# Patient Record
Sex: MALE | Race: WHITE | NOT HISPANIC OR LATINO | Employment: FULL TIME | URBAN - METROPOLITAN AREA
[De-identification: names, ages, dates, MRNs, and addresses within clinical notes are randomized per-mention and may not be internally consistent; named-entity substitution may affect disease eponyms.]

---

## 2021-09-08 LAB — CRC RECOMMENDATION EXT: NORMAL

## 2023-02-09 ENCOUNTER — TELEPHONE (OUTPATIENT)
Dept: HEPATOLOGY | Facility: CLINIC | Age: 63
End: 2023-02-09
Payer: COMMERCIAL

## 2023-02-09 NOTE — TELEPHONE ENCOUNTER
Attempted to contact patient at [phone] with no answer. Left voicemail message for patient to return call.     Re: external referral received from Dr. Germain

## 2023-02-10 ENCOUNTER — TELEPHONE (OUTPATIENT)
Dept: HEPATOLOGY | Facility: CLINIC | Age: 63
End: 2023-02-10
Payer: COMMERCIAL

## 2023-02-10 NOTE — TELEPHONE ENCOUNTER
----- Message from Goldie Fatima sent at 2/10/2023  2:34 PM CST -----  Type:  Patient Returning Call    Who Called:pt  Who Left Message for Patient:nurse  Does the patient know what this is regarding?:return call  Would the patient rather a call back or a response via [facility]? call  Best Call Back Number:[phone]  Additional Information: .    Thank you

## 2023-02-10 NOTE — TELEPHONE ENCOUNTER
Returned call to patient at [phone] with no answer. Left voicemail message for patient to return call.

## 2023-02-10 NOTE — TELEPHONE ENCOUNTER
Spoke with patient and scheduled an appointment for 02/15/2023 with Dr. Joleen Colmenares at [facility].

## 2023-02-10 NOTE — TELEPHONE ENCOUNTER
----- Message from Gia Isidro RN sent at 2/9/2023  4:12 PM CST -----  Contact: self [phone]    ----- Message -----  From: Roxie Coronel  Sent: 2/9/2023   3:47 PM CST  To: Darrian [facility]ATERS Staff    Patient calling back after missing a call from Deb from you office. Please call back [phone]

## 2023-02-15 ENCOUNTER — OFFICE VISIT (OUTPATIENT)
Dept: HEPATOLOGY | Facility: CLINIC | Age: 63
End: 2023-02-15
Payer: COMMERCIAL

## 2023-02-15 ENCOUNTER — LAB VISIT (OUTPATIENT)
Dept: LAB | Facility: HOSPITAL | Age: 63
End: 2023-02-15
Attending: INTERNAL MEDICINE
Payer: COMMERCIAL

## 2023-02-15 VITALS
SYSTOLIC BLOOD PRESSURE: 128 MMHG | BODY MASS INDEX: 30.71 KG/M2 | DIASTOLIC BLOOD PRESSURE: 72 MMHG | HEIGHT: 71 IN | WEIGHT: 219.38 LBS | HEART RATE: 82 BPM

## 2023-02-15 DIAGNOSIS — R16.0 LIVER MASSES: ICD-10-CM

## 2023-02-15 DIAGNOSIS — K74.69 OTHER CIRRHOSIS OF LIVER: Primary | ICD-10-CM

## 2023-02-15 DIAGNOSIS — K74.69 OTHER CIRRHOSIS OF LIVER: ICD-10-CM

## 2023-02-15 LAB
ALBUMIN SERPL BCP-MCNC: 3 G/DL (ref 3.5–5.2)
ALP SERPL-CCNC: 205 U/L (ref 55–135)
ALT SERPL W/O P-5'-P-CCNC: 63 U/L (ref 10–44)
ANION GAP SERPL CALC-SCNC: 10 MMOL/L (ref 8–16)
AST SERPL-CCNC: 90 U/L (ref 10–40)
BASOPHILS # BLD AUTO: 0.02 K/UL (ref 0–0.2)
BASOPHILS NFR BLD: 0.5 % (ref 0–1.9)
BILIRUB SERPL-MCNC: 3.4 MG/DL (ref 0.1–1)
BUN SERPL-MCNC: 8 MG/DL (ref 8–23)
CALCIUM SERPL-MCNC: 8.5 MG/DL (ref 8.7–10.5)
CHLORIDE SERPL-SCNC: 103 MMOL/L (ref 95–110)
CO2 SERPL-SCNC: 26 MMOL/L (ref 23–29)
CREAT SERPL-MCNC: 0.8 MG/DL (ref 0.5–1.4)
DIFFERENTIAL METHOD: ABNORMAL
EOSINOPHIL # BLD AUTO: 0.1 K/UL (ref 0–0.5)
EOSINOPHIL NFR BLD: 2.8 % (ref 0–8)
ERYTHROCYTE [DISTWIDTH] IN BLOOD BY AUTOMATED COUNT: 13.4 % (ref 11.5–14.5)
EST. GFR  (NO RACE VARIABLE): >60 ML/MIN/1.73 M^2
GLUCOSE SERPL-MCNC: 178 MG/DL (ref 70–110)
HCT VFR BLD AUTO: 42.6 % (ref 40–54)
HGB BLD-MCNC: 14.7 G/DL (ref 14–18)
IMM GRANULOCYTES # BLD AUTO: 0 K/UL (ref 0–0.04)
IMM GRANULOCYTES NFR BLD AUTO: 0 % (ref 0–0.5)
INR PPP: 2.4 (ref 0.8–1.2)
LYMPHOCYTES # BLD AUTO: 1.2 K/UL (ref 1–4.8)
LYMPHOCYTES NFR BLD: 27.7 % (ref 18–48)
MCH RBC QN AUTO: 31.3 PG (ref 27–31)
MCHC RBC AUTO-ENTMCNC: 34.5 G/DL (ref 32–36)
MCV RBC AUTO: 91 FL (ref 82–98)
MONOCYTES # BLD AUTO: 0.4 K/UL (ref 0.3–1)
MONOCYTES NFR BLD: 8.6 % (ref 4–15)
NEUTROPHILS # BLD AUTO: 2.6 K/UL (ref 1.8–7.7)
NEUTROPHILS NFR BLD: 60.4 % (ref 38–73)
NRBC BLD-RTO: 0 /100 WBC
PLATELET # BLD AUTO: 71 K/UL (ref 150–450)
PMV BLD AUTO: 12.5 FL (ref 9.2–12.9)
POTASSIUM SERPL-SCNC: 3.5 MMOL/L (ref 3.5–5.1)
PROT SERPL-MCNC: 7.3 G/DL (ref 6–8.4)
PROTHROMBIN TIME: 25.2 SEC (ref 9–12.5)
RBC # BLD AUTO: 4.69 M/UL (ref 4.6–6.2)
SODIUM SERPL-SCNC: 139 MMOL/L (ref 136–145)
WBC # BLD AUTO: 4.3 K/UL (ref 3.9–12.7)

## 2023-02-15 PROCEDURE — 85025 COMPLETE CBC W/AUTO DIFF WBC: CPT | Performed by: INTERNAL MEDICINE

## 2023-02-15 PROCEDURE — 36415 COLL VENOUS BLD VENIPUNCTURE: CPT | Performed by: INTERNAL MEDICINE

## 2023-02-15 PROCEDURE — 4010F PR ACE/ARB THEARPY RXD/TAKEN: ICD-10-PCS | Mod: CPTII,S$GLB,, | Performed by: INTERNAL MEDICINE

## 2023-02-15 PROCEDURE — 1159F PR MEDICATION LIST DOCUMENTED IN MEDICAL RECORD: ICD-10-PCS | Mod: CPTII,S$GLB,, | Performed by: INTERNAL MEDICINE

## 2023-02-15 PROCEDURE — 86038 ANTINUCLEAR ANTIBODIES: CPT | Performed by: INTERNAL MEDICINE

## 2023-02-15 PROCEDURE — 85610 PROTHROMBIN TIME: CPT | Performed by: INTERNAL MEDICINE

## 2023-02-15 PROCEDURE — 86225 DNA ANTIBODY NATIVE: CPT | Performed by: INTERNAL MEDICINE

## 2023-02-15 PROCEDURE — 82728 ASSAY OF FERRITIN: CPT | Performed by: INTERNAL MEDICINE

## 2023-02-15 PROCEDURE — 99205 OFFICE O/P NEW HI 60 MIN: CPT | Mod: S$GLB,,, | Performed by: INTERNAL MEDICINE

## 2023-02-15 PROCEDURE — 99999 PR PBB SHADOW E&M-EST. PATIENT-LVL III: CPT | Mod: PBBFAC,,, | Performed by: INTERNAL MEDICINE

## 2023-02-15 PROCEDURE — 3074F PR MOST RECENT SYSTOLIC BLOOD PRESSURE < 130 MM HG: ICD-10-PCS | Mod: CPTII,S$GLB,, | Performed by: INTERNAL MEDICINE

## 2023-02-15 PROCEDURE — 1160F PR REVIEW ALL MEDS BY PRESCRIBER/CLIN PHARMACIST DOCUMENTED: ICD-10-PCS | Mod: CPTII,S$GLB,, | Performed by: INTERNAL MEDICINE

## 2023-02-15 PROCEDURE — 82390 ASSAY OF CERULOPLASMIN: CPT | Performed by: INTERNAL MEDICINE

## 2023-02-15 PROCEDURE — 86790 VIRUS ANTIBODY NOS: CPT | Performed by: INTERNAL MEDICINE

## 2023-02-15 PROCEDURE — 3078F PR MOST RECENT DIASTOLIC BLOOD PRESSURE < 80 MM HG: ICD-10-PCS | Mod: CPTII,S$GLB,, | Performed by: INTERNAL MEDICINE

## 2023-02-15 PROCEDURE — 80053 COMPREHEN METABOLIC PANEL: CPT | Performed by: INTERNAL MEDICINE

## 2023-02-15 PROCEDURE — 3074F SYST BP LT 130 MM HG: CPT | Mod: CPTII,S$GLB,, | Performed by: INTERNAL MEDICINE

## 2023-02-15 PROCEDURE — 3008F PR BODY MASS INDEX (BMI) DOCUMENTED: ICD-10-PCS | Mod: CPTII,S$GLB,, | Performed by: INTERNAL MEDICINE

## 2023-02-15 PROCEDURE — 99999 PR PBB SHADOW E&M-EST. PATIENT-LVL III: ICD-10-PCS | Mod: PBBFAC,,, | Performed by: INTERNAL MEDICINE

## 2023-02-15 PROCEDURE — 1160F RVW MEDS BY RX/DR IN RCRD: CPT | Mod: CPTII,S$GLB,, | Performed by: INTERNAL MEDICINE

## 2023-02-15 PROCEDURE — 1159F MED LIST DOCD IN RCRD: CPT | Mod: CPTII,S$GLB,, | Performed by: INTERNAL MEDICINE

## 2023-02-15 PROCEDURE — 87340 HEPATITIS B SURFACE AG IA: CPT | Performed by: INTERNAL MEDICINE

## 2023-02-15 PROCEDURE — 3008F BODY MASS INDEX DOCD: CPT | Mod: CPTII,S$GLB,, | Performed by: INTERNAL MEDICINE

## 2023-02-15 PROCEDURE — 86381 MITOCHONDRIAL ANTIBODY EACH: CPT | Performed by: INTERNAL MEDICINE

## 2023-02-15 PROCEDURE — 86235 NUCLEAR ANTIGEN ANTIBODY: CPT | Mod: 59 | Performed by: INTERNAL MEDICINE

## 2023-02-15 PROCEDURE — 84466 ASSAY OF TRANSFERRIN: CPT | Performed by: INTERNAL MEDICINE

## 2023-02-15 PROCEDURE — 99205 PR OFFICE/OUTPT VISIT, NEW, LEVL V, 60-74 MIN: ICD-10-PCS | Mod: S$GLB,,, | Performed by: INTERNAL MEDICINE

## 2023-02-15 PROCEDURE — 86015 ACTIN ANTIBODY EACH: CPT | Performed by: INTERNAL MEDICINE

## 2023-02-15 PROCEDURE — 86039 ANTINUCLEAR ANTIBODIES (ANA): CPT | Performed by: INTERNAL MEDICINE

## 2023-02-15 PROCEDURE — 3078F DIAST BP <80 MM HG: CPT | Mod: CPTII,S$GLB,, | Performed by: INTERNAL MEDICINE

## 2023-02-15 PROCEDURE — 82977 ASSAY OF GGT: CPT | Performed by: INTERNAL MEDICINE

## 2023-02-15 PROCEDURE — 82107 ALPHA-FETOPROTEIN L3: CPT | Performed by: INTERNAL MEDICINE

## 2023-02-15 PROCEDURE — 86706 HEP B SURFACE ANTIBODY: CPT | Performed by: INTERNAL MEDICINE

## 2023-02-15 PROCEDURE — 82103 ALPHA-1-ANTITRYPSIN TOTAL: CPT | Performed by: INTERNAL MEDICINE

## 2023-02-15 PROCEDURE — 4010F ACE/ARB THERAPY RXD/TAKEN: CPT | Mod: CPTII,S$GLB,, | Performed by: INTERNAL MEDICINE

## 2023-02-15 RX ORDER — NADOLOL 20 MG/1
1 TABLET ORAL EVERY MORNING
Status: ON HOLD | COMMUNITY
Start: 2023-01-25 | End: 2023-08-16 | Stop reason: HOSPADM

## 2023-02-15 RX ORDER — METFORMIN HYDROCHLORIDE 500 MG/1
500 TABLET, EXTENDED RELEASE ORAL 2 TIMES DAILY WITH MEALS
COMMUNITY
Start: 2022-11-22 | End: 2023-06-15

## 2023-02-15 RX ORDER — AMLODIPINE BESYLATE 10 MG/1
10 TABLET ORAL DAILY
Status: ON HOLD | COMMUNITY
Start: 2023-02-14 | End: 2023-08-16 | Stop reason: HOSPADM

## 2023-02-15 RX ORDER — EZETIMIBE 10 MG/1
10 TABLET ORAL EVERY MORNING
Status: ON HOLD | COMMUNITY
Start: 2023-01-20 | End: 2023-08-16 | Stop reason: HOSPADM

## 2023-02-15 RX ORDER — LISINOPRIL AND HYDROCHLOROTHIAZIDE 12.5; 2 MG/1; MG/1
1 TABLET ORAL DAILY
Status: ON HOLD | COMMUNITY
Start: 2023-01-12 | End: 2023-08-16 | Stop reason: HOSPADM

## 2023-02-15 RX ORDER — GLIMEPIRIDE 4 MG/1
4 TABLET ORAL EVERY MORNING
Status: ON HOLD | COMMUNITY
Start: 2023-01-12 | End: 2023-08-16 | Stop reason: HOSPADM

## 2023-02-15 NOTE — PROGRESS NOTES
Subjective:     Jed Chávez is here for evaluation of Cirrhosis and Liver lesions      HPI  Jed Chávez is here for eval of new liver lesions concerning for HCC. His dx of cirrhosis is relatively new thought 2/2 EtoH. He has not had EtOH in 90 days and is committed to abstinence.    No evidence of liver decompensation: no ascites, confusion or GI bleeding.      Outside records reviewed    Syncope from September 2021 shows a 4 mm polyp, external and internal hemorrhoids    Upper endoscopy from January 2023 shows small esophageal varices, small hiatal hernia, gastritis, normal duodenum    US 1/2023    There is cirrhosis of the liver.  No ascites.  Stable   splenomegaly.  Possible masses or nodules involving the right lobe the liver.   Consider further evaluation with a CT scan of the liver with and without   contrast using a hemangioma /cirrhosis protocol or MRI abdomen with and without   contrast.   1 cm gallstone.  CT 1/2023  Cirrhosis.  Several hyperdense lesions scattered throughout both lobes of the   liver measuring 4.3 cm.  In the setting of chronic liver disease these may   represent dysplastic nodules.  Recommend MRI correlation with and without   intravenous Gadavist contrast.   Splenomegaly.   Soft tissue density encasing the superior mesenteric artery raising concern for   vasculitis.   Cholelithiasis.   MRI 2/2023  1.  There are 2 nodules as detailed above concerning for hepatocellular   carcinoma, both in the right hepatic lobe.  Recommend correlation with the   patient's elevated protein levels.   2.  Cirrhosis with portal venous hypertension and splenomegaly but no ascites.   Main portal vein and intrahepatic branches are patent.   3.  Innumerable regenerative and hyperplastic nodules throughout the liver.    Review of Systems    Objective:     Physical Exam  Vitals reviewed.   Constitutional:       General: He is not in acute distress.     Appearance: He is well-developed.   HENT:      Head:  Normocephalic and atraumatic.      Mouth/Throat:      Pharynx: No oropharyngeal exudate.   Eyes:      General: No scleral icterus.        Right eye: No discharge.         Left eye: No discharge.      Conjunctiva/sclera: Conjunctivae normal.      Pupils: Pupils are equal, round, and reactive to light.   Pulmonary:      Effort: Pulmonary effort is normal. No respiratory distress.      Breath sounds: Normal breath sounds. No wheezing.   Abdominal:      General: There is no distension.      Palpations: Abdomen is soft.      Tenderness: There is no abdominal tenderness.   Neurological:      Mental Status: He is alert and oriented to person, place, and time.   Psychiatric:         Behavior: Behavior normal.       Computed MELD-Na score unavailable. Necessary lab results were not found in the last year.  Computed MELD score unavailable. Necessary lab results were not found in the last year.    No results found for: WBC, HGB, HCT, PLT  No results found for: BUN, CREATININE, GLU, CALCIUM, NA, K, CL, MG  No results found for: AST, ALT, ALKPHOS, BILITOT, ALBUMIN  No results found for: INR      Assessment/Plan:     1. Other cirrhosis of liver    2. Liver masses      Jed Chávez is a 63 y.o. male withCirrhosis and Liver lesions    Other cirrhosis of liver-likely secondary to alcohol will evaluate for other causes.  Patient is clinically compensated.  -check meld score  -     Anti-Smooth Muscle Antibody; Future; Expected date: 02/15/2023  -     IHSAN Screen w/Reflex; Future; Expected date: 02/15/2023  -     Iron and TIBC; Future; Expected date: 02/15/2023  -     Ferritin; Future; Expected date: 02/15/2023  -     Ceruloplasmin; Future; Expected date: 02/15/2023  -     Antimitochondrial Antibody; Future; Expected date: 02/15/2023  -     Alpha-1-Antitrypsin; Future; Expected date: 02/15/2023  -     Hepatitis A antibody, IgG; Future; Expected date: 02/15/2023  -     Gamma GT; Future; Expected date: 02/15/2023  -     Comprehensive  Metabolic Panel; Standing  -     CBC Auto Differential; Standing  -     Protime-INR; Standing  -     Hepatitis B Surface Ab, Qualitative; Future; Expected date: 02/15/2023  -     Hepatitis B Surface Antigen; Future; Expected date: 02/15/2023  -     Alpha-Fetoprotein and AFP L-3; Future; Expected date: 02/15/2023    Liver masses-needs further evaluation to see if HCC can be diagnosed or if there regenerative nodules as well as staging if HCC is present.  -extensive discussion with patient and wife about the process needed for evaluation.  Imaging will need to be reviewed at IR conference once we have this scanned into our system.  -after IR conference will plan for virtual visit if HCC is diagnosed further discuss in detail treatment options  -     Alpha-Fetoprotein and AFP L-3; Future; Expected date: 02/15/2023    Return to clinic in 3 months with preclinic labs; will be sooner via virtual visit if HCC diagnosis confirmed      Joleen Colmenares MD

## 2023-02-16 LAB
A1AT SERPL-MCNC: 191 MG/DL (ref 100–190)
CERULOPLASMIN SERPL-MCNC: 19 MG/DL (ref 15–45)
FERRITIN SERPL-MCNC: 207 NG/ML (ref 20–300)
GGT SERPL-CCNC: 384 U/L (ref 8–55)
HAV IGG SER QL IA: NORMAL
HBV SURFACE AB SER-ACNC: <3 MIU/ML
HBV SURFACE AB SER-ACNC: NORMAL M[IU]/ML
HBV SURFACE AG SERPL QL IA: NORMAL
IRON SERPL-MCNC: 98 UG/DL (ref 45–160)
SATURATED IRON: 35 % (ref 20–50)
TOTAL IRON BINDING CAPACITY: 281 UG/DL (ref 250–450)
TRANSFERRIN SERPL-MCNC: 190 MG/DL (ref 200–375)

## 2023-02-17 LAB
ANA PATTERN 1: NORMAL
ANA SER QL IF: POSITIVE
ANA TITR SER IF: NORMAL {TITER}
MITOCHONDRIA AB TITR SER IF: NORMAL {TITER}

## 2023-02-20 LAB
AFP L3 MFR SERPL: 5.1 %
AFP SERPL-MCNC: 8.5 NG/ML
ANTI SM ANTIBODY: 0.1 RATIO (ref 0–0.99)
ANTI SM/RNP ANTIBODY: 0.09 RATIO (ref 0–0.99)
ANTI-SM INTERPRETATION: NEGATIVE
ANTI-SM/RNP INTERPRETATION: NEGATIVE
ANTI-SSA ANTIBODY: 0.1 RATIO (ref 0–0.99)
ANTI-SSA INTERPRETATION: NEGATIVE
ANTI-SSB ANTIBODY: 0.06 RATIO (ref 0–0.99)
ANTI-SSB INTERPRETATION: NEGATIVE
DSDNA AB SER-ACNC: NORMAL [IU]/ML
IMMUNOLOGIST REVIEW: ABNORMAL

## 2023-02-22 ENCOUNTER — TELEPHONE (OUTPATIENT)
Dept: HEPATOLOGY | Facility: CLINIC | Age: 63
End: 2023-02-22
Payer: COMMERCIAL

## 2023-02-22 NOTE — TELEPHONE ENCOUNTER
Returned patient's call and informed him that he does not need an order to get his hep a and b vaccines. Patient voiced understanding.

## 2023-02-22 NOTE — TELEPHONE ENCOUNTER
----- Message from Anayeli Covarrubias sent at 2/22/2023 10:04 AM CST -----  Contact: Jed Palma called in with questions regarding immunizations and a procedure he doesn't know about. Please call him back at [phone].     Thanks  TS

## 2023-02-23 LAB — SMOOTH MUSCLE AB TITR SER IF: ABNORMAL {TITER}

## 2023-02-24 ENCOUNTER — PATIENT MESSAGE (OUTPATIENT)
Dept: HEPATOLOGY | Facility: CLINIC | Age: 63
End: 2023-02-24
Payer: COMMERCIAL

## 2023-02-24 DIAGNOSIS — R79.89 ABNORMAL LFTS: Primary | ICD-10-CM

## 2023-03-09 ENCOUNTER — TELEPHONE (OUTPATIENT)
Dept: HEPATOLOGY | Facility: CLINIC | Age: 63
End: 2023-03-09
Payer: COMMERCIAL

## 2023-03-09 NOTE — TELEPHONE ENCOUNTER
Returned patient's call per his request. He stated that no one phoned him with his lab results. I informed the patient that we corresponded with him via portal. Patient stated that he appologized that he thought that there would be additional follow up. Patient voiced understanding.

## 2023-03-17 ENCOUNTER — TELEPHONE (OUTPATIENT)
Dept: TRANSPLANT | Facility: CLINIC | Age: 63
End: 2023-03-17
Payer: COMMERCIAL

## 2023-03-17 NOTE — TELEPHONE ENCOUNTER
Patient: Jed Chávez       MRN: [id number]      : 1960     Age: 63 y.o.  [address] [facility] [address]    Presenting Radiologists:     Providers  Hepatologists: Joleen Colmenares MD      Priority of review: Cancer    Patient Transplant Status: Other Hepatology    Reason for presentation: Indeterminate lesion    Clinical Summary: 63M with EtOH cirrhosis and 2 concerning liver lesions for HCC, minimal elevation of AFP but AFP L3 is normal.    Imaging to be reviewed: Outside CT and MRI scan from this year    HCC Treatment History: none    ABO: n/a    Platelets:   Lab Results   Component Value Date/Time    PLT 71 (L) 02/15/2023 04:36 PM     Creatinine:   Lab Results   Component Value Date/Time    CREATININE 0.8 02/15/2023 04:36 PM     Bilirubin:   Lab Results   Component Value Date/Time    BILITOT 3.4 (H) 02/15/2023 04:36 PM     AFP Last 3 each if available: No results found for: AFP, EXTAFP    MELD: MELD-Na score: 21 at 2/15/2023  4:36 PM  MELD score: 21 at 2/15/2023  4:36 PM  Calculated from:  Serum Creatinine: 0.8 mg/dL (Using min of 1 mg/dL) at 2/15/2023  4:36 PM  Serum Sodium: 139 mmol/L (Using max of 137 mmol/L) at 2/15/2023  4:36 PM  Total Bilirubin: 3.4 mg/dL at 2/15/2023  4:36 PM  INR(ratio): 2.4 at 2/15/2023  4:36 PM  Age: 63 years    Plan:     Follow-up Provider:

## 2023-03-28 ENCOUNTER — CONFERENCE (OUTPATIENT)
Dept: TRANSPLANT | Facility: CLINIC | Age: 63
End: 2023-03-28
Payer: COMMERCIAL

## 2023-03-28 NOTE — TELEPHONE ENCOUNTER
Patient: Jed Chávez       MRN: [id number]      : 1960     Age: 63 y.o.  [address] [facility] [address]     Presenting Radiologists: Jones Gong MD     Providers  Hepatologists: Joleen Colmenares MD        Priority of review: Cancer     Patient Transplant Status: Other Hepatology     Reason for presentation: Indeterminate lesion     Clinical Summary: 63M with EtOH cirrhosis and 2 concerning liver lesions for HCC, minimal elevation of AFP but AFP L3 is normal.     Imaging to be reviewed: Outside CT and MRI scan from this year     HCC Treatment History: none     ABO: n/a     Platelets:         Lab Results   Component Value Date/Time     PLT 71 (L) 02/15/2023 04:36 PM      Creatinine:         Lab Results   Component Value Date/Time     CREATININE 0.8 02/15/2023 04:36 PM      Bilirubin:         Lab Results   Component Value Date/Time     BILITOT 3.4 (H) 02/15/2023 04:36 PM      AFP Last 3 each if available: No results found for: AFP, EXTAFP     MELD: MELD-Na score: 21 at 2/15/2023  4:36 PM  MELD score: 21 at 2/15/2023  4:36 PM  Calculated from:  Serum Creatinine: 0.8 mg/dL (Using min of 1 mg/dL) at 2/15/2023  4:36 PM  Serum Sodium: 139 mmol/L (Using max of 137 mmol/L) at 2/15/2023  4:36 PM  Total Bilirubin: 3.4 mg/dL at 2/15/2023  4:36 PM  INR(ratio): 2.4 at 2/15/2023  4:36 PM  Age: 63 years     Plan: 2 tumors that meet imaging criteria for HCC with arterial enhancement and PV washout c/w HCC, measuring 2.2cm and 1.8cm.  Additional T1 hyperintense foci throughout the liver, likely representing regenerative nodules.  Recommend Y90 and liver transplant evaluation.  Anticipate that the mapping will be challenging.      Discussion:large amount of regenerative nodules which mask anything that looks real. The ones that are suspicious in the posterior aspect segment 7 and the in segment 5.  They enhance and washout, meets imaging characteristic of HCC.  2 cm tumor in the setting of cirrhosis with enhancement and  washout. Ablation or Y90,  Y90 is his best option for treatment        Follow-up Provider: Dr Colmenares

## 2023-03-29 ENCOUNTER — TELEPHONE (OUTPATIENT)
Dept: TRANSPLANT | Facility: CLINIC | Age: 63
End: 2023-03-29
Payer: COMMERCIAL

## 2023-03-29 NOTE — TELEPHONE ENCOUNTER
Patient called and notified of follow up appointment , to discuss the IR results scheduled for 4/5/23 at 1:30.   Patient confirmed that he will be attending the scheduled appointment

## 2023-03-30 ENCOUNTER — PATIENT MESSAGE (OUTPATIENT)
Dept: GASTROENTEROLOGY | Facility: CLINIC | Age: 63
End: 2023-03-30
Payer: COMMERCIAL

## 2023-03-30 ENCOUNTER — TELEPHONE (OUTPATIENT)
Dept: HEPATOLOGY | Facility: CLINIC | Age: 63
End: 2023-03-30
Payer: COMMERCIAL

## 2023-03-30 NOTE — TELEPHONE ENCOUNTER
----- Message from Genie Garcia MA sent at 3/30/2023 10:08 AM CDT -----  Contact: patient  Can you just let him know about his virtual at 11am. Dr. Colmenares said he needed to be seen sooner and she had an opening. She said if he can not do the virtual to just let him come on 04/05  ----- Message -----  From: Gia Isidro RN  Sent: 3/30/2023  10:02 AM CDT  To: STEPHANIA Dupree- do you need me to relay something to the patient or will you call back?  ----- Message -----  From: Davis Stapleton  Sent: 3/30/2023   9:22 AM CDT  To: Darrian Chávez would like a call back at [phone], in regards to speaking with nurse Prescott. He states that she is expecting a call from him.

## 2023-03-30 NOTE — TELEPHONE ENCOUNTER
Attempted to contact patient at [phone] with no answer. Left voicemail message for patient to return call.

## 2023-04-05 ENCOUNTER — OFFICE VISIT (OUTPATIENT)
Dept: HEPATOLOGY | Facility: CLINIC | Age: 63
End: 2023-04-05
Payer: COMMERCIAL

## 2023-04-05 VITALS — BODY MASS INDEX: 30.66 KG/M2 | WEIGHT: 219 LBS | HEIGHT: 71 IN

## 2023-04-05 DIAGNOSIS — C22.0 HCC (HEPATOCELLULAR CARCINOMA): Primary | ICD-10-CM

## 2023-04-05 PROCEDURE — 1160F RVW MEDS BY RX/DR IN RCRD: CPT | Mod: CPTII,95,, | Performed by: INTERNAL MEDICINE

## 2023-04-05 PROCEDURE — 4010F ACE/ARB THERAPY RXD/TAKEN: CPT | Mod: CPTII,95,, | Performed by: INTERNAL MEDICINE

## 2023-04-05 PROCEDURE — 3008F BODY MASS INDEX DOCD: CPT | Mod: CPTII,95,, | Performed by: INTERNAL MEDICINE

## 2023-04-05 PROCEDURE — 3008F PR BODY MASS INDEX (BMI) DOCUMENTED: ICD-10-PCS | Mod: CPTII,95,, | Performed by: INTERNAL MEDICINE

## 2023-04-05 PROCEDURE — 99214 OFFICE O/P EST MOD 30 MIN: CPT | Mod: 95,,, | Performed by: INTERNAL MEDICINE

## 2023-04-05 PROCEDURE — 1159F MED LIST DOCD IN RCRD: CPT | Mod: CPTII,95,, | Performed by: INTERNAL MEDICINE

## 2023-04-05 PROCEDURE — 99214 PR OFFICE/OUTPT VISIT, EST, LEVL IV, 30-39 MIN: ICD-10-PCS | Mod: 95,,, | Performed by: INTERNAL MEDICINE

## 2023-04-05 PROCEDURE — 1159F PR MEDICATION LIST DOCUMENTED IN MEDICAL RECORD: ICD-10-PCS | Mod: CPTII,95,, | Performed by: INTERNAL MEDICINE

## 2023-04-05 PROCEDURE — 1160F PR REVIEW ALL MEDS BY PRESCRIBER/CLIN PHARMACIST DOCUMENTED: ICD-10-PCS | Mod: CPTII,95,, | Performed by: INTERNAL MEDICINE

## 2023-04-05 PROCEDURE — 4010F PR ACE/ARB THEARPY RXD/TAKEN: ICD-10-PCS | Mod: CPTII,95,, | Performed by: INTERNAL MEDICINE

## 2023-04-05 NOTE — PROGRESS NOTES
Subjective:     Jed Chávez is here for follow up of cirrhosis    HPI  Since Jed Chávez's last visit he is mainly been having issues with his hip for which he is getting follow-up.  No specific liver related issues.  He is here to follow-up IR conference review.    IR   Plan: 2 tumors that meet imaging criteria for HCC with arterial enhancement and PV washout c/w HCC, measuring 2.2cm and 1.8cm.  Additional T1 hyperintense foci throughout the liver, likely representing regenerative nodules.  Recommend Y90 and liver transplant evaluation.  Anticipate that the mapping will be challenging.        Discussion:large amount of regenerative nodules which mask anything that looks real. The ones that are suspicious in the posterior aspect segment 7 and the in segment 5.  They enhance and washout, meets imaging characteristic of HCC.  2 cm tumor in the setting of cirrhosis with enhancement and washout. Ablation or Y90,  Y90 is his best option for treatment        No evidence of liver decompensation: no ascites, confusion or GI bleeding.      Review of Systems    Objective:     Physical Exam    MELD-Na score: 21 at 2/15/2023  4:36 PM  MELD score: 21 at 2/15/2023  4:36 PM  Calculated from:  Serum Creatinine: 0.8 mg/dL (Using min of 1 mg/dL) at 2/15/2023  4:36 PM  Serum Sodium: 139 mmol/L (Using max of 137 mmol/L) at 2/15/2023  4:36 PM  Total Bilirubin: 3.4 mg/dL at 2/15/2023  4:36 PM  INR(ratio): 2.4 at 2/15/2023  4:36 PM  Age: 63 years    WBC   Date Value Ref Range Status   02/15/2023 4.30 3.90 - 12.70 K/uL Final     Hemoglobin   Date Value Ref Range Status   02/15/2023 14.7 14.0 - 18.0 g/dL Final     Hematocrit   Date Value Ref Range Status   02/15/2023 42.6 40.0 - 54.0 % Final     Platelets   Date Value Ref Range Status   02/15/2023 71 (L) 150 - 450 K/uL Final     BUN   Date Value Ref Range Status   02/15/2023 8 8 - 23 mg/dL Final     Creatinine   Date Value Ref Range Status   02/15/2023 0.8 0.5 - 1.4 mg/dL Final      Glucose   Date Value Ref Range Status   02/15/2023 178 (H) 70 - 110 mg/dL Final     Calcium   Date Value Ref Range Status   02/15/2023 8.5 (L) 8.7 - 10.5 mg/dL Final     Sodium   Date Value Ref Range Status   02/15/2023 139 136 - 145 mmol/L Final     Potassium   Date Value Ref Range Status   02/15/2023 3.5 3.5 - 5.1 mmol/L Final     Chloride   Date Value Ref Range Status   02/15/2023 103 95 - 110 mmol/L Final     AST   Date Value Ref Range Status   02/15/2023 90 (H) 10 - 40 U/L Final     ALT   Date Value Ref Range Status   02/15/2023 63 (H) 10 - 44 U/L Final     Alkaline Phosphatase   Date Value Ref Range Status   02/15/2023 205 (H) 55 - 135 U/L Final     Total Bilirubin   Date Value Ref Range Status   02/15/2023 3.4 (H) 0.1 - 1.0 mg/dL Final     Comment:     For infants and newborns, interpretation of results should be based  on gestational age, weight and in agreement with clinical  observations.    Premature Infant recommended reference ranges:  Up to 24 hours.............<8.0 mg/dL  Up to 48 hours............<12.0 mg/dL  3-5 days..................<15.0 mg/dL  6-29 days.................<15.0 mg/dL       Albumin   Date Value Ref Range Status   02/15/2023 3.0 (L) 3.5 - 5.2 g/dL Final     INR   Date Value Ref Range Status   02/15/2023 2.4 (H) 0.8 - 1.2 Final     Comment:     Coumadin Therapy:  2.0 - 3.0 for INR for all indicators except mechanical heart valves  and antiphospholipid syndromes which should use 2.5 - 3.5.           Assessment/Plan:     1. HCC (hepatocellular carcinoma)      Jed Chávez is a 63 y.o. male withIR Review and Cirrhosis      HCC-extensive discussion with patient and wife about diagnosis of HCC.  Also discussed plan for treatment with yttrium therapy and the need for liver transplantation.  Process for transplant evaluation, listing and ultimately transplantation were discussed.  -patient is okay to proceed with transplant evaluation  -patient is okay to proceed with yttrium therapy  -he  will need chest CT as part of his staging when he goes for transplant evaluation along with updated cross-sectional imaging    Return to clinic as previously scheduled in June with preclinic labs    Patient was seen in the liver transplant department at [facility].    UNOS Patient Status  Functional Status: 80% - Normal activity with effort: some symptoms of disease  Physical Capacity: No Limitations      Joleen Colmenares MD     The patient location is: [place]  The chief complaint leading to consultation is: Cirrhosis and liver lesions    Visit type: audiovisual    Face to Face time with patient: 15 minutes  30 minutes of total time spent on the encounter, which includes face to face time and non-face to face time preparing to see the patient (eg, review of tests), Obtaining and/or reviewing separately obtained history, Documenting clinical information in the electronic or other health record, Independently interpreting results (not separately reported) and communicating results to the patient/family/caregiver, or Care coordination (not separately reported).         Each patient to whom he or she provides medical services by telemedicine is:  (1) informed of the relationship between the physician and patient and the respective role of any other health care provider with respect to management of the patient; and (2) notified that he or she may decline to receive medical services by telemedicine and may withdraw from such care at any time.    Notes:

## 2023-04-10 ENCOUNTER — TELEPHONE (OUTPATIENT)
Dept: TRANSPLANT | Facility: CLINIC | Age: 63
End: 2023-04-10

## 2023-04-11 ENCOUNTER — TELEPHONE (OUTPATIENT)
Dept: INTERVENTIONAL RADIOLOGY/VASCULAR | Facility: CLINIC | Age: 63
End: 2023-04-11
Payer: COMMERCIAL

## 2023-04-11 NOTE — TELEPHONE ENCOUNTER
Left message for pt to return my call. Need to schedule IR consult.  Please forward call to [phone]. Thanks

## 2023-04-12 ENCOUNTER — OFFICE VISIT (OUTPATIENT)
Dept: INTERVENTIONAL RADIOLOGY/VASCULAR | Facility: CLINIC | Age: 63
End: 2023-04-12
Payer: COMMERCIAL

## 2023-04-12 DIAGNOSIS — C22.0 HCC (HEPATOCELLULAR CARCINOMA): Primary | ICD-10-CM

## 2023-04-12 PROCEDURE — 1160F RVW MEDS BY RX/DR IN RCRD: CPT | Mod: CPTII,95,NTX,

## 2023-04-12 PROCEDURE — 4010F ACE/ARB THERAPY RXD/TAKEN: CPT | Mod: CPTII,95,NTX,

## 2023-04-12 PROCEDURE — 1160F PR REVIEW ALL MEDS BY PRESCRIBER/CLIN PHARMACIST DOCUMENTED: ICD-10-PCS | Mod: CPTII,95,NTX,

## 2023-04-12 PROCEDURE — 99204 PR OFFICE/OUTPT VISIT, NEW, LEVL IV, 45-59 MIN: ICD-10-PCS | Mod: 95,NTX,,

## 2023-04-12 PROCEDURE — 4010F PR ACE/ARB THEARPY RXD/TAKEN: ICD-10-PCS | Mod: CPTII,95,NTX,

## 2023-04-12 PROCEDURE — 1159F MED LIST DOCD IN RCRD: CPT | Mod: CPTII,95,NTX,

## 2023-04-12 PROCEDURE — 99204 OFFICE O/P NEW MOD 45 MIN: CPT | Mod: 95,NTX,,

## 2023-04-12 PROCEDURE — 1159F PR MEDICATION LIST DOCUMENTED IN MEDICAL RECORD: ICD-10-PCS | Mod: CPTII,95,NTX,

## 2023-04-12 NOTE — PROGRESS NOTES
Subjective     Patient ID: Jed Chávez is a 63 y.o. male.    Chief Complaint: HCC    Patient presents virtually with wife to discuss y90. Patient was discussed in a multidisciplinary liver conference (including surgery, oncology, hepatology, and IR).   Jed Chávez with dx of cirrhosis presents to discuss HCC local regional treatment. Patient reports he is felling well.  He also denies other signs of decompensated cirrhosis including no recent abdominal distention, encephalopathy, jaundice. 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 He also endorse diarrhea daily that has been going on for several months (possibly due to metformin?) denies and blood in stool and reports is stool is normal in color. No other complaints at this time.     PMH and medications reviewed  No issues laying flat. Patient does not use a CPAP or O2 at home.   Hepatology and conference notes reviewed.      Review of Systems   Constitutional:  Positive for fatigue. Negative for appetite change and unexpected weight change.   Respiratory:  Negative for cough and shortness of breath.    Cardiovascular:  Negative for chest pain.   Gastrointestinal:  Positive for diarrhea. Negative for abdominal distention, abdominal pain, blood in stool, constipation, nausea and vomiting.   Psychiatric/Behavioral:  Positive for sleep disturbance. Negative for behavioral problems and confusion.         Objective     Physical Exam  Constitutional:       Appearance: Normal appearance.      Comments: Virtual Visit.    HENT:      Head: Atraumatic.      Nose: Nose normal.   Eyes:      Conjunctiva/sclera: Conjunctivae normal.   Pulmonary:      Effort: Pulmonary effort is normal. No respiratory distress.   Neurological:      General: No focal deficit present.      Mental Status: He is alert and oriented to person, place, and time.   Psychiatric:         Mood and Affect: Mood normal.          Behavior: Behavior normal.     López Kent MD - 02/03/2023   Formatting of this note might be different from the original.   EXAM: MR ABDOMEN WO AND W  CONTRAST   CLINICAL HISTORY: Liver lesion.  Cirrhosis.   TECHNIQUE: Multiplanar multisequence imaging of the abdomen is performed with   and without the intravenous administration of 20 mL of ProHance.   COMPARISON: CT abdomen, 1/16/2023 and 10/24/2022.   FINDINGS:  The liver is cirrhotic with innumerable varying sized nodules   scattered throughout the entire liver many of which shows T1 hyperintense   signal intensity indicating hyperplastic nodules.  The hyperdense obvious   lesions on the recent CT scan show T2 hyperintense signal intensity and do not   show definite early arterial enhancement.   There are however 2 definite nodules which show early arterial enhancement and   washout on the more delayed sequences one of which appears to show a enhancing   pseudocapsule on the delayed image.  The largest is seen in the anterior   segment right hepatic lobe measuring 20 x 11 mm and the other is seen in the   subcapsular posterior segment right hepatic lobe measuring 14 x 12 mm.   Splenomegaly with the spleen measuring 17.3 x 10.9 x 15.4 cm.  No biliary or   pancreatic ductal dilatation.  No adrenal mass.  No hydronephrosis.  Simple   cyst in the posterior aspect left kidney measuring 2.9 x 2.2 cm.  No ascites.   Dictated and Electronically Signed By: López Kent MD on February 3, 2023   IMPRESSION:   1.  There are 2 nodules as detailed above concerning for hepatocellular   carcinoma, both in the right hepatic lobe.  Recommend correlation with the   patient's elevated protein levels.   2.  Cirrhosis with portal venous hypertension and splenomegaly but no ascites.   Main portal vein and intrahepatic branches are patent.   3.  Innumerable regenerative and hyperplastic nodules throughout the liver.       Assessment and Plan     HCC (hepatocellular carcinoma)  -      Bilirubin, Direct; Future; Expected date: 04/12/2023  -     CBC Auto Differential; Future; Expected date: 04/12/2023  -     Comprehensive Metabolic Panel; Future; Expected date: 04/12/2023  -     Protime-INR; Future; Expected date: 04/12/2023  -     IR Embolization for Tumor_Organ Ischemia; Future; Expected date: 04/12/2023  -     IR Embolization for Tumor_Organ Ischemia; Future; Expected date: 04/12/2023  -     NM Liver Imaging Static POST Y-90 Emboli; Future; Expected date: 04/12/2023  -     NM Liver Imaging Static PRE Y-90 Emboliz; Future; Expected date: 04/12/2023  -     NM Spect/CT Single Area; Future; Expected date: 04/12/2023  -     NM Spect/CT Single Area; Future; Expected date: 04/12/2023      The patient location is: [place]  The chief complaint leading to consultation is: HCC    Visit type: audiovisual    30 minutes of total time spent on the encounter, which includes face to face time and non-face to face time preparing to see the patient (eg, review of tests), Obtaining and/or reviewing separately obtained history, Documenting clinical information in the electronic or other health record, Independently interpreting results (not separately reported) and communicating results to the patient/family/caregiver, or Care coordination (not separately reported).     Each patient to whom he or she provides medical services by telemedicine is:  (1) informed of the relationship between the physician and patient and the respective role of any other health care provider with respect to management of the patient; and (2) notified that he or she may decline to receive medical services by telemedicine and may withdraw from such care at any time.    Notes: - Patient was reviewed in multidisciplinary liver conference including IR, hepatology, surgery, and oncology.  -  2 tumors that meet imaging criteria for HCC with arterial enhancement and PV washout c/w HCC, measuring 2.2cm and 1.8cm.  - Based on liver function and  functional status (ECOG 0) patient has early stage disease. Recommend Y90 for locoregional therapy as a definitive therapy or potential bridge to liver transplant.   - Patient with untreated HCC at betito for progression without intervention     Yttrium 90 Radioembolization discussed in detail with the patient including risks (including, but not limited to, pain, bleeding, infection, damage to nearby structures, and the need for additional procedures), benefits, potential complications, usual pre and post procedure course.  Discussed the need for initial Angiogram mapping study prior to scheduling the actual Radioembolization procedure. Utilized images from the patient's chart, the internet, and illustrations to help explain procedure. The patient voices understanding and all questions have been answered.  The patient agrees to proceed as planned.  Written informed consent obtained. Patient scheduled for 5/8/2023. Pre-procedure handout with clinic phone number provided.    Maribell Alonzo PA-C  Interventional Radiology  [phone]

## 2023-04-13 ENCOUNTER — PATIENT MESSAGE (OUTPATIENT)
Dept: HEPATOLOGY | Facility: CLINIC | Age: 63
End: 2023-04-13
Payer: COMMERCIAL

## 2023-05-01 ENCOUNTER — TELEPHONE (OUTPATIENT)
Dept: TRANSPLANT | Facility: CLINIC | Age: 63
End: 2023-05-01
Payer: COMMERCIAL

## 2023-05-01 NOTE — TELEPHONE ENCOUNTER
----- Message from Ryann Partida MA sent at 5/1/2023 11:03 AM CDT -----    ----- Message -----  From: Taylor Mcfadden  Sent: 5/1/2023  10:59 AM CDT  To: [facility] Staff    Pt stated he have an upcoming appt in [place] and would like a call back at .[phone]. Thx EL

## 2023-05-02 ENCOUNTER — PATIENT MESSAGE (OUTPATIENT)
Dept: HEPATOLOGY | Facility: CLINIC | Age: 63
End: 2023-05-02
Payer: COMMERCIAL

## 2023-05-03 ENCOUNTER — TELEPHONE (OUTPATIENT)
Dept: TRANSPLANT | Facility: CLINIC | Age: 63
End: 2023-05-03
Payer: COMMERCIAL

## 2023-05-03 NOTE — TELEPHONE ENCOUNTER
Spoke with ANN Chávez in reference to coordinating dates for liver transplant evaluation.  Standard and Fast Pass evaluation procedures reviewed, as well as tentative start dates.  Patient expressed understanding and agreed to Fast Pass evaluation June 1st and 2nd.  Evaluation tests and consults discussed, including HIV testing and Anesthesia review of tests results and medical history. Patient reports having recent egd and  colonoscopy. BMD to be scheduled with evaluation. Tentative evaluation schedule reviewed.  Advised of the need to fast after midnight May 31st and have caregiver attend.  All questions and concerns addressed. Caregiver and Fast Pass appointment letters mailed.

## 2023-05-05 ENCOUNTER — TELEPHONE (OUTPATIENT)
Dept: INTERVENTIONAL RADIOLOGY/VASCULAR | Facility: HOSPITAL | Age: 63
End: 2023-05-05
Payer: COMMERCIAL

## 2023-05-08 ENCOUNTER — HOSPITAL ENCOUNTER (OUTPATIENT)
Dept: RADIOLOGY | Facility: HOSPITAL | Age: 63
Discharge: HOME | End: 2023-05-08
Payer: COMMERCIAL

## 2023-05-08 ENCOUNTER — HOSPITAL ENCOUNTER (OUTPATIENT)
Dept: INTERVENTIONAL RADIOLOGY/VASCULAR | Facility: HOSPITAL | Age: 63
Discharge: HOME | End: 2023-05-08
Payer: COMMERCIAL

## 2023-05-08 VITALS
BODY MASS INDEX: 30.66 KG/M2 | WEIGHT: 219 LBS | SYSTOLIC BLOOD PRESSURE: 119 MMHG | TEMPERATURE: 99 F | OXYGEN SATURATION: 95 % | HEART RATE: 61 BPM | HEIGHT: 71 IN | RESPIRATION RATE: 18 BRPM | DIASTOLIC BLOOD PRESSURE: 65 MMHG

## 2023-05-08 DIAGNOSIS — C22.0 HCC (HEPATOCELLULAR CARCINOMA): ICD-10-CM

## 2023-05-08 LAB — POCT GLUCOSE: 88 MG/DL (ref 70–110)

## 2023-05-08 PROCEDURE — 99153 MOD SED SAME PHYS/QHP EA: CPT | Mod: NTX,59 | Performed by: RADIOLOGY

## 2023-05-08 PROCEDURE — 99152 MOD SED SAME PHYS/QHP 5/>YRS: CPT | Mod: TXP | Performed by: RADIOLOGY

## 2023-05-08 PROCEDURE — 77290 IR EMBOLIZATION COMP FOR TUMOR_ORGAN ISCHEMIA_INFARC: ICD-10-PCS | Mod: 26,NTX,, | Performed by: RADIOLOGY

## 2023-05-08 PROCEDURE — 36248 INS CATH ABD/L-EXT ART ADDL: CPT | Mod: 59,NTX | Performed by: RADIOLOGY

## 2023-05-08 PROCEDURE — 75887 PR  PERCUT XHEPATIC PORTOGRAM: ICD-10-PCS | Mod: 26,NTX,, | Performed by: RADIOLOGY

## 2023-05-08 PROCEDURE — C1769 GUIDE WIRE: HCPCS | Mod: NTX

## 2023-05-08 PROCEDURE — 63600175 PHARM REV CODE 636 W HCPCS: Mod: TXP | Performed by: STUDENT IN AN ORGANIZED HEALTH CARE EDUCATION/TRAINING PROGRAM

## 2023-05-08 PROCEDURE — 25000003 PHARM REV CODE 250: Mod: NTX | Performed by: STUDENT IN AN ORGANIZED HEALTH CARE EDUCATION/TRAINING PROGRAM

## 2023-05-08 PROCEDURE — 75774 ARTERY X-RAY EACH VESSEL: CPT | Mod: TC,59,TXP | Performed by: RADIOLOGY

## 2023-05-08 PROCEDURE — 36248 INS CATH ABD/L-EXT ART ADDL: CPT | Mod: 59,NTX,, | Performed by: RADIOLOGY

## 2023-05-08 PROCEDURE — 78201 NM LIVER IMAGING STATIC PRE Y-90 EMBOLIZATION: ICD-10-PCS | Mod: 26,59,NTX, | Performed by: RADIOLOGY

## 2023-05-08 PROCEDURE — 36248 PR PR INS CATH ABD/L-EXT ART ADDL 2ND ORD/3RD ORD/BYD: ICD-10-PCS | Mod: 59,NTX,, | Performed by: RADIOLOGY

## 2023-05-08 PROCEDURE — 76380 CAT SCAN FOLLOW-UP STUDY: CPT | Mod: TC,TXP | Performed by: RADIOLOGY

## 2023-05-08 PROCEDURE — G0269 OCCLUSIVE DEVICE IN VEIN ART: HCPCS | Mod: TXP | Performed by: RADIOLOGY

## 2023-05-08 PROCEDURE — 76377 3D RENDER W/INTRP POSTPROCES: CPT | Mod: 26,59,NTX, | Performed by: RADIOLOGY

## 2023-05-08 PROCEDURE — 75726 CHG ANGIO VISCERAL SELECTV/SUBSELEC: ICD-10-PCS | Mod: 26,59,NTX, | Performed by: RADIOLOGY

## 2023-05-08 PROCEDURE — 76377 3D RENDER W/INTRP POSTPROCES: CPT | Mod: TC,NTX | Performed by: RADIOLOGY

## 2023-05-08 PROCEDURE — 82962 GLUCOSE BLOOD TEST: CPT | Mod: TXP

## 2023-05-08 PROCEDURE — 75726 ARTERY X-RAYS ABDOMEN: CPT | Mod: 26,59,NTX, | Performed by: RADIOLOGY

## 2023-05-08 PROCEDURE — 76937 US GUIDE VASCULAR ACCESS: CPT | Mod: 26,NTX,, | Performed by: RADIOLOGY

## 2023-05-08 PROCEDURE — 78201 LIVER IMAGING STATIC ONLY: CPT | Mod: 26,59,NTX, | Performed by: RADIOLOGY

## 2023-05-08 PROCEDURE — 75726 ARTERY X-RAYS ABDOMEN: CPT | Mod: TC,59,TXP | Performed by: RADIOLOGY

## 2023-05-08 PROCEDURE — 36247 INS CATH ABD/L-EXT ART 3RD: CPT | Mod: RT,NTX | Performed by: RADIOLOGY

## 2023-05-08 PROCEDURE — 75774 PR  ANGIO EA ADDNL SELECTV VESSEL: ICD-10-PCS | Mod: 26,59,NTX, | Performed by: RADIOLOGY

## 2023-05-08 PROCEDURE — 77290 THER RAD SIMULAJ FIELD CPLX: CPT | Mod: NTX | Performed by: RADIOLOGY

## 2023-05-08 PROCEDURE — 76380 CAT SCAN FOLLOW-UP STUDY: CPT | Mod: 26,NTX,, | Performed by: RADIOLOGY

## 2023-05-08 PROCEDURE — 25500020 PHARM REV CODE 255: Mod: NTX | Performed by: RADIOLOGY

## 2023-05-08 PROCEDURE — 78830 NM SPECT/CT SINGLE AREA: ICD-10-PCS | Mod: 26,NTX,, | Performed by: RADIOLOGY

## 2023-05-08 PROCEDURE — 78830 RP LOCLZJ TUM SPECT W/CT 1: CPT | Mod: TC,NTX

## 2023-05-08 PROCEDURE — 75887 VEIN X-RAY LIVER W/O HEMODYN: CPT | Mod: TC,NTX | Performed by: RADIOLOGY

## 2023-05-08 PROCEDURE — 76937 PR  US GUIDE, VASCULAR ACCESS: ICD-10-PCS | Mod: 26,NTX,, | Performed by: RADIOLOGY

## 2023-05-08 PROCEDURE — 75887 VEIN X-RAY LIVER W/O HEMODYN: CPT | Mod: 26,NTX,, | Performed by: RADIOLOGY

## 2023-05-08 PROCEDURE — 76380 PR  CT SCAN,LIMITED/LOCALIZED F/U STUDY: ICD-10-PCS | Mod: 26,NTX,, | Performed by: RADIOLOGY

## 2023-05-08 PROCEDURE — 75774 ARTERY X-RAY EACH VESSEL: CPT | Mod: 26,59,NTX, | Performed by: RADIOLOGY

## 2023-05-08 PROCEDURE — 78830 RP LOCLZJ TUM SPECT W/CT 1: CPT | Mod: 26,NTX,, | Performed by: RADIOLOGY

## 2023-05-08 PROCEDURE — 76377 PR  3D RENDERING W/ IMAGE POSTPROCESS: ICD-10-PCS | Mod: 26,59,NTX, | Performed by: RADIOLOGY

## 2023-05-08 PROCEDURE — 36247 INS CATH ABD/L-EXT ART 3RD: CPT | Mod: 51,RT,NTX, | Performed by: RADIOLOGY

## 2023-05-08 PROCEDURE — 36247 PR PLACE CATH SUBSUBSELECT ART,ABD/PEL: ICD-10-PCS | Mod: 51,RT,NTX, | Performed by: RADIOLOGY

## 2023-05-08 PROCEDURE — 76937 US GUIDE VASCULAR ACCESS: CPT | Mod: TC,NTX | Performed by: RADIOLOGY

## 2023-05-08 PROCEDURE — 78201 LIVER IMAGING STATIC ONLY: CPT | Mod: TC,NTX,59

## 2023-05-08 RX ORDER — MIDAZOLAM HYDROCHLORIDE 1 MG/ML
INJECTION INTRAMUSCULAR; INTRAVENOUS
Status: COMPLETED | OUTPATIENT
Start: 2023-05-08 | End: 2023-05-08

## 2023-05-08 RX ORDER — FENTANYL CITRATE 50 UG/ML
INJECTION, SOLUTION INTRAMUSCULAR; INTRAVENOUS
Status: COMPLETED | OUTPATIENT
Start: 2023-05-08 | End: 2023-05-08

## 2023-05-08 RX ORDER — LIDOCAINE HYDROCHLORIDE 10 MG/ML
1 INJECTION, SOLUTION EPIDURAL; INFILTRATION; INTRACAUDAL; PERINEURAL ONCE AS NEEDED
Status: DISCONTINUED | OUTPATIENT
Start: 2023-05-08 | End: 2023-05-09 | Stop reason: HOSPADM

## 2023-05-08 RX ORDER — LIDOCAINE HYDROCHLORIDE 10 MG/ML
INJECTION INFILTRATION; PERINEURAL
Status: COMPLETED | OUTPATIENT
Start: 2023-05-08 | End: 2023-05-08

## 2023-05-08 RX ORDER — SODIUM CHLORIDE 9 MG/ML
INJECTION, SOLUTION INTRAVENOUS
Status: COMPLETED | OUTPATIENT
Start: 2023-05-08 | End: 2023-05-08

## 2023-05-08 RX ORDER — SODIUM CHLORIDE 9 MG/ML
INJECTION, SOLUTION INTRAVENOUS CONTINUOUS
Status: DISCONTINUED | OUTPATIENT
Start: 2023-05-08 | End: 2023-05-09 | Stop reason: HOSPADM

## 2023-05-08 RX ADMIN — MIDAZOLAM HYDROCHLORIDE 0.5 MG: 1 INJECTION INTRAMUSCULAR; INTRAVENOUS at 11:05

## 2023-05-08 RX ADMIN — FENTANYL CITRATE 25 MCG: 50 INJECTION, SOLUTION INTRAMUSCULAR; INTRAVENOUS at 12:05

## 2023-05-08 RX ADMIN — SODIUM CHLORIDE 500 ML: 9 INJECTION, SOLUTION INTRAVENOUS at 11:05

## 2023-05-08 RX ADMIN — IOHEXOL 85 ML: 300 INJECTION, SOLUTION INTRAVENOUS at 01:05

## 2023-05-08 RX ADMIN — LIDOCAINE HYDROCHLORIDE 5 ML: 10 INJECTION, SOLUTION INFILTRATION; PERINEURAL at 11:05

## 2023-05-08 RX ADMIN — MIDAZOLAM HYDROCHLORIDE 0.5 MG: 1 INJECTION INTRAMUSCULAR; INTRAVENOUS at 12:05

## 2023-05-08 RX ADMIN — FENTANYL CITRATE 25 MCG: 50 INJECTION, SOLUTION INTRAMUSCULAR; INTRAVENOUS at 11:05

## 2023-05-08 NOTE — PLAN OF CARE
Patient arrived to room. PIV placed. Admit assessment completed. Plan of care discussed with patient. Will monitor. Call light within reach, instructed in use.   POC BS 88

## 2023-05-08 NOTE — H&P
Radiology History & Physical      SUBJECTIVE:     Chief Complaint: HCC     History of Present Illness:  Jed Chávez is a 63 y.o. male who presents for pre Y90 mapping.    Past Medical History:   Diagnosis Date    Alcoholic cirrhosis     CVA (cerebral vascular accident)     DM (diabetes mellitus)     Dyslipidemia     HTN (hypertension)     Intracranial hemorrhage      Past Surgical History:   Procedure Laterality Date    HERNIA REPAIR N/A        Home Meds:   Prior to Admission medications    Medication Sig Start Date End Date Taking? Authorizing Provider   amLODIPine (NORVASC) 10 MG tablet Take 10 mg by mouth. 2/14/23  Yes Historical Provider   ezetimibe (ZETIA) 10 mg tablet Take 10 mg by mouth every morning. 1/20/23  Yes Historical Provider   glimepiride (AMARYL) 4 MG tablet Take 4 mg by mouth every morning. 1/12/23  Yes Historical Provider   lisinopriL-hydrochlorothiazide (PRINZIDE,ZESTORETIC) 20-12.5 mg per tablet Take 1 tablet by mouth. 1/12/23  Yes Historical Provider   metFORMIN (GLUCOPHAGE-XR) 500 MG ER 24hr tablet Take 500 mg by mouth 2 (two) times daily with meals. 11/22/22 11/22/23 Yes Historical Provider   nadoloL (CORGARD) 20 MG tablet Take 1 tablet by mouth every morning. 1/25/23 1/25/24 Yes Historical Provider     Anticoagulants/Antiplatelets: no anticoagulation    Allergies: Review of patient's allergies indicates:  No Known Allergies  Sedation History:  have not been any systemic reactions    Review of Systems:   Hematological: negative  no known coagulopathies  Respiratory: no cough, shortness of breath, or wheezing  no shortness of breath  Cardiovascular: no chest pain or dyspnea on exertion  no chest pain  Gastrointestinal: no abdominal pain, change in bowel habits, or black or bloody stools  no abdominal pain  Genito-Urinary: no dysuria, trouble voiding, or hematuria  no dysuria  Musculoskeletal: negative  Neurological: no TIA or stroke symptoms         OBJECTIVE:     Vital Signs (Most  Recent)  Temp: 99 °F (37.2 °C) (05/08/23 0750)  Pulse: (!) 51 (05/08/23 0750)  Resp: 20 (05/08/23 0750)  BP: 122/72 (05/08/23 0750)  SpO2: 96 % (05/08/23 0750)    Physical Exam:  ASA: 2  Mallampati: 2    General: no acute distress  Mental Status: alert and oriented to person, place and time  HEENT: normocephalic, atraumatic  Chest: unlabored breathing  Heart: regular heart rate  Abdomen: nondistended  Extremity: moves all extremities    Laboratory  Lab Results   Component Value Date    INR 2.3 (H) 05/08/2023       Lab Results   Component Value Date    WBC 5.40 05/08/2023    HGB 14.9 05/08/2023    HCT 45.2 05/08/2023    MCV 96 05/08/2023    PLT 85 (L) 05/08/2023      Lab Results   Component Value Date     (H) 02/15/2023     02/15/2023    K 3.5 02/15/2023     02/15/2023    CO2 26 02/15/2023    BUN 8 02/15/2023    CREATININE 0.8 02/15/2023    CALCIUM 8.5 (L) 02/15/2023    ALT 63 (H) 02/15/2023    AST 90 (H) 02/15/2023    ALBUMIN 3.0 (L) 02/15/2023    BILITOT 3.4 (H) 02/15/2023       ASSESSMENT/PLAN:     Sedation Plan: Moderate  Patient will undergo pre Y90 mapping.      Benja Ernandez DO

## 2023-05-08 NOTE — PROCEDURES
Interventional Radiology postop note    Pre Op Diagnosis: HCC  Post Op Diagnosis: Same    Procedure: Pre Y90 mapping      Procedure performed by: Chandra    Written Informed Consent Obtained: Yes  Specimen Removed: NO  Estimated Blood Loss: Minimal    Findings:   Via right CFA, angiography demonstrated tumor enhancement within the right hepatic lobe supplied via branches of seg 7 and seg 6 hepatic arteries. MAA injected via right hepatic artery.     5 Fr RCFA access closed with Vascade, Hemostasis achieved.     Patient tolerated procedure well.    Right leg straight for 2 hours.  Patient to undergo nuclear medicine scan for lung shunt calculation.    Benja Ernandez, DO  Interventional Radiology

## 2023-05-08 NOTE — DISCHARGE SUMMARY
Radiology Discharge Summary      Hospital Course: No complications    Admit Date: 5/8/2023  Discharge Date: 05/08/2023     Instructions Given to Patient: Yes  Diet: Resume prior diet  Activity: no lifting, Driving, or Strenuous exercise for 3 days    Description of Condition on Discharge: Stable  Vital Signs (Most Recent): Temp: 99 °F (37.2 °C) (05/08/23 0750)  Pulse: (!) 57 (05/08/23 1245)  Resp: 16 (05/08/23 1245)  BP: 107/65 (05/08/23 1245)  SpO2: 95 % (05/08/23 1245)    Discharge Disposition: Home    Discharge Diagnosis: HCC    Benja Ernandez DO

## 2023-05-08 NOTE — CARE UPDATE
Pt arrived to MPU 5 for recovery of Y 90. VS stable wife brought to bedside. Will continue to monitor.

## 2023-05-08 NOTE — PLAN OF CARE
Pre-Yttrium mapping procedure completed. Patient tolerated well. Patient AAOx3, no distress noted, respirations even and unlabored, will continue to monitor. VSS. 5 F Vascade closure device deployed in right  femoral artery; hemostasis achieved at 1245. Patient to lay flat for 2 hours until 14:45 on 05 08/23 per MD. Right groin site clean, dry, and intact; no bleeding or hematoma noted. Patient to be transferred to nuclear medicine  for post-procedural imaging then to MPU for recovery per MD. Report to be given at bedside to RN.

## 2023-05-08 NOTE — PLAN OF CARE
Pt arrived to  for pre-yttrium mapping. Pt oriented to unit and staff. Plan of care reviewed with patient, patient verbalizes understanding. Comfort measures utilized. Pt safely transferred from stretcher to procedural table. Fall risk reviewed with patient, fall risk interventions maintained. Safety strap applied, positioner pillows utilized to minimize pressure points. Blankets applied. Pt prepped and draped utilizing standard sterile technique. Patient placed on continuous monitoring, as required by sedation policy. Timeouts completed utilizing standard universal time-out, per department and facility policy. RN to remain at bedside, continuous monitoring maintained. Pt resting comfortably. Denies pain/discomfort. Will continue to monitor. See flow sheets for monitoring, medication administration, and updates.  '

## 2023-05-08 NOTE — DISCHARGE INSTRUCTIONS
For scheduling: Call  at [phone]    For questions or concerns call: [facility] 8 AM- 5PM [phone]. Radiology resident on call [phone].    For immediate concerns that are not emergent, you may call our radiology clinic at: [phone]     Discharge Instructions for Hepatic Angiography  You had a procedure called hepatic angiography. This is an X-ray study of the blood vessels that supply your liver. During  the procedure, a catheter (thin, flexible tube) was inserted into one of your blood vessels through a small incision. A  specially trained doctor called an interventional radiologist usually does the procedure. Heres what to do at home  afterward.  Home care  Follow your doctor's recommendations on when it is safe to drive after the procedure.  Rest according to your doctor's instructions after the procedure. Most people are able to resume normal activity  within a few days.  Dont lift anything heavier than 10 pounds for 3 to 4 days.  Avoid strenuous activity for 2 weeks after the procedure.  Exercise according to your doctors recommendations.  You can shower the day after the procedure.  Ask your doctor when it is safe to swim or take a bath.  Take your medications exactly as directed. Dont skip doses.  Unless directed otherwise, drink 6 to 8 glasses of water a day to prevent dehydration and to help flush your body of  the dye that was used during your procedure.  Take your temperature and check the place where your incision was made for signs of infection (redness, swelling, bleeding or  warmth) every day for a week.  Report any numbness or coolness to the extremity. Report any discoloration to the puncture site or the extremity.  Follow-up care  Make a follow-up appointment as directed by our staff.  Ask your doctor when you can return to work.  Date Last Reviewed: 6/14/2015  © 4737-2045 [employer]. [address] [place] [address], [place] [address]. All rights reserved. This  information  is not intended as a substitute for professional medical care. Always follow your healthcare professional's instructions.     Post Yttrium (Y-90) Instructions    Pregnant women should be no closer to the patient than 3 feet for a period of 3 days.    Children under 10 years of age should be no closer than 3 feet for 3 days, although brief contact with the patient for a few seconds is acceptable.

## 2023-05-08 NOTE — CARE UPDATE
Pt up at 14:45 pm right groin CDI. Pt fully recovered and states full understanding of discharge. Pt up and dressed and to leave with  wife and escort shortly.

## 2023-05-16 DIAGNOSIS — K74.69 OTHER CIRRHOSIS OF LIVER: ICD-10-CM

## 2023-05-16 DIAGNOSIS — C22.0 HCC (HEPATOCELLULAR CARCINOMA): Primary | ICD-10-CM

## 2023-05-16 DIAGNOSIS — I63.9 CEREBROVASCULAR ACCIDENT (CVA), UNSPECIFIED MECHANISM: ICD-10-CM

## 2023-05-16 DIAGNOSIS — E11.69 TYPE 2 DIABETES MELLITUS WITH OTHER SPECIFIED COMPLICATION, WITHOUT LONG-TERM CURRENT USE OF INSULIN: ICD-10-CM

## 2023-05-16 DIAGNOSIS — I10 HYPERTENSION, UNSPECIFIED TYPE: ICD-10-CM

## 2023-05-16 DIAGNOSIS — Z01.818 PRE-TRANSPLANT EVALUATION FOR LIVER TRANSPLANT: ICD-10-CM

## 2023-05-17 ENCOUNTER — TELEPHONE (OUTPATIENT)
Dept: NEUROLOGY | Facility: CLINIC | Age: 63
End: 2023-05-17
Payer: COMMERCIAL

## 2023-05-17 NOTE — TELEPHONE ENCOUNTER
----- Message from Vania Kelsey sent at 5/17/2023  9:17 AM CDT -----  Patient has a referral in the system and is ready to schedule, but there are no available appointments showing. Patient can be contacted at [phone] (home) to schedule accordingly.    TY

## 2023-05-22 RX ORDER — SODIUM CHLORIDE 9 MG/ML
INJECTION, SOLUTION INTRAVENOUS CONTINUOUS
Status: CANCELLED | OUTPATIENT
Start: 2023-05-22

## 2023-05-25 ENCOUNTER — TELEPHONE (OUTPATIENT)
Dept: TRANSPLANT | Facility: CLINIC | Age: 63
End: 2023-05-25
Payer: COMMERCIAL

## 2023-05-25 NOTE — TELEPHONE ENCOUNTER
----- Message from Mickie Fowler sent at 5/25/2023  3:47 PM CDT -----  Regarding: FW: R/S Appt    Returned call to pt and he told me that he wanted to get back on the board for his work up appts; pt on for 6/15 and 6/16. Pt nurse made aware.   .  ----- Message -----  From: Rafal Kent  Sent: 5/25/2023   3:34 PM CDT  To: [facility] Pre-Liver Transplant Non-Clinical  Subject: R/S Appt                                         Patient called in requesting to speak with  for assistance rescheduling appts. Requested a call back at earliest convenience.                        Contact: [phone]

## 2023-05-26 ENCOUNTER — RESEARCH ENCOUNTER (OUTPATIENT)
Dept: RESEARCH | Facility: HOSPITAL | Age: 63
End: 2023-05-26
Payer: COMMERCIAL

## 2023-05-26 ENCOUNTER — PATIENT MESSAGE (OUTPATIENT)
Dept: RESEARCH | Facility: HOSPITAL | Age: 63
End: 2023-05-26
Payer: COMMERCIAL

## 2023-05-26 NOTE — PROGRESS NOTES
RESEARCH STUDY CONSENT ENCOUNTER  ORGAN TRANSPLANT  [facility]    Study Title: Role of Tumor-Induced Immune Tolerance in the Patient Response to Locoregional Therapy: Implications in Assessment Risk of Hepatocellular Carcinoma Recurrence Following Liver Transplantation    IRB #: [id number].B    IRB Approval Date: 6/8/2016    : Ameya Suero MD  Sub-investigator: Cecil Reilly, PhD    Patient Number: To be assigned    Patient was scheduled for a Y90 on (date: 05/31/2023).     This study is an observational study to evaluate patients receiving transarterial chemoembolization (TACE) or transarterial radioembolization (TARE) therapy to define the link between tumor-elicited peripheral cell populations, and the risk of hepatocellular carcinoma (HCC) recurrence before orthotopic liver transplantation (OLT). [IRB 2016.131.B]      Present for discussion: Patient only  Is LAR Consenting for Subject: YES/NO: No    Prior to the Informed Consent (IC) being signed, or any protocol required testing, procedure, or intervention being performed, the following was done or discussed with Jed Chávez:    Purpose of the Study, Qualifications to Participate: YES/NO: Yes  Study Design, Schedule and Procedures: YES/NO: Yes  Risks, Benefits, Alternative Treatments, Compensation and Costs: YES/NO: Yes  Confidentiality and HIPAA Authorization for Release of Medical Records for the research trial/subject's right/study related injury: YES/NO: Yes  Study related contact information: YES/NO: Yes  Voluntary Participation and Withdrawal from the research trial at any time: YES/NO: Yes  Patient has been offered the opportunity to ask questions regarding the study and all questions were answered satisfactorily: YES/NO: Yes  Patient verbalizes understanding of the study/procedures and agrees to participate: YES/NO: Yes  CRC and PI contact information given to patient:YES/NO: Yes  Verification the ICF was appropriately  completed: YES/NO: Yes  Signed copy given to patient: YES/NO: Yes  Copy in patient's chart and original uploaded to Epic: YES/NO: Yes    Research staff present during consent: Consenter Rob Tee, Witness Ivy Marsh    No study procedures (I.e. specimen collection) were performed before the informed consent was signed.     Specimens were collected in [facility] at the time of peripheral IV line placement: YES/NO: No  Specimens were collected in [facility] RAD IR: YES/NO: No      Rob Tee  Admin Research- Liver Transplant

## 2023-05-29 DIAGNOSIS — C22.0 HCC (HEPATOCELLULAR CARCINOMA): ICD-10-CM

## 2023-05-29 DIAGNOSIS — Z00.6 RESEARCH STUDY PATIENT: Primary | ICD-10-CM

## 2023-05-30 ENCOUNTER — TELEPHONE (OUTPATIENT)
Dept: INTERVENTIONAL RADIOLOGY/VASCULAR | Facility: HOSPITAL | Age: 63
End: 2023-05-30
Payer: COMMERCIAL

## 2023-05-30 ENCOUNTER — RESEARCH ENCOUNTER (OUTPATIENT)
Dept: RESEARCH | Facility: HOSPITAL | Age: 63
End: 2023-05-30
Payer: COMMERCIAL

## 2023-05-30 ENCOUNTER — LAB VISIT (OUTPATIENT)
Dept: LAB | Facility: HOSPITAL | Age: 63
End: 2023-05-30
Attending: INTERNAL MEDICINE
Payer: COMMERCIAL

## 2023-05-30 DIAGNOSIS — C22.0 HCC (HEPATOCELLULAR CARCINOMA): ICD-10-CM

## 2023-05-30 DIAGNOSIS — Z00.6 RESEARCH STUDY PATIENT: ICD-10-CM

## 2023-05-30 LAB
ALBUMIN SERPL BCP-MCNC: 2.7 G/DL (ref 3.5–5.2)
ALP SERPL-CCNC: 175 U/L (ref 55–135)
ALT SERPL W/O P-5'-P-CCNC: 61 U/L (ref 10–44)
ANION GAP SERPL CALC-SCNC: 10 MMOL/L (ref 8–16)
AST SERPL-CCNC: 99 U/L (ref 10–40)
BASOPHILS # BLD AUTO: 0.02 K/UL (ref 0–0.2)
BASOPHILS NFR BLD: 0.6 % (ref 0–1.9)
BILIRUB DIRECT SERPL-MCNC: 2.7 MG/DL (ref 0.1–0.3)
BILIRUB SERPL-MCNC: 4 MG/DL (ref 0.1–1)
BUN SERPL-MCNC: 10 MG/DL (ref 8–23)
CALCIUM SERPL-MCNC: 8.3 MG/DL (ref 8.7–10.5)
CHLORIDE SERPL-SCNC: 105 MMOL/L (ref 95–110)
CO2 SERPL-SCNC: 23 MMOL/L (ref 23–29)
CREAT SERPL-MCNC: 0.8 MG/DL (ref 0.5–1.4)
DIFFERENTIAL METHOD: ABNORMAL
EOSINOPHIL # BLD AUTO: 0.1 K/UL (ref 0–0.5)
EOSINOPHIL NFR BLD: 2.3 % (ref 0–8)
ERYTHROCYTE [DISTWIDTH] IN BLOOD BY AUTOMATED COUNT: 13.7 % (ref 11.5–14.5)
EST. GFR  (NO RACE VARIABLE): >60 ML/MIN/1.73 M^2
GLUCOSE SERPL-MCNC: 289 MG/DL (ref 70–110)
HCT VFR BLD AUTO: 40.8 % (ref 40–54)
HGB BLD-MCNC: 14.4 G/DL (ref 14–18)
IMM GRANULOCYTES # BLD AUTO: 0 K/UL (ref 0–0.04)
IMM GRANULOCYTES NFR BLD AUTO: 0 % (ref 0–0.5)
INR PPP: 2.5 (ref 0.8–1.2)
LYMPHOCYTES # BLD AUTO: 0.9 K/UL (ref 1–4.8)
LYMPHOCYTES NFR BLD: 25.9 % (ref 18–48)
MCH RBC QN AUTO: 32.6 PG (ref 27–31)
MCHC RBC AUTO-ENTMCNC: 35.3 G/DL (ref 32–36)
MCV RBC AUTO: 92 FL (ref 82–98)
MONOCYTES # BLD AUTO: 0.3 K/UL (ref 0.3–1)
MONOCYTES NFR BLD: 7.4 % (ref 4–15)
NEUTROPHILS # BLD AUTO: 2.3 K/UL (ref 1.8–7.7)
NEUTROPHILS NFR BLD: 63.8 % (ref 38–73)
NRBC BLD-RTO: 0 /100 WBC
PLATELET # BLD AUTO: 61 K/UL (ref 150–450)
PMV BLD AUTO: 12.5 FL (ref 9.2–12.9)
POTASSIUM SERPL-SCNC: 3.6 MMOL/L (ref 3.5–5.1)
PROT SERPL-MCNC: 7.2 G/DL (ref 6–8.4)
PROTHROMBIN TIME: 25.8 SEC (ref 9–12.5)
RBC # BLD AUTO: 4.42 M/UL (ref 4.6–6.2)
RESEARCH LAB: NORMAL
SODIUM SERPL-SCNC: 138 MMOL/L (ref 136–145)
WBC # BLD AUTO: 3.52 K/UL (ref 3.9–12.7)

## 2023-05-30 PROCEDURE — 85610 PROTHROMBIN TIME: CPT | Mod: NTX | Performed by: PHYSICIAN ASSISTANT

## 2023-05-30 PROCEDURE — 36415 COLL VENOUS BLD VENIPUNCTURE: CPT | Mod: NTX | Performed by: PHYSICIAN ASSISTANT

## 2023-05-30 PROCEDURE — 82248 BILIRUBIN DIRECT: CPT | Mod: NTX | Performed by: PHYSICIAN ASSISTANT

## 2023-05-30 PROCEDURE — 85025 COMPLETE CBC W/AUTO DIFF WBC: CPT | Mod: NTX | Performed by: PHYSICIAN ASSISTANT

## 2023-05-30 PROCEDURE — 80053 COMPREHEN METABOLIC PANEL: CPT | Mod: NTX | Performed by: PHYSICIAN ASSISTANT

## 2023-05-30 NOTE — NURSING
Attempted to call pt to review preop instructions for procedure 5/31, no answer left message, reviewed arrival time of 0730, NPO after midnight, arrange ride to and from procedure, bring list of home meds

## 2023-05-31 ENCOUNTER — HOSPITAL ENCOUNTER (OUTPATIENT)
Dept: RADIOLOGY | Facility: HOSPITAL | Age: 63
Discharge: HOME | End: 2023-05-31
Payer: COMMERCIAL

## 2023-05-31 ENCOUNTER — HOSPITAL ENCOUNTER (OUTPATIENT)
Dept: INTERVENTIONAL RADIOLOGY/VASCULAR | Facility: HOSPITAL | Age: 63
Discharge: HOME | End: 2023-05-31
Payer: COMMERCIAL

## 2023-05-31 ENCOUNTER — PATIENT MESSAGE (OUTPATIENT)
Dept: NEUROLOGY | Facility: CLINIC | Age: 63
End: 2023-05-31
Payer: COMMERCIAL

## 2023-05-31 VITALS
OXYGEN SATURATION: 96 % | DIASTOLIC BLOOD PRESSURE: 84 MMHG | SYSTOLIC BLOOD PRESSURE: 183 MMHG | WEIGHT: 215 LBS | BODY MASS INDEX: 30.1 KG/M2 | RESPIRATION RATE: 18 BRPM | HEART RATE: 66 BPM | TEMPERATURE: 98 F | HEIGHT: 71 IN

## 2023-05-31 DIAGNOSIS — C22.0 HCC (HEPATOCELLULAR CARCINOMA): ICD-10-CM

## 2023-05-31 LAB — POCT GLUCOSE: 88 MG/DL (ref 70–110)

## 2023-05-31 PROCEDURE — 77263 PR  RADIATION THERAPY PLAN COMPLEX: ICD-10-PCS | Mod: NTX,,, | Performed by: RADIOLOGY

## 2023-05-31 PROCEDURE — 36247 PR PLACE CATH SUBSUBSELECT ART,ABD/PEL: ICD-10-PCS | Mod: 51,RT,NTX, | Performed by: RADIOLOGY

## 2023-05-31 PROCEDURE — 76937 US GUIDE VASCULAR ACCESS: CPT | Mod: 26,NTX,, | Performed by: RADIOLOGY

## 2023-05-31 PROCEDURE — 63600175 PHARM REV CODE 636 W HCPCS: Mod: TXP | Performed by: RADIOLOGY

## 2023-05-31 PROCEDURE — 78201 NM LIVER IMAGING STATIC POST Y-90 EMBOLIZATION: ICD-10-PCS | Mod: 26,59,NTX, | Performed by: RADIOLOGY

## 2023-05-31 PROCEDURE — 37243 IR EMBOLIZATION COMP FOR TUMOR_ORGAN ISCHEMIA_INFARC: ICD-10-PCS | Mod: NTX,,, | Performed by: RADIOLOGY

## 2023-05-31 PROCEDURE — C1887 CATHETER, GUIDING: HCPCS | Mod: NTX

## 2023-05-31 PROCEDURE — 78830 RP LOCLZJ TUM SPECT W/CT 1: CPT | Mod: 26,NTX,, | Performed by: RADIOLOGY

## 2023-05-31 PROCEDURE — 78830 NM SPECT/CT SINGLE AREA: ICD-10-PCS | Mod: 26,NTX,, | Performed by: RADIOLOGY

## 2023-05-31 PROCEDURE — 75726 ARTERY X-RAYS ABDOMEN: CPT | Mod: 26,59,NTX, | Performed by: RADIOLOGY

## 2023-05-31 PROCEDURE — 36247 INS CATH ABD/L-EXT ART 3RD: CPT | Mod: 51,RT,NTX, | Performed by: RADIOLOGY

## 2023-05-31 PROCEDURE — 77300 RADIATION THERAPY DOSE PLAN: CPT | Mod: 26,59,NTX, | Performed by: RADIOLOGY

## 2023-05-31 PROCEDURE — 25500020 PHARM REV CODE 255: Mod: NTX | Performed by: RADIOLOGY

## 2023-05-31 PROCEDURE — 75726 CHG ANGIO VISCERAL SELECTV/SUBSELEC: ICD-10-PCS | Mod: 26,59,NTX, | Performed by: RADIOLOGY

## 2023-05-31 PROCEDURE — 36248 INS CATH ABD/L-EXT ART ADDL: CPT | Mod: NTX,,, | Performed by: RADIOLOGY

## 2023-05-31 PROCEDURE — 76937 US GUIDE VASCULAR ACCESS: CPT | Mod: TC,NTX | Performed by: RADIOLOGY

## 2023-05-31 PROCEDURE — 25000003 PHARM REV CODE 250: Mod: NTX | Performed by: RADIOLOGY

## 2023-05-31 PROCEDURE — 79445 NUCLEAR RX INTRA-ARTERIAL: CPT | Mod: TC,TXP | Performed by: RADIOLOGY

## 2023-05-31 PROCEDURE — 77300 PR RADIATION THERAPY,DOSIMETRY PLAN: ICD-10-PCS | Mod: 26,59,NTX, | Performed by: RADIOLOGY

## 2023-05-31 PROCEDURE — 76377 3D RENDER W/INTRP POSTPROCES: CPT | Mod: 26,NTX,, | Performed by: RADIOLOGY

## 2023-05-31 PROCEDURE — 78201 LIVER IMAGING STATIC ONLY: CPT | Mod: 26,59,NTX, | Performed by: RADIOLOGY

## 2023-05-31 PROCEDURE — 75774 ARTERY X-RAY EACH VESSEL: CPT | Mod: TC,59,NTX | Performed by: RADIOLOGY

## 2023-05-31 PROCEDURE — 76377 3D RENDER W/INTRP POSTPROCES: CPT | Mod: TC,NTX | Performed by: RADIOLOGY

## 2023-05-31 PROCEDURE — 76377 PR  3D RENDERING W/ IMAGE POSTPROCESS: ICD-10-PCS | Mod: 26,NTX,, | Performed by: RADIOLOGY

## 2023-05-31 PROCEDURE — 77300 RADIATION THERAPY DOSE PLAN: CPT | Mod: TC,59,TXP | Performed by: RADIOLOGY

## 2023-05-31 PROCEDURE — 76937 PR  US GUIDE, VASCULAR ACCESS: ICD-10-PCS | Mod: 26,NTX,, | Performed by: RADIOLOGY

## 2023-05-31 PROCEDURE — 76380 PR  CT SCAN,LIMITED/LOCALIZED F/U STUDY: ICD-10-PCS | Mod: 26,59,NTX, | Performed by: RADIOLOGY

## 2023-05-31 PROCEDURE — 36248 PR PR INS CATH ABD/L-EXT ART ADDL 2ND ORD/3RD ORD/BYD: ICD-10-PCS | Mod: NTX,,, | Performed by: RADIOLOGY

## 2023-05-31 PROCEDURE — 76380 CAT SCAN FOLLOW-UP STUDY: CPT | Mod: 26,59,NTX, | Performed by: RADIOLOGY

## 2023-05-31 PROCEDURE — 75774 PR  ANGIO EA ADDNL SELECTV VESSEL: ICD-10-PCS | Mod: 26,59,NTX, | Performed by: RADIOLOGY

## 2023-05-31 PROCEDURE — 79445 NUCLEAR RX INTRA-ARTERIAL: CPT | Mod: 26,NTX,, | Performed by: RADIOLOGY

## 2023-05-31 PROCEDURE — 75774 ARTERY X-RAY EACH VESSEL: CPT | Mod: 26,59,NTX, | Performed by: RADIOLOGY

## 2023-05-31 PROCEDURE — 78830 RP LOCLZJ TUM SPECT W/CT 1: CPT | Mod: TC,TXP

## 2023-05-31 PROCEDURE — 36247 INS CATH ABD/L-EXT ART 3RD: CPT | Mod: RT,NTX | Performed by: RADIOLOGY

## 2023-05-31 PROCEDURE — 37243 VASC EMBOLIZE/OCCLUDE ORGAN: CPT | Mod: TXP | Performed by: RADIOLOGY

## 2023-05-31 PROCEDURE — 76380 CAT SCAN FOLLOW-UP STUDY: CPT | Mod: TC,59,TXP | Performed by: RADIOLOGY

## 2023-05-31 PROCEDURE — 78201 LIVER IMAGING STATIC ONLY: CPT | Mod: TC,NTX

## 2023-05-31 PROCEDURE — 36248 INS CATH ABD/L-EXT ART ADDL: CPT | Mod: TXP | Performed by: RADIOLOGY

## 2023-05-31 PROCEDURE — 75726 ARTERY X-RAYS ABDOMEN: CPT | Mod: TC,59,NTX | Performed by: RADIOLOGY

## 2023-05-31 PROCEDURE — 77263 THER RADIOLOGY TX PLNG CPLX: CPT | Mod: NTX,,, | Performed by: RADIOLOGY

## 2023-05-31 PROCEDURE — 99152 MOD SED SAME PHYS/QHP 5/>YRS: CPT | Mod: NTX

## 2023-05-31 PROCEDURE — 79445 PR  NUCLEAR THERAPY, INTRA-ARTERIAL: ICD-10-PCS | Mod: 26,NTX,, | Performed by: RADIOLOGY

## 2023-05-31 PROCEDURE — 99153 MOD SED SAME PHYS/QHP EA: CPT | Mod: TXP

## 2023-05-31 RX ORDER — HEPARIN SODIUM 200 [USP'U]/100ML
INJECTION, SOLUTION INTRAVENOUS
Status: COMPLETED | OUTPATIENT
Start: 2023-05-31 | End: 2023-05-31

## 2023-05-31 RX ORDER — SODIUM CHLORIDE 9 MG/ML
INJECTION, SOLUTION INTRAVENOUS
Status: COMPLETED | OUTPATIENT
Start: 2023-05-31 | End: 2023-05-31

## 2023-05-31 RX ORDER — MIDAZOLAM HYDROCHLORIDE 1 MG/ML
INJECTION INTRAMUSCULAR; INTRAVENOUS
Status: COMPLETED | OUTPATIENT
Start: 2023-05-31 | End: 2023-05-31

## 2023-05-31 RX ORDER — LIDOCAINE HYDROCHLORIDE 10 MG/ML
INJECTION INFILTRATION; PERINEURAL
Status: COMPLETED | OUTPATIENT
Start: 2023-05-31 | End: 2023-05-31

## 2023-05-31 RX ORDER — FENTANYL CITRATE 50 UG/ML
INJECTION, SOLUTION INTRAMUSCULAR; INTRAVENOUS
Status: COMPLETED | OUTPATIENT
Start: 2023-05-31 | End: 2023-05-31

## 2023-05-31 RX ORDER — OXYCODONE HYDROCHLORIDE 5 MG/1
5 TABLET ORAL EVERY 4 HOURS PRN
Status: DISCONTINUED | OUTPATIENT
Start: 2023-05-31 | End: 2023-06-01 | Stop reason: HOSPADM

## 2023-05-31 RX ADMIN — SODIUM CHLORIDE 500 ML: 0.9 INJECTION, SOLUTION INTRAVENOUS at 09:05

## 2023-05-31 RX ADMIN — FENTANYL CITRATE 25 MCG: 50 INJECTION, SOLUTION INTRAMUSCULAR; INTRAVENOUS at 09:05

## 2023-05-31 RX ADMIN — HEPARIN SODIUM 1000 UNITS/HR: 200 INJECTION, SOLUTION INTRAVENOUS at 09:05

## 2023-05-31 RX ADMIN — MIDAZOLAM HYDROCHLORIDE 1 MG: 1 INJECTION, SOLUTION INTRAMUSCULAR; INTRAVENOUS at 09:05

## 2023-05-31 RX ADMIN — LIDOCAINE HYDROCHLORIDE 10 ML: 10 INJECTION, SOLUTION INFILTRATION; PERINEURAL at 09:05

## 2023-05-31 RX ADMIN — IOHEXOL 50 ML: 350 INJECTION, SOLUTION INTRAVENOUS at 10:05

## 2023-05-31 NOTE — PROCEDURES
Radiology Post-Procedure Note    Pre Op Diagnosis: HCC  Post Op Diagnosis: Same    Procedure: Y90    Procedure performed by: Jones Gong MD    Written Informed Consent Obtained: Yes  Specimen Removed: NO  Estimated Blood Loss: Minimal    Findings:   Successful Y90 delivery.  See imaging report for further details.    Patient tolerated procedure well.    Jones Gong MD  Diagnostic and Interventional Radiologist  Department of Radiology  Pager: [phone]

## 2023-05-31 NOTE — H&P
Radiology History & Physical      SUBJECTIVE:     Chief Complaint: HCC    History of Present Illness:  Jed Chávez is a 63 y.o. male with HCC who presents for Y90.    Past Medical History:   Diagnosis Date    Alcoholic cirrhosis     CVA (cerebral vascular accident)     DM (diabetes mellitus)     Dyslipidemia     HTN (hypertension)     Intracranial hemorrhage      Past Surgical History:   Procedure Laterality Date    HERNIA REPAIR N/A        Home Meds:   Prior to Admission medications    Medication Sig Start Date End Date Taking? Authorizing Provider   amLODIPine (NORVASC) 10 MG tablet Take 10 mg by mouth. 2/14/23  Yes Historical Provider   ezetimibe (ZETIA) 10 mg tablet Take 10 mg by mouth every morning. 1/20/23  Yes Historical Provider   glimepiride (AMARYL) 4 MG tablet Take 4 mg by mouth every morning. 1/12/23  Yes Historical Provider   lisinopriL-hydrochlorothiazide (PRINZIDE,ZESTORETIC) 20-12.5 mg per tablet Take 1 tablet by mouth. 1/12/23  Yes Historical Provider   metFORMIN (GLUCOPHAGE-XR) 500 MG ER 24hr tablet Take 500 mg by mouth 2 (two) times daily with meals. 11/22/22 11/22/23 Yes Historical Provider   nadoloL (CORGARD) 20 MG tablet Take 1 tablet by mouth every morning. 1/25/23 1/25/24 Yes Historical Provider     Anticoagulants/Antiplatelets: no anticoagulation    Allergies: Review of patient's allergies indicates:  No Known Allergies  Sedation History:  no adverse reactions    Review of Systems:   Hematological: no known coagulopathies  Respiratory: no shortness of breath  Cardiovascular: no chest pain  Gastrointestinal: no abdominal pain  Genito-Urinary: no dysuria  Musculoskeletal: negative  Neurological: no TIA or stroke symptoms         OBJECTIVE:     Vital Signs (Most Recent)  Temp: 97.8 °F (36.6 °C) (05/31/23 0726)  Pulse: (!) 49 (05/31/23 0726)  Resp: 18 (05/31/23 0726)  BP: 128/73 (05/31/23 0726)  SpO2: 98 % (05/31/23 0726)    Physical Exam:  ASA: 3  Mallampati: 2    General: no acute  distress  Mental Status: alert and oriented to person, place and time  HEENT: normocephalic, atraumatic  Chest: unlabored breathing  Heart: regular heart rate  Abdomen: nondistended  Extremity: moves all extremities    Laboratory  Lab Results   Component Value Date    INR 2.5 (H) 05/30/2023       Lab Results   Component Value Date    WBC 3.52 (L) 05/30/2023    HGB 14.4 05/30/2023    HCT 40.8 05/30/2023    MCV 92 05/30/2023    PLT 61 (L) 05/30/2023      Lab Results   Component Value Date     (H) 05/30/2023     05/30/2023    K 3.6 05/30/2023     05/30/2023    CO2 23 05/30/2023    BUN 10 05/30/2023    CREATININE 0.8 05/30/2023    CALCIUM 8.3 (L) 05/30/2023    ALT 61 (H) 05/30/2023    AST 99 (H) 05/30/2023    ALBUMIN 2.7 (L) 05/30/2023    BILITOT 4.0 (H) 05/30/2023    BILIDIR 2.7 (H) 05/30/2023       ASSESSMENT/PLAN:     Sedation Plan: Moderate.  Patient will undergo Y90.    Jones Gong MD  Diagnostic and Interventional Radiologist  Department of Radiology  Pager: [phone]

## 2023-05-31 NOTE — DISCHARGE SUMMARY
Radiology Discharge Summary      Hospital Course: No complications    Admit Date: 5/31/2023  Discharge Date: 05/31/2023     Instructions Given to Patient: Yes  Diet: Resume prior diet  Activity: activity as tolerated    Description of Condition on Discharge: Stable  Vital Signs (Most Recent): Temp: 97.8 °F (36.6 °C) (05/31/23 0726)  Pulse: (!) 59 (05/31/23 1015)  Resp: 18 (05/31/23 1015)  BP: 133/81 (05/31/23 1015)  SpO2: 100 % (05/31/23 1015)    Discharge Disposition: Home    Discharge Diagnosis: HCC s/p Y90 delivery     Follow-up: Will plan for f/u imaging in 1 month to assess treatment response.    Jones Gong MD  Diagnostic and Interventional Radiologist  Department of Radiology  Pager: [phone]

## 2023-06-01 DIAGNOSIS — C22.0 HCC (HEPATOCELLULAR CARCINOMA): Primary | ICD-10-CM

## 2023-06-02 DIAGNOSIS — C22.0 HCC (HEPATOCELLULAR CARCINOMA): ICD-10-CM

## 2023-06-02 DIAGNOSIS — Z00.6 RESEARCH STUDY PATIENT: Primary | ICD-10-CM

## 2023-06-05 ENCOUNTER — TELEPHONE (OUTPATIENT)
Dept: NEUROLOGY | Facility: CLINIC | Age: 63
End: 2023-06-05
Payer: COMMERCIAL

## 2023-06-05 NOTE — TELEPHONE ENCOUNTER
----- Message from Janice Collins sent at 6/5/2023  2:51 PM CDT -----  Regarding: call back  Contact: [phone]  Pt requesting call back regarding seeing around another DR on 6/6/23 please call to discuss Further

## 2023-06-06 ENCOUNTER — OFFICE VISIT (OUTPATIENT)
Dept: NEUROLOGY | Facility: CLINIC | Age: 63
End: 2023-06-06
Payer: COMMERCIAL

## 2023-06-06 VITALS
WEIGHT: 211.63 LBS | DIASTOLIC BLOOD PRESSURE: 63 MMHG | HEART RATE: 64 BPM | SYSTOLIC BLOOD PRESSURE: 108 MMHG | BODY MASS INDEX: 29.63 KG/M2 | HEIGHT: 71 IN

## 2023-06-06 DIAGNOSIS — E11.69 TYPE 2 DIABETES MELLITUS WITH OTHER SPECIFIED COMPLICATION, WITHOUT LONG-TERM CURRENT USE OF INSULIN: ICD-10-CM

## 2023-06-06 DIAGNOSIS — Z01.818 PRE-TRANSPLANT EVALUATION FOR LIVER TRANSPLANT: ICD-10-CM

## 2023-06-06 DIAGNOSIS — K74.69 OTHER CIRRHOSIS OF LIVER: ICD-10-CM

## 2023-06-06 DIAGNOSIS — C22.0 HCC (HEPATOCELLULAR CARCINOMA): ICD-10-CM

## 2023-06-06 DIAGNOSIS — I61.8 OTHER LEFT-SIDED NONTRAUMATIC INTRACEREBRAL HEMORRHAGE: ICD-10-CM

## 2023-06-06 DIAGNOSIS — I10 HYPERTENSION, UNSPECIFIED TYPE: ICD-10-CM

## 2023-06-06 PROBLEM — E11.9 TYPE 2 DIABETES MELLITUS, WITHOUT LONG-TERM CURRENT USE OF INSULIN: Status: ACTIVE | Noted: 2023-06-06

## 2023-06-06 PROBLEM — I61.9 LEFT-SIDED NONTRAUMATIC INTRACEREBRAL HEMORRHAGE: Status: ACTIVE | Noted: 2023-06-06

## 2023-06-06 PROCEDURE — 99999 PR PBB SHADOW E&M-EST. PATIENT-LVL IV: ICD-10-PCS | Mod: PBBFAC,TXP,, | Performed by: NURSE PRACTITIONER

## 2023-06-06 PROCEDURE — 99204 PR OFFICE/OUTPT VISIT, NEW, LEVL IV, 45-59 MIN: ICD-10-PCS | Mod: S$GLB,TXP,, | Performed by: NURSE PRACTITIONER

## 2023-06-06 PROCEDURE — 99999 PR PBB SHADOW E&M-EST. PATIENT-LVL IV: CPT | Mod: PBBFAC,TXP,, | Performed by: NURSE PRACTITIONER

## 2023-06-06 PROCEDURE — 99204 OFFICE O/P NEW MOD 45 MIN: CPT | Mod: S$GLB,TXP,, | Performed by: NURSE PRACTITIONER

## 2023-06-06 RX ORDER — SITAGLIPTIN 100 MG/1
100 TABLET, FILM COATED ORAL EVERY MORNING
Status: ON HOLD | COMMUNITY
Start: 2023-06-01 | End: 2023-08-16 | Stop reason: HOSPADM

## 2023-06-06 NOTE — PROGRESS NOTES
[facility] VISIT      SUBJECTIVE:    History for Present Illness: Jed Chávez is a 63 y.o.  male with a past medical history of left ICH (2014 without residual deficit), HTN, dm 2, HLD and hepatocellular carcinoma who presents to me in clinic today as a referral for history of stroke.    At the time of today's visit, the patient denies new or worsening focal neurologic symptoms concerning for new stroke or TIA.He denies associated nausea, vomiting, HA ,vertigo, double vision, focal weakness or numbness, gait imbalance,  language or visual disturbances.  He is independent with ADLs.  He denies tobacco/alcohol/illicit drug use. He is compliant with home medications.    Past Medical History:   Diagnosis Date    Alcoholic cirrhosis     CVA (cerebral vascular accident)     DM (diabetes mellitus)     Dyslipidemia     HTN (hypertension)     Intracranial hemorrhage      Past Surgical History:   Procedure Laterality Date    HERNIA REPAIR N/A      History reviewed. No pertinent family history.     Current Outpatient Medications:     amLODIPine (NORVASC) 10 MG tablet, Take 10 mg by mouth., Disp: , Rfl:     ezetimibe (ZETIA) 10 mg tablet, Take 10 mg by mouth every morning., Disp: , Rfl:     glimepiride (AMARYL) 4 MG tablet, Take 4 mg by mouth every morning., Disp: , Rfl:     JANUVIA 100 mg Tab, Take 100 mg by mouth every morning., Disp: , Rfl:     lisinopriL-hydrochlorothiazide (PRINZIDE,ZESTORETIC) 20-12.5 mg per tablet, Take 1 tablet by mouth., Disp: , Rfl:     nadoloL (CORGARD) 20 MG tablet, Take 1 tablet by mouth every morning., Disp: , Rfl:     metFORMIN (GLUCOPHAGE-XR) 500 MG ER 24hr tablet, Take 500 mg by mouth 2 (two) times daily with meals., Disp: , Rfl:      Review of Systems:   Constitutional:  Negative for chills and fever.   HENT:  Negative for sore throat.    Eyes:  Negative visual disturbance. Negative for photophobia, pain and redness.   Respiratory:  Negative for shortness of  "breath.    Cardiovascular:  Negative for chest pain.   Gastrointestinal:  Negative for nausea.   Genitourinary:  Negative for dysuria.   Musculoskeletal:  Negative for back pain.   Skin:  Negative for rash.   Neurological:  Negative for weakness.   Hematological:  Does not bruise/bleed easily.     OBJECTIVE:    /63 (BP Location: Right arm, Patient Position: Sitting, BP Method: Small (Automatic))   Pulse 64   Ht 5' 11" (1.803 m)   Wt 96 kg (211 lb 10.3 oz)   BMI 29.52 kg/m²     Physical Exam   Constitution: He appears well nourished. No distress   LOC: Alert and follows request  HEENT: Normocephalic and atraumatic.  Yellow sclerae  Cardiovascular: Normal rate. Intact distal pulses  Pulmonary/Chest: Effort normal. No respiratory distress  Psychiatric: no pressured speech; normal affect; no evidence of impaired cognition    Neurologic Exam:  Orientation: person, place and time  Language: No aphasia  Speech: No dysarthria  Memory: Recent memory intact; Remote memory intact; age correct; month correct  Visual Fields (CN II):  Full  EOM (CN III, IV, VI): Full intact  Pupils (CN II, III): PERRL  Facial Sensation (CN V): symmetric  Facial Movement (CN VII): Symmetrical facial expressions   Hearing (CN VIII):  Intact bilaterally   Shoulder/Neck (CN XI): SCM-Left: Normal; SCM-Right: Normal; Left Shoulder Shrug: Normal/Symmetric ; Right Shoulder Shrug: Normal/Symmetric  Tongue (CN XII): to midline  Motor examination of all extremities :demonstrates normal bulk and tone in all four limbs. There are no atrophy or fasciculations.        Left Right     Left Right   Deltoid 5/5 5/5   Hip Flexion 5/5 5/5   Biceps 5/5 5/5   Hip Extension 5/5 5/5   Triceps 5/5 5/5   Knee Flexion 5/5 5/5   Wrist Ext 5/5 5/5   Knee Extension 5/5 5/5   Finger Abd 5/5 5/5   Ankle dorsiflex 5/5 5/5           Ankle plantar flex 5/5 5/5     Sensory examination: is normal light touch in BUE and BLE.   Gait: Gait steady with normal arm swing and " stride length  Coordination: No dysmetria with finger-to-nose . Rapid alternating movements and fine finger movements are intact.                                                                                                                                                                                           Relevant Labwork:  Recent Labs   Lab 04/20/23  0836   Hemoglobin A1C 7.5 H       Diagnostic Results:  Imaging was independently reviewed by myself, along with the associated radiology report    Brain Imaging   MRI of the brain OSH 01/20/2014:   Per OSH radiology read hemorrhage in the left basal ganglia  Vessel Imaging     Cardiac Imaging     Assessment:  Jed Chávez  is a 63 y.o.  male  seen today in clinic for follow-up assessment and recommendations. The following recommendations and plan were discussed in depth with the patient who voiced understanding and was in agreement.  Plan:  History of left basal ganglia hemorrhage  -Stroke etiology suspected uncontrolled hypertension  --In-depth discussion with patient regarding diagnosis ,imaging findings  & stroke risk factors  -Plan for aggressive risk factor modification and maximum medical management  -- Antithrombotics/ Anticoagulation:  Not indicated and hemorrhagic stroke  - Stroke Risk Factors:  HTN, HLD, hepatocellular carcinoma, alcohol cirrhosis, dm  - Lipid Management:  Not clinically indicated and hemorrhagic stroke  -Hypertension: Long term goal is normotension w/ target BP of less than 130/80 mmHg.  Continue home medications and follow up with PCP for surveillance and long-term management  - Rehab efforts:  None  - Diagnostics ordered/pending:  None  -Diet: Discussed Mediterranean Diet recommendations (Adopted from Angela et al, NEJ, 2018.)  - Eat primarily plant-based foods, such as fruits and vegetables, whole grains, legumes (beans) and nuts  - Limit refined carbohydrates (white pasta, bread, rice).  - Replace butter with healthy fats such  as olive oil.  - Use herbs and spices instead of salt to flavor foods.  - Limit red meat and processed meats to no more than a few times a month.  - Avoid sugary sodas, bakery goods, and sweets.  - Eat fish and poultry at least twice a week.              - Get plenty of exercise (150 minutes per week).    Diabetes  Stroke risk factor   Target hemoglobin A1c <7%, measured 2X/year or quarterly if not meeting goals.   Continue home medications and follow up with PCP for surveillance and long-term management    Lipid Management  Target is LDL < 70mg/dL.   Continue Zetia 10 mg daily.  Follow up with PCP for surveillance and long-term management    Hepatocellular carcinoma   Continue to follow-up with transplant services for surveillance and long-term management    Patient/Family teaching provided during this visit  -Identifying the signs and symptoms of stroke; emergency action and ER attention  -Risk factor control  -Optimization for secondary stroke prevention including compliance with current medications       I will plan on having Jed return to clinic as needed. The patient can contact my office with any questions or concerns they may have as they arise in the interim.     45 minutes of total time spent on the encounter, which includes face to face time and non-face to face time preparing to see the patient (eg, review of tests), Obtaining and/or reviewing separately obtained history, Documenting clinical information in the electronic or other health record, Independently interpreting results (not separately reported) and communicating results to the patient/family/caregiver, patient/family education and Care coordination (not separately reported).     Sophie Deng NP-C  Department of Vascular Neurology  [facility]  [phone]    This note is dictated on M*Modal Fluency Direct word recognition program. There are word recognition mistakes that are occasionally missed on review

## 2023-06-07 ENCOUNTER — TELEPHONE (OUTPATIENT)
Dept: INTERVENTIONAL RADIOLOGY/VASCULAR | Facility: CLINIC | Age: 63
End: 2023-06-07
Payer: COMMERCIAL

## 2023-06-13 DIAGNOSIS — Z00.6 RESEARCH STUDY PATIENT: Primary | ICD-10-CM

## 2023-06-13 DIAGNOSIS — C22.0 HCC (HEPATOCELLULAR CARCINOMA): ICD-10-CM

## 2023-06-14 ENCOUNTER — TELEPHONE (OUTPATIENT)
Dept: TRANSPLANT | Facility: CLINIC | Age: 63
End: 2023-06-14
Payer: COMMERCIAL

## 2023-06-14 NOTE — TELEPHONE ENCOUNTER
Patient called to confirm that he will be attending appointments for [facility].          ----- Message from Mickie Fowler sent at 6/14/2023  3:24 PM CDT -----  Regarding: FW: Returning call  Contact: Pt [phone]  Caller: Jed Chávez   Returning call to: Kimi     Caller can be reached at: [phone]   Nature of the call: Returning missed call     ----- Message -----  From: Lizabeth Marcus  Sent: 6/14/2023   3:19 PM CDT  To: [facility] Pre-Liver Transplant Non-Clinical  Subject: Returning call                                             Caller: Jed Chávez       Returning call to: Kimi       Caller can be reached at: [phone]      Nature of the call: Returning missed call

## 2023-06-15 ENCOUNTER — HOSPITAL ENCOUNTER (OUTPATIENT)
Dept: RADIOLOGY | Facility: HOSPITAL | Age: 63
Discharge: HOME | End: 2023-06-15
Attending: INTERNAL MEDICINE
Payer: COMMERCIAL

## 2023-06-15 ENCOUNTER — DOCUMENTATION ONLY (OUTPATIENT)
Dept: TRANSPLANT | Facility: CLINIC | Age: 63
End: 2023-06-15

## 2023-06-15 ENCOUNTER — SOCIAL WORK (OUTPATIENT)
Dept: TRANSPLANT | Facility: CLINIC | Age: 63
End: 2023-06-15
Payer: COMMERCIAL

## 2023-06-15 ENCOUNTER — CLINICAL SUPPORT (OUTPATIENT)
Dept: TRANSPLANT | Facility: CLINIC | Age: 63
End: 2023-06-15
Payer: COMMERCIAL

## 2023-06-15 ENCOUNTER — EVALUATION (OUTPATIENT)
Dept: TRANSPLANT | Facility: CLINIC | Age: 63
End: 2023-06-15
Payer: COMMERCIAL

## 2023-06-15 VITALS
OXYGEN SATURATION: 97 % | DIASTOLIC BLOOD PRESSURE: 74 MMHG | BODY MASS INDEX: 29.69 KG/M2 | OXYGEN SATURATION: 97 % | HEART RATE: 63 BPM | HEART RATE: 63 BPM | TEMPERATURE: 97 F | HEIGHT: 71 IN | DIASTOLIC BLOOD PRESSURE: 74 MMHG | TEMPERATURE: 97 F | RESPIRATION RATE: 16 BRPM | RESPIRATION RATE: 16 BRPM | SYSTOLIC BLOOD PRESSURE: 144 MMHG | WEIGHT: 212.06 LBS | BODY MASS INDEX: 29.69 KG/M2 | WEIGHT: 212.06 LBS | SYSTOLIC BLOOD PRESSURE: 144 MMHG | HEIGHT: 71 IN

## 2023-06-15 DIAGNOSIS — E11.69 TYPE 2 DIABETES MELLITUS WITH OTHER SPECIFIED COMPLICATION, WITHOUT LONG-TERM CURRENT USE OF INSULIN: ICD-10-CM

## 2023-06-15 DIAGNOSIS — I10 HYPERTENSION, UNSPECIFIED TYPE: ICD-10-CM

## 2023-06-15 DIAGNOSIS — K74.69 OTHER CIRRHOSIS OF LIVER: ICD-10-CM

## 2023-06-15 DIAGNOSIS — I63.9 CEREBROVASCULAR ACCIDENT (CVA), UNSPECIFIED MECHANISM: ICD-10-CM

## 2023-06-15 DIAGNOSIS — C22.0 HCC (HEPATOCELLULAR CARCINOMA): ICD-10-CM

## 2023-06-15 DIAGNOSIS — Z01.818 PRE-TRANSPLANT EVALUATION FOR LIVER TRANSPLANT: ICD-10-CM

## 2023-06-15 PROCEDURE — 99999 PR PBB SHADOW E&M-EST. PATIENT-LVL I: CPT | Mod: PBBFAC,TXP,,

## 2023-06-15 PROCEDURE — 71250 CT CHEST WITHOUT CONTRAST: ICD-10-PCS | Mod: 26,TXP,, | Performed by: STUDENT IN AN ORGANIZED HEALTH CARE EDUCATION/TRAINING PROGRAM

## 2023-06-15 PROCEDURE — 72197 MRI PELVIS W WO CONTRAST: ICD-10-PCS | Mod: 26,TXP,, | Performed by: RADIOLOGY

## 2023-06-15 PROCEDURE — 99204 OFFICE O/P NEW MOD 45 MIN: CPT | Mod: S$GLB,TXP,, | Performed by: PHYSICIAN ASSISTANT

## 2023-06-15 PROCEDURE — 71046 X-RAY EXAM CHEST 2 VIEWS: CPT | Mod: TC,FY,TXP

## 2023-06-15 PROCEDURE — 99999 PR PBB SHADOW E&M-EST. PATIENT-LVL I: ICD-10-PCS | Mod: PBBFAC,TXP,,

## 2023-06-15 PROCEDURE — 71250 CT THORAX DX C-: CPT | Mod: TC,TXP

## 2023-06-15 PROCEDURE — 99204 PR OFFICE/OUTPT VISIT, NEW, LEVL IV, 45-59 MIN: ICD-10-PCS | Mod: S$GLB,TXP,, | Performed by: PHYSICIAN ASSISTANT

## 2023-06-15 PROCEDURE — 99205 OFFICE O/P NEW HI 60 MIN: CPT | Mod: S$GLB,TXP,, | Performed by: TRANSPLANT SURGERY

## 2023-06-15 PROCEDURE — 72197 MRI PELVIS W/O & W/DYE: CPT | Mod: TC,TXP

## 2023-06-15 PROCEDURE — 25500020 PHARM REV CODE 255: Mod: TXP | Performed by: INTERNAL MEDICINE

## 2023-06-15 PROCEDURE — 99205 PR OFFICE/OUTPT VISIT, NEW, LEVL V, 60-74 MIN: ICD-10-PCS | Mod: S$GLB,TXP,, | Performed by: TRANSPLANT SURGERY

## 2023-06-15 PROCEDURE — 72197 MRI PELVIS W/O & W/DYE: CPT | Mod: 26,TXP,, | Performed by: RADIOLOGY

## 2023-06-15 PROCEDURE — A9585 GADOBUTROL INJECTION: HCPCS | Mod: TXP | Performed by: INTERNAL MEDICINE

## 2023-06-15 PROCEDURE — 99999 PR PBB SHADOW E&M-EST. PATIENT-LVL IV: ICD-10-PCS | Mod: PBBFAC,TXP,,

## 2023-06-15 PROCEDURE — 71250 CT THORAX DX C-: CPT | Mod: 26,TXP,, | Performed by: STUDENT IN AN ORGANIZED HEALTH CARE EDUCATION/TRAINING PROGRAM

## 2023-06-15 PROCEDURE — 99999 PR PBB SHADOW E&M-EST. PATIENT-LVL III: ICD-10-PCS | Mod: PBBFAC,TXP,,

## 2023-06-15 PROCEDURE — 71046 X-RAY EXAM CHEST 2 VIEWS: CPT | Mod: 26,TXP,, | Performed by: RADIOLOGY

## 2023-06-15 PROCEDURE — 97802 PR MED NUTR THER, 1ST, INDIV, EA 15 MIN: ICD-10-PCS | Mod: S$GLB,TXP,, | Performed by: DIETITIAN, REGISTERED

## 2023-06-15 PROCEDURE — 97802 MEDICAL NUTRITION INDIV IN: CPT | Mod: S$GLB,TXP,, | Performed by: DIETITIAN, REGISTERED

## 2023-06-15 PROCEDURE — 71046 XR CHEST PA AND LATERAL: ICD-10-PCS | Mod: 26,TXP,, | Performed by: RADIOLOGY

## 2023-06-15 PROCEDURE — 99999 PR PBB SHADOW E&M-EST. PATIENT-LVL IV: CPT | Mod: PBBFAC,TXP,,

## 2023-06-15 PROCEDURE — 99999 PR PBB SHADOW E&M-EST. PATIENT-LVL III: CPT | Mod: PBBFAC,TXP,,

## 2023-06-15 RX ORDER — GADOBUTROL 604.72 MG/ML
10 INJECTION INTRAVENOUS
Status: COMPLETED | OUTPATIENT
Start: 2023-06-15 | End: 2023-06-15

## 2023-06-15 RX ADMIN — GADOBUTROL 10 ML: 604.72 INJECTION INTRAVENOUS at 03:06

## 2023-06-15 NOTE — PROGRESS NOTES
Patient seen in clinic with caregiver.   Patient's name and date of birth verified.  Patient contact information verified. Consents reviewed and signatures obtained.   Patient given supplies needed to obtain urine specimen.   Instructions reviewed with the patient on the way the specimen should be obtained.   Patient stated that  they understood the information provided for the collection and  placement of the specimen. Specimen collected in clinic.  Patient given supplies and printed instructions for the collection of the 24 hour urine specimen.  Patient reports understanding the instructions provided to obtain the specimen and the storage of the specimen while at home.  Patient given instructed to bring the container to 2nd floor lab when the collection is completed.    Patient questions answered and concerns addressed.

## 2023-06-15 NOTE — PROGRESS NOTES
"TRANSPLANT NUTRITIONAL ASSESSMENT    Referring Provider: Joleen Colmenares MD    Reason for Visit: Pre-liver transplant work-up    Age: 63 y.o.  Sex: male    Patient Active Problem List   Diagnosis    Other cirrhosis of liver    Liver masses    HCC (hepatocellular carcinoma)    Left-sided nontraumatic intracerebral hemorrhage    Hypertension    Type 2 diabetes mellitus, without long-term current use of insulin     Past Medical History:   Diagnosis Date    Alcoholic cirrhosis     CVA (cerebral vascular accident)     DM (diabetes mellitus)     Dyslipidemia     HTN (hypertension)     Intracranial hemorrhage      Lab Results   Component Value Date     06/15/2023    K 3.3 (L) 06/15/2023    ALBUMIN 2.8 (L) 06/15/2023    HGBA1C 7.5 (H) 04/20/2023    CALCIUM 8.8 06/15/2023     Other Pertinent Labs: none    Current Outpatient Medications   Medication Sig    amLODIPine (NORVASC) 10 MG tablet Take 10 mg by mouth once daily.    ezetimibe (ZETIA) 10 mg tablet Take 10 mg by mouth every morning.    glimepiride (AMARYL) 4 MG tablet Take 4 mg by mouth every morning.    JANUVIA 100 mg Tab Take 100 mg by mouth every morning.    lisinopriL-hydrochlorothiazide (PRINZIDE,ZESTORETIC) 20-12.5 mg per tablet Take 1 tablet by mouth once daily.    nadoloL (CORGARD) 20 MG tablet Take 1 tablet by mouth every morning.     No current facility-administered medications for this visit.     Allergies: Patient has no known allergies.    Ht Readings from Last 1 Encounters:   06/15/23 5' 10.71" (1.796 m)     Wt Readings from Last 1 Encounters:   06/15/23 96.2 kg (212 lb 1.3 oz)      BMI: Body mass index is 29.82 kg/m².    Usual Weight: 212 lb  Weight Change/Time: has lost about 15 lb over the past year; stopped drinking alcohol, more mindful of eating when truly hungry, less portions  Current Diet: regular  Appetite/Current Intake: good   Exercise/Physical Activity: functional in ADL's, no limitations, working, active at work, no frail  LFI: " 3.98  Nutritional/Herbal Supplements: reviewed  Potential Food/Medication Interactions: reviewed  Chewing/Swallowing Problems: none  Symptoms: none  Assessment of Lab Values: K 3.3, Alb 2.8, Ha1c 7.5 (April 2023)  Support System: spouse present, voices support    Estimated Kcal Need: 0505-4628 kcal (20-25 kcal/kg)  Estimated Protein Need:  gm (1-1.5 gm/kg)    Nutritional History:   Pt reports to have a good appetite, no n/v/d/abd pain/c. Pt is trying to eat more only when hungry, not out of boredom, eating 1 serving instead of  multiple at times. Jed has no fluid retention, no diuretics. Pt reports the following diet recall:  B: bagel w/ cream cheese / yogurt / 1 egg / Smart Ones frozen meal / bowl of cereal (2% milk)  L: less fast food, occasional / frozen meal like [employer] or Smart Meals / left overs  D: pork loin / ground beef / steak / fish / other seafood / grilled chicken / salad / mac & cheese / broccoli / zucchini / brussel sprouts / green beans / asparagus, not adding table salt  Beverage: water, diet Dr Pepper, Sprite zero, non alcoholic beer  Snack: slice cheese, Triscuit crackers, Wheat thins, humus, pretzels, peanut butter     Nutritional Diagnoses  Problem: food- and nutrition-related knowledge deficit  Etiology: r/t no prior edu on pre liver transplant nutrition recommendations   Symptoms: aeb diet recall    Educational Need? yes  Barriers: none identified  Discussed with: patient and spouse  Interventions: Patient taught nutrition information regarding Pre-liver transplant work-up  Pre liver transplant nutrition packet provided and discussed. Pt advised on the recommendations to follow a low sodium diet while taking in adequate protein.  This packet includes label low sodium reading tips, seasoning suggestions, protein intake goal amount (gm) for the individual patient, as well as oral supplement suggestions.   Exercise and physical activity are encouraged.  DM packet reviewed (basic DM  guidelines, carb counting, 5-day sample ADA meal plan).  Emphasis on controlling carb intake throughout the day, no skipping meals, snacks when appropriate.      Goals/Recommendations: diet adherence and small frequent meals and snacks  Actions Taken: instruct/provide written information  Strategies Used: problem solving, goal setting, motivational interviewing  Patient and/or family comprehend instructions: yes , adherence expected  Outcome: Verbalizes understanding  Monitoring: Contact information provided, will f/u in clinic and communicate with the care team as needed.     Counseling Time: 15 minutes

## 2023-06-15 NOTE — PROGRESS NOTES
Transplant Recipient Adult Psychosocial Assessment    Jed Chávez  [address]  [facility] [address]  Telephone Information:   Mobile [phone]   Home  [phone] (home)  Work  There is no work phone number on file.  E-mail  [email or web address]    Sex: male  YOB: 1960  Age: 63 y.o.    Encounter Date: 6/15/2023  [place] Citizen: yes  Primary Language: English   Needed: no    Emergency Contact:  Name: Leida Chávez  Relationship: wife  Address: same as patient  Phone Numbers:  [phone] (mobile)    Family/Social Support:   Number of dependents/: Patient has no dependents in need of   Marital history:   Other family dynamics: Patient and wife, Leida, have been  for 37 years. Patient has 2 adult children: Maranda Celeste (age 32, [place]) and Emily Sandifer (age 28, [place]). Patient reports his daughter Maranda is pregnant and they have a grandchild on the way. Patient has 4 sisters, 1 who lives in [place]. Patient also has in-laws involved as part of his support system.     Household Composition:  Name: Jed Chávez  Age: 63  Relationship: patient  Does person drive? yes    Name: Leida Chávez   Age: 62  Relationship: wife  Does person drive? yes    Do you and your caregivers have access to reliable transportation? yes    PRIMARY CAREGIVER: Leida Chávez will be primary caregiver, phone number [phone]. Patient's wife was present during this assessment and verbalizes commitment to caregiver role. Patient's wife works as a teacher and will be able to take time off as needed. Patient's wife drives and has reliable transportation.       provided in-depth information to patient and caregiver regarding pre- and post-transplant caregiver role.   strongly encourages patient and caregiver to have concrete plan regarding post-transplant care giving, including back-up caregiver(s) to ensure care giving needs are met as needed.    Patient and Caregiver states  understanding all aspects of caregiver role/commitment and is able/willing/committed to being caregiver to the fullest extent necessary.    Patient and Caregiver verbalizes understanding of the education provided today and caregiver responsibilities.         remains available. Patient and Caregiver agree to contact  in a timely manner if concerns arise.      Able to take time off work without financial concerns: yes.     Additional Significant Others who will Assist with Transplant:  Name: Emily Sandifer (waiting to confirm after pt notifies)  Age: 28  Relationship: daughter  Does person drive? yes  Phone: [phone]  *Patient has not told his daughters of his cancer diagnosis or possibility of needing a transplant yet. Patient wanted to wait to see his daughters in person next month to tell them face to face. Patient would like SW to wait until his family is notified prior to calling for confirmation and education. However, patient is confident his daughter would assist him as a back up caregiver if needed.    Patient also has in-laws who would be able to stay with him as back up caregivers if needed.     Living Will: yes  Healthcare Power of : no  Advance Directives on file: <<no information> per medical record.  Verbally reviewed LW/HCPA information.   provided patient with copy of LW/HCPA documents and provided education on completion of forms.    Living Donors: Education and resource information given to patient.    Highest Education Level: Associate/Bachelor Degree  Reading Ability: college  Reports difficulty with:  vision (wears prescription glasses)  Learns Best By:  written information and likes to use [facility] portal for records/instructions      Status: no  VA Benefits: no     Working for Income: yes  If yes, working activity level: Working Full Time  Patient is employed as the owner of [employer].    Spouse/Significant Other  Employment: Teacher    Disabled: no    Monthly Income: $9,000/mo    Able to afford all costs now and if transplanted, including medications: yes    Patient and Caregiver verbalizes understanding of personal responsibilities related to transplant costs and the importance of having a financial plan to ensure that patients transplant costs are fully covered.       provided fundraising information/education. Patient and Caregiververbalizes understanding.   remains available.    Insurance:   Payer/Plan Subscr  Sex Relation Sub. Ins. ID Effective Group Num   1. [employer]* RODRIGUE BOYKIN 1960 Male Self [id number]                                    [address] [address]   2. [employer]* FIFI BOYKIN 1960 Female Spouse [id number]                                   [address] [address]     Primary Insurance (for UNOS reporting): Private Insurance  Secondary Insurance (for UNOS reporting): None  Patient and Caregiver verbalizes clear understanding that patient may experience difficulty obtaining and/or be denied insurance coverage post-surgery. This includes and is not limited to disability insurance, life insurance, health insurance, burial insurance, long term care insurance, and other insurances.      Patient and Caregiver also reports understanding that future health concerns related to or unrelated to transplantation may not be covered by patient's insurance.  Resources and information provided and reviewed.     Patient and Caregiver provides verbal permission to release any necessary information to outside resources for patient care and discharge planning.  Resources and information provided are reviewed.      Dialysis Adherence: Patient denies any history of dialysis  Infusion Service: patient utilizing? no  Home Health: patient utilizing? no  DME: no  Pulmonary/Cardiac Rehab: no   ADLS: Patient reports being independent with ADLs and drives    Adherence: Patient reports  "good adherence with medical and medication regimens. Patient's wife assists as needed. Patient utilizes [facility] portal. Adherence education and counseling provided.     Per History Section:  Past Medical History:   Diagnosis Date    Alcoholic cirrhosis     CVA (cerebral vascular accident)     DM (diabetes mellitus)     Dyslipidemia     HTN (hypertension)     Intracranial hemorrhage      Social History     Tobacco Use    Smoking status: Never    Smokeless tobacco: Never   Substance Use Topics    Alcohol use: Not Currently     Social History     Substance and Sexual Activity   Drug Use Never     Social History     Substance and Sexual Activity   Sexual Activity Not Currently    Partners: Female       Per Today's Psychosocial:  Tobacco: Patient reports quitting dipping tobacco use last year.  Alcohol:  Patient reports quitting alcohol use 9 months ago. Per chart review, patient's Internal Medicine doctor discussed "stopping alcohol abuse" in February 2022 due to elevated LFTs. It appears patient reduced alcohol use and eventually stopped upon liver diagnosis in October 2022. Patient reports prior to quitting use, he was a daily heavy drinker (6 pack of beer or more for over 40 years). Patient reports cutting back and quitting alcohol use on his own without rehab, IOP or AA. Patient denies any cravings or urges, and reports taking his sobriety seriously. Patient reports commitment to sobriety and was given resources for AA meetings for additional support with maintenance.  Illicit Drugs/Non-prescribed Medications: none, patient denies any use.    Patient and Caregiver states clear understanding of the potential impact of substance use as it relates to transplant candidacy and is aware of possible random substance screening.  Substance abstinence/cessation counseling, education and resources provided and reviewed.     Arrests/DWI/Treatment/Rehab: patient denies    Psychiatric History:    Mental Health:  Patient denies " any mental health history  Psychiatrist/Counselor: Patient denies any history of mental health services  Medications: none  Suicide/Homicide Issues: Patient denies any past or current SI or HI    Safety at home: Patient reports living in a safe environment at home and denies any safety concerns    Knowledge: Patient and Caregiver states having clear understanding and realistic expectations regarding the potential risks and potential benefits of organ transplantation and organ donation and agrees to discuss with health care team members and support system members, as well as to utilize available resources and express questions and/or concerns in order to further facilitate the pt informed decision-making.  Resources and information provided and reviewed.    Patient and Caregiver is aware of [facility]'s affiliation and/or partnership with agencies in home health care, LTAC, SNF, [facility], and other hospitals and clinics.    Understanding: Patient and Caregiver reports having a clear understanding of the many lifetime commitments involved with being a transplant recipient, including costs, compliance, medications, lab work, procedures, appointments, concrete and financial planning, preparedness, timely and appropriate communication of concerns, abstinence (ETOH, tobacco, illicit non-prescribed drugs), adherence to all health care team recommendations, support system and caregiver involvement, appropriate and timely resource utilization and follow-through, mental health counseling as needed/recommended, and patient and caregiver responsibilities.  Social Service Handbook, resources and detailed educational information provided and reviewed.  Educational information provided.    Patient and Caregiver also reports current and expected compliance with health care regime and states having a clear understanding of the importance of compliance.      Patient and Caregiver reports a clear understanding that risks and benefits may be  involved with organ transplantation and with organ donation.       Patient and Caregiver also reports clear understanding that psychosocial risk factors may affect patient, and include but are not limited to feelings of depression, generalized anxiety, anxiety regarding dependence on others, post traumatic stress disorder, feelings of guilt and other emotional and/or mental concerns, and/or exacerbation of existing mental health concerns.  Detailed resources provided and discussed.      Patient and Caregiver agrees to access appropriate resources in a timely manner as needed and/or as recommended, and to communicate concerns appropriately.  Patient and Caregiver also reports a clear understanding of treatment options available.     Patient and Caregiver received education in a group setting.   reviewed education, provided additional information, and answered questions.    Feelings or Concerns: Patient reports wanting to take things step by step and prefers alternative treatments if possible. However, he states if transplant is needed he will be ready and would like to proceed.    Coping: Identify Patient & Caregiver Strategies to [place]:   1. Currently & Pre-transplant - Patient enjoys playing and watching golf and gardening   3. At the time of surgery - Patient plans to cope with support from family   4. During post-Transplant & Recovery Period - Patient plans to cope with support from family and watch golf    Goals: Patient has goals to continue to work or find an activity outside of work when he retires. Patient also has a grandchild on the way and is looking forward to spending time with his family.      Interview Behavior: Patient and Caregiver presents as alert and oriented x 4, pleasant, calm, communicative, cooperative, and asking and answering questions appropriately.          Transplant Social Work - Candidacy  Assessment/Plan:     Psychosocial Suitability: Patient presents as  a higher risk   candidate for transplant at this time. Based on psychosocial risk factors, patient presents as  higher risk due to less than 1 year of sobriety. Patient reports last alcohol use was about 9 months ago (October 2022). Patient reports daily heavy drinking prior to quitting use. Patient was able to stop alcohol use on his own and has not engaged in rehab, IOP or AA. Patient verbalizes commitment to sobriety and open to resources as needed .    Patient's protective factors include patient having a good support system and confirmed primary caregiver plan. Patient reports having a reliable secondary caregiver plan and would like to inform them first prior to SW contacting. Patient denies financial concerns and has active insurance coverage. Patient reports quitting tobacco use and denies any illicit substance use. Patient denies any mental health concerns.     Final determination of transplant candidacy will be made once evaluation is complete and reviewed by the Liver Transplant Selection Committee.      Recommendations/Additional Comments:   -Engagement in 12 step meetings (or similar).  -Send 12 step meeting logs to transplant  or coordinator  -Recommend periodic random alcohol testing   -Speak with daughter about transplant possibility, and notify SW so she can be contacted for confirmation and education as a secondary caregiver  -Fundraising  -File advanced directives with medical records  -Local Lodging (patient has travel benefits with insurance and considering hotel vs airbnb vs LR; if patient is able to return to  post transplant he would not mind traveling back and forth for appointments if needed)  -Patient to call transplant SW with any concerns or obstacles  -Plan was created in collaboration with patient and patient/caregiver verbalize agreement with plan as discussed.      Mali Mosqueda LCSW              no

## 2023-06-15 NOTE — PROGRESS NOTES
PHARM.D. PRE-TRANSPLANT NOTE:    This patient's medication therapy was evaluated as part of his pre-transplant evaluation.      The following general pharmacologic concerns were noted: N/A    The following concerns for post-operative pain management were noted: N/A    The following pharmacologic concerns related to HCV therapy were noted: N/A      This patient's medication profile was reviewed for considerations for DAA Hepatitis C therapy:    [x]  No current inducers of CYP 3A4 or PGP  [x]  No amiodarone on this patient's EMR profile in the last 24 months  [x]  No past or current atrial fibrillation on this patient's EMR profile       Current Outpatient Medications   Medication Sig Dispense Refill    amLODIPine (NORVASC) 10 MG tablet Take 10 mg by mouth once daily.      ezetimibe (ZETIA) 10 mg tablet Take 10 mg by mouth every morning.      glimepiride (AMARYL) 4 MG tablet Take 4 mg by mouth every morning.      JANUVIA 100 mg Tab Take 100 mg by mouth every morning.      lisinopriL-hydrochlorothiazide (PRINZIDE,ZESTORETIC) 20-12.5 mg per tablet Take 1 tablet by mouth once daily.      nadoloL (CORGARD) 20 MG tablet Take 1 tablet by mouth every morning.       No current facility-administered medications for this visit.           I am available for consultation and can be contacted, as needed by the other members of the Transplant team.

## 2023-06-15 NOTE — PROGRESS NOTES
PRE-TRANSPLANT INFECTIOUS DISEASE CONSULT    Reason for Visit:  Pre-transplant evaluation  Referring Provider: Dr. Joleen Colmenares     History of Present Illness:    63 y.o. 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 Denies HE, paracentsis, ascites, SBP    Infectious History:  Recent hospital admissions: yes, underwent y90 mapping for HCC   Recent infections: No  Recent or current antibiotic use: No  History of recurrent infections *(sinus / pneumonia / UTI / SBP)*: No  History of diabetic foot wound or bone/joint infection: No  Recent dental infections, issues or procedures: No  History of chicken pox: Yes  History of shingles: No, shingles vaccine.   History of STI: No  History of COVID infection: Yes, 2022, denies admission. 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  Yes, raw oysters   Tobacco: No  Alcohol: No  Recreational drug use:  No  Sexual partners: spouse      Past Histories:   Past Medical History:   Diagnosis Date    Alcoholic cirrhosis     CVA (cerebral vascular accident)     DM (diabetes mellitus)     Dyslipidemia     HTN (hypertension)     Intracranial hemorrhage      Past Surgical History:   Procedure Laterality Date    HERNIA REPAIR  N/A      No family history on file.  Social History     Tobacco Use    Smoking status: Never    Smokeless tobacco: Never   Substance Use Topics    Alcohol use: Not Currently    Drug use: Never     Review of patient's allergies indicates:  No Known Allergies    Immunization History:  Received all childhood vaccines: Yes  All household members receive annual flu vaccine: Yes  All household members are up to date on COVID vaccine: Yes      Current antibiotics:  Antibiotics (From admission, onward)      None              Review of Systems  Review of Systems   Constitutional: Positive for malaise/fatigue. Negative for chills, decreased appetite, fever, night sweats, weight gain and weight loss.   HENT:  Negative for congestion, ear pain, hearing loss, hoarse voice, sore throat and tinnitus.    Eyes:  Negative for blurred vision, pain, vision loss in left eye, vision loss in right eye and visual disturbance.   Cardiovascular:  Negative for chest pain, dyspnea on exertion, leg swelling and palpitations.   Respiratory:  Negative for cough, shortness of breath, sputum production and wheezing.    Skin:  Negative for dry skin, itching, rash and suspicious lesions.   Musculoskeletal:  Negative for back pain, joint pain, myalgias and neck pain.   Gastrointestinal:  Negative for abdominal pain, constipation, diarrhea, heartburn, nausea and vomiting.   Genitourinary:  Negative for dysuria, flank pain, frequency, hematuria, hesitancy and urgency.   Neurological:  Negative for dizziness, headaches, numbness, paresthesias and weakness.   Psychiatric/Behavioral:  Negative for depression and memory loss. The patient does not have insomnia and is not nervous/anxious.         Objective  Physical Exam  Vitals and nursing note reviewed.   Constitutional:       General: He is not in acute distress.     Appearance: He is well-developed. He is not diaphoretic.   HENT:      Head: Normocephalic and atraumatic.   Eyes:      Pupils: Pupils are  equal, round, and reactive to light.   Cardiovascular:      Rate and Rhythm: Normal rate and regular rhythm.      Heart sounds: Normal heart sounds. No murmur heard.    No friction rub. No gallop.   Pulmonary:      Effort: Pulmonary effort is normal. No respiratory distress.      Breath sounds: Normal breath sounds. No wheezing or rales.   Chest:      Chest wall: No tenderness.   Abdominal:      General: Bowel sounds are normal. There is distension.      Palpations: There is no mass.      Tenderness: There is no abdominal tenderness. There is no guarding.   Musculoskeletal:         General: No deformity. Normal range of motion.      Cervical back: Normal range of motion and neck supple.   Skin:     General: Skin is warm and dry.      Findings: No erythema or rash.   Neurological:      Mental Status: He is alert and oriented to person, place, and time.   Psychiatric:         Behavior: Behavior normal.         Thought Content: Thought content normal.         Judgment: Judgment normal.         Labs:    CBC:   Lab Results   Component Value Date    WBC 4.43 06/15/2023    HGB 15.1 06/15/2023    HCT 44.9 06/15/2023    MCV 96 06/15/2023    PLT 85 (L) 06/15/2023    GRAN 3.4 06/15/2023    GRAN 76.5 (H) 06/15/2023    LYMPH 0.5 (L) 06/15/2023    LYMPH 10.6 (L) 06/15/2023    MONO 0.3 06/15/2023    MONO 6.8 06/15/2023    EOSINOPHIL 5.2 06/15/2023       Syphilis screening: No results found for: RPR, PRPQ, FTAABS     TB screening: No results found for: TBGOLDPLUS, TSPOTSCREN    HIV screening:   Lab Results   Component Value Date    [id number] Non-reactive 06/15/2023       Strongyloides IgG: No results found for: STRONGANTIGG    Hepatitis Serologies:   Lab Results   Component Value Date    HEPAIGG Non-reactive 02/15/2023    HEPBCAB Non-reactive 06/15/2023    HEPBSAB <3.00 02/15/2023    HEPBSAB Non-reactive 02/15/2023    HEPCAB Non-reactive 06/15/2023        Varicella IgG: No results found for: VARICELLAINT      Immunization History    Administered Date(s) Administered    COVID-19, MRNA, LN-S, PF (MODERNA FULL 0.5 ML DOSE) 02/25/2021, 03/25/2021, 10/22/2021, 04/18/2022          Assessment and Plan    1. Risks of Infection: Available serologies were reviewed. No unusual risks of infection or significant barriers to transplantation were identified from the infectious disease standpoint given the information available at this time.    - Acute infectious issues: None   - Pending serologies: Hepatitis B surface Ab, Hepatitis C Ab, HIV, Quantiferon gold / T-spot, RPR, Strongyloides IgG, and VZV IgG   - Please call if any pending serologic testing is positive.    2. Immunizations:  Based on the patient's immunization history and serologies, the following immunizations are recommended:  - Hepatitis A    Patient does not have immunity to hepatitis A    Vaccination ordered today: No. Reason for not ordering: undergoing hepatitis A series, second dose due 8/2023   - Hepatitis B    Patient does not have immunity to hepatitis B    Vaccination ordered today: No. Reason for not ordering: pt recently completed heplisav B series.    - COVID    Current [facility] vaccination recommendations were discussed with the patient   - Annual high dose influenza     Vaccination ordered today: No. Reason for not ordering: vaccination up to date   - Prevnar 20    Vaccination ordered today: Yes   - Tdap    Vaccination ordered today: Yes   - Shingrix    Vaccination ordered today: No. Reason for not ordering: vaccination up to date    Recommended Pre-Transplant Immunization Schedule   Vaccine  0m 1m 2m 6m   Pneumococcal conjugate vaccine (Prevnar 20) X      Tetanus-diphtheria-pertussis (Tdap)* X      Hepatitis A Vaccine (Havrix)** X   X   Hepatitis B Vaccine (Heplisav)** X X     Influenza (annual) X      Zoster Recombinant Vaccine (Shingrix) X  X           *Administer booster every 10 years.       **Administer if no immunity demonstrated on serologies               Patient will  receive vaccines at local pharmacy. A written prescription was provided for all vaccine doses.     3. Counseling:   I discussed with the patient the risk for increased susceptibility to infections following transplantation including increased risk for infection right after transplant and if rejection should occur.  The patient has been counseled on the importance of vaccinations to decrease risk of infection and severe illness. Specific guidance has been provided to the patient regarding the patient's occupation, hobbies and activities to avoid future infectious complications.     4. Transplant Candidacy: Based on available information, there are no identified significant barriers to transplantation from an infectious disease standpoint.  Final determination of transplant candidacy will be made once evaluation is complete and reviewed by the Selection Committee.      Follow up with infectious disease as needed.       The total time for evaluation and management services performed on 06/15/2023 was greater than >35 minutes.

## 2023-06-16 ENCOUNTER — HOSPITAL ENCOUNTER (OUTPATIENT)
Dept: CARDIOLOGY | Facility: HOSPITAL | Age: 63
Discharge: HOME | End: 2023-06-16
Attending: INTERNAL MEDICINE
Payer: COMMERCIAL

## 2023-06-16 ENCOUNTER — HOSPITAL ENCOUNTER (OUTPATIENT)
Dept: RADIOLOGY | Facility: HOSPITAL | Age: 63
Discharge: HOME | End: 2023-06-16
Attending: INTERNAL MEDICINE
Payer: COMMERCIAL

## 2023-06-16 VITALS
RESPIRATION RATE: 16 BRPM | DIASTOLIC BLOOD PRESSURE: 67 MMHG | HEIGHT: 70 IN | SYSTOLIC BLOOD PRESSURE: 117 MMHG | HEART RATE: 68 BPM | BODY MASS INDEX: 30.35 KG/M2 | WEIGHT: 212 LBS

## 2023-06-16 DIAGNOSIS — K74.69 OTHER CIRRHOSIS OF LIVER: ICD-10-CM

## 2023-06-16 DIAGNOSIS — I63.9 CEREBROVASCULAR ACCIDENT (CVA), UNSPECIFIED MECHANISM: ICD-10-CM

## 2023-06-16 DIAGNOSIS — C22.0 HCC (HEPATOCELLULAR CARCINOMA): ICD-10-CM

## 2023-06-16 DIAGNOSIS — I10 HYPERTENSION, UNSPECIFIED TYPE: ICD-10-CM

## 2023-06-16 DIAGNOSIS — E11.69 TYPE 2 DIABETES MELLITUS WITH OTHER SPECIFIED COMPLICATION, WITHOUT LONG-TERM CURRENT USE OF INSULIN: ICD-10-CM

## 2023-06-16 DIAGNOSIS — Z01.818 PRE-TRANSPLANT EVALUATION FOR LIVER TRANSPLANT: ICD-10-CM

## 2023-06-16 LAB
ASCENDING AORTA: 3.84 CM
AV INDEX (PROSTH): 0.81
AV MEAN GRADIENT: 6 MMHG
AV PEAK GRADIENT: 11 MMHG
AV VALVE AREA: 3.58 CM2
AV VELOCITY RATIO: 0.77
BSA FOR ECHO PROCEDURE: 2.18 M2
CV ECHO LV RWT: 0.33 CM
CV STRESS BASE HR: 60 BPM
DIASTOLIC BLOOD PRESSURE: 67 MMHG
DOP CALC AO PEAK VEL: 1.67 M/S
DOP CALC AO VTI: 34.93 CM
DOP CALC LVOT AREA: 4.4 CM2
DOP CALC LVOT DIAMETER: 2.37 CM
DOP CALC LVOT PEAK VEL: 1.28 M/S
DOP CALC LVOT STROKE VOLUME: 124.91 CM3
DOP CALCLVOT PEAK VEL VTI: 28.33 CM
ECHO LV POSTERIOR WALL: 0.89 CM (ref 0.6–1.1)
EJECTION FRACTION: 65 %
FRACTIONAL SHORTENING: 35 % (ref 28–44)
INTERVENTRICULAR SEPTUM: 0.99 CM (ref 0.6–1.1)
LA MAJOR: 7.05 CM
LA MINOR: 6.44 CM
LA WIDTH: 4.19 CM
LEFT ATRIUM SIZE: 4.25 CM
LEFT ATRIUM VOLUME INDEX: 47.6 ML/M2
LEFT ATRIUM VOLUME: 101.89 CM3
LEFT INTERNAL DIMENSION IN SYSTOLE: 3.48 CM (ref 2.1–4)
LEFT VENTRICLE DIASTOLIC VOLUME INDEX: 64.14 ML/M2
LEFT VENTRICLE DIASTOLIC VOLUME: 137.25 ML
LEFT VENTRICLE MASS INDEX: 87 G/M2
LEFT VENTRICLE SYSTOLIC VOLUME INDEX: 23.4 ML/M2
LEFT VENTRICLE SYSTOLIC VOLUME: 50.07 ML
LEFT VENTRICULAR INTERNAL DIMENSION IN DIASTOLE: 5.33 CM (ref 3.5–6)
LEFT VENTRICULAR MASS: 186.46 G
MV A" WAVE DURATION": 5.99 MSEC
OHS CV CPX 1 MINUTE RECOVERY HEART RATE: 139 BPM
OHS CV CPX 85 PERCENT MAX PREDICTED HEART RATE MALE: 134
OHS CV CPX MAX PREDICTED HEART RATE: 158
OHS CV CPX PATIENT IS FEMALE: 0
OHS CV CPX PATIENT IS MALE: 1
OHS CV CPX PEAK DIASTOLIC BLOOD PRESSURE: 52 MMHG
OHS CV CPX PEAK HEAR RATE: 139 BPM
OHS CV CPX PEAK RATE PRESSURE PRODUCT: NORMAL
OHS CV CPX PEAK SYSTOLIC BLOOD PRESSURE: 140 MMHG
OHS CV CPX PERCENT MAX PREDICTED HEART RATE ACHIEVED: 88
OHS CV CPX RATE PRESSURE PRODUCT PRESENTING: 7020
PISA TR MAX VEL: 2.27 M/S
PULM VEIN S/D RATIO: 1.52
PV PEAK D VEL: 0.44 M/S
PV PEAK S VEL: 0.67 M/S
QEF: 63 %
RA MAJOR: 6.09 CM
RA WIDTH: 4.32 CM
RIGHT VENTRICULAR END-DIASTOLIC DIMENSION: 3.79 CM
SINUS: 4.45 CM
STJ: 3.31 CM
SYSTOLIC BLOOD PRESSURE: 117 MMHG
TDI LATERAL: 0.13 M/S
TDI SEPTAL: 0.07 M/S
TDI: 0.1 M/S
TR MAX PG: 21 MMHG
TRICUSPID ANNULAR PLANE SYSTOLIC EXCURSION: 2.98 CM

## 2023-06-16 PROCEDURE — 93320 DOPPLER ECHO COMPLETE: CPT | Mod: 26,TXP,, | Performed by: INTERNAL MEDICINE

## 2023-06-16 PROCEDURE — 93325 STRESS ECHO (CUPID ONLY): ICD-10-PCS | Mod: 26,TXP,, | Performed by: INTERNAL MEDICINE

## 2023-06-16 PROCEDURE — 63600175 PHARM REV CODE 636 W HCPCS: Mod: TXP | Performed by: INTERNAL MEDICINE

## 2023-06-16 PROCEDURE — 93351 STRESS ECHO (CUPID ONLY): ICD-10-PCS | Mod: 26,TXP,, | Performed by: INTERNAL MEDICINE

## 2023-06-16 PROCEDURE — 93325 DOPPLER ECHO COLOR FLOW MAPG: CPT | Mod: TXP

## 2023-06-16 PROCEDURE — 93975 VASCULAR STUDY: CPT | Mod: 26,TXP,, | Performed by: STUDENT IN AN ORGANIZED HEALTH CARE EDUCATION/TRAINING PROGRAM

## 2023-06-16 PROCEDURE — 76700 US ABDOMEN COMP WITH DOPPLER_PRE LIVER TRANSPLANT: ICD-10-PCS | Mod: 26,TXP,, | Performed by: STUDENT IN AN ORGANIZED HEALTH CARE EDUCATION/TRAINING PROGRAM

## 2023-06-16 PROCEDURE — 76700 US EXAM ABDOM COMPLETE: CPT | Mod: 26,TXP,, | Performed by: STUDENT IN AN ORGANIZED HEALTH CARE EDUCATION/TRAINING PROGRAM

## 2023-06-16 PROCEDURE — 93351 STRESS TTE COMPLETE: CPT | Mod: 26,TXP,, | Performed by: INTERNAL MEDICINE

## 2023-06-16 PROCEDURE — 93975 US ABDOMEN COMP WITH DOPPLER_PRE LIVER TRANSPLANT: ICD-10-PCS | Mod: 26,TXP,, | Performed by: STUDENT IN AN ORGANIZED HEALTH CARE EDUCATION/TRAINING PROGRAM

## 2023-06-16 PROCEDURE — 63600150 PHARM REV CODE 636: Mod: TXP | Performed by: INTERNAL MEDICINE

## 2023-06-16 PROCEDURE — 76700 US EXAM ABDOM COMPLETE: CPT | Mod: 59,TC,TXP

## 2023-06-16 PROCEDURE — 93320 STRESS ECHO (CUPID ONLY): ICD-10-PCS | Mod: 26,TXP,, | Performed by: INTERNAL MEDICINE

## 2023-06-16 PROCEDURE — 93975 VASCULAR STUDY: CPT | Mod: TC,TXP

## 2023-06-16 PROCEDURE — 93325 DOPPLER ECHO COLOR FLOW MAPG: CPT | Mod: 26,TXP,, | Performed by: INTERNAL MEDICINE

## 2023-06-16 RX ORDER — ATROPINE SULFATE 0.1 MG/ML
1 INJECTION INTRAVENOUS
Status: COMPLETED | OUTPATIENT
Start: 2023-06-16 | End: 2023-06-16

## 2023-06-16 RX ORDER — DOBUTAMINE HYDROCHLORIDE 100 MG/100ML
10 INJECTION INTRAVENOUS
Status: COMPLETED | OUTPATIENT
Start: 2023-06-16 | End: 2023-06-16

## 2023-06-16 RX ADMIN — DOBUTAMINE HYDROCHLORIDE 10 MCG/KG/MIN: 100 INJECTION INTRAVENOUS at 09:06

## 2023-06-16 RX ADMIN — ATROPINE SULFATE 1 MG: 0.1 INJECTION INTRAVENOUS at 09:06

## 2023-06-16 NOTE — PROGRESS NOTES
Transplant Surgery  Liver Transplant Recipient Evaluation    Referring Provider: Abdulkadir Germain Jr.    Subjective:     Reason for Visit: evaluation for liver transplant    History of Present Illness: Jed Chávez is a 63 y.o. male who is being evaluated for liver transplant due to Primary Liver Malignancy: Hepatoma (HCC) and Cirrhosis. Jed reports encephalopathy, portal hypertension, and thrombocytopenia.    External provider notes reviewed: Yes    Review of Systems   Respiratory: Negative.     Cardiovascular: Negative.    Gastrointestinal: Negative.    Genitourinary: Negative.    Objective:     Physical Exam  Constitutional:       Appearance: Normal appearance.   Cardiovascular:      Rate and Rhythm: Normal rate and regular rhythm.      Pulses: Normal pulses.      Heart sounds: Normal heart sounds.   Pulmonary:      Effort: Pulmonary effort is normal.      Breath sounds: Normal breath sounds.   Abdominal:      Palpations: Abdomen is soft.      Tenderness: There is no abdominal tenderness.      Hernia: No hernia is present.   Skin:     General: Skin is warm and dry.   Neurological:      Mental Status: He is alert.   Psychiatric:         Mood and Affect: Mood normal.         Behavior: Behavior normal.       MELD-Na: 23 at 6/16/2023  7:30 AM  MELD: 23 at 6/16/2023  7:30 AM  Calculated from:  Serum Creatinine: 0.8 mg/dL (Using min of 1 mg/dL) at 6/16/2023  7:30 AM  Serum Sodium: 138 mmol/L (Using max of 137 mmol/L) at 6/15/2023  6:54 AM  Total Bilirubin: 4.6 mg/dL at 6/15/2023  6:54 AM  INR(ratio): 2.6 at 6/15/2023  6:54 AM      Diagnostics:  The following labs have been reviewed: CBC  CMP  PT  INR  The following radiology images have been independently reviewed and interpreted: Abdominal US    Diagnoses:  1. HCC (hepatocellular carcinoma)    2. Other cirrhosis of liver    3. Pre-transplant evaluation for liver transplant    4. Type 2 diabetes mellitus with other specified complication, without long-term current  use of insulin    5. Hypertension, unspecified type    6. Cerebrovascular accident (CVA), unspecified mechanism        Transplant Surgery - Candidacy   Assessment/Plan:     Transplant Candidacy: Jed Chávez is a 63 y.o. male with ESLD secondary to Primary Liver Malignancy: Hepatoma (HCC) and Cirrhosis here for evaluation for possible OLT.  Based on available information, Jed is a suitable liver transplant candidate.  He will be presented to selection committee after all tests and evaluations are complete.    Additional testing to be completed according to Liver : Written Order Guideline for Adult Liver Transplant Evaluation (LI-02)    Interpretation of tests and discussion of patient management involves all members of the multidisciplinary transplant team.    López Sampson MD         [facility] Patient Status  Functional Status: 50% - Requires considerable assistance and frequent medical care  Physical Capacity: No Limitations    Counseling: I discussed with Jed the benefits of liver transplantation.  We discussed the evaluation and listing procedures.  We discussed the MELD system and the associated waiting times.  We discussed national and center specific survival results.  We discussed the option of being multiply listed in different OPOs.  We discussed the option of living donation versus  donor transplantation and the advantages and relative disadvantages of each.   We discussed the risks, benefits and potential complications related to surgery including the risks related to anesthesia, bleeding, infection, primary non-function of the allograft, the risk of reoperation as well as the risk of death.  We discussed the typical post-operative course, length of hospitalization, the long-term use of immunosuppressive therapy as well as the need for long-term routine follow-up.    Patient advised that it is recommended that all transplant candidates, and their close contacts and household members receive Covid  vaccination.    Coronavirus disease (COVID-19) caused by severe acute respiratory virus coronavirus 2 (SARS-C0V 2) is associated with increased mortality in solid organ transplant recipients (SOT) compared to non-transplant patients. Vaccine responses to vaccination are depressed against SARS-CoV2 compared to normal individuals but improve with third vaccination doses. Vaccination prior to SOT provides both the best opportunity for transplant candidates to develop protective immunity and to reduce the risk of serious COVID19 infections post transplantation. Organ transplant candidates at [facility] will be required to receive SARS-CoV-2 vaccination prior to being listed with a an active status, whenever possible. Exceptions will be made for disability related reasons or for sincerely held [employer] beliefs.     PHS: I discussed the use of organs from donors with PHS risk criteria, including the testing protocols utilized, as well as data from the literature regarding the likelihood of transmission of hepatitis or HIV.  The patient is willing to consider such grafts.  DCD: I discussed the use of organs recovered by donation after cardiac death (DCD), including slightly decreased graft survival and greater risk of arterial and biliary complications. The potential advantage to the recipient is possibly receiving a transplant sooner by accepting such an organ. The patient is willing to consider such grafts.  HBcAb: I discussed the use of organs from donors with HBcAb in conjunction with long term use of HBV antiviral drugs, such as lamivudine. The small but measurable risk of hepatitis B seroconversion was discussed as well as the potentially life long need to continue antiviral drugs. The patient is willing to consider such grafts.  HCV Non-viremic recipient: I discussed the use of HCV-positive organs in naive recipients, including the risk of viral transmission to the patients or  others, potential insurance barriers for antiviral medication coverage, risk for fibrosing cholestatic hepatitis, death or graft loss. The potential advantage to the recipient is the possibility of receiving a transplant sooner with decreased mortality risk by accepting such an organ. The patient is willing to consider such grafts.  LDLT: I discussed the nature of living donor liver transplant, including donor risks and more frequent recipient complications. The patient is willing to consider such grafts.

## 2023-06-19 ENCOUNTER — TELEPHONE (OUTPATIENT)
Dept: TRANSPLANT | Facility: CLINIC | Age: 63
End: 2023-06-19
Payer: COMMERCIAL

## 2023-06-19 NOTE — TELEPHONE ENCOUNTER
Call returned. Leida (Hallie) says her questions regarding the vaccines have been addressed and she has no further concerns. However, Leida says she was given a form by the  to complete regarding medication co-pays and tried returning it as as email attachment but received an error message. She's asking the best way to get this information back to Eleanor. Eleanor message during this telephone conversation and asked to have patient give her a call. Leida notified. Understanding expressed. Denies any additional questions or concerns at this time.     ----- Message from Mali Mosqueda LCSW sent at 6/16/2023  3:36 PM CDT -----  Regarding: FW: Pt Advice / Rx  Contact: Hallie / [phone]  Onelia, I believe they sent this to us by accident.       ----- Message -----  From: Rosy Stevenson  Sent: 6/16/2023   3:32 PM CDT  To: [facility]  Subject: Pt Advice / Rx                                   Hallie / Wife called on behalf of the pt, regarding Tetanus & Prevnar 20 vaccines that pt needs. Hallie / Wife would like to know if vaccines can be done at local pharmacy. Please Call

## 2023-06-20 NOTE — PROGRESS NOTES
Met with ANN Chávez in clinic. HCC Care Sequence program reviewed. Form completed and given to patient.

## 2023-06-21 ENCOUNTER — OFFICE VISIT (OUTPATIENT)
Dept: HEPATOLOGY | Facility: CLINIC | Age: 63
End: 2023-06-21
Payer: COMMERCIAL

## 2023-06-21 ENCOUNTER — LAB VISIT (OUTPATIENT)
Dept: LAB | Facility: HOSPITAL | Age: 63
End: 2023-06-21
Attending: INTERNAL MEDICINE
Payer: COMMERCIAL

## 2023-06-21 VITALS
HEART RATE: 74 BPM | HEIGHT: 70 IN | BODY MASS INDEX: 30.96 KG/M2 | DIASTOLIC BLOOD PRESSURE: 80 MMHG | SYSTOLIC BLOOD PRESSURE: 118 MMHG | WEIGHT: 216.25 LBS

## 2023-06-21 DIAGNOSIS — R79.89 ABNORMAL LFTS: ICD-10-CM

## 2023-06-21 DIAGNOSIS — K74.69 OTHER CIRRHOSIS OF LIVER: ICD-10-CM

## 2023-06-21 DIAGNOSIS — C22.0 HCC (HEPATOCELLULAR CARCINOMA): ICD-10-CM

## 2023-06-21 DIAGNOSIS — K74.69 OTHER CIRRHOSIS OF LIVER: Primary | ICD-10-CM

## 2023-06-21 DIAGNOSIS — R94.39 ABNORMAL CARDIOVASCULAR STRESS TEST: ICD-10-CM

## 2023-06-21 LAB
ALBUMIN SERPL BCP-MCNC: 2.6 G/DL (ref 3.5–5.2)
ALP SERPL-CCNC: 153 U/L (ref 55–135)
ALT SERPL W/O P-5'-P-CCNC: 52 U/L (ref 10–44)
ANION GAP SERPL CALC-SCNC: 8 MMOL/L (ref 8–16)
AST SERPL-CCNC: 82 U/L (ref 10–40)
BASOPHILS # BLD AUTO: 0.02 K/UL (ref 0–0.2)
BASOPHILS NFR BLD: 0.5 % (ref 0–1.9)
BILIRUB SERPL-MCNC: 5.5 MG/DL (ref 0.1–1)
BUN SERPL-MCNC: 8 MG/DL (ref 8–23)
CALCIUM SERPL-MCNC: 8.4 MG/DL (ref 8.7–10.5)
CHLORIDE SERPL-SCNC: 103 MMOL/L (ref 95–110)
CO2 SERPL-SCNC: 23 MMOL/L (ref 23–29)
CREAT SERPL-MCNC: 0.7 MG/DL (ref 0.5–1.4)
DIFFERENTIAL METHOD: ABNORMAL
EOSINOPHIL # BLD AUTO: 0.3 K/UL (ref 0–0.5)
EOSINOPHIL NFR BLD: 7.9 % (ref 0–8)
ERYTHROCYTE [DISTWIDTH] IN BLOOD BY AUTOMATED COUNT: 13.7 % (ref 11.5–14.5)
EST. GFR  (NO RACE VARIABLE): >60 ML/MIN/1.73 M^2
GLUCOSE SERPL-MCNC: 102 MG/DL (ref 70–110)
HCT VFR BLD AUTO: 40.5 % (ref 40–54)
HGB BLD-MCNC: 13.9 G/DL (ref 14–18)
IGA SERPL-MCNC: 752 MG/DL (ref 40–350)
IGG SERPL-MCNC: 2099 MG/DL (ref 650–1600)
IGM SERPL-MCNC: 95 MG/DL (ref 50–300)
IMM GRANULOCYTES # BLD AUTO: 0.01 K/UL (ref 0–0.04)
IMM GRANULOCYTES NFR BLD AUTO: 0.2 % (ref 0–0.5)
INR PPP: 2.7 (ref 0.8–1.2)
LYMPHOCYTES # BLD AUTO: 0.4 K/UL (ref 1–4.8)
LYMPHOCYTES NFR BLD: 9.5 % (ref 18–48)
MCH RBC QN AUTO: 32 PG (ref 27–31)
MCHC RBC AUTO-ENTMCNC: 34.3 G/DL (ref 32–36)
MCV RBC AUTO: 93 FL (ref 82–98)
MONOCYTES # BLD AUTO: 0.4 K/UL (ref 0.3–1)
MONOCYTES NFR BLD: 9.7 % (ref 4–15)
NEUTROPHILS # BLD AUTO: 3.1 K/UL (ref 1.8–7.7)
NEUTROPHILS NFR BLD: 72.2 % (ref 38–73)
NRBC BLD-RTO: 0 /100 WBC
PLATELET # BLD AUTO: 76 K/UL (ref 150–450)
PMV BLD AUTO: 11.9 FL (ref 9.2–12.9)
POTASSIUM SERPL-SCNC: 3.6 MMOL/L (ref 3.5–5.1)
PROT SERPL-MCNC: 7.3 G/DL (ref 6–8.4)
PROTHROMBIN TIME: 27.3 SEC (ref 9–12.5)
RBC # BLD AUTO: 4.35 M/UL (ref 4.6–6.2)
SODIUM SERPL-SCNC: 134 MMOL/L (ref 136–145)
WBC # BLD AUTO: 4.31 K/UL (ref 3.9–12.7)

## 2023-06-21 PROCEDURE — 99999 PR PBB SHADOW E&M-EST. PATIENT-LVL III: CPT | Mod: PBBFAC,TXP,, | Performed by: INTERNAL MEDICINE

## 2023-06-21 PROCEDURE — 99214 PR OFFICE/OUTPT VISIT, EST, LEVL IV, 30-39 MIN: ICD-10-PCS | Mod: S$GLB,TXP,, | Performed by: INTERNAL MEDICINE

## 2023-06-21 PROCEDURE — 80053 COMPREHEN METABOLIC PANEL: CPT | Mod: TXP | Performed by: INTERNAL MEDICINE

## 2023-06-21 PROCEDURE — 99999 PR PBB SHADOW E&M-EST. PATIENT-LVL III: ICD-10-PCS | Mod: PBBFAC,TXP,, | Performed by: INTERNAL MEDICINE

## 2023-06-21 PROCEDURE — 85610 PROTHROMBIN TIME: CPT | Mod: TXP | Performed by: INTERNAL MEDICINE

## 2023-06-21 PROCEDURE — 36415 COLL VENOUS BLD VENIPUNCTURE: CPT | Mod: TXP | Performed by: INTERNAL MEDICINE

## 2023-06-21 PROCEDURE — 99214 OFFICE O/P EST MOD 30 MIN: CPT | Mod: S$GLB,TXP,, | Performed by: INTERNAL MEDICINE

## 2023-06-21 PROCEDURE — 85025 COMPLETE CBC W/AUTO DIFF WBC: CPT | Mod: TXP | Performed by: INTERNAL MEDICINE

## 2023-06-21 PROCEDURE — 82784 ASSAY IGA/IGD/IGG/IGM EACH: CPT | Mod: 59,TXP | Performed by: INTERNAL MEDICINE

## 2023-06-21 NOTE — Clinical Note
Hi Dr. Krause, this patient is being evaluated for liver transplantation on the stress test as mentioned abnormal septal wall motion.  Wondering with that could signify (? Previous ischemia) or if we need patient to have a cardiology consult? Thanks

## 2023-06-21 NOTE — PROGRESS NOTES
Subjective:     Jed Chávez is here for follow up of cirrhosis    HPI  Since Jed Chávez's last visit there have been no significant issues.  Overall feels well.    No evidence of liver decompensation: no ascites, confusion or GI bleeding.    No h/o CAD. H/ CVA in 2015    Yttrium 5/2023    Review of Systems    Objective:     Physical Exam  Vitals reviewed.   Constitutional:       General: He is not in acute distress.     Appearance: He is well-developed.   HENT:      Head: Normocephalic and atraumatic.      Mouth/Throat:      Pharynx: No oropharyngeal exudate.   Eyes:      General: Scleral icterus present.         Right eye: No discharge.         Left eye: No discharge.      Pupils: Pupils are equal, round, and reactive to light.   Pulmonary:      Effort: Pulmonary effort is normal. No respiratory distress.      Breath sounds: Normal breath sounds. No wheezing.   Abdominal:      General: There is no distension.      Palpations: Abdomen is soft.      Tenderness: There is no abdominal tenderness.   Musculoskeletal:      Right lower leg: No edema.      Left lower leg: No edema.   Neurological:      Mental Status: He is alert and oriented to person, place, and time.   Psychiatric:         Behavior: Behavior normal.       MELD-Na: 26 at 6/21/2023  8:36 AM  MELD: 24 at 6/21/2023  8:36 AM  Calculated from:  Serum Creatinine: 0.7 mg/dL (Using min of 1 mg/dL) at 6/21/2023  8:36 AM  Serum Sodium: 134 mmol/L at 6/21/2023  8:36 AM  Total Bilirubin: 5.5 mg/dL at 6/21/2023  8:36 AM  INR(ratio): 2.7 at 6/21/2023  8:36 AM      WBC   Date Value Ref Range Status   06/21/2023 4.31 3.90 - 12.70 K/uL Final     Hemoglobin   Date Value Ref Range Status   06/21/2023 13.9 (L) 14.0 - 18.0 g/dL Final     Hematocrit   Date Value Ref Range Status   06/21/2023 40.5 40.0 - 54.0 % Final     Platelets   Date Value Ref Range Status   06/21/2023 76 (L) 150 - 450 K/uL Final     BUN   Date Value Ref Range Status   06/21/2023 8 8 - 23 mg/dL Final      Creatinine   Date Value Ref Range Status   06/21/2023 0.7 0.5 - 1.4 mg/dL Final     Glucose   Date Value Ref Range Status   06/21/2023 102 70 - 110 mg/dL Final     Calcium   Date Value Ref Range Status   06/21/2023 8.4 (L) 8.7 - 10.5 mg/dL Final     Sodium   Date Value Ref Range Status   06/21/2023 134 (L) 136 - 145 mmol/L Final     Potassium   Date Value Ref Range Status   06/21/2023 3.6 3.5 - 5.1 mmol/L Final     Chloride   Date Value Ref Range Status   06/21/2023 103 95 - 110 mmol/L Final     AST   Date Value Ref Range Status   06/21/2023 82 (H) 10 - 40 U/L Final     ALT   Date Value Ref Range Status   06/21/2023 52 (H) 10 - 44 U/L Final     Alkaline Phosphatase   Date Value Ref Range Status   06/21/2023 153 (H) 55 - 135 U/L Final     Total Bilirubin   Date Value Ref Range Status   06/21/2023 5.5 (H) 0.1 - 1.0 mg/dL Final     Comment:     For infants and newborns, interpretation of results should be based  on gestational age, weight and in agreement with clinical  observations.    Premature Infant recommended reference ranges:  Up to 24 hours.............<8.0 mg/dL  Up to 48 hours............<12.0 mg/dL  3-5 days..................<15.0 mg/dL  6-29 days.................<15.0 mg/dL       Albumin   Date Value Ref Range Status   06/21/2023 2.6 (L) 3.5 - 5.2 g/dL Final     INR   Date Value Ref Range Status   06/21/2023 2.7 (H) 0.8 - 1.2 Final     Comment:     Coumadin Therapy:  2.0 - 3.0 for INR for all indicators except mechanical heart valves  and antiphospholipid syndromes which should use 2.5 - 3.5.           Assessment/Plan:     1. Other cirrhosis of liver    2. HCC (hepatocellular carcinoma)    3. Abnormal cardiovascular stress test      Jed Chávez is a 63 y.o. male withFollow-up, Cirrhosis, and Hepatocellular Carcinoma      Other cirrhosis of liver-MELD is increasing for unclear reason but patient remains compensated  -will get repeat labs in a month to monitor meld score  -patient has indications for  transplant from his actual meld score as well as because of his liver cancer  -patient does have positive autoantibodies as well as elevated IgG; will discuss at next visit if liver disease could be from autoimmune etiology.  -     Comprehensive Metabolic Panel; Standing  -     CBC Auto Differential; Standing  -     Protime-INR; Standing  -     AFP Tumor Marker; Standing    HCC (hepatocellular carcinoma)-status post treatment awaiting follow-up imaging  -follow-up imaging already scheduled  -continue with transplant evaluation  -     AFP Tumor Marker; Standing    Abnormal cardiovascular stress test-uncertainty of the significance of the abnormal septal wall motion on his stress test.  There was no evidence of ischemia so will ask the reading cardiologist for any input.    Patient was seen in the liver transplant department at [facility].      RTC in 3 months     Joleen Colmenares MD

## 2023-06-27 ENCOUNTER — APPOINTMENT (OUTPATIENT)
Dept: RADIOLOGY | Facility: HOSPITAL | Age: 63
End: 2023-06-27
Attending: INTERNAL MEDICINE
Payer: COMMERCIAL

## 2023-06-27 DIAGNOSIS — Z01.818 PRE-TRANSPLANT EVALUATION FOR LIVER TRANSPLANT: ICD-10-CM

## 2023-06-27 DIAGNOSIS — I10 HYPERTENSION, UNSPECIFIED TYPE: ICD-10-CM

## 2023-06-27 DIAGNOSIS — I63.9 CEREBROVASCULAR ACCIDENT (CVA), UNSPECIFIED MECHANISM: ICD-10-CM

## 2023-06-27 DIAGNOSIS — Z00.6 RESEARCH STUDY PATIENT: Primary | ICD-10-CM

## 2023-06-27 DIAGNOSIS — E11.69 TYPE 2 DIABETES MELLITUS WITH OTHER SPECIFIED COMPLICATION, WITHOUT LONG-TERM CURRENT USE OF INSULIN: ICD-10-CM

## 2023-06-27 DIAGNOSIS — C22.0 HCC (HEPATOCELLULAR CARCINOMA): ICD-10-CM

## 2023-06-27 DIAGNOSIS — K74.69 OTHER CIRRHOSIS OF LIVER: ICD-10-CM

## 2023-06-27 PROCEDURE — 77080 DXA BONE DENSITY AXIAL: CPT | Mod: TC,TXP

## 2023-06-27 PROCEDURE — 77080 DXA BONE DENSITY AXIAL: CPT | Mod: 26,TXP,, | Performed by: RADIOLOGY

## 2023-06-27 PROCEDURE — 77080 DXA BONE DENSITY AXIAL SKELETON 1 OR MORE SITES: ICD-10-PCS | Mod: 26,TXP,, | Performed by: RADIOLOGY

## 2023-06-30 ENCOUNTER — TELEPHONE (OUTPATIENT)
Dept: TRANSPLANT | Facility: CLINIC | Age: 63
End: 2023-06-30
Payer: COMMERCIAL

## 2023-06-30 NOTE — TELEPHONE ENCOUNTER
Noted    ----- Message from Adan Krause MD sent at 6/21/2023 11:55 AM CDT -----  If he has a right bundle-branch block this could cause abnormal septal wall motion.  The right bundle sits in the septum and can cause abnormal wall motion here.    And he has a right bundle-branch block    No consult needed just for that    Adan    ----- Message -----  From: Joleen Colmenares MD  Sent: 6/21/2023  10:44 AM CDT  To: Adan Krause MD, #    Hi Dr. Krause, this patient is being evaluated for liver transplantation on the stress test as mentioned abnormal septal wall motion.  Wondering with that could signify (? Previous ischemia) or if we need patient to have a cardiology consult? Thanks

## 2023-07-03 ENCOUNTER — TELEPHONE (OUTPATIENT)
Dept: TRANSPLANT | Facility: HOSPITAL | Age: 63
End: 2023-07-03
Payer: COMMERCIAL

## 2023-07-03 ENCOUNTER — HOSPITAL ENCOUNTER (OUTPATIENT)
Dept: RADIOLOGY | Facility: HOSPITAL | Age: 63
Discharge: HOME | End: 2023-07-03
Payer: COMMERCIAL

## 2023-07-03 DIAGNOSIS — C22.0 HCC (HEPATOCELLULAR CARCINOMA): ICD-10-CM

## 2023-07-03 PROCEDURE — 25500020 PHARM REV CODE 255: Mod: TXP

## 2023-07-03 PROCEDURE — 74183 MRI ABD W/O CNTR FLWD CNTR: CPT | Mod: TC,TXP

## 2023-07-03 PROCEDURE — 74183 MRI ABD W/O CNTR FLWD CNTR: CPT | Mod: 26,TXP,, | Performed by: RADIOLOGY

## 2023-07-03 PROCEDURE — 74183 MRI ABDOMEN W WO CONTRAST: ICD-10-PCS | Mod: 26,TXP,, | Performed by: RADIOLOGY

## 2023-07-03 PROCEDURE — A9585 GADOBUTROL INJECTION: HCPCS | Mod: TXP

## 2023-07-03 RX ORDER — GADOBUTROL 604.72 MG/ML
10 INJECTION INTRAVENOUS
Status: COMPLETED | OUTPATIENT
Start: 2023-07-03 | End: 2023-07-03

## 2023-07-03 RX ADMIN — GADOBUTROL 10 ML: 604.72 INJECTION INTRAVENOUS at 11:07

## 2023-07-03 NOTE — TELEPHONE ENCOUNTER
"SW spoke with patient's wife regarding need to speak with back up caregivers/daughters about patient's current diagnosis prior to SW contacting for confirmation. Patient's wife notes they will notify their daughters next week. SW asked for patient's wife to call team when they are notified so SW can follow up for confirmation and education regarding caregiver role. SW also inquired if patient has been doing any AA meetings. Patient's last alcohol use was about 9 months ago. Patient was advised by his internal medicine doctor in February 2022 to stop alcohol abuse due to elevated LFTs. Patient reduced alcohol use and eventually stopped upon liver diagnosis in October 2022. Patient's wife reports AA is "not his thing" but that patient is doing well and committed to sobriety. Patient's wife reports patient's diagnosis has been an eye opener. SW verified patient does have resources as needed. Patient's wife denies any other changes or concerns at this time. SW remains available.   "

## 2023-07-05 ENCOUNTER — COMMITTEE REVIEW (OUTPATIENT)
Dept: TRANSPLANT | Facility: CLINIC | Age: 63
End: 2023-07-05

## 2023-07-05 ENCOUNTER — OFFICE VISIT (OUTPATIENT)
Dept: INTERVENTIONAL RADIOLOGY/VASCULAR | Facility: CLINIC | Age: 63
End: 2023-07-05
Payer: COMMERCIAL

## 2023-07-05 DIAGNOSIS — C22.0 HCC (HEPATOCELLULAR CARCINOMA): Primary | ICD-10-CM

## 2023-07-05 PROCEDURE — 99212 PR OFFICE/OUTPT VISIT, EST, LEVL II, 10-19 MIN: ICD-10-PCS | Mod: 95,TXP,,

## 2023-07-05 PROCEDURE — 99212 OFFICE O/P EST SF 10 MIN: CPT | Mod: 95,TXP,,

## 2023-07-05 NOTE — PROGRESS NOTES
Subjective     Patient ID: Jed Chávez is a 63 y.o. male.    Chief Complaint: HCC    Patient presents virtually with wife to follow up after y90. Jed Chávez with dx of cirrhosis. Patient reports he is felling well. He also denies other signs of decompensated cirrhosis including no recent abdominal distention, encephalopathy, jaundice. He is s/p y90 on 5/31/2023.    Hepatology clinic note reviewed.     Review of Systems   Constitutional:  Positive for fatigue and fever.   Respiratory:  Negative for cough and shortness of breath.    Cardiovascular:  Negative for chest pain and leg swelling.   Gastrointestinal:  Negative for abdominal distention, abdominal pain and diarrhea (resolved after medications change).   Neurological:  Negative for facial asymmetry and speech difficulty.   Psychiatric/Behavioral:  Positive for sleep disturbance (at baseline). Negative for behavioral problems and confusion.         Objective     Physical Exam  Constitutional:       Appearance: Normal appearance.      Comments: Virtual Visit.   HENT:      Head: Atraumatic.      Nose: Nose normal.   Eyes:      Conjunctiva/sclera: Conjunctivae normal.   Pulmonary:      Effort: Pulmonary effort is normal. No respiratory distress.   Neurological:      General: No focal deficit present.      Mental Status: He is alert and oriented to person, place, and time.   Psychiatric:         Mood and Affect: Mood normal.         Behavior: Behavior normal.        Assessment and Plan     EXAMINATION:  MRI ABDOMEN W WO CONTRAST     CLINICAL HISTORY:  s/p y90;  Liver cell carcinoma     TECHNIQUE:  Multiplanar multisequence MR imaging the abdomen is performed with and without contrast.  10 cc of Gadavist was administered without immediate complication.     COMPARISON:  Outside MRI from 02/03/2023     FINDINGS:  The liver demonstrates a nodular peripheral contour consistent with cirrhosis.  There are numerous T1 hyperintense lesion seen scattered throughout the liver  most consistent with hyperplastic nodules.  Post treatment related changes seen within the posterior segment of the right hepatic lobe on the current study.  The previously noted areas of arterial phase enhancement within the right hepatic lobe are no longer demonstrated.  No new arterial phase enhancing lesions are noted within the liver.  The gallbladder is present.  No biliary ductal dilation.  There is normal enhancement of the portal vein, IVC and hepatic veins.  The spleen is enlarged.  Trace perihepatic ascites is noted.     There is a cyst seen projecting off the posterior aspect of the interpolar to lower pole left kidney that is stable in appearance.  The pancreas demonstrates no obvious focal abnormality.  The adrenals are unremarkable in appearance.     Impression:     1. Post treatment related changes seen within the posterior segment of the right hepatic lobe.  No obvious residual arterial phase enhancing lesions seen within the right hepatic lobe to suggest residual disease.  No new arterial phase enhancing lesions.  2. Remaining findings as discussed above and stable when compared to the prior study.        Electronically signed by: Jaya Hernandez DO  Date:                                            07/03/2023  Time:                                           11:43    HCC (hepatocellular carcinoma)      - Imaging reviewed by Dr. Gong. Good treatment response no residual or recurrence. Patient can continue to follow up with hepatology for surveillance imaging.   - Follow up PRN.     The patient location is: [place]  The chief complaint leading to consultation is: HCC    Visit type: audiovisual      20 minutes of total time spent on the encounter, which includes face to face time and non-face to face time preparing to see the patient (eg, review of tests), Obtaining and/or reviewing separately obtained history, Documenting clinical information in the electronic or other health record, Independently  interpreting results (not separately reported) and communicating results to the patient/family/caregiver, or Care coordination (not separately reported).     Each patient to whom he or she provides medical services by telemedicine is:  (1) informed of the relationship between the physician and patient and the respective role of any other health care provider with respect to management of the patient; and (2) notified that he or she may decline to receive medical services by telemedicine and may withdraw from such care at any time.    Maribell Alonzo PA-C  Interventional Radiology  [phone]

## 2023-07-06 ENCOUNTER — TELEPHONE (OUTPATIENT)
Dept: TRANSPLANT | Facility: CLINIC | Age: 63
End: 2023-07-06
Payer: COMMERCIAL

## 2023-07-06 NOTE — TELEPHONE ENCOUNTER
Call placed to patient to discuss Liver Selection's decision with no answer. Voice message left requesting a return call upon receipt of message. Contact numbers provided.

## 2023-07-06 NOTE — COMMITTEE REVIEW
Jed Chávez's case presented to selection committee on 7/5/2023.  Patient has been accepted for liver transplant due to Primary Liver Malignancy: Hepatoma (HCC) and alcohol cirrhosis and complications of end stage liver disease including HCC, Cirrhosis, Thrombocytopenia, Coagulopathy, Hypoalbuminemia, Hyperbilirubinemia, Esophageal varices, Splenomegaly, Severe malnutritionwith a MELD score of 26.  Patient has no absolute contraindications for liver transplant.  Patient will be listed pending financial approval.    Patient will accept HBcAb positive livers.  Patient will accept HCVAB positive livers.  Patient will accept DCD livers.  Patient will accept HCV SURENDRA positive livers  Patient will not accept HBV SURENDRA positive livers  Patient candidacy for living donor transplant: yes  I was present at the committee meeting and attest to the decision of the committee.    Ochoa Quesada  07/06/2023

## 2023-07-06 NOTE — TELEPHONE ENCOUNTER
Call returned. Patient notified his case was presented to the Liver Selection Committee and he has been approved for listing pending financial clearance. Questions regarding listing process, wait time, organ offers, and tumor surveillance/management addressed.  Understanding expressed.         ----- Message from Lizabeth Marcus sent at 7/6/2023 10:59 AM CDT -----  Regarding: Patient advice  Contact: Pt [phone]    Caller:  Jed Chávez     Returning call to: Onelia Keenan can be reached at: [phone]    Nature of the call: Returning missed call to discuss decision

## 2023-07-10 ENCOUNTER — LAB VISIT (OUTPATIENT)
Dept: LAB | Facility: HOSPITAL | Age: 63
End: 2023-07-10
Attending: INTERNAL MEDICINE
Payer: COMMERCIAL

## 2023-07-10 DIAGNOSIS — I63.9 CEREBROVASCULAR ACCIDENT (CVA), UNSPECIFIED MECHANISM: ICD-10-CM

## 2023-07-10 DIAGNOSIS — E11.69 TYPE 2 DIABETES MELLITUS WITH OTHER SPECIFIED COMPLICATION, WITHOUT LONG-TERM CURRENT USE OF INSULIN: ICD-10-CM

## 2023-07-10 DIAGNOSIS — K74.69 OTHER CIRRHOSIS OF LIVER: ICD-10-CM

## 2023-07-10 DIAGNOSIS — Z01.818 PRE-TRANSPLANT EVALUATION FOR LIVER TRANSPLANT: ICD-10-CM

## 2023-07-10 DIAGNOSIS — Z00.6 RESEARCH STUDY PATIENT: ICD-10-CM

## 2023-07-10 DIAGNOSIS — C22.0 HCC (HEPATOCELLULAR CARCINOMA): ICD-10-CM

## 2023-07-10 DIAGNOSIS — I10 HYPERTENSION, UNSPECIFIED TYPE: ICD-10-CM

## 2023-07-10 LAB
ABO + RH BLD: NORMAL
AFP SERPL-MCNC: 14 NG/ML (ref 0–8.4)
ALBUMIN SERPL BCP-MCNC: 2.5 G/DL (ref 3.5–5.2)
ALP SERPL-CCNC: 180 U/L (ref 55–135)
ALT SERPL W/O P-5'-P-CCNC: 56 U/L (ref 10–44)
ANION GAP SERPL CALC-SCNC: 9 MMOL/L (ref 8–16)
AST SERPL-CCNC: 101 U/L (ref 10–40)
BASOPHILS # BLD AUTO: 0.01 K/UL (ref 0–0.2)
BASOPHILS NFR BLD: 0.3 % (ref 0–1.9)
BILIRUB SERPL-MCNC: 5 MG/DL (ref 0.1–1)
BLD GP AB SCN CELLS X3 SERPL QL: NORMAL
BUN SERPL-MCNC: 12 MG/DL (ref 8–23)
CALCIUM SERPL-MCNC: 8.4 MG/DL (ref 8.7–10.5)
CHLORIDE SERPL-SCNC: 104 MMOL/L (ref 95–110)
CO2 SERPL-SCNC: 24 MMOL/L (ref 23–29)
CREAT SERPL-MCNC: 0.8 MG/DL (ref 0.5–1.4)
DIFFERENTIAL METHOD: ABNORMAL
EOSINOPHIL # BLD AUTO: 0.1 K/UL (ref 0–0.5)
EOSINOPHIL NFR BLD: 3.4 % (ref 0–8)
ERYTHROCYTE [DISTWIDTH] IN BLOOD BY AUTOMATED COUNT: 14.5 % (ref 11.5–14.5)
EST. GFR  (NO RACE VARIABLE): >60 ML/MIN/1.73 M^2
GLUCOSE SERPL-MCNC: 150 MG/DL (ref 70–110)
HCT VFR BLD AUTO: 38 % (ref 40–54)
HGB BLD-MCNC: 13.2 G/DL (ref 14–18)
IMM GRANULOCYTES # BLD AUTO: 0.01 K/UL (ref 0–0.04)
IMM GRANULOCYTES NFR BLD AUTO: 0.3 % (ref 0–0.5)
INR PPP: 2.6 (ref 0.8–1.2)
LYMPHOCYTES # BLD AUTO: 0.5 K/UL (ref 1–4.8)
LYMPHOCYTES NFR BLD: 12.6 % (ref 18–48)
MCH RBC QN AUTO: 32.9 PG (ref 27–31)
MCHC RBC AUTO-ENTMCNC: 34.7 G/DL (ref 32–36)
MCV RBC AUTO: 95 FL (ref 82–98)
MONOCYTES # BLD AUTO: 0.4 K/UL (ref 0.3–1)
MONOCYTES NFR BLD: 10.2 % (ref 4–15)
NEUTROPHILS # BLD AUTO: 2.8 K/UL (ref 1.8–7.7)
NEUTROPHILS NFR BLD: 73.2 % (ref 38–73)
NRBC BLD-RTO: 0 /100 WBC
PLATELET # BLD AUTO: 80 K/UL (ref 150–450)
PMV BLD AUTO: 11.8 FL (ref 9.2–12.9)
POTASSIUM SERPL-SCNC: 3.6 MMOL/L (ref 3.5–5.1)
PROT SERPL-MCNC: 7.4 G/DL (ref 6–8.4)
PROTHROMBIN TIME: 26 SEC (ref 9–12.5)
RBC # BLD AUTO: 4.01 M/UL (ref 4.6–6.2)
SODIUM SERPL-SCNC: 137 MMOL/L (ref 136–145)
SPECIMEN OUTDATE: NORMAL
WBC # BLD AUTO: 3.82 K/UL (ref 3.9–12.7)

## 2023-07-10 PROCEDURE — 36415 COLL VENOUS BLD VENIPUNCTURE: CPT | Mod: TXP | Performed by: INTERNAL MEDICINE

## 2023-07-10 PROCEDURE — 85610 PROTHROMBIN TIME: CPT | Mod: TXP | Performed by: INTERNAL MEDICINE

## 2023-07-10 PROCEDURE — 86900 BLOOD TYPING SEROLOGIC ABO: CPT | Mod: TXP | Performed by: INTERNAL MEDICINE

## 2023-07-10 PROCEDURE — 82105 ALPHA-FETOPROTEIN SERUM: CPT | Mod: TXP | Performed by: INTERNAL MEDICINE

## 2023-07-10 PROCEDURE — 85025 COMPLETE CBC W/AUTO DIFF WBC: CPT | Mod: TXP | Performed by: INTERNAL MEDICINE

## 2023-07-10 PROCEDURE — 80053 COMPREHEN METABOLIC PANEL: CPT | Mod: TXP | Performed by: INTERNAL MEDICINE

## 2023-07-13 ENCOUNTER — TELEPHONE (OUTPATIENT)
Dept: TRANSPLANT | Facility: CLINIC | Age: 63
End: 2023-07-13
Payer: COMMERCIAL

## 2023-07-13 NOTE — TELEPHONE ENCOUNTER
"  LIVER WAIT LISTING NOTE    **NOTE:   IF ANY EXTERNAL LABS ARE USED FOR LISTING THE VALUES AND DATES MUST BE ENTERED IN EPIC TO GENERATE THIS NOTE**    Date of Financial clearance to list: 7/10/2023    [facility]:        Organ: Liver    Last Name: Kyler  First Name: Jed     : 1960      Gender:     male         MRN#: [id number]                                   State of Permanent Residence:  [address] [place] [id number]    Ethnicity: Not  or /a   Race:      White    CLINICAL INFORMATION       ABO  ABO Blood Group:   B POS     ABO Confirmation: (THESE DATES MUST BE PRIOR TO THE LIST DATE AND SUPPORTED BY SEPARATE LAB REPORTS)    Internal Results    Lab Results   Component Value Date    GROUPTRH B POS 07/10/2023    GROUPTRH B POS 06/15/2023     No results found for: ABO    External Results    ABO Date 1:  ABO Result 1:  ABO Date 2:  ABO Result 2:     Are either of these ABO results based on External Labs? No  (If Yes, STOP and go to source document in Media Tab for verification).    VITALS  Height:    Ht Readings from Last 1 Encounters:   23 5' 10" (1.778 m)     Weight:    Wt Readings from Last 1 Encounters:   23 98.1 kg (216 lb 4.3 oz)       LIVER ORGAN INFORMATION  Candidate Medical Urgency Status:  MELD/PELD    Number of Previous Transplants: 0    MELD/PELD Data Collection:  Had dialysis twice, or 24 hours of CVVHD, within 1 week prior to the serum creatinine test: No  Encephalopathy: none Date: 7/10/2023  Ascites: none Date: 7/10/2023          MELD Score:  MELD 3.0: 24 at 7/10/2023  7:25 AM  MELD-Na: 23 at 7/10/2023  7:25 AM  Calculated from:  Serum Creatinine: 0.8 mg/dL (Using min of 1 mg/dL) at 7/10/2023  7:25 AM  Serum Sodium: 137 mmol/L at 7/10/2023  7:25 AM  Total Bilirubin: 5.0 mg/dL at 7/10/2023  7:25 AM  Serum Albumin: 2.5 g/dL at 7/10/2023  7:25 AM  INR(ratio): 2.6 at 7/10/2023  7:25 AM  Age at listing (hypothetical): 63 years  Sex: Male at 7/10/2023  " "7:25 AM    Lab Results   Component Value Date    ALBUMIN 2.5 (L) 07/10/2023     Additional Organs: none  Kidney: No    If Kidney is "Yes" above, check Diagnosis and enter the medical eligibility below for a simultaneous liver/kidney:    Diagnosis: Chronic kidney disease (CKD) with measured or calculated GFR less than or equal to 60 ml/min for greater than 90 consecutive days. At least one of the following must qualify for CKD:  Date Begun Dialysis CrCl (ml/min)  Must be < or = 30 eGFR (ml/min) Must be < or = 30    Yes/no:                    Diagnosis: Sustained acute kidney injury (must be confirmed at least once every 7 days). Please select at least one of the following criteria:  Date of test or treatment Dialysis received CrCl (ml/min) Must be < or = 30 eGFR (ml/min) Must be < or = 25 Number of days since previous test or treatment (must be less than or equal to 7 days)    Yes/No:                  Diagnosis: Metabolic disease, Check all diagnosis that apply:     Hyperoxaluria    Atypical hemolytic uremic syndrome (HUS) from mutations in factor H or factor I    Familial non-neuropathic systemic amyloidosis    Methylmalonic aciduria     Transplant nephrologist confirming candidate's most recent diagnosis for SLK: N/A               ## Please submit a separate Kidney Listing note for combined Liver/Kidney patients. ##    Will Recipient Accept?   Accept HBcAB Positive Organ:  Yes  Accept HBV SURENDRA Positive Organ:  No  Accept HCV Antibody Positive Organ: Yes   Accept HCV SURENDRA Positive Organ:  Yes                        Local: No                           Import: No  Accept DCD Organ:    Yes  Minimum acceptable donor age:  5 years  Maximum acceptable donor age:  99 years  Minimum acceptable donor weight:  40 lbs    Maximum acceptable donor weight:  440 lb  Maximum miles the organ or  Recovery team will travel:   5000 miles    TCR Information    Citizenship: [place] Citizen   Country of permanent residence:   Year of entry to " the [place]:   Highest education level: Associate/Bachelor Degree    Patient on Life Support: No  Functional Status: 50% - requires considerable assistance and frequent medical care  Working for income: yes  If yes, working activity level: Working Full Time  Previous [facility] - No  Source of payment: Private Insurance  Diabetes: Type II  Any previous malignancy: Yes, Skin Non-Melanoma and Liver  Neoadjuvant Therapy: No  Has candidate ever had a diagnosis of HCC: Yes    Liver Medical Factors  Previous abdominal surgery: No  Spontaneous Bacterial Peritonitis: No  History of Portal Vein Thrombosis: No  Transjugular Intrahepatic Portosystemic Shunt: No    Blood Type x2 was verified by myself and Torrey Dubon RN. Blood type determination and reporting was completed according to the programs protocols and OPTN requirements.

## 2023-07-14 ENCOUNTER — TELEPHONE (OUTPATIENT)
Dept: TRANSPLANT | Facility: CLINIC | Age: 63
End: 2023-07-14
Payer: COMMERCIAL

## 2023-07-14 DIAGNOSIS — Z76.82 ORGAN TRANSPLANT CANDIDATE: ICD-10-CM

## 2023-07-14 DIAGNOSIS — E55.9 VITAMIN D DEFICIENCY: Primary | ICD-10-CM

## 2023-07-14 RX ORDER — ERGOCALCIFEROL 1.25 MG/1
50000 CAPSULE ORAL
Qty: 12 CAPSULE | Refills: 0 | Status: SHIPPED | OUTPATIENT
Start: 2023-07-14 | End: 2023-09-29 | Stop reason: ALTCHOICE

## 2023-07-14 NOTE — TELEPHONE ENCOUNTER
Patient advised vitamin A and vitamin D levels are low. MD recommends vitamin D 50,000 units weekly x12 weeks then repeat a level, and daily MVI for vitamin A replacement. Vitamin D rx sent to MD for approval and will then be e[facility]scribed to [facility].    Vitamin D lab entered and  messaged to schedule in 3 months     ----- Message from Joleen Colmenares MD sent at 6/15/2023 11:19 AM CDT -----  Vitamin-D deficiency; Needs ergocalciferol 55501 units weekly for 12 weeks and then repeat vitamin-D level.

## 2023-07-14 NOTE — TELEPHONE ENCOUNTER
Patient listed July 13, 2023 with medical MELD score 24.  Patient notified and advised he is active on the liver transplant waitlist and eligible for organ offers. Primary and back-up offers, MELD score, and MELD exception process discussed.  Patient advised he should stay prepared for organ offers, which may come day or night. Patient instructed to notify the transplant department of any changes in contact numbers, insurance or caregiver plan.  All questions and concerns addressed. Understanding expressed.

## 2023-07-17 ENCOUNTER — TELEPHONE (OUTPATIENT)
Dept: TRANSPLANT | Facility: CLINIC | Age: 63
End: 2023-07-17
Payer: COMMERCIAL

## 2023-07-17 NOTE — TELEPHONE ENCOUNTER
Call returned. Questions regarding listing status and primary/backup organ offers addressed. Advised of low vitamin A and D levels with MD's recommendation for replacement therapy. Vitamin D [facility]. Understanding expressed. Denies any additional questions or concerns at this time.     ----- Message from Julieth Choi sent at 7/17/2023  8:57 AM CDT -----  Regarding: Consult/Advisory  Contact: [phone]  CONSULT/ADVISORY    Name of Caller: Hallie Chávez (Spouse)    Contact Preference: [phone]    Nature of Call: Pts spouse Hallie states she has a question to ask about a call she received on Friday.

## 2023-07-19 ENCOUNTER — PATIENT MESSAGE (OUTPATIENT)
Dept: TRANSPLANT | Facility: CLINIC | Age: 63
End: 2023-07-19
Payer: COMMERCIAL

## 2023-07-21 ENCOUNTER — TELEPHONE (OUTPATIENT)
Dept: TRANSPLANT | Facility: CLINIC | Age: 63
End: 2023-07-21
Payer: COMMERCIAL

## 2023-07-21 NOTE — TELEPHONE ENCOUNTER
Transplant SW received message that patient has spoken with his daughter, Nasreen, to inform her of his liver diagnosis and undergoing transplant evaluation. Patient's daughter Nasreen was identified as patient' secondary caregiver and patient asked SW to wait until they spoke to her about the caregiver role. SW contacted patient's daughter for confirmation and education. Patient's daughter verbalizes commitment and availability to assist patient as needed. Patient's daughter works as a school therapist, but will be able to take time off. Patient's daughter lives in [place] and would take a flight in and use parents vehicle for transportation. Patient's daughter denied any other questions or concerns at this time. SW remains available.     Additional Significant Others who will Assist with Transplant:  Name: Emily Sandifer (confirmed via phone)  Age: 28  Relationship: daughter  State: [place]  Does person drive? yes  Phone: [phone]

## 2023-07-27 ENCOUNTER — TELEPHONE (OUTPATIENT)
Dept: TRANSPLANT | Facility: CLINIC | Age: 63
End: 2023-07-27
Payer: COMMERCIAL

## 2023-07-29 ENCOUNTER — TELEPHONE (OUTPATIENT)
Dept: TRANSPLANT | Facility: CLINIC | Age: 63
End: 2023-07-29
Payer: COMMERCIAL

## 2023-07-29 NOTE — TELEPHONE ENCOUNTER
TXP LIVER COORDINATOR ORGAN OFFER NOTE   UNOS# [id number]  Notified by Ricci Eugene, , that Jed Chávez is eligible for liver as a backup recipient offer.  Spoke with patient and identified no acute medical issues with telephone assessment. Protocol script read to patient regarding N/A, standard donor offer.  Patient verbalized understanding, all questions answered, patient declines organ offer.Discussed patient's diagnosis of HCC and the fact that he declined this offer.  Informed patient that Dr. Carr or Onelia whitt call patient next week to discuss if he would like to be  inactive.  Per Ricci Eugene's request, I asked patient if he became primary would he agree to be transplanted and he said no.  He said he is not ready and does not like to be put on the spot with these question.  Explained to patient being on the transplant list does put patients on the spot to make important decisions.

## 2023-07-31 ENCOUNTER — TELEPHONE (OUTPATIENT)
Dept: TRANSPLANT | Facility: CLINIC | Age: 63
End: 2023-07-31
Payer: COMMERCIAL

## 2023-07-31 NOTE — TELEPHONE ENCOUNTER
"TXP LIVER COORDINATOR ORGAN OFFER NOTE   UNOS# [id number]  Notified by Ricci García, , that Jed Chávez is eligible for liver as a backup recipient offer.  Spoke with patient and identified no acute medical issues with telephone assessment. Protocol script read to patient regarding N/A, standard donor offer.  Patient was informed that if he turned down the offer A transplant doctor will call you after we hang up."  Patient was also informed that if he turned down this donor, he would not be punished or removed from the transplant list, that he  would remain on the list at his current status/MELD score. However, by waiting on the list longer rather than receiving this transplant, he will face a greater risk of death than any risk from the slight possibility of transmitted infection.  Patient was asked if they have had a positive COVID-19 test or if they have any signs or symptoms. Informed patient that they will be tested for COVID-19 upon arrival to the hospital, unless have a previous positive result. If tested and result is positive, the transplant will not be able to occur, they will be inactivated on the wait list for 21 days per protocol and required to quarantine.   Patient verbalized understanding, all questions answered, patient accepts organ offer.   Patient notified of plan, will remain home at this time and await further instruction, and states understanding.      "

## 2023-08-01 ENCOUNTER — TELEPHONE (OUTPATIENT)
Dept: TRANSPLANT | Facility: CLINIC | Age: 63
End: 2023-08-01
Payer: COMMERCIAL

## 2023-08-01 ENCOUNTER — LAB VISIT (OUTPATIENT)
Dept: TRANSPLANT | Facility: CLINIC | Age: 63
End: 2023-08-01
Payer: COMMERCIAL

## 2023-08-01 ENCOUNTER — TELEPHONE (OUTPATIENT)
Dept: TRANSPLANT | Facility: CLINIC | Age: 63
End: 2023-08-01

## 2023-08-01 ENCOUNTER — PATIENT MESSAGE (OUTPATIENT)
Dept: TRANSPLANT | Facility: CLINIC | Age: 63
End: 2023-08-01
Payer: COMMERCIAL

## 2023-08-01 DIAGNOSIS — Z76.82 ORGAN TRANSPLANT CANDIDATE: Primary | ICD-10-CM

## 2023-08-01 LAB — SARS-COV-2 RDRP RESP QL NAA+PROBE: NEGATIVE

## 2023-08-01 PROCEDURE — U0002 COVID-19 LAB TEST NON-CDC: HCPCS | Mod: TXP | Performed by: TRANSPLANT SURGERY

## 2023-08-01 NOTE — TELEPHONE ENCOUNTER
Case update:    UNOS ID: [id number]     Per procurement, donor times are set for 1500 today, 8/1/23.     Advised Mr. Adkins to report to the 1st floor transplant clinic for COVID test & be prepared to remain on campus for 5-6 hours as backup to an outside case. NPO starting at 1115. Testing scheduled @1400.

## 2023-08-02 NOTE — TELEPHONE ENCOUNTER
Re: UNOS ID [id number]    Per procurement, donor did not pass in allotted time. Case has been shut down.Communicated to Mr. Chávez that he is released from back-up status. Pt may resume meds, meals and fluids & return home. Understanding expressed. Thanked patient for agreeing to come out as a backup tonight.

## 2023-08-02 NOTE — TELEPHONE ENCOUNTER
Referral received from Dr Joleen Colmenares  Initial  referral from Dr. Abdulkadir Germain  Patient with HCC and cirrhosis. MELD 21  ICD-10: K74.60  Referred for liver transplant for EVALUATION.    Referral completed and forwarded to Transplant Financial Services.          Insurance: in Epic    
[phone]

## 2023-08-09 ENCOUNTER — TELEPHONE (OUTPATIENT)
Dept: TRANSPLANT | Facility: CLINIC | Age: 63
End: 2023-08-09
Payer: COMMERCIAL

## 2023-08-09 ENCOUNTER — HOSPITAL ENCOUNTER (INPATIENT)
Facility: HOSPITAL | Age: 63
LOS: 7 days | Discharge: HOME HEALTH | DRG: 006 | End: 2023-08-16
Attending: TRANSPLANT SURGERY | Admitting: SURGERY
Payer: COMMERCIAL

## 2023-08-09 ENCOUNTER — ANESTHESIA (OUTPATIENT)
Dept: SURGERY | Facility: HOSPITAL | Age: 63
DRG: 006 | End: 2023-08-09
Payer: COMMERCIAL

## 2023-08-09 ENCOUNTER — ANESTHESIA EVENT (OUTPATIENT)
Dept: SURGERY | Facility: HOSPITAL | Age: 63
DRG: 006 | End: 2023-08-09
Payer: COMMERCIAL

## 2023-08-09 DIAGNOSIS — Z91.89 AT RISK FOR OPPORTUNISTIC INFECTIONS: ICD-10-CM

## 2023-08-09 DIAGNOSIS — T38.0X5D ADVERSE EFFECT OF CORTICOSTEROIDS, SUBSEQUENT ENCOUNTER: ICD-10-CM

## 2023-08-09 DIAGNOSIS — Z94.4 STATUS POST LIVER TRANSPLANT: Primary | ICD-10-CM

## 2023-08-09 DIAGNOSIS — E11.69 TYPE 2 DIABETES MELLITUS WITH OTHER SPECIFIED COMPLICATION, WITHOUT LONG-TERM CURRENT USE OF INSULIN: ICD-10-CM

## 2023-08-09 DIAGNOSIS — D62 ACUTE BLOOD LOSS ANEMIA: ICD-10-CM

## 2023-08-09 DIAGNOSIS — I10 HYPERTENSION, UNSPECIFIED TYPE: ICD-10-CM

## 2023-08-09 DIAGNOSIS — E55.9 VITAMIN D DEFICIENCY: ICD-10-CM

## 2023-08-09 DIAGNOSIS — Z76.82 ORGAN TRANSPLANT CANDIDATE: ICD-10-CM

## 2023-08-09 DIAGNOSIS — K72.10 END STAGE LIVER DISEASE: ICD-10-CM

## 2023-08-09 DIAGNOSIS — Z29.89 PROPHYLACTIC IMMUNOTHERAPY: ICD-10-CM

## 2023-08-09 DIAGNOSIS — E87.1 HYPONATREMIA: ICD-10-CM

## 2023-08-09 DIAGNOSIS — Z79.60 LONG-TERM USE OF IMMUNOSUPPRESSANT MEDICATION: ICD-10-CM

## 2023-08-09 DIAGNOSIS — Z01.818 PREOP TESTING: ICD-10-CM

## 2023-08-09 DIAGNOSIS — D68.4 ACQUIRED COAGULATION FACTOR DEFICIENCY: ICD-10-CM

## 2023-08-09 LAB
ABO + RH BLD: NORMAL
ALBUMIN SERPL BCP-MCNC: 2 G/DL (ref 3.5–5.2)
ALBUMIN SERPL BCP-MCNC: 2.3 G/DL (ref 3.5–5.2)
ALP SERPL-CCNC: 179 U/L (ref 55–135)
ALT SERPL W/O P-5'-P-CCNC: 74 U/L (ref 10–44)
ANION GAP SERPL CALC-SCNC: 11 MMOL/L (ref 8–16)
APTT PPP: 31.9 SEC (ref 21–32)
AST SERPL-CCNC: 130 U/L (ref 10–40)
BASOPHILS # BLD AUTO: 0.01 K/UL (ref 0–0.2)
BASOPHILS NFR BLD: 0.2 % (ref 0–1.9)
BILIRUB SERPL-MCNC: 6.7 MG/DL (ref 0.1–1)
BLD GP AB SCN CELLS X3 SERPL QL: NORMAL
BLOOD BANK HEPATITIS FREEZE AND HOLD: NORMAL
BUN SERPL-MCNC: 10 MG/DL (ref 8–23)
CA-I BLDV-SCNC: 1.07 MMOL/L (ref 1.06–1.42)
CALCIUM SERPL-MCNC: 8.1 MG/DL (ref 8.7–10.5)
CHLORIDE SERPL-SCNC: 103 MMOL/L (ref 95–110)
CO2 SERPL-SCNC: 24 MMOL/L (ref 23–29)
CREAT SERPL-MCNC: 0.8 MG/DL (ref 0.5–1.4)
DIFFERENTIAL METHOD: ABNORMAL
EOSINOPHIL # BLD AUTO: 0.1 K/UL (ref 0–0.5)
EOSINOPHIL NFR BLD: 3.4 % (ref 0–8)
ERYTHROCYTE [DISTWIDTH] IN BLOOD BY AUTOMATED COUNT: 14.6 % (ref 11.5–14.5)
EST. GFR  (NO RACE VARIABLE): >60 ML/MIN/1.73 M^2
FIBRINOGEN PPP-MCNC: 304 MG/DL (ref 182–400)
GLUCOSE SERPL-MCNC: 236 MG/DL (ref 70–110)
GLUCOSE SERPL-MCNC: 60 MG/DL (ref 70–110)
HBV CORE AB SERPL QL IA: NORMAL
HBV SURFACE AB SER-ACNC: 219.92 MIU/ML
HBV SURFACE AB SER-ACNC: REACTIVE M[IU]/ML
HBV SURFACE AG SERPL QL IA: NORMAL
HCT VFR BLD AUTO: 35.2 % (ref 40–54)
HCT VFR BLD AUTO: 40.1 % (ref 40–54)
HCV AB SERPL QL IA: NORMAL
HGB BLD-MCNC: 12 G/DL (ref 14–18)
HGB BLD-MCNC: 13.4 G/DL (ref 14–18)
HIV 1+2 AB+HIV1 P24 AG SERPL QL IA: NORMAL
IMM GRANULOCYTES # BLD AUTO: 0.01 K/UL (ref 0–0.04)
IMM GRANULOCYTES NFR BLD AUTO: 0.2 % (ref 0–0.5)
INR PPP: 2.7 (ref 0.8–1.2)
INR PPP: 2.7 (ref 0.8–1.2)
LYMPHOCYTES # BLD AUTO: 0.5 K/UL (ref 1–4.8)
LYMPHOCYTES NFR BLD: 12.2 % (ref 18–48)
MAGNESIUM SERPL-MCNC: 1.9 MG/DL (ref 1.6–2.6)
MCH RBC QN AUTO: 32.1 PG (ref 27–31)
MCHC RBC AUTO-ENTMCNC: 33.4 G/DL (ref 32–36)
MCV RBC AUTO: 96 FL (ref 82–98)
MONOCYTES # BLD AUTO: 0.4 K/UL (ref 0.3–1)
MONOCYTES NFR BLD: 10.5 % (ref 4–15)
NEUTROPHILS # BLD AUTO: 3 K/UL (ref 1.8–7.7)
NEUTROPHILS NFR BLD: 73.5 % (ref 38–73)
NRBC BLD-RTO: 0 /100 WBC
PLATELET # BLD AUTO: 83 K/UL (ref 150–450)
PLATELET # BLD AUTO: 87 K/UL (ref 150–450)
PMV BLD AUTO: 11.7 FL (ref 9.2–12.9)
PMV BLD AUTO: 12.1 FL (ref 9.2–12.9)
POCT GLUCOSE: 207 MG/DL (ref 70–110)
POTASSIUM SERPL-SCNC: 3.2 MMOL/L (ref 3.5–5.1)
POTASSIUM SERPL-SCNC: 3.6 MMOL/L (ref 3.5–5.1)
PROT SERPL-MCNC: 7.5 G/DL (ref 6–8.4)
PROTHROMBIN TIME: 26.9 SEC (ref 9–12.5)
PROTHROMBIN TIME: 27.3 SEC (ref 9–12.5)
RBC # BLD AUTO: 4.17 M/UL (ref 4.6–6.2)
SARS-COV-2 RDRP RESP QL NAA+PROBE: NEGATIVE
SODIUM SERPL-SCNC: 138 MMOL/L (ref 136–145)
SODIUM SERPL-SCNC: 140 MMOL/L (ref 136–145)
SPECIMEN OUTDATE: NORMAL
WBC # BLD AUTO: 4.11 K/UL (ref 3.9–12.7)

## 2023-08-09 PROCEDURE — P9017 PLASMA 1 DONOR FRZ W/IN 8 HR: HCPCS | Performed by: PHYSICIAN ASSISTANT

## 2023-08-09 PROCEDURE — 93503 INSERT/PLACE HEART CATHETER: CPT | Mod: 59,,, | Performed by: ANESTHESIOLOGY

## 2023-08-09 PROCEDURE — 27201423 OPTIME MED/SURG SUP & DEVICES STERILE SUPPLY: Performed by: SURGERY

## 2023-08-09 PROCEDURE — 86706 HEP B SURFACE ANTIBODY: CPT | Mod: 91 | Performed by: PHYSICIAN ASSISTANT

## 2023-08-09 PROCEDURE — 93503 SWAN GANZ LINE: ICD-10-PCS | Mod: 59,,, | Performed by: ANESTHESIOLOGY

## 2023-08-09 PROCEDURE — 37000009 HC ANESTHESIA EA ADD 15 MINS: Performed by: SURGERY

## 2023-08-09 PROCEDURE — 99223 1ST HOSP IP/OBS HIGH 75: CPT | Mod: 57,NTX,,

## 2023-08-09 PROCEDURE — 84132 ASSAY OF SERUM POTASSIUM: CPT | Performed by: SURGERY

## 2023-08-09 PROCEDURE — 63600175 PHARM REV CODE 636 W HCPCS: Performed by: SURGERY

## 2023-08-09 PROCEDURE — 37000008 HC ANESTHESIA 1ST 15 MINUTES: Performed by: SURGERY

## 2023-08-09 PROCEDURE — 47135 PR TRANSPLANT LIVER,ALLOTRANSPLANT: ICD-10-PCS | Mod: ,,, | Performed by: SURGERY

## 2023-08-09 PROCEDURE — 85610 PROTHROMBIN TIME: CPT | Performed by: PHYSICIAN ASSISTANT

## 2023-08-09 PROCEDURE — D9220A PRA ANESTHESIA: ICD-10-PCS | Mod: ANES,,, | Performed by: ANESTHESIOLOGY

## 2023-08-09 PROCEDURE — D9220A PRA ANESTHESIA: ICD-10-PCS | Mod: CRNA,,, | Performed by: NURSE ANESTHETIST, CERTIFIED REGISTERED

## 2023-08-09 PROCEDURE — 87522 HEPATITIS C REVRS TRNSCRPJ: CPT | Performed by: PHYSICIAN ASSISTANT

## 2023-08-09 PROCEDURE — 99223 PR INITIAL HOSPITAL CARE,LEVL III: ICD-10-PCS | Mod: 57,NTX,,

## 2023-08-09 PROCEDURE — 87340 HEPATITIS B SURFACE AG IA: CPT | Performed by: PHYSICIAN ASSISTANT

## 2023-08-09 PROCEDURE — P9045 ALBUMIN (HUMAN), 5%, 250 ML: HCPCS | Mod: JZ,JG | Performed by: SURGERY

## 2023-08-09 PROCEDURE — 36620 INSERTION CATHETER ARTERY: CPT | Mod: 59,,, | Performed by: ANESTHESIOLOGY

## 2023-08-09 PROCEDURE — 36415 COLL VENOUS BLD VENIPUNCTURE: CPT | Performed by: SURGERY

## 2023-08-09 PROCEDURE — 36000931 HC OR TIME LEV VII EA ADD 15 MIN: Performed by: SURGERY

## 2023-08-09 PROCEDURE — 85018 HEMOGLOBIN: CPT | Performed by: SURGERY

## 2023-08-09 PROCEDURE — 47135 PR TRANSPLANT LIVER,ALLOTRANSPLANT: ICD-10-PCS | Mod: 82,,, | Performed by: TRANSPLANT SURGERY

## 2023-08-09 PROCEDURE — 25000003 PHARM REV CODE 250: Performed by: NURSE ANESTHETIST, CERTIFIED REGISTERED

## 2023-08-09 PROCEDURE — P9021 RED BLOOD CELLS UNIT: HCPCS | Performed by: PHYSICIAN ASSISTANT

## 2023-08-09 PROCEDURE — D9220A PRA ANESTHESIA: Mod: CRNA,,, | Performed by: NURSE ANESTHETIST, CERTIFIED REGISTERED

## 2023-08-09 PROCEDURE — 86920 COMPATIBILITY TEST SPIN: CPT | Performed by: PHYSICIAN ASSISTANT

## 2023-08-09 PROCEDURE — 93010 ELECTROCARDIOGRAM REPORT: CPT | Mod: ,,, | Performed by: INTERNAL MEDICINE

## 2023-08-09 PROCEDURE — 12000002 HC ACUTE/MED SURGE SEMI-PRIVATE ROOM: Mod: NTX

## 2023-08-09 PROCEDURE — 25000003 PHARM REV CODE 250: Performed by: STUDENT IN AN ORGANIZED HEALTH CARE EDUCATION/TRAINING PROGRAM

## 2023-08-09 PROCEDURE — 47143 PR TRANSPLANT,PREP DONOR LIVER, WHOLE: ICD-10-PCS | Mod: 51,,, | Performed by: TRANSPLANT SURGERY

## 2023-08-09 PROCEDURE — U0002 COVID-19 LAB TEST NON-CDC: HCPCS | Performed by: PHYSICIAN ASSISTANT

## 2023-08-09 PROCEDURE — 85025 COMPLETE CBC W/AUTO DIFF WBC: CPT | Performed by: PHYSICIAN ASSISTANT

## 2023-08-09 PROCEDURE — 63600175 PHARM REV CODE 636 W HCPCS: Mod: NTX | Performed by: PHYSICIAN ASSISTANT

## 2023-08-09 PROCEDURE — 83735 ASSAY OF MAGNESIUM: CPT | Performed by: SURGERY

## 2023-08-09 PROCEDURE — 47135 TRANSPLANTATION OF LIVER: CPT | Mod: ,,, | Performed by: SURGERY

## 2023-08-09 PROCEDURE — 87389 HIV-1 AG W/HIV-1&-2 AB AG IA: CPT | Performed by: PHYSICIAN ASSISTANT

## 2023-08-09 PROCEDURE — 86900 BLOOD TYPING SEROLOGIC ABO: CPT | Performed by: PHYSICIAN ASSISTANT

## 2023-08-09 PROCEDURE — 63600175 PHARM REV CODE 636 W HCPCS: Performed by: NURSE ANESTHETIST, CERTIFIED REGISTERED

## 2023-08-09 PROCEDURE — 85384 FIBRINOGEN ACTIVITY: CPT | Performed by: SURGERY

## 2023-08-09 PROCEDURE — 99000 SPECIMEN HANDLING OFFICE-LAB: CPT | Performed by: PHYSICIAN ASSISTANT

## 2023-08-09 PROCEDURE — 85049 AUTOMATED PLATELET COUNT: CPT | Performed by: SURGERY

## 2023-08-09 PROCEDURE — 36415 COLL VENOUS BLD VENIPUNCTURE: CPT | Performed by: PHYSICIAN ASSISTANT

## 2023-08-09 PROCEDURE — 84295 ASSAY OF SERUM SODIUM: CPT | Performed by: SURGERY

## 2023-08-09 PROCEDURE — 93005 ELECTROCARDIOGRAM TRACING: CPT

## 2023-08-09 PROCEDURE — 85730 THROMBOPLASTIN TIME PARTIAL: CPT | Performed by: SURGERY

## 2023-08-09 PROCEDURE — 36556 INSERT NON-TUNNEL CV CATH: CPT | Mod: 59,,, | Performed by: ANESTHESIOLOGY

## 2023-08-09 PROCEDURE — 80053 COMPREHEN METABOLIC PANEL: CPT | Performed by: PHYSICIAN ASSISTANT

## 2023-08-09 PROCEDURE — 85610 PROTHROMBIN TIME: CPT | Mod: 91 | Performed by: SURGERY

## 2023-08-09 PROCEDURE — 36000930 HC OR TIME LEV VII 1ST 15 MIN: Performed by: SURGERY

## 2023-08-09 PROCEDURE — 63600175 PHARM REV CODE 636 W HCPCS: Performed by: STUDENT IN AN ORGANIZED HEALTH CARE EDUCATION/TRAINING PROGRAM

## 2023-08-09 PROCEDURE — 82040 ASSAY OF SERUM ALBUMIN: CPT | Performed by: SURGERY

## 2023-08-09 PROCEDURE — 86803 HEPATITIS C AB TEST: CPT | Performed by: PHYSICIAN ASSISTANT

## 2023-08-09 PROCEDURE — 82330 ASSAY OF CALCIUM: CPT | Performed by: SURGERY

## 2023-08-09 PROCEDURE — 36556: ICD-10-PCS | Mod: 59,,, | Performed by: ANESTHESIOLOGY

## 2023-08-09 PROCEDURE — 82947 ASSAY GLUCOSE BLOOD QUANT: CPT | Performed by: SURGERY

## 2023-08-09 PROCEDURE — 47135 TRANSPLANTATION OF LIVER: CPT | Mod: 82,,, | Performed by: TRANSPLANT SURGERY

## 2023-08-09 PROCEDURE — 93010 EKG 12-LEAD: ICD-10-PCS | Mod: ,,, | Performed by: INTERNAL MEDICINE

## 2023-08-09 PROCEDURE — 85014 HEMATOCRIT: CPT | Performed by: SURGERY

## 2023-08-09 PROCEDURE — 86704 HEP B CORE ANTIBODY TOTAL: CPT | Performed by: PHYSICIAN ASSISTANT

## 2023-08-09 PROCEDURE — D9220A PRA ANESTHESIA: Mod: ANES,,, | Performed by: ANESTHESIOLOGY

## 2023-08-09 PROCEDURE — C1729 CATH, DRAINAGE: HCPCS | Performed by: SURGERY

## 2023-08-09 PROCEDURE — 36620 ARTERIAL: ICD-10-PCS | Mod: 59,,, | Performed by: ANESTHESIOLOGY

## 2023-08-09 RX ORDER — FENTANYL CITRATE 50 UG/ML
INJECTION, SOLUTION INTRAMUSCULAR; INTRAVENOUS
Status: DISCONTINUED | OUTPATIENT
Start: 2023-08-09 | End: 2023-08-10

## 2023-08-09 RX ORDER — MUPIROCIN 20 MG/G
OINTMENT TOPICAL
Status: DISCONTINUED | OUTPATIENT
Start: 2023-08-09 | End: 2023-08-16 | Stop reason: HOSPADM

## 2023-08-09 RX ORDER — HEPARIN SODIUM 1000 [USP'U]/ML
INJECTION, SOLUTION INTRAVENOUS; SUBCUTANEOUS
Status: DISCONTINUED | OUTPATIENT
Start: 2023-08-09 | End: 2023-08-10 | Stop reason: HOSPADM

## 2023-08-09 RX ORDER — ROCURONIUM BROMIDE 10 MG/ML
INJECTION, SOLUTION INTRAVENOUS
Status: DISCONTINUED | OUTPATIENT
Start: 2023-08-09 | End: 2023-08-10

## 2023-08-09 RX ORDER — FUROSEMIDE 10 MG/ML
INJECTION INTRAMUSCULAR; INTRAVENOUS
Status: DISCONTINUED | OUTPATIENT
Start: 2023-08-09 | End: 2023-08-10

## 2023-08-09 RX ORDER — LIDOCAINE HYDROCHLORIDE 20 MG/ML
INJECTION INTRAVENOUS
Status: DISCONTINUED | OUTPATIENT
Start: 2023-08-09 | End: 2023-08-10

## 2023-08-09 RX ORDER — ALBUMIN HUMAN 50 G/1000ML
SOLUTION INTRAVENOUS
Status: COMPLETED | OUTPATIENT
Start: 2023-08-09 | End: 2023-08-10

## 2023-08-09 RX ORDER — ALBUMIN HUMAN 50 G/1000ML
SOLUTION INTRAVENOUS CONTINUOUS PRN
Status: DISCONTINUED | OUTPATIENT
Start: 2023-08-09 | End: 2023-08-10

## 2023-08-09 RX ORDER — VASOPRESSIN 20 [USP'U]/ML
INJECTION, SOLUTION INTRAMUSCULAR; SUBCUTANEOUS
Status: DISCONTINUED | OUTPATIENT
Start: 2023-08-09 | End: 2023-08-10

## 2023-08-09 RX ORDER — METHYLPREDNISOLONE SODIUM SUCCINATE 500 MG/8ML
500 INJECTION INTRAMUSCULAR; INTRAVENOUS
Status: DISCONTINUED | OUTPATIENT
Start: 2023-08-09 | End: 2023-08-12

## 2023-08-09 RX ORDER — HYDROCODONE BITARTRATE AND ACETAMINOPHEN 500; 5 MG/1; MG/1
TABLET ORAL
Status: DISCONTINUED | OUTPATIENT
Start: 2023-08-09 | End: 2023-08-11

## 2023-08-09 RX ORDER — KETAMINE HCL IN 0.9 % NACL 50 MG/5 ML
SYRINGE (ML) INTRAVENOUS
Status: DISCONTINUED | OUTPATIENT
Start: 2023-08-09 | End: 2023-08-10

## 2023-08-09 RX ORDER — PROPOFOL 10 MG/ML
VIAL (ML) INTRAVENOUS
Status: DISCONTINUED | OUTPATIENT
Start: 2023-08-09 | End: 2023-08-10

## 2023-08-09 RX ORDER — MIDAZOLAM HYDROCHLORIDE 1 MG/ML
INJECTION INTRAMUSCULAR; INTRAVENOUS
Status: DISCONTINUED | OUTPATIENT
Start: 2023-08-09 | End: 2023-08-10

## 2023-08-09 RX ORDER — MANNITOL 250 MG/ML
INJECTION, SOLUTION INTRAVENOUS
Status: DISCONTINUED | OUTPATIENT
Start: 2023-08-09 | End: 2023-08-10

## 2023-08-09 RX ORDER — PHENYLEPHRINE HYDROCHLORIDE 10 MG/ML
INJECTION INTRAVENOUS
Status: DISCONTINUED | OUTPATIENT
Start: 2023-08-09 | End: 2023-08-10

## 2023-08-09 RX ADMIN — MANNITOL 25 G: 12.5 INJECTION, SOLUTION INTRAVENOUS at 11:08

## 2023-08-09 RX ADMIN — VASOPRESSIN 0.04 UNITS/MIN: 20 INJECTION INTRAVENOUS at 11:08

## 2023-08-09 RX ADMIN — PROPOFOL 100 MG: 10 INJECTION, EMULSION INTRAVENOUS at 09:08

## 2023-08-09 RX ADMIN — MIDAZOLAM HYDROCHLORIDE 2 MG: 1 INJECTION INTRAMUSCULAR; INTRAVENOUS at 09:08

## 2023-08-09 RX ADMIN — VASOPRESSIN 2 UNITS: 20 INJECTION INTRAVENOUS at 10:08

## 2023-08-09 RX ADMIN — Medication 10 MG: at 11:08

## 2023-08-09 RX ADMIN — ALBUMIN HUMAN: 50 SOLUTION INTRAVENOUS at 11:08

## 2023-08-09 RX ADMIN — DEXTROSE: 5 SOLUTION INTRAVENOUS at 11:08

## 2023-08-09 RX ADMIN — LIDOCAINE HYDROCHLORIDE 100 MG: 20 INJECTION INTRAVENOUS at 09:08

## 2023-08-09 RX ADMIN — SODIUM CHLORIDE, SODIUM ACETATE ANHYDROUS, SODIUM GLUCONATE, POTASSIUM CHLORIDE, AND MAGNESIUM CHLORIDE: 526; 222; 502; 37; 30 INJECTION, SOLUTION INTRAVENOUS at 09:08

## 2023-08-09 RX ADMIN — NOREPINEPHRINE BITARTRATE 0.04 MCG/KG/MIN: 1 INJECTION, SOLUTION, CONCENTRATE INTRAVENOUS at 11:08

## 2023-08-09 RX ADMIN — SODIUM CHLORIDE, SODIUM ACETATE ANHYDROUS, SODIUM GLUCONATE, POTASSIUM CHLORIDE, AND MAGNESIUM CHLORIDE: 526; 222; 502; 37; 30 INJECTION, SOLUTION INTRAVENOUS at 10:08

## 2023-08-09 RX ADMIN — AMPICILLIN SODIUM AND SULBACTAM SODIUM 3 G: 2; 1 INJECTION, POWDER, FOR SOLUTION INTRAMUSCULAR; INTRAVENOUS at 11:08

## 2023-08-09 RX ADMIN — ROCURONIUM BROMIDE 100 MG: 10 INJECTION, SOLUTION INTRAVENOUS at 09:08

## 2023-08-09 RX ADMIN — Medication 20 MG: at 09:08

## 2023-08-09 RX ADMIN — METHYLPREDNISOLONE SODIUM SUCCINATE 500 MG: 1 INJECTION INTRAMUSCULAR; INTRAVENOUS at 10:08

## 2023-08-09 RX ADMIN — FUROSEMIDE 100 MG: 10 INJECTION, SOLUTION INTRAMUSCULAR; INTRAVENOUS at 11:08

## 2023-08-09 RX ADMIN — FENTANYL CITRATE 150 MCG: 50 INJECTION, SOLUTION INTRAMUSCULAR; INTRAVENOUS at 09:08

## 2023-08-09 RX ADMIN — PHENYLEPHRINE HYDROCHLORIDE 200 MCG: 10 INJECTION INTRAVENOUS at 10:08

## 2023-08-09 RX ADMIN — ROCURONIUM BROMIDE 50 MG: 10 INJECTION, SOLUTION INTRAVENOUS at 10:08

## 2023-08-09 NOTE — H&P
[facility] - Transplant Stepdown  Liver Transplant  History & Physical    Patient Name: Jed Chávez  MRN: [id number]  Admission Date: 8/9/2023  Code Status: Full Code  Primary Care Provider: Alee Pink MD    Subjective:     History of Present Illness:  Mr. 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 Reports feeling in his usual state of health. Denies recent hospitalizations or illnesses. Of note, has a history of a hypertensive CVA in 2015. No focal deficits noted, BP well controlled with current medications. Pre op labs and diagnostics pending. Tentative OR times 2030/2200 with Dr. Suero as primary surgeon.       No new subjective & objective note has been filed under this hospital service since the last note was generated.    Assessment/Plan:     * End stage liver disease  - ESRD 2/2 HCC admitted for liver transplant  - Pre op labs, CXR, EKG will be reviewed before surgery  - OR 2030/2200 with Dr. 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 Expect to improve post transplant.        Type 2 diabetes mellitus, without long-term current use of insulin  - Consult Endocrine post op for assistance in BG management.          The patient presents for liver transplant.  There are no apparent contraindications to proceeding with the planned transplant.  The patient understands that the transplant could potentially be cancelled pending detailed assessment of the donor organ.    MELD 3.0: 24 at 7/10/2023  7:25 AM  MELD-Na: 23 at 7/10/2023  7:25 AM  Calculated from:  Serum Creatinine: 0.8 mg/dL (Using min of 1 mg/dL) at 7/10/2023  7:25 AM  Serum Sodium: 137 mmol/L at 7/10/2023  7:25 AM  Total Bilirubin: 5.0 mg/dL at 7/10/2023  7:25 AM  Serum Albumin: 2.5 g/dL at 7/10/2023  7:25 AM  INR(ratio): 2.6 at 7/10/2023  7:25 AM  Age at listing (hypothetical): 63 years  Sex: Male at 7/10/2023  7:25 AM      He will receive IV  steroids pulse induction.    COVID pending    A complete discussion of the transplant procedure, including risks, complications, and alternatives, as well as any donor-specific risk factors requiring specific disclosure, will be carried out by the responsible staff surgeon prior to the procedure.       Hallie Acosta PA-C  Liver Transplant  [facility] - Transplant Stepdown

## 2023-08-09 NOTE — ASSESSMENT & PLAN NOTE
- ESRD 2/2 HCC admitted for liver transplant  - Pre op labs, CXR, EKG will be reviewed before surgery  - OR 2030/2200 with Dr. Suero  - Cont to monitor

## 2023-08-09 NOTE — SUBJECTIVE & OBJECTIVE
Past Medical History:   Diagnosis Date    Alcoholic cirrhosis     CVA (cerebral vascular accident)     DM (diabetes mellitus)     Dyslipidemia     Hepatocellular carcinoma     HTN (hypertension)     Intracranial hemorrhage     Skin cancer        Past Surgical History:   Procedure Laterality Date    HERNIA REPAIR N/A        Review of patient's allergies indicates:  No Known Allergies    Family History    None       Tobacco Use    Smoking status: Never    Smokeless tobacco: Never   Substance and Sexual Activity    Alcohol use: Not Currently    Drug use: Never    Sexual activity: Not Currently     Partners: Female       PTA Medications   Medication Sig    amLODIPine (NORVASC) 10 MG tablet Take 10 mg by mouth once daily.    ergocalciferol (ERGOCALCIFEROL) 50,000 unit Cap Take 1 capsule (50,000 Units total) by mouth every 7 days.    ezetimibe (ZETIA) 10 mg tablet Take 10 mg by mouth every morning.    glimepiride (AMARYL) 4 MG tablet Take 4 mg by mouth every morning.    JANUVIA 100 mg Tab Take 100 mg by mouth every morning.    lisinopriL-hydrochlorothiazide (PRINZIDE,ZESTORETIC) 20-12.5 mg per tablet Take 1 tablet by mouth once daily.    nadoloL (CORGARD) 20 MG tablet Take 1 tablet by mouth every morning.       Review of Systems   Constitutional:  Negative for appetite change, fatigue and fever.   HENT:  Negative for trouble swallowing.    Respiratory:  Negative for cough and shortness of breath.    Cardiovascular:  Negative for chest pain, palpitations and leg swelling.   Gastrointestinal:  Negative for abdominal distention, abdominal pain, diarrhea, nausea and vomiting.   Genitourinary:  Negative for decreased urine volume, difficulty urinating, dysuria and frequency.   Musculoskeletal:  Negative for arthralgias and back pain.   Skin:  Negative for wound.   Neurological:  Negative for dizziness, weakness and headaches.   Psychiatric/Behavioral:  Negative for behavioral problems, confusion and decreased concentration.       Objective:     Vital Signs (Most Recent):    Vital Signs (24h Range):           There is no height or weight on file to calculate BMI.    No intake or output data in the 24 hours ending 08/09/23 1706     Physical Exam  Vitals and nursing note reviewed.   Constitutional:       General: He is not in acute distress.     Appearance: He is not ill-appearing.   HENT:      Head: Normocephalic and atraumatic.      Mouth/Throat:      Mouth: Mucous membranes are moist.   Eyes:      General: Scleral icterus present.      Extraocular Movements: Extraocular movements intact.      Conjunctiva/sclera: Conjunctivae normal.   Cardiovascular:      Rate and Rhythm: Normal rate and regular rhythm.      Pulses: Normal pulses.      Heart sounds: Normal heart sounds. No murmur heard.  Pulmonary:      Effort: Pulmonary effort is normal. No respiratory distress.      Breath sounds: Normal breath sounds. No wheezing or rales.   Abdominal:      General: Bowel sounds are normal. There is distension.      Palpations: Abdomen is soft. There is no mass.      Tenderness: There is no abdominal tenderness. There is no guarding or rebound.      Hernia: No hernia is present.   Musculoskeletal:         General: Normal range of motion.      Cervical back: Normal range of motion.      Right lower leg: No edema.      Left lower leg: No edema.   Skin:     General: Skin is warm and dry.   Neurological:      Mental Status: He is alert and oriented to person, place, and time.      Motor: No weakness.   Psychiatric:         Mood and Affect: Mood normal.         Behavior: Behavior normal.         Thought Content: Thought content normal.         Judgment: Judgment normal.          Laboratory:  Labs pending, to be reviewed prior to transplant    Diagnostic Results:  Pending, to be reviewed prior to transplant

## 2023-08-09 NOTE — TELEPHONE ENCOUNTER
"TXP LIVER COORDINATOR PRIMARY ORGAN OFFER NOTE   UNOS# [id number]  Notified by Ricci García, , that Jed Chávez is eligible for liver as a primary recipient offer.  Spoke with patient and identified no acute medical issues with telephone assessment. Protocol script read to patient regarding N/A, standard donor offer.  Patient was informed that if he turned down the offer A transplant doctor will call you after we hang up."  Patient was also informed that if he turned down this donor, he would not be punished or removed from the transplant list, that he  would remain on the list at his current status/MELD score. However, by waiting on the list longer rather than receiving this transplant, he will face a greater risk of death than any risk from the slight possibility of transmitted infection.  Patient was asked if they have had a positive COVID-19 test or if they have any signs or symptoms. Informed patient that they will be tested for COVID-19 upon arrival to the hospital, unless have a previous positive result. If tested and result is positive, the transplant will not be able to occur, they will be inactivated on the wait list for 21 days per protocol and required to quarantine.   Patient verbalized understanding, all questions answered, patient accepts organ offer.   Patient notified of plan to be NPO and states understanding.      "

## 2023-08-09 NOTE — HPI
Mr. Chávez is a 62 y/o male with ESRD 2/2 HCC who presents as a direct admit for liver transplant on 8/9/23. Reports feeling in his usual state of health. Denies recent hospitalizations or illnesses. Of note, has a history of a hypertensive CVA in 2015. No focal deficits noted, BP well controlled with current medications. Pre op labs and diagnostics pending. Tentative OR times 2030/2200 with Dr. Suero as primary surgeon.

## 2023-08-09 NOTE — TELEPHONE ENCOUNTER
TXP LIVER COORDINATOR ORGAN OFFER NOTE   UNOS# [id number]  Notified by Gibran Mayers, , that Jed Chávez is eligible for liver as a backup recipient offer.  Spoke with patient and identified no acute medical issues with telephone assessment. Protocol script read to patient regarding N/A, standard donor offer.  Patient was asked if they have had a positive COVID-19 test or if they have any signs or symptoms. Informed patient that they will be tested for COVID-19 upon arrival to the hospital, unless have a previous positive result. If tested and result is positive, the transplant will not be able to occur, they will be inactivated on the wait list for 21 days per protocol and required to quarantine.   Patient verbalized understanding, all questions answered, patient accepts back up organ offer.   Patient notified that he does not need to be NPO or come to hospital at this time . Patient and wife state understanding.

## 2023-08-09 NOTE — H&P
[facility] - Transplant [facility]  Liver Transplant  History & Physical    Patient Name: Jed Chávez  MRN: [id number]  Admission Date: 8/9/2023  Code Status: Full Code  Primary Care Provider: Alee Pink MD    Subjective:     History of Present Illness:  Mr. 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 Reports feeling in his usual state of health. Denies recent hospitalizations or illnesses. Of note, has a history of a hypertensive CVA in 2015. No focal deficits noted, BP well controlled with current medications. Pre op labs and diagnostics pending. Tentative OR times 2030/2200 with Dr. Suero as primary surgeon.       Past Medical History:   Diagnosis Date    Alcoholic cirrhosis     CVA (cerebral vascular accident)     DM (diabetes mellitus)     Dyslipidemia     Hepatocellular carcinoma     HTN (hypertension)     Intracranial hemorrhage     Skin cancer        Past Surgical History:   Procedure Laterality Date    HERNIA REPAIR N/A        Review of patient's allergies indicates:  No Known Allergies    Family History    None       Tobacco Use    Smoking status: Never    Smokeless tobacco: Never   Substance and Sexual Activity    Alcohol use: Not Currently    Drug use: Never    Sexual activity: Not Currently     Partners: Female       PTA Medications   Medication Sig    amLODIPine (NORVASC) 10 MG tablet Take 10 mg by mouth once daily.    ergocalciferol (ERGOCALCIFEROL) 50,000 unit Cap Take 1 capsule (50,000 Units total) by mouth every 7 days.    ezetimibe (ZETIA) 10 mg tablet Take 10 mg by mouth every morning.    glimepiride (AMARYL) 4 MG tablet Take 4 mg by mouth every morning.    JANUVIA 100 mg Tab Take 100 mg by mouth every morning.    lisinopriL-hydrochlorothiazide (PRINZIDE,ZESTORETIC) 20-12.5 mg per tablet Take 1 tablet by mouth once daily.    nadoloL (CORGARD) 20 MG tablet Take 1 tablet by mouth every morning.       Review  of Systems   Constitutional:  Negative for appetite change, fatigue and fever.   HENT:  Negative for trouble swallowing.    Respiratory:  Negative for cough and shortness of breath.    Cardiovascular:  Negative for chest pain, palpitations and leg swelling.   Gastrointestinal:  Negative for abdominal distention, abdominal pain, diarrhea, nausea and vomiting.   Genitourinary:  Negative for decreased urine volume, difficulty urinating, dysuria and frequency.   Musculoskeletal:  Negative for arthralgias and back pain.   Skin:  Negative for wound.   Neurological:  Negative for dizziness, weakness and headaches.   Psychiatric/Behavioral:  Negative for behavioral problems, confusion and decreased concentration.      Objective:     Vital Signs (Most Recent):    Vital Signs (24h Range):           There is no height or weight on file to calculate BMI.    No intake or output data in the 24 hours ending 08/09/23 1706     Physical Exam  Vitals and nursing note reviewed.   Constitutional:       General: He is not in acute distress.     Appearance: He is not ill-appearing.   HENT:      Head: Normocephalic and atraumatic.      Mouth/Throat:      Mouth: Mucous membranes are moist.   Eyes:      General: Scleral icterus present.      Extraocular Movements: Extraocular movements intact.      Conjunctiva/sclera: Conjunctivae normal.   Cardiovascular:      Rate and Rhythm: Normal rate and regular rhythm.      Pulses: Normal pulses.      Heart sounds: Normal heart sounds. No murmur heard.  Pulmonary:      Effort: Pulmonary effort is normal. No respiratory distress.      Breath sounds: Normal breath sounds. No wheezing or rales.   Abdominal:      General: Bowel sounds are normal. There is distension.      Palpations: Abdomen is soft. There is no mass.      Tenderness: There is no abdominal tenderness. There is no guarding or rebound.      Hernia: No hernia is present.   Musculoskeletal:         General: Normal range of motion.       Cervical back: Normal range of motion.      Right lower leg: No edema.      Left lower leg: No edema.   Skin:     General: Skin is warm and dry.   Neurological:      Mental Status: He is alert and oriented to person, place, and time.      Motor: No weakness.   Psychiatric:         Mood and Affect: Mood normal.         Behavior: Behavior normal.         Thought Content: Thought content normal.         Judgment: Judgment normal.          Laboratory:  Labs pending, to be reviewed prior to transplant    Diagnostic Results:  Pending, to be reviewed prior to transplant     Assessment/Plan:     * End stage liver disease  - ESRD 2/2 HCC admitted for liver transplant  - Pre op labs, CXR, EKG will be reviewed before surgery  - OR 2030/2200 with Dr. 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 Expect to improve post transplant.        Type 2 diabetes mellitus, without long-term current use of insulin  - Consult Endocrine post op for assistance in BG management.          The patient presents for liver transplant.  There are no apparent contraindications to proceeding with the planned transplant.  The patient understands that the transplant could potentially be cancelled pending detailed assessment of the donor organ.    MELD 3.0: 24 at 7/10/2023  7:25 AM  MELD-Na: 23 at 7/10/2023  7:25 AM  Calculated from:  Serum Creatinine: 0.8 mg/dL (Using min of 1 mg/dL) at 7/10/2023  7:25 AM  Serum Sodium: 137 mmol/L at 7/10/2023  7:25 AM  Total Bilirubin: 5.0 mg/dL at 7/10/2023  7:25 AM  Serum Albumin: 2.5 g/dL at 7/10/2023  7:25 AM  INR(ratio): 2.6 at 7/10/2023  7:25 AM  Age at listing (hypothetical): 63 years  Sex: Male at 7/10/2023  7:25 AM      He will receive IV steroids pulse induction.    COVID pending     A complete discussion of the transplant procedure, including risks, complications, and alternatives, as well as any donor-specific risk factors requiring specific disclosure, will be carried  out by the responsible staff surgeon prior to the procedure.     Discharge Planning:  No Patient Care Coordination Note on file.      Hallie Acosta PA-C  Liver Transplant  [facility]

## 2023-08-09 NOTE — ANESTHESIA PREPROCEDURE EVALUATION
[facility]  Anesthesia Pre-Operative Evaluation        Patient Name: Jed Chávez  YOB: 1960  MRN: [id number]    SUBJECTIVE:     Pre-operative Evaluation for Procedure(s) (LRB):  TRANSPLANT, LIVER (N/A)     08/09/2023    Patient last ate at approx 2PM    Jed Chávez is a 63 y.o. male with a PMHx significant for HTN, T2DM, previous hypertensive CVA (no residual deficits), and ESLD 2/2 HCC presenting as a direct admit for liver transplant.     He now presents for the above procedure(s).    Previous Airway: None documented.    Stress Echo   Results for orders placed during the hospital encounter of 06/16/23  Interpretation Summary  · The estimated ejection fraction is 65%.  · The quantitatively derived ejection fraction is 63%.  · During stress, the following significant arrhythmias were observed: occasional PACs.  · The left ventricle is normal in size with normal systolic function.  · Mild to moderate left atrial enlargement.  · Normal left ventricular diastolic function.  · Mild to moderate tricuspid regurgitation.  · Normal right ventricular size with normal right ventricular systolic function.  · Moderate right atrial enlargement.  · Trivial pericardial effusion.  · Indeterminate central venous pressure.  · There is abnormal septal wall motion.  · The stress echo portion of this study is negative for myocardial ischemia.      Patient Active Problem List   Diagnosis    Other cirrhosis of liver    Liver masses    HCC (hepatocellular carcinoma)    Left-sided nontraumatic intracerebral hemorrhage    Hypertension    Type 2 diabetes mellitus, without long-term current use of insulin    End stage liver disease    Acquired coagulation factor deficiency    Preop testing       Review of patient's allergies indicates:  No Known Allergies    Current Outpatient Medications   Medication Instructions    amLODIPine (NORVASC) 10 mg, Oral, Daily    ergocalciferol (ERGOCALCIFEROL) 50,000  "Units, Oral, Every 7 days    ezetimibe (ZETIA) 10 mg, Oral, Every morning    glimepiride (AMARYL) 4 mg, Oral, Every morning    JANUVIA 100 mg, Oral, Every morning    lisinopriL-hydrochlorothiazide (PRINZIDE,ZESTORETIC) 20-12.5 mg per tablet 1 tablet, Oral, Daily    nadoloL (CORGARD) 20 MG tablet 1 tablet, Oral, Every morning       Past Surgical History:   Procedure Laterality Date    HERNIA REPAIR N/A        Social History     Substance and Sexual Activity   Drug Use Never     Alcohol Use: Not on file     Tobacco Use: Low Risk  (8/9/2023)    Patient History     Smoking Tobacco Use: Never     Smokeless Tobacco Use: Never     Passive Exposure: Not on file       OBJECTIVE:     Vital Signs Range (Last 24H):  Temp:  [36.9 °C (98.4 °F)]   Pulse:  [70]   Resp:  [16]   BP: (129)/(70)   SpO2:  [95 %]       Significant Labs    Heme Profile  Lab Results   Component Value Date    WBC 3.82 (L) 07/10/2023    HGB 13.2 (L) 07/10/2023    HCT 38.0 (L) 07/10/2023    PLT 80 (L) 07/10/2023       Coagulation Studies  Lab Results   Component Value Date    LABPROT 26.0 (H) 07/10/2023    INR 2.6 (H) 07/10/2023       BMP  Lab Results   Component Value Date     07/10/2023    K 3.6 07/10/2023     07/10/2023    CO2 24 07/10/2023    BUN 12 07/10/2023    CREATININE 0.8 07/10/2023       Liver Function Tests  Lab Results   Component Value Date     (H) 07/10/2023    ALT 56 (H) 07/10/2023    ALKPHOS 180 (H) 07/10/2023    BILITOT 5.0 (H) 07/10/2023    PROT 7.4 07/10/2023    ALBUMIN 2.5 (L) 07/10/2023       Lipid Profile  No results found for: "CHOL", "HDL", "LDLDIRECT", "TRIG"    Endocrine Profile  Lab Results   Component Value Date    HGBA1C 7.5 (H) 04/20/2023    TSH 2.096 06/15/2023         Cardiac Studies    EKG:   No results found for this or any previous visit.    TTE:  No results found for this or any previous visit.        Stress Echo   Results for orders placed during the hospital encounter of " 06/16/23  Interpretation Summary  · The estimated ejection fraction is 65%.  · The quantitatively derived ejection fraction is 63%.  · During stress, the following significant arrhythmias were observed: occasional PACs.  · The left ventricle is normal in size with normal systolic function.  · Mild to moderate left atrial enlargement.  · Normal left ventricular diastolic function.  · Mild to moderate tricuspid regurgitation.  · Normal right ventricular size with normal right ventricular systolic function.  · Moderate right atrial enlargement.  · Trivial pericardial effusion.  · Indeterminate central venous pressure.  · There is abnormal septal wall motion.  · The stress echo portion of this study is negative for myocardial ischemia.        ASSESSMENT/PLAN:         Pre-op Assessment    I have reviewed the Patient Summary Reports.     I have reviewed the Nursing Notes. I have reviewed the NPO Status.   I have reviewed the Medications.     Review of Systems  Anesthesia Hx:  No problems with previous Anesthesia   Denies Personal Hx of Anesthesia complications.   Social:  Non-Smoker    Hematology/Oncology:     Oncology Normal     EENT/Dental:EENT/Dental Normal   Cardiovascular:   Hypertension    Pulmonary:  Pulmonary Normal    Hepatic/GI:   Liver Disease,    Musculoskeletal:  Musculoskeletal Normal    Neurological:   CVA    Endocrine:   Diabetes, type 2    Dermatological:  Skin Normal    Psych:  Psychiatric Normal           Physical Exam  General: Well nourished, Cooperative and Alert    Airway:  Mallampati: II / I  Mouth Opening: Normal  TM Distance: Normal  Tongue: Normal  Neck ROM: Normal ROM    Dental:  Intact        Anesthesia Plan  Type of Anesthesia, risks & benefits discussed:    Anesthesia Type: Gen ETT  Intra-op Monitoring Plan: Standard ASA Monitors, Art Line, Central Line and PA  Post Op Pain Control Plan: multimodal analgesia and IV/PO Opioids PRN  Induction:  IV  Airway Plan: Direct, Post-Induction  Informed  Consent: Informed consent signed with the Patient and all parties understand the risks and agree with anesthesia plan.  All questions answered. Patient consented to blood products? Yes  ASA Score: 3  Day of Surgery Review of History & Physical: H&P Update referred to the surgeon/provider.I have interviewed and examined the patient. I have reviewed the patient's H&P dated: There are no significant changes.     Ready For Surgery From Anesthesia Perspective.     .

## 2023-08-09 NOTE — PROGRESS NOTES
Patient arrived to [id number] via ambulation accompanied by wife.  Patient AAOx4, VSS, and in NAD.  Patient denies pain.  Patient displays no breakdown at this time.  TWAN Gordon notified of patient's arrival.  Anesthesia notified of patient's arrival.  Will continue to monitor patient.

## 2023-08-10 PROBLEM — T38.0X5A ADVERSE EFFECT OF ADRENAL CORTICAL STEROIDS: Status: ACTIVE | Noted: 2023-08-10

## 2023-08-10 LAB
ABO + RH BLD: NORMAL
ALBUMIN SERPL BCP-MCNC: 2.2 G/DL (ref 3.5–5.2)
ALBUMIN SERPL BCP-MCNC: 2.5 G/DL (ref 3.5–5.2)
ALBUMIN SERPL BCP-MCNC: 2.6 G/DL (ref 3.5–5.2)
ALLENS TEST: ABNORMAL
ALLENS TEST: NORMAL
ALP SERPL-CCNC: 209 U/L (ref 55–135)
ALT SERPL W/O P-5'-P-CCNC: 170 U/L (ref 10–44)
ALT SERPL W/O P-5'-P-CCNC: 249 U/L (ref 10–44)
AMYLASE SERPL-CCNC: 36 U/L (ref 20–110)
ANION GAP SERPL CALC-SCNC: 10 MMOL/L (ref 8–16)
ANION GAP SERPL CALC-SCNC: 14 MMOL/L (ref 8–16)
ANION GAP SERPL CALC-SCNC: 7 MMOL/L (ref 8–16)
ANISOCYTOSIS BLD QL SMEAR: SLIGHT
APTT PPP: 33.2 SEC (ref 21–32)
APTT PPP: 37.9 SEC (ref 21–32)
APTT PPP: 43.7 SEC (ref 21–32)
AST SERPL-CCNC: 1072 U/L (ref 10–40)
AST SERPL-CCNC: 1129 U/L (ref 10–40)
AST SERPL-CCNC: 1673 U/L (ref 10–40)
AST SERPL-CCNC: 2174 U/L (ref 10–40)
AST SERPL-CCNC: 2371 U/L (ref 10–40)
AST SERPL-CCNC: 985 U/L (ref 10–40)
BASOPHILS # BLD AUTO: 0.01 K/UL (ref 0–0.2)
BASOPHILS # BLD AUTO: 0.01 K/UL (ref 0–0.2)
BASOPHILS # BLD AUTO: 0.02 K/UL (ref 0–0.2)
BASOPHILS NFR BLD: 0.1 % (ref 0–1.9)
BASOPHILS NFR BLD: 0.2 % (ref 0–1.9)
BILIRUB SERPL-MCNC: 5.3 MG/DL (ref 0.1–1)
BLD GP AB SCN CELLS X3 SERPL QL: NORMAL
BLD PROD TYP BPU: NORMAL
BLOOD UNIT EXPIRATION DATE: NORMAL
BLOOD UNIT TYPE CODE: 6200
BLOOD UNIT TYPE CODE: 7300
BLOOD UNIT TYPE CODE: 7300
BLOOD UNIT TYPE: NORMAL
BUN SERPL-MCNC: 12 MG/DL (ref 8–23)
CA-I BLDV-SCNC: 0.9 MMOL/L (ref 1.06–1.42)
CA-I BLDV-SCNC: 1.02 MMOL/L (ref 1.06–1.42)
CA-I BLDV-SCNC: 1.06 MMOL/L (ref 1.06–1.42)
CALCIUM SERPL-MCNC: 7.4 MG/DL (ref 8.7–10.5)
CALCIUM SERPL-MCNC: 7.7 MG/DL (ref 8.7–10.5)
CALCIUM SERPL-MCNC: 7.9 MG/DL (ref 8.7–10.5)
CHLORIDE SERPL-SCNC: 106 MMOL/L (ref 95–110)
CHLORIDE SERPL-SCNC: 108 MMOL/L (ref 95–110)
CHLORIDE SERPL-SCNC: 109 MMOL/L (ref 95–110)
CO2 SERPL-SCNC: 20 MMOL/L (ref 23–29)
CO2 SERPL-SCNC: 21 MMOL/L (ref 23–29)
CO2 SERPL-SCNC: 22 MMOL/L (ref 23–29)
CODING SYSTEM: NORMAL
CREAT SERPL-MCNC: 0.7 MG/DL (ref 0.5–1.4)
CROSSMATCH INTERPRETATION: NORMAL
DELSYS: ABNORMAL
DELSYS: NORMAL
DIFFERENTIAL METHOD: ABNORMAL
DISPENSE STATUS: NORMAL
DOHLE BOD BLD QL SMEAR: PRESENT
EOSINOPHIL # BLD AUTO: 0 K/UL (ref 0–0.5)
EOSINOPHIL NFR BLD: 0 % (ref 0–8)
EOSINOPHIL NFR BLD: 0.1 % (ref 0–8)
EOSINOPHIL NFR BLD: 0.1 % (ref 0–8)
ERYTHROCYTE [DISTWIDTH] IN BLOOD BY AUTOMATED COUNT: 14.8 % (ref 11.5–14.5)
ERYTHROCYTE [DISTWIDTH] IN BLOOD BY AUTOMATED COUNT: 14.9 % (ref 11.5–14.5)
ERYTHROCYTE [DISTWIDTH] IN BLOOD BY AUTOMATED COUNT: 15.1 % (ref 11.5–14.5)
ERYTHROCYTE [DISTWIDTH] IN BLOOD BY AUTOMATED COUNT: 15.2 % (ref 11.5–14.5)
ERYTHROCYTE [DISTWIDTH] IN BLOOD BY AUTOMATED COUNT: 15.3 % (ref 11.5–14.5)
ERYTHROCYTE [SEDIMENTATION RATE] IN BLOOD BY WESTERGREN METHOD: 18 MM/H
EST. GFR  (NO RACE VARIABLE): >60 ML/MIN/1.73 M^2
FIBRINOGEN PPP-MCNC: 159 MG/DL (ref 182–400)
FIBRINOGEN PPP-MCNC: 248 MG/DL (ref 182–400)
FIO2: 100
FIO2: 40
FIO2: 50
GLUCOSE SERPL-MCNC: 128 MG/DL (ref 70–110)
GLUCOSE SERPL-MCNC: 136 MG/DL (ref 70–110)
GLUCOSE SERPL-MCNC: 167 MG/DL (ref 70–110)
GLUCOSE SERPL-MCNC: 249 MG/DL (ref 70–110)
GLUCOSE SERPL-MCNC: 255 MG/DL (ref 70–110)
GLUCOSE SERPL-MCNC: 256 MG/DL (ref 70–110)
GLUCOSE SERPL-MCNC: 259 MG/DL (ref 70–110)
GLUCOSE SERPL-MCNC: 263 MG/DL (ref 70–110)
GLUCOSE SERPL-MCNC: 275 MG/DL (ref 70–110)
GLUCOSE SERPL-MCNC: 283 MG/DL (ref 70–110)
GLUCOSE SERPL-MCNC: 356 MG/DL (ref 70–110)
GLUCOSE SERPL-MCNC: 62 MG/DL (ref 70–110)
HCO3 UR-SCNC: 22.1 MMOL/L (ref 24–28)
HCO3 UR-SCNC: 22.2 MMOL/L (ref 24–28)
HCO3 UR-SCNC: 22.9 MMOL/L (ref 24–28)
HCO3 UR-SCNC: 23.1 MMOL/L (ref 24–28)
HCO3 UR-SCNC: 23.5 MMOL/L (ref 24–28)
HCO3 UR-SCNC: 25.1 MMOL/L (ref 24–28)
HCO3 UR-SCNC: 25.9 MMOL/L (ref 24–28)
HCO3 UR-SCNC: 26.3 MMOL/L (ref 24–28)
HCO3 UR-SCNC: 26.7 MMOL/L (ref 24–28)
HCO3 UR-SCNC: 26.8 MMOL/L (ref 24–28)
HCO3 UR-SCNC: 27.2 MMOL/L (ref 24–28)
HCT VFR BLD AUTO: 32 % (ref 40–54)
HCT VFR BLD AUTO: 33.2 % (ref 40–54)
HCT VFR BLD AUTO: 34.3 % (ref 40–54)
HCT VFR BLD AUTO: 35.6 % (ref 40–54)
HCT VFR BLD AUTO: 36.6 % (ref 40–54)
HCT VFR BLD AUTO: 36.7 % (ref 40–54)
HCT VFR BLD AUTO: 39.2 % (ref 40–54)
HCT VFR BLD CALC: 31 %PCV (ref 36–54)
HCT VFR BLD CALC: 32 %PCV (ref 36–54)
HCT VFR BLD CALC: 33 %PCV (ref 36–54)
HCT VFR BLD CALC: 34 %PCV (ref 36–54)
HCT VFR BLD CALC: 37 %PCV (ref 36–54)
HCT VFR BLD CALC: 38 %PCV (ref 36–54)
HCV RNA SERPL QL NAA+PROBE: NOT DETECTED
HCV RNA SPEC NAA+PROBE-ACNC: <12 IU/ML
HGB BLD-MCNC: 11 G/DL (ref 14–18)
HGB BLD-MCNC: 11.3 G/DL (ref 14–18)
HGB BLD-MCNC: 11.9 G/DL (ref 14–18)
HGB BLD-MCNC: 12.1 G/DL (ref 14–18)
HGB BLD-MCNC: 12.4 G/DL (ref 14–18)
HGB BLD-MCNC: 12.7 G/DL (ref 14–18)
HGB BLD-MCNC: 13.2 G/DL (ref 14–18)
IMM GRANULOCYTES # BLD AUTO: 0.04 K/UL (ref 0–0.04)
IMM GRANULOCYTES # BLD AUTO: 0.05 K/UL (ref 0–0.04)
IMM GRANULOCYTES # BLD AUTO: 0.05 K/UL (ref 0–0.04)
IMM GRANULOCYTES # BLD AUTO: 0.06 K/UL (ref 0–0.04)
IMM GRANULOCYTES # BLD AUTO: 0.07 K/UL (ref 0–0.04)
IMM GRANULOCYTES NFR BLD AUTO: 0.3 % (ref 0–0.5)
IMM GRANULOCYTES NFR BLD AUTO: 0.3 % (ref 0–0.5)
IMM GRANULOCYTES NFR BLD AUTO: 0.4 % (ref 0–0.5)
IMM GRANULOCYTES NFR BLD AUTO: 0.5 % (ref 0–0.5)
IMM GRANULOCYTES NFR BLD AUTO: 0.6 % (ref 0–0.5)
INR PPP: 1.3 (ref 0.8–1.2)
INR PPP: 1.3 (ref 0.8–1.2)
INR PPP: 1.4 (ref 0.8–1.2)
INR PPP: 1.4 (ref 0.8–1.2)
INR PPP: 1.6 (ref 0.8–1.2)
INR PPP: 1.7 (ref 0.8–1.2)
INR PPP: 2.3 (ref 0.8–1.2)
LDH SERPL L TO P-CCNC: 0.95 MMOL/L (ref 0.36–1.25)
LDH SERPL L TO P-CCNC: 2832 U/L (ref 110–260)
LYMPHOCYTES # BLD AUTO: 0.2 K/UL (ref 1–4.8)
LYMPHOCYTES # BLD AUTO: 0.3 K/UL (ref 1–4.8)
LYMPHOCYTES # BLD AUTO: 0.4 K/UL (ref 1–4.8)
LYMPHOCYTES # BLD AUTO: 0.5 K/UL (ref 1–4.8)
LYMPHOCYTES # BLD AUTO: 0.5 K/UL (ref 1–4.8)
LYMPHOCYTES NFR BLD: 1.7 % (ref 18–48)
LYMPHOCYTES NFR BLD: 2.4 % (ref 18–48)
LYMPHOCYTES NFR BLD: 3 % (ref 18–48)
LYMPHOCYTES NFR BLD: 3.1 % (ref 18–48)
LYMPHOCYTES NFR BLD: 3.2 % (ref 18–48)
MAGNESIUM SERPL-MCNC: 1.6 MG/DL (ref 1.6–2.6)
MAGNESIUM SERPL-MCNC: 1.7 MG/DL (ref 1.6–2.6)
MAGNESIUM SERPL-MCNC: 1.7 MG/DL (ref 1.6–2.6)
MAGNESIUM SERPL-MCNC: 1.8 MG/DL (ref 1.6–2.6)
MCH RBC QN AUTO: 31.4 PG (ref 27–31)
MCH RBC QN AUTO: 32 PG (ref 27–31)
MCH RBC QN AUTO: 32 PG (ref 27–31)
MCH RBC QN AUTO: 32.4 PG (ref 27–31)
MCH RBC QN AUTO: 32.5 PG (ref 27–31)
MCHC RBC AUTO-ENTMCNC: 33.7 G/DL (ref 32–36)
MCHC RBC AUTO-ENTMCNC: 33.8 G/DL (ref 32–36)
MCHC RBC AUTO-ENTMCNC: 34 G/DL (ref 32–36)
MCHC RBC AUTO-ENTMCNC: 34.7 G/DL (ref 32–36)
MCHC RBC AUTO-ENTMCNC: 34.7 G/DL (ref 32–36)
MCV RBC AUTO: 92 FL (ref 82–98)
MCV RBC AUTO: 92 FL (ref 82–98)
MCV RBC AUTO: 93 FL (ref 82–98)
MCV RBC AUTO: 95 FL (ref 82–98)
MCV RBC AUTO: 96 FL (ref 82–98)
MODE: ABNORMAL
MODE: NORMAL
MONOCYTES # BLD AUTO: 0.4 K/UL (ref 0.3–1)
MONOCYTES # BLD AUTO: 0.4 K/UL (ref 0.3–1)
MONOCYTES # BLD AUTO: 0.5 K/UL (ref 0.3–1)
MONOCYTES # BLD AUTO: 0.7 K/UL (ref 0.3–1)
MONOCYTES # BLD AUTO: 0.7 K/UL (ref 0.3–1)
MONOCYTES NFR BLD: 3.3 % (ref 4–15)
MONOCYTES NFR BLD: 3.5 % (ref 4–15)
MONOCYTES NFR BLD: 4.5 % (ref 4–15)
MONOCYTES NFR BLD: 4.5 % (ref 4–15)
MONOCYTES NFR BLD: 4.6 % (ref 4–15)
NEUTROPHILS # BLD AUTO: 10.5 K/UL (ref 1.8–7.7)
NEUTROPHILS # BLD AUTO: 11.7 K/UL (ref 1.8–7.7)
NEUTROPHILS # BLD AUTO: 14 K/UL (ref 1.8–7.7)
NEUTROPHILS # BLD AUTO: 14.3 K/UL (ref 1.8–7.7)
NEUTROPHILS # BLD AUTO: 9.7 K/UL (ref 1.8–7.7)
NEUTROPHILS NFR BLD: 91.7 % (ref 38–73)
NEUTROPHILS NFR BLD: 91.9 % (ref 38–73)
NEUTROPHILS NFR BLD: 93 % (ref 38–73)
NEUTROPHILS NFR BLD: 93.1 % (ref 38–73)
NEUTROPHILS NFR BLD: 93.6 % (ref 38–73)
NRBC BLD-RTO: 0 /100 WBC
PCO2 BLDA: 36.7 MMHG (ref 35–45)
PCO2 BLDA: 38.4 MMHG (ref 35–45)
PCO2 BLDA: 38.6 MMHG (ref 35–45)
PCO2 BLDA: 39.7 MMHG (ref 35–45)
PCO2 BLDA: 40.8 MMHG (ref 35–45)
PCO2 BLDA: 43.5 MMHG (ref 35–45)
PCO2 BLDA: 43.7 MMHG (ref 35–45)
PCO2 BLDA: 44.3 MMHG (ref 35–45)
PCO2 BLDA: 44.5 MMHG (ref 35–45)
PCO2 BLDA: 45.4 MMHG (ref 35–45)
PCO2 BLDA: 52.2 MMHG (ref 35–45)
PEEP: 5
PH SMN: 7.23 [PH] (ref 7.35–7.45)
PH SMN: 7.31 [PH] (ref 7.35–7.45)
PH SMN: 7.37 [PH] (ref 7.35–7.45)
PH SMN: 7.38 [PH] (ref 7.35–7.45)
PH SMN: 7.39 [PH] (ref 7.35–7.45)
PH SMN: 7.4 [PH] (ref 7.35–7.45)
PH SMN: 7.41 [PH] (ref 7.35–7.45)
PHOSPHATE SERPL-MCNC: 2.5 MG/DL (ref 2.7–4.5)
PHOSPHATE SERPL-MCNC: 2.8 MG/DL (ref 2.7–4.5)
PLATELET # BLD AUTO: 111 K/UL (ref 150–450)
PLATELET # BLD AUTO: 76 K/UL (ref 150–450)
PLATELET # BLD AUTO: 86 K/UL (ref 150–450)
PLATELET # BLD AUTO: 91 K/UL (ref 150–450)
PLATELET # BLD AUTO: 95 K/UL (ref 150–450)
PLATELET # BLD AUTO: 97 K/UL (ref 150–450)
PLATELET # BLD AUTO: 98 K/UL (ref 150–450)
PLATELET BLD QL SMEAR: ABNORMAL
PMV BLD AUTO: 10.8 FL (ref 9.2–12.9)
PMV BLD AUTO: 10.9 FL (ref 9.2–12.9)
PMV BLD AUTO: 10.9 FL (ref 9.2–12.9)
PMV BLD AUTO: 11.1 FL (ref 9.2–12.9)
PMV BLD AUTO: 11.2 FL (ref 9.2–12.9)
PMV BLD AUTO: 11.3 FL (ref 9.2–12.9)
PMV BLD AUTO: 12 FL (ref 9.2–12.9)
PO2 BLDA: 113 MMHG (ref 80–100)
PO2 BLDA: 116 MMHG (ref 80–100)
PO2 BLDA: 119 MMHG (ref 80–100)
PO2 BLDA: 124 MMHG (ref 80–100)
PO2 BLDA: 229 MMHG (ref 80–100)
PO2 BLDA: 236 MMHG (ref 80–100)
PO2 BLDA: 273 MMHG (ref 80–100)
PO2 BLDA: 304 MMHG (ref 80–100)
PO2 BLDA: 311 MMHG (ref 80–100)
PO2 BLDA: 317 MMHG (ref 80–100)
PO2 BLDA: 41 MMHG (ref 40–60)
POC BE: -1 MMOL/L
POC BE: -2 MMOL/L
POC BE: -3 MMOL/L
POC BE: -3 MMOL/L
POC BE: -5 MMOL/L
POC BE: 0 MMOL/L
POC BE: 1 MMOL/L
POC BE: 1 MMOL/L
POC BE: 2 MMOL/L
POC IONIZED CALCIUM: 0.94 MMOL/L (ref 1.06–1.42)
POC IONIZED CALCIUM: 1.01 MMOL/L (ref 1.06–1.42)
POC IONIZED CALCIUM: 1.06 MMOL/L (ref 1.06–1.42)
POC IONIZED CALCIUM: 1.1 MMOL/L (ref 1.06–1.42)
POC IONIZED CALCIUM: 1.15 MMOL/L (ref 1.06–1.42)
POC IONIZED CALCIUM: 1.19 MMOL/L (ref 1.06–1.42)
POC SATURATED O2: 100 % (ref 95–100)
POC SATURATED O2: 75 % (ref 95–100)
POC SATURATED O2: 98 % (ref 95–100)
POC SATURATED O2: 99 % (ref 95–100)
POC SVO2: 75 %
POC TCO2: 23 MMOL/L (ref 23–27)
POC TCO2: 24 MMOL/L (ref 23–27)
POC TCO2: 25 MMOL/L (ref 23–27)
POC TCO2: 26 MMOL/L (ref 23–27)
POC TCO2: 27 MMOL/L (ref 23–27)
POC TCO2: 28 MMOL/L (ref 23–27)
POC TCO2: 28 MMOL/L (ref 24–29)
POCT GLUCOSE: 137 MG/DL (ref 70–110)
POCT GLUCOSE: 140 MG/DL (ref 70–110)
POCT GLUCOSE: 141 MG/DL (ref 70–110)
POCT GLUCOSE: 149 MG/DL (ref 70–110)
POCT GLUCOSE: 155 MG/DL (ref 70–110)
POCT GLUCOSE: 163 MG/DL (ref 70–110)
POCT GLUCOSE: 163 MG/DL (ref 70–110)
POCT GLUCOSE: 165 MG/DL (ref 70–110)
POCT GLUCOSE: 180 MG/DL (ref 70–110)
POCT GLUCOSE: 196 MG/DL (ref 70–110)
POCT GLUCOSE: 205 MG/DL (ref 70–110)
POCT GLUCOSE: 209 MG/DL (ref 70–110)
POCT GLUCOSE: 241 MG/DL (ref 70–110)
POCT GLUCOSE: 247 MG/DL (ref 70–110)
POCT GLUCOSE: 247 MG/DL (ref 70–110)
POCT GLUCOSE: 249 MG/DL (ref 70–110)
POCT GLUCOSE: 251 MG/DL (ref 70–110)
POCT GLUCOSE: 261 MG/DL (ref 70–110)
POOLED CRYOPPT GVN BPU: NORMAL
POTASSIUM BLD-SCNC: 2.7 MMOL/L (ref 3.5–5.1)
POTASSIUM BLD-SCNC: 3 MMOL/L (ref 3.5–5.1)
POTASSIUM BLD-SCNC: 3.2 MMOL/L (ref 3.5–5.1)
POTASSIUM BLD-SCNC: 3.2 MMOL/L (ref 3.5–5.1)
POTASSIUM BLD-SCNC: 3.4 MMOL/L (ref 3.5–5.1)
POTASSIUM BLD-SCNC: 3.4 MMOL/L (ref 3.5–5.1)
POTASSIUM BLD-SCNC: 3.5 MMOL/L (ref 3.5–5.1)
POTASSIUM BLD-SCNC: 4 MMOL/L (ref 3.5–5.1)
POTASSIUM SERPL-SCNC: 2.8 MMOL/L (ref 3.5–5.1)
POTASSIUM SERPL-SCNC: 3.1 MMOL/L (ref 3.5–5.1)
POTASSIUM SERPL-SCNC: 3.2 MMOL/L (ref 3.5–5.1)
POTASSIUM SERPL-SCNC: 3.4 MMOL/L (ref 3.5–5.1)
POTASSIUM SERPL-SCNC: 3.8 MMOL/L (ref 3.5–5.1)
PROT SERPL-MCNC: 5.2 G/DL (ref 6–8.4)
PROTHROMBIN TIME: 13.7 SEC (ref 9–12.5)
PROTHROMBIN TIME: 14 SEC (ref 9–12.5)
PROTHROMBIN TIME: 14.6 SEC (ref 9–12.5)
PROTHROMBIN TIME: 14.7 SEC (ref 9–12.5)
PROTHROMBIN TIME: 17.1 SEC (ref 9–12.5)
PROTHROMBIN TIME: 17.2 SEC (ref 9–12.5)
PROTHROMBIN TIME: 23.6 SEC (ref 9–12.5)
PS: 5
RBC # BLD AUTO: 3.72 M/UL (ref 4.6–6.2)
RBC # BLD AUTO: 3.74 M/UL (ref 4.6–6.2)
RBC # BLD AUTO: 3.81 M/UL (ref 4.6–6.2)
RBC # BLD AUTO: 3.97 M/UL (ref 4.6–6.2)
RBC # BLD AUTO: 4.2 M/UL (ref 4.6–6.2)
SAMPLE: ABNORMAL
SAMPLE: NORMAL
SITE: ABNORMAL
SITE: NORMAL
SODIUM BLD-SCNC: 135 MMOL/L (ref 136–145)
SODIUM BLD-SCNC: 135 MMOL/L (ref 136–145)
SODIUM BLD-SCNC: 137 MMOL/L (ref 136–145)
SODIUM BLD-SCNC: 137 MMOL/L (ref 136–145)
SODIUM BLD-SCNC: 138 MMOL/L (ref 136–145)
SODIUM BLD-SCNC: 138 MMOL/L (ref 136–145)
SODIUM BLD-SCNC: 141 MMOL/L (ref 136–145)
SODIUM BLD-SCNC: 142 MMOL/L (ref 136–145)
SODIUM SERPL-SCNC: 136 MMOL/L (ref 136–145)
SODIUM SERPL-SCNC: 136 MMOL/L (ref 136–145)
SODIUM SERPL-SCNC: 138 MMOL/L (ref 136–145)
SODIUM SERPL-SCNC: 140 MMOL/L (ref 136–145)
SODIUM SERPL-SCNC: 141 MMOL/L (ref 136–145)
SPECIMEN OUTDATE: NORMAL
TACROLIMUS BLD-MCNC: <2 NG/ML (ref 5–15)
TOXIC GRANULES BLD QL SMEAR: PRESENT
UNIT NUMBER: NORMAL
UNIT NUMBER: NORMAL
VT: 500
WBC # BLD AUTO: 10.4 K/UL (ref 3.9–12.7)
WBC # BLD AUTO: 11.18 K/UL (ref 3.9–12.7)
WBC # BLD AUTO: 12.6 K/UL (ref 3.9–12.7)
WBC # BLD AUTO: 15.29 K/UL (ref 3.9–12.7)
WBC # BLD AUTO: 15.51 K/UL (ref 3.9–12.7)

## 2023-08-10 PROCEDURE — 85520 HEPARIN ASSAY: CPT

## 2023-08-10 PROCEDURE — 25000003 PHARM REV CODE 250

## 2023-08-10 PROCEDURE — 88307 TISSUE EXAM BY PATHOLOGIST: CPT | Mod: 26,,, | Performed by: PATHOLOGY

## 2023-08-10 PROCEDURE — 94002 VENT MGMT INPAT INIT DAY: CPT

## 2023-08-10 PROCEDURE — 85730 THROMBOPLASTIN TIME PARTIAL: CPT | Performed by: SURGERY

## 2023-08-10 PROCEDURE — 88307 PR  SURG PATH,LEVEL V: ICD-10-PCS | Mod: 26,,, | Performed by: PATHOLOGY

## 2023-08-10 PROCEDURE — 85730 THROMBOPLASTIN TIME PARTIAL: CPT | Mod: 91 | Performed by: STUDENT IN AN ORGANIZED HEALTH CARE EDUCATION/TRAINING PROGRAM

## 2023-08-10 PROCEDURE — 84100 ASSAY OF PHOSPHORUS: CPT | Mod: 91

## 2023-08-10 PROCEDURE — 84100 ASSAY OF PHOSPHORUS: CPT | Performed by: STUDENT IN AN ORGANIZED HEALTH CARE EDUCATION/TRAINING PROGRAM

## 2023-08-10 PROCEDURE — 85018 HEMOGLOBIN: CPT | Performed by: SURGERY

## 2023-08-10 PROCEDURE — 84132 ASSAY OF SERUM POTASSIUM: CPT | Mod: 91 | Performed by: SURGERY

## 2023-08-10 PROCEDURE — 25000003 PHARM REV CODE 250: Performed by: STUDENT IN AN ORGANIZED HEALTH CARE EDUCATION/TRAINING PROGRAM

## 2023-08-10 PROCEDURE — 63600175 PHARM REV CODE 636 W HCPCS: Mod: JZ,JG | Performed by: SURGERY

## 2023-08-10 PROCEDURE — 63600175 PHARM REV CODE 636 W HCPCS: Performed by: NURSE ANESTHETIST, CERTIFIED REGISTERED

## 2023-08-10 PROCEDURE — 37799 UNLISTED PX VASCULAR SURGERY: CPT

## 2023-08-10 PROCEDURE — 27201041 HC RESERVOIR, CARDIOTOMY

## 2023-08-10 PROCEDURE — 99291 CRITICAL CARE FIRST HOUR: CPT | Mod: 24,,, | Performed by: STUDENT IN AN ORGANIZED HEALTH CARE EDUCATION/TRAINING PROGRAM

## 2023-08-10 PROCEDURE — 88381 MICRODISSECTION MANUAL: CPT | Performed by: PATHOLOGY

## 2023-08-10 PROCEDURE — 25000003 PHARM REV CODE 250: Performed by: SURGERY

## 2023-08-10 PROCEDURE — S5010 5% DEXTROSE AND 0.45% SALINE: HCPCS | Performed by: STUDENT IN AN ORGANIZED HEALTH CARE EDUCATION/TRAINING PROGRAM

## 2023-08-10 PROCEDURE — 81301 MICROSATELLITE INSTABILITY: CPT | Performed by: PATHOLOGY

## 2023-08-10 PROCEDURE — 63600175 PHARM REV CODE 636 W HCPCS: Performed by: STUDENT IN AN ORGANIZED HEALTH CARE EDUCATION/TRAINING PROGRAM

## 2023-08-10 PROCEDURE — 88313 PR  SPECIAL STAINS,GROUP II: ICD-10-PCS | Mod: 26,,, | Performed by: PATHOLOGY

## 2023-08-10 PROCEDURE — 63600175 PHARM REV CODE 636 W HCPCS

## 2023-08-10 PROCEDURE — 86965 POOLING BLOOD PLATELETS: CPT | Performed by: ANESTHESIOLOGY

## 2023-08-10 PROCEDURE — 85014 HEMATOCRIT: CPT | Mod: 91 | Performed by: SURGERY

## 2023-08-10 PROCEDURE — 84450 TRANSFERASE (AST) (SGOT): CPT | Performed by: SURGERY

## 2023-08-10 PROCEDURE — 99223 1ST HOSP IP/OBS HIGH 75: CPT | Mod: ,,, | Performed by: PHYSICIAN ASSISTANT

## 2023-08-10 PROCEDURE — 88309 TISSUE EXAM BY PATHOLOGIST: CPT | Mod: 26,,, | Performed by: PATHOLOGY

## 2023-08-10 PROCEDURE — 82040 ASSAY OF SERUM ALBUMIN: CPT | Mod: 91 | Performed by: SURGERY

## 2023-08-10 PROCEDURE — 85384 FIBRINOGEN ACTIVITY: CPT | Performed by: SURGERY

## 2023-08-10 PROCEDURE — 84460 ALANINE AMINO (ALT) (SGPT): CPT | Performed by: SURGERY

## 2023-08-10 PROCEDURE — 88313 SPECIAL STAINS GROUP 2: CPT | Performed by: PATHOLOGY

## 2023-08-10 PROCEDURE — 27000191 HC C-V MONITORING

## 2023-08-10 PROCEDURE — P9035 PLATELET PHERES LEUKOREDUCED: HCPCS | Performed by: ANESTHESIOLOGY

## 2023-08-10 PROCEDURE — 82803 BLOOD GASES ANY COMBINATION: CPT

## 2023-08-10 PROCEDURE — 83735 ASSAY OF MAGNESIUM: CPT | Mod: 91

## 2023-08-10 PROCEDURE — 20000000 HC ICU ROOM

## 2023-08-10 PROCEDURE — 88342 CHG IMMUNOCYTOCHEMISTRY: ICD-10-PCS | Mod: 26,,, | Performed by: PATHOLOGY

## 2023-08-10 PROCEDURE — 25000003 PHARM REV CODE 250: Performed by: NURSE ANESTHETIST, CERTIFIED REGISTERED

## 2023-08-10 PROCEDURE — 83605 ASSAY OF LACTIC ACID: CPT

## 2023-08-10 PROCEDURE — P9012 CRYOPRECIPITATE EACH UNIT: HCPCS | Performed by: ANESTHESIOLOGY

## 2023-08-10 PROCEDURE — 99900035 HC TECH TIME PER 15 MIN (STAT)

## 2023-08-10 PROCEDURE — 27100088 HC CELL SAVER

## 2023-08-10 PROCEDURE — 99900017 HC EXTUBATION W/PARAMETERS (STAT)

## 2023-08-10 PROCEDURE — 80053 COMPREHEN METABOLIC PANEL: CPT | Performed by: STUDENT IN AN ORGANIZED HEALTH CARE EDUCATION/TRAINING PROGRAM

## 2023-08-10 PROCEDURE — 99900026 HC AIRWAY MAINTENANCE (STAT)

## 2023-08-10 PROCEDURE — 99291 PR CRITICAL CARE, E/M 30-74 MINUTES: ICD-10-PCS | Mod: 24,,, | Performed by: STUDENT IN AN ORGANIZED HEALTH CARE EDUCATION/TRAINING PROGRAM

## 2023-08-10 PROCEDURE — 94010 BREATHING CAPACITY TEST: CPT

## 2023-08-10 PROCEDURE — 80048 BASIC METABOLIC PNL TOTAL CA: CPT | Mod: 91,XB | Performed by: STUDENT IN AN ORGANIZED HEALTH CARE EDUCATION/TRAINING PROGRAM

## 2023-08-10 PROCEDURE — 85049 AUTOMATED PLATELET COUNT: CPT | Performed by: SURGERY

## 2023-08-10 PROCEDURE — 63600175 PHARM REV CODE 636 W HCPCS: Mod: JZ,JG

## 2023-08-10 PROCEDURE — 85014 HEMATOCRIT: CPT

## 2023-08-10 PROCEDURE — 94761 N-INVAS EAR/PLS OXIMETRY MLT: CPT

## 2023-08-10 PROCEDURE — 27200966 HC CLOSED SUCTION SYSTEM

## 2023-08-10 PROCEDURE — 84295 ASSAY OF SERUM SODIUM: CPT | Mod: 91 | Performed by: SURGERY

## 2023-08-10 PROCEDURE — 85025 COMPLETE CBC W/AUTO DIFF WBC: CPT | Performed by: STUDENT IN AN ORGANIZED HEALTH CARE EDUCATION/TRAINING PROGRAM

## 2023-08-10 PROCEDURE — 83615 LACTATE (LD) (LDH) ENZYME: CPT | Performed by: STUDENT IN AN ORGANIZED HEALTH CARE EDUCATION/TRAINING PROGRAM

## 2023-08-10 PROCEDURE — 84295 ASSAY OF SERUM SODIUM: CPT

## 2023-08-10 PROCEDURE — P9045 ALBUMIN (HUMAN), 5%, 250 ML: HCPCS | Mod: JZ,JG

## 2023-08-10 PROCEDURE — 94150 VITAL CAPACITY TEST: CPT

## 2023-08-10 PROCEDURE — 88341 PR IHC OR ICC EACH ADD'L SINGLE ANTIBODY  STAINPR: ICD-10-PCS | Mod: 26,,, | Performed by: PATHOLOGY

## 2023-08-10 PROCEDURE — 86900 BLOOD TYPING SEROLOGIC ABO: CPT | Performed by: STUDENT IN AN ORGANIZED HEALTH CARE EDUCATION/TRAINING PROGRAM

## 2023-08-10 PROCEDURE — 84450 TRANSFERASE (AST) (SGOT): CPT | Mod: 91 | Performed by: STUDENT IN AN ORGANIZED HEALTH CARE EDUCATION/TRAINING PROGRAM

## 2023-08-10 PROCEDURE — 83735 ASSAY OF MAGNESIUM: CPT | Mod: 91 | Performed by: STUDENT IN AN ORGANIZED HEALTH CARE EDUCATION/TRAINING PROGRAM

## 2023-08-10 PROCEDURE — 88341 IMHCHEM/IMCYTCHM EA ADD ANTB: CPT | Mod: 59 | Performed by: PATHOLOGY

## 2023-08-10 PROCEDURE — 82947 ASSAY GLUCOSE BLOOD QUANT: CPT | Mod: 91 | Performed by: SURGERY

## 2023-08-10 PROCEDURE — 83735 ASSAY OF MAGNESIUM: CPT | Mod: 91 | Performed by: SURGERY

## 2023-08-10 PROCEDURE — 81300000 HC LIVER ACQUISITION CHARGE

## 2023-08-10 PROCEDURE — 88342 IMHCHEM/IMCYTCHM 1ST ANTB: CPT | Performed by: PATHOLOGY

## 2023-08-10 PROCEDURE — 99223 PR INITIAL HOSPITAL CARE,LEVL III: ICD-10-PCS | Mod: ,,, | Performed by: PHYSICIAN ASSISTANT

## 2023-08-10 PROCEDURE — 88309 PR  SURG PATH,LEVEL VI: ICD-10-PCS | Mod: 26,,, | Performed by: PATHOLOGY

## 2023-08-10 PROCEDURE — 88341 IMHCHEM/IMCYTCHM EA ADD ANTB: CPT | Mod: 26,,, | Performed by: PATHOLOGY

## 2023-08-10 PROCEDURE — 85610 PROTHROMBIN TIME: CPT | Performed by: SURGERY

## 2023-08-10 PROCEDURE — 80197 ASSAY OF TACROLIMUS: CPT | Performed by: STUDENT IN AN ORGANIZED HEALTH CARE EDUCATION/TRAINING PROGRAM

## 2023-08-10 PROCEDURE — 81300005 HC LIVER TRANSPORT, GROUND 4-5 HOURS

## 2023-08-10 PROCEDURE — 88309 TISSUE EXAM BY PATHOLOGIST: CPT | Performed by: PATHOLOGY

## 2023-08-10 PROCEDURE — 82150 ASSAY OF AMYLASE: CPT | Performed by: STUDENT IN AN ORGANIZED HEALTH CARE EDUCATION/TRAINING PROGRAM

## 2023-08-10 PROCEDURE — 85610 PROTHROMBIN TIME: CPT | Mod: 91 | Performed by: STUDENT IN AN ORGANIZED HEALTH CARE EDUCATION/TRAINING PROGRAM

## 2023-08-10 PROCEDURE — 36415 COLL VENOUS BLD VENIPUNCTURE: CPT | Performed by: SURGERY

## 2023-08-10 PROCEDURE — 88313 SPECIAL STAINS GROUP 2: CPT | Mod: 26,,, | Performed by: PATHOLOGY

## 2023-08-10 PROCEDURE — 94799 UNLISTED PULMONARY SVC/PX: CPT

## 2023-08-10 PROCEDURE — 82330 ASSAY OF CALCIUM: CPT | Mod: 91

## 2023-08-10 PROCEDURE — 84132 ASSAY OF SERUM POTASSIUM: CPT

## 2023-08-10 PROCEDURE — 88307 TISSUE EXAM BY PATHOLOGIST: CPT | Performed by: PATHOLOGY

## 2023-08-10 PROCEDURE — 82330 ASSAY OF CALCIUM: CPT

## 2023-08-10 PROCEDURE — 36415 COLL VENOUS BLD VENIPUNCTURE: CPT | Performed by: PHYSICIAN ASSISTANT

## 2023-08-10 PROCEDURE — 85002 BLEEDING TIME TEST: CPT

## 2023-08-10 PROCEDURE — 27100171 HC OXYGEN HIGH FLOW UP TO 24 HOURS

## 2023-08-10 PROCEDURE — 82330 ASSAY OF CALCIUM: CPT | Performed by: SURGERY

## 2023-08-10 PROCEDURE — 85025 COMPLETE CBC W/AUTO DIFF WBC: CPT | Mod: 91 | Performed by: SURGERY

## 2023-08-10 PROCEDURE — 88342 IMHCHEM/IMCYTCHM 1ST ANTB: CPT | Mod: 26,,, | Performed by: PATHOLOGY

## 2023-08-10 RX ORDER — FENTANYL CITRATE 50 UG/ML
25 INJECTION, SOLUTION INTRAMUSCULAR; INTRAVENOUS
Status: DISCONTINUED | OUTPATIENT
Start: 2023-08-10 | End: 2023-08-12

## 2023-08-10 RX ORDER — NOREPINEPHRINE BITARTRATE/D5W 4MG/250ML
0-3 PLASTIC BAG, INJECTION (ML) INTRAVENOUS CONTINUOUS
Status: DISCONTINUED | OUTPATIENT
Start: 2023-08-10 | End: 2023-08-10

## 2023-08-10 RX ORDER — METHYLPREDNISOLONE SOD SUCC 125 MG
200 VIAL (EA) INJECTION DAILY
Status: COMPLETED | OUTPATIENT
Start: 2023-08-11 | End: 2023-08-11

## 2023-08-10 RX ORDER — DEXTROSE MONOHYDRATE AND SODIUM CHLORIDE 5; .45 G/100ML; G/100ML
INJECTION, SOLUTION INTRAVENOUS CONTINUOUS
Status: DISCONTINUED | OUTPATIENT
Start: 2023-08-10 | End: 2023-08-11

## 2023-08-10 RX ORDER — FAMOTIDINE 10 MG/ML
20 INJECTION INTRAVENOUS EVERY 12 HOURS
Status: DISCONTINUED | OUTPATIENT
Start: 2023-08-10 | End: 2023-08-10

## 2023-08-10 RX ORDER — SODIUM CHLORIDE 0.9 % (FLUSH) 0.9 %
10 SYRINGE (ML) INJECTION
Status: DISCONTINUED | OUTPATIENT
Start: 2023-08-10 | End: 2023-08-16 | Stop reason: HOSPADM

## 2023-08-10 RX ORDER — CALCIUM GLUCONATE 20 MG/ML
1 INJECTION, SOLUTION INTRAVENOUS
Status: DISCONTINUED | OUTPATIENT
Start: 2023-08-10 | End: 2023-08-11

## 2023-08-10 RX ORDER — POTASSIUM CHLORIDE 7.45 MG/ML
60 INJECTION INTRAVENOUS
Status: DISCONTINUED | OUTPATIENT
Start: 2023-08-10 | End: 2023-08-11

## 2023-08-10 RX ORDER — MAGNESIUM SULFATE HEPTAHYDRATE 40 MG/ML
2 INJECTION, SOLUTION INTRAVENOUS
Status: DISCONTINUED | OUTPATIENT
Start: 2023-08-10 | End: 2023-08-11

## 2023-08-10 RX ORDER — OXYCODONE HYDROCHLORIDE 5 MG/1
5 TABLET ORAL EVERY 4 HOURS PRN
Status: DISCONTINUED | OUTPATIENT
Start: 2023-08-10 | End: 2023-08-16 | Stop reason: HOSPADM

## 2023-08-10 RX ORDER — ONDANSETRON 2 MG/ML
INJECTION INTRAMUSCULAR; INTRAVENOUS
Status: DISCONTINUED | OUTPATIENT
Start: 2023-08-10 | End: 2023-08-10

## 2023-08-10 RX ORDER — EPINEPHRINE 1 MG/ML
INJECTION, SOLUTION INTRACARDIAC; INTRAMUSCULAR; INTRAVENOUS; SUBCUTANEOUS
Status: DISCONTINUED | OUTPATIENT
Start: 2023-08-10 | End: 2023-08-10

## 2023-08-10 RX ORDER — POTASSIUM CHLORIDE 29.8 MG/ML
40 INJECTION INTRAVENOUS ONCE
Status: COMPLETED | OUTPATIENT
Start: 2023-08-10 | End: 2023-08-10

## 2023-08-10 RX ORDER — POTASSIUM CHLORIDE 7.45 MG/ML
80 INJECTION INTRAVENOUS
Status: DISCONTINUED | OUTPATIENT
Start: 2023-08-10 | End: 2023-08-11

## 2023-08-10 RX ORDER — HALOPERIDOL 5 MG/ML
INJECTION INTRAMUSCULAR
Status: DISCONTINUED | OUTPATIENT
Start: 2023-08-10 | End: 2023-08-10

## 2023-08-10 RX ORDER — POTASSIUM CHLORIDE 29.8 MG/ML
40 INJECTION INTRAVENOUS ONCE
Status: COMPLETED | OUTPATIENT
Start: 2023-08-10 | End: 2023-08-11

## 2023-08-10 RX ORDER — PROPOFOL 10 MG/ML
VIAL (ML) INTRAVENOUS CONTINUOUS PRN
Status: DISCONTINUED | OUTPATIENT
Start: 2023-08-10 | End: 2023-08-10

## 2023-08-10 RX ORDER — FAMOTIDINE 10 MG/ML
20 INJECTION INTRAVENOUS NIGHTLY
Status: DISCONTINUED | OUTPATIENT
Start: 2023-08-10 | End: 2023-08-11

## 2023-08-10 RX ORDER — CALCIUM GLUCONATE 20 MG/ML
3 INJECTION, SOLUTION INTRAVENOUS
Status: DISCONTINUED | OUTPATIENT
Start: 2023-08-10 | End: 2023-08-11

## 2023-08-10 RX ORDER — MAGNESIUM SULFATE HEPTAHYDRATE 40 MG/ML
4 INJECTION, SOLUTION INTRAVENOUS
Status: DISCONTINUED | OUTPATIENT
Start: 2023-08-10 | End: 2023-08-11

## 2023-08-10 RX ORDER — MUPIROCIN 20 MG/G
1 OINTMENT TOPICAL 2 TIMES DAILY
Status: DISPENSED | OUTPATIENT
Start: 2023-08-10 | End: 2023-08-15

## 2023-08-10 RX ORDER — PREDNISONE 20 MG/1
20 TABLET ORAL DAILY
Status: DISCONTINUED | OUTPATIENT
Start: 2023-08-16 | End: 2023-08-16 | Stop reason: HOSPADM

## 2023-08-10 RX ORDER — OXYCODONE HYDROCHLORIDE 10 MG/1
10 TABLET ORAL EVERY 6 HOURS PRN
Status: DISCONTINUED | OUTPATIENT
Start: 2023-08-10 | End: 2023-08-16 | Stop reason: HOSPADM

## 2023-08-10 RX ORDER — METHYLPREDNISOLONE SOD SUCC 125 MG
120 VIAL (EA) INJECTION DAILY
Status: COMPLETED | OUTPATIENT
Start: 2023-08-13 | End: 2023-08-13

## 2023-08-10 RX ORDER — DEXTROSE MONOHYDRATE 100 MG/ML
INJECTION, SOLUTION INTRAVENOUS CONTINUOUS PRN
Status: DISCONTINUED | OUTPATIENT
Start: 2023-08-10 | End: 2023-08-10

## 2023-08-10 RX ORDER — SULFAMETHOXAZOLE AND TRIMETHOPRIM 400; 80 MG/1; MG/1
1 TABLET ORAL EVERY MORNING
Status: DISCONTINUED | OUTPATIENT
Start: 2023-08-17 | End: 2023-08-16 | Stop reason: HOSPADM

## 2023-08-10 RX ORDER — NYSTATIN 100000 [USP'U]/ML
500000 SUSPENSION ORAL
Status: DISCONTINUED | OUTPATIENT
Start: 2023-08-10 | End: 2023-08-10

## 2023-08-10 RX ORDER — MYCOPHENOLATE MOFETIL 200 MG/ML
1000 POWDER, FOR SUSPENSION ORAL 2 TIMES DAILY
Status: DISCONTINUED | OUTPATIENT
Start: 2023-08-10 | End: 2023-08-11

## 2023-08-10 RX ORDER — POTASSIUM CHLORIDE 7.45 MG/ML
40 INJECTION INTRAVENOUS
Status: DISCONTINUED | OUTPATIENT
Start: 2023-08-10 | End: 2023-08-11

## 2023-08-10 RX ORDER — NICARDIPINE HYDROCHLORIDE 2.5 MG/ML
INJECTION INTRAVENOUS
Status: DISCONTINUED | OUTPATIENT
Start: 2023-08-10 | End: 2023-08-10 | Stop reason: HOSPADM

## 2023-08-10 RX ORDER — HEPARIN SODIUM 5000 [USP'U]/ML
5000 INJECTION, SOLUTION INTRAVENOUS; SUBCUTANEOUS EVERY 8 HOURS
Status: DISCONTINUED | OUTPATIENT
Start: 2023-08-10 | End: 2023-08-16 | Stop reason: HOSPADM

## 2023-08-10 RX ORDER — HEPARIN SODIUM 1000 [USP'U]/ML
INJECTION, SOLUTION INTRAVENOUS; SUBCUTANEOUS
Status: DISCONTINUED | OUTPATIENT
Start: 2023-08-10 | End: 2023-08-10

## 2023-08-10 RX ORDER — CALCIUM GLUCONATE 20 MG/ML
2 INJECTION, SOLUTION INTRAVENOUS
Status: DISCONTINUED | OUTPATIENT
Start: 2023-08-10 | End: 2023-08-11

## 2023-08-10 RX ORDER — VALGANCICLOVIR 450 MG/1
450 TABLET, FILM COATED ORAL DAILY
Status: DISCONTINUED | OUTPATIENT
Start: 2023-08-20 | End: 2023-08-16 | Stop reason: HOSPADM

## 2023-08-10 RX ORDER — OXYCODONE HYDROCHLORIDE 5 MG/1
5 TABLET ORAL EVERY 6 HOURS PRN
Status: DISCONTINUED | OUTPATIENT
Start: 2023-08-10 | End: 2023-08-10

## 2023-08-10 RX ORDER — ALBUMIN HUMAN 50 G/1000ML
25 SOLUTION INTRAVENOUS ONCE
Status: COMPLETED | OUTPATIENT
Start: 2023-08-10 | End: 2023-08-10

## 2023-08-10 RX ORDER — DEXTROSE MONOHYDRATE 50 MG/ML
INJECTION, SOLUTION INTRAVENOUS CONTINUOUS PRN
Status: DISCONTINUED | OUTPATIENT
Start: 2023-08-09 | End: 2023-08-10

## 2023-08-10 RX ORDER — METHYLPREDNISOLONE SOD SUCC 125 MG
160 VIAL (EA) INJECTION DAILY
Status: COMPLETED | OUTPATIENT
Start: 2023-08-12 | End: 2023-08-12

## 2023-08-10 RX ORDER — PROPOFOL 10 MG/ML
0-50 INJECTION, EMULSION INTRAVENOUS CONTINUOUS
Status: DISCONTINUED | OUTPATIENT
Start: 2023-08-10 | End: 2023-08-10

## 2023-08-10 RX ADMIN — CALCIUM GLUCONATE 1 G: 20 INJECTION, SOLUTION INTRAVENOUS at 11:08

## 2023-08-10 RX ADMIN — SODIUM CHLORIDE, SODIUM ACETATE ANHYDROUS, SODIUM GLUCONATE, POTASSIUM CHLORIDE, AND MAGNESIUM CHLORIDE: 526; 222; 502; 37; 30 INJECTION, SOLUTION INTRAVENOUS at 01:08

## 2023-08-10 RX ADMIN — MYCOPHENOLATE MOFETIL 1000 MG: 200 POWDER, FOR SUSPENSION ORAL at 09:08

## 2023-08-10 RX ADMIN — PROPOFOL 45 MCG/KG/MIN: 10 INJECTION, EMULSION INTRAVENOUS at 07:08

## 2023-08-10 RX ADMIN — PHYTONADIONE 10 MG: 10 INJECTION, EMULSION INTRAMUSCULAR; INTRAVENOUS; SUBCUTANEOUS at 05:08

## 2023-08-10 RX ADMIN — HEPARIN SODIUM 5000 UNITS: 5000 INJECTION INTRAVENOUS; SUBCUTANEOUS at 02:08

## 2023-08-10 RX ADMIN — Medication 20 MG: at 12:08

## 2023-08-10 RX ADMIN — AMPICILLIN SODIUM AND SULBACTAM SODIUM 3 G: 2; 1 INJECTION, POWDER, FOR SOLUTION INTRAMUSCULAR; INTRAVENOUS at 08:08

## 2023-08-10 RX ADMIN — FENTANYL CITRATE 50 MCG: 50 INJECTION, SOLUTION INTRAMUSCULAR; INTRAVENOUS at 03:08

## 2023-08-10 RX ADMIN — PHYTONADIONE 10 MG: 10 INJECTION, EMULSION INTRAMUSCULAR; INTRAVENOUS; SUBCUTANEOUS at 10:08

## 2023-08-10 RX ADMIN — PHYTONADIONE 10 MG: 10 INJECTION, EMULSION INTRAMUSCULAR; INTRAVENOUS; SUBCUTANEOUS at 02:08

## 2023-08-10 RX ADMIN — ROCURONIUM BROMIDE 50 MG: 10 INJECTION, SOLUTION INTRAVENOUS at 02:08

## 2023-08-10 RX ADMIN — MUPIROCIN 1 G: 20 OINTMENT TOPICAL at 08:08

## 2023-08-10 RX ADMIN — ALBUMIN HUMAN: 50 SOLUTION INTRAVENOUS at 12:08

## 2023-08-10 RX ADMIN — HEPARIN SODIUM 3000 UNITS: 1000 INJECTION, SOLUTION INTRAVENOUS; SUBCUTANEOUS at 12:08

## 2023-08-10 RX ADMIN — ONDANSETRON 4 MG: 2 INJECTION INTRAMUSCULAR; INTRAVENOUS at 03:08

## 2023-08-10 RX ADMIN — POTASSIUM CHLORIDE 10 MEQ: 7.46 INJECTION, SOLUTION INTRAVENOUS at 11:08

## 2023-08-10 RX ADMIN — DEXTROSE AND SODIUM CHLORIDE: 5; 450 INJECTION, SOLUTION INTRAVENOUS at 08:08

## 2023-08-10 RX ADMIN — FUROSEMIDE 100 MG: 10 INJECTION, SOLUTION INTRAMUSCULAR; INTRAVENOUS at 01:08

## 2023-08-10 RX ADMIN — SODIUM CHLORIDE 3 UNITS/HR: 9 INJECTION, SOLUTION INTRAVENOUS at 03:08

## 2023-08-10 RX ADMIN — MYCOPHENOLATE MOFETIL 1000 MG: 200 POWDER, FOR SUSPENSION ORAL at 10:08

## 2023-08-10 RX ADMIN — OXYCODONE HYDROCHLORIDE 5 MG: 5 TABLET ORAL at 06:08

## 2023-08-10 RX ADMIN — FENTANYL CITRATE 25 MCG: 50 INJECTION INTRAMUSCULAR; INTRAVENOUS at 01:08

## 2023-08-10 RX ADMIN — DEXTROSE AND SODIUM CHLORIDE: 5; 450 INJECTION, SOLUTION INTRAVENOUS at 05:08

## 2023-08-10 RX ADMIN — DEXTROSE MONOHYDRATE: 10 INJECTION, SOLUTION INTRAVENOUS at 12:08

## 2023-08-10 RX ADMIN — AMPICILLIN SODIUM AND SULBACTAM SODIUM 3 G: 2; 1 INJECTION, POWDER, FOR SOLUTION INTRAMUSCULAR; INTRAVENOUS at 01:08

## 2023-08-10 RX ADMIN — PROPOFOL 50 MCG/KG/MIN: 10 INJECTION, EMULSION INTRAVENOUS at 04:08

## 2023-08-10 RX ADMIN — ONDANSETRON 1000 MG: 2 INJECTION INTRAMUSCULAR; INTRAVENOUS at 01:08

## 2023-08-10 RX ADMIN — ALBUMIN (HUMAN) 25 G: 12.5 SOLUTION INTRAVENOUS at 04:08

## 2023-08-10 RX ADMIN — HEPARIN SODIUM 5000 UNITS: 5000 INJECTION INTRAVENOUS; SUBCUTANEOUS at 09:08

## 2023-08-10 RX ADMIN — FAMOTIDINE 20 MG: 10 INJECTION, SOLUTION INTRAVENOUS at 08:08

## 2023-08-10 RX ADMIN — MANNITOL 25 G: 12.5 INJECTION, SOLUTION INTRAVENOUS at 01:08

## 2023-08-10 RX ADMIN — ONDANSETRON 500 MG: 2 INJECTION INTRAMUSCULAR; INTRAVENOUS at 12:08

## 2023-08-10 RX ADMIN — EPINEPHRINE 10 MCG: 1 INJECTION, SOLUTION INTRACARDIAC; INTRAMUSCULAR; INTRAVENOUS; SUBCUTANEOUS at 01:08

## 2023-08-10 RX ADMIN — POTASSIUM CHLORIDE 40 MEQ: 29.8 INJECTION, SOLUTION INTRAVENOUS at 10:08

## 2023-08-10 RX ADMIN — OXYCODONE HYDROCHLORIDE 5 MG: 5 TABLET ORAL at 03:08

## 2023-08-10 RX ADMIN — ONDANSETRON 500 MG: 2 INJECTION INTRAMUSCULAR; INTRAVENOUS at 02:08

## 2023-08-10 RX ADMIN — POTASSIUM CHLORIDE 40 MEQ: 400 INJECTION, SOLUTION INTRAVENOUS at 05:08

## 2023-08-10 RX ADMIN — EPINEPHRINE 10 MCG: 1 INJECTION, SOLUTION INTRACARDIAC; INTRAMUSCULAR; INTRAVENOUS; SUBCUTANEOUS at 12:08

## 2023-08-10 RX ADMIN — SODIUM PHOSPHATE, MONOBASIC, MONOHYDRATE AND SODIUM PHOSPHATE, DIBASIC, ANHYDROUS 15 MMOL: 142; 276 INJECTION, SOLUTION INTRAVENOUS at 08:08

## 2023-08-10 RX ADMIN — AMPICILLIN SODIUM AND SULBACTAM SODIUM 3 G: 2; 1 INJECTION, POWDER, FOR SOLUTION INTRAMUSCULAR; INTRAVENOUS at 04:08

## 2023-08-10 RX ADMIN — ROCURONIUM BROMIDE 50 MG: 10 INJECTION, SOLUTION INTRAVENOUS at 12:08

## 2023-08-10 RX ADMIN — VASOPRESSIN 4 UNITS: 20 INJECTION INTRAVENOUS at 01:08

## 2023-08-10 RX ADMIN — AMPICILLIN SODIUM AND SULBACTAM SODIUM 3 G: 2; 1 INJECTION, POWDER, FOR SOLUTION INTRAMUSCULAR; INTRAVENOUS at 10:08

## 2023-08-10 RX ADMIN — AMPICILLIN SODIUM AND SULBACTAM SODIUM 3 G: 2; 1 INJECTION, POWDER, FOR SOLUTION INTRAMUSCULAR; INTRAVENOUS at 03:08

## 2023-08-10 RX ADMIN — HEPARIN SODIUM 5000 UNITS: 5000 INJECTION INTRAVENOUS; SUBCUTANEOUS at 05:08

## 2023-08-10 RX ADMIN — ONDANSETRON 500 MG: 2 INJECTION INTRAMUSCULAR; INTRAVENOUS at 03:08

## 2023-08-10 RX ADMIN — MAGNESIUM SULFATE HEPTAHYDRATE 2 G: 40 INJECTION, SOLUTION INTRAVENOUS at 05:08

## 2023-08-10 RX ADMIN — TACROLIMUS 1 MG: 1 CAPSULE ORAL at 08:08

## 2023-08-10 RX ADMIN — DEXTROSE: 5 SOLUTION INTRAVENOUS at 12:08

## 2023-08-10 RX ADMIN — INSULIN HUMAN 4 UNITS/HR: 1 INJECTION, SOLUTION INTRAVENOUS at 10:08

## 2023-08-10 RX ADMIN — TACROLIMUS 1 MG: 1 CAPSULE ORAL at 06:08

## 2023-08-10 RX ADMIN — HALOPERIDOL LACTATE 1 MG: 5 INJECTION, SOLUTION INTRAMUSCULAR at 03:08

## 2023-08-10 RX ADMIN — INSULIN HUMAN 10 UNITS: 100 INJECTION, SOLUTION PARENTERAL at 12:08

## 2023-08-10 RX ADMIN — VASOPRESSIN 3 UNITS: 20 INJECTION INTRAVENOUS at 12:08

## 2023-08-10 RX ADMIN — MAGNESIUM SULFATE HEPTAHYDRATE 2 G: 40 INJECTION, SOLUTION INTRAVENOUS at 10:08

## 2023-08-10 NOTE — CARE UPDATE
SICU PLAN OF CARE NOTE    Dx: End stage liver disease    Goals of Care: SBP<160    Vital Signs:   Temp:  [99 °F (37.2 °C)-99.5 °F (37.5 °C)]   Pulse:  [65-82]   Resp:  [2-25]   BP: (102-128)/(68-74)   SpO2:  [95 %-99 %]   Arterial Line BP: (104-152)/(50-66)      Neuro: AAO x4    Respiratory: Room Air    Gtts: Insulin    Urine Output: Urinary Catheter  cc/hour      Diet: Clear Liquid    Labs/Accuchecks: q4/q1.    Skin: skin intact    Shift Events: Patient extubated. [place], introducer, and femoral a-line discontinued.  Albumin 500cc 25% infused.    Plan of care reviewed with Dr. Baum.  Questions and concerns addressed with patient's wife at the bedside.  Refer to flow sheet for vital signs, ggts, and assesments.  Will continue to monitor.

## 2023-08-10 NOTE — ANESTHESIA PROCEDURE NOTES
Arterial    Diagnosis: ESLD    Patient location during procedure: done in OR    Staffing  Authorizing Provider: Kimo Vnace MD  Performing Provider: Julius Alvarez DO    Staffing  Performed by: Julius Alvarez DO  Authorized by: Kimo Vance MD    Anesthesiologist was present at the time of the procedure.    Preanesthetic Checklist  Completed: patient identified, IV checked, site marked, risks and benefits discussed, surgical consent, monitors and equipment checked, pre-op evaluation, timeout performed and anesthesia consent givenArterial  Skin Prep: chlorhexidine gluconate and isopropyl alcohol  Orientation: right  Location: femoral    Catheter Size: 20 G  Catheter placement by Ultrasound guidance. Heme positive aspiration all ports.   Vessel Caliber: patent  Needle advanced into vessel with real time Ultrasound guidance.Insertion Attempts: 1  Assessment  Dressing: secured with tape and tegaderm, secured with tape, sutured in place and taped, tegaderm and steri-strips  Patient: Tolerated well

## 2023-08-10 NOTE — SUBJECTIVE & OBJECTIVE
Interval HPI:   Overnight events: No acute events overnight. Patient in room 91901/46010 A. Blood glucose worsening. BG above goal on current insulin regimen (IIP). Steroid use- None .   1 Day Post-Op  Renal function- Normal   Vasopressors-  None     Diet NPO Except for: Sips with Medication     Eating:   NPO  Nausea: No  Hypoglycemia and intervention: No  Fever: No  TPN and/or TF: No  PMH, PSH, FH, SH updated and reviewed     ROS:    Review of Systems   Unable to perform ROS: Intubated       Current Medications and/or Treatments Impacting Glycemic Control  Immunotherapy:    Immunosuppressants           Stop Route Frequency     mycophenolate mofetil 200 mg/mL suspension 1,000 mg         -- Oral 2 times daily     tacrolimus (PROGRAF) 1 mg/mL oral syringe         -- Oral 2 times daily          Steroids:   Hormones (From admission, onward)      Start     Stop Route Frequency Ordered    08/16/23 0900  predniSONE tablet 20 mg  (methylprednisolone taper panel)        See Hyperspace for full Linked Orders Report.    -- Oral Daily 08/10/23 0436    08/15/23 0900  methylPREDNISolone sodium succinate injection 40 mg  (methylprednisolone taper panel)        See Hyperspace for full Linked Orders Report.    08/16/23 0859 IV Daily 08/10/23 0436    08/14/23 0900  methylPREDNISolone sodium succinate injection 80 mg  (methylprednisolone taper panel)        See Hyperspace for full Linked Orders Report.    08/15/23 0859 IV Daily 08/10/23 0436    08/13/23 0900  methylPREDNISolone sodium succinate injection 120 mg  (methylprednisolone taper panel)        See Hyperspace for full Linked Orders Report.    08/14/23 0859 IV Daily 08/10/23 0436    08/12/23 0900  methylPREDNISolone sodium succinate injection 160 mg  (methylprednisolone taper panel)        See Hyperspace for full Linked Orders Report.    08/13/23 0859 IV Daily 08/10/23 0436    08/11/23 0900  methylPREDNISolone sodium succinate injection 200 mg  (methylprednisolone taper panel)         See Hyperspace for full Linked Orders Report.    08/12/23 0859 IV Daily 08/10/23 0436    08/09/23 1709  methylPREDNISolone sodium succinate injection 500 mg  (Med - Immunosuppression Induction Therapy (Methylprednisolone))         -- IV On Call Procedure 08/09/23 1709          Pressors:    Autonomic Drugs (From admission, onward)      None          Hyperglycemia/Diabetes Medications:   Antihyperglycemics (From admission, onward)      Start     Stop Route Frequency Ordered    08/10/23 0545  insulin regular in 0.9 % NaCl 100 unit/100 mL (1 unit/mL) infusion        Question Answer Comment   Insulin Rate Adjustment (DO NOT MODIFY ANSWER) \\[email or web address]    Enter initial dose from Infusion Protocol Chart (Units/hr): 3        -- IV Continuous 08/10/23 0436             PHYSICAL EXAMINATION:  Vitals:    08/10/23 0912   BP:    Pulse: 71   Resp: 18   Temp:      Body mass index is 30.95 kg/m².     Physical Exam  Constitutional:       Appearance: He is not ill-appearing.   HENT:      Head: Normocephalic and atraumatic.   Pulmonary:      Comments: Intubated  Abdominal:      General: There is no distension.   Musculoskeletal:         General: No swelling.      Right lower leg: No edema.   Neurological:      Comments: Sedated

## 2023-08-10 NOTE — TRANSFER OF CARE
"Anesthesia Transfer of Care Note    Patient: Jed Chávez    Procedure(s) Performed: Procedure(s) (LRB):  TRANSPLANT, LIVER (N/A)    Patient location: ICU    Anesthesia Type: general    Transport from OR: Transported from OR intubated on 100% O2 by AMBU with adequate controlled ventilation. Upon arrival to PACU/ICU, patient attached to ventilator and auscultated to confirm bilateral breath sounds and adequate TV. Continuous ECG monitoring in transport. Continuous SpO2 monitoring in transport. Continuos invasive BP monitoring in transport    Post pain: adequate analgesia    Post assessment: no apparent anesthetic complications and tolerated procedure well    Post vital signs: stable    Level of consciousness: sedated    Nausea/Vomiting: no nausea/vomiting    Complications: none    Transfer of care protocol was followed      Last vitals:   Visit Vitals  /73 (BP Location: Right arm, Patient Position: Lying)   Pulse 73   Temp 35.6 °C (96.1 °F) (Oral)   Resp 18   Ht 5' 10" (1.778 m)   Wt 97.8 kg (215 lb 11.5 oz)   SpO2 98%   BMI 30.95 kg/m²     "

## 2023-08-10 NOTE — ANESTHESIA PROCEDURE NOTES
Central Line    Diagnosis: Aortic Stenosis  Patient location during procedure: done in OR    Staffing  Authorizing Provider: Kimo Vance MD  Performing Provider: Julius Alvarez DO    Staffing  Performed by: Julius Alvarez DO  Authorized by: Kimo Vance MD    Anesthesiologist was present at the time of the procedure.  Preanesthetic Checklist  Completed: patient identified, IV checked, site marked, risks and benefits discussed, surgical consent, monitors and equipment checked, pre-op evaluation, timeout performed and anesthesia consent given  Indication   Indication: hemodynamic monitoring, vascular access     Anesthesia   local infiltration    Central Line   Skin Prep: skin prepped with ChloraPrep, skin prep agent completely dried prior to procedure  Sterile Barriers Followed: Yes    All five maximal barriers used- gloves, gown, cap, mask, and large sterile sheet    hand hygiene performed prior to central venous catheter insertion  Location: right internal jugular.   Catheter type: triple lumen  Catheter Size: 12 Fr  Ultrasound: none     Manometry: none  Insertion Attempts: 1   Securement:line sutured, chlorhexidine patch, sterile dressing applied and blood return through all ports    Post-Procedure    Adverse Events:none      Guidewire Guidewire removed intact. Guidewire removed intact, verified with nurse.

## 2023-08-10 NOTE — ANESTHESIA PROCEDURE NOTES
Intubation    Date/Time: 8/9/2023 10:03 PM    Performed by: Ghassan Campos CRNA  Authorized by: Kimo Vance MD    Intubation:     Induction:  Intravenous    Intubated:  Postinduction    Mask Ventilation:  Easy with oral airway    Attempts:  1    Attempted By:  CRNA    Method of Intubation:  Video laryngoscopy    Blade:  [place] 3    Laryngeal View Grade: Grade I - full view of cords      Difficult Airway Encountered?: No      Complications:  None    Airway Device:  Oral endotracheal tube    Airway Device Size:  7.5    Style/Cuff Inflation:  Cuffed    Inflation Amount (mL):  7    Tube secured:  21    Secured at:  The teeth    Placement Verified By:  Capnometry    Complicating Factors:  None    Findings Post-Intubation:  BS equal bilateral

## 2023-08-10 NOTE — PLAN OF CARE
Spoke to patient's wife, Leida, via telephone regarding progression of procedure 8/10/23 @ 0154. GRICELDA Rose RN

## 2023-08-10 NOTE — PLAN OF CARE
Spoke to patient's wife, Leida, via telephone regarding start of procedure 8/9/23 @ 4534. GRICELDA Rose RN

## 2023-08-10 NOTE — ASSESSMENT & PLAN NOTE
Mr. Chávez is a 62 y/o male with ESLD 2/2 HCC and alcoholic cirrhosis MELD 24, T2DM, and HTN c/b hypertensive CVA ( with no residual deficits) who is admitted to ICU for postop monitoring after DCD liver transplant with Dr. Hewitt 8/10/23. Per chart review, preop reported feeling in his usual state of health. Denies recent hospitalizations or illnesses.  Preop, he received IV steroids pulse induction.No focal deficits noted on preop exam.    Neuro:  - Propofol for sedation  - Fentanyl prn  - Hx of CVA with no focal deficits. Will monitor neuro status closely  - GCS 10T. Follows commands in all extremities per nursing    CV:  - MAP goal > 65  - Drips: now off  - [place] Marc in place. CVP 8-10. CI 3. PA     Pulm:  - Intubated with mechanical vent  Vent Mode: A/C  Oxygen Concentration (%):  [] 50  Resp Rate Total:  [18 br/min-20 br/min] 18 br/min  Vt Set:  [500 mL] 500 mL  PEEP/CPAP:  [5 cmH20] 5 cmH20  Mean Airway Pressure:  [9.1 cmH20] 9.1 cmH20  - Post op AB.4/43.5/317/27  - ABG prn  - Will attempt extubation later this afternoon  - CXR showed ETT in good position       FEN/GI:  S/p DCD liver transplant for ESLD 2/2 HCC on 8/10. Cold ischemia time 215 minutes. Warm ischemia time 26 minutes. Donor and recipient both with atherosclerosis. MELD 24  - NPO   - Trend BMP q12hr, AST q4hr  - Lyte replacement prn  - OGT LIWS  - Drains: 2x abdominal drains in place. 1 lateral above liver with 100mL serosanguinous output. 1 medial drain with 85 mL serous output.  - AM Liver Transplant US pending due to elevated AST/ALT 2174/249 (1072/170). LDH 2832  - D5-1/2NS at 100  - famotidine BID    Renal:  - [place] in place  - Monitor UOP. UOP 3L in OR  - Trend BUN/Cr. 12/0.7    Heme/Onc:  - Received a total of 1u pRBC, 6u FFP, 2u Plts, 1u Cryo and 3L cell saver today  - Trend CBC q4hr, INR q4hr  - Immuno: Methylprednisolone taper, Prograf, Mycophenolate  - Hx of HCC s/p Y90 2023  - heparin for DVT ppx    ID:  -  AF  - Trend WBC with CBC q4hr  - Abx/Antifungals/Antiviral: Unasyn to be completed by tomorrow, Bactrim/Valganciclovir to start 8/17. Nystatin to start once extubated    Endo:  - Insulin gtt in place. Will monitor closely and consult endocrinology  - Glucose 207-251    PPx:  - Famotidine, TEDs/SCDs    Dispo: continue ICU care, AM Liver US  Lines: 2x abdominal drain, ETT, ulrich, OG, RIJ trialysis and introducer, L radial a line, R femoral a line

## 2023-08-10 NOTE — PROGRESS NOTES
Saw patient and and his wife, Leida, in the ICU.  Patient was intubated. Gave Leida  My New Journey: Living Smart After My Liver Transplant.  Asked her to read the book in preparation for education.  Allowed time for questions and answers.

## 2023-08-10 NOTE — ANESTHESIA PROCEDURE NOTES
MAC Line    Diagnosis: Aortic Stenosis  Patient location during procedure: done in OR    Staffing  Authorizing Provider: Kimo Vance MD  Performing Provider: Julius Alvarez DO    Staffing  Performed by: Julius Alvarez DO  Authorized by: Kimo Vance MD    Anesthesiologist was present at the time of the procedure.  Preanesthetic Checklist  Completed: patient identified, IV checked, site marked, risks and benefits discussed, surgical consent, monitors and equipment checked, pre-op evaluation, timeout performed and anesthesia consent given  Indication   Indication: hemodynamic monitoring, vascular access     Anesthesia   local infiltration    Central Line   Skin Prep: skin prepped with ChloraPrep, skin prep agent completely dried prior to procedure  Sterile Barriers Followed: Yes    All five maximal barriers used- gloves, gown, cap, mask, and large sterile sheet    hand hygiene performed prior to central venous catheter insertion  Location: right internal jugular.   Catheter type: introducer  Catheter Size: 9 Fr  Ultrasound: none     Manometry: none  Insertion Attempts: 1   Securement:line sutured, chlorhexidine patch, sterile dressing applied and blood return through all ports    Post-Procedure    Adverse Events:none      Guidewire Guidewire removed intact. Guidewire removed intact, verified with nurse.

## 2023-08-10 NOTE — PROGRESS NOTES
TRANSPLANT NOTE:    Admit Date: 8/9/2023    ORGAN:   LIVER  Disease Etiology: Primary Liver Malignancy: Hepatoma (HCC) and Cirrhosis  Donor CMV Status: Positive  Donor HCV Status: Negative  Donor HBcAb: Negative  Donor HBV SURENDRA: negative   Donor HCV SURENDRA: Negative  Whole or Partial: Whole Liver  Biliary Anastomosis: End to End  Arterial Anatomy: Standard    Jed Chávez is a 63 y.o. male s/p    Donation after Circulatory Death  liver transplant on 8/10/2023 (Liver) for Primary Liver Malignancy: Hepatoma (HCC) and Cirrhosis.  This patient will follow the Steroid Induction protocol.  This patients immunosuppression will include a steroid taper over 5 weeks, Cellcept for 3 months and Prograf maintenance.  Opportunistic infection prophylaxis will include Valcyte for 6 months (CMV D+ R-), Bactrim for 6 months.  Osteoporosis risk assessment identifies normal dexa but Vit D 13, therapy will include ergo 50k weekly for 5 additional weeks (started on 7/14/23) and calcium carbonate 1000 mg daily for 8 weeks then yair/vitD combo pill daily thereafter.  I have reviewed the pre-op medications and those have been restarted those, as appropriate.

## 2023-08-10 NOTE — PROGRESS NOTES
Admit / Transplant Note     SW met with wife to assess patients needs due to patient being intubated.  Patient is a 63 y.o.  male, admitted for liver transplant. Patient received a liver transplant on 8/10/2023    Patient admitted from home on 8/9/2023 .  At this time, patient presents as intubated.  At this time, patients caregiver presents as alert and oriented x 4 and asking and answering questions appropriately.      Household/Family Systems (as reported by patients caregiver)     Patient resides with patient's wife, at [id number] [place] [id number].  Support system includes wife, daughter and in laws.  Patient does not have dependents that are need of being cared for.     Patients primary caregiver is Leida Chávez, patients wife, phone number [phone].  Confirmed patients contact information is [phone] (home);   Telephone Information:   Mobile [phone]   .    During admission, patient's caregiver plans to stay in patient's room.  Confirmed patient and patients caregivers do have access to reliable transportation.    Cognitive Status/Learning     Patients caregiver reports patients reading ability as college and states patient does have difficulty with seeing and wears glasses. Patients caregiver reports patient learns best by written information.   Needed: No.   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    Adherence     Patients caregiver reports patient has a high level of adherence to patients health care regimen.  Adherence counseling and education provided.  Patient's caregiver verbalizes understanding.    Substance Use    Patients caregiver reports patients substance usage as the  following:    Tobacco:  per chart review, patient quit tobacco use last year .  Alcohol:  per chart review, patient's last alcohol use was in 2022 .  Illicit Drugs/Non-prescribed Medications: none, patient denies any use.  Patients caregiver states clear understanding of the potential impact of substance use.  Substance abstinence/cessation counseling, education and resources provided and reviewed.     Services Utilizing/ADLS (as reported by patients caregiver)    Infusion Service: Prior to admission, patient utilizing? no  Home Health: Prior to admission, patient utilizing? no  DME: Prior to admission, no  Pulmonary/Cardiac Rehab: Prior to admission, no  Dialysis:  Prior to admission, no  Transplant Specialty Pharmacy:  Prior to admission, no.    Prior to admission, patients caregiver reports patient was independent with ADLS and was driving.  Patients caregiver reports patient is not able to care for self at this time due to compromised medical condition (as documented in medical record) and physical weakness..  Patients caregiver reports patient indicates a willingness to care for self once medically cleared to do so.    Insurance/Medications    Insured by   Payer/Plan Subscr  Sex Relation Sub. Ins. ID Effective Group Num   1. [employer]* RODRIGUE BOYKIN 1960 Male Self [id number]                                    [address] [address]   2. [employer]* FIFI BOYKIN 1960 Female Spouse [id number]                                   [address] [address]      Primary Insurance (for UNOS reporting): Private Insurance  Secondary Insurance (for UNOS reporting): None    Patients caregiver reports patient is able to obtain and afford medications at this time and at time of discharge.    Living Will/Healthcare Power of     Patients caregiver reports patient does not have a LW and/or HCPA.   provided education regarding LW and HCPA and the completion of  forms.    Coping/Mental Health (as reported by patients caregiver)    Patient is coping adequately with the aid of  family members. Patients caregiver is coping adequately with the aid of  family members.      Discharge Planning (as reported by patients caregiver)    At time of discharge, patient plans to  stay at a local [employer]  under the care of Leida. Leida is currently looking for an AirBnb close to the transplant center and will provide an address when an appropriate place is found. Patients wife will transport patient.  Per rounds today, expected discharge date has not been medically determined at this time. Patients caretaker verbalizes understanding and is involved in treatment planning and discharge process.    Additional Concerns    Patient's caretaker denies additional needs and/or concerns at this time.  provided resource list, patient choice, psychosocial and supportive counseling, resources, education, assistance and discharge planning with patient and caregiver involvement, ongoing SW availability and services as appropriate.  remains available. Patient's caregiver verbalizes understanding and agreement with information reviewed,  availability and how to access available resources as needed.

## 2023-08-10 NOTE — PT/OT/SLP PROGRESS
Physical Therapy      Patient Name:  Jed Chávez   MRN:  [id number]    Patient not seen today. Pt remains intubated and not medically appropriate for therapy services s/p liver transplant on 8/10. Will follow-up when appropriate.

## 2023-08-10 NOTE — NURSING
Nurses Note -- 4 Eyes      8/10/2023   5:51 AM      Skin assessed during: Admit      [x] No Altered Skin Integrity Present    [x]Prevention Measures Documented      [] Yes- Altered Skin Integrity Present or Discovered   [] LDA Added if Not in Epic (Describe Wound)   [] New Altered Skin Integrity was Present on Admit and Documented in LDA   [] Wound Image Taken    Wound Care Consulted? No    Attending Nurse:  Dania Sosa RN      Second RN/Staff Member:   Cristal Felix RN

## 2023-08-10 NOTE — ASSESSMENT & PLAN NOTE
BG goal: 140-180   T2dm.  S/P liver transplant.  BG above goal on high drip rates.  Will continue to monitor.    - -Continue Intensive insulin drip    - POCT Glucose every hour  - Hypoglycemia protocol in place      ** Please notify Endocrine for any change and/or advance in diet**  ** Please call Endocrine for any BG related issues **     Discharge Planning:   TBD. Please notify endocrinology prior to discharge.

## 2023-08-10 NOTE — HPI
Mr. 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 Salgado and Pio 8/10/23. Per chart review, preop reported feeling in his usual state of health. Denies recent hospitalizations or illnesses.  Preop, he received IV steroids pulse induction. No focal deficits noted on preop exam.      Intraop, he received 1 unit RBC, 6 FFP, 2 PLT, 1 cryo, 1500 ml albumin, 3L cellsaver, and 6L crystalloid. EBL10 L. UOP 3 L. 2 drains in place. He arrives on 0.03 vaso and 0.03 levo. He is also on an insulin drip. Cold ischemia time 215 minutes. Warm ischemia time 26 minutes. Donor and recipient both with atherosclerosis.

## 2023-08-10 NOTE — OP NOTE
Operative Report    Date of Procedure: 8/9/2023  Date of Transplant (UNOS): 8/10/23    Surgeons:  Surgeon(s) and Role:     * Ameya Suero MD - Primary     * Jed Salgado MD - Assisting     * Lizzy Ramesh MD - Fellow     * Shreyas Larose MD - Fellow    First Assistant Attestation:  The presence of an additional attending surgeon functioning as first assistant was required due to the complexity of the procedure relative to any available residents. I certify that no resident was available who was qualified to serve as first assistant. Duties performed by the assistant included assisting the primary surgeon.    Pre-operative Diagnosis (UNOS): End Stage Liver Disease due to Primary Liver Malignancy: Hepatoma (HCC) and Cirrhosis, Alcohol-Associated Cirrhosis Without Acute Alcohol-Associated Hepatitis and Liver cell carcinoma C22.0    Post-operative Diagnosis: Same; portal vein status:  patent    Principal Procedure Performed: Orthotopic Liver Transplant  (whole liver, DCD donor, biliary reconstruction end to end donor common bile duct to recipient common hepatic duct  )  Additional Procedures Performed:   None    Anesthesia: General endotracheal  Findings: cirrhosis, portal hypertension, ascites, and adhesions    Preamble  Indications and Patient Counseling: The patient is a 63 y.o. year-old male with Primary Liver Malignancy: Hepatoma (HCC) and Cirrhosis, Alcohol-Associated Cirrhosis Without Acute Alcohol-Associated Hepatitis (UNOS terminology) who has been evaluated for a liver transplant.  The procedure was thoroughly discussed with the patient, including potential risks, complications, and alternatives. Specific complications mentioned included death, graft non-function, bleeding, infection, rejection, renal failure, respiratory failure, and neurologic deficits, as well as the possibility of other complications not specifically mentioned.    Donor Risk Factors:  Prior to the operation, the patient was advised of  any donor-specific risk factors requiring specific disclosure. Factors in this case included nothing that required specific disclosure.      Specific PHS Donor Risk criteria for the organ donor include:  None    All questions were answered, the patient voiced appropriate understanding, and he agreed to proceed with the planned procedure.    ABO Confirmation: Immediately following arrival of the donor organ and prior to implantation, a formal ABO confirmation was done according to hospital and UNOS policies.  I confirmed the UNOS ID number of the donor organ ([id number]) and the donor and recipient ABO types, directly verifying these data by comparison with the UNOS Match Run report ([id number].  This confirmation was personally done by an attending surgeon and circulating nurse, and is officially documented elsewhere.    Time-Out: A complete time out was carried out prior to incision, with confirmation of patient identity, correct procedure, correct operative site, appropriate antibiotic prophylaxis, review of any known allergies, and presence of all needed equipment.    Procedure in Detail  Following the induction of general endotracheal anesthesia, appropriate arterial and venous lines were placed by the anesthesia team.  Care was taken to pad all pressure points and avoid any potential traction injuries from positioning. The urinary bladder was catheterized.  Sequential compression boots and Yelena Huggers were used. The chest, abdomen, and upper thighs were sterilely prepped and draped.     The abdomen was entered via a wide bilateral subcostal incision. 100 mL of ascites was removed. The round ligament was ligated and divided. The falciform, left triangular, and gastrohepatic ligaments were divided using the electrocautery, with 2-0 silk ties as needed. The Monsalve self-retaining retractor was placed to provide exposure. Adhesions between the abdominal viscera and the abdominal wall and the undersurface of the liver  were divided as needed using cautery dissection and silk ties or suture ligatures. Hilar dissection was then carried out, with ligation of the right and left hepatic arteries as well as the cystic duct and the common hepatic duct. The recipient arterial anatomy was found to be: standard. The portal vein was exposed circumferentially from its bifurcation down to the superior border of the pancreas. The portal vein was found to be patent.  Attention was next directed to the right side of the liver where the right triangular ligament was taken down, exposing the bare area of the liver, and the IVC. The infrahepatic IVC was isolated, followed by mobilization of the retrohepatic cava, division of the right adrenal vein, and isolation of the suprahepatic IVC. The patient was given 3000 units of heparin prior to caval cross-clamping. . After assuring adequate stability after cross-clamping, the native liver was excised and the allograft liver was brought to the operative field.  The liver was perfused with cold 5% albumin during implantation to displace the organ preservation solution.  IVC reconstruction technique consisted of end to end ivc using 3-0 and 4-0 polypropylene as appropriate.  The donor portal vein was then anastomosed to the recipient portal inflow site (end portal vein) with 5-0 polypropylene. Once this was completed, anesthesia was notified and the liver was re-perfused. Copious irrigation with warm saline and temporary finger occlusion of portal inflow were used as needed to avoid any problems with hypothermia. Temporary packing, electrocautery, and polypropylene suture ligatures were used as appropriate to provide hemostasis. Once this was satisfactory, attention was directed to the arterial reconstruction. As per the protocol for livers from DCD donors, the allograft hepatic artery was infused with 2 mg of TPA and 5 mg of verapamil. Arterial inflow was provided to the graft by anastomosing the recipient  proper hepatic artery to the donor  sa branch patch using 6-0 polypropylene. The liver was assessed for adequacy of perfusion, which was satisfactory. The gallbladder was then removed from the allograft liver, and a temporary packing period was carried out to help assure hemostatis.   Attention was next directed toward the bile duct. The donor bile duct was prepared and assessed for adequate vascular supply. Biliary reconstruction was then performed by anastomosing the recipient common hepatic duct to the donor duct using 6-0 PDS sutures. There was a slight mismatch which required a Cheatle slit on the recipient duct.  The biliary stent used was: none.  A final check for hemostasis was made. 2 19f Patrick drains were placed through separate incisions below the transverse abdominal incision and placed in the usual positions. The cavity was irrigated with saline. The abdomen was closed in layers using running #1 PDS suture. The incision was irrigated and the skin was closed with staples. At the end of the case all instrument, needle, and sponge counts were correct. The patient was taken to the ICU in good condition.     Fluids Administered:   Crystalloid (mL) 6,000   Colloid (mL) 1,500   RBC (Units) 1   FFP (Units) 6   Cryoprecipitate (Units)  1   Platelets (Units) 2   Cell Saver (CCs) 3,066      Specimens: Explant liver  Drains: 19f Patrick drains x 2    Additional Findings:    Estimated Blood Loss: 10,000 mL  Ascites Evacuated: 100 mL    Ischemic Times:  Time of portal reperfusion: 8/10/2023  1:07 AM  Time of arterial reperfusion: 8/10/2023  1:35 AM  Anastomosis (warm ischemia) time: 26 minutes  Cold ischemia time: 215 minutes    Surgical Data:  Graft type (whole vs. partial): whole liver  IVC reconstruction: end to end ivc  Portal vein status: patent  Portal graft performed? no  Donor arterial anatomy: standard  Donor arterial inflow: sa branch patch  Recipient arterial anatomy: standard  Recipient arterial inflow:  proper hepatic artery  Arterial graft performed? no  Biliary reconstruction: end to end (donor common bile duct to recipient common hepatic duct)  Biliary stent: none  Disposition of Vessels from donor of transplanted organ: sent to blood bank  Damage during procurement: no  Procurement damage comments:     Donor Factors:  UNOS ID: )[id number]  UNOS Match Run: [id number]  Donor type: donation after circulatory death   Donor with reported PHS risk criteria: no  Donor CMV serologic status: Positive  Donor with known cancer: no  Donor HCV serologic status: Negative  Donor HBcAb: Negative  Donor HBV SURENDRA:   Donor HCV SURENDRA: Negative  Liver weight: 1568 grams

## 2023-08-10 NOTE — RESPIRATORY THERAPY
Patient received from OR to [facility] [id number]. Patient intubated with #7.5/24 @ the teeth. Placed on documented vent settings. Ambu bag @ bedside. Will continue to monitor.

## 2023-08-10 NOTE — OP NOTE
Certification of Assistant at Surgery       Surgery Date: 8/9/2023     Participating Surgeons:  Surgeon(s) and Role:     * Ameya Suero MD - Primary     * Jed Salgado MD - Assisting     * Lizzy Ramesh MD - Fellow     * Shreyas Larose MD - Fellow    Procedures:  Procedure(s) (LRB):  TRANSPLANT, LIVER (N/A)    Assistant Surgeon's Certification of Necessity:  I understand that section 1842 (b) (6) (d) of the Social Security Act generally prohibits Medicare Part B reasonable charge payment for the services of assistants at surgery in teaching hospitals when qualified residents are available to furnish such services. I certify that the services for which payment is claimed were medically necessary, and that no qualified resident was available to perform the services. I further understand that these services are subject to post-payment review by the Medicare carrier.      Jed Salgado MD    08/10/2023  1:46 AM

## 2023-08-10 NOTE — SUBJECTIVE & OBJECTIVE
Follow-up For: Procedure(s) (LRB):  TRANSPLANT, LIVER (N/A)    Post-Operative Day: 1 Day Post-Op     Past Medical History:   Diagnosis Date    Alcoholic cirrhosis     CVA (cerebral vascular accident)     DM (diabetes mellitus)     Dyslipidemia     Hepatocellular carcinoma     HTN (hypertension)     Intracranial hemorrhage     Skin cancer        Past Surgical History:   Procedure Laterality Date    HERNIA REPAIR N/A        Review of patient's allergies indicates:  No Known Allergies    Family History    None       Tobacco Use    Smoking status: Never    Smokeless tobacco: Never   Substance and Sexual Activity    Alcohol use: Not Currently    Drug use: Never    Sexual activity: Not Currently     Partners: Female      Review of Systems   Reason unable to perform ROS: intubated.     Objective:     Vital Signs (Most Recent):  Temp: 96.1 °F (35.6 °C) (08/09/23 1949)  Pulse: 83 (08/10/23 0441)  Resp: 18 (08/10/23 0441)  BP: 128/81 (08/10/23 0441)  SpO2: 100 % (08/10/23 0441) Vital Signs (24h Range):  Temp:  [96.1 °F (35.6 °C)-98.4 °F (36.9 °C)] 96.1 °F (35.6 °C)  Pulse:  [70-83] 83  Resp:  [16-18] 18  SpO2:  [95 %-100 %] 100 %  BP: (128-130)/(70-81) 128/81     Weight: 97.8 kg (215 lb 11.5 oz)  Body mass index is 30.95 kg/m².      Intake/Output Summary (Last 24 hours) at 8/10/2023 0537  Last data filed at 8/10/2023 0442  Gross per 24 hour   Intake 93710 ml   Output 3740 ml   Net 7435 ml          Physical Exam  Constitutional:       General: He is not in acute distress.     Appearance: He is not ill-appearing.   HENT:      Head: Normocephalic.   Neck:      Comments: RIJ trialysis line in place   Cardiovascular:      Rate and Rhythm: Normal rate and regular rhythm.      Pulses: Normal pulses.      Comments: L radial a line, R femoral a line  Pulmonary:      Comments: Intubated on vent  Abdominal:      General: Abdomen is flat. There is no distension.      Palpations: Abdomen is soft.      Comments: 2 x drains. Dressings in  place. Medial drain with serous drainage and lateral with serosanguinous output   Genitourinary:     Comments: Marroquin in place  Neurological:      Comments: Sedated            Vents:  Vent Mode: A/C (08/10/23 0441)  Set Rate: 18 BPM (08/10/23 0441)  Vt Set: 500 mL (08/10/23 0441)  PEEP/CPAP: 5 cmH20 (08/10/23 0441)  Oxygen Concentration (%): 100 (08/10/23 0441)  Peak Airway Pressure: 22 cmH20 (08/10/23 0441)  Plateau Pressure: 0 cmH20 (08/10/23 0441)  Total Ve: 9.71 L/m (08/10/23 0441)  Negative Inspiratory Force (cm H2O): 0 (08/10/23 0441)  F/VT Ratio<105 (RSBI): (!) 34.22 (08/10/23 0441)    Lines/Drains/Airways       Central Venous Catheter Line  Duration              Introducer with Double Lumen 08/10/23 0457 Internal Jugular Right <1 day    Pulmonary Artery Catheter Assessment  08/09/23 2350 <1 day    Trialysis (Dialysis) Catheter 08/10/23 0456 right internal jugular <1 day              Drain  Duration                  Closed/Suction Drain 08/10/23 0333 Right Abdomen Bulb 19 Fr. <1 day         Closed/Suction Drain 08/10/23 0334 Lateral;Right Abdomen Bulb 19 Fr. <1 day         NG/OG Tube 08/10/23 0458 Center mouth <1 day         Urethral Catheter 08/09/23 2244 Non-latex;Straight-tip 16 Fr. <1 day              Airway  Duration                  Airway - Non-Surgical 08/09/23 2203 <1 day              Arterial Line  Duration             Arterial Line 08/09/23 2020 Left Radial <1 day    Arterial Line 08/09/23 2254 Right Femoral <1 day              Peripheral Intravenous Line  Duration                  Peripheral IV - Single Lumen 08/09/23 1800 20 G Anterior;Right Forearm <1 day         Peripheral IV - Single Lumen 08/10/23 0454 16 G Anterior;Left;Proximal Forearm <1 day                    Significant Labs:    CBC/Anemia Profile:  Recent Labs   Lab 08/09/23  1750 08/09/23  2230 08/10/23  0146 08/10/23  0256   WBC 4.11  --   --   --    HGB 13.4* 12.0* 11.0* 11.3*   HCT 40.1 35.2* 33.2* 32.0*   PLT 87* 83* 76* 95*   MCV  96  --   --   --    RDW 14.6*  --   --   --         Chemistries:  Recent Labs   Lab 08/09/23  1750 08/09/23  2230 08/10/23  0146 08/10/23  0256    140 136 138   K 3.6 3.2* 3.2* 3.1*     --   --   --    CO2 24  --   --   --    BUN 10  --   --   --    CREATININE 0.8  --   --   --    CALCIUM 8.1*  --   --   --    ALBUMIN 2.3* 2.0* 2.2* 2.6*   PROT 7.5  --   --   --    BILITOT 6.7*  --   --   --    ALKPHOS 179*  --   --   --    ALT 74*  --  170*  --    *  --  1,072*  --    MG  --  1.9 1.8 1.7       ABGs:   Recent Labs   Lab 08/10/23  0451   PH 7.379   PCO2 44.5   HCO3 26.3   POCSATURATED 75*   BE 1     CMP:   Recent Labs   Lab 08/09/23  1750 08/09/23  2230 08/10/23  0146 08/10/23  0256 08/10/23  0445      < > 136 138 140   K 3.6   < > 3.2* 3.1* 2.8*     --   --   --  106   CO2 24  --   --   --  20*   *   < > 249* 263* 256*   BUN 10  --   --   --  12   CREATININE 0.8  --   --   --  0.7   CALCIUM 8.1*  --   --   --  7.9*   PROT 7.5  --   --   --  5.2*   ALBUMIN 2.3*   < > 2.2* 2.6* 2.5*   BILITOT 6.7*  --   --   --  5.3*   ALKPHOS 179*  --   --   --  209*   *  --  1,072*  --  2,174*   ALT 74*  --  170*  --  249*   ANIONGAP 11  --   --   --  14    < > = values in this interval not displayed.     POCT Glucose:   Recent Labs   Lab 08/09/23  1815 08/10/23  0444 08/10/23  0601   POCTGLUCOSE 207* 241* 247*       Significant Imaging: I have reviewed all pertinent imaging results/findings within the past 24 hours.  CXR: I have reviewed all pertinent results/findings within the past 24 hours and my personal findings are:  pending final read of CXR and KUB

## 2023-08-10 NOTE — H&P
[facility]  Critical Care - Surgery  History & Physical    Patient Name: Jed Chávez  MRN: [id number]  Admission Date: 8/9/2023  Code Status: Full Code  Attending Physician: Ameya Suero MD   Primary Care Provider: Alee Pink MD   Principal Problem: End stage liver disease    Subjective:     HPI:  Mr. Chávez is a 64 y/o male with ESLD 2/2 HCC and alcoholic cirrhosis MELD 24, T2DM, and HTN c/b hypertensive CVA (2015 with no residual deficits) who is admitted to ICU for postop monitoring after DCD liver transplant with Dr. Salgado and Pio 8/10/23. Per chart review, preop reported feeling in his usual state of health. Denies recent hospitalizations or illnesses.  Preop, he received IV steroids pulse induction. No focal deficits noted on preop exam.      Intraop, he received 1 unit RBC, 6 FFP, 2 PLT, 1 cryo, 1500 ml albumin, 3L cellsaver, and 6L crystalloid. EBL10 L. UOP 3 L. 2 drains in place. He arrives on 0.03 vaso and 0.03 levo. He is also on an insulin drip. Cold ischemia time 215 minutes. Warm ischemia time 26 minutes. Donor and recipient both with atherosclerosis.      Hospital/ICU Course:  No notes on file    Follow-up For: Procedure(s) (LRB):  TRANSPLANT, LIVER (N/A)    Post-Operative Day: 1 Day Post-Op     Past Medical History:   Diagnosis Date    Alcoholic cirrhosis     CVA (cerebral vascular accident)     DM (diabetes mellitus)     Dyslipidemia     Hepatocellular carcinoma     HTN (hypertension)     Intracranial hemorrhage     Skin cancer        Past Surgical History:   Procedure Laterality Date    HERNIA REPAIR N/A        Review of patient's allergies indicates:  No Known Allergies    Family History    None       Tobacco Use    Smoking status: Never    Smokeless tobacco: Never   Substance and Sexual Activity    Alcohol use: Not Currently    Drug use: Never    Sexual activity: Not Currently     Partners: Female      Review of Systems   Reason unable to  perform ROS: intubated.     Objective:     Vital Signs (Most Recent):  Temp: 96.1 °F (35.6 °C) (08/09/23 1949)  Pulse: 83 (08/10/23 0441)  Resp: 18 (08/10/23 0441)  BP: 128/81 (08/10/23 0441)  SpO2: 100 % (08/10/23 0441) Vital Signs (24h Range):  Temp:  [96.1 °F (35.6 °C)-98.4 °F (36.9 °C)] 96.1 °F (35.6 °C)  Pulse:  [70-83] 83  Resp:  [16-18] 18  SpO2:  [95 %-100 %] 100 %  BP: (128-130)/(70-81) 128/81     Weight: 97.8 kg (215 lb 11.5 oz)  Body mass index is 30.95 kg/m².      Intake/Output Summary (Last 24 hours) at 8/10/2023 0537  Last data filed at 8/10/2023 0442  Gross per 24 hour   Intake 03303 ml   Output 3740 ml   Net 7435 ml          Physical Exam  Constitutional:       General: He is not in acute distress.     Appearance: He is not ill-appearing.   HENT:      Head: Normocephalic.   Neck:      Comments: RIJ trialysis line in place   Cardiovascular:      Rate and Rhythm: Normal rate and regular rhythm.      Pulses: Normal pulses.      Comments: L radial a line, R femoral a line  Pulmonary:      Comments: Intubated on vent  Abdominal:      General: Abdomen is flat. There is no distension.      Palpations: Abdomen is soft.      Comments: 2 x drains. Dressings in place. Medial drain with serous drainage and lateral with serosanguinous output   Genitourinary:     Comments: Marroquin in place  Neurological:      Comments: Sedated            Vents:  Vent Mode: A/C (08/10/23 0441)  Set Rate: 18 BPM (08/10/23 0441)  Vt Set: 500 mL (08/10/23 0441)  PEEP/CPAP: 5 cmH20 (08/10/23 0441)  Oxygen Concentration (%): 100 (08/10/23 0441)  Peak Airway Pressure: 22 cmH20 (08/10/23 0441)  Plateau Pressure: 0 cmH20 (08/10/23 0441)  Total Ve: 9.71 L/m (08/10/23 0441)  Negative Inspiratory Force (cm H2O): 0 (08/10/23 0441)  F/VT Ratio<105 (RSBI): (!) 34.22 (08/10/23 0441)    Lines/Drains/Airways       Central Venous Catheter Line  Duration              Introducer with Double Lumen 08/10/23 0457 Internal Jugular Right <1 day    Pulmonary  Artery Catheter Assessment  08/09/23 2350 <1 day    Trialysis (Dialysis) Catheter 08/10/23 0456 right internal jugular <1 day              Drain  Duration                  Closed/Suction Drain 08/10/23 0333 Right Abdomen Bulb 19 Fr. <1 day         Closed/Suction Drain 08/10/23 0334 Lateral;Right Abdomen Bulb 19 Fr. <1 day         NG/OG Tube 08/10/23 0458 Center mouth <1 day         Urethral Catheter 08/09/23 2244 Non-latex;Straight-tip 16 Fr. <1 day              Airway  Duration                  Airway - Non-Surgical 08/09/23 2203 <1 day              Arterial Line  Duration             Arterial Line 08/09/23 2020 Left Radial <1 day    Arterial Line 08/09/23 2254 Right Femoral <1 day              Peripheral Intravenous Line  Duration                  Peripheral IV - Single Lumen 08/09/23 1800 20 G Anterior;Right Forearm <1 day         Peripheral IV - Single Lumen 08/10/23 0454 16 G Anterior;Left;Proximal Forearm <1 day                    Significant Labs:    CBC/Anemia Profile:  Recent Labs   Lab 08/09/23  1750 08/09/23  2230 08/10/23  0146 08/10/23  0256   WBC 4.11  --   --   --    HGB 13.4* 12.0* 11.0* 11.3*   HCT 40.1 35.2* 33.2* 32.0*   PLT 87* 83* 76* 95*   MCV 96  --   --   --    RDW 14.6*  --   --   --         Chemistries:  Recent Labs   Lab 08/09/23  1750 08/09/23  2230 08/10/23  0146 08/10/23  0256    140 136 138   K 3.6 3.2* 3.2* 3.1*     --   --   --    CO2 24  --   --   --    BUN 10  --   --   --    CREATININE 0.8  --   --   --    CALCIUM 8.1*  --   --   --    ALBUMIN 2.3* 2.0* 2.2* 2.6*   PROT 7.5  --   --   --    BILITOT 6.7*  --   --   --    ALKPHOS 179*  --   --   --    ALT 74*  --  170*  --    *  --  1,072*  --    MG  --  1.9 1.8 1.7       ABGs:   Recent Labs   Lab 08/10/23  0451   PH 7.379   PCO2 44.5   HCO3 26.3   POCSATURATED 75*   BE 1     CMP:   Recent Labs   Lab 08/09/23  1750 08/09/23  2230 08/10/23  0146 08/10/23  0256 08/10/23  0445      < > 136 138 140   K 3.6   < >  3.2* 3.1* 2.8*     --   --   --  106   CO2 24  --   --   --  20*   *   < > 249* 263* 256*   BUN 10  --   --   --  12   CREATININE 0.8  --   --   --  0.7   CALCIUM 8.1*  --   --   --  7.9*   PROT 7.5  --   --   --  5.2*   ALBUMIN 2.3*   < > 2.2* 2.6* 2.5*   BILITOT 6.7*  --   --   --  5.3*   ALKPHOS 179*  --   --   --  209*   *  --  1,072*  --  2,174*   ALT 74*  --  170*  --  249*   ANIONGAP 11  --   --   --  14    < > = values in this interval not displayed.     POCT Glucose:   Recent Labs   Lab 23  1815 08/10/23  0444 08/10/23  0601   POCTGLUCOSE 207* 241* 247*       Significant Imaging: I have reviewed all pertinent imaging results/findings within the past 24 hours.  CXR: I have reviewed all pertinent results/findings within the past 24 hours and my personal findings are:  pending final read of CXR and KUB    Assessment/Plan:     GI  * End stage liver disease  Mr. Chávez is a 64 y/o male with ESLD 2/2 HCC and alcoholic cirrhosis MELD 24, T2DM, and HTN c/b hypertensive CVA ( with no residual deficits) who is admitted to ICU for postop monitoring after DCD liver transplant with Dr. Salgado and Pio 8/10/23. Per chart review, preop reported feeling in his usual state of health. Denies recent hospitalizations or illnesses.  Preop, he received IV steroids pulse induction.No focal deficits noted on preop exam.    Neuro:  - Propofol for sedation  - Fentanyl prn  - Hx of CVA with no focal deficits. Will monitor neuro status closely  - GCS 10T. Follows commands in all extremities per nursing    CV:  - MAP goal > 65  - Drips: now off  - [place] Marc in place. CVP 8-10. CI 3. PA /13    Pulm:  - Intubated with mechanical vent  Vent Mode: A/C  Oxygen Concentration (%):  [] 50  Resp Rate Total:  [18 br/min-20 br/min] 18 br/min  Vt Set:  [500 mL] 500 mL  PEEP/CPAP:  [5 cmH20] 5 cmH20  Mean Airway Pressure:  [9.1 cmH20] 9.1 cmH20  - Post op AB.4/43.5/317/27  - ABG prn  - Will attempt  extubation later this afternoon  - CXR showed ETT in good position       FEN/GI:  S/p DCD liver transplant for ESLD 2/2 HCC on 8/10. Cold ischemia time 215 minutes. Warm ischemia time 26 minutes. Donor and recipient both with atherosclerosis. MELD 24  - NPO  - Trend BMP q12hr, AST q4hr  - Lyte replacement prn  - OGT LIWS. [place] near EG junction on KUB  - Drains: 2x abdominal drains in place. 1 lateral above liver with 100mL serosanguinous output. 1 medial drain with 85 mL serous output.  - AM Liver Transplant US pending due to elevated AST/ALT 2174/249 (1072/170). LDH 2832  - D5-1/2NS at 100  - famotidine BID    Renal:  - Ulrich in place  - Monitor UOP. UOP 3L in OR  - Trend BUN/Cr. 12/0.7    Heme/Onc:  - Received a total of 1u pRBC, 6u FFP, 2u Plts, 1u Cryo and 3L cell saver today  - Trend CBC q4hr, INR q4hr  - Immuno: Methylprednisolone taper, Prograf, Mycophenolate  - Hx of HCC s/p Y90 5/2023  - heparin for DVT ppx    ID:  - AF  - Trend WBC with CBC q4hr  - Abx/Antifungals/Antiviral: Unasyn to be completed by tomorrow, Bactrim/Valganciclovir to start 8/17. Nystatin to start once extubated    Endo:  - Insulin gtt in place. Will monitor closely and consult endocrinology  - Glucose 207-251    PPx:  - Famotidine, TEDs/SCDs    Dispo: continue ICU care, AM Liver US  Lines: 2x abdominal drain, ETT, ulrich, OG, RIJ trialysis and introducer, L radial a line, R femoral a line      Critical care was time spent personally by me on the following activities: development of treatment plan with patient or surrogate and bedside caregivers, discussions with consultants, evaluation of patient's response to treatment, examination of patient, ordering and performing treatments and interventions, ordering and review of laboratory studies, ordering and review of radiographic studies, pulse oximetry, re-evaluation of patient's condition.  This critical care time did not overlap with that of any other provider or involve time for any  procedures.     Irina Hernandez MD  Critical Care - Surgery  [facility]

## 2023-08-10 NOTE — PLAN OF CARE
Recommendations     1.) Recommend if and when medically feasible, to ADAT, with goal of Low Na diet, fluid per MD, texture per SLP.      2.) If PO intake <50%, recommend Boost Glucose Control to optimize PRO/Kcal intake.      3.) If pt is to be NPO >48hrs, recommend initiating TF of Impact Peptide 1.5 at trickle rate and advance slowly to goal of 50ml/hr to provide 1800kcal, 113g PRO, and 924ml FF.      - monitor for s/s of intolerance such as residuals >500nl, n/v/d, or abdominal distension.      4.) RD to monitor wt, nutritional status, skin, labs.        Goals: to meet % of EEN/EPN by next RD f/u  Nutrition Goal Status: new  Communication of RD Recs:  (POC)

## 2023-08-10 NOTE — NURSING
Pt remains intubated/sedated s/p liver tx. Tmax 100.0. HR 70-80s. -160. Levo and Vaso titrated off. SpO2>90% on AC/VC 50% fiO2/ 5 PEEP.  Pt following commands and moving all extremities. CVP 8. SvO2 75%. On propofol and insulin gtts.     Marroquin in place with 900cc UOP.   MILANA x2. #1 w/ 100cc of sanguinous drainage. #2 w/ 85cc serous drainage.     Electrolytes replaced per orders.     Skin: Bed plugged in and working properly. Waffle inflated. Foams, heel boots, and SCDs in place. Turn q2h.

## 2023-08-10 NOTE — PROGRESS NOTES
Autotransfusion/Rapid Infusion Record:      08/10/2023  Autotransfusionist:  Carlos Raza    Surgeon(s) and Role:     * Ameya Suero MD - Primary     * Jed Salgado MD - Assisting     * Lizzy Ramesh MD - Fellow     * Shreyas Larose MD - Fellow  Anesthesiologist:  Kimo Vance MD    Past Medical History:   Diagnosis Date    Alcoholic cirrhosis     CVA (cerebral vascular accident)     DM (diabetes mellitus)     Dyslipidemia     Hepatocellular carcinoma     HTN (hypertension)     Intracranial hemorrhage     Skin cancer        Procedure(s) (LRB):  TRANSPLANT, LIVER (N/A)     3:05 AM    Equipment:    Cell Saver     R.I.S.  : [employer] Model: CATSmart or CATSplus : [place]   Model: [id number]     Serial number: [id number]   Serial number:    Disposable lot #: [id number]   Disposable lot #:      Were extra cardiotomies used for cell saver?  YES   if yes, #:  01    Solutions:  Anticoagulant: ACD-A   Expiration date: 02/2024 Volume used: 2500ml   Wash solution: 0.9% NaCl   Expiration date: 05/2025 Volume used: 5174ml     Cell saver checklist  Time completed:           [x]   Circuit assembled correctly     [x]   Cell saver powered and operational     [x]   Vacuum connected, functional, adjust to max -150mmHg     [x]   Anticoagulant drip rate adjusted     [x]   Transfer bag properly labeled with patient name, expiration time, volume,       anticoagulant, OR number, and initials     [x]   Cell saver disinfected after use (completed at end of case)       Cell Saver volumes:    Total volume processed:     9380 mL     Total volume pRBCs recovered     9198 mL     Volume pRBCs infused     9198 mL         RIS checklist   Time completed:  []   RIS circuit assembled correctly     []   RIS power and operational     []   RIS disinfected after use (completed at end of case)       RIS volumes:    Total volume infused:    (see anesthesia record for blood       product information)    mL       Additional  comments:

## 2023-08-10 NOTE — CONSULTS
[facility]  Adult Nutrition  Consult Note    SUMMARY     Recommendations    1.) Recommend if and when medically feasible, to ADAT, with goal of Low Na diet, fluid per MD, texture per SLP.     2.) If PO intake <50%, recommend Boost Glucose Control to optimize PRO/Kcal intake.     3.) If pt is to be NPO >48hrs, recommend initiating TF of Impact Peptide 1.5 at trickle rate and advance slowly to goal of 50ml/hr to provide 1800kcal, 113g PRO, and 924ml FF.     - monitor for s/s of intolerance such as residuals >500nl, n/v/d, or abdominal distension.     4.) RD to monitor wt, nutritional status, skin, labs.      Goals: to meet % of EEN/EPN by next RD f/u  Nutrition Goal Status: new  Communication of RD Recs:  (POC)    Assessment and Plan    Nutrition Problem  Increased PRO/energy needs    Related to (etiology):   Increased physiological needs    Signs and Symptoms (as evidenced by):   S/p liver transplant     Interventions/Recommendations (treatment strategy):  Collaboration of nutritional care with other providers.  Low Na and food Safety edu provided on 8/10.    Nutrition Diagnosis Status:   New    Reason for Assessment    Reason For Assessment: consult  Diagnosis: liver disease (End stage liver disease; s/p liver tx on 8/9)  Relevant Medical History: DM2, HTN, HCC w/ alcoholic cirrhosis  Interdisciplinary Rounds: did not attend  General Information Comments: Pt was seen by RD. Pt was asleep, coming off sedation. Pt is currently NPO. Caregivers were present in the room. Pt is s/p liver tx 8/10. No edema was documented. NFPE not appropriate at this time d/t pt lack of consent. RD went over post transplant diet edu with caregivers that were present. RD to continue to f/u.  Nutrition Discharge Planning: Post transplant nutrition education provided on 8/10. Food safety/drug interactions emphasized. General healthy/low salt diet recommended. Education material left.  No other needs identified.  "Caregiver present.    Nutrition Risk Screen    Nutrition Risk Screen: no indicators present    Anthropometrics    Temp: 99.3 °F (37.4 °C)  Height: 5' 10" (177.8 cm)  Height (inches): 70 in  Weight Method: Standard Scale  Weight: 97.8 kg (215 lb 11.5 oz)  Weight (lb): 215.72 lb  Ideal Body Weight (IBW), Male: 166 lb  % Ideal Body Weight, Male (lb): 129.95 %  BMI (Calculated): 31  BMI Grade: 30 - 34.9- obesity - grade I     Lab/Procedures/Meds    Pertinent Labs Reviewed: reviewed  Pertinent Labs Comments: K: 2.8, gluc: 256, Ca: 7/9. phos: 2.5, alk phos: 209, PRO: 5.2, alb: 2.5, tbili: 5.3, AST: 2174, ALT: 249  Pertinent Medications Reviewed: reviewed  Pertinent Medications Comments: abx, famotidine, heparin, methylprednisolone, mycophenolate mofetil, VitK, tacrolimus, propofol, insulin, D5    Estimated/Assessed Needs    Weight Used For Calorie Calculations: 97.8 kg (215 lb 9.8 oz)  Energy Calorie Requirements (kcal): 1784 kcal  Energy Need Method: Robert-St Jeor (MSJ x 1.0- d/t obesity)  Protein Requirements: 113- 150g (1.5-2.0g/kg of IBW)  Weight Used For Protein Calculations: 75 kg (165 lb 5.5 oz) (IBW d/t obesity)  Fluid Requirements (mL): 1ml/1kcal or per MD  Estimated Fluid Requirement Method: RDA Method  RDA Method (mL): 1784  CHO Requirement: 223g    Nutrition Prescription Ordered    Current Diet Order: NPO    Evaluation of Received Nutrient/Fluid Intake    I/O: +550ml since admit  Energy Calories Required: not meeting needs  Protein Required: not meeting needs  Fluid Required:  (as per MD)  Comments: LBM 8/9  % Intake of Estimated Energy Needs: 0 - 25 %  % Meal Intake: NPO    Nutrition Risk    Level of Risk/Frequency of Follow-up:  (RD to f/u x 1-2/week)     Monitor and Evaluation    Food and Nutrient Intake: energy intake  Food and Nutrient Adminstration: diet order  Knowledge/Beliefs/Attitudes: food and nutrition knowledge/skill, beliefs and attitudes  Physical Activity and Function: nutrition-related ADLs " and IADLs  Anthropometric Measurements: weight, weight change, body mass index  Biochemical Data, Medical Tests and Procedures: electrolyte and renal panel, gastrointestinal profile, glucose/endocrine profile, inflammatory profile, lipid profile  Nutrition-Focused Physical Findings: overall appearance, skin     Nutrition Follow-Up    RD Follow-up?: Yes

## 2023-08-10 NOTE — OP NOTE
Operative Report    Date of Procedure: 8/9/2023    Surgeon: Jed Salgado MD  First Assistant: Lizzy Ramesh MD    Pre-operative Diagnosis: Allograft liver for transplantation  Post-operative Diagnosis: Same    Procedure(s) Performed:   Back Table Preparation of Liver with needle biopsy and vascular reconstruction of replaced right hepatic artery.    Anesthesia: Not applicable  Estimated Blood Loss: Not applicable  Fluids Administered: Not applicable    Findings: Estimated steatosis 0-10%, normal vascular anatomy.  Drains: Not applicable  Specimens: Core biopsy of allograft liver      Preamble  Indications: This report describes only the backbench preparation of the liver prior to transplantation.  The transplant operation itself is described in a separate report.    ABO Confirmation: Immediately following arrival of the donor organ and prior to implantation, a formal ABO confirmation was done according to hospital and [facility] policies.  I confirmed the [facility] ID number of the donor organ and the donor and recipient ABO types, directly verifying these data by comparison with the [facility] report.  This confirmation was personally done by an attending surgeon and circulating nurse, and is officially documented elsewhere.    Time-Out: A complete time out was carried out prior to the procedure, with confirmation of patient identity, correct procedure, correct operative site, appropriate antibiotic prophylaxis, review of any known allergies, and presence of all needed equipment.    Procedure in Detail  The liver was recovered from the transport cooler and inspected for vascular anatomy and overall suitability for transplantation, with findings as noted above.  The remnant of the diaphragm was dissected away.  The phrenic veins and adrenal vein were identified and ligated or sutured as appropriate.  The portal vein and hepatic artery were mobilized toward the hilum of the liver, and extrahepatic branches were ligated.  No  vascular reconstruction was required.  The vessels were tested for leaks.  A needle biopsy was obtained for permanent section histology using a spring-loaded biopsy device. The liver was kept in ice temperature organ preservation solution until the time of implantation.

## 2023-08-10 NOTE — NURSING
Pt transported to SICU 23764 with portable telemetry. Pt connected to ICU monitor and Ventilator. MD, charge RN, and RRT called to bedside for patient arrival. Report received from Anesthesia. New orders received and implemented. Pt assessed, immediate needs met.

## 2023-08-10 NOTE — CONSULTS
[facility]  Endocrinology  Diabetes Consult Note    Consult Requested by: Ameya Suero MD   Reason for admit: End stage liver disease    HISTORY OF PRESENT ILLNESS:  Reason for Consult: Management of T2DM, Hyperglycemia     Surgical Procedure and Date: S/p liver txp    Diabetes diagnosis year: >7 years    Home Diabetes Medications:  Glimepiride 4 mg, Januvia 100 mg.    How often checking glucose at home?  ALEXANDER    BG readings on regimen: ALEXANDER  Hypoglycemia on the regimen?  No  Missed doses on regimen?  No    Diabetes Complications include:     Hyperglycemia    Complicating diabetes co morbidities:   CIRRHOSIS and Glucocorticoid use       HPI:   Patient is a 63 y.o. 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 Salgado and Pio 8/10/23. Endocrine consulted for bg management.        Interval HPI:   Overnight events: No acute events overnight. Patient in room 97971/36451 A. Blood glucose worsening. BG above goal on current insulin regimen (IIP). Steroid use- None .   1 Day Post-Op  Renal function- Normal   Vasopressors-  None     Diet NPO Except for: Sips with Medication     Eating:   NPO  Nausea: No  Hypoglycemia and intervention: No  Fever: No  TPN and/or TF: No  PMH, PSH, FH, SH updated and reviewed     ROS:    Review of Systems   Unable to perform ROS: Intubated       Current Medications and/or Treatments Impacting Glycemic Control  Immunotherapy:    Immunosuppressants           Stop Route Frequency     mycophenolate mofetil 200 mg/mL suspension 1,000 mg         -- Oral 2 times daily     tacrolimus (PROGRAF) 1 mg/mL oral syringe         -- Oral 2 times daily          Steroids:   Hormones (From admission, onward)      Start     Stop Route Frequency Ordered    08/16/23 0900  predniSONE tablet 20 mg  (methylprednisolone taper panel)        See Hyperspace for full Linked Orders Report.     -- Oral Daily 08/10/23 0436    08/15/23 0900  methylPREDNISolone sodium succinate injection 40 mg  (methylprednisolone taper panel)        See Hyperspace for full Linked Orders Report.    08/16/23 0859 IV Daily 08/10/23 0436    08/14/23 0900  methylPREDNISolone sodium succinate injection 80 mg  (methylprednisolone taper panel)        See Hyperspace for full Linked Orders Report.    08/15/23 0859 IV Daily 08/10/23 0436    08/13/23 0900  methylPREDNISolone sodium succinate injection 120 mg  (methylprednisolone taper panel)        See Hyperspace for full Linked Orders Report.    08/14/23 0859 IV Daily 08/10/23 0436    08/12/23 0900  methylPREDNISolone sodium succinate injection 160 mg  (methylprednisolone taper panel)        See Hyperspace for full Linked Orders Report.    08/13/23 0859 IV Daily 08/10/23 0436    08/11/23 0900  methylPREDNISolone sodium succinate injection 200 mg  (methylprednisolone taper panel)        See Hyperspace for full Linked Orders Report.    08/12/23 0859 IV Daily 08/10/23 0436    08/09/23 1709  methylPREDNISolone sodium succinate injection 500 mg  (Med - Immunosuppression Induction Therapy (Methylprednisolone))         -- IV On Call Procedure 08/09/23 1709          Pressors:    Autonomic Drugs (From admission, onward)      None          Hyperglycemia/Diabetes Medications:   Antihyperglycemics (From admission, onward)      Start     Stop Route Frequency Ordered    08/10/23 0545  insulin regular in 0.9 % NaCl 100 unit/100 mL (1 unit/mL) infusion        Question Answer Comment   Insulin Rate Adjustment (DO NOT MODIFY ANSWER) [email or web address]    Enter initial dose from Infusion Protocol Chart (Units/hr): 3        -- IV Continuous 08/10/23 0436             PHYSICAL EXAMINATION:  Vitals:    08/10/23 0912   BP:    Pulse: 71   Resp: 18   Temp:      Body mass index is 30.95 kg/m².     Physical Exam  Constitutional:       Appearance: He is not  "ill-appearing.   HENT:      Head: Normocephalic and atraumatic.   Pulmonary:      Comments: Intubated  Abdominal:      General: There is no distension.   Musculoskeletal:         General: No swelling.      Right lower leg: No edema.   Neurological:      Comments: Sedated              Labs Reviewed and Include   Recent Labs   Lab 08/10/23  0445 08/10/23  0909   * 283*   CALCIUM 7.9* 7.7*   ALBUMIN 2.5*  --    PROT 5.2*  --     141   K 2.8* 3.4*   CO2 20* 22*    109   BUN 12 12   CREATININE 0.7 0.7   ALKPHOS 209*  --    *  --    AST 2,174* 2,371*   BILITOT 5.3*  --      Lab Results   Component Value Date    WBC 11.18 08/10/2023    HGB 12.7 (L) 08/10/2023    HCT 36.6 (L) 08/10/2023    MCV 92 08/10/2023    PLT 91 (L) 08/10/2023     No results for input(s): "TSH", "FREET4" in the last 168 hours.  Lab Results   Component Value Date    HGBA1C 7.5 (H) 04/20/2023       Nutritional status:   Body mass index is 30.95 kg/m².  Lab Results   Component Value Date    ALBUMIN 2.5 (L) 08/10/2023    ALBUMIN 2.6 (L) 08/10/2023    ALBUMIN 2.2 (L) 08/10/2023     No results found for: "PREALBUMIN"    Estimated Creatinine Clearance: 128.5 mL/min (based on SCr of 0.7 mg/dL).    Accu-Checks  Recent Labs     08/09/23  1815 08/10/23  0444 08/10/23  0601 08/10/23  0716 08/10/23  0807 08/10/23  0910   POCTGLUCOSE 207* 241* 247* 251* 247* 261*        ASSESSMENT and PLAN    Endocrine  Type 2 diabetes mellitus, without long-term current use of insulin  BG goal: 140-180   T2dm.  S/P liver transplant.  BG above goal on high drip rates.  Will continue to monitor.    - -Continue Intensive insulin drip    - POCT Glucose every hour  - Hypoglycemia protocol in place      ** Please notify Endocrine for any change and/or advance in diet**  ** Please call Endocrine for any BG related issues **     Discharge Planning:   TBD. Please notify endocrinology prior to discharge.          GI  * End stage liver disease    Managed per primary " team  Optimize bg control      Other  Adverse effect of adrenal cortical steroids  Steroids may increase BG          Plan discussed with patient, family, and RN at bedside.     Carson Jesus PA-C  Endocrinology  [facility]

## 2023-08-10 NOTE — ANESTHESIA PROCEDURE NOTES
Arterial    Diagnosis: ESLD    Patient location during procedure: done in OR  Procedure start time: 8/9/2023 8:20 PM  Timeout: 8/9/2023 8:20 PM  Procedure end time: 8/9/2023 8:27 PM    Staffing  Authorizing Provider: Kimo Vance MD  Performing Provider: Marsha Samuel CRNA    Staffing  Performed by: Marsha Samuel CRNA  Authorized by: Kimo Vance MD    Anesthesiologist was present at the time of the procedure.    Preanesthetic Checklist  Completed: patient identified, IV checked, site marked, risks and benefits discussed, surgical consent, monitors and equipment checked, pre-op evaluation, timeout performed and anesthesia consent givenArterial  Skin Prep: chlorhexidine gluconate  Local Infiltration: none  Orientation: left  Location: radial    Catheter Size: 20 G  Catheter placement by Anatomical landmarks and Ultrasound guidance. Heme positive aspiration all ports.   Vessel Caliber: medium, patent, compressibility normal  Needle advanced into vessel with real time Ultrasound guidance.Insertion Attempts: 1  Assessment  Dressing: secured with tape and tegaderm  Patient: Tolerated well

## 2023-08-10 NOTE — ANESTHESIA PROCEDURE NOTES
[place]    Diagnosis: ESLD  Patient location during procedure: done in OR    Staffing  Authorizing Provider: Kimo Vance MD  Performing Provider: Julius Alvarez DO    Staffing  Performed by: Julius Alvarez DO  Authorized by: Kimo Vance MD    Anesthesiologist was present at the time of the procedure.  [place]  Skin Prep: chlorhexidine gluconate  Device: CCO/Oximetric Catheter   Catheter Size: 8 Fr  Catheter placement by linda Tripathi positive aspiration all ports. PAC floated with balloon up not wedgedSterile sheath usedInsertion Attempts: 1  Indication: hemodynamic monitoring  Assessment  Central Line Bundle Protocol followed. Hand hygiene before procedure, surgical cap worn, surgical mask worn, sterile surgical gloves worn, large sterile drape used.  Dressing: secured with tape and tegaderm and sutured in place and taped  Patient: Tolerated Well

## 2023-08-11 LAB
ALBUMIN SERPL BCP-MCNC: 2.4 G/DL (ref 3.5–5.2)
ALP SERPL-CCNC: 113 U/L (ref 55–135)
ALT SERPL W/O P-5'-P-CCNC: 334 U/L (ref 10–44)
ANION GAP SERPL CALC-SCNC: 7 MMOL/L (ref 8–16)
APTT PPP: 31.9 SEC (ref 21–32)
AST SERPL-CCNC: 748 U/L (ref 10–40)
AST SERPL-CCNC: 748 U/L (ref 10–40)
AST SERPL-CCNC: 838 U/L (ref 10–40)
BASOPHILS # BLD AUTO: 0.02 K/UL (ref 0–0.2)
BASOPHILS # BLD AUTO: 0.02 K/UL (ref 0–0.2)
BASOPHILS NFR BLD: 0.1 % (ref 0–1.9)
BASOPHILS NFR BLD: 0.1 % (ref 0–1.9)
BILIRUB SERPL-MCNC: 3.7 MG/DL (ref 0.1–1)
BLD PROD TYP BPU: NORMAL
BLOOD UNIT EXPIRATION DATE: NORMAL
BLOOD UNIT TYPE CODE: 1700
BLOOD UNIT TYPE CODE: 1700
BLOOD UNIT TYPE CODE: 7300
BLOOD UNIT TYPE: NORMAL
BUN SERPL-MCNC: 12 MG/DL (ref 8–23)
CALCIUM SERPL-MCNC: 7.7 MG/DL (ref 8.7–10.5)
CHLORIDE SERPL-SCNC: 106 MMOL/L (ref 95–110)
CO2 SERPL-SCNC: 21 MMOL/L (ref 23–29)
CODING SYSTEM: NORMAL
CREAT SERPL-MCNC: 0.6 MG/DL (ref 0.5–1.4)
CROSSMATCH INTERPRETATION: NORMAL
DIFFERENTIAL METHOD: ABNORMAL
DIFFERENTIAL METHOD: ABNORMAL
DISPENSE STATUS: NORMAL
EOSINOPHIL # BLD AUTO: 0 K/UL (ref 0–0.5)
EOSINOPHIL # BLD AUTO: 0 K/UL (ref 0–0.5)
EOSINOPHIL NFR BLD: 0 % (ref 0–8)
EOSINOPHIL NFR BLD: 0.1 % (ref 0–8)
ERYTHROCYTE [DISTWIDTH] IN BLOOD BY AUTOMATED COUNT: 15.2 % (ref 11.5–14.5)
ERYTHROCYTE [DISTWIDTH] IN BLOOD BY AUTOMATED COUNT: 15.5 % (ref 11.5–14.5)
EST. GFR  (NO RACE VARIABLE): >60 ML/MIN/1.73 M^2
GLUCOSE SERPL-MCNC: 200 MG/DL (ref 70–110)
HCT VFR BLD AUTO: 35.5 % (ref 40–54)
HCT VFR BLD AUTO: 36 % (ref 40–54)
HGB BLD-MCNC: 12.1 G/DL (ref 14–18)
HGB BLD-MCNC: 12.3 G/DL (ref 14–18)
IMM GRANULOCYTES # BLD AUTO: 0.08 K/UL (ref 0–0.04)
IMM GRANULOCYTES # BLD AUTO: 0.09 K/UL (ref 0–0.04)
IMM GRANULOCYTES NFR BLD AUTO: 0.4 % (ref 0–0.5)
IMM GRANULOCYTES NFR BLD AUTO: 0.5 % (ref 0–0.5)
INR PPP: 1.2 (ref 0.8–1.2)
INR PPP: 1.2 (ref 0.8–1.2)
LYMPHOCYTES # BLD AUTO: 0.6 K/UL (ref 1–4.8)
LYMPHOCYTES # BLD AUTO: 0.7 K/UL (ref 1–4.8)
LYMPHOCYTES NFR BLD: 3.1 % (ref 18–48)
LYMPHOCYTES NFR BLD: 3.7 % (ref 18–48)
MAGNESIUM SERPL-MCNC: 2.2 MG/DL (ref 1.6–2.6)
MCH RBC QN AUTO: 31.7 PG (ref 27–31)
MCH RBC QN AUTO: 32.2 PG (ref 27–31)
MCHC RBC AUTO-ENTMCNC: 34.1 G/DL (ref 32–36)
MCHC RBC AUTO-ENTMCNC: 34.2 G/DL (ref 32–36)
MCV RBC AUTO: 93 FL (ref 82–98)
MCV RBC AUTO: 94 FL (ref 82–98)
MONOCYTES # BLD AUTO: 1 K/UL (ref 0.3–1)
MONOCYTES # BLD AUTO: 1.1 K/UL (ref 0.3–1)
MONOCYTES NFR BLD: 5.3 % (ref 4–15)
MONOCYTES NFR BLD: 5.7 % (ref 4–15)
NEUTROPHILS # BLD AUTO: 16.5 K/UL (ref 1.8–7.7)
NEUTROPHILS # BLD AUTO: 16.8 K/UL (ref 1.8–7.7)
NEUTROPHILS NFR BLD: 90.3 % (ref 38–73)
NEUTROPHILS NFR BLD: 90.7 % (ref 38–73)
NRBC BLD-RTO: 0 /100 WBC
NRBC BLD-RTO: 0 /100 WBC
NUM UNITS TRANS FFP: NORMAL
PHOSPHATE SERPL-MCNC: 3.3 MG/DL (ref 2.7–4.5)
PLATELET # BLD AUTO: 98 K/UL (ref 150–450)
PLATELET # BLD AUTO: 99 K/UL (ref 150–450)
PMV BLD AUTO: 10.5 FL (ref 9.2–12.9)
PMV BLD AUTO: 11.2 FL (ref 9.2–12.9)
POCT GLUCOSE: 132 MG/DL (ref 70–110)
POCT GLUCOSE: 174 MG/DL (ref 70–110)
POCT GLUCOSE: 176 MG/DL (ref 70–110)
POCT GLUCOSE: 193 MG/DL (ref 70–110)
POCT GLUCOSE: 195 MG/DL (ref 70–110)
POCT GLUCOSE: 196 MG/DL (ref 70–110)
POCT GLUCOSE: 201 MG/DL (ref 70–110)
POCT GLUCOSE: 274 MG/DL (ref 70–110)
POTASSIUM SERPL-SCNC: 4.5 MMOL/L (ref 3.5–5.1)
PROT SERPL-MCNC: 4.9 G/DL (ref 6–8.4)
PROTHROMBIN TIME: 12.4 SEC (ref 9–12.5)
PROTHROMBIN TIME: 13.1 SEC (ref 9–12.5)
RBC # BLD AUTO: 3.76 M/UL (ref 4.6–6.2)
RBC # BLD AUTO: 3.88 M/UL (ref 4.6–6.2)
SODIUM SERPL-SCNC: 134 MMOL/L (ref 136–145)
TACROLIMUS BLD-MCNC: 3.3 NG/ML (ref 5–15)
WBC # BLD AUTO: 18.26 K/UL (ref 3.9–12.7)
WBC # BLD AUTO: 18.55 K/UL (ref 3.9–12.7)

## 2023-08-11 PROCEDURE — 99291 CRITICAL CARE FIRST HOUR: CPT | Mod: 24,,, | Performed by: STUDENT IN AN ORGANIZED HEALTH CARE EDUCATION/TRAINING PROGRAM

## 2023-08-11 PROCEDURE — 80053 COMPREHEN METABOLIC PANEL: CPT | Performed by: STUDENT IN AN ORGANIZED HEALTH CARE EDUCATION/TRAINING PROGRAM

## 2023-08-11 PROCEDURE — 63600175 PHARM REV CODE 636 W HCPCS: Performed by: STUDENT IN AN ORGANIZED HEALTH CARE EDUCATION/TRAINING PROGRAM

## 2023-08-11 PROCEDURE — 25000003 PHARM REV CODE 250

## 2023-08-11 PROCEDURE — 85025 COMPLETE CBC W/AUTO DIFF WBC: CPT | Mod: 91 | Performed by: STUDENT IN AN ORGANIZED HEALTH CARE EDUCATION/TRAINING PROGRAM

## 2023-08-11 PROCEDURE — 94761 N-INVAS EAR/PLS OXIMETRY MLT: CPT

## 2023-08-11 PROCEDURE — 85610 PROTHROMBIN TIME: CPT | Mod: 91 | Performed by: STUDENT IN AN ORGANIZED HEALTH CARE EDUCATION/TRAINING PROGRAM

## 2023-08-11 PROCEDURE — 85610 PROTHROMBIN TIME: CPT | Performed by: STUDENT IN AN ORGANIZED HEALTH CARE EDUCATION/TRAINING PROGRAM

## 2023-08-11 PROCEDURE — 80197 ASSAY OF TACROLIMUS: CPT | Performed by: STUDENT IN AN ORGANIZED HEALTH CARE EDUCATION/TRAINING PROGRAM

## 2023-08-11 PROCEDURE — 84100 ASSAY OF PHOSPHORUS: CPT

## 2023-08-11 PROCEDURE — 84450 TRANSFERASE (AST) (SGOT): CPT | Mod: 91 | Performed by: STUDENT IN AN ORGANIZED HEALTH CARE EDUCATION/TRAINING PROGRAM

## 2023-08-11 PROCEDURE — 94799 UNLISTED PULMONARY SVC/PX: CPT

## 2023-08-11 PROCEDURE — 25000003 PHARM REV CODE 250: Performed by: PHYSICIAN ASSISTANT

## 2023-08-11 PROCEDURE — 63600175 PHARM REV CODE 636 W HCPCS: Performed by: PHYSICIAN ASSISTANT

## 2023-08-11 PROCEDURE — 99233 PR SUBSEQUENT HOSPITAL CARE,LEVL III: ICD-10-PCS | Mod: ,,, | Performed by: PHYSICIAN ASSISTANT

## 2023-08-11 PROCEDURE — 84450 TRANSFERASE (AST) (SGOT): CPT | Performed by: STUDENT IN AN ORGANIZED HEALTH CARE EDUCATION/TRAINING PROGRAM

## 2023-08-11 PROCEDURE — 85730 THROMBOPLASTIN TIME PARTIAL: CPT | Performed by: STUDENT IN AN ORGANIZED HEALTH CARE EDUCATION/TRAINING PROGRAM

## 2023-08-11 PROCEDURE — 99233 SBSQ HOSP IP/OBS HIGH 50: CPT | Mod: ,,, | Performed by: PHYSICIAN ASSISTANT

## 2023-08-11 PROCEDURE — 99291 PR CRITICAL CARE, E/M 30-74 MINUTES: ICD-10-PCS | Mod: 24,,, | Performed by: STUDENT IN AN ORGANIZED HEALTH CARE EDUCATION/TRAINING PROGRAM

## 2023-08-11 PROCEDURE — 25000003 PHARM REV CODE 250: Performed by: STUDENT IN AN ORGANIZED HEALTH CARE EDUCATION/TRAINING PROGRAM

## 2023-08-11 PROCEDURE — 20600001 HC STEP DOWN PRIVATE ROOM

## 2023-08-11 PROCEDURE — 83735 ASSAY OF MAGNESIUM: CPT

## 2023-08-11 RX ORDER — MYCOPHENOLATE MOFETIL 250 MG/1
1000 CAPSULE ORAL 2 TIMES DAILY
Status: DISCONTINUED | OUTPATIENT
Start: 2023-08-11 | End: 2023-08-16 | Stop reason: HOSPADM

## 2023-08-11 RX ORDER — FAMOTIDINE 20 MG/1
20 TABLET, FILM COATED ORAL NIGHTLY
Status: DISCONTINUED | OUTPATIENT
Start: 2023-08-11 | End: 2023-08-16 | Stop reason: HOSPADM

## 2023-08-11 RX ORDER — FAMOTIDINE 20 MG/1
20 TABLET, FILM COATED ORAL NIGHTLY
Qty: 30 TABLET | Refills: 0 | Status: SHIPPED | OUTPATIENT
Start: 2023-08-11 | End: 2023-10-30 | Stop reason: HOSPADM

## 2023-08-11 RX ORDER — MYCOPHENOLATE MOFETIL 250 MG/1
1000 CAPSULE ORAL 2 TIMES DAILY
Qty: 240 CAPSULE | Refills: 11 | Status: SHIPPED | OUTPATIENT
Start: 2023-08-11 | End: 2023-09-20

## 2023-08-11 RX ORDER — INSULIN ASPART 100 [IU]/ML
5 INJECTION, SOLUTION INTRAVENOUS; SUBCUTANEOUS
Status: DISCONTINUED | OUTPATIENT
Start: 2023-08-11 | End: 2023-08-12

## 2023-08-11 RX ORDER — PREDNISONE 5 MG/1
TABLET ORAL
Qty: 70 TABLET | Refills: 0 | Status: SHIPPED | OUTPATIENT
Start: 2023-08-16 | End: 2023-09-20

## 2023-08-11 RX ORDER — DOCUSATE SODIUM 100 MG/1
100 CAPSULE, LIQUID FILLED ORAL 3 TIMES DAILY
Status: DISCONTINUED | OUTPATIENT
Start: 2023-08-11 | End: 2023-08-14

## 2023-08-11 RX ORDER — ACETAMINOPHEN 325 MG/1
650 TABLET ORAL EVERY 6 HOURS
Status: DISCONTINUED | OUTPATIENT
Start: 2023-08-11 | End: 2023-08-16 | Stop reason: HOSPADM

## 2023-08-11 RX ORDER — TALC
6 POWDER (GRAM) TOPICAL NIGHTLY PRN
Status: DISCONTINUED | OUTPATIENT
Start: 2023-08-11 | End: 2023-08-16 | Stop reason: HOSPADM

## 2023-08-11 RX ORDER — VALGANCICLOVIR 450 MG/1
450 TABLET, FILM COATED ORAL DAILY
Qty: 30 TABLET | Refills: 5 | Status: SHIPPED | OUTPATIENT
Start: 2023-08-10 | End: 2024-02-16

## 2023-08-11 RX ORDER — SULFAMETHOXAZOLE AND TRIMETHOPRIM 400; 80 MG/1; MG/1
1 TABLET ORAL DAILY
Qty: 30 TABLET | Refills: 5 | Status: SHIPPED | OUTPATIENT
Start: 2023-08-10 | End: 2024-02-16

## 2023-08-11 RX ORDER — IBUPROFEN 200 MG
16 TABLET ORAL
Status: DISCONTINUED | OUTPATIENT
Start: 2023-08-11 | End: 2023-08-15

## 2023-08-11 RX ORDER — GLUCAGON 1 MG
1 KIT INJECTION
Status: DISCONTINUED | OUTPATIENT
Start: 2023-08-11 | End: 2023-08-15

## 2023-08-11 RX ORDER — TACROLIMUS 1 MG/1
2 CAPSULE ORAL 2 TIMES DAILY
Status: DISCONTINUED | OUTPATIENT
Start: 2023-08-11 | End: 2023-08-12

## 2023-08-11 RX ORDER — TACROLIMUS 1 MG/1
6 CAPSULE ORAL EVERY 12 HOURS
Qty: 360 CAPSULE | Refills: 11 | Status: SHIPPED | OUTPATIENT
Start: 2023-08-11 | End: 2023-08-16 | Stop reason: SDUPTHER

## 2023-08-11 RX ORDER — BISACODYL 5 MG
10 TABLET, DELAYED RELEASE (ENTERIC COATED) ORAL NIGHTLY
Status: DISCONTINUED | OUTPATIENT
Start: 2023-08-11 | End: 2023-08-16 | Stop reason: HOSPADM

## 2023-08-11 RX ORDER — IBUPROFEN 200 MG
24 TABLET ORAL
Status: DISCONTINUED | OUTPATIENT
Start: 2023-08-11 | End: 2023-08-15

## 2023-08-11 RX ORDER — INSULIN ASPART 100 [IU]/ML
0-10 INJECTION, SOLUTION INTRAVENOUS; SUBCUTANEOUS
Status: DISCONTINUED | OUTPATIENT
Start: 2023-08-11 | End: 2023-08-15

## 2023-08-11 RX ADMIN — ACETAMINOPHEN 650 MG: 325 TABLET ORAL at 05:08

## 2023-08-11 RX ADMIN — MUPIROCIN 1 G: 20 OINTMENT TOPICAL at 08:08

## 2023-08-11 RX ADMIN — INSULIN ASPART 5 UNITS: 100 INJECTION, SOLUTION INTRAVENOUS; SUBCUTANEOUS at 12:08

## 2023-08-11 RX ADMIN — DOCUSATE SODIUM 100 MG: 100 CAPSULE, LIQUID FILLED ORAL at 08:08

## 2023-08-11 RX ADMIN — INSULIN HUMAN 3 UNITS/HR: 1 INJECTION, SOLUTION INTRAVENOUS at 09:08

## 2023-08-11 RX ADMIN — INSULIN ASPART 6 UNITS: 100 INJECTION, SOLUTION INTRAVENOUS; SUBCUTANEOUS at 08:08

## 2023-08-11 RX ADMIN — INSULIN ASPART 5 UNITS: 100 INJECTION, SOLUTION INTRAVENOUS; SUBCUTANEOUS at 09:08

## 2023-08-11 RX ADMIN — TACROLIMUS 2 MG: 1 CAPSULE ORAL at 08:08

## 2023-08-11 RX ADMIN — ACETAMINOPHEN 650 MG: 325 TABLET ORAL at 08:08

## 2023-08-11 RX ADMIN — OXYCODONE HYDROCHLORIDE 5 MG: 5 TABLET ORAL at 08:08

## 2023-08-11 RX ADMIN — BISACODYL 10 MG: 5 TABLET, COATED ORAL at 08:08

## 2023-08-11 RX ADMIN — OXYCODONE HYDROCHLORIDE 10 MG: 10 TABLET ORAL at 09:08

## 2023-08-11 RX ADMIN — ACETAMINOPHEN 650 MG: 325 TABLET ORAL at 12:08

## 2023-08-11 RX ADMIN — HEPARIN SODIUM 5000 UNITS: 5000 INJECTION INTRAVENOUS; SUBCUTANEOUS at 08:08

## 2023-08-11 RX ADMIN — MYCOPHENOLATE MOFETIL 1000 MG: 250 CAPSULE ORAL at 08:08

## 2023-08-11 RX ADMIN — TACROLIMUS 2 MG: 1 CAPSULE ORAL at 05:08

## 2023-08-11 RX ADMIN — Medication 6 MG: at 09:08

## 2023-08-11 RX ADMIN — HEPARIN SODIUM 5000 UNITS: 5000 INJECTION INTRAVENOUS; SUBCUTANEOUS at 01:08

## 2023-08-11 RX ADMIN — INSULIN ASPART 5 UNITS: 100 INJECTION, SOLUTION INTRAVENOUS; SUBCUTANEOUS at 06:08

## 2023-08-11 RX ADMIN — FAMOTIDINE 20 MG: 20 TABLET, FILM COATED ORAL at 08:08

## 2023-08-11 RX ADMIN — INSULIN ASPART 4 UNITS: 100 INJECTION, SOLUTION INTRAVENOUS; SUBCUTANEOUS at 06:08

## 2023-08-11 RX ADMIN — INSULIN ASPART 2 UNITS: 100 INJECTION, SOLUTION INTRAVENOUS; SUBCUTANEOUS at 12:08

## 2023-08-11 RX ADMIN — METHYLPREDNISOLONE SODIUM SUCCINATE 200 MG: 125 INJECTION, POWDER, FOR SOLUTION INTRAMUSCULAR; INTRAVENOUS at 08:08

## 2023-08-11 RX ADMIN — HEPARIN SODIUM 5000 UNITS: 5000 INJECTION INTRAVENOUS; SUBCUTANEOUS at 05:08

## 2023-08-11 NOTE — NURSING TRANSFER
Nursing Transfer Note      8/11/2023   3:02 PM    Nurse receiving handoff: Una SCOTT RN    Reason patient is being transferred: step down    Transfer From: SICU bed 69934    Transfer via wheelchair    Transfer with cardiac monitoring    Transported by RN      Telemetry: RN using bedside monitor  Order for Tele Monitor? Yes    4eyes on Skin: yes    Medicines sent: insulin drip    Any special needs or follow-up needed: no    Patient belongings transferred with patient: Yes    Chart send with patient: No    Notified: spouse, daughter    Patient reassessed at: 1400, 8/11     Upon arrival to floor: cardiac monitor applied, patient oriented to room, call bell in reach, and bed in lowest position

## 2023-08-11 NOTE — SUBJECTIVE & OBJECTIVE
"Interval HPI:   No acute events overnight. Patient in room 45771/03431 A. Blood glucose stable. BG at and above goal on current insulin regimen (IIP). Steroid use- Methylprednisolone  200 mg .   2 Days Post-Op  Renal function- Normal   Vasopressors-  None     Diet Adult Regular (IDDSI Level 7)     Eatin%  Nausea: No  Hypoglycemia and intervention: No  Fever: No  TPN and/or TF: No    BP (!) 131/91 (BP Location: Right arm, Patient Position: Sitting)   Pulse 84   Temp 97.6 °F (36.4 °C) (Oral)   Resp (!) 21   Ht 5' 10" (1.778 m)   Wt 97.8 kg (215 lb 11.5 oz)   SpO2 96%   BMI 30.95 kg/m²     Labs Reviewed and Include    Recent Labs   Lab 23   *   CALCIUM 7.7*   ALBUMIN 2.4*   PROT 4.9*   *   K 4.5   CO2 21*      BUN 12   CREATININE 0.6   ALKPHOS 113   *   *  748*   BILITOT 3.7*     Lab Results   Component Value Date    WBC 18.55 (H) 2023    HGB 12.3 (L) 2023    HCT 36.0 (L) 2023    MCV 93 2023    PLT 99 (L) 2023     No results for input(s): "TSH", "FREET4" in the last 168 hours.  Lab Results   Component Value Date    HGBA1C 7.5 (H) 2023       Nutritional status:   Body mass index is 30.95 kg/m².  Lab Results   Component Value Date    ALBUMIN 2.4 (L) 2023    ALBUMIN 2.5 (L) 08/10/2023    ALBUMIN 2.6 (L) 08/10/2023     No results found for: "PREALBUMIN"    Estimated Creatinine Clearance: 149.9 mL/min (based on SCr of 0.6 mg/dL).    Accu-Checks  Recent Labs     08/10/23  1958 08/10/23  2054 08/10/23  2214 08/10/23  2323 23  0039 23  0254 23  0422 23  0510 23  0606 23  0756   POCTGLUCOSE 163* 163* 140* 165* 132* 176* 195* 196* 201* 174*       Current Medications and/or Treatments Impacting Glycemic Control  Immunotherapy:    Immunosuppressants           Stop Route Frequency     mycophenolate capsule 1,000 mg         -- Oral 2 times daily     tacrolimus capsule 2 mg         -- Oral 2 times " daily          Steroids:   Hormones (From admission, onward)      Start     Stop Route Frequency Ordered    08/16/23 0900  predniSONE tablet 20 mg  (methylprednisolone taper panel)        See Hyperspace for full Linked Orders Report.    -- Oral Daily 08/10/23 0436    08/15/23 0900  methylPREDNISolone sodium succinate injection 40 mg  (methylprednisolone taper panel)        See Hyperspace for full Linked Orders Report.    08/16/23 0859 IV Daily 08/10/23 0436    08/14/23 0900  methylPREDNISolone sodium succinate injection 80 mg  (methylprednisolone taper panel)        See Hyperspace for full Linked Orders Report.    08/15/23 0859 IV Daily 08/10/23 0436    08/13/23 0900  methylPREDNISolone sodium succinate injection 120 mg  (methylprednisolone taper panel)        See Hyperspace for full Linked Orders Report.    08/14/23 0859 IV Daily 08/10/23 0436    08/12/23 0900  methylPREDNISolone sodium succinate injection 160 mg  (methylprednisolone taper panel)        See Hyperspace for full Linked Orders Report.    08/13/23 0859 IV Daily 08/10/23 0436    08/09/23 1709  methylPREDNISolone sodium succinate injection 500 mg  (Med - Immunosuppression Induction Therapy (Methylprednisolone))         -- IV On Call Procedure 08/09/23 1709          Pressors:    Autonomic Drugs (From admission, onward)      None          Hyperglycemia/Diabetes Medications:   Antihyperglycemics (From admission, onward)      Start     Stop Route Frequency Ordered    08/11/23 0845  insulin regular in 0.9 % NaCl 100 unit/100 mL (1 unit/mL) infusion        Question:  Enter initial dose (Units/hr):  Answer:  3    -- IV Continuous 08/11/23 0737    08/11/23 0836  insulin aspart U-100 pen 0-10 Units         -- SubQ As needed (PRN) 08/11/23 0737    08/11/23 0745  insulin aspart U-100 pen 5 Units         -- SubQ 3 times daily with meals 08/11/23 0737

## 2023-08-11 NOTE — PROGRESS NOTES
"[facility]  Endocrinology  Progress Note    Admit Date: 2023     Reason for Consult: Management of T2DM, Hyperglycemia     Surgical Procedure and Date: S/p liver txp    Diabetes diagnosis year: >7 years    Home Diabetes Medications:  Glimepiride 4 mg, Januvia 100 mg.    How often checking glucose at home?  ALEXANDER    BG readings on regimen: ALEXANDER  Hypoglycemia on the regimen?  No  Missed doses on regimen?  No    Diabetes Complications include:     Hyperglycemia    Complicating diabetes co morbidities:   CIRRHOSIS and Glucocorticoid use       HPI:   Patient is a 63 y.o. 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 Salgado and Pio 8/10/23. Endocrine consulted for bg management.        Interval HPI:   No acute events overnight. Patient in room 74661/37450 A. Blood glucose stable. BG at and above goal on current insulin regimen (IIP). Steroid use- Methylprednisolone  200 mg .   2 Days Post-Op  Renal function- Normal   Vasopressors-  None     Diet Adult Regular (IDDSI Level 7)     Eatin%  Nausea: No  Hypoglycemia and intervention: No  Fever: No  TPN and/or TF: No    BP (!) 131/91 (BP Location: Right arm, Patient Position: Sitting)   Pulse 84   Temp 97.6 °F (36.4 °C) (Oral)   Resp (!) 21   Ht 5' 10" (1.778 m)   Wt 97.8 kg (215 lb 11.5 oz)   SpO2 96%   BMI 30.95 kg/m²     Labs Reviewed and Include    Recent Labs   Lab 23  0426   *   CALCIUM 7.7*   ALBUMIN 2.4*   PROT 4.9*   *   K 4.5   CO2 21*      BUN 12   CREATININE 0.6   ALKPHOS 113   *   *  748*   BILITOT 3.7*     Lab Results   Component Value Date    WBC 18.55 (H) 2023    HGB 12.3 (L) 2023    HCT 36.0 (L) 2023    MCV 93 2023    PLT 99 (L) 2023     No results for input(s): "TSH", "FREET4" in the last 168 hours.  Lab Results   Component Value Date    " "HGBA1C 7.5 (H) 04/20/2023       Nutritional status:   Body mass index is 30.95 kg/m².  Lab Results   Component Value Date    ALBUMIN 2.4 (L) 08/11/2023    ALBUMIN 2.5 (L) 08/10/2023    ALBUMIN 2.6 (L) 08/10/2023     No results found for: "PREALBUMIN"    Estimated Creatinine Clearance: 149.9 mL/min (based on SCr of 0.6 mg/dL).    Accu-Checks  Recent Labs     08/10/23  1958 08/10/23  2054 08/10/23  2214 08/10/23  2323 08/11/23  0039 08/11/23  0254 08/11/23  0422 08/11/23  0510 08/11/23  0606 08/11/23  0756   POCTGLUCOSE 163* 163* 140* 165* 132* 176* 195* 196* 201* 174*       Current Medications and/or Treatments Impacting Glycemic Control  Immunotherapy:    Immunosuppressants           Stop Route Frequency     mycophenolate capsule 1,000 mg         -- Oral 2 times daily     tacrolimus capsule 2 mg         -- Oral 2 times daily          Steroids:   Hormones (From admission, onward)      Start     Stop Route Frequency Ordered    08/16/23 0900  predniSONE tablet 20 mg  (methylprednisolone taper panel)        See Hyperspace for full Linked Orders Report.    -- Oral Daily 08/10/23 0436    08/15/23 0900  methylPREDNISolone sodium succinate injection 40 mg  (methylprednisolone taper panel)        See Hyperspace for full Linked Orders Report.    08/16/23 0859 IV Daily 08/10/23 0436    08/14/23 0900  methylPREDNISolone sodium succinate injection 80 mg  (methylprednisolone taper panel)        See Hyperspace for full Linked Orders Report.    08/15/23 0859 IV Daily 08/10/23 0436    08/13/23 0900  methylPREDNISolone sodium succinate injection 120 mg  (methylprednisolone taper panel)        See Hyperspace for full Linked Orders Report.    08/14/23 0859 IV Daily 08/10/23 0436    08/12/23 0900  methylPREDNISolone sodium succinate injection 160 mg  (methylprednisolone taper panel)        See Hyperspace for full Linked Orders Report.    08/13/23 0859 IV Daily 08/10/23 0436    08/09/23 1709  methylPREDNISolone sodium succinate injection " 500 mg  (Med - Immunosuppression Induction Therapy (Methylprednisolone))         -- IV On Call Procedure 08/09/23 1705          Pressors:    Autonomic Drugs (From admission, onward)      None          Hyperglycemia/Diabetes Medications:   Antihyperglycemics (From admission, onward)      Start     Stop Route Frequency Ordered    08/11/23 0845  insulin regular in 0.9 % NaCl 100 unit/100 mL (1 unit/mL) infusion        Question:  Enter initial dose (Units/hr):  Answer:  3    -- IV Continuous 08/11/23 0737    08/11/23 0836  insulin aspart U-100 pen 0-10 Units         -- SubQ As needed (PRN) 08/11/23 0737    08/11/23 0745  insulin aspart U-100 pen 5 Units         -- SubQ 3 times daily with meals 08/11/23 0737            ASSESSMENT and PLAN    Endocrine  Type 2 diabetes mellitus, without long-term current use of insulin  BG goal: 140-180   T2dm.  S/P liver transplant.  Primary team advanced diet while still on high rates.  Will switch to transition drip at lower rate as expect needs to decrease. Will continue to monitor.    - -Discontinue Intensive insulin drip    - Start Transition insulin drip at 3 units/hr  - Start Novolog 5 units AC  - Start /25   - POCT Glucose before meals, at bedtime and at 2 am  - Hypoglycemia protocol in place      ** Please notify Endocrine for any change and/or advance in diet**  ** Please call Endocrine for any BG related issues **     Discharge Planning:   TBD. Please notify endocrinology prior to discharge.          GI  * End stage liver disease    Managed per primary team  Optimize bg control      Other  Adverse effect of adrenal cortical steroids  Steroids may increase BG          Carson Jesus PA-C  Endocrinology  [facility]

## 2023-08-11 NOTE — ASSESSMENT & PLAN NOTE
Mr. 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 Salgado and Pio 8/10/23. Per chart review, preop reported feeling in his usual state of health. Denies recent hospitalizations or illnesses.  Preop, he received IV steroids pulse induction.No focal deficits noted on preop exam.    Neuro:  -  PRN oxycodone. Will confirm ok to tylenol    CV:  - MAP goal > 65. No pressors  - Restart antihypertensives when appropriate  - BP at goal. Will discuss if ok to discontinue michael and RIJ    Pulm:  - extubated yesterday  - RA, using IS    FEN/GI:  S/p DCD liver transplant for ESLD 2/2 HCC on 8/10. Cold ischemia time 215 minutes. Warm ischemia time 26 minutes. Donor and recipient both with atherosclerosis. MELD 24  - Tolerated CLD. Will start regular diet and stop fluids  - Trend BMP q12hr, AST q4hr. AST downtrending -748.  (249). Tbili 3.7 (5.3)  - Lyte replacement prn  - Drains: 2x abdominal drains in place. 1 lateral above liver with 372mL serosanguinous output. 1 medial drain with 335 mL serosanguinous output.  - Liver US reviewed yesterday. Next one on POD6. Several nonop fluid collections visualized  - famotidine BID    Renal:  - Ulrich in place  - Monitor UOP. UOP 30-50 cc per hour. Net +2L. 1060 cc UOP  - Trend BUN/Cr 12/0.6 (12/0.7)    Heme/Onc:  - Trend CBC, INR daily  - Immuno: Methylprednisolone taper, Prograf, Mycophenolate  - Hx of HCC s/p Y90 5/2023  - heparin for DVT ppx    ID:  - AF  - Trend WBC. 18.5 (18.2)  - Abx/Antifungals/Antiviral: Unasyn completed, Bactrim/Valganciclovir to start 8/17 on nystatin    Endo:  - Insulin gtt in place. Endocrinology managing  - Glucose 132-251    PPx:  - Famotidine, TEDs/SCDs    Dispo: continue ICU care, AM Liver US  Lines: 2x abdominal drain, ulrich, RIJ trialysis, L radial a line

## 2023-08-11 NOTE — ANESTHESIA POSTPROCEDURE EVALUATION
Anesthesia Post Evaluation    Patient: Jed Chávez    Procedure(s) Performed: Procedure(s) (LRB):  TRANSPLANT, LIVER (N/A)    Final Anesthesia Type: general      Patient location during evaluation: ICU  Patient participation: No - Unable to Participate, Intubation  Level of consciousness: sedated  Post-procedure vital signs: reviewed and stable  Pain management: adequate  Airway patency: patent    PONV status at discharge: No PONV  Anesthetic complications: no      Cardiovascular status: blood pressure returned to baseline  Respiratory status: ventilator and ETT  Hydration status: euvolemic  Follow-up not needed.          Vitals Value Taken Time   /78 08/11/23 0801   Temp 36.4 °C (97.6 °F) 08/11/23 0700   Pulse 78 08/11/23 0843   Resp 23 08/11/23 0843   SpO2 95 % 08/11/23 0843   Vitals shown include unvalidated device data.      No case tracking events are documented in the log.      Pain/Chanell Score: Pain Rating Prior to Med Admin: 5 (8/11/2023  7:01 AM)  Pain Rating Post Med Admin: 0 (8/10/2023  8:00 PM)

## 2023-08-11 NOTE — ASSESSMENT & PLAN NOTE
BG goal: 140-180   T2dm.  S/P liver transplant.  Primary team advanced diet while still on high rates.  Will switch to transition drip at lower rate as expect needs to decrease. Will continue to monitor.    - -Discontinue Intensive insulin drip    - Start Transition insulin drip at 3 units/hr  - Start Novolog 5 units AC  - Start /25   - POCT Glucose before meals, at bedtime and at 2 am  - Hypoglycemia protocol in place      ** Please notify Endocrine for any change and/or advance in diet**  ** Please call Endocrine for any BG related issues **     Discharge Planning:   TBD. Please notify endocrinology prior to discharge.

## 2023-08-11 NOTE — PROGRESS NOTES
Assisted pt in ambulating to bathroom. One person assist out of chair and stand by assist ambulating to bathroom. Pt brushed teeth and returned to chair. Dinner arrived, insulin given. Visitors at the bedside. No distress noted.

## 2023-08-11 NOTE — SUBJECTIVE & OBJECTIVE
Interval History/Significant Events: Extubated yesterday. Tolerating CLD. Good UOP 30-50 cc/hr. Femoral a line removed yesterday. No flatus or BM yet. Did not work with PT yet    Follow-up For: Procedure(s) (LRB):  TRANSPLANT, LIVER (N/A)    Post-Operative Day: 2 Days Post-Op    Objective:     Vital Signs (Most Recent):  Temp: 97.8 °F (36.6 °C) (08/11/23 0300)  Pulse: 68 (08/11/23 0645)  Resp: 10 (08/11/23 0645)  BP: 118/78 (08/11/23 0600)  SpO2: (!) 94 % (08/11/23 0645) Vital Signs (24h Range):  Temp:  [97.6 °F (36.4 °C)-99.3 °F (37.4 °C)] 97.8 °F (36.6 °C)  Pulse:  [65-83] 68  Resp:  [2-31] 10  SpO2:  [92 %-99 %] 94 %  BP: (102-136)/(68-86) 118/78  Arterial Line BP: (104-155)/(50-68) 134/55     Weight: 97.8 kg (215 lb 11.5 oz)  Body mass index is 30.95 kg/m².      Intake/Output Summary (Last 24 hours) at 8/11/2023 0658  Last data filed at 8/11/2023 0600  Gross per 24 hour   Intake 3897.84 ml   Output 1897 ml   Net 2000.84 ml          Physical Exam  Constitutional:       General: He is not in acute distress.     Appearance: Normal appearance.   HENT:      Head: Normocephalic.   Neck:      Comments: RIJ in place  Cardiovascular:      Rate and Rhythm: Normal rate and regular rhythm.   Pulmonary:      Effort: Pulmonary effort is normal. No respiratory distress.   Abdominal:      General: There is distension.      Palpations: Abdomen is soft.      Comments: Appropriately tender postop to RUQ. 2x drains with serosanguinous output. Dressing in place to RUQ with no strikethrough   Genitourinary:     Comments: Marroquin in place  Musculoskeletal:         General: Normal range of motion.   Skin:     General: Skin is warm and dry.   Neurological:      General: No focal deficit present.      Mental Status: He is alert and oriented to person, place, and time.   Psychiatric:         Mood and Affect: Mood normal.         Behavior: Behavior normal.         Thought Content: Thought content normal.            Vents:  Vent Mode: Spont  (08/10/23 1329)  Set Rate: 0 BPM (08/10/23 1216)  Vt Set: 0 mL (08/10/23 1216)  Pressure Support: 5 cmH20 (08/10/23 1329)  PEEP/CPAP: 5 cmH20 (08/10/23 1329)  Oxygen Concentration (%): 40 (08/10/23 1330)  Peak Airway Pressure: 10 cmH20 (08/10/23 1329)  Plateau Pressure: 16 cmH20 (08/10/23 1329)  Total Ve: 6.4 L/m (08/10/23 1329)  Negative Inspiratory Force (cm H2O): -28 (08/10/23 1329)  F/VT Ratio<105 (RSBI): (!) 26.71 (08/10/23 1323)    Lines/Drains/Airways       Central Venous Catheter Line  Duration             Trialysis (Dialysis) Catheter 08/10/23 0456 right internal jugular 1 day              Drain  Duration                  Closed/Suction Drain 08/10/23 0333 Right Abdomen Bulb 19 Fr. 1 day         Closed/Suction Drain 08/10/23 0334 Lateral;Right Abdomen Bulb 19 Fr. 1 day         Urethral Catheter 08/09/23 2244 Non-latex;Straight-tip 16 Fr. 1 day              Arterial Line  Duration             Arterial Line 08/09/23 2020 Left Radial 1 day              Peripheral Intravenous Line  Duration                  Peripheral IV - Single Lumen 08/09/23 1800 20 G Anterior;Right Forearm 1 day         Peripheral IV - Single Lumen 08/10/23 0454 16 G Anterior;Left;Proximal Forearm 1 day         SUSANNE 08/10/23 Left Forearm 1 day                    Significant Labs:    CBC/Anemia Profile:  Recent Labs   Lab 08/10/23  2058 08/11/23  0037 08/11/23  0426   WBC 15.51* 18.26* 18.55*   HGB 11.9* 12.1* 12.3*   HCT 34.3* 35.5* 36.0*   PLT 97* 98* 99*   MCV 92 94 93   RDW 15.3* 15.2* 15.5*        Chemistries:  Recent Labs   Lab 08/09/23  1750 08/09/23  2230 08/10/23  0146 08/10/23  0256 08/10/23  0445 08/10/23  0909 08/10/23  1308 08/10/23  2058 08/11/23  0037 08/11/23  0426      < > 136 138 140 141  --  136  --  134*   K 3.6   < > 3.2* 3.1* 2.8* 3.4*  --  3.8  --  4.5     --   --   --  106 109  --  108  --  106   CO2 24  --   --   --  20* 22*  --  21*  --  21*   BUN 10  --   --   --  12 12  --  12  --  12   CREATININE 0.8   --   --   --  0.7 0.7  --  0.7  --  0.6   CALCIUM 8.1*  --   --   --  7.9* 7.7*  --  7.4*  --  7.7*   ALBUMIN 2.3*   < > 2.2* 2.6* 2.5*  --   --   --   --  2.4*   PROT 7.5  --   --   --  5.2*  --   --   --   --  4.9*   BILITOT 6.7*  --   --   --  5.3*  --   --   --   --  3.7*   ALKPHOS 179*  --   --   --  209*  --   --   --   --  113   ALT 74*  --  170*  --  249*  --   --   --   --  334*   *  --  1,072*  --  2,174* 2,371*   < > 985* 838* 748*  748*   MG  --    < > 1.8 1.7 1.6  --   --  1.7  --  2.2   PHOS  --   --   --   --  2.5*  --   --  2.8  --  3.3    < > = values in this interval not displayed.       All pertinent labs within the past 24 hours have been reviewed.    Significant Imaging:  I have reviewed all pertinent imaging results/findings within the past 24 hours.

## 2023-08-11 NOTE — PLAN OF CARE
VSS. NAEON. Pt OOBTC this AM.     Pt afebrile. HR 70s. -160. SpO2>90% on room air. Pt following commands and moving all extremities. Accuchecks q1h. Insulin gtt @ 3.9 units/hr      Marroquin in place UOP 30-55cc/hr .   MILANA x2. #1 w/ 120cc of sanguinous drainage  #2 w/ 232cc serous drainage/shift.      Electrolytes replaced per orders.      PoC reviewed with patient and family. All questions/concerns addressed.       Skin: No new skin breakdown. Bed plugged in and working properly. Waffle inflated. Foams and SCDs in place. Turn q2h. Weight shift assistance provided as needed.

## 2023-08-12 PROBLEM — Z79.60 LONG-TERM USE OF IMMUNOSUPPRESSANT MEDICATION: Status: ACTIVE | Noted: 2023-08-12

## 2023-08-12 PROBLEM — Z94.4 LIVER TRANSPLANTED: Status: ACTIVE | Noted: 2023-08-12

## 2023-08-12 PROBLEM — D62 ACUTE BLOOD LOSS ANEMIA: Status: ACTIVE | Noted: 2023-08-12

## 2023-08-12 PROBLEM — Z29.89 PROPHYLACTIC IMMUNOTHERAPY: Status: ACTIVE | Noted: 2023-08-12

## 2023-08-12 PROBLEM — Z01.818 PREOP TESTING: Status: RESOLVED | Noted: 2023-08-09 | Resolved: 2023-08-12

## 2023-08-12 PROBLEM — Z91.89 AT RISK FOR OPPORTUNISTIC INFECTIONS: Status: ACTIVE | Noted: 2023-08-12

## 2023-08-12 PROBLEM — K72.10 END STAGE LIVER DISEASE: Status: RESOLVED | Noted: 2023-08-09 | Resolved: 2023-08-12

## 2023-08-12 LAB
ALBUMIN SERPL BCP-MCNC: 2.4 G/DL (ref 3.5–5.2)
ALP SERPL-CCNC: 119 U/L (ref 55–135)
ALT SERPL W/O P-5'-P-CCNC: 291 U/L (ref 10–44)
ANION GAP SERPL CALC-SCNC: 10 MMOL/L (ref 8–16)
ANISOCYTOSIS BLD QL SMEAR: SLIGHT
AST SERPL-CCNC: 242 U/L (ref 10–40)
BASOPHILS # BLD AUTO: 0.03 K/UL (ref 0–0.2)
BASOPHILS NFR BLD: 0.1 % (ref 0–1.9)
BILIRUB SERPL-MCNC: 3.2 MG/DL (ref 0.1–1)
BUN SERPL-MCNC: 20 MG/DL (ref 8–23)
CALCIUM SERPL-MCNC: 7.7 MG/DL (ref 8.7–10.5)
CHLORIDE SERPL-SCNC: 99 MMOL/L (ref 95–110)
CO2 SERPL-SCNC: 18 MMOL/L (ref 23–29)
CREAT SERPL-MCNC: 0.7 MG/DL (ref 0.5–1.4)
DIFFERENTIAL METHOD: ABNORMAL
EOSINOPHIL # BLD AUTO: 0 K/UL (ref 0–0.5)
EOSINOPHIL NFR BLD: 0 % (ref 0–8)
ERYTHROCYTE [DISTWIDTH] IN BLOOD BY AUTOMATED COUNT: 15.2 % (ref 11.5–14.5)
EST. GFR  (NO RACE VARIABLE): >60 ML/MIN/1.73 M^2
GLUCOSE SERPL-MCNC: 274 MG/DL (ref 70–110)
HCT VFR BLD AUTO: 39.4 % (ref 40–54)
HGB BLD-MCNC: 13.5 G/DL (ref 14–18)
IMM GRANULOCYTES # BLD AUTO: 0.21 K/UL (ref 0–0.04)
IMM GRANULOCYTES NFR BLD AUTO: 0.9 % (ref 0–0.5)
INR PPP: 1.1 (ref 0.8–1.2)
LYMPHOCYTES # BLD AUTO: 0.9 K/UL (ref 1–4.8)
LYMPHOCYTES NFR BLD: 3.5 % (ref 18–48)
MAGNESIUM SERPL-MCNC: 2.1 MG/DL (ref 1.6–2.6)
MCH RBC QN AUTO: 31.5 PG (ref 27–31)
MCHC RBC AUTO-ENTMCNC: 34.3 G/DL (ref 32–36)
MCV RBC AUTO: 92 FL (ref 82–98)
MONOCYTES # BLD AUTO: 1.8 K/UL (ref 0.3–1)
MONOCYTES NFR BLD: 7.3 % (ref 4–15)
NEUTROPHILS # BLD AUTO: 21.4 K/UL (ref 1.8–7.7)
NEUTROPHILS NFR BLD: 88.2 % (ref 38–73)
NRBC BLD-RTO: 0 /100 WBC
OVALOCYTES BLD QL SMEAR: ABNORMAL
PLATELET # BLD AUTO: 126 K/UL (ref 150–450)
PLATELET BLD QL SMEAR: ABNORMAL
PMV BLD AUTO: 10.9 FL (ref 9.2–12.9)
POCT GLUCOSE: 182 MG/DL (ref 70–110)
POCT GLUCOSE: 202 MG/DL (ref 70–110)
POCT GLUCOSE: 238 MG/DL (ref 70–110)
POCT GLUCOSE: 295 MG/DL (ref 70–110)
POCT GLUCOSE: 329 MG/DL (ref 70–110)
POIKILOCYTOSIS BLD QL SMEAR: SLIGHT
POTASSIUM SERPL-SCNC: 4.3 MMOL/L (ref 3.5–5.1)
PROT SERPL-MCNC: 5.2 G/DL (ref 6–8.4)
PROTHROMBIN TIME: 11.4 SEC (ref 9–12.5)
RBC # BLD AUTO: 4.29 M/UL (ref 4.6–6.2)
SODIUM SERPL-SCNC: 127 MMOL/L (ref 136–145)
TACROLIMUS BLD-MCNC: 5.7 NG/ML (ref 5–15)
WBC # BLD AUTO: 24.25 K/UL (ref 3.9–12.7)

## 2023-08-12 PROCEDURE — 85025 COMPLETE CBC W/AUTO DIFF WBC: CPT | Performed by: STUDENT IN AN ORGANIZED HEALTH CARE EDUCATION/TRAINING PROGRAM

## 2023-08-12 PROCEDURE — 99232 PR SUBSEQUENT HOSPITAL CARE,LEVL II: ICD-10-PCS | Mod: ,,, | Performed by: PHYSICIAN ASSISTANT

## 2023-08-12 PROCEDURE — 36415 COLL VENOUS BLD VENIPUNCTURE: CPT | Performed by: STUDENT IN AN ORGANIZED HEALTH CARE EDUCATION/TRAINING PROGRAM

## 2023-08-12 PROCEDURE — 25000003 PHARM REV CODE 250: Performed by: PHYSICIAN ASSISTANT

## 2023-08-12 PROCEDURE — 85610 PROTHROMBIN TIME: CPT | Performed by: STUDENT IN AN ORGANIZED HEALTH CARE EDUCATION/TRAINING PROGRAM

## 2023-08-12 PROCEDURE — 97116 GAIT TRAINING THERAPY: CPT

## 2023-08-12 PROCEDURE — 99233 PR SUBSEQUENT HOSPITAL CARE,LEVL III: ICD-10-PCS | Mod: ,,, | Performed by: NURSE PRACTITIONER

## 2023-08-12 PROCEDURE — 97161 PT EVAL LOW COMPLEX 20 MIN: CPT

## 2023-08-12 PROCEDURE — 63600175 PHARM REV CODE 636 W HCPCS: Performed by: STUDENT IN AN ORGANIZED HEALTH CARE EDUCATION/TRAINING PROGRAM

## 2023-08-12 PROCEDURE — 63600175 PHARM REV CODE 636 W HCPCS: Performed by: TRANSPLANT SURGERY

## 2023-08-12 PROCEDURE — 25000003 PHARM REV CODE 250

## 2023-08-12 PROCEDURE — 80053 COMPREHEN METABOLIC PANEL: CPT | Performed by: STUDENT IN AN ORGANIZED HEALTH CARE EDUCATION/TRAINING PROGRAM

## 2023-08-12 PROCEDURE — 80197 ASSAY OF TACROLIMUS: CPT | Performed by: STUDENT IN AN ORGANIZED HEALTH CARE EDUCATION/TRAINING PROGRAM

## 2023-08-12 PROCEDURE — 99233 SBSQ HOSP IP/OBS HIGH 50: CPT | Mod: ,,, | Performed by: NURSE PRACTITIONER

## 2023-08-12 PROCEDURE — 25000003 PHARM REV CODE 250: Performed by: STUDENT IN AN ORGANIZED HEALTH CARE EDUCATION/TRAINING PROGRAM

## 2023-08-12 PROCEDURE — 83735 ASSAY OF MAGNESIUM: CPT | Performed by: PHYSICIAN ASSISTANT

## 2023-08-12 PROCEDURE — 63600175 PHARM REV CODE 636 W HCPCS: Performed by: NURSE PRACTITIONER

## 2023-08-12 PROCEDURE — 25000003 PHARM REV CODE 250: Performed by: NURSE PRACTITIONER

## 2023-08-12 PROCEDURE — 20600001 HC STEP DOWN PRIVATE ROOM

## 2023-08-12 PROCEDURE — 97165 OT EVAL LOW COMPLEX 30 MIN: CPT

## 2023-08-12 PROCEDURE — 97535 SELF CARE MNGMENT TRAINING: CPT

## 2023-08-12 PROCEDURE — 99232 SBSQ HOSP IP/OBS MODERATE 35: CPT | Mod: ,,, | Performed by: PHYSICIAN ASSISTANT

## 2023-08-12 RX ORDER — ONDANSETRON 2 MG/ML
4 INJECTION INTRAMUSCULAR; INTRAVENOUS ONCE
Status: COMPLETED | OUTPATIENT
Start: 2023-08-12 | End: 2023-08-12

## 2023-08-12 RX ORDER — ONDANSETRON 2 MG/ML
4 INJECTION INTRAMUSCULAR; INTRAVENOUS EVERY 6 HOURS PRN
Status: DISCONTINUED | OUTPATIENT
Start: 2023-08-12 | End: 2023-08-16 | Stop reason: HOSPADM

## 2023-08-12 RX ORDER — TACROLIMUS 1 MG/1
1 CAPSULE ORAL ONCE
Status: COMPLETED | OUTPATIENT
Start: 2023-08-12 | End: 2023-08-12

## 2023-08-12 RX ORDER — ONDANSETRON 2 MG/ML
4 INJECTION INTRAMUSCULAR; INTRAVENOUS EVERY 6 HOURS PRN
Status: DISCONTINUED | OUTPATIENT
Start: 2023-08-12 | End: 2023-08-12

## 2023-08-12 RX ORDER — INSULIN ASPART 100 [IU]/ML
8 INJECTION, SOLUTION INTRAVENOUS; SUBCUTANEOUS
Status: DISCONTINUED | OUTPATIENT
Start: 2023-08-12 | End: 2023-08-13

## 2023-08-12 RX ORDER — ONDANSETRON HYDROCHLORIDE 4 MG/5ML
4 SOLUTION ORAL ONCE
Status: DISCONTINUED | OUTPATIENT
Start: 2023-08-12 | End: 2023-08-16 | Stop reason: HOSPADM

## 2023-08-12 RX ORDER — LIDOCAINE HYDROCHLORIDE 10 MG/ML
10 INJECTION, SOLUTION EPIDURAL; INFILTRATION; INTRACAUDAL; PERINEURAL ONCE
Status: DISCONTINUED | OUTPATIENT
Start: 2023-08-12 | End: 2023-08-16 | Stop reason: HOSPADM

## 2023-08-12 RX ORDER — BISACODYL 10 MG
10 SUPPOSITORY, RECTAL RECTAL ONCE
Status: COMPLETED | OUTPATIENT
Start: 2023-08-12 | End: 2023-08-12

## 2023-08-12 RX ORDER — CARVEDILOL 6.25 MG/1
6.25 TABLET ORAL 2 TIMES DAILY
Status: DISCONTINUED | OUTPATIENT
Start: 2023-08-12 | End: 2023-08-15

## 2023-08-12 RX ORDER — ONDANSETRON HYDROCHLORIDE 4 MG/5ML
4 SOLUTION ORAL ONCE
Status: DISCONTINUED | OUTPATIENT
Start: 2023-08-12 | End: 2023-08-12

## 2023-08-12 RX ORDER — TACROLIMUS 1 MG/1
3 CAPSULE ORAL 2 TIMES DAILY
Status: DISCONTINUED | OUTPATIENT
Start: 2023-08-12 | End: 2023-08-13

## 2023-08-12 RX ADMIN — INSULIN ASPART 2 UNITS: 100 INJECTION, SOLUTION INTRAVENOUS; SUBCUTANEOUS at 02:08

## 2023-08-12 RX ADMIN — ACETAMINOPHEN 650 MG: 325 TABLET ORAL at 05:08

## 2023-08-12 RX ADMIN — TACROLIMUS 1 MG: 1 CAPSULE ORAL at 02:08

## 2023-08-12 RX ADMIN — ONDANSETRON 4 MG: 2 INJECTION INTRAMUSCULAR; INTRAVENOUS at 03:08

## 2023-08-12 RX ADMIN — DOCUSATE SODIUM 100 MG: 100 CAPSULE, LIQUID FILLED ORAL at 09:08

## 2023-08-12 RX ADMIN — METHYLPREDNISOLONE SODIUM SUCCINATE 160 MG: 125 INJECTION, POWDER, FOR SOLUTION INTRAMUSCULAR; INTRAVENOUS at 09:08

## 2023-08-12 RX ADMIN — CARVEDILOL 6.25 MG: 6.25 TABLET, FILM COATED ORAL at 08:08

## 2023-08-12 RX ADMIN — ACETAMINOPHEN 650 MG: 325 TABLET ORAL at 12:08

## 2023-08-12 RX ADMIN — TACROLIMUS 3 MG: 1 CAPSULE ORAL at 06:08

## 2023-08-12 RX ADMIN — MUPIROCIN 1 G: 20 OINTMENT TOPICAL at 08:08

## 2023-08-12 RX ADMIN — INSULIN ASPART 4 UNITS: 100 INJECTION, SOLUTION INTRAVENOUS; SUBCUTANEOUS at 09:08

## 2023-08-12 RX ADMIN — INSULIN ASPART 5 UNITS: 100 INJECTION, SOLUTION INTRAVENOUS; SUBCUTANEOUS at 09:08

## 2023-08-12 RX ADMIN — MUPIROCIN 1 G: 20 OINTMENT TOPICAL at 09:08

## 2023-08-12 RX ADMIN — DOCUSATE SODIUM 100 MG: 100 CAPSULE, LIQUID FILLED ORAL at 02:08

## 2023-08-12 RX ADMIN — HEPARIN SODIUM 5000 UNITS: 5000 INJECTION INTRAVENOUS; SUBCUTANEOUS at 08:08

## 2023-08-12 RX ADMIN — INSULIN ASPART 6 UNITS: 100 INJECTION, SOLUTION INTRAVENOUS; SUBCUTANEOUS at 03:08

## 2023-08-12 RX ADMIN — MYCOPHENOLATE MOFETIL 1000 MG: 250 CAPSULE ORAL at 08:08

## 2023-08-12 RX ADMIN — FAMOTIDINE 20 MG: 20 TABLET, FILM COATED ORAL at 08:08

## 2023-08-12 RX ADMIN — ONDANSETRON 4 MG: 2 INJECTION INTRAMUSCULAR; INTRAVENOUS at 09:08

## 2023-08-12 RX ADMIN — INSULIN ASPART 2 UNITS: 100 INJECTION, SOLUTION INTRAVENOUS; SUBCUTANEOUS at 09:08

## 2023-08-12 RX ADMIN — TACROLIMUS 2 MG: 1 CAPSULE ORAL at 09:08

## 2023-08-12 RX ADMIN — HEPARIN SODIUM 5000 UNITS: 5000 INJECTION INTRAVENOUS; SUBCUTANEOUS at 05:08

## 2023-08-12 RX ADMIN — BISACODYL 10 MG: 10 SUPPOSITORY RECTAL at 02:08

## 2023-08-12 RX ADMIN — HEPARIN SODIUM 5000 UNITS: 5000 INJECTION INTRAVENOUS; SUBCUTANEOUS at 02:08

## 2023-08-12 RX ADMIN — MYCOPHENOLATE MOFETIL 1000 MG: 250 CAPSULE ORAL at 09:08

## 2023-08-12 RX ADMIN — OXYCODONE HYDROCHLORIDE 10 MG: 10 TABLET ORAL at 04:08

## 2023-08-12 RX ADMIN — ACETAMINOPHEN 650 MG: 325 TABLET ORAL at 06:08

## 2023-08-12 RX ADMIN — INSULIN HUMAN 3 UNITS/HR: 1 INJECTION, SOLUTION INTRAVENOUS at 03:08

## 2023-08-12 NOTE — PT/OT/SLP EVAL
Physical Therapy Evaluation    Patient Name:  Jed Chávez   MRN:  [id number]  Admit Date: 8/9/2023  Admitting Diagnosis:  Status post liver transplant  Length of Stay: 3 days  Recent Surgery: Procedure(s) (LRB):  TRANSPLANT, LIVER (N/A) 3 Days Post-Op    Recommendations:     Discharge Recommendations:  other (see comments) (defer to CM/SW)   Discharge Equipment Recommendations: shower chair   Barriers to discharge: None    Assessment:     Jed Chávez is a 63 y.o. male admitted with a medical diagnosis of Status post liver transplant. Pt found alert and cooperative. VSS throughout session. Pt was able to ambulate in the hallway with one person assistance. Pt would benefit from continued acute PT to maximize functional mobility after surgical intervention.      Problem List: weakness, impaired endurance, impaired functional mobility, gait instability, impaired balance, pain, impaired cardiopulmonary response to activity, edema  Rehab Prognosis: Good; patient would benefit from acute skilled PT services to address these deficits and reach maximum level of function.      Plan:     During this hospitalization, patient to be seen 4 x/week to address the identified rehab impairments via gait training, therapeutic activities, therapeutic exercises, neuromuscular re-education and progress towards the established goals.    Subjective   Communicated with RN prior to session.  Patient found HOB elevated upon PT entry to room, agreeable to evaluation. Jed Chávez's wife and daughter present during session.    Chief Complaint: No chief complaint on file.    Patient/Family Comments/goals: to get better  Pain/Comfort:  Pain Rating 1: 5/10  Location 1:  (abdomen)  Pain Addressed 1: Reposition, Distraction  Pain Rating Post-Intervention 1: 5/10    Living Environment:  Pt lives with wife in a [facility] with a  entrance. Pt was ind with ambulation and ADLs. Pt and family will stay at an air B&B until cleared to return to home in [place].  "Patient uses DME as follows: none. DME owned (not currently used): none.    Patient reports they will have assistance from family upon discharge.    Objective:   Patient found with: telemetry, MILANA drain, peripheral IV     General Precautions: Standard, Cardiac fall   Orthopedic Precautions:N/A   Braces: N/A   Oxygen Device: Room Air  Vitals: /89   Pulse 79   Temp 98.2 °F (36.8 °C)   Resp 20   Ht 5' 10" (1.778 m)   Wt 97.3 kg (214 lb 9.6 oz)   SpO2 96%   BMI 30.79 kg/m²     Exams:  Cognition:   Alert and Cooperative  Ox4  Command following: Follows multistep  commands  Fluency: clear/fluent    RLE ROM: WFL  RLE Strength: grossly 4+/5  LLE ROM: WFL  LLE Strength: grossly 4+/5     B  LE edema noted    Outcome Measures:  AM-PAC 6 CLICK MOBILITY  Turning over in bed (including adjusting bedclothes, sheets and blankets)?: 3  Sitting down on and standing up from a chair with arms (e.g., wheelchair, bedside commode, etc.): 3  Moving from lying on back to sitting on the side of the bed?: 3  Moving to and from a bed to a chair (including a wheelchair)?: 3  Need to walk in hospital room?: 3  Climbing 3-5 steps with a railing?: 3  Basic Mobility Total Score: 18     Functional Mobility:  Additional staff present: OT  Bed Mobility  Supine to Sit: stand by assistance; HOB elevated  Scooting anteriorly to EOB to have both feet planted on floor: stand by assistance    Sitting Balance at Edge of Bed:  Assistance Level Required: Stand-by Assistance  Time: 5 minutes  Comments:   Worked on activity tolerance sitting EOB and worked on tolerance to positional change   Pt performed lower body dressing with OT    Transfers:   Sit <> Stand Transfer: contact guard assistance with no assistive device from EOB      Gait:   Patient ambulated: 80ft   Patient required: contact guard  Patient used: no assistive device  Gait Pattern observed: reciprocal gait  Gait Deviation(s): decreased step length, wide base of support, increased B " weight shifting, and decreased tayler  Impairments due to: impaired balance, decreased endurance, and B LE edema  Comments:   Mask donned  No LOB but pt reported mild dizziness   Mild SOB  Verbal cuing for pacing, purposeful steps, and upright posture       Therapeutic Activities, Exercises, & Education:   Educated pt on PT role/POC  Educated pt on importance of OOB activity and daily ambulation   Pt verbalized understanding     T/f to chair to increase tolerance to OOB activity and to create optimal positioning for lung expansion     Patient left up in chair with all lines intact, call button in reach, RN notified, and wife and daughter present.    GOALS:   Multidisciplinary Problems       Physical Therapy Goals          Problem: Physical Therapy    Goal Priority Disciplines Outcome Goal Variances Interventions   Physical Therapy Goal     PT, PT/OT Ongoing, Progressing     Description: Goals to be met by: 2023    Patient will increase functional independence with mobility by performin. Supine to sit with Supervision  2. Sit to stand transfer with Supervision  3. Gait  x 400 feet with Supervision.                          History:     Past Medical History:   Diagnosis Date    Alcoholic cirrhosis     CVA (cerebral vascular accident)     DM (diabetes mellitus)     Dyslipidemia     Hepatocellular carcinoma     HTN (hypertension)     Intracranial hemorrhage     Skin cancer        Past Surgical History:   Procedure Laterality Date    HERNIA REPAIR N/A     LIVER TRANSPLANT N/A 2023    Procedure: TRANSPLANT, LIVER;  Surgeon: Ameya Suero MD;  Location: [facility] OR [address] [place] [address];  Service: Transplant;  Laterality: N/A;       Time Tracking:     PT Received On: 23  PT Start Time: 0815     PT Stop Time: 0840  PT Total Time (min): 25 min     Billable Minutes: Evaluation 8 and Gait Training 10

## 2023-08-12 NOTE — PROGRESS NOTES
[facility] - Transplant Stepdown  Liver Transplant  Progress Note    Patient Name: Jed Chávez  MRN: [id number]  Admission Date: 8/9/2023  Hospital Length of Stay: 3 days  Code Status: Full Code  Primary Care Provider: Alee Pink MD  Post-Operative Day: 2    ORGAN:   LIVER  Disease Etiology: Primary Liver Malignancy: Hepatoma (HCC) and Cirrhosis  Donor Type:   Donation after Circulatory Death   CDC High Risk:   No  Donor CMV Status:   Donor CMV Status: Positive  Donor HBcAB:   Negative  Donor HCV Status:   Negative  Donor HBV SURENDRA:   Donor HCV SURENDRA: Negative  Whole or Partial: Whole Liver  Biliary Anastomosis: End to End  Arterial Anatomy: Standard  Subjective:     History of Present Illness:  Mr. 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 Reports feeling in his usual state of health. Denies recent hospitalizations or illnesses. Of note, has a history of a hypertensive CVA in 2015. No focal deficits noted, BP well controlled with current medications. Pre op labs and diagnostics pending. Tentative OR times 2030/2200 with Dr. Suero as primary surgeon.       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 Of note, has a history of a hypertensive CVA in 2015. No focal deficits noted. Stepped down on 8/11      Hospital Interval: NAEON, Doing well post op.#2 LFT's trending down. He is tolerating diet. (-)flatus/BM, started bowel Regime - Pt/OT consulted.   Started Coreg 6.25 today, will monitor. VSS, Monitor     Drain removed:  MILANA drain removed.  Site cleansed with betadine.  Lidocaine 1 cc injected SC.  Line removed without complication, no resistance.  MILANA drain tip intact.  Prolene 3.0 suture used to close site.  Dressing applied and secured with paper tape.   Patient tolerated procedure w/o complications.    Scheduled Meds:   acetaminophen  650 mg Oral Q6H    bisacodyL  10 mg Oral QHS    bisacodyL  10 mg Rectal Once     carvediloL  6.25 mg Oral BID    docusate sodium  100 mg Oral TID    famotidine  20 mg Oral QHS    heparin (porcine)  5,000 Units Subcutaneous Q8H    insulin aspart U-100  8 Units Subcutaneous TIDWM    [START ON 8/13/2023] methylPREDNISolone sodium succinate injection  120 mg Intravenous Daily    Followed by    [START ON 8/14/2023] methylPREDNISolone sodium succinate injection  80 mg Intravenous Daily    Followed by    [START ON 8/15/2023] methylPREDNISolone sodium succinate injection  40 mg Intravenous Daily    Followed by    [START ON 8/16/2023] predniSONE  20 mg Oral Daily    mupirocin  1 g Nasal BID    mycophenolate  1,000 mg Oral BID    [START ON 8/17/2023] sulfamethoxazole-trimethoprim 400-80mg  1 tablet Oral Daily AM    tacrolimus  1 mg Oral Once    tacrolimus  3 mg Oral BID    [START ON 8/20/2023] valGANciclovir  450 mg Oral Daily     Continuous Infusions:   insulin regular 1 units/mL infusion orderable (TRANSFER) 3.5 Units/hr (08/12/23 0830)     PRN Meds:dextrose 10%, dextrose 10%, glucagon (human recombinant), glucose, glucose, insulin aspart U-100, melatonin, mupirocin, oxyCODONE, oxyCODONE, sodium chloride 0.9%    Review of Systems   Constitutional:  Positive for activity change, appetite change and fatigue.   Respiratory:  Negative for cough and shortness of breath.    Cardiovascular:  Negative for chest pain, palpitations and leg swelling.   Gastrointestinal:  Positive for abdominal distention and constipation.   Genitourinary: Negative.  Negative for dysuria, hematuria, penile swelling and scrotal swelling.   Musculoskeletal:  Negative for arthralgias.   Skin:  Positive for color change and wound.   Neurological:  Positive for weakness.   Psychiatric/Behavioral:  Negative for behavioral problems.      Objective:     Vital Signs (Most Recent):  Temp: 98.2 °F (36.8 °C) (08/12/23 1141)  Pulse: 79 (08/12/23 1141)  Resp: 20 (08/12/23 1141)  BP: 134/89 (08/12/23 1141)  SpO2: 96 % (08/12/23 1141) Vital Signs  (24h Range):  Temp:  [97 °F (36.1 °C)-98.4 °F (36.9 °C)] 98.2 °F (36.8 °C)  Pulse:  [72-92] 79  Resp:  [17-26] 20  SpO2:  [92 %-96 %] 96 %  BP: (128-152)/(76-89) 134/89     Weight: 97.3 kg (214 lb 9.6 oz)  Body mass index is 30.79 kg/m².    Intake/Output - Last 3 Shifts         08/10 0700 08/11 0659 08/11 0700 08/12 0659 08/12 0700 08/13 0659    P.O.  1460     I.V. (mL/kg) 2872.4 (29.4) 78.2 (0.8)     Blood       IV Piggyback 1025.4      Total Intake(mL/kg) 3897.8 (39.9) 1538.2 (15.8)     Urine (mL/kg/hr) 1190 (0.5) 2095 (0.9)     Drains 707 985     Stool  0     Total Output 1897 3080     Net +2000.8 -1541.8            Stool Occurrence  0 x              Physical Exam  Vitals and nursing note reviewed.   Eyes:      General: Scleral icterus present.   Cardiovascular:      Rate and Rhythm: Normal rate and regular rhythm.      Heart sounds: No murmur heard.  Pulmonary:      Effort: No respiratory distress.      Breath sounds: No wheezing.   Abdominal:      General: There is distension.      Tenderness: There is abdominal tenderness.   Musculoskeletal:         General: No swelling or tenderness.      Cervical back: Neck supple.      Right lower leg: No edema.      Left lower leg: No edema.   Skin:     General: Skin is warm and dry.      Capillary Refill: Capillary refill takes less than 2 seconds.   Neurological:      Mental Status: He is alert and oriented to person, place, and time.          Laboratory:  Immunosuppressants           Stop Route Frequency     tacrolimus capsule 3 mg         -- Oral 2 times daily     tacrolimus capsule 1 mg         -- Oral Once     mycophenolate capsule 1,000 mg         -- Oral 2 times daily          CBC:   Recent Labs   Lab 08/12/23  0615   WBC 24.25*   RBC 4.29*   HGB 13.5*   HCT 39.4*   *   MCV 92   MCH 31.5*   MCHC 34.3     BMP:   Recent Labs   Lab 08/12/23  0615   *   *   K 4.3   CL 99   CO2 18*   BUN 20   CREATININE 0.7   CALCIUM 7.7*     CMP:   Recent Labs    Lab 08/12/23  0615   *   CALCIUM 7.7*   ALBUMIN 2.4*   PROT 5.2*   *   K 4.3   CO2 18*   CL 99   BUN 20   CREATININE 0.7   ALKPHOS 119   *   *   BILITOT 3.2*     Labs within the past 24 hours have been reviewed.    Diagnostic Results:  US Liver Transplant Post:  Results for orders placed during the hospital encounter of 08/09/23    US Doppler Liver Transplant Post (xpd)    Narrative  EXAMINATION:  US DOPPLER LIVER TRANSPLANT POST (XPD)    CLINICAL HISTORY:  s/p liver transplant;    TECHNIQUE:  Limited abdominal ultrasound of the transplant liver with Doppler evaluation.  Color and spectral Doppler were performed.    COMPARISON:  None.    FINDINGS:  Patient is status post orthotopic liver transplant 08/09/2023.  The liver demonstrates homogeneous echotexture.  No focal hepatic lesions are seen.    Peritransplant fluid collections as below.    Along the left hepatic lobe there is a 0.9 x 3.0 x 2.2 cm collection.    Along the medial right hepatic lobe there is a 3.2 x 7.1 x 4.1 cm mildly complex collection.    Additional anechoic collection near the right hepatic lobe measures 2.4 x 1.3 x 1.9 cm.    The common duct is not dilated, measuring 4 mm.  No dilated intrahepatic radicles are seen.    Color flow and spectral waveform analysis was performed.  The main portal vein, right portal vein, left portal vein, middle hepatic vein, right hepatic vein, left hepatic vein, and IVC are patent with proper directional flow.    Anastomosis site of the main hepatic artery demonstrates a peak systolic velocity 199 cm/sec.    Main hepatic artery demonstrates resistive index 0.81 with normal waveform.    Left hepatic artery shows resistive index 0.81 with normal waveform.    Anterior branch of the right hepatic artery demonstrates resistive index 0.72 with normal waveform.    Posterior branch of the right hepatic artery demonstrates resistive index 0.70 with normal waveform.    Impression  Satisfactory  Doppler evaluation of postoperative day 1 liver allograft.    Several peritransplant fluid collections, as above.    Findings were relayed to Dr. Baum on 08/10/2023 at 09:02.    Electronically signed by resident: Jed Watkins  Date:    08/10/2023  Time:    08:33    Electronically signed by: Raj Kang  Date:    08/10/2023  Time:    09:03    Debility/Functional status: Patient debilitated by evidence of Weakness. Physical and occupational therapy ordered daily to evaluate and treat. Debility was: present on admission.    Assessment/Plan:     * Status post liver transplant  64 y/o male with ESRD 2/2 HCC s/p DCD OLTxp 8/10     Prophylactic immunotherapy  - continue prograf  - continue to monitor for toxic side effects and check daily levels. Will adjust for therapeutic dose          Long-term use of immunosuppressant medication  - see prophylactic immunotherapy      At risk for opportunistic infections  - Bactrim for PCP ppx.  - Valcyte for CMV ppx.      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 Expect to improve post transplant.        Type 2 diabetes mellitus, without long-term current use of insulin  - Consult Endocrine post op for assistance in BG management.        VTE Risk Mitigation (From admission, onward)           Ordered     heparin (porcine) injection 5,000 Units  Every 8 hours         08/10/23 0436     Place sequential compression device  Until discontinued         08/10/23 0436     IP VTE HIGH RISK PATIENT  Once         08/10/23 0436                    The patients clinical status was discussed at multidisplinary rounds, involving transplant surgery, transplant medicine, pharmacy, nursing, nutrition, and social work    Discharge Planning:  No Patient Care Coordination Note on file.      Shadia Parkinson, FNP  Liver Transplant  [facility] - Transplant Stepdown

## 2023-08-12 NOTE — PLAN OF CARE
Problem: Physical Therapy  Goal: Physical Therapy Goal  Description: Goals to be met by: 2023    Patient will increase functional independence with mobility by performin. Supine to sit with Supervision  2. Sit to stand transfer with Supervision  3. Gait  x 400 feet with Supervision.     Outcome: Ongoing, Progressing     Eval completed. Goals appropriate.

## 2023-08-12 NOTE — SUBJECTIVE & OBJECTIVE
Scheduled Meds:   acetaminophen  650 mg Oral Q6H    bisacodyL  10 mg Oral QHS    bisacodyL  10 mg Rectal Once    carvediloL  6.25 mg Oral BID    docusate sodium  100 mg Oral TID    famotidine  20 mg Oral QHS    heparin (porcine)  5,000 Units Subcutaneous Q8H    insulin aspart U-100  8 Units Subcutaneous TIDWM    [START ON 8/13/2023] methylPREDNISolone sodium succinate injection  120 mg Intravenous Daily    Followed by    [START ON 8/14/2023] methylPREDNISolone sodium succinate injection  80 mg Intravenous Daily    Followed by    [START ON 8/15/2023] methylPREDNISolone sodium succinate injection  40 mg Intravenous Daily    Followed by    [START ON 8/16/2023] predniSONE  20 mg Oral Daily    mupirocin  1 g Nasal BID    mycophenolate  1,000 mg Oral BID    [START ON 8/17/2023] sulfamethoxazole-trimethoprim 400-80mg  1 tablet Oral Daily AM    tacrolimus  1 mg Oral Once    tacrolimus  3 mg Oral BID    [START ON 8/20/2023] valGANciclovir  450 mg Oral Daily     Continuous Infusions:   insulin regular 1 units/mL infusion orderable (TRANSFER) 3.5 Units/hr (08/12/23 0830)     PRN Meds:dextrose 10%, dextrose 10%, glucagon (human recombinant), glucose, glucose, insulin aspart U-100, melatonin, mupirocin, oxyCODONE, oxyCODONE, sodium chloride 0.9%    Review of Systems   Constitutional:  Positive for activity change, appetite change and fatigue.   Respiratory:  Negative for cough and shortness of breath.    Cardiovascular:  Negative for chest pain, palpitations and leg swelling.   Gastrointestinal:  Positive for abdominal distention and constipation.   Genitourinary: Negative.  Negative for dysuria, hematuria, penile swelling and scrotal swelling.   Musculoskeletal:  Negative for arthralgias.   Skin:  Positive for color change and wound.   Neurological:  Positive for weakness.   Psychiatric/Behavioral:  Negative for behavioral problems.      Objective:     Vital Signs (Most Recent):  Temp: 98.2 °F (36.8 °C) (08/12/23 1141)  Pulse:  79 (08/12/23 1141)  Resp: 20 (08/12/23 1141)  BP: 134/89 (08/12/23 1141)  SpO2: 96 % (08/12/23 1141) Vital Signs (24h Range):  Temp:  [97 °F (36.1 °C)-98.4 °F (36.9 °C)] 98.2 °F (36.8 °C)  Pulse:  [72-92] 79  Resp:  [17-26] 20  SpO2:  [92 %-96 %] 96 %  BP: (128-152)/(76-89) 134/89     Weight: 97.3 kg (214 lb 9.6 oz)  Body mass index is 30.79 kg/m².    Intake/Output - Last 3 Shifts         08/10 0700 08/11 0659 08/11 0700 08/12 0659 08/12 0700 08/13 0659    P.O.  1460     I.V. (mL/kg) 2872.4 (29.4) 78.2 (0.8)     Blood       IV Piggyback 1025.4      Total Intake(mL/kg) 3897.8 (39.9) 1538.2 (15.8)     Urine (mL/kg/hr) 1190 (0.5) 2095 (0.9)     Drains 707 985     Stool  0     Total Output 1897 3080     Net +2000.8 -1541.8            Stool Occurrence  0 x              Physical Exam  Vitals and nursing note reviewed.   Eyes:      General: Scleral icterus present.   Cardiovascular:      Rate and Rhythm: Normal rate and regular rhythm.      Heart sounds: No murmur heard.  Pulmonary:      Effort: No respiratory distress.      Breath sounds: No wheezing.   Abdominal:      General: There is distension.      Tenderness: There is abdominal tenderness.   Musculoskeletal:         General: No swelling or tenderness.      Cervical back: Neck supple.      Right lower leg: No edema.      Left lower leg: No edema.   Skin:     General: Skin is warm and dry.      Capillary Refill: Capillary refill takes less than 2 seconds.   Neurological:      Mental Status: He is alert and oriented to person, place, and time.          Laboratory:  Immunosuppressants           Stop Route Frequency     tacrolimus capsule 3 mg         -- Oral 2 times daily     tacrolimus capsule 1 mg         -- Oral Once     mycophenolate capsule 1,000 mg         -- Oral 2 times daily          CBC:   Recent Labs   Lab 08/12/23  0615   WBC 24.25*   RBC 4.29*   HGB 13.5*   HCT 39.4*   *   MCV 92   MCH 31.5*   MCHC 34.3     BMP:   Recent Labs   Lab 08/12/23 0615    *   *   K 4.3   CL 99   CO2 18*   BUN 20   CREATININE 0.7   CALCIUM 7.7*     CMP:   Recent Labs   Lab 08/12/23  0615   *   CALCIUM 7.7*   ALBUMIN 2.4*   PROT 5.2*   *   K 4.3   CO2 18*   CL 99   BUN 20   CREATININE 0.7   ALKPHOS 119   *   *   BILITOT 3.2*     Labs within the past 24 hours have been reviewed.    Diagnostic Results:  US Liver Transplant Post:  Results for orders placed during the hospital encounter of 08/09/23    US Doppler Liver Transplant Post (xpd)    Narrative  EXAMINATION:  US DOPPLER LIVER TRANSPLANT POST (XPD)    CLINICAL HISTORY:  s/p liver transplant;    TECHNIQUE:  Limited abdominal ultrasound of the transplant liver with Doppler evaluation.  Color and spectral Doppler were performed.    COMPARISON:  None.    FINDINGS:  Patient is status post orthotopic liver transplant 08/09/2023.  The liver demonstrates homogeneous echotexture.  No focal hepatic lesions are seen.    Peritransplant fluid collections as below.    Along the left hepatic lobe there is a 0.9 x 3.0 x 2.2 cm collection.    Along the medial right hepatic lobe there is a 3.2 x 7.1 x 4.1 cm mildly complex collection.    Additional anechoic collection near the right hepatic lobe measures 2.4 x 1.3 x 1.9 cm.    The common duct is not dilated, measuring 4 mm.  No dilated intrahepatic radicles are seen.    Color flow and spectral waveform analysis was performed.  The main portal vein, right portal vein, left portal vein, middle hepatic vein, right hepatic vein, left hepatic vein, and IVC are patent with proper directional flow.    Anastomosis site of the main hepatic artery demonstrates a peak systolic velocity 199 cm/sec.    Main hepatic artery demonstrates resistive index 0.81 with normal waveform.    Left hepatic artery shows resistive index 0.81 with normal waveform.    Anterior branch of the right hepatic artery demonstrates resistive index 0.72 with normal waveform.    Posterior branch of  the right hepatic artery demonstrates resistive index 0.70 with normal waveform.    Impression  Satisfactory Doppler evaluation of postoperative day 1 liver allograft.    Several peritransplant fluid collections, as above.    Findings were relayed to Dr. Baum on 08/10/2023 at 09:02.    Electronically signed by resident: Jed Watkins  Date:    08/10/2023  Time:    08:33    Electronically signed by: Raj Kang  Date:    08/10/2023  Time:    09:03    Debility/Functional status: Patient debilitated by evidence of Weakness. Physical and occupational therapy ordered daily to evaluate and treat. Debility was: present on admission.

## 2023-08-12 NOTE — HOSPITAL COURSE
62 y/o male with ESRD 2/2 HCC s/p DCD OLTxp 8/10/23. Of note, has a history of a hypertensive CVA in 2015. No focal deficits noted. Stepped down on 8/11      Hospital Interval: No acute events overnight. Pt progressing very well. AOx4. LFT's continue to trend down. Na improved- 129. Pt having serous drainage from right end of incision. Plan for lasix 20 mg IV w albumin x1. Potassium replaced. Cont 1L fluid restriction for another 24 hours. Appetite is better and he is tolerating supplements. (+)flatus/BM.  PT/OT working with patient. Sleeping better w Trazodone. Encourage IS and OOB. BP improved/lower end of normal. Coreg 6.25 discontinued. Will need to monitor need to restart given hs if HTN CVA. Plan for routine Liver US and possible discharge tomorrow. Monitor.

## 2023-08-12 NOTE — PLAN OF CARE
Pt aaox4 vswnl and c/o incisional pain. Bed in low position and callbell within reach. Pt needs 1 assist to ambulate. Instructed to call. Telemetry maintained NSR. Insulin gtt infusing at 3u/hr. Accu ck at 4018=261. Rt MILANA charged 'patent/intact. Chevron incision kusum with staples intact. +3 BLE edema noted. Self meds ready for am teaching. Instructed some regarding self meds. Questions answered. Pt turns independently.

## 2023-08-12 NOTE — PROGRESS NOTES
"[facility] - Transplant Stepdown  Endocrinology  Progress Note    Admit Date: 2023     Reason for Consult: Management of T2DM, Hyperglycemia     Surgical Procedure and Date: S/p liver txp    Diabetes diagnosis year: >7 years    Home Diabetes Medications:  Glimepiride 4 mg, Januvia 100 mg.    How often checking glucose at home?  ALEXANDER    BG readings on regimen: ALEXANDER  Hypoglycemia on the regimen?  No  Missed doses on regimen?  No    Diabetes Complications include:     Hyperglycemia    Complicating diabetes co morbidities:   CIRRHOSIS and Glucocorticoid use       HPI:   Patient is a 63 y.o. 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 Salgado and Pio 8/10/23. Endocrine consulted for bg management.        Interval HPI:   No acute events overnight. Patient in room 97406/77800 A. Blood glucose stable. BG at and above goal on current insulin regimen (Transition Insulin Drip). Steroid use- Methylprednisolone  160 mg .   3 Days Post-Op  Renal function- Normal   Vasopressors-  None     Diet diabetic  Calorie     Eatin%  Nausea: No  Hypoglycemia and intervention: No  Fever: No  TPN and/or TF: No    BP (!) 148/88   Pulse 77   Temp 97 °F (36.1 °C) (Oral)   Resp (!) 26   Ht 5' 10" (1.778 m)   Wt 97.3 kg (214 lb 9.6 oz)   SpO2 (!) 92%   BMI 30.79 kg/m²     Labs Reviewed and Include    Recent Labs   Lab 23  0615   *   CALCIUM 7.7*   ALBUMIN 2.4*   PROT 5.2*   *   K 4.3   CO2 18*   CL 99   BUN 20   CREATININE 0.7   ALKPHOS 119   *   *   BILITOT 3.2*     Lab Results   Component Value Date    WBC 24.25 (H) 2023    HGB 13.5 (L) 2023    HCT 39.4 (L) 2023    MCV 92 2023     (L) 2023     No results for input(s): "TSH", "FREET4" in the last 168 hours.  Lab Results   Component Value Date    HGBA1C 7.5 (H) 2023       Nutritional status: " "  Body mass index is 30.79 kg/m².  Lab Results   Component Value Date    ALBUMIN 2.4 (L) 08/12/2023    ALBUMIN 2.4 (L) 08/11/2023    ALBUMIN 2.5 (L) 08/10/2023     No results found for: "PREALBUMIN"    Estimated Creatinine Clearance: 128 mL/min (based on SCr of 0.7 mg/dL).    Accu-Checks  Recent Labs     08/11/23  0254 08/11/23  0422 08/11/23  0510 08/11/23  0606 08/11/23  0756 08/11/23  1123 08/11/23  1732 08/11/23  2015 08/12/23  0300 08/12/23  0828   POCTGLUCOSE 176* 195* 196* 201* 174* 193* 274* 329* 295* 238*       Current Medications and/or Treatments Impacting Glycemic Control  Immunotherapy:    Immunosuppressants           Stop Route Frequency     mycophenolate capsule 1,000 mg         -- Oral 2 times daily     tacrolimus capsule 2 mg         -- Oral 2 times daily          Steroids:   Hormones (From admission, onward)      Start     Stop Route Frequency Ordered    08/16/23 0900  predniSONE tablet 20 mg  (methylprednisolone taper panel)        See Hyperspace for full Linked Orders Report.    -- Oral Daily 08/10/23 0436    08/15/23 0900  methylPREDNISolone sodium succinate injection 40 mg  (methylprednisolone taper panel)        See Hyperspace for full Linked Orders Report.    08/16/23 0859 IV Daily 08/10/23 0436    08/14/23 0900  methylPREDNISolone sodium succinate injection 80 mg  (methylprednisolone taper panel)        See Hyperspace for full Linked Orders Report.    08/15/23 0859 IV Daily 08/10/23 0436    08/13/23 0900  methylPREDNISolone sodium succinate injection 120 mg  (methylprednisolone taper panel)        See Hyperspace for full Linked Orders Report.    08/14/23 0859 IV Daily 08/10/23 0436    08/11/23 2126  melatonin tablet 6 mg         -- Oral Nightly PRN 08/11/23 2026          Pressors:    Autonomic Drugs (From admission, onward)      None          Hyperglycemia/Diabetes Medications:   Antihyperglycemics (From admission, onward)      Start     Stop Route Frequency Ordered    08/12/23 1130  insulin " aspart U-100 pen 8 Units         -- SubQ 3 times daily with meals 08/12/23 1103    08/12/23 0830  insulin regular in 0.9 % NaCl 100 unit/100 mL (1 unit/mL) infusion        Question:  Enter initial dose (Units/hr):  Answer:  3.5    -- IV Continuous 08/12/23 0824    08/11/23 0836  insulin aspart U-100 pen 0-10 Units         -- SubQ As needed (PRN) 08/11/23 0737            ASSESSMENT and PLAN    Endocrine  Type 2 diabetes mellitus, without long-term current use of insulin  BG goal: 140-180   T2dm.  S/P liver transplant. On steroids  Still with high insulin requirements on transition drip.  BG above goal.  Will increase mealtime insulin.    - Increase Transition insulin drip to 3.5 units/hr  - Increase Novolog 8 units AC  - Continue /25   - POCT Glucose before meals, at bedtime and at 2 am  - Hypoglycemia protocol in place      ** Please notify Endocrine for any change and/or advance in diet**  ** Please call Endocrine for any BG related issues **     Discharge Planning:   TBD. Please notify endocrinology prior to discharge.          GI  End stage liver disease    Managed per primary team  Optimize bg control      Other  Adverse effect of adrenal cortical steroids  Steroids may increase BG          Carson Jesus PA-C  Endocrinology  [facility] - Transplant Stepdown

## 2023-08-12 NOTE — PLAN OF CARE
Patient remaining free from injury and up with standby assist.  Patient up in chair for most of the day.  Patient ambulated in hallway x 2 with standby assist.  Patient Remains on Insulin drip at 3.5units/hr.  BG trending downwards.  Patient did not eat lunch and only received SSI.  Patient at this time has not eaten dinner and night RN will admin meal insulin if patient decides to eat.  Patient's wife pulled self meds with 100% accuracy.  No s/s of infection noted.  Painted incision with Betadine.  Some leaking at MILANA and old MILANA site and ABD pad changed x 3.  Noted 270cc total from P drain today.  Patient with 2 BMS and 6 unmeasured voids.  Patient educated on hand washing and re-enforced to patient.  NSR on tele.  Patient reports pain well controlled with Tylenol today.  Patient on 1L fluid restriction and compliant.  Patient reports feeling tired.  Patient requesting Melatonin for sleep.

## 2023-08-12 NOTE — ASSESSMENT & PLAN NOTE
BG goal: 140-180   T2dm.  S/P liver transplant. On steroids  Still with high insulin requirements on transition drip.  BG above goal.  Will increase mealtime insulin.    - Increase Transition insulin drip to 3.5 units/hr  - Increase Novolog 8 units AC  - Continue /25   - POCT Glucose before meals, at bedtime and at 2 am  - Hypoglycemia protocol in place      ** Please notify Endocrine for any change and/or advance in diet**  ** Please call Endocrine for any BG related issues **     Discharge Planning:   TBD. Please notify endocrinology prior to discharge.

## 2023-08-12 NOTE — ASSESSMENT & PLAN NOTE
- continue prograf  - continue to monitor for toxic side effects and check daily levels. Will adjust for therapeutic dose

## 2023-08-12 NOTE — SUBJECTIVE & OBJECTIVE
"Interval HPI:   No acute events overnight. Patient in room 94556/23241 A. Blood glucose stable. BG at and above goal on current insulin regimen (Transition Insulin Drip). Steroid use- Methylprednisolone  160 mg .   3 Days Post-Op  Renal function- Normal   Vasopressors-  None     Diet diabetic  Calorie     Eatin%  Nausea: No  Hypoglycemia and intervention: No  Fever: No  TPN and/or TF: No    BP (!) 148/88   Pulse 77   Temp 97 °F (36.1 °C) (Oral)   Resp (!) 26   Ht 5' 10" (1.778 m)   Wt 97.3 kg (214 lb 9.6 oz)   SpO2 (!) 92%   BMI 30.79 kg/m²     Labs Reviewed and Include    Recent Labs   Lab 23  0615   *   CALCIUM 7.7*   ALBUMIN 2.4*   PROT 5.2*   *   K 4.3   CO2 18*   CL 99   BUN 20   CREATININE 0.7   ALKPHOS 119   *   *   BILITOT 3.2*     Lab Results   Component Value Date    WBC 24.25 (H) 2023    HGB 13.5 (L) 2023    HCT 39.4 (L) 2023    MCV 92 2023     (L) 2023     No results for input(s): "TSH", "FREET4" in the last 168 hours.  Lab Results   Component Value Date    HGBA1C 7.5 (H) 2023       Nutritional status:   Body mass index is 30.79 kg/m².  Lab Results   Component Value Date    ALBUMIN 2.4 (L) 2023    ALBUMIN 2.4 (L) 2023    ALBUMIN 2.5 (L) 08/10/2023     No results found for: "PREALBUMIN"    Estimated Creatinine Clearance: 128 mL/min (based on SCr of 0.7 mg/dL).    Accu-Checks  Recent Labs     23  0254 23  0422 23  0510 23  0606 23  0756 23  1123 23  1732 23  2015 23  0300 23  0828   POCTGLUCOSE 176* 195* 196* 201* 174* 193* 274* 329* 295* 238*       Current Medications and/or Treatments Impacting Glycemic Control  Immunotherapy:    Immunosuppressants           Stop Route Frequency     mycophenolate capsule 1,000 mg         -- Oral 2 times daily     tacrolimus capsule 2 mg         -- Oral 2 times daily          Steroids:   Hormones (From " admission, onward)      Start     Stop Route Frequency Ordered    08/16/23 0900  predniSONE tablet 20 mg  (methylprednisolone taper panel)        See Hyperspace for full Linked Orders Report.    -- Oral Daily 08/10/23 0436    08/15/23 0900  methylPREDNISolone sodium succinate injection 40 mg  (methylprednisolone taper panel)        See Hyperspace for full Linked Orders Report.    08/16/23 0859 IV Daily 08/10/23 0436    08/14/23 0900  methylPREDNISolone sodium succinate injection 80 mg  (methylprednisolone taper panel)        See Hyperspace for full Linked Orders Report.    08/15/23 0859 IV Daily 08/10/23 0436    08/13/23 0900  methylPREDNISolone sodium succinate injection 120 mg  (methylprednisolone taper panel)        See Hyperspace for full Linked Orders Report.    08/14/23 0859 IV Daily 08/10/23 0436    08/11/23 2126  melatonin tablet 6 mg         -- Oral Nightly PRN 08/11/23 2026          Pressors:    Autonomic Drugs (From admission, onward)      None          Hyperglycemia/Diabetes Medications:   Antihyperglycemics (From admission, onward)      Start     Stop Route Frequency Ordered    08/12/23 1130  insulin aspart U-100 pen 8 Units         -- SubQ 3 times daily with meals 08/12/23 1103    08/12/23 0830  insulin regular in 0.9 % NaCl 100 unit/100 mL (1 unit/mL) infusion        Question:  Enter initial dose (Units/hr):  Answer:  3.5    -- IV Continuous 08/12/23 0824    08/11/23 0836  insulin aspart U-100 pen 0-10 Units         -- SubQ As needed (PRN) 08/11/23 0737

## 2023-08-12 NOTE — PLAN OF CARE
Problem: Occupational Therapy  Goal: Occupational Therapy Goal  Description: Goals to be met by: 9/12/23 (1 month)     Patient will increase functional independence with ADLs by performing:    Grooming while standing at sink with Supervision.  Toileting from toilet with Modified [place] for hygiene and clothing management.   Rolling to Bilateral with [place].   Supine to sit with [place].  Step transfer with Modified [place]  Toilet transfer to toilet with Modified [place].    Evaluated pt and established OT POC. Continue OT as tolerated.  Ana Love OT.    8/12/2023    Outcome: Ongoing, Progressing

## 2023-08-12 NOTE — PT/OT/SLP EVAL
Occupational Therapy   Co-Evaluation and Co-Treatment  Co-treatment with PT for maximal pt participation, safety, and activity tolerance       Name: Jed Chávez  MRN: [id number]  Admitting Diagnosis: Status post liver transplant  Recent Surgery: Procedure(s) (LRB):  TRANSPLANT, LIVER (N/A) 3 Days Post-Op    Recommendations:     Discharge Recommendations: other (see comments)  Discharge Equipment Recommendations:  shower chair  Barriers to discharge:       Assessment:     Jed Chávez is a 63 y.o. male with a medical diagnosis of Status post liver transplant.  Pt presents with decreased endurance and impaired mobility performance as limited by cardiovascular status and generalized weakness. Pt found upright in bed and agreeable for therapy with family present during session. Session focused on fx'l mobility within room and hallway to simulate household and community distances with pt requiring CGA-HHA for balance. Pt limited 2/2 generalized weakness and 5/10 abdomen discomfort tolerating ~80ft today using no AD.  Pt will have A from family at d/c. Pt would benefit from continued OT skilled services 3x/wk to improve daily living skills to optimize QOL. Pt is recommended to discharge to other  at this time.Performance deficits affecting function: weakness, gait instability, impaired endurance, impaired balance, impaired self care skills, impaired functional mobility, pain, decreased lower extremity function, decreased upper extremity function, edema.      Rehab Prognosis: Good; patient would benefit from acute skilled OT services to address these deficits and reach maximum level of function.       Plan:     Patient to be seen 3 x/week to address the above listed problems via self-care/home management, therapeutic activities, therapeutic exercises  Plan of Care Expires:    Plan of Care Reviewed with: patient, spouse, daughter    Subjective     Chief Complaint: abdomen discomfort   Patient/Family Comments/goals: improve  mobility    Occupational Profile:  Living Environment: Pt lives with wife and dog in a H with threshold entry. Pt has a walk in shower for bathing. 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   Equipment Used at Home: none  Assistance upon Discharge: family    Pain/Comfort:  Pain Rating 1: 5/10  Location - Orientation 1: generalized  Location 1: abdomen  Pain Addressed 1: Distraction, Reposition, Cessation of Activity    Patients cultural, spiritual, Mandaen conflicts given the current situation: no    Objective:     Communicated with: RN prior to session.  Patient found HOB elevated with telemetry, peripheral IV upon OT entry to room.    General Precautions: Standard, fall  Orthopedic Precautions: N/A  Braces: N/A  Respiratory Status: Room air    Occupational Performance:    Bed Mobility:    Patient completed Rolling/Turning to Right with stand by assistance  Patient completed Scooting/Bridging with stand by assistance  Patient completed Supine to Sit with stand by assistance    Functional Mobility/Transfers:  Patient completed Sit <> Stand Transfer with contact guard assistance  with  hand-held assist   Patient completed Bed <> Chair Transfer using Step Transfer technique with contact guard assistance with hand-held assist  Functional Mobility: Pt stood and mobilized ~80 ft CGA-HHA within room  and into hallway.     Activities of Daily Living:  Feeding:  setup A of meal upright in chair  Lower Body Dressing: total assistance donning socks; min A to don pants in standing     Cognitive/Visual Perceptual:  Cognitive/Psychosocial Skills:     -       Oriented to: Person, Place, Time, and Situation   -       Follows Commands/attention:Follows multistep  commands  -       Communication: clear/fluent  -       Memory: No Deficits noted  -       Safety awareness/insight to disability: intact    -       Mood/Affect/Coping skills/emotional control: Appropriate to situation    Physical Exam:  Balance:    -       demo good sitting, fair standing using CGA-HHA for lateral sway  Upper Extremity Range of Motion:     -       Right Upper Extremity: WFL  -       Left Upper Extremity: WFL  Upper Extremity Strength:    -       Right Upper Extremity: WFL  -       Left Upper Extremity: WFL   Strength:    -       Right Upper Extremity: WFL  -       Left Upper Extremity: WNL    AMPAC 6 Click ADL:  AMPAC Total Score: 16    Treatment & Education:  Pt educated on role of occupational therapy, POC, and safety during ADLs and functional mobility. Pt and OT discussed importance of safe, continued mobility to optimize daily living skills. Pt verbalized understanding.     White board updated during session. Pt given instruction to call for medical staff/nurse for assistance.       Patient left up in chair with all lines intact, call button in reach, RN notified, and family present    GOALS:   Multidisciplinary Problems       Occupational Therapy Goals          Problem: Occupational Therapy    Goal Priority Disciplines Outcome Interventions   Occupational Therapy Goal     OT, PT/OT Ongoing, Progressing    Description: Goals to be met by: 9/12/23 (1 month)     Patient will increase functional independence with ADLs by performing:    Grooming while standing at sink with Supervision.  Toileting from toilet with Modified [place] for hygiene and clothing management.   Rolling to Bilateral with [place].   Supine to sit with [place].  Step transfer with Modified [place]  Toilet transfer to toilet with Modified [place].                       History:     Past Medical History:   Diagnosis Date    Alcoholic cirrhosis     CVA (cerebral vascular accident)     DM (diabetes mellitus)     Dyslipidemia     Hepatocellular carcinoma     HTN (hypertension)     Intracranial hemorrhage     Skin cancer          Past  Surgical History:   Procedure Laterality Date    HERNIA REPAIR N/A     LIVER TRANSPLANT N/A 8/9/2023    Procedure: TRANSPLANT, LIVER;  Surgeon: Ameya Suero MD;  Location: [facility] OR [address] [place] [address];  Service: Transplant;  Laterality: N/A;       Time Tracking:     OT Date of Treatment: 08/12/23  OT Start Time: 0815  OT Stop Time: 0839  OT Total Time (min): 24 min    Billable Minutes:Evaluation 12 min  Self Care/Home Management 12 min    8/12/2023

## 2023-08-13 LAB
ALBUMIN SERPL BCP-MCNC: 2.4 G/DL (ref 3.5–5.2)
ALP SERPL-CCNC: 112 U/L (ref 55–135)
ALT SERPL W/O P-5'-P-CCNC: 225 U/L (ref 10–44)
ANION GAP SERPL CALC-SCNC: 8 MMOL/L (ref 8–16)
ANISOCYTOSIS BLD QL SMEAR: SLIGHT
AST SERPL-CCNC: 102 U/L (ref 10–40)
BASOPHILS # BLD AUTO: 0.03 K/UL (ref 0–0.2)
BASOPHILS NFR BLD: 0.1 % (ref 0–1.9)
BILIRUB SERPL-MCNC: 3 MG/DL (ref 0.1–1)
BLD PROD TYP BPU: NORMAL
BLOOD UNIT EXPIRATION DATE: NORMAL
BLOOD UNIT TYPE CODE: 7300
BLOOD UNIT TYPE: NORMAL
BUN SERPL-MCNC: 24 MG/DL (ref 8–23)
CALCIUM SERPL-MCNC: 7.6 MG/DL (ref 8.7–10.5)
CHLORIDE SERPL-SCNC: 97 MMOL/L (ref 95–110)
CO2 SERPL-SCNC: 24 MMOL/L (ref 23–29)
CODING SYSTEM: NORMAL
CREAT SERPL-MCNC: 0.7 MG/DL (ref 0.5–1.4)
CROSSMATCH INTERPRETATION: NORMAL
DIFFERENTIAL METHOD: ABNORMAL
DISPENSE STATUS: NORMAL
EOSINOPHIL # BLD AUTO: 0 K/UL (ref 0–0.5)
EOSINOPHIL NFR BLD: 0 % (ref 0–8)
ERYTHROCYTE [DISTWIDTH] IN BLOOD BY AUTOMATED COUNT: 15.5 % (ref 11.5–14.5)
EST. GFR  (NO RACE VARIABLE): >60 ML/MIN/1.73 M^2
GLUCOSE SERPL-MCNC: 114 MG/DL (ref 70–110)
HCT VFR BLD AUTO: 39.3 % (ref 40–54)
HGB BLD-MCNC: 13.8 G/DL (ref 14–18)
IMM GRANULOCYTES # BLD AUTO: 0.25 K/UL (ref 0–0.04)
IMM GRANULOCYTES NFR BLD AUTO: 1 % (ref 0–0.5)
INR PPP: 1.1 (ref 0.8–1.2)
LYMPHOCYTES # BLD AUTO: 1.3 K/UL (ref 1–4.8)
LYMPHOCYTES NFR BLD: 5.1 % (ref 18–48)
MAGNESIUM SERPL-MCNC: 2.1 MG/DL (ref 1.6–2.6)
MCH RBC QN AUTO: 31.9 PG (ref 27–31)
MCHC RBC AUTO-ENTMCNC: 35.1 G/DL (ref 32–36)
MCV RBC AUTO: 91 FL (ref 82–98)
MONOCYTES # BLD AUTO: 2 K/UL (ref 0.3–1)
MONOCYTES NFR BLD: 7.9 % (ref 4–15)
NEUTROPHILS # BLD AUTO: 22.2 K/UL (ref 1.8–7.7)
NEUTROPHILS NFR BLD: 85.9 % (ref 38–73)
NRBC BLD-RTO: 0 /100 WBC
OVALOCYTES BLD QL SMEAR: ABNORMAL
PLATELET # BLD AUTO: 155 K/UL (ref 150–450)
PMV BLD AUTO: 11.1 FL (ref 9.2–12.9)
POCT GLUCOSE: 108 MG/DL (ref 70–110)
POCT GLUCOSE: 131 MG/DL (ref 70–110)
POCT GLUCOSE: 164 MG/DL (ref 70–110)
POCT GLUCOSE: 198 MG/DL (ref 70–110)
POCT GLUCOSE: 206 MG/DL (ref 70–110)
POCT GLUCOSE: 206 MG/DL (ref 70–110)
POCT GLUCOSE: 240 MG/DL (ref 70–110)
POIKILOCYTOSIS BLD QL SMEAR: SLIGHT
POLYCHROMASIA BLD QL SMEAR: ABNORMAL
POTASSIUM SERPL-SCNC: 3.9 MMOL/L (ref 3.5–5.1)
PROT SERPL-MCNC: 5.1 G/DL (ref 6–8.4)
PROTHROMBIN TIME: 11.8 SEC (ref 9–12.5)
RBC # BLD AUTO: 4.33 M/UL (ref 4.6–6.2)
SODIUM SERPL-SCNC: 129 MMOL/L (ref 136–145)
TACROLIMUS BLD-MCNC: 9.8 NG/ML (ref 5–15)
TRANS ERYTHROCYTES VOL PATIENT: NORMAL ML
WBC # BLD AUTO: 25.81 K/UL (ref 3.9–12.7)

## 2023-08-13 PROCEDURE — 25000003 PHARM REV CODE 250: Performed by: STUDENT IN AN ORGANIZED HEALTH CARE EDUCATION/TRAINING PROGRAM

## 2023-08-13 PROCEDURE — 83735 ASSAY OF MAGNESIUM: CPT | Performed by: PHYSICIAN ASSISTANT

## 2023-08-13 PROCEDURE — 99232 SBSQ HOSP IP/OBS MODERATE 35: CPT | Mod: ,,, | Performed by: PHYSICIAN ASSISTANT

## 2023-08-13 PROCEDURE — 99900035 HC TECH TIME PER 15 MIN (STAT)

## 2023-08-13 PROCEDURE — 20600001 HC STEP DOWN PRIVATE ROOM

## 2023-08-13 PROCEDURE — 80197 ASSAY OF TACROLIMUS: CPT | Performed by: STUDENT IN AN ORGANIZED HEALTH CARE EDUCATION/TRAINING PROGRAM

## 2023-08-13 PROCEDURE — 85025 COMPLETE CBC W/AUTO DIFF WBC: CPT | Performed by: STUDENT IN AN ORGANIZED HEALTH CARE EDUCATION/TRAINING PROGRAM

## 2023-08-13 PROCEDURE — 25000003 PHARM REV CODE 250: Performed by: NURSE PRACTITIONER

## 2023-08-13 PROCEDURE — 85610 PROTHROMBIN TIME: CPT | Performed by: STUDENT IN AN ORGANIZED HEALTH CARE EDUCATION/TRAINING PROGRAM

## 2023-08-13 PROCEDURE — 63600175 PHARM REV CODE 636 W HCPCS: Performed by: TRANSPLANT SURGERY

## 2023-08-13 PROCEDURE — 25000003 PHARM REV CODE 250

## 2023-08-13 PROCEDURE — 25000003 PHARM REV CODE 250: Performed by: PHYSICIAN ASSISTANT

## 2023-08-13 PROCEDURE — 99233 SBSQ HOSP IP/OBS HIGH 50: CPT | Mod: ,,, | Performed by: NURSE PRACTITIONER

## 2023-08-13 PROCEDURE — 99232 PR SUBSEQUENT HOSPITAL CARE,LEVL II: ICD-10-PCS | Mod: ,,, | Performed by: PHYSICIAN ASSISTANT

## 2023-08-13 PROCEDURE — 80053 COMPREHEN METABOLIC PANEL: CPT | Performed by: STUDENT IN AN ORGANIZED HEALTH CARE EDUCATION/TRAINING PROGRAM

## 2023-08-13 PROCEDURE — 63600175 PHARM REV CODE 636 W HCPCS: Performed by: NURSE PRACTITIONER

## 2023-08-13 PROCEDURE — 63600175 PHARM REV CODE 636 W HCPCS: Performed by: STUDENT IN AN ORGANIZED HEALTH CARE EDUCATION/TRAINING PROGRAM

## 2023-08-13 PROCEDURE — 99233 PR SUBSEQUENT HOSPITAL CARE,LEVL III: ICD-10-PCS | Mod: ,,, | Performed by: NURSE PRACTITIONER

## 2023-08-13 RX ORDER — TRAZODONE HYDROCHLORIDE 50 MG/1
50 TABLET ORAL NIGHTLY
Status: DISCONTINUED | OUTPATIENT
Start: 2023-08-13 | End: 2023-08-16 | Stop reason: HOSPADM

## 2023-08-13 RX ORDER — INSULIN ASPART 100 [IU]/ML
2-6 INJECTION, SOLUTION INTRAVENOUS; SUBCUTANEOUS
Status: DISCONTINUED | OUTPATIENT
Start: 2023-08-13 | End: 2023-08-14

## 2023-08-13 RX ORDER — TACROLIMUS 1 MG/1
2 CAPSULE ORAL 2 TIMES DAILY
Status: DISCONTINUED | OUTPATIENT
Start: 2023-08-13 | End: 2023-08-16

## 2023-08-13 RX ADMIN — ONDANSETRON 4 MG: 2 INJECTION INTRAMUSCULAR; INTRAVENOUS at 06:08

## 2023-08-13 RX ADMIN — TACROLIMUS 2 MG: 1 CAPSULE ORAL at 06:08

## 2023-08-13 RX ADMIN — ACETAMINOPHEN 650 MG: 325 TABLET ORAL at 11:08

## 2023-08-13 RX ADMIN — MYCOPHENOLATE MOFETIL 1000 MG: 250 CAPSULE ORAL at 09:08

## 2023-08-13 RX ADMIN — INSULIN ASPART 2 UNITS: 100 INJECTION, SOLUTION INTRAVENOUS; SUBCUTANEOUS at 06:08

## 2023-08-13 RX ADMIN — OXYCODONE HYDROCHLORIDE 5 MG: 5 TABLET ORAL at 05:08

## 2023-08-13 RX ADMIN — INSULIN HUMAN 3.5 UNITS/HR: 1 INJECTION, SOLUTION INTRAVENOUS at 06:08

## 2023-08-13 RX ADMIN — ACETAMINOPHEN 650 MG: 325 TABLET ORAL at 01:08

## 2023-08-13 RX ADMIN — HEPARIN SODIUM 5000 UNITS: 5000 INJECTION INTRAVENOUS; SUBCUTANEOUS at 05:08

## 2023-08-13 RX ADMIN — ONDANSETRON 4 MG: 2 INJECTION INTRAMUSCULAR; INTRAVENOUS at 01:08

## 2023-08-13 RX ADMIN — MUPIROCIN 1 G: 20 OINTMENT TOPICAL at 08:08

## 2023-08-13 RX ADMIN — TACROLIMUS 3 MG: 1 CAPSULE ORAL at 09:08

## 2023-08-13 RX ADMIN — HEPARIN SODIUM 5000 UNITS: 5000 INJECTION INTRAVENOUS; SUBCUTANEOUS at 01:08

## 2023-08-13 RX ADMIN — MUPIROCIN 1 G: 20 OINTMENT TOPICAL at 09:08

## 2023-08-13 RX ADMIN — ONDANSETRON 4 MG: 2 INJECTION INTRAMUSCULAR; INTRAVENOUS at 08:08

## 2023-08-13 RX ADMIN — CARVEDILOL 6.25 MG: 6.25 TABLET, FILM COATED ORAL at 08:08

## 2023-08-13 RX ADMIN — FAMOTIDINE 20 MG: 20 TABLET, FILM COATED ORAL at 08:08

## 2023-08-13 RX ADMIN — ACETAMINOPHEN 650 MG: 325 TABLET ORAL at 05:08

## 2023-08-13 RX ADMIN — ACETAMINOPHEN 650 MG: 325 TABLET ORAL at 12:08

## 2023-08-13 RX ADMIN — TRAZODONE HYDROCHLORIDE 50 MG: 50 TABLET ORAL at 08:08

## 2023-08-13 RX ADMIN — ACETAMINOPHEN 650 MG: 325 TABLET ORAL at 06:08

## 2023-08-13 RX ADMIN — INSULIN ASPART 2 UNITS: 100 INJECTION, SOLUTION INTRAVENOUS; SUBCUTANEOUS at 10:08

## 2023-08-13 RX ADMIN — METHYLPREDNISOLONE SODIUM SUCCINATE 120 MG: 125 INJECTION, POWDER, FOR SOLUTION INTRAMUSCULAR; INTRAVENOUS at 09:08

## 2023-08-13 RX ADMIN — MYCOPHENOLATE MOFETIL 1000 MG: 250 CAPSULE ORAL at 08:08

## 2023-08-13 RX ADMIN — HEPARIN SODIUM 5000 UNITS: 5000 INJECTION INTRAVENOUS; SUBCUTANEOUS at 08:08

## 2023-08-13 NOTE — ASSESSMENT & PLAN NOTE
BG goal: 140-180   T2dm.  S/P liver transplant. On steroids  Appetite remains poor.  BG ar or below goal    - Adjust Transition insulin drip to 1.9 units/hr w/ stepdown parameters  - Adjust mealtime insulin based on intake Administer 2 units if patient eats 25% -  50% and 6 units if patient eats 50% or more.  - Continue /25   - POCT Glucose before meals, at bedtime and at 2 am  - Hypoglycemia protocol in place      ** Please notify Endocrine for any change and/or advance in diet**  ** Please call Endocrine for any BG related issues **     Discharge Planning:   TBD. Please notify endocrinology prior to discharge.

## 2023-08-13 NOTE — PROGRESS NOTES
"[facility] - Transplant Stepdown  Endocrinology  Progress Note    Admit Date: 8/9/2023     Reason for Consult: Management of T2DM, Hyperglycemia     Surgical Procedure and Date: S/p liver txp    Diabetes diagnosis year: >7 years    Home Diabetes Medications:  Glimepiride 4 mg, Januvia 100 mg.    How often checking glucose at home?  ALEXANDER    BG readings on regimen: ALEXANDER  Hypoglycemia on the regimen?  No  Missed doses on regimen?  No    Diabetes Complications include:     Hyperglycemia    Complicating diabetes co morbidities:   CIRRHOSIS and Glucocorticoid use       HPI:   Patient is a 63 y.o. 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 Salgado and Pio 8/10/23. Endocrine consulted for bg management.        Interval HPI:   No acute events overnight. Patient in room 28622/08250 A. Blood glucose stable. BG at and above goal on current insulin regimen (Transition Insulin Drip). Steroid use- Methylprednisolone  .   4 Days Post-Op  Renal function- Normal   Vasopressors-  None     Diet diabetic 2000 Calorie     Eating:   <25%  Nausea: Yes  Hypoglycemia and intervention: No  Fever: No  TPN and/or TF: No    /69 (BP Location: Left arm, Patient Position: Lying)   Pulse 88   Temp 97.4 °F (36.3 °C) (Axillary)   Resp 16   Ht 5' 10" (1.778 m)   Wt 97.3 kg (214 lb 9.6 oz)   SpO2 96%   BMI 30.79 kg/m²     Labs Reviewed and Include    Recent Labs   Lab 08/13/23  0650   *   CALCIUM 7.6*   ALBUMIN 2.4*   PROT 5.1*   *   K 3.9   CO2 24   CL 97   BUN 24*   CREATININE 0.7   ALKPHOS 112   *   *   BILITOT 3.0*     Lab Results   Component Value Date    WBC 25.81 (H) 08/13/2023    HGB 13.8 (L) 08/13/2023    HCT 39.3 (L) 08/13/2023    MCV 91 08/13/2023     08/13/2023     No results for input(s): "TSH", "FREET4" in the last 168 hours.  Lab Results   Component Value Date    HGBA1C 7.5 (H) " "04/20/2023       Nutritional status:   Body mass index is 30.79 kg/m².  Lab Results   Component Value Date    ALBUMIN 2.4 (L) 08/13/2023    ALBUMIN 2.4 (L) 08/12/2023    ALBUMIN 2.4 (L) 08/11/2023     No results found for: "PREALBUMIN"    Estimated Creatinine Clearance: 128 mL/min (based on SCr of 0.7 mg/dL).    Accu-Checks  Recent Labs     08/11/23  1123 08/11/23  1732 08/11/23 2015 08/12/23  0300 08/12/23  0828 08/12/23  1242 08/12/23  1723 08/12/23  2123 08/13/23  0125 08/13/23  0748   POCTGLUCOSE 193* 274* 329* 295* 238* 202* 182* 198* 164* 108       Current Medications and/or Treatments Impacting Glycemic Control  Immunotherapy:    Immunosuppressants           Stop Route Frequency     tacrolimus capsule 3 mg         -- Oral 2 times daily     mycophenolate capsule 1,000 mg         -- Oral 2 times daily          Steroids:   Hormones (From admission, onward)      Start     Stop Route Frequency Ordered    08/16/23 0900  predniSONE tablet 20 mg  (methylprednisolone taper panel)        See Hyperspace for full Linked Orders Report.    -- Oral Daily 08/10/23 0436    08/15/23 0900  methylPREDNISolone sodium succinate injection 40 mg  (methylprednisolone taper panel)        See Hyperspace for full Linked Orders Report.    08/16/23 0859 IV Daily 08/10/23 0436    08/14/23 0900  methylPREDNISolone sodium succinate injection 80 mg  (methylprednisolone taper panel)        See Hyperspace for full Linked Orders Report.    08/15/23 0859 IV Daily 08/10/23 0436    08/13/23 0900  methylPREDNISolone sodium succinate injection 120 mg  (methylprednisolone taper panel)        See Hyperspace for full Linked Orders Report.    08/14/23 0859 IV Daily 08/10/23 0436    08/11/23 2126  melatonin tablet 6 mg         -- Oral Nightly PRN 08/11/23 2026          Pressors:    Autonomic Drugs (From admission, onward)      None          Hyperglycemia/Diabetes Medications:   Antihyperglycemics (From admission, onward)      Start     Stop Route " Frequency Ordered    08/12/23 1130  insulin aspart U-100 pen 8 Units         -- SubQ 3 times daily with meals 08/12/23 1103    08/12/23 0830  insulin regular in 0.9 % NaCl 100 unit/100 mL (1 unit/mL) infusion        Question:  Enter initial dose (Units/hr):  Answer:  3.5    -- IV Continuous 08/12/23 0824    08/11/23 0836  insulin aspart U-100 pen 0-10 Units         -- SubQ As needed (PRN) 08/11/23 0737            ASSESSMENT and PLAN    Endocrine  Type 2 diabetes mellitus, without long-term current use of insulin  BG goal: 140-180   T2dm.  S/P liver transplant. On steroids  Appetite remains poor.  BG ar or below goal    - Adjust Transition insulin drip to 1.9 units/hr w/ stepdown parameters  - Adjust mealtime insulin based on intake Administer 2 units if patient eats 25% -  50% and 6 units if patient eats 50% or more.  - Continue /25   - POCT Glucose before meals, at bedtime and at 2 am  - Hypoglycemia protocol in place      ** Please notify Endocrine for any change and/or advance in diet**  ** Please call Endocrine for any BG related issues **     Discharge Planning:   TBD. Please notify endocrinology prior to discharge.          GI  * Status post liver transplant  Managed per primary team  Optimize bg control        Other  Adverse effect of adrenal cortical steroids  Steroids may increase BG          Carson Jesus PA-C  Endocrinology  [facility] - Transplant Stepdown

## 2023-08-13 NOTE — PLAN OF CARE
Patient AAO x4, VSS, on room air, afebrile. NSR on telemetry. Insulin gtt at 3.5 u/hr.  and 164- SSI given per MAR. Wife pulled self meds 100%. Held stool softeners- pt had 3 bm and getting loose. PRN Zofran given x1 for nausea. Pt coughing up clear mucus. Taught wife and patient how to paint incision. MILANA drain pulled; site CDI w/ minimal drainage. Reinforcing 1L fluid restriction. Noted 200 cc since midnight. Bed locked and in lowest setting. Call bell in reach. Tele alarms on and audible.

## 2023-08-13 NOTE — PLAN OF CARE
Patient remaining free from injury and ambulating to and from the restroom with standby assist.  Patient noted to have some slow responses today.  Patient reports only sleeping about three hours last night and barely at all the previous night.  Delirium precautions in place.  Patient allowed nap, but needs to be awake after 3pm.  Trazadone to be given tonight.  Patient with small BM x 2 today.  Patient frequently up to toilet with urges to have BM.  Patient only at about half his eggs this am and has not had anything else to eat since previous am.  Explained to patient he needs to eat to be able to have a meaningful BM.  Patient still has not eaten lunch.  Patient BG being monitored and < 150.  Insulin drip currently running at 1.2units/hr.  Notified TWAN Nunez of patient's eating trend and blood glucose levels.  Patient's wife pulling self meds 100%.  Patient given partial bath with warm wipes this am.  Patient incision painted with Betadine.  Patient reports surgical pain 3/10.  Patient taking scheduled tylenol and will take pain pill at night and receives adequate pain control.  Patient not performing regular hand hygiene and requires prompting to do rocky after using the restroom.  Patient's wife reminding patient and demonstrates understanding of infection prevention.  Patient up in chair all day.  Patient liver numbers trending down.  Patient using IS independently.  Patient to possibly be dc'd early this week.

## 2023-08-13 NOTE — SUBJECTIVE & OBJECTIVE
"Interval HPI:   No acute events overnight. Patient in room 54034/42550 A. Blood glucose stable. BG at and above goal on current insulin regimen (Transition Insulin Drip). Steroid use- Methylprednisolone  .   4 Days Post-Op  Renal function- Normal   Vasopressors-  None     Diet diabetic 2000 Calorie     Eating:   <25%  Nausea: Yes  Hypoglycemia and intervention: No  Fever: No  TPN and/or TF: No    /69 (BP Location: Left arm, Patient Position: Lying)   Pulse 88   Temp 97.4 °F (36.3 °C) (Axillary)   Resp 16   Ht 5' 10" (1.778 m)   Wt 97.3 kg (214 lb 9.6 oz)   SpO2 96%   BMI 30.79 kg/m²     Labs Reviewed and Include    Recent Labs   Lab 08/13/23  0650   *   CALCIUM 7.6*   ALBUMIN 2.4*   PROT 5.1*   *   K 3.9   CO2 24   CL 97   BUN 24*   CREATININE 0.7   ALKPHOS 112   *   *   BILITOT 3.0*     Lab Results   Component Value Date    WBC 25.81 (H) 08/13/2023    HGB 13.8 (L) 08/13/2023    HCT 39.3 (L) 08/13/2023    MCV 91 08/13/2023     08/13/2023     No results for input(s): "TSH", "FREET4" in the last 168 hours.  Lab Results   Component Value Date    HGBA1C 7.5 (H) 04/20/2023       Nutritional status:   Body mass index is 30.79 kg/m².  Lab Results   Component Value Date    ALBUMIN 2.4 (L) 08/13/2023    ALBUMIN 2.4 (L) 08/12/2023    ALBUMIN 2.4 (L) 08/11/2023     No results found for: "PREALBUMIN"    Estimated Creatinine Clearance: 128 mL/min (based on SCr of 0.7 mg/dL).    Accu-Checks  Recent Labs     08/11/23  1123 08/11/23  1732 08/11/23 2015 08/12/23  0300 08/12/23  0828 08/12/23  1242 08/12/23  1723 08/12/23  2123 08/13/23  0125 08/13/23  0748   POCTGLUCOSE 193* 274* 329* 295* 238* 202* 182* 198* 164* 108       Current Medications and/or Treatments Impacting Glycemic Control  Immunotherapy:    Immunosuppressants           Stop Route Frequency     tacrolimus capsule 3 mg         -- Oral 2 times daily     mycophenolate capsule 1,000 mg         -- Oral 2 times daily      "     Steroids:   Hormones (From admission, onward)      Start     Stop Route Frequency Ordered    08/16/23 0900  predniSONE tablet 20 mg  (methylprednisolone taper panel)        See Hyperspace for full Linked Orders Report.    -- Oral Daily 08/10/23 0436    08/15/23 0900  methylPREDNISolone sodium succinate injection 40 mg  (methylprednisolone taper panel)        See Hyperspace for full Linked Orders Report.    08/16/23 0859 IV Daily 08/10/23 0436    08/14/23 0900  methylPREDNISolone sodium succinate injection 80 mg  (methylprednisolone taper panel)        See Hyperspace for full Linked Orders Report.    08/15/23 0859 IV Daily 08/10/23 0436    08/13/23 0900  methylPREDNISolone sodium succinate injection 120 mg  (methylprednisolone taper panel)        See Hyperspace for full Linked Orders Report.    08/14/23 0859 IV Daily 08/10/23 0436    08/11/23 2126  melatonin tablet 6 mg         -- Oral Nightly PRN 08/11/23 2026          Pressors:    Autonomic Drugs (From admission, onward)      None          Hyperglycemia/Diabetes Medications:   Antihyperglycemics (From admission, onward)      Start     Stop Route Frequency Ordered    08/12/23 1130  insulin aspart U-100 pen 8 Units         -- SubQ 3 times daily with meals 08/12/23 1103    08/12/23 0830  insulin regular in 0.9 % NaCl 100 unit/100 mL (1 unit/mL) infusion        Question:  Enter initial dose (Units/hr):  Answer:  3.5    -- IV Continuous 08/12/23 0824    08/11/23 0836  insulin aspart U-100 pen 0-10 Units         -- SubQ As needed (PRN) 08/11/23 0737

## 2023-08-13 NOTE — SUBJECTIVE & OBJECTIVE
Scheduled Meds:   acetaminophen  650 mg Oral Q6H    bisacodyL  10 mg Oral QHS    carvediloL  6.25 mg Oral BID    docusate sodium  100 mg Oral TID    famotidine  20 mg Oral QHS    heparin (porcine)  5,000 Units Subcutaneous Q8H    insulin aspart U-100  2-6 Units Subcutaneous TIDWM    LIDOcaine (PF) 10 mg/ml (1%)  10 mL Other Once    [START ON 8/14/2023] methylPREDNISolone sodium succinate injection  80 mg Intravenous Daily    Followed by    [START ON 8/15/2023] methylPREDNISolone sodium succinate injection  40 mg Intravenous Daily    Followed by    [START ON 8/16/2023] predniSONE  20 mg Oral Daily    mupirocin  1 g Nasal BID    mycophenolate  1,000 mg Oral BID    ondansetron  4 mg Oral Once    [START ON 8/17/2023] sulfamethoxazole-trimethoprim 400-80mg  1 tablet Oral Daily AM    tacrolimus  2 mg Oral BID    traZODone  50 mg Oral QHS    [START ON 8/20/2023] valGANciclovir  450 mg Oral Daily     Continuous Infusions:   insulin regular 1 units/mL infusion orderable (TRANSFER)       PRN Meds:dextrose 10%, dextrose 10%, glucagon (human recombinant), glucose, glucose, insulin aspart U-100, melatonin, mupirocin, ondansetron, oxyCODONE, oxyCODONE, sodium chloride 0.9%    Review of Systems   Constitutional:  Positive for activity change, appetite change and fatigue.   Respiratory:  Negative for cough and shortness of breath.    Cardiovascular:  Negative for chest pain, palpitations and leg swelling.   Gastrointestinal:  Positive for abdominal distention and constipation.   Genitourinary: Negative.  Negative for dysuria, hematuria, penile swelling and scrotal swelling.   Musculoskeletal:  Negative for arthralgias.   Skin:  Positive for color change and wound.   Neurological:  Positive for weakness.   Psychiatric/Behavioral:  Negative for behavioral problems.      Objective:     Vital Signs (Most Recent):  Temp: 98.2 °F (36.8 °C) (08/13/23 1113)  Pulse: 83 (08/13/23 1200)  Resp: 16 (08/13/23 1113)  BP: 135/78 (08/13/23  1113)  SpO2: 95 % (08/13/23 1113) Vital Signs (24h Range):  Temp:  [97.4 °F (36.3 °C)-98.5 °F (36.9 °C)] 98.2 °F (36.8 °C)  Pulse:  [81-90] 83  Resp:  [16-20] 16  SpO2:  [94 %-96 %] 95 %  BP: (120-148)/(69-89) 135/78     Weight: 97.3 kg (214 lb 9.6 oz)  Body mass index is 30.79 kg/m².    Intake/Output - Last 3 Shifts         08/11 0700  08/12 0659 08/12 0700 08/13 0659 08/13 0700 08/14 0659    P.O. 1460 950     I.V. (mL/kg) 78.2 (0.8) 102 (1)     IV Piggyback       Total Intake(mL/kg) 1538.2 (15.8) 1052 (10.8)     Urine (mL/kg/hr) 2095 (0.9) 300 (0.1)     Drains 985 270     Stool 0 0     Total Output 3080 570     Net -1541.8 +482            Urine Occurrence  9 x     Stool Occurrence 0 x 6 x 1 x             Physical Exam  Vitals and nursing note reviewed.   Eyes:      General: Scleral icterus present.   Cardiovascular:      Rate and Rhythm: Normal rate and regular rhythm.      Heart sounds: No murmur heard.  Pulmonary:      Effort: No respiratory distress.      Breath sounds: No wheezing.   Abdominal:      General: There is distension.      Tenderness: There is abdominal tenderness.   Musculoskeletal:         General: No swelling or tenderness.      Cervical back: Neck supple.      Right lower leg: No edema.      Left lower leg: No edema.   Skin:     General: Skin is warm and dry.      Capillary Refill: Capillary refill takes less than 2 seconds.   Neurological:      Mental Status: He is alert and oriented to person, place, and time.          Laboratory:  Immunosuppressants           Stop Route Frequency     tacrolimus capsule 2 mg         -- Oral 2 times daily     mycophenolate capsule 1,000 mg         -- Oral 2 times daily          CBC:   Recent Labs   Lab 08/13/23  0650   WBC 25.81*   RBC 4.33*   HGB 13.8*   HCT 39.3*      MCV 91   MCH 31.9*   MCHC 35.1     BMP:   Recent Labs   Lab 08/13/23  0650   *   *   K 3.9   CL 97   CO2 24   BUN 24*   CREATININE 0.7   CALCIUM 7.6*     CMP:   Recent Labs    Lab 08/13/23  0650   *   CALCIUM 7.6*   ALBUMIN 2.4*   PROT 5.1*   *   K 3.9   CO2 24   CL 97   BUN 24*   CREATININE 0.7   ALKPHOS 112   *   *   BILITOT 3.0*     Labs within the past 24 hours have been reviewed.    Diagnostic Results:  US Liver Transplant Post:  Results for orders placed during the hospital encounter of 08/09/23    US Doppler Liver Transplant Post (xpd)    Narrative  EXAMINATION:  US DOPPLER LIVER TRANSPLANT POST (XPD)    CLINICAL HISTORY:  s/p liver transplant;    TECHNIQUE:  Limited abdominal ultrasound of the transplant liver with Doppler evaluation.  Color and spectral Doppler were performed.    COMPARISON:  None.    FINDINGS:  Patient is status post orthotopic liver transplant 08/09/2023.  The liver demonstrates homogeneous echotexture.  No focal hepatic lesions are seen.    Peritransplant fluid collections as below.    Along the left hepatic lobe there is a 0.9 x 3.0 x 2.2 cm collection.    Along the medial right hepatic lobe there is a 3.2 x 7.1 x 4.1 cm mildly complex collection.    Additional anechoic collection near the right hepatic lobe measures 2.4 x 1.3 x 1.9 cm.    The common duct is not dilated, measuring 4 mm.  No dilated intrahepatic radicles are seen.    Color flow and spectral waveform analysis was performed.  The main portal vein, right portal vein, left portal vein, middle hepatic vein, right hepatic vein, left hepatic vein, and IVC are patent with proper directional flow.    Anastomosis site of the main hepatic artery demonstrates a peak systolic velocity 199 cm/sec.    Main hepatic artery demonstrates resistive index 0.81 with normal waveform.    Left hepatic artery shows resistive index 0.81 with normal waveform.    Anterior branch of the right hepatic artery demonstrates resistive index 0.72 with normal waveform.    Posterior branch of the right hepatic artery demonstrates resistive index 0.70 with normal waveform.    Impression  Satisfactory  Doppler evaluation of postoperative day 1 liver allograft.    Several peritransplant fluid collections, as above.    Findings were relayed to Dr. Baum on 08/10/2023 at 09:02.    Electronically signed by resident: Jed Watkins  Date:    08/10/2023  Time:    08:33    Electronically signed by: Raj Kang  Date:    08/10/2023  Time:    09:03    Debility/Functional status: Patient debilitated by evidence of Weakness. Physical and occupational therapy ordered daily to evaluate and treat. Debility was: present on admission.

## 2023-08-13 NOTE — PROGRESS NOTES
[facility] - Transplant Stepdown  Liver Transplant  Progress Note    Patient Name: Jed Chávez  MRN: [id number]  Admission Date: 8/9/2023  Hospital Length of Stay: 4 days  Code Status: Full Code  Primary Care Provider: Alee Pink MD  Post-Operative Day: 3    ORGAN:   LIVER  Disease Etiology: Primary Liver Malignancy: Hepatoma (HCC) and Cirrhosis  Donor Type:   Donation after Circulatory Death   CDC High Risk:   No  Donor CMV Status:   Donor CMV Status: Positive  Donor HBcAB:   Negative  Donor HCV Status:   Negative  Donor HBV SURENDRA:   Donor HCV SURENDRA: Negative  Whole or Partial: Whole Liver  Biliary Anastomosis: End to End  Arterial Anatomy: Standard  Subjective:     History of Present Illness:  Mr. 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 Reports feeling in his usual state of health. Denies recent hospitalizations or illnesses. Of note, has a history of a hypertensive CVA in 2015. No focal deficits noted, BP well controlled with current medications. Pre op labs and diagnostics pending. Tentative OR times 2030/2200 with Dr. Suero as primary surgeon.       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 Of note, has a history of a hypertensive CVA in 2015. No focal deficits noted. Stepped down on 8/11      Hospital Interval: NAEON, Doing well post op.#3 LFT's continue to trend down. He is tolerating diet small meals. (+)flatus/BM,  Pt/OT working with patient.  Continue Coreg 6.25, Plan to discharge in 2 days, will monitor. VSS, Monitor       Scheduled Meds:   acetaminophen  650 mg Oral Q6H    bisacodyL  10 mg Oral QHS    carvediloL  6.25 mg Oral BID    docusate sodium  100 mg Oral TID    famotidine  20 mg Oral QHS    heparin (porcine)  5,000 Units Subcutaneous Q8H    insulin aspart U-100  2-6 Units Subcutaneous TIDWM    LIDOcaine (PF) 10 mg/ml (1%)  10 mL Other Once    [START ON 8/14/2023] methylPREDNISolone sodium  succinate injection  80 mg Intravenous Daily    Followed by    [START ON 8/15/2023] methylPREDNISolone sodium succinate injection  40 mg Intravenous Daily    Followed by    [START ON 8/16/2023] predniSONE  20 mg Oral Daily    mupirocin  1 g Nasal BID    mycophenolate  1,000 mg Oral BID    ondansetron  4 mg Oral Once    [START ON 8/17/2023] sulfamethoxazole-trimethoprim 400-80mg  1 tablet Oral Daily AM    tacrolimus  2 mg Oral BID    traZODone  50 mg Oral QHS    [START ON 8/20/2023] valGANciclovir  450 mg Oral Daily     Continuous Infusions:   insulin regular 1 units/mL infusion orderable (TRANSFER)       PRN Meds:dextrose 10%, dextrose 10%, glucagon (human recombinant), glucose, glucose, insulin aspart U-100, melatonin, mupirocin, ondansetron, oxyCODONE, oxyCODONE, sodium chloride 0.9%    Review of Systems   Constitutional:  Positive for activity change, appetite change and fatigue.   Respiratory:  Negative for cough and shortness of breath.    Cardiovascular:  Negative for chest pain, palpitations and leg swelling.   Gastrointestinal:  Positive for abdominal distention and constipation.   Genitourinary: Negative.  Negative for dysuria, hematuria, penile swelling and scrotal swelling.   Musculoskeletal:  Negative for arthralgias.   Skin:  Positive for color change and wound.   Neurological:  Positive for weakness.   Psychiatric/Behavioral:  Negative for behavioral problems.      Objective:     Vital Signs (Most Recent):  Temp: 98.2 °F (36.8 °C) (08/13/23 1113)  Pulse: 83 (08/13/23 1200)  Resp: 16 (08/13/23 1113)  BP: 135/78 (08/13/23 1113)  SpO2: 95 % (08/13/23 1113) Vital Signs (24h Range):  Temp:  [97.4 °F (36.3 °C)-98.5 °F (36.9 °C)] 98.2 °F (36.8 °C)  Pulse:  [81-90] 83  Resp:  [16-20] 16  SpO2:  [94 %-96 %] 95 %  BP: (120-148)/(69-89) 135/78     Weight: 97.3 kg (214 lb 9.6 oz)  Body mass index is 30.79 kg/m².    Intake/Output - Last 3 Shifts         08/11 0700 08/12 0659 08/12 0700 08/13 0659 08/13  0700  08/14 0659    P.O. 1460 950     I.V. (mL/kg) 78.2 (0.8) 102 (1)     IV Piggyback       Total Intake(mL/kg) 1538.2 (15.8) 1052 (10.8)     Urine (mL/kg/hr) 2095 (0.9) 300 (0.1)     Drains 985 270     Stool 0 0     Total Output 3080 570     Net -1541.8 +482            Urine Occurrence  9 x     Stool Occurrence 0 x 6 x 1 x             Physical Exam  Vitals and nursing note reviewed.   Eyes:      General: Scleral icterus present.   Cardiovascular:      Rate and Rhythm: Normal rate and regular rhythm.      Heart sounds: No murmur heard.  Pulmonary:      Effort: No respiratory distress.      Breath sounds: No wheezing.   Abdominal:      General: There is distension.      Tenderness: There is abdominal tenderness.   Musculoskeletal:         General: No swelling or tenderness.      Cervical back: Neck supple.      Right lower leg: No edema.      Left lower leg: No edema.   Skin:     General: Skin is warm and dry.      Capillary Refill: Capillary refill takes less than 2 seconds.   Neurological:      Mental Status: He is alert and oriented to person, place, and time.          Laboratory:  Immunosuppressants           Stop Route Frequency     tacrolimus capsule 2 mg         -- Oral 2 times daily     mycophenolate capsule 1,000 mg         -- Oral 2 times daily          CBC:   Recent Labs   Lab 08/13/23  0650   WBC 25.81*   RBC 4.33*   HGB 13.8*   HCT 39.3*      MCV 91   MCH 31.9*   MCHC 35.1     BMP:   Recent Labs   Lab 08/13/23  0650   *   *   K 3.9   CL 97   CO2 24   BUN 24*   CREATININE 0.7   CALCIUM 7.6*     CMP:   Recent Labs   Lab 08/13/23  0650   *   CALCIUM 7.6*   ALBUMIN 2.4*   PROT 5.1*   *   K 3.9   CO2 24   CL 97   BUN 24*   CREATININE 0.7   ALKPHOS 112   *   *   BILITOT 3.0*     Labs within the past 24 hours have been reviewed.    Diagnostic Results:  US Liver Transplant Post:  Results for orders placed during the hospital encounter of 08/09/23    US Doppler  Liver Transplant Post (xpd)    Narrative  EXAMINATION:  US DOPPLER LIVER TRANSPLANT POST (XPD)    CLINICAL HISTORY:  s/p liver transplant;    TECHNIQUE:  Limited abdominal ultrasound of the transplant liver with Doppler evaluation.  Color and spectral Doppler were performed.    COMPARISON:  None.    FINDINGS:  Patient is status post orthotopic liver transplant 08/09/2023.  The liver demonstrates homogeneous echotexture.  No focal hepatic lesions are seen.    Peritransplant fluid collections as below.    Along the left hepatic lobe there is a 0.9 x 3.0 x 2.2 cm collection.    Along the medial right hepatic lobe there is a 3.2 x 7.1 x 4.1 cm mildly complex collection.    Additional anechoic collection near the right hepatic lobe measures 2.4 x 1.3 x 1.9 cm.    The common duct is not dilated, measuring 4 mm.  No dilated intrahepatic radicles are seen.    Color flow and spectral waveform analysis was performed.  The main portal vein, right portal vein, left portal vein, middle hepatic vein, right hepatic vein, left hepatic vein, and IVC are patent with proper directional flow.    Anastomosis site of the main hepatic artery demonstrates a peak systolic velocity 199 cm/sec.    Main hepatic artery demonstrates resistive index 0.81 with normal waveform.    Left hepatic artery shows resistive index 0.81 with normal waveform.    Anterior branch of the right hepatic artery demonstrates resistive index 0.72 with normal waveform.    Posterior branch of the right hepatic artery demonstrates resistive index 0.70 with normal waveform.    Impression  Satisfactory Doppler evaluation of postoperative day 1 liver allograft.    Several peritransplant fluid collections, as above.    Findings were relayed to Dr. Baum on 08/10/2023 at 09:02.    Electronically signed by resident: Jed Watkins  Date:    08/10/2023  Time:    08:33    Electronically signed by: Raj Kang  Date:    08/10/2023  Time:    09:03    Debility/Functional  status: Patient debilitated by evidence of Weakness. Physical and occupational therapy ordered daily to evaluate and treat. Debility was: present on admission.    Assessment/Plan:     * Status post liver transplant  64 y/o male with ESRD 2/2 HCC s/p DCD OLTxp 8/10     Prophylactic immunotherapy  - continue prograf  - continue to monitor for toxic side effects and check daily levels. Will adjust for therapeutic dose          Long-term use of immunosuppressant medication  - see prophylactic immunotherapy      At risk for opportunistic infections  - Bactrim for PCP ppx.  - Valcyte for CMV ppx.      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 Expect to improve post transplant.        Type 2 diabetes mellitus, without long-term current use of insulin  - Consult Endocrine post op for assistance in BG management.        VTE Risk Mitigation (From admission, onward)         Ordered     heparin (porcine) injection 5,000 Units  Every 8 hours         08/10/23 0436     Place sequential compression device  Until discontinued         08/10/23 0436     IP VTE HIGH RISK PATIENT  Once         08/10/23 0436                The patients clinical status was discussed at multidisplinary rounds, involving transplant surgery, transplant medicine, pharmacy, nursing, nutrition, and social work    Discharge Planning:  No Patient Care Coordination Note on file.      Shadia Parkinson, FNP  Liver Transplant  [facility] - Transplant Stepdown

## 2023-08-14 PROBLEM — E87.1 HYPONATREMIA: Status: ACTIVE | Noted: 2023-08-14

## 2023-08-14 LAB
ALBUMIN SERPL BCP-MCNC: 2.4 G/DL (ref 3.5–5.2)
ALP SERPL-CCNC: 98 U/L (ref 55–135)
ALT SERPL W/O P-5'-P-CCNC: 154 U/L (ref 10–44)
ANION GAP SERPL CALC-SCNC: 7 MMOL/L (ref 8–16)
AST SERPL-CCNC: 48 U/L (ref 10–40)
BASOPHILS # BLD AUTO: 0.03 K/UL (ref 0–0.2)
BASOPHILS NFR BLD: 0.2 % (ref 0–1.9)
BILIRUB SERPL-MCNC: 2.7 MG/DL (ref 0.1–1)
BUN SERPL-MCNC: 35 MG/DL (ref 8–23)
CALCIUM SERPL-MCNC: 7.5 MG/DL (ref 8.7–10.5)
CHLORIDE SERPL-SCNC: 96 MMOL/L (ref 95–110)
CO2 SERPL-SCNC: 24 MMOL/L (ref 23–29)
CREAT SERPL-MCNC: 0.8 MG/DL (ref 0.5–1.4)
DIFFERENTIAL METHOD: ABNORMAL
EOSINOPHIL # BLD AUTO: 0.1 K/UL (ref 0–0.5)
EOSINOPHIL NFR BLD: 0.4 % (ref 0–8)
ERYTHROCYTE [DISTWIDTH] IN BLOOD BY AUTOMATED COUNT: 15.4 % (ref 11.5–14.5)
EST. GFR  (NO RACE VARIABLE): >60 ML/MIN/1.73 M^2
GLUCOSE SERPL-MCNC: 186 MG/DL (ref 70–110)
HCT VFR BLD AUTO: 37.1 % (ref 40–54)
HGB BLD-MCNC: 13.3 G/DL (ref 14–18)
IMM GRANULOCYTES # BLD AUTO: 0.16 K/UL (ref 0–0.04)
IMM GRANULOCYTES NFR BLD AUTO: 0.8 % (ref 0–0.5)
INR PPP: 1.1 (ref 0.8–1.2)
LYMPHOCYTES # BLD AUTO: 1.6 K/UL (ref 1–4.8)
LYMPHOCYTES NFR BLD: 8 % (ref 18–48)
MAGNESIUM SERPL-MCNC: 2.1 MG/DL (ref 1.6–2.6)
MCH RBC QN AUTO: 31.8 PG (ref 27–31)
MCHC RBC AUTO-ENTMCNC: 35.8 G/DL (ref 32–36)
MCV RBC AUTO: 89 FL (ref 82–98)
MONOCYTES # BLD AUTO: 2 K/UL (ref 0.3–1)
MONOCYTES NFR BLD: 10 % (ref 4–15)
NEUTROPHILS # BLD AUTO: 15.9 K/UL (ref 1.8–7.7)
NEUTROPHILS NFR BLD: 80.6 % (ref 38–73)
NRBC BLD-RTO: 0 /100 WBC
PLATELET # BLD AUTO: 157 K/UL (ref 150–450)
PMV BLD AUTO: 10.7 FL (ref 9.2–12.9)
POCT GLUCOSE: 179 MG/DL (ref 70–110)
POCT GLUCOSE: 185 MG/DL (ref 70–110)
POCT GLUCOSE: 195 MG/DL (ref 70–110)
POCT GLUCOSE: 221 MG/DL (ref 70–110)
POCT GLUCOSE: 262 MG/DL (ref 70–110)
POTASSIUM SERPL-SCNC: 3.7 MMOL/L (ref 3.5–5.1)
PROT SERPL-MCNC: 4.7 G/DL (ref 6–8.4)
PROTHROMBIN TIME: 12.2 SEC (ref 9–12.5)
RBC # BLD AUTO: 4.18 M/UL (ref 4.6–6.2)
SODIUM SERPL-SCNC: 127 MMOL/L (ref 136–145)
TACROLIMUS BLD-MCNC: 10.2 NG/ML (ref 5–15)
WBC # BLD AUTO: 19.76 K/UL (ref 3.9–12.7)

## 2023-08-14 PROCEDURE — 97116 GAIT TRAINING THERAPY: CPT | Mod: CQ

## 2023-08-14 PROCEDURE — 36415 COLL VENOUS BLD VENIPUNCTURE: CPT | Performed by: PHYSICIAN ASSISTANT

## 2023-08-14 PROCEDURE — 85610 PROTHROMBIN TIME: CPT | Performed by: STUDENT IN AN ORGANIZED HEALTH CARE EDUCATION/TRAINING PROGRAM

## 2023-08-14 PROCEDURE — 97535 SELF CARE MNGMENT TRAINING: CPT | Mod: CO

## 2023-08-14 PROCEDURE — 99233 PR SUBSEQUENT HOSPITAL CARE,LEVL III: ICD-10-PCS | Mod: 24,,, | Performed by: NURSE PRACTITIONER

## 2023-08-14 PROCEDURE — 85025 COMPLETE CBC W/AUTO DIFF WBC: CPT | Performed by: STUDENT IN AN ORGANIZED HEALTH CARE EDUCATION/TRAINING PROGRAM

## 2023-08-14 PROCEDURE — 97530 THERAPEUTIC ACTIVITIES: CPT | Mod: CO

## 2023-08-14 PROCEDURE — 20600001 HC STEP DOWN PRIVATE ROOM

## 2023-08-14 PROCEDURE — 25000003 PHARM REV CODE 250: Performed by: NURSE PRACTITIONER

## 2023-08-14 PROCEDURE — P9047 ALBUMIN (HUMAN), 25%, 50ML: HCPCS | Mod: JZ,JG | Performed by: NURSE PRACTITIONER

## 2023-08-14 PROCEDURE — 80053 COMPREHEN METABOLIC PANEL: CPT | Performed by: STUDENT IN AN ORGANIZED HEALTH CARE EDUCATION/TRAINING PROGRAM

## 2023-08-14 PROCEDURE — 63600175 PHARM REV CODE 636 W HCPCS: Performed by: STUDENT IN AN ORGANIZED HEALTH CARE EDUCATION/TRAINING PROGRAM

## 2023-08-14 PROCEDURE — 25000003 PHARM REV CODE 250: Performed by: STUDENT IN AN ORGANIZED HEALTH CARE EDUCATION/TRAINING PROGRAM

## 2023-08-14 PROCEDURE — 25000003 PHARM REV CODE 250: Performed by: PHYSICIAN ASSISTANT

## 2023-08-14 PROCEDURE — 25000003 PHARM REV CODE 250

## 2023-08-14 PROCEDURE — 97110 THERAPEUTIC EXERCISES: CPT | Mod: CQ

## 2023-08-14 PROCEDURE — 63600175 PHARM REV CODE 636 W HCPCS: Performed by: TRANSPLANT SURGERY

## 2023-08-14 PROCEDURE — 80197 ASSAY OF TACROLIMUS: CPT | Performed by: STUDENT IN AN ORGANIZED HEALTH CARE EDUCATION/TRAINING PROGRAM

## 2023-08-14 PROCEDURE — 63600175 PHARM REV CODE 636 W HCPCS: Mod: JZ,JG | Performed by: NURSE PRACTITIONER

## 2023-08-14 PROCEDURE — 99232 SBSQ HOSP IP/OBS MODERATE 35: CPT | Mod: ,,, | Performed by: PHYSICIAN ASSISTANT

## 2023-08-14 PROCEDURE — 99233 SBSQ HOSP IP/OBS HIGH 50: CPT | Mod: 24,,, | Performed by: NURSE PRACTITIONER

## 2023-08-14 PROCEDURE — 99232 PR SUBSEQUENT HOSPITAL CARE,LEVL II: ICD-10-PCS | Mod: ,,, | Performed by: PHYSICIAN ASSISTANT

## 2023-08-14 PROCEDURE — 83735 ASSAY OF MAGNESIUM: CPT | Performed by: PHYSICIAN ASSISTANT

## 2023-08-14 RX ORDER — SIMETHICONE 80 MG
1 TABLET,CHEWABLE ORAL 3 TIMES DAILY PRN
Status: DISCONTINUED | OUTPATIENT
Start: 2023-08-14 | End: 2023-08-16 | Stop reason: HOSPADM

## 2023-08-14 RX ORDER — INSULIN ASPART 100 [IU]/ML
4-8 INJECTION, SOLUTION INTRAVENOUS; SUBCUTANEOUS
Status: DISCONTINUED | OUTPATIENT
Start: 2023-08-14 | End: 2023-08-15

## 2023-08-14 RX ORDER — ALBUMIN HUMAN 250 G/1000ML
25 SOLUTION INTRAVENOUS ONCE
Status: COMPLETED | OUTPATIENT
Start: 2023-08-14 | End: 2023-08-14

## 2023-08-14 RX ADMIN — INSULIN ASPART 2 UNITS: 100 INJECTION, SOLUTION INTRAVENOUS; SUBCUTANEOUS at 12:08

## 2023-08-14 RX ADMIN — HEPARIN SODIUM 5000 UNITS: 5000 INJECTION INTRAVENOUS; SUBCUTANEOUS at 08:08

## 2023-08-14 RX ADMIN — HEPARIN SODIUM 5000 UNITS: 5000 INJECTION INTRAVENOUS; SUBCUTANEOUS at 05:08

## 2023-08-14 RX ADMIN — INSULIN ASPART 2 UNITS: 100 INJECTION, SOLUTION INTRAVENOUS; SUBCUTANEOUS at 06:08

## 2023-08-14 RX ADMIN — MUPIROCIN 1 G: 20 OINTMENT TOPICAL at 07:08

## 2023-08-14 RX ADMIN — TACROLIMUS 2 MG: 1 CAPSULE ORAL at 05:08

## 2023-08-14 RX ADMIN — ALBUMIN (HUMAN) 25 G: 12.5 SOLUTION INTRAVENOUS at 12:08

## 2023-08-14 RX ADMIN — METHYLPREDNISOLONE SODIUM SUCCINATE 80 MG: 40 INJECTION, POWDER, FOR SOLUTION INTRAMUSCULAR; INTRAVENOUS at 08:08

## 2023-08-14 RX ADMIN — MUPIROCIN 1 G: 20 OINTMENT TOPICAL at 08:08

## 2023-08-14 RX ADMIN — ACETAMINOPHEN 650 MG: 325 TABLET ORAL at 05:08

## 2023-08-14 RX ADMIN — MYCOPHENOLATE MOFETIL 1000 MG: 250 CAPSULE ORAL at 07:08

## 2023-08-14 RX ADMIN — INSULIN ASPART 2 UNITS: 100 INJECTION, SOLUTION INTRAVENOUS; SUBCUTANEOUS at 09:08

## 2023-08-14 RX ADMIN — ACETAMINOPHEN 650 MG: 325 TABLET ORAL at 11:08

## 2023-08-14 RX ADMIN — MYCOPHENOLATE MOFETIL 1000 MG: 250 CAPSULE ORAL at 08:08

## 2023-08-14 RX ADMIN — SIMETHICONE 80 MG: 80 TABLET, CHEWABLE ORAL at 05:08

## 2023-08-14 RX ADMIN — INSULIN ASPART 2 UNITS: 100 INJECTION, SOLUTION INTRAVENOUS; SUBCUTANEOUS at 08:08

## 2023-08-14 RX ADMIN — TRAZODONE HYDROCHLORIDE 50 MG: 50 TABLET ORAL at 08:08

## 2023-08-14 RX ADMIN — INSULIN ASPART 2 UNITS: 100 INJECTION, SOLUTION INTRAVENOUS; SUBCUTANEOUS at 02:08

## 2023-08-14 RX ADMIN — TACROLIMUS 2 MG: 1 CAPSULE ORAL at 08:08

## 2023-08-14 RX ADMIN — INSULIN HUMAN 1.2 UNITS/HR: 1 INJECTION, SOLUTION INTRAVENOUS at 06:08

## 2023-08-14 RX ADMIN — FAMOTIDINE 20 MG: 20 TABLET, FILM COATED ORAL at 07:08

## 2023-08-14 RX ADMIN — HEPARIN SODIUM 5000 UNITS: 5000 INJECTION INTRAVENOUS; SUBCUTANEOUS at 03:08

## 2023-08-14 RX ADMIN — INSULIN ASPART 4 UNITS: 100 INJECTION, SOLUTION INTRAVENOUS; SUBCUTANEOUS at 06:08

## 2023-08-14 RX ADMIN — ACETAMINOPHEN 650 MG: 325 TABLET ORAL at 12:08

## 2023-08-14 NOTE — PT/OT/SLP PROGRESS
Occupational Therapy   Treatment    Name: Jed Chávez  MRN: [id number]  Admitting Diagnosis:  Status post liver transplant  5 Days Post-Op    Recommendations:     Discharge Recommendations: other (see comments)  Discharge Equipment Recommendations:  shower chair  Barriers to discharge:       Assessment:     Jed Chávez is a 63 y.o. male with a medical diagnosis of Status post liver transplant.  He presents with the following performance deficits affecting function: weakness, impaired endurance, impaired functional mobility, gait instability, edema, impaired self care skills.     Pt agreeable to therapy and tolerated session well. Pt requires light assistance with transfers and ADLs. Pt ambulating without AD within room. He is motivation to regain function.     Rehab Prognosis:  Good; patient would benefit from acute skilled OT services to address these deficits and reach maximum level of function.       Plan:     Patient to be seen 3 x/week to address the above listed problems via self-care/home management, therapeutic activities, therapeutic exercises  Plan of Care Expires:    Plan of Care Reviewed with: patient    Subjective     Patient/Family Comments/goals: to regain functional independence  Pain/Comfort:  Pain Rating 1: other (see comments) (unrated, discomfort)  Location - Orientation 1: generalized  Location 1: abdomen  Pain Addressed 1: Reposition, Distraction  Pain Rating Post-Intervention 1: other (see comments) (unrated)    Objective:     Communicated with: RN prior to session.  Patient found up in chair with pulse ox (continuous), peripheral IV, telemetry upon OT entry to room.  A client care conference was completed by the OTR and the TORREZ prior to treatment by the TORREZ to discuss the patient's POC and current status.  Treatment supervised by LEVY Patterson.    General Precautions: Standard, fall    Orthopedic Precautions:N/A  Braces: N/A  Respiratory Status: Room air     Occupational  Performance:     Functional Mobility/Transfers:  Patient completed Sit <> Stand Transfer with stand by assistance  with  no assistive device   Patient completed Bed <> Chair Transfer using Step Transfer technique with stand by assistance with no assistive device  Patient completed Toilet Transfer Step Transfer technique with stand by assistance with  no AD  Functional Mobility: pt ambulated with SBA using no AD from bedside chair to bathroom x2 trials. No LOB or SOB noted    Activities of Daily Living:  Grooming: stand by assistance standing at sink for facial care and oral hygiene  Upper Body Dressing: stand by assistance seated on toilet to don/doff tshirt  Lower Body Dressing: minimum assistance to don underwear seated on toilet, assisted underwear over ankles  Toileting: stand by assistance on standard toilet with pericare and clothing mgmt      [facility] 6 Click ADL: 21    Treatment & Education:  Pt educated on OT POC and frequency during hospital stay.     Pt educated on dressing tips to improve function independence.     Addressed all patient questions/concerns within TORREZ scope of practice.    Patient left up in chair with all lines intact, call button in reach, RN notified, and wife present    GOALS:   Multidisciplinary Problems       Occupational Therapy Goals          Problem: Occupational Therapy    Goal Priority Disciplines Outcome Interventions   Occupational Therapy Goal     OT, PT/OT Ongoing, Progressing    Description: Goals to be met by: 9/12/23 (1 month)     Patient will increase functional independence with ADLs by performing:    Grooming while standing at sink with Supervision.  Toileting from toilet with Modified [place] for hygiene and clothing management.   Rolling to Bilateral with [place].   Supine to sit with [place].  Step transfer with Modified [place]  Toilet transfer to toilet with Modified [place].                       Time Tracking:     OT Date of Treatment:  08/14/23  OT Start Time: 1003  OT Stop Time: 1033  OT Total Time (min): 30 min    Billable Minutes:Self Care/Home Management 15  Therapeutic Activity 15    OT/JEROME: JEROME     Number of JEROME visits since last OT visit: 1    8/14/2023

## 2023-08-14 NOTE — PROGRESS NOTES
[facility]  Liver Transplant  Progress Note    Patient Name: Jed Chávez  MRN: [id number]  Admission Date: 8/9/2023  Hospital Length of Stay: 5 days  Code Status: Full Code  Primary Care Provider: Alee Pink MD  Post-Operative Day: 4    ORGAN:   LIVER  Disease Etiology: Primary Liver Malignancy: Hepatoma (HCC) and Cirrhosis  Donor Type:   Donation after Circulatory Death   CDC High Risk:   No  Donor CMV Status:   Donor CMV Status: Positive  Donor HBcAB:   Negative  Donor HCV Status:   Negative  Donor HBV SURENDRA:   Donor HCV SUREDNRA: Negative  Whole or Partial: Whole Liver  Biliary Anastomosis: End to End  Arterial Anatomy: Standard  Subjective:     History of Present Illness:  Mr. 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 Reports feeling in his usual state of health. Denies recent hospitalizations or illnesses. Of note, has a history of a hypertensive CVA in 2015. No focal deficits noted, BP well controlled with current medications. Pre op labs and diagnostics pending. Tentative OR times 2030/2200 with Dr. Suero as primary surgeon.       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 Of note, has a history of a hypertensive CVA in 2015. No focal deficits noted. Stepped down on 8/11      Hospital Interval: No acute events overnight, progressing well POD #4. LFT's continue to trend down. Na trended down but stable at 127- continue 1L fluid restriction. Chevron w serous drainage to right end, albumin x1 given. He is AOx4. He is tolerating diet w small meals. Supplements ordered. (+)flatus/BM.  PT/OT working with patient.  Trazedone started last night, sleep has improved. Encourage Incentive Spirometer. Continue Coreg 6.25, Plan for routine Liver US and possible discharge tomorrow or Wednesday. VSS. Monitor.      Scheduled Meds:   acetaminophen  650 mg Oral Q6H    bisacodyL  10 mg Oral QHS    carvediloL   6.25 mg Oral BID    famotidine  20 mg Oral QHS    heparin (porcine)  5,000 Units Subcutaneous Q8H    insulin aspart U-100  2-6 Units Subcutaneous TIDWM    LIDOcaine (PF) 10 mg/ml (1%)  10 mL Other Once    [START ON 8/15/2023] methylPREDNISolone sodium succinate injection  40 mg Intravenous Daily    Followed by    [START ON 8/16/2023] predniSONE  20 mg Oral Daily    mupirocin  1 g Nasal BID    mycophenolate  1,000 mg Oral BID    ondansetron  4 mg Oral Once    [START ON 8/17/2023] sulfamethoxazole-trimethoprim 400-80mg  1 tablet Oral Daily AM    tacrolimus  2 mg Oral BID    traZODone  50 mg Oral QHS    [START ON 8/20/2023] valGANciclovir  450 mg Oral Daily     Continuous Infusions:   insulin regular 1 units/mL infusion orderable (TRANSFER) 1.2 Units/hr (08/14/23 0608)     PRN Meds:dextrose 10%, dextrose 10%, glucagon (human recombinant), glucose, glucose, insulin aspart U-100, melatonin, mupirocin, ondansetron, oxyCODONE, oxyCODONE, simethicone, sodium chloride 0.9%    Review of Systems   Constitutional:  Positive for activity change, appetite change and fatigue.   Respiratory:  Negative for cough and shortness of breath.    Cardiovascular:  Negative for chest pain, palpitations and leg swelling.   Gastrointestinal:  Positive for abdominal distention. Negative for constipation.   Genitourinary: Negative.  Negative for dysuria, hematuria, penile swelling and scrotal swelling.   Musculoskeletal:  Negative for arthralgias.   Skin:  Positive for color change and wound.   Allergic/Immunologic: Positive for immunocompromised state.   Neurological:  Positive for weakness.   Psychiatric/Behavioral:  Negative for behavioral problems.      Objective:     Vital Signs (Most Recent):  Temp: 97.9 °F (36.6 °C) (08/14/23 0828)  Pulse: 83 (08/14/23 0828)  Resp: 18 (08/14/23 0828)  BP: 116/69 (08/14/23 0828)  SpO2: 97 % (08/14/23 0828) Vital Signs (24h Range):  Temp:  [97.6 °F (36.4 °C)-98.4 °F (36.9 °C)] 97.9 °F (36.6 °C)  Pulse:   [] 83  Resp:  [16-20] 18  SpO2:  [93 %-97 %] 97 %  BP: (106-135)/(69-79) 116/69     Weight: 97.3 kg (214 lb 9.6 oz)  Body mass index is 30.79 kg/m².    Intake/Output - Last 3 Shifts          0700  / 0659  0700   0659  0700  08/15 0659    P.O. 950 820     I.V. (mL/kg) 102 (1)      Total Intake(mL/kg) 1052 (10.8) 820 (8.4)     Urine (mL/kg/hr) 300 (0.1) 700 (0.3)     Drains 270      Stool 0 0     Total Output 570 700     Net +482 +120            Urine Occurrence 9 x 5 x     Stool Occurrence 6 x 6 x              Physical Exam  Vitals and nursing note reviewed.   Eyes:      General: Scleral icterus present.   Cardiovascular:      Rate and Rhythm: Normal rate and regular rhythm.      Heart sounds: No murmur heard.  Pulmonary:      Effort: No respiratory distress.      Breath sounds: No wheezing.   Abdominal:      General: There is distension.      Tenderness: There is abdominal tenderness.   Musculoskeletal:         General: No swelling or tenderness.      Cervical back: Neck supple.      Right lower le+ Pitting Edema present.      Left lower le+ Pitting Edema present.   Skin:     General: Skin is warm and dry.      Capillary Refill: Capillary refill takes less than 2 seconds.   Neurological:      Mental Status: He is alert and oriented to person, place, and time.          Laboratory:  Immunosuppressants           Stop Route Frequency     tacrolimus capsule 2 mg         -- Oral 2 times daily     mycophenolate capsule 1,000 mg         -- Oral 2 times daily          CBC:   Recent Labs   Lab 23  06   WBC 19.76*   RBC 4.18*   HGB 13.3*   HCT 37.1*      MCV 89   MCH 31.8*   MCHC 35.8     BMP:   Recent Labs   Lab 23   *   *   K 3.7   CL 96   CO2 24   BUN 35*   CREATININE 0.8   CALCIUM 7.5*     CMP:   Recent Labs   Lab 23   *   CALCIUM 7.5*   ALBUMIN 2.4*   PROT 4.7*   *   K 3.7   CO2 24   CL 96   BUN 35*   CREATININE 0.8    ALKPHOS 98   *   AST 48*   BILITOT 2.7*     Labs within the past 24 hours have been reviewed.    Diagnostic Results:  US Liver Transplant Post:  Results for orders placed during the hospital encounter of 08/09/23    US Doppler Liver Transplant Post (xpd)    Narrative  EXAMINATION:  US DOPPLER LIVER TRANSPLANT POST (XPD)    CLINICAL HISTORY:  s/p liver transplant;    TECHNIQUE:  Limited abdominal ultrasound of the transplant liver with Doppler evaluation.  Color and spectral Doppler were performed.    COMPARISON:  None.    FINDINGS:  Patient is status post orthotopic liver transplant 08/09/2023.  The liver demonstrates homogeneous echotexture.  No focal hepatic lesions are seen.    Peritransplant fluid collections as below.    Along the left hepatic lobe there is a 0.9 x 3.0 x 2.2 cm collection.    Along the medial right hepatic lobe there is a 3.2 x 7.1 x 4.1 cm mildly complex collection.    Additional anechoic collection near the right hepatic lobe measures 2.4 x 1.3 x 1.9 cm.    The common duct is not dilated, measuring 4 mm.  No dilated intrahepatic radicles are seen.    Color flow and spectral waveform analysis was performed.  The main portal vein, right portal vein, left portal vein, middle hepatic vein, right hepatic vein, left hepatic vein, and IVC are patent with proper directional flow.    Anastomosis site of the main hepatic artery demonstrates a peak systolic velocity 199 cm/sec.    Main hepatic artery demonstrates resistive index 0.81 with normal waveform.    Left hepatic artery shows resistive index 0.81 with normal waveform.    Anterior branch of the right hepatic artery demonstrates resistive index 0.72 with normal waveform.    Posterior branch of the right hepatic artery demonstrates resistive index 0.70 with normal waveform.    Impression  Satisfactory Doppler evaluation of postoperative day 1 liver allograft.    Several peritransplant fluid collections, as above.    Findings were relayed  to Dr. Baum on 08/10/2023 at 09:02.    Electronically signed by resident: Jed Watkins  Date:    08/10/2023  Time:    08:33    Electronically signed by: Raj Kang  Date:    08/10/2023  Time:    09:03    Debility/Functional status: No debility noted.  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 Will adjust for therapeutic dose          Long-term use of immunosuppressant medication  - see prophylactic immunotherapy      At risk for opportunistic infections  - Bactrim for PCP ppx.  - Valcyte for CMV ppx.      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 Improved post transplant.        Type 2 diabetes mellitus, without long-term current use of insulin  - Consult Endocrine post op for assistance in BG management.      Hypertension  - cont to monitor        VTE Risk Mitigation (From admission, onward)           Ordered     heparin (porcine) injection 5,000 Units  Every 8 hours         08/10/23 0436     Place sequential compression device  Until discontinued         08/10/23 0436     IP VTE HIGH RISK PATIENT  Once         08/10/23 0436                    The patients clinical status was discussed at multidisplinary rounds, involving transplant surgery, transplant medicine,  pharmacy, nursing, nutrition, and social work    Discharge Planning:  Discussed plan of care.  No plan for discharge today.    Greater than 30 minutes spent with patient discussing diagnosis including reviewing labs and imaging and plan of care.      Shadia Magallanes NP  Liver Transplant  [facility]

## 2023-08-14 NOTE — SUBJECTIVE & OBJECTIVE
"Interval HPI:   No acute events overnight. Patient in room 97563/63318 A. Blood glucose stable. BG at and above goal on current insulin regimen (Transition Insulin Drip). Steroid use- Methylprednisolone 80mg .   4 Days Post-Op  Renal function- Normal   Vasopressors-  None     Diet diabetic 2000 Calorie     Eating:   <25%  Nausea: No  Hypoglycemia and intervention: No  Fever: No  TPN and/or TF: No    /69   Pulse 83   Temp 97.9 °F (36.6 °C) (Oral)   Resp 18   Ht 5' 10" (1.778 m)   Wt 97.3 kg (214 lb 9.6 oz)   SpO2 97%   BMI 30.79 kg/m²     Labs Reviewed and Include    Recent Labs   Lab 08/14/23  0628   *   CALCIUM 7.5*   ALBUMIN 2.4*   PROT 4.7*   *   K 3.7   CO2 24   CL 96   BUN 35*   CREATININE 0.8   ALKPHOS 98   *   AST 48*   BILITOT 2.7*     Lab Results   Component Value Date    WBC 19.76 (H) 08/14/2023    HGB 13.3 (L) 08/14/2023    HCT 37.1 (L) 08/14/2023    MCV 89 08/14/2023     08/14/2023     No results for input(s): "TSH", "FREET4" in the last 168 hours.  Lab Results   Component Value Date    HGBA1C 7.5 (H) 04/20/2023       Nutritional status:   Body mass index is 30.79 kg/m².  Lab Results   Component Value Date    ALBUMIN 2.4 (L) 08/14/2023    ALBUMIN 2.4 (L) 08/13/2023    ALBUMIN 2.4 (L) 08/12/2023     No results found for: "PREALBUMIN"    Estimated Creatinine Clearance: 112 mL/min (based on SCr of 0.8 mg/dL).    Accu-Checks  Recent Labs     08/12/23  1723 08/12/23  2123 08/13/23  0125 08/13/23  0748 08/13/23  1223 08/13/23  1724 08/13/23  2031 08/13/23  2240 08/14/23  0244 08/14/23  0832   POCTGLUCOSE 182* 198* 164* 108 131* 206* 240* 206* 195* 179*       Current Medications and/or Treatments Impacting Glycemic Control  Immunotherapy:    Immunosuppressants           Stop Route Frequency     tacrolimus capsule 2 mg         -- Oral 2 times daily     mycophenolate capsule 1,000 mg         -- Oral 2 times daily          Steroids:   Hormones (From admission, onward)      " Start     Stop Route Frequency Ordered    08/16/23 0900  predniSONE tablet 20 mg  (methylprednisolone taper panel)        See Hyperspace for full Linked Orders Report.    -- Oral Daily 08/10/23 0436    08/15/23 0900  methylPREDNISolone sodium succinate injection 40 mg  (methylprednisolone taper panel)        See Hyperspace for full Linked Orders Report.    08/16/23 0859 IV Daily 08/10/23 0436    08/11/23 2126  melatonin tablet 6 mg         -- Oral Nightly PRN 08/11/23 2026          Pressors:    Autonomic Drugs (From admission, onward)      None          Hyperglycemia/Diabetes Medications:   Antihyperglycemics (From admission, onward)      Start     Stop Route Frequency Ordered    08/13/23 1245  insulin regular in 0.9 % NaCl 100 unit/100 mL (1 unit/mL) infusion        Question:  Enter initial dose (Units/hr):  Answer:  1.2    -- IV Continuous 08/13/23 1238    08/13/23 1145  insulin aspart U-100 pen 2-6 Units         -- SubQ 3 times daily with meals 08/13/23 1142    08/11/23 0836  insulin aspart U-100 pen 0-10 Units         -- SubQ As needed (PRN) 08/11/23 0737

## 2023-08-14 NOTE — PLAN OF CARE
Patient AAO x4, VSS, on room air, afebrile. NSR on telemetry. Insulin gtt at 1.2 u/hr.  and 194- SSI given per MAR. Wife pulled self meds 100%. Held stool softeners- pt c/o of gas pains. Encouraging PO intake which will produce better bm and less gas.  Pt coughing up clear mucus.  Reinforcing 1L fluid restriction. Trazodone started this shift to help with sleep. Bed locked and in lowest setting. Call bell in reach. Tele alarms on and audible.

## 2023-08-14 NOTE — ASSESSMENT & PLAN NOTE
BG goal: 140-180   T2dm.  S/P liver transplant. On steroids  Appetite inconsistent.  BG at or above goal.  Will increase his mealtime insulin and monitor.    - Adjust Transition insulin drip to 1.2 units/hr w/ stepdown parameters  - Adjust mealtime insulin based on intake Administer 2 units if patient eats 25% -  50% and 6 units if patient eats 50% or more.  - Continue /25   - POCT Glucose before meals, at bedtime and at 2 am  - Hypoglycemia protocol in place      ** Please notify Endocrine for any change and/or advance in diet**  ** Please call Endocrine for any BG related issues **     Discharge Planning:   TBD. Please notify endocrinology prior to discharge.

## 2023-08-14 NOTE — ASSESSMENT & PLAN NOTE
- 62 y/o male with ESRD 2/2 HCC s/p DCD OLTxp 8/10   - POD #1 US satisfactory with multiple small fluid collections, largest along medial R Lobe (3x7x4)  - LFTs trending down   - Plan for routine DCD Liver US and possible d/c tomorrow or Wednesday

## 2023-08-14 NOTE — PROGRESS NOTES
"[facility] - Transplant Step[facility]  Endocrinology  Progress Note    Admit Date: 8/9/2023     Reason for Consult: Management of T2DM, Hyperglycemia     Surgical Procedure and Date: S/p liver txp    Diabetes diagnosis year: >7 years    Home Diabetes Medications:  Glimepiride 4 mg, Januvia 100 mg.    How often checking glucose at home?  ALEXANDER    BG readings on regimen: ALEXANDER  Hypoglycemia on the regimen?  No  Missed doses on regimen?  No    Diabetes Complications include:     Hyperglycemia    Complicating diabetes co morbidities:   CIRRHOSIS and Glucocorticoid use       HPI:   Patient is a 63 y.o. 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 Salgado and Pio 8/10/23. Endocrine consulted for bg management.        Interval HPI:   No acute events overnight. Patient in room 80988/90270 A. Blood glucose stable. BG at and above goal on current insulin regimen (Transition Insulin Drip). Steroid use- Methylprednisolone 80mg .   4 Days Post-Op  Renal function- Normal   Vasopressors-  None     Diet diabetic 2000 Calorie     Eating:   <25%  Nausea: No  Hypoglycemia and intervention: No  Fever: No  TPN and/or TF: No    /69   Pulse 83   Temp 97.9 °F (36.6 °C) (Oral)   Resp 18   Ht 5' 10" (1.778 m)   Wt 97.3 kg (214 lb 9.6 oz)   SpO2 97%   BMI 30.79 kg/m²     Labs Reviewed and Include    Recent Labs   Lab 08/14/23  0628   *   CALCIUM 7.5*   ALBUMIN 2.4*   PROT 4.7*   *   K 3.7   CO2 24   CL 96   BUN 35*   CREATININE 0.8   ALKPHOS 98   *   AST 48*   BILITOT 2.7*     Lab Results   Component Value Date    WBC 19.76 (H) 08/14/2023    HGB 13.3 (L) 08/14/2023    HCT 37.1 (L) 08/14/2023    MCV 89 08/14/2023     08/14/2023     No results for input(s): "TSH", "FREET4" in the last 168 hours.  Lab Results   Component Value Date    HGBA1C 7.5 (H) 04/20/2023       Nutritional status:   Body mass index is " "30.79 kg/m².  Lab Results   Component Value Date    ALBUMIN 2.4 (L) 08/14/2023    ALBUMIN 2.4 (L) 08/13/2023    ALBUMIN 2.4 (L) 08/12/2023     No results found for: "PREALBUMIN"    Estimated Creatinine Clearance: 112 mL/min (based on SCr of 0.8 mg/dL).    Accu-Checks  Recent Labs     08/12/23  1723 08/12/23  2123 08/13/23  0125 08/13/23  0748 08/13/23  1223 08/13/23  1724 08/13/23  2031 08/13/23  2240 08/14/23  0244 08/14/23  0832   POCTGLUCOSE 182* 198* 164* 108 131* 206* 240* 206* 195* 179*       Current Medications and/or Treatments Impacting Glycemic Control  Immunotherapy:    Immunosuppressants           Stop Route Frequency     tacrolimus capsule 2 mg         -- Oral 2 times daily     mycophenolate capsule 1,000 mg         -- Oral 2 times daily          Steroids:   Hormones (From admission, onward)      Start     Stop Route Frequency Ordered    08/16/23 0900  predniSONE tablet 20 mg  (methylprednisolone taper panel)        See Hyperspace for full Linked Orders Report.    -- Oral Daily 08/10/23 0436    08/15/23 0900  methylPREDNISolone sodium succinate injection 40 mg  (methylprednisolone taper panel)        See Hyperspace for full Linked Orders Report.    08/16/23 0859 IV Daily 08/10/23 0436    08/11/23 2126  melatonin tablet 6 mg         -- Oral Nightly PRN 08/11/23 2026          Pressors:    Autonomic Drugs (From admission, onward)      None          Hyperglycemia/Diabetes Medications:   Antihyperglycemics (From admission, onward)      Start     Stop Route Frequency Ordered    08/13/23 1245  insulin regular in 0.9 % NaCl 100 unit/100 mL (1 unit/mL) infusion        Question:  Enter initial dose (Units/hr):  Answer:  1.2    -- IV Continuous 08/13/23 1238    08/13/23 1145  insulin aspart U-100 pen 2-6 Units         -- SubQ 3 times daily with meals 08/13/23 1142    08/11/23 0836  insulin aspart U-100 pen 0-10 Units         -- SubQ As needed (PRN) 08/11/23 0737            ASSESSMENT and PLAN    Endocrine  Type 2 " diabetes mellitus, without long-term current use of insulin  BG goal: 140-180   T2dm.  S/P liver transplant. On steroids  Appetite inconsistent.  BG at or above goal.  Will increase his mealtime insulin and monitor.    - Adjust Transition insulin drip to 1.2 units/hr w/ stepdown parameters  - Adjust mealtime insulin based on intake Administer 4 units if patient eats 25% -  50% and 8 units if patient eats 50% or more.  - Continue /25   - POCT Glucose before meals, at bedtime and at 2 am  - Hypoglycemia protocol in place      ** Please notify Endocrine for any change and/or advance in diet**  ** Please call Endocrine for any BG related issues **     Discharge Planning:   TBD. Please notify endocrinology prior to discharge.          GI  * Status post liver transplant  Managed per primary team  Optimize bg control        Other  Adverse effect of adrenal cortical steroids  Steroids may increase BG          Carson Jesus PA-C  Endocrinology  [facility] - Transplant [facility]

## 2023-08-14 NOTE — PT/OT/SLP PROGRESS
Physical Therapy Treatment    Patient Name:  Jed Chávez   MRN:  [id number]    Recommendations:     Discharge Recommendations: other (see comments)  Discharge Equipment Recommendations: shower chair  Barriers to discharge: None    Assessment:     Jed Chávez is a 63 y.o. male admitted with a medical diagnosis of Status post liver transplant.  He presents with the following impairments/functional limitations: weakness, impaired endurance, impaired self care skills, impaired functional mobility, gait instability, decreased coordination, pain, impaired skin, edema, impaired cardiopulmonary response to activity . Patient progressing well towards his goals as evidence of walking range.     Rehab Prognosis: Good; patient would benefit from acute skilled PT services to address these deficits and reach maximum level of function.    Recent Surgery: Procedure(s) (LRB):  TRANSPLANT, LIVER (N/A) 5 Days Post-Op    Plan:     During this hospitalization, patient to be seen 4 x/week to address the identified rehab impairments via gait training, therapeutic activities, therapeutic exercises, neuromuscular re-education and progress toward the following goals:    Plan of Care Expires:  09/11/23    Subjective     Chief Complaint: abdominal swelling  Patient/Family Comments/goals: to heal well, so He can move to the apartments next to the hospital.  Pain/Comfort:  Pain Rating 1: 0/10  Location - Orientation 1: generalized  Location 1: abdomen  Pain Addressed 1: Reposition  Pain Rating Post-Intervention 1: 1/10      Objective:     Communicated with NSG prior to session.  Patient found up in chair with telemetry, peripheral IV upon PT entry to room.     General Precautions: Standard, fall  Orthopedic Precautions: N/A  Braces: N/A  Respiratory Status: Room air     Functional Mobility:  Transfers:     Sit to Stand:  stand by assistance with no AD  Gait: ~160 ft with IV pole for support with CGA.      AM-PAC 6 CLICK MOBILITY  Turning over in  bed (including adjusting bedclothes, sheets and blankets)?: 4  Sitting down on and standing up from a chair with arms (e.g., wheelchair, bedside commode, etc.): 4  Moving from lying on back to sitting on the side of the bed?: 3  Moving to and from a bed to a chair (including a wheelchair)?: 3  Need to walk in hospital room?: 3  Climbing 3-5 steps with a railing?: 3  Basic Mobility Total Score: 20       Treatment & Education:  Maxime bellamy. Patient educated on the importance of mobility. There ex in sitting: LAQ, HIP FLEX AND HEEL/TOE RAISES 2X12 REPS B LE.    Patient left up in chair with all lines intact, call button in reach, and SPOUSE present..    GOALS:   Multidisciplinary Problems       Physical Therapy Goals          Problem: Physical Therapy    Goal Priority Disciplines Outcome Goal Variances Interventions   Physical Therapy Goal     PT, PT/OT Ongoing, Progressing     Description: Goals to be met by: 2023    Patient will increase functional independence with mobility by performin. Supine to sit with Supervision  2. Sit to stand transfer with Supervision  3. Gait  x 400 feet with Supervision.                          Time Tracking:     PT Received On: 23  PT Start Time: 1351     PT Stop Time: 1417  PT Total Time (min): 26 min     Billable Minutes: Gait Training 15 and Therapeutic Exercise 11    Treatment Type: Treatment  PT/PTA: PTA     Number of PTA visits since last PT visit: 2023

## 2023-08-14 NOTE — ASSESSMENT & PLAN NOTE
- Mild hyponatremia (127 today), Na stable for now  - Continue 1L fluid restriction  - no plan for diuretics  - Continue to monitor

## 2023-08-14 NOTE — PROGRESS NOTES
Met with patient and Leida for  discharge teaching.  Reviewed My New Journey: Living Smart After My Liver Transplant.  Sections reviewed were: First Steps, Appointments and Prevention, Medications, General Health, and prevention of Rejection.  Allowed time for questions and answers.  Asked patient to complete the review questions in the discharge book.  Patient and wife expressed feelings of being overwhelmed and were tearful.  Provided encouragement and positive affirmation.

## 2023-08-14 NOTE — PLAN OF CARE
Pt is AAOX4, stand by assist, and VSS. Blood glucose monitoring performed and treated as ordered- insulin infusing at 1.2 unit/hr. This RN explained the insulin pen to pt's wife-pt's wife verbalized understanding. This RN encouraged pt's wife to pull self meds and clean incision with iodine swabs. Albumin infused. Telemetry monitoring in place. Pt ambulated in hallway with PT. Pt's wife visited. Pt's bed in lowest position, call bell within reach, nonskid socks on, and RN encouraged pt to call for any assistance needed. RN rounded on pt throughout shift. Will continue to monitor.

## 2023-08-14 NOTE — SUBJECTIVE & OBJECTIVE
Scheduled Meds:   acetaminophen  650 mg Oral Q6H    bisacodyL  10 mg Oral QHS    carvediloL  6.25 mg Oral BID    famotidine  20 mg Oral QHS    heparin (porcine)  5,000 Units Subcutaneous Q8H    insulin aspart U-100  2-6 Units Subcutaneous TIDWM    LIDOcaine (PF) 10 mg/ml (1%)  10 mL Other Once    [START ON 8/15/2023] methylPREDNISolone sodium succinate injection  40 mg Intravenous Daily    Followed by    [START ON 8/16/2023] predniSONE  20 mg Oral Daily    mupirocin  1 g Nasal BID    mycophenolate  1,000 mg Oral BID    ondansetron  4 mg Oral Once    [START ON 8/17/2023] sulfamethoxazole-trimethoprim 400-80mg  1 tablet Oral Daily AM    tacrolimus  2 mg Oral BID    traZODone  50 mg Oral QHS    [START ON 8/20/2023] valGANciclovir  450 mg Oral Daily     Continuous Infusions:   insulin regular 1 units/mL infusion orderable (TRANSFER) 1.2 Units/hr (08/14/23 0608)     PRN Meds:dextrose 10%, dextrose 10%, glucagon (human recombinant), glucose, glucose, insulin aspart U-100, melatonin, mupirocin, ondansetron, oxyCODONE, oxyCODONE, simethicone, sodium chloride 0.9%    Review of Systems   Constitutional:  Positive for activity change, appetite change and fatigue.   Respiratory:  Negative for cough and shortness of breath.    Cardiovascular:  Negative for chest pain, palpitations and leg swelling.   Gastrointestinal:  Positive for abdominal distention. Negative for constipation.   Genitourinary: Negative.  Negative for dysuria, hematuria, penile swelling and scrotal swelling.   Musculoskeletal:  Negative for arthralgias.   Skin:  Positive for color change and wound.   Allergic/Immunologic: Positive for immunocompromised state.   Neurological:  Positive for weakness.   Psychiatric/Behavioral:  Negative for behavioral problems.      Objective:     Vital Signs (Most Recent):  Temp: 97.9 °F (36.6 °C) (08/14/23 0828)  Pulse: 83 (08/14/23 0828)  Resp: 18 (08/14/23 0828)  BP: 116/69 (08/14/23 0828)  SpO2: 97 % (08/14/23 0828) Vital  Signs (24h Range):  Temp:  [97.6 °F (36.4 °C)-98.4 °F (36.9 °C)] 97.9 °F (36.6 °C)  Pulse:  [] 83  Resp:  [16-20] 18  SpO2:  [93 %-97 %] 97 %  BP: (106-135)/(69-79) 116/69     Weight: 97.3 kg (214 lb 9.6 oz)  Body mass index is 30.79 kg/m².    Intake/Output - Last 3 Shifts          07 0659  07 0659  0700  08/15 0659    P.O. 950 820     I.V. (mL/kg) 102 (1)      Total Intake(mL/kg) 1052 (10.8) 820 (8.4)     Urine (mL/kg/hr) 300 (0.1) 700 (0.3)     Drains 270      Stool 0 0     Total Output 570 700     Net +482 +120            Urine Occurrence 9 x 5 x     Stool Occurrence 6 x 6 x              Physical Exam  Vitals and nursing note reviewed.   Eyes:      General: Scleral icterus present.   Cardiovascular:      Rate and Rhythm: Normal rate and regular rhythm.      Heart sounds: No murmur heard.  Pulmonary:      Effort: No respiratory distress.      Breath sounds: No wheezing.   Abdominal:      General: There is distension.      Tenderness: There is abdominal tenderness.   Musculoskeletal:         General: No swelling or tenderness.      Cervical back: Neck supple.      Right lower le+ Pitting Edema present.      Left lower le+ Pitting Edema present.   Skin:     General: Skin is warm and dry.      Capillary Refill: Capillary refill takes less than 2 seconds.   Neurological:      Mental Status: He is alert and oriented to person, place, and time.          Laboratory:  Immunosuppressants           Stop Route Frequency     tacrolimus capsule 2 mg         -- Oral 2 times daily     mycophenolate capsule 1,000 mg         -- Oral 2 times daily          CBC:   Recent Labs   Lab 23  0629   WBC 19.76*   RBC 4.18*   HGB 13.3*   HCT 37.1*      MCV 89   MCH 31.8*   MCHC 35.8     BMP:   Recent Labs   Lab 23  0628   *   *   K 3.7   CL 96   CO2 24   BUN 35*   CREATININE 0.8   CALCIUM 7.5*     CMP:   Recent Labs   Lab 23  0628   *   CALCIUM 7.5*    ALBUMIN 2.4*   PROT 4.7*   *   K 3.7   CO2 24   CL 96   BUN 35*   CREATININE 0.8   ALKPHOS 98   *   AST 48*   BILITOT 2.7*     Labs within the past 24 hours have been reviewed.    Diagnostic Results:  US Liver Transplant Post:  Results for orders placed during the hospital encounter of 08/09/23    US Doppler Liver Transplant Post (xpd)    Narrative  EXAMINATION:  US DOPPLER LIVER TRANSPLANT POST (XPD)    CLINICAL HISTORY:  s/p liver transplant;    TECHNIQUE:  Limited abdominal ultrasound of the transplant liver with Doppler evaluation.  Color and spectral Doppler were performed.    COMPARISON:  None.    FINDINGS:  Patient is status post orthotopic liver transplant 08/09/2023.  The liver demonstrates homogeneous echotexture.  No focal hepatic lesions are seen.    Peritransplant fluid collections as below.    Along the left hepatic lobe there is a 0.9 x 3.0 x 2.2 cm collection.    Along the medial right hepatic lobe there is a 3.2 x 7.1 x 4.1 cm mildly complex collection.    Additional anechoic collection near the right hepatic lobe measures 2.4 x 1.3 x 1.9 cm.    The common duct is not dilated, measuring 4 mm.  No dilated intrahepatic radicles are seen.    Color flow and spectral waveform analysis was performed.  The main portal vein, right portal vein, left portal vein, middle hepatic vein, right hepatic vein, left hepatic vein, and IVC are patent with proper directional flow.    Anastomosis site of the main hepatic artery demonstrates a peak systolic velocity 199 cm/sec.    Main hepatic artery demonstrates resistive index 0.81 with normal waveform.    Left hepatic artery shows resistive index 0.81 with normal waveform.    Anterior branch of the right hepatic artery demonstrates resistive index 0.72 with normal waveform.    Posterior branch of the right hepatic artery demonstrates resistive index 0.70 with normal waveform.    Impression  Satisfactory Doppler evaluation of postoperative day 1 liver  allograft.    Several peritransplant fluid collections, as above.    Findings were relayed to Dr. Baum on 08/10/2023 at 09:02.    Electronically signed by resident: Jed Watkins  Date:    08/10/2023  Time:    08:33    Electronically signed by: Raj Kang  Date:    08/10/2023  Time:    09:03    Debility/Functional status: No debility noted.

## 2023-08-15 LAB
ALBUMIN SERPL BCP-MCNC: 2.7 G/DL (ref 3.5–5.2)
ALP SERPL-CCNC: 89 U/L (ref 55–135)
ALT SERPL W/O P-5'-P-CCNC: 107 U/L (ref 10–44)
ANION GAP SERPL CALC-SCNC: 9 MMOL/L (ref 8–16)
AST SERPL-CCNC: 33 U/L (ref 10–40)
BASOPHILS # BLD AUTO: 0.05 K/UL (ref 0–0.2)
BASOPHILS NFR BLD: 0.3 % (ref 0–1.9)
BILIRUB SERPL-MCNC: 2.2 MG/DL (ref 0.1–1)
BUN SERPL-MCNC: 30 MG/DL (ref 8–23)
CALCIUM SERPL-MCNC: 7.6 MG/DL (ref 8.7–10.5)
CHLORIDE SERPL-SCNC: 96 MMOL/L (ref 95–110)
CO2 SERPL-SCNC: 24 MMOL/L (ref 23–29)
CREAT SERPL-MCNC: 0.9 MG/DL (ref 0.5–1.4)
DIFFERENTIAL METHOD: ABNORMAL
EOSINOPHIL # BLD AUTO: 0.2 K/UL (ref 0–0.5)
EOSINOPHIL NFR BLD: 1.1 % (ref 0–8)
ERYTHROCYTE [DISTWIDTH] IN BLOOD BY AUTOMATED COUNT: 15.2 % (ref 11.5–14.5)
EST. GFR  (NO RACE VARIABLE): >60 ML/MIN/1.73 M^2
GLUCOSE SERPL-MCNC: 153 MG/DL (ref 70–110)
HCT VFR BLD AUTO: 37.1 % (ref 40–54)
HGB BLD-MCNC: 13 G/DL (ref 14–18)
IMM GRANULOCYTES # BLD AUTO: 0.18 K/UL (ref 0–0.04)
IMM GRANULOCYTES NFR BLD AUTO: 1 % (ref 0–0.5)
INR PPP: 1.2 (ref 0.8–1.2)
LYMPHOCYTES # BLD AUTO: 1.7 K/UL (ref 1–4.8)
LYMPHOCYTES NFR BLD: 9.5 % (ref 18–48)
MAGNESIUM SERPL-MCNC: 2.1 MG/DL (ref 1.6–2.6)
MCH RBC QN AUTO: 31.4 PG (ref 27–31)
MCHC RBC AUTO-ENTMCNC: 35 G/DL (ref 32–36)
MCV RBC AUTO: 90 FL (ref 82–98)
MONOCYTES # BLD AUTO: 2 K/UL (ref 0.3–1)
MONOCYTES NFR BLD: 11.6 % (ref 4–15)
NEUTROPHILS # BLD AUTO: 13.3 K/UL (ref 1.8–7.7)
NEUTROPHILS NFR BLD: 76.5 % (ref 38–73)
NRBC BLD-RTO: 0 /100 WBC
PLATELET # BLD AUTO: 154 K/UL (ref 150–450)
PLATELET BLD QL SMEAR: ABNORMAL
PMV BLD AUTO: 10.1 FL (ref 9.2–12.9)
POCT GLUCOSE: 163 MG/DL (ref 70–110)
POCT GLUCOSE: 177 MG/DL (ref 70–110)
POCT GLUCOSE: 215 MG/DL (ref 70–110)
POCT GLUCOSE: 277 MG/DL (ref 70–110)
POCT GLUCOSE: 337 MG/DL (ref 70–110)
POTASSIUM SERPL-SCNC: 3.1 MMOL/L (ref 3.5–5.1)
PROT SERPL-MCNC: 4.8 G/DL (ref 6–8.4)
PROTHROMBIN TIME: 12.3 SEC (ref 9–12.5)
RBC # BLD AUTO: 4.14 M/UL (ref 4.6–6.2)
SODIUM SERPL-SCNC: 129 MMOL/L (ref 136–145)
TACROLIMUS BLD-MCNC: 8.1 NG/ML (ref 5–15)
WBC # BLD AUTO: 17.41 K/UL (ref 3.9–12.7)

## 2023-08-15 PROCEDURE — 80053 COMPREHEN METABOLIC PANEL: CPT | Performed by: STUDENT IN AN ORGANIZED HEALTH CARE EDUCATION/TRAINING PROGRAM

## 2023-08-15 PROCEDURE — 80197 ASSAY OF TACROLIMUS: CPT | Performed by: STUDENT IN AN ORGANIZED HEALTH CARE EDUCATION/TRAINING PROGRAM

## 2023-08-15 PROCEDURE — 25000003 PHARM REV CODE 250: Performed by: NURSE PRACTITIONER

## 2023-08-15 PROCEDURE — 99233 SBSQ HOSP IP/OBS HIGH 50: CPT | Mod: 24,,, | Performed by: NURSE PRACTITIONER

## 2023-08-15 PROCEDURE — 85610 PROTHROMBIN TIME: CPT | Performed by: STUDENT IN AN ORGANIZED HEALTH CARE EDUCATION/TRAINING PROGRAM

## 2023-08-15 PROCEDURE — P9047 ALBUMIN (HUMAN), 25%, 50ML: HCPCS | Mod: JZ,JG | Performed by: NURSE PRACTITIONER

## 2023-08-15 PROCEDURE — 63600175 PHARM REV CODE 636 W HCPCS: Performed by: STUDENT IN AN ORGANIZED HEALTH CARE EDUCATION/TRAINING PROGRAM

## 2023-08-15 PROCEDURE — 63600175 PHARM REV CODE 636 W HCPCS: Performed by: PHYSICIAN ASSISTANT

## 2023-08-15 PROCEDURE — 99232 PR SUBSEQUENT HOSPITAL CARE,LEVL II: ICD-10-PCS | Mod: ,,, | Performed by: PHYSICIAN ASSISTANT

## 2023-08-15 PROCEDURE — 99232 SBSQ HOSP IP/OBS MODERATE 35: CPT | Mod: ,,, | Performed by: PHYSICIAN ASSISTANT

## 2023-08-15 PROCEDURE — 63600175 PHARM REV CODE 636 W HCPCS: Mod: JZ,JG | Performed by: NURSE PRACTITIONER

## 2023-08-15 PROCEDURE — 25000003 PHARM REV CODE 250

## 2023-08-15 PROCEDURE — 99233 PR SUBSEQUENT HOSPITAL CARE,LEVL III: ICD-10-PCS | Mod: 24,,, | Performed by: NURSE PRACTITIONER

## 2023-08-15 PROCEDURE — 20600001 HC STEP DOWN PRIVATE ROOM

## 2023-08-15 PROCEDURE — 25000003 PHARM REV CODE 250: Performed by: STUDENT IN AN ORGANIZED HEALTH CARE EDUCATION/TRAINING PROGRAM

## 2023-08-15 PROCEDURE — 83735 ASSAY OF MAGNESIUM: CPT | Performed by: PHYSICIAN ASSISTANT

## 2023-08-15 PROCEDURE — 63600175 PHARM REV CODE 636 W HCPCS: Performed by: TRANSPLANT SURGERY

## 2023-08-15 PROCEDURE — 85025 COMPLETE CBC W/AUTO DIFF WBC: CPT | Performed by: STUDENT IN AN ORGANIZED HEALTH CARE EDUCATION/TRAINING PROGRAM

## 2023-08-15 PROCEDURE — 25000003 PHARM REV CODE 250: Performed by: PHYSICIAN ASSISTANT

## 2023-08-15 RX ORDER — POTASSIUM CHLORIDE 20 MEQ/1
40 TABLET, EXTENDED RELEASE ORAL ONCE
Status: COMPLETED | OUTPATIENT
Start: 2023-08-15 | End: 2023-08-15

## 2023-08-15 RX ORDER — INSULIN ASPART 100 [IU]/ML
4-8 INJECTION, SOLUTION INTRAVENOUS; SUBCUTANEOUS
Status: DISCONTINUED | OUTPATIENT
Start: 2023-08-15 | End: 2023-08-16 | Stop reason: HOSPADM

## 2023-08-15 RX ORDER — IBUPROFEN 200 MG
24 TABLET ORAL
Status: DISCONTINUED | OUTPATIENT
Start: 2023-08-15 | End: 2023-08-16 | Stop reason: HOSPADM

## 2023-08-15 RX ORDER — ALBUMIN HUMAN 250 G/1000ML
25 SOLUTION INTRAVENOUS EVERY 6 HOURS
Status: DISCONTINUED | OUTPATIENT
Start: 2023-08-15 | End: 2023-08-16 | Stop reason: HOSPADM

## 2023-08-15 RX ORDER — POTASSIUM CHLORIDE 750 MG/1
40 CAPSULE, EXTENDED RELEASE ORAL ONCE
Status: COMPLETED | OUTPATIENT
Start: 2023-08-15 | End: 2023-08-15

## 2023-08-15 RX ORDER — GLUCAGON 1 MG
1 KIT INJECTION
Status: DISCONTINUED | OUTPATIENT
Start: 2023-08-15 | End: 2023-08-16 | Stop reason: HOSPADM

## 2023-08-15 RX ORDER — INSULIN ASPART 100 [IU]/ML
1-10 INJECTION, SOLUTION INTRAVENOUS; SUBCUTANEOUS
Status: DISCONTINUED | OUTPATIENT
Start: 2023-08-15 | End: 2023-08-16 | Stop reason: HOSPADM

## 2023-08-15 RX ORDER — FUROSEMIDE 10 MG/ML
20 INJECTION INTRAMUSCULAR; INTRAVENOUS ONCE
Status: COMPLETED | OUTPATIENT
Start: 2023-08-15 | End: 2023-08-15

## 2023-08-15 RX ORDER — NAPROXEN SODIUM 220 MG/1
81 TABLET, FILM COATED ORAL DAILY
Status: DISCONTINUED | OUTPATIENT
Start: 2023-08-15 | End: 2023-08-16 | Stop reason: HOSPADM

## 2023-08-15 RX ORDER — IBUPROFEN 200 MG
16 TABLET ORAL
Status: DISCONTINUED | OUTPATIENT
Start: 2023-08-15 | End: 2023-08-16 | Stop reason: HOSPADM

## 2023-08-15 RX ADMIN — POTASSIUM CHLORIDE 40 MEQ: 10 CAPSULE, COATED, EXTENDED RELEASE ORAL at 02:08

## 2023-08-15 RX ADMIN — ALBUMIN (HUMAN) 25 G: 12.5 SOLUTION INTRAVENOUS at 06:08

## 2023-08-15 RX ADMIN — INSULIN ASPART 3 UNITS: 100 INJECTION, SOLUTION INTRAVENOUS; SUBCUTANEOUS at 11:08

## 2023-08-15 RX ADMIN — ASPIRIN 81 MG 81 MG: 81 TABLET ORAL at 11:08

## 2023-08-15 RX ADMIN — FAMOTIDINE 20 MG: 20 TABLET, FILM COATED ORAL at 08:08

## 2023-08-15 RX ADMIN — INSULIN DETEMIR 20 UNITS: 100 INJECTION, SOLUTION SUBCUTANEOUS at 02:08

## 2023-08-15 RX ADMIN — MYCOPHENOLATE MOFETIL 1000 MG: 250 CAPSULE ORAL at 08:08

## 2023-08-15 RX ADMIN — INSULIN ASPART 4 UNITS: 100 INJECTION, SOLUTION INTRAVENOUS; SUBCUTANEOUS at 09:08

## 2023-08-15 RX ADMIN — ACETAMINOPHEN 650 MG: 325 TABLET ORAL at 05:08

## 2023-08-15 RX ADMIN — TACROLIMUS 2 MG: 1 CAPSULE ORAL at 05:08

## 2023-08-15 RX ADMIN — POTASSIUM CHLORIDE 40 MEQ: 1500 TABLET, EXTENDED RELEASE ORAL at 10:08

## 2023-08-15 RX ADMIN — TRAZODONE HYDROCHLORIDE 50 MG: 50 TABLET ORAL at 08:08

## 2023-08-15 RX ADMIN — TACROLIMUS 2 MG: 1 CAPSULE ORAL at 08:08

## 2023-08-15 RX ADMIN — METHYLPREDNISOLONE SODIUM SUCCINATE 40 MG: 40 INJECTION, POWDER, FOR SOLUTION INTRAMUSCULAR; INTRAVENOUS at 08:08

## 2023-08-15 RX ADMIN — ALBUMIN (HUMAN) 25 G: 12.5 SOLUTION INTRAVENOUS at 11:08

## 2023-08-15 RX ADMIN — INSULIN ASPART 4 UNITS: 100 INJECTION, SOLUTION INTRAVENOUS; SUBCUTANEOUS at 02:08

## 2023-08-15 RX ADMIN — ACETAMINOPHEN 650 MG: 325 TABLET ORAL at 11:08

## 2023-08-15 RX ADMIN — HEPARIN SODIUM 5000 UNITS: 5000 INJECTION INTRAVENOUS; SUBCUTANEOUS at 05:08

## 2023-08-15 RX ADMIN — INSULIN ASPART 8 UNITS: 100 INJECTION, SOLUTION INTRAVENOUS; SUBCUTANEOUS at 07:08

## 2023-08-15 RX ADMIN — HEPARIN SODIUM 5000 UNITS: 5000 INJECTION INTRAVENOUS; SUBCUTANEOUS at 10:08

## 2023-08-15 RX ADMIN — HEPARIN SODIUM 5000 UNITS: 5000 INJECTION INTRAVENOUS; SUBCUTANEOUS at 02:08

## 2023-08-15 RX ADMIN — FUROSEMIDE 20 MG: 10 INJECTION, SOLUTION INTRAMUSCULAR; INTRAVENOUS at 12:08

## 2023-08-15 NOTE — PROGRESS NOTES
"[facility] - Transplant Stepdown  Endocrinology  Progress Note    Admit Date: 2023     Reason for Consult: Management of T2DM, Hyperglycemia     Surgical Procedure and Date: S/p liver txp    Diabetes diagnosis year: >7 years    Home Diabetes Medications:  Glimepiride 4 mg, Januvia 100 mg.    How often checking glucose at home?  ALEXANDER    BG readings on regimen: ALEXANDER  Hypoglycemia on the regimen?  No  Missed doses on regimen?  No    Diabetes Complications include:     Hyperglycemia    Complicating diabetes co morbidities:   CIRRHOSIS and Glucocorticoid use       HPI:   Patient is a 63 y.o. 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 Salgado and Pio 8/10/23. Endocrine consulted for bg management.        Interval HPI:   No acute events overnight. Patient in room 08792/45132 A. Blood glucose worsening. BG at and above goal on current insulin regimen (Transition Insulin Drip). Steroid use- Methylprednisolone  40 mg .   6 Days Post-Op  Renal function- Normal   Vasopressors-  None     Diet diabetic  Calorie     Eatin%  Nausea: No  Hypoglycemia and intervention: No  Fever: No  TPN and/or TF: No    /73 (BP Location: Left arm, Patient Position: Lying)   Pulse 91   Temp 97.6 °F (36.4 °C) (Oral)   Resp 16   Ht 5' 10" (1.778 m)   Wt 104.3 kg (229 lb 15 oz)   SpO2 97%   BMI 32.99 kg/m²     Labs Reviewed and Include    Recent Labs   Lab 23  0628   *   CALCIUM 7.5*   ALBUMIN 2.4*   PROT 4.7*   *   K 3.7   CO2 24   CL 96   BUN 35*   CREATININE 0.8   ALKPHOS 98   *   AST 48*   BILITOT 2.7*     Lab Results   Component Value Date    WBC 19.76 (H) 2023    HGB 13.3 (L) 2023    HCT 37.1 (L) 2023    MCV 89 2023     2023     No results for input(s): "TSH", "FREET4" in the last 168 hours.  Lab Results   Component Value Date    HGBA1C 7.5 (H) " "04/20/2023       Nutritional status:   Body mass index is 32.99 kg/m².  Lab Results   Component Value Date    ALBUMIN 2.4 (L) 08/14/2023    ALBUMIN 2.4 (L) 08/13/2023    ALBUMIN 2.4 (L) 08/12/2023     No results found for: "PREALBUMIN"    Estimated Creatinine Clearance: 115.8 mL/min (based on SCr of 0.8 mg/dL).    Accu-Checks  Recent Labs     08/13/23  0748 08/13/23  1223 08/13/23  1724 08/13/23  2031 08/13/23  2240 08/14/23  0244 08/14/23  0832 08/14/23  1212 08/14/23  1726 08/14/23  2153   POCTGLUCOSE 108 131* 206* 240* 206* 195* 179* 185* 262* 221*       Current Medications and/or Treatments Impacting Glycemic Control  Immunotherapy:    Immunosuppressants           Stop Route Frequency     tacrolimus capsule 2 mg         -- Oral 2 times daily     mycophenolate capsule 1,000 mg         -- Oral 2 times daily          Steroids:   Hormones (From admission, onward)      Start     Stop Route Frequency Ordered    08/16/23 0900  predniSONE tablet 20 mg  (methylprednisolone taper panel)        See Hyperspace for full Linked Orders Report.    -- Oral Daily 08/10/23 0436    08/15/23 0900  methylPREDNISolone sodium succinate injection 40 mg  (methylprednisolone taper panel)        See Hyperspace for full Linked Orders Report.    08/16/23 0859 IV Daily 08/10/23 0436    08/11/23 2126  melatonin tablet 6 mg         -- Oral Nightly PRN 08/11/23 2026          Pressors:    Autonomic Drugs (From admission, onward)      None          Hyperglycemia/Diabetes Medications:   Antihyperglycemics (From admission, onward)      Start     Stop Route Frequency Ordered    08/14/23 1645  insulin aspart U-100 pen 4-8 Units         -- SubQ 3 times daily with meals 08/14/23 1241    08/13/23 1245  insulin regular in 0.9 % NaCl 100 unit/100 mL (1 unit/mL) infusion        Question:  Enter initial dose (Units/hr):  Answer:  1.2    -- IV Continuous 08/13/23 1238    08/11/23 0836  insulin aspart U-100 pen 0-10 Units         -- SubQ As needed (PRN) " 08/11/23 0737            ASSESSMENT and PLAN    Endocrine  Type 2 diabetes mellitus, without long-term current use of insulin  BG goal: 140-180   T2dm.  S/P liver transplant. On steroids  Appetite inconsistent.  BG at or above goal.  Will transition to MDI.    -Discontinue Transition drip   -start on Levemir 20 units nightly  -Continue mealtime insulin Novolog based on intake Administer 4 units if patient eats 25% -  50% and 8 units if patient eats 50% or more.  - Continue /25   - POCT Glucose before meals, at bedtime and at 2 am  - Hypoglycemia protocol in place      ** Please notify Endocrine for any change and/or advance in diet**  ** Please call Endocrine for any BG related issues **     Discharge Planning:   TBD. Please notify endocrinology prior to discharge.          GI  * Status post liver transplant  Managed per primary team  Optimize bg control        Other  Adverse effect of adrenal cortical steroids  Steroids may increase BG          Carson Jesus PA-C  Endocrinology  [facility] - Transplant Stepdown

## 2023-08-15 NOTE — ASSESSMENT & PLAN NOTE
- 62 y/o male with ESRD 2/2 HCC s/p DCD OLTxp 8/10   - POD #1 US satisfactory with multiple small fluid collections, largest along medial R Lobe (3x7x4)  - LFTs trending down   - Plan for routine DCD Liver US and possible d/c tomorrow

## 2023-08-15 NOTE — SUBJECTIVE & OBJECTIVE
Scheduled Meds:   acetaminophen  650 mg Oral Q6H    albumin human 25%  25 g Intravenous Q6H    aspirin  81 mg Oral Daily    bisacodyL  10 mg Oral QHS    famotidine  20 mg Oral QHS    furosemide (LASIX) injection  20 mg Intravenous Once    heparin (porcine)  5,000 Units Subcutaneous Q8H    insulin aspart U-100  4-8 Units Subcutaneous TIDWM    LIDOcaine (PF) 10 mg/ml (1%)  10 mL Other Once    mycophenolate  1,000 mg Oral BID    ondansetron  4 mg Oral Once    potassium chloride  40 mEq Oral Once    [START ON 8/16/2023] predniSONE  20 mg Oral Daily    [START ON 8/17/2023] sulfamethoxazole-trimethoprim 400-80mg  1 tablet Oral Daily AM    tacrolimus  2 mg Oral BID    traZODone  50 mg Oral QHS    [START ON 8/20/2023] valGANciclovir  450 mg Oral Daily     Continuous Infusions:   insulin regular 1 units/mL infusion orderable (TRANSFER) 1.2 Units/hr (08/15/23 0459)     PRN Meds:dextrose 10%, dextrose 10%, glucagon (human recombinant), glucose, glucose, insulin aspart U-100, melatonin, mupirocin, ondansetron, oxyCODONE, oxyCODONE, simethicone, sodium chloride 0.9%    Review of Systems   Constitutional:  Positive for activity change and appetite change. Negative for fatigue.   Respiratory:  Negative for cough and shortness of breath.    Cardiovascular:  Negative for chest pain, palpitations and leg swelling.   Gastrointestinal:  Positive for abdominal distention. Negative for constipation.   Genitourinary: Negative.  Negative for dysuria, hematuria, penile swelling and scrotal swelling.   Musculoskeletal:  Negative for arthralgias.   Skin:  Positive for wound. Negative for color change.   Allergic/Immunologic: Positive for immunocompromised state.   Neurological:  Positive for weakness.   Psychiatric/Behavioral:  Negative for behavioral problems.      Objective:     Vital Signs (Most Recent):  Temp: 97.5 °F (36.4 °C) (08/15/23 0833)  Pulse: 87 (08/15/23 0833)  Resp: 17 (08/15/23 0833)  BP: 113/74 (08/15/23 0833)  SpO2: 98 %  (08/15/23 0833) Vital Signs (24h Range):  Temp:  [97.5 °F (36.4 °C)-97.7 °F (36.5 °C)] 97.5 °F (36.4 °C)  Pulse:  [] 87  Resp:  [16-18] 17  SpO2:  [95 %-98 %] 98 %  BP: (113-132)/(69-77) 113/74     Weight: 104.3 kg (229 lb 15 oz)  Body mass index is 32.99 kg/m².    Intake/Output - Last 3 Shifts          0700   0659  0700  08/15 0659 08/15 07 0659    P.O. 820 360     I.V. (mL/kg) 40.4 (0.4) 135.3 (1.3)     Other  0     Total Intake(mL/kg) 860.4 (8.8) 495.3 (4.7)     Urine (mL/kg/hr) 700 (0.3) 300 (0.1)     Emesis/NG output  0     Drains       Other  0     Stool 0 0     Blood  0     Total Output 700 300     Net +160.4 +195.3            Urine Occurrence 5 x 3 x     Stool Occurrence 6 x 2 x     Emesis Occurrence  0 x              Physical Exam  Vitals and nursing note reviewed.   Eyes:      General: Scleral icterus present.   Cardiovascular:      Rate and Rhythm: Normal rate and regular rhythm.      Heart sounds: No murmur heard.  Pulmonary:      Effort: No respiratory distress.      Breath sounds: No wheezing.   Abdominal:      General: There is distension.      Tenderness: There is abdominal tenderness.   Musculoskeletal:         General: No swelling or tenderness.      Cervical back: Neck supple.      Right lower le+ Pitting Edema present.      Left lower le+ Pitting Edema present.   Skin:     General: Skin is warm and dry.      Capillary Refill: Capillary refill takes less than 2 seconds.   Neurological:      General: No focal deficit present.      Mental Status: He is alert and oriented to person, place, and time.   Psychiatric:         Mood and Affect: Mood normal.         Behavior: Behavior normal.         Thought Content: Thought content normal.         Judgment: Judgment normal.          Laboratory:  Immunosuppressants           Stop Route Frequency     tacrolimus capsule 2 mg         -- Oral 2 times daily     mycophenolate capsule 1,000 mg         -- Oral 2 times daily           CBC:   Recent Labs   Lab 08/15/23  0655   WBC 17.41*   RBC 4.14*   HGB 13.0*   HCT 37.1*      MCV 90   MCH 31.4*   MCHC 35.0       BMP:   Recent Labs   Lab 08/15/23  0655   *   *   K 3.1*   CL 96   CO2 24   BUN 30*   CREATININE 0.9   CALCIUM 7.6*       CMP:   Recent Labs   Lab 08/15/23  0655   *   CALCIUM 7.6*   ALBUMIN 2.7*   PROT 4.8*   *   K 3.1*   CO2 24   CL 96   BUN 30*   CREATININE 0.9   ALKPHOS 89   *   AST 33   BILITOT 2.2*       Labs within the past 24 hours have been reviewed.    Diagnostic Results:  US Liver Transplant Post:  Results for orders placed during the hospital encounter of 08/09/23    US Doppler Liver Transplant Post (xpd)    Narrative  EXAMINATION:  US DOPPLER LIVER TRANSPLANT POST (XPD)    CLINICAL HISTORY:  s/p liver transplant;    TECHNIQUE:  Limited abdominal ultrasound of the transplant liver with Doppler evaluation.  Color and spectral Doppler were performed.    COMPARISON:  None.    FINDINGS:  Patient is status post orthotopic liver transplant 08/09/2023.  The liver demonstrates homogeneous echotexture.  No focal hepatic lesions are seen.    Peritransplant fluid collections as below.    Along the left hepatic lobe there is a 0.9 x 3.0 x 2.2 cm collection.    Along the medial right hepatic lobe there is a 3.2 x 7.1 x 4.1 cm mildly complex collection.    Additional anechoic collection near the right hepatic lobe measures 2.4 x 1.3 x 1.9 cm.    The common duct is not dilated, measuring 4 mm.  No dilated intrahepatic radicles are seen.    Color flow and spectral waveform analysis was performed.  The main portal vein, right portal vein, left portal vein, middle hepatic vein, right hepatic vein, left hepatic vein, and IVC are patent with proper directional flow.    Anastomosis site of the main hepatic artery demonstrates a peak systolic velocity 199 cm/sec.    Main hepatic artery demonstrates resistive index 0.81 with normal waveform.    Left  hepatic artery shows resistive index 0.81 with normal waveform.    Anterior branch of the right hepatic artery demonstrates resistive index 0.72 with normal waveform.    Posterior branch of the right hepatic artery demonstrates resistive index 0.70 with normal waveform.    Impression  Satisfactory Doppler evaluation of postoperative day 1 liver allograft.    Several peritransplant fluid collections, as above.    Findings were relayed to Dr. Baum on 08/10/2023 at 09:02.    Electronically signed by resident: Jed Watkins  Date:    08/10/2023  Time:    08:33    Electronically signed by: Raj Kang  Date:    08/10/2023  Time:    09:03    Debility/Functional status: No debility noted.

## 2023-08-15 NOTE — SUBJECTIVE & OBJECTIVE
"Interval HPI:   No acute events overnight. Patient in room 75088/89607 A. Blood glucose worsening. BG at and above goal on current insulin regimen (Transition Insulin Drip). Steroid use- Methylprednisolone  40 mg .   6 Days Post-Op  Renal function- Normal   Vasopressors-  None     Diet diabetic  Calorie     Eatin%  Nausea: No  Hypoglycemia and intervention: No  Fever: No  TPN and/or TF: No    /73 (BP Location: Left arm, Patient Position: Lying)   Pulse 91   Temp 97.6 °F (36.4 °C) (Oral)   Resp 16   Ht 5' 10" (1.778 m)   Wt 104.3 kg (229 lb 15 oz)   SpO2 97%   BMI 32.99 kg/m²     Labs Reviewed and Include    Recent Labs   Lab 23  0628   *   CALCIUM 7.5*   ALBUMIN 2.4*   PROT 4.7*   *   K 3.7   CO2 24   CL 96   BUN 35*   CREATININE 0.8   ALKPHOS 98   *   AST 48*   BILITOT 2.7*     Lab Results   Component Value Date    WBC 19.76 (H) 2023    HGB 13.3 (L) 2023    HCT 37.1 (L) 2023    MCV 89 2023     2023     No results for input(s): "TSH", "FREET4" in the last 168 hours.  Lab Results   Component Value Date    HGBA1C 7.5 (H) 2023       Nutritional status:   Body mass index is 32.99 kg/m².  Lab Results   Component Value Date    ALBUMIN 2.4 (L) 2023    ALBUMIN 2.4 (L) 2023    ALBUMIN 2.4 (L) 2023     No results found for: "PREALBUMIN"    Estimated Creatinine Clearance: 115.8 mL/min (based on SCr of 0.8 mg/dL).    Accu-Checks  Recent Labs     23  0748 23  1223 23  1724 23  2031 23  2240 23  0244 23  0832 23  1212 23  1726 23  2153   POCTGLUCOSE 108 131* 206* 240* 206* 195* 179* 185* 262* 221*       Current Medications and/or Treatments Impacting Glycemic Control  Immunotherapy:    Immunosuppressants           Stop Route Frequency     tacrolimus capsule 2 mg         -- Oral 2 times daily     mycophenolate capsule 1,000 mg         -- Oral 2 times daily      "     Steroids:   Hormones (From admission, onward)      Start     Stop Route Frequency Ordered    08/16/23 0900  predniSONE tablet 20 mg  (methylprednisolone taper panel)        See Hyperspace for full Linked Orders Report.    -- Oral Daily 08/10/23 0436    08/15/23 0900  methylPREDNISolone sodium succinate injection 40 mg  (methylprednisolone taper panel)        See Hyperspace for full Linked Orders Report.    08/16/23 0859 IV Daily 08/10/23 0436    08/11/23 2126  melatonin tablet 6 mg         -- Oral Nightly PRN 08/11/23 2026          Pressors:    Autonomic Drugs (From admission, onward)      None          Hyperglycemia/Diabetes Medications:   Antihyperglycemics (From admission, onward)      Start     Stop Route Frequency Ordered    08/14/23 1645  insulin aspart U-100 pen 4-8 Units         -- SubQ 3 times daily with meals 08/14/23 1241    08/13/23 1245  insulin regular in 0.9 % NaCl 100 unit/100 mL (1 unit/mL) infusion        Question:  Enter initial dose (Units/hr):  Answer:  1.2    -- IV Continuous 08/13/23 1238    08/11/23 0836  insulin aspart U-100 pen 0-10 Units         -- SubQ As needed (PRN) 08/11/23 0729

## 2023-08-15 NOTE — DISCHARGE SUMMARY
[facility]  Liver Transplant  Discharge Summary      Patient Name: Jed Chávez  MRN: [id number]  Admission Date: 8/9/2023  Hospital Length of Stay: 7 days  Discharge Date and Time:  08/16/2023 12:46 PM  Attending Physician: Braulio Huerta MD   Discharging Provider: Hallie Bravo PA-C  Primary Care Provider: Alee Pink MD  Post-Operative Day: 6     ORGAN:   LIVER  Disease Etiology: Primary Liver Malignancy: Hepatoma (HCC) and Cirrhosis  Donor Type:   Donation after Circulatory Death   CDC High Risk:   No  Donor CMV Status:   Donor CMV Status: Positive  Donor HBcAB:   Negative  Donor HCV Status:   Negative  Whole or Partial: Whole Liver  Biliary Anastomosis: End to End  Arterial Anatomy: Standard    HPI:   Mr. 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 Reports feeling in his usual state of health. Denies recent hospitalizations or illnesses. Of note, has a history of a hypertensive CVA in 2015. No focal deficits noted, BP well controlled with current medications. Pre op labs and diagnostics pending. Tentative OR times 2030/2200 with Dr. Suero as primary surgeon.       Procedure(s) (LRB):  TRANSPLANT, LIVER (N/A)     Hospital Course:    64 y/o male with ESRD 2/2 HCC s/p DCD OLTxp 8/10/23. Of note, has a history of a hypertensive CVA in 2015. No focal deficits noted. Surgery w/o issues. Pt was stepped down on 8/11.He progressed very well. AOx4. LFT's continue to trend down. He developed hyponatremia post transplant, improved with 1L FR (dc on 1.5L FR). Pt having serous drainage from right end of incision. Sodium improved to 129 and diuresed w Lasix 20 mg IV w albumin x1. Electrolytes replaced. Na 134 day of dc 8/16; will dc patient with 20 mg oral Lasix every other day x 1 week. Appetite is better and he is tolerating supplements. (+) flatus/BM.  Sleeping better w Trazodone. Encourage IS and OOB. BP improved/lower end of  normal. Coreg 6.25 resumed 8/16. PT/OT worked with patient, recommend dc with HH (ordered). Routine POD#6 US 8/16 showed decrease in main HA velocity with normal waveforms, increased Ris, slight increase in CBD diameter (7 mm, previously 4 mm), and multiple small manas-transplant collections. Reviewed by surgeons, no need for intervention at this time. Monitor per protocol.    Patient is now stable and ready for discharge. Discharge instructions reviewed by Pharmacist, Nursing and Transplant coordinator. Patient and CG verbalize understanding and are in agreement with discharge from hospital today.  Patient is medically stable for discharge. Patient will follow up with labs and transplant clinic appointment tomorrow, 8/17. Patient is in agreement with discharge from the hospital today and follow up plan.        Goals of Care Treatment Preferences:  Code Status: Full Code      Final Active Diagnoses:    Diagnosis Date Noted POA    PRINCIPAL PROBLEM:  Status post liver transplant [Z94.4] 08/12/2023 Not Applicable    Hyponatremia [E87.1] 08/14/2023 Yes    Acute blood loss anemia [D62] 08/12/2023 No    At risk for opportunistic infections [Z91.89] 08/12/2023 No    Long-term use of immunosuppressant medication [Z79.60] 08/12/2023 Not Applicable    Prophylactic immunotherapy [Z29.8] 08/12/2023 Not Applicable    Adverse effect of adrenal cortical steroids [T38.0X5A] 08/10/2023 No    Type 2 diabetes mellitus, without long-term current use of insulin [E11.9] 06/06/2023 Yes    Hypertension [I10] 06/06/2023 Yes      Problems Resolved During this Admission:    Diagnosis Date Noted Date Resolved POA    End stage liver disease [K72.10] 08/09/2023 08/12/2023 Yes    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] 08/09/2023 08/16/2023 Yes    Preop testing [Z01.818] 08/09/2023 08/12/2023 Not Applicable       Consults (From admission, onward)          Status Ordering Provider     Inpatient consult to Endocrinology  Once       "  Provider:  (Not yet assigned)    Completed CLAIR CASTRO     Inpatient consult to Registered Dietitian/Nutritionist  Once        Provider:  (Not yet assigned)    Completed CLAIR CASTRO            Pending Diagnostic Studies:       Procedure Component Value Units Date/Time    Freeze and Hold -BB HEP [[id number]] Collected: 08/10/23 0445    Order Status: Sent Lab Status: In process Updated: 08/10/23 0446    Specimen: Blood     Specimen to Pathology, Surgery Liver [[id number]] Collected: 08/10/23 0327    Order Status: Sent Lab Status: In process Updated: 08/10/23 0944    Specimen: Tissue           Significant Diagnostic Studies: Labs:   CMP   Recent Labs   Lab 08/15/23  0655 08/16/23  0606   * 134*   K 3.1* 3.1*   CL 96 100   CO2 24 26   * 115*   BUN 30* 23   CREATININE 0.9 0.8   CALCIUM 7.6* 8.0*   PROT 4.8* 5.1*   ALBUMIN 2.7* 3.4*   BILITOT 2.2* 2.1*   ALKPHOS 89 90   AST 33 36   * 80*   ANIONGAP 9 8   , CBC   Recent Labs   Lab 08/15/23  0655 08/16/23  0606   WBC 17.41* 12.63   HGB 13.0* 11.2*   HCT 37.1* 31.3*    124*   , INR   Lab Results   Component Value Date    INR 1.2 08/15/2023    INR 1.1 08/14/2023    INR 1.1 08/13/2023    and All labs within the past 24 hours have been reviewed  Microbiology: Blood Culture No results found for: "LABBLOO" and Urine Culture  No results found for: "LABURIN"  Radiology: X-Ray: CXR: X-Ray Chest 1 View (CXR): No results found for this visit on 08/09/23.    The patients clinical status was discussed at multidisplinary rounds, involving transplant surgery, transplant medicine, pharmacy, nursing, nutrition, and social work    Discharged Condition: stable    Disposition: Home or Self Care    Follow Up: Dc locally    Patient Instructions:      BATH/SHOWER CHAIR FOR HOME USE     Order Specific Question Answer Comments   Height: 5' 10" (1.778 m)    Weight: 104.3 kg (229 lb 15 oz)    Does patient have medical equipment at home? none    Length of need (1-99 " months): 99    Type: Without back      Ambulatory referral/consult to Home Health   Standing Status: Future   Referral Priority: Routine Referral Type: Home Health Care   Referral Reason: Specialty Services Required   Requested Specialty: Home Health Services   Number of Visits Requested: 1     Diet Adult Regular     Order Specific Question Answer Comments   Fluid restriction: Fluid - 1500mL    Additional restrictions: Low Sodium,2gm      Notify your health care provider if you experience any of the following:  increased confusion or weakness     Notify your health care provider if you experience any of the following:  persistent dizziness, light-headedness, or visual disturbances     Notify your health care provider if you experience any of the following:  worsening rash     Notify your health care provider if you experience any of the following:  severe persistent headache     Notify your health care provider if you experience any of the following:  difficulty breathing or increased cough     Notify your health care provider if you experience any of the following:  redness, tenderness, or signs of infection (pain, swelling, redness, odor or green/yellow discharge around incision site)     Notify your health care provider if you experience any of the following:  severe uncontrolled pain     Notify your health care provider if you experience any of the following:  persistent nausea and vomiting or diarrhea     Notify your health care provider if you experience any of the following:  temperature >100.4     Activity as tolerated     Medications:  Reconciled Home Medications:      Medication List        START taking these medications      aspirin 81 MG Chew  Take 1 tablet (81 mg total) by mouth once daily.     calcium carbonate 500 mg calcium (1,250 mg) tablet  Commonly known as: OS-NANCIE  Take 2 tablets (1,000 mg total) by mouth once daily.     carvediloL 3.125 MG tablet  Commonly known as: COREG  Take 1 tablet (3.125  "mg total) by mouth 2 (two) times daily. HOLD SBP <130, HR <60     CONTOUR NEXT GEN METER Misc  Generic drug: blood-glucose meter  Test blood glucose as directed     CONTOUR NEXT TEST STRIPS Strp  Generic drug: blood sugar diagnostic  Test blood glucose 3 (three) times daily.     famotidine 20 MG tablet  Commonly known as: PEPCID  Take 1 tablet (20 mg total) by mouth every evening.     furosemide 20 MG tablet  Commonly known as: LASIX  Take 1 tablet (20 mg total) by mouth every other day. STOP 8/24/23     HumaLOG KwikPen Insulin 100 unit/mL pen  Generic drug: insulin lispro  Inject 7 Units into the skin 3 (three) times daily with meals.     insulin glargine 100 units/mL SubQ pen  Inject 16 Units into the skin once daily.  Start taking on: August 17, 2023     MICROLET LANCET Misc  Generic drug: lancets  Test blood glucose 3 (three) times daily.     mycophenolate 250 mg Cap  Commonly known as: CELLCEPT  Take 4 capsules (1,000 mg total) by mouth 2 (two) times daily.     oxyCODONE 5 MG immediate release tablet  Commonly known as: ROXICODONE  Take 1 tablet (5 mg total) by mouth every 6 (six) hours as needed for Pain.     predniSONE 5 MG tablet  Commonly known as: DELTASONE  Take by mouth daily;  8/16 - 8/22 : 20 mg;  8/23 - 8/29 : 15 mg; 8/30 - 9/5 :10 mg; 9/6 - 9/12 : 5 mg; stop on 9/13/23     sulfamethoxazole-trimethoprim 400-80mg 400-80 mg per tablet  Commonly known as: BACTRIM,SEPTRA  Take 1 tablet by mouth once daily. Stop 2/6/24     tacrolimus 1 MG Cap  Commonly known as: PROGRAF  Take 3 capsules (3 mg total) by mouth every 12 (twelve) hours.     traZODone 50 MG tablet  Commonly known as: DESYREL  Take 1 tablet (50 mg total) by mouth nightly as needed for Insomnia.     TRUEPLUS PEN NEEDLE 32 gauge x 5/32" Ndle  Generic drug: pen needle, diabetic  Use to inject insulin into the skin 4 (four) times daily.     valGANciclovir 450 mg Tab  Commonly known as: VALCYTE  Take 1 tablet (450 mg total) by mouth once daily. Stop " 2/6/24            CONTINUE taking these medications      ergocalciferol 50,000 unit Cap  Commonly known as: ERGOCALCIFEROL  Take 1 capsule (50,000 Units total) by mouth every 7 days.            STOP taking these medications      amLODIPine 10 MG tablet  Commonly known as: NORVASC     ezetimibe 10 mg tablet  Commonly known as: ZETIA     glimepiride 4 MG tablet  Commonly known as: AMARYL     JANUVIA 100 MG Tab  Generic drug: SITagliptin phosphate     lisinopriL-hydrochlorothiazide 20-12.5 mg per tablet  Commonly known as: PRINZIDE,ZESTORETIC     nadoloL 20 MG tablet  Commonly known as: CORGARD            Time spent caring for patient (Greater than 1/2 spent in direct face-to-face contact): > 30 minutes    Hallie Bravo PA-C  Liver Transplant  [facility]

## 2023-08-15 NOTE — ASSESSMENT & PLAN NOTE
BG goal: 140-180   T2dm.  S/P liver transplant. On steroids  Appetite inconsistent.  BG at or above goal.  Will transition to MDI.    -Discontinue Transition drip   -start on Levemir 20 units nightly  -Continue mealtime insulin Novolog based on intake Administer 4 units if patient eats 25% -  50% and 8 units if patient eats 50% or more.  - Continue /25   - POCT Glucose before meals, at bedtime and at 2 am  - Hypoglycemia protocol in place      ** Please notify Endocrine for any change and/or advance in diet**  ** Please call Endocrine for any BG related issues **     Discharge Planning:   TBD. Please notify endocrinology prior to discharge.

## 2023-08-15 NOTE — ASSESSMENT & PLAN NOTE
- Glucose 186 this morning   - Continue Insulin drip per Endo  - Endocrine following, appreciate recs

## 2023-08-15 NOTE — PLAN OF CARE
Pt remains AAO x 4 with VSS, afebrile, sats upper 90s on RA, /70s throughout shift  Scheduled Tylenol given for pain relief   H/H stable, LFT trend down, Tbili 2.7, Na 127 - continue 1 L FR  Plan for routine Liver US later today and poss d/c today or Wednesday  R FA 20g with transitional insulin gtt infusing at 1.2 U/hr - endocrine following  L FA 16g - CDI, saline locked  Chevron JEROME with staples - wife painted with betadine; some serous leaking on R side - covering with gauze. RLQ old MILANA site - CDI with gauze covering   NSR on tele monitor - HR 90s  CBG ACHS + 2 AM - last , 177- SSI given as needed  Pt up independent and ambulatory- PT/OT consulted, voids in urinal/toilet, LBM 8/14  Self meds pulled 100% by wife - box updated and stocked this shift   Diabetic diet   Pt remains free from falls and injuries, call light in reach, bed in lowest position, nonskid socks on when OOB, side rails up x2  Will continue to monitor

## 2023-08-15 NOTE — PLAN OF CARE
63 year-old male admitted 8/9/23 for liver txp on 8/10/23. Pt has hx of HCC, ETOH cirrhosis, DM2, HTN, CVA, HLD  -AAOx4, ambulates independently  -20 G Rt FA  -16 G Lt FA  -Chevron with staples JEROME  -RLQ prev MILANA sites with sutures/ leaking at site, dressings changed multiple times this shift  -Accuchecks AC/HS & 0200  -Insulin gtt discontinued  -Diabetic Diet, 1 L FR  -tele NSR/sinus tach  -K 3.1  -Potassium 80mEq PO  -Albumin IVPB x2  -Lasix 20 mg IVP  -Liver US 8/16  -spouse at bedside, pt sitting up in chair, wheels locked, non-skid socks in place, call light within reach  -pending discharge 8/16 w/ HH

## 2023-08-15 NOTE — PROGRESS NOTES
[facility]  Liver Transplant  Progress Note    Patient Name: Jed Chávez  MRN: [id number]  Admission Date: 8/9/2023  Hospital Length of Stay: 6 days  Code Status: Full Code  Primary Care Provider: Alee Pink MD  Post-Operative Day: 5    ORGAN:   LIVER  Disease Etiology: Primary Liver Malignancy: Hepatoma (HCC) and Cirrhosis  Donor Type:   Donation after Circulatory Death   CDC High Risk:   No  Donor CMV Status:   Donor CMV Status: Positive  Donor HBcAB:   Negative  Donor HCV Status:   Negative  Donor HBV SURENDRA:   Donor HCV SURENDRA: Negative  Whole or Partial: Whole Liver  Biliary Anastomosis: End to End  Arterial Anatomy: Standard  Subjective:     History of Present Illness:  Mr. 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 Reports feeling in his usual state of health. Denies recent hospitalizations or illnesses. Of note, has a history of a hypertensive CVA in 2015. No focal deficits noted, BP well controlled with current medications. Pre op labs and diagnostics pending. Tentative OR times 2030/2200 with Dr. Suero as primary surgeon.       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 Of note, has a history of a hypertensive CVA in 2015. No focal deficits noted. Stepped down on 8/11      Hospital Interval: No acute events overnight. Pt progressing very well. AOx4. LFT's continue to trend down. Na improved- 129. Pt having serous drainage from right end of incision. Plan for lasix 20 mg IV w albumin x1. Potassium replaced. Cont 1L fluid restriction for another 24 hours. Appetite is better and he is tolerating supplements. (+)flatus/BM.  PT/OT working with patient. Sleeping better w Trazodone. Encourage IS and OOB. BP improved/lower end of normal. Coreg 6.25 discontinued. Will need to monitor need to restart given hs if HTN CVA. Plan for routine Liver US and possible discharge tomorrow.  Monitor.      Scheduled Meds:   acetaminophen  650 mg Oral Q6H    albumin human 25%  25 g Intravenous Q6H    aspirin  81 mg Oral Daily    bisacodyL  10 mg Oral QHS    famotidine  20 mg Oral QHS    furosemide (LASIX) injection  20 mg Intravenous Once    heparin (porcine)  5,000 Units Subcutaneous Q8H    insulin aspart U-100  4-8 Units Subcutaneous TIDWM    LIDOcaine (PF) 10 mg/ml (1%)  10 mL Other Once    mycophenolate  1,000 mg Oral BID    ondansetron  4 mg Oral Once    potassium chloride  40 mEq Oral Once    [START ON 8/16/2023] predniSONE  20 mg Oral Daily    [START ON 8/17/2023] sulfamethoxazole-trimethoprim 400-80mg  1 tablet Oral Daily AM    tacrolimus  2 mg Oral BID    traZODone  50 mg Oral QHS    [START ON 8/20/2023] valGANciclovir  450 mg Oral Daily     Continuous Infusions:   insulin regular 1 units/mL infusion orderable (TRANSFER) 1.2 Units/hr (08/15/23 0459)     PRN Meds:dextrose 10%, dextrose 10%, glucagon (human recombinant), glucose, glucose, insulin aspart U-100, melatonin, mupirocin, ondansetron, oxyCODONE, oxyCODONE, simethicone, sodium chloride 0.9%    Review of Systems   Constitutional:  Positive for activity change and appetite change. Negative for fatigue.   Respiratory:  Negative for cough and shortness of breath.    Cardiovascular:  Negative for chest pain, palpitations and leg swelling.   Gastrointestinal:  Positive for abdominal distention. Negative for constipation.   Genitourinary: Negative.  Negative for dysuria, hematuria, penile swelling and scrotal swelling.   Musculoskeletal:  Negative for arthralgias.   Skin:  Positive for wound. Negative for color change.   Allergic/Immunologic: Positive for immunocompromised state.   Neurological:  Positive for weakness.   Psychiatric/Behavioral:  Negative for behavioral problems.      Objective:     Vital Signs (Most Recent):  Temp: 97.5 °F (36.4 °C) (08/15/23 0833)  Pulse: 87 (08/15/23 0833)  Resp: 17 (08/15/23 0833)  BP: 113/74  (08/15/23 0833)  SpO2: 98 % (08/15/23 0833) Vital Signs (24h Range):  Temp:  [97.5 °F (36.4 °C)-97.7 °F (36.5 °C)] 97.5 °F (36.4 °C)  Pulse:  [] 87  Resp:  [16-18] 17  SpO2:  [95 %-98 %] 98 %  BP: (113-132)/(69-77) 113/74     Weight: 104.3 kg (229 lb 15 oz)  Body mass index is 32.99 kg/m².    Intake/Output - Last 3 Shifts          07 0659 08/14 0700  08/15 0659 08/15 0700  08/16 0659    P.O. 820 360     I.V. (mL/kg) 40.4 (0.4) 135.3 (1.3)     Other  0     Total Intake(mL/kg) 860.4 (8.8) 495.3 (4.7)     Urine (mL/kg/hr) 700 (0.3) 300 (0.1)     Emesis/NG output  0     Drains       Other  0     Stool 0 0     Blood  0     Total Output 700 300     Net +160.4 +195.3            Urine Occurrence 5 x 3 x     Stool Occurrence 6 x 2 x     Emesis Occurrence  0 x             Physical Exam  Vitals and nursing note reviewed.   Eyes:      General: Scleral icterus present.   Cardiovascular:      Rate and Rhythm: Normal rate and regular rhythm.      Heart sounds: No murmur heard.  Pulmonary:      Effort: No respiratory distress.      Breath sounds: No wheezing.   Abdominal:      General: There is distension.      Tenderness: There is abdominal tenderness.   Musculoskeletal:         General: No swelling or tenderness.      Cervical back: Neck supple.      Right lower le+ Pitting Edema present.      Left lower le+ Pitting Edema present.   Skin:     General: Skin is warm and dry.      Capillary Refill: Capillary refill takes less than 2 seconds.   Neurological:      General: No focal deficit present.      Mental Status: He is alert and oriented to person, place, and time.   Psychiatric:         Mood and Affect: Mood normal.         Behavior: Behavior normal.         Thought Content: Thought content normal.         Judgment: Judgment normal.          Laboratory:  Immunosuppressants           Stop Route Frequency     tacrolimus capsule 2 mg         -- Oral 2 times daily     mycophenolate capsule 1,000 mg          -- Oral 2 times daily          CBC:   Recent Labs   Lab 08/15/23  0655   WBC 17.41*   RBC 4.14*   HGB 13.0*   HCT 37.1*      MCV 90   MCH 31.4*   MCHC 35.0       BMP:   Recent Labs   Lab 08/15/23  0655   *   *   K 3.1*   CL 96   CO2 24   BUN 30*   CREATININE 0.9   CALCIUM 7.6*       CMP:   Recent Labs   Lab 08/15/23  0655   *   CALCIUM 7.6*   ALBUMIN 2.7*   PROT 4.8*   *   K 3.1*   CO2 24   CL 96   BUN 30*   CREATININE 0.9   ALKPHOS 89   *   AST 33   BILITOT 2.2*       Labs within the past 24 hours have been reviewed.    Diagnostic Results:  US Liver Transplant Post:  Results for orders placed during the hospital encounter of 08/09/23    US Doppler Liver Transplant Post (xpd)    Narrative  EXAMINATION:  US DOPPLER LIVER TRANSPLANT POST (XPD)    CLINICAL HISTORY:  s/p liver transplant;    TECHNIQUE:  Limited abdominal ultrasound of the transplant liver with Doppler evaluation.  Color and spectral Doppler were performed.    COMPARISON:  None.    FINDINGS:  Patient is status post orthotopic liver transplant 08/09/2023.  The liver demonstrates homogeneous echotexture.  No focal hepatic lesions are seen.    Peritransplant fluid collections as below.    Along the left hepatic lobe there is a 0.9 x 3.0 x 2.2 cm collection.    Along the medial right hepatic lobe there is a 3.2 x 7.1 x 4.1 cm mildly complex collection.    Additional anechoic collection near the right hepatic lobe measures 2.4 x 1.3 x 1.9 cm.    The common duct is not dilated, measuring 4 mm.  No dilated intrahepatic radicles are seen.    Color flow and spectral waveform analysis was performed.  The main portal vein, right portal vein, left portal vein, middle hepatic vein, right hepatic vein, left hepatic vein, and IVC are patent with proper directional flow.    Anastomosis site of the main hepatic artery demonstrates a peak systolic velocity 199 cm/sec.    Main hepatic artery demonstrates resistive index 0.81 with  normal waveform.    Left hepatic artery shows resistive index 0.81 with normal waveform.    Anterior branch of the right hepatic artery demonstrates resistive index 0.72 with normal waveform.    Posterior branch of the right hepatic artery demonstrates resistive index 0.70 with normal waveform.    Impression  Satisfactory Doppler evaluation of postoperative day 1 liver allograft.    Several peritransplant fluid collections, as above.    Findings were relayed to Dr. Baum on 08/10/2023 at 09:02.    Electronically signed by resident: Jed Watkins  Date:    08/10/2023  Time:    08:33    Electronically signed by: Raj Kang  Date:    08/10/2023  Time:    09:03    Debility/Functional status: No debility noted.    Assessment/Plan:     * Status post liver transplant  - 64 y/o male with ESRD 2/2 HCC s/p DCD OLTxp 8/10   - POD #1 US satisfactory with multiple small fluid collections, largest along medial R Lobe (3x7x4)  - LFTs trending down   - Plan for routine DCD Liver US and possible d/c tomorrow    Hyponatremia  - Mild hyponatremia post transplant, improving as of today  - Continue 1L fluid restriction  - Plan for Lasix 20 mg IV w albumin today  - Continue to monitor     Prophylactic immunotherapy  - continue prograf  - continue to monitor for toxic side effects and check daily levels. Will adjust for therapeutic dose          Long-term use of immunosuppressant medication  - see prophylactic immunotherapy      At risk for opportunistic infections  - Bactrim for PCP ppx.  - Valcyte for CMV ppx.      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 Improved post transplant.        Type 2 diabetes mellitus, without long-term current use of insulin  - Consult Endocrine post op for assistance  in BG management.      Hypertension  - cont to monitor        VTE Risk Mitigation (From admission, onward)         Ordered     heparin (porcine) injection 5,000 Units  Every 8 hours         08/10/23 0436     Place sequential compression device  Until discontinued         08/10/23 0436     IP VTE HIGH RISK PATIENT  Once         08/10/23 0436                The patients clinical status was discussed at multidisplinary rounds, involving transplant surgery, transplant medicine, pharmacy, nursing, nutrition, and social work    Discharge Planning:  Discussed plan of care.  No plan for discharge today.    Greater than 30 minutes spent with patient discussing diagnosis including reviewing labs and imaging and plan of care.    Shadia Magallanes, NP  Liver Transplant  [facility]

## 2023-08-15 NOTE — ASSESSMENT & PLAN NOTE
- Mild hyponatremia post transplant, improving as of today  - Continue 1L fluid restriction  - Plan for Lasix 20 mg IV w albumin today  - Continue to monitor

## 2023-08-16 ENCOUNTER — NURSE TRIAGE (OUTPATIENT)
Dept: ADMINISTRATIVE | Facility: CLINIC | Age: 63
End: 2023-08-16
Payer: COMMERCIAL

## 2023-08-16 ENCOUNTER — TELEPHONE (OUTPATIENT)
Dept: TRANSPLANT | Facility: CLINIC | Age: 63
End: 2023-08-16
Payer: COMMERCIAL

## 2023-08-16 ENCOUNTER — PATIENT MESSAGE (OUTPATIENT)
Dept: ENDOCRINOLOGY | Facility: HOSPITAL | Age: 63
End: 2023-08-16
Payer: COMMERCIAL

## 2023-08-16 VITALS
DIASTOLIC BLOOD PRESSURE: 63 MMHG | OXYGEN SATURATION: 97 % | WEIGHT: 232.56 LBS | BODY MASS INDEX: 33.29 KG/M2 | SYSTOLIC BLOOD PRESSURE: 124 MMHG | TEMPERATURE: 98 F | HEART RATE: 92 BPM | HEIGHT: 70 IN | RESPIRATION RATE: 18 BRPM

## 2023-08-16 DIAGNOSIS — Z94.4 LIVER REPLACED BY TRANSPLANT: Primary | ICD-10-CM

## 2023-08-16 PROBLEM — E55.9 VITAMIN D DEFICIENCY: Status: ACTIVE | Noted: 2023-08-16

## 2023-08-16 PROBLEM — D68.4 ACQUIRED COAGULATION FACTOR DEFICIENCY: Status: RESOLVED | Noted: 2023-08-09 | Resolved: 2023-08-16

## 2023-08-16 LAB
ALBUMIN SERPL BCP-MCNC: 3.4 G/DL (ref 3.5–5.2)
ALP SERPL-CCNC: 90 U/L (ref 55–135)
ALT SERPL W/O P-5'-P-CCNC: 80 U/L (ref 10–44)
ANION GAP SERPL CALC-SCNC: 8 MMOL/L (ref 8–16)
AST SERPL-CCNC: 36 U/L (ref 10–40)
BASOPHILS # BLD AUTO: 0.04 K/UL (ref 0–0.2)
BASOPHILS NFR BLD: 0.3 % (ref 0–1.9)
BILIRUB SERPL-MCNC: 2.1 MG/DL (ref 0.1–1)
BUN SERPL-MCNC: 23 MG/DL (ref 8–23)
CALCIUM SERPL-MCNC: 8 MG/DL (ref 8.7–10.5)
CHLORIDE SERPL-SCNC: 100 MMOL/L (ref 95–110)
CO2 SERPL-SCNC: 26 MMOL/L (ref 23–29)
CREAT SERPL-MCNC: 0.8 MG/DL (ref 0.5–1.4)
DIFFERENTIAL METHOD: ABNORMAL
EOSINOPHIL # BLD AUTO: 0.2 K/UL (ref 0–0.5)
EOSINOPHIL NFR BLD: 1.8 % (ref 0–8)
ERYTHROCYTE [DISTWIDTH] IN BLOOD BY AUTOMATED COUNT: 15.8 % (ref 11.5–14.5)
EST. GFR  (NO RACE VARIABLE): >60 ML/MIN/1.73 M^2
GLUCOSE SERPL-MCNC: 115 MG/DL (ref 70–110)
HCT VFR BLD AUTO: 31.3 % (ref 40–54)
HGB BLD-MCNC: 11.2 G/DL (ref 14–18)
IMM GRANULOCYTES # BLD AUTO: 0.3 K/UL (ref 0–0.04)
IMM GRANULOCYTES NFR BLD AUTO: 2.4 % (ref 0–0.5)
LYMPHOCYTES # BLD AUTO: 1.4 K/UL (ref 1–4.8)
LYMPHOCYTES NFR BLD: 11.2 % (ref 18–48)
MAGNESIUM SERPL-MCNC: 2 MG/DL (ref 1.6–2.6)
MCH RBC QN AUTO: 32.2 PG (ref 27–31)
MCHC RBC AUTO-ENTMCNC: 35.8 G/DL (ref 32–36)
MCV RBC AUTO: 90 FL (ref 82–98)
MONOCYTES # BLD AUTO: 1.2 K/UL (ref 0.3–1)
MONOCYTES NFR BLD: 9.3 % (ref 4–15)
NEUTROPHILS # BLD AUTO: 9.5 K/UL (ref 1.8–7.7)
NEUTROPHILS NFR BLD: 75 % (ref 38–73)
NRBC BLD-RTO: 0 /100 WBC
PLATELET # BLD AUTO: 124 K/UL (ref 150–450)
PMV BLD AUTO: 10.2 FL (ref 9.2–12.9)
POCT GLUCOSE: 115 MG/DL (ref 70–110)
POCT GLUCOSE: 152 MG/DL (ref 70–110)
POCT GLUCOSE: 253 MG/DL (ref 70–110)
POTASSIUM SERPL-SCNC: 3.1 MMOL/L (ref 3.5–5.1)
PROT SERPL-MCNC: 5.1 G/DL (ref 6–8.4)
RBC # BLD AUTO: 3.48 M/UL (ref 4.6–6.2)
SODIUM SERPL-SCNC: 134 MMOL/L (ref 136–145)
TACROLIMUS BLD-MCNC: 6.7 NG/ML (ref 5–15)
WBC # BLD AUTO: 12.63 K/UL (ref 3.9–12.7)

## 2023-08-16 PROCEDURE — 63600175 PHARM REV CODE 636 W HCPCS: Performed by: STUDENT IN AN ORGANIZED HEALTH CARE EDUCATION/TRAINING PROGRAM

## 2023-08-16 PROCEDURE — 97530 THERAPEUTIC ACTIVITIES: CPT

## 2023-08-16 PROCEDURE — 99233 SBSQ HOSP IP/OBS HIGH 50: CPT | Mod: 24,,, | Performed by: PHYSICIAN ASSISTANT

## 2023-08-16 PROCEDURE — 99233 PR SUBSEQUENT HOSPITAL CARE,LEVL III: ICD-10-PCS | Mod: 24,,, | Performed by: PHYSICIAN ASSISTANT

## 2023-08-16 PROCEDURE — 99232 PR SUBSEQUENT HOSPITAL CARE,LEVL II: ICD-10-PCS | Mod: ,,, | Performed by: NURSE PRACTITIONER

## 2023-08-16 PROCEDURE — 25000003 PHARM REV CODE 250: Performed by: PHYSICIAN ASSISTANT

## 2023-08-16 PROCEDURE — 25000003 PHARM REV CODE 250

## 2023-08-16 PROCEDURE — 63600175 PHARM REV CODE 636 W HCPCS

## 2023-08-16 PROCEDURE — 63600175 PHARM REV CODE 636 W HCPCS: Performed by: TRANSPLANT SURGERY

## 2023-08-16 PROCEDURE — 80197 ASSAY OF TACROLIMUS: CPT | Performed by: STUDENT IN AN ORGANIZED HEALTH CARE EDUCATION/TRAINING PROGRAM

## 2023-08-16 PROCEDURE — 36415 COLL VENOUS BLD VENIPUNCTURE: CPT | Performed by: PHYSICIAN ASSISTANT

## 2023-08-16 PROCEDURE — 85025 COMPLETE CBC W/AUTO DIFF WBC: CPT | Performed by: STUDENT IN AN ORGANIZED HEALTH CARE EDUCATION/TRAINING PROGRAM

## 2023-08-16 PROCEDURE — 80053 COMPREHEN METABOLIC PANEL: CPT | Performed by: STUDENT IN AN ORGANIZED HEALTH CARE EDUCATION/TRAINING PROGRAM

## 2023-08-16 PROCEDURE — 25000003 PHARM REV CODE 250: Performed by: NURSE PRACTITIONER

## 2023-08-16 PROCEDURE — 83735 ASSAY OF MAGNESIUM: CPT | Performed by: PHYSICIAN ASSISTANT

## 2023-08-16 PROCEDURE — P9047 ALBUMIN (HUMAN), 25%, 50ML: HCPCS | Mod: JZ,JG | Performed by: NURSE PRACTITIONER

## 2023-08-16 PROCEDURE — 99232 SBSQ HOSP IP/OBS MODERATE 35: CPT | Mod: ,,, | Performed by: NURSE PRACTITIONER

## 2023-08-16 PROCEDURE — 63600175 PHARM REV CODE 636 W HCPCS: Mod: JZ,JG | Performed by: NURSE PRACTITIONER

## 2023-08-16 PROCEDURE — 97116 GAIT TRAINING THERAPY: CPT

## 2023-08-16 RX ORDER — TACROLIMUS 1 MG/1
3 CAPSULE ORAL EVERY 12 HOURS
Qty: 180 CAPSULE | Refills: 11 | Status: CANCELLED | OUTPATIENT
Start: 2023-08-16

## 2023-08-16 RX ORDER — INSULIN ASPART 100 [IU]/ML
7 INJECTION, SOLUTION INTRAVENOUS; SUBCUTANEOUS
Qty: 10 ML | Refills: 7 | Status: CANCELLED | OUTPATIENT
Start: 2023-08-16

## 2023-08-16 RX ORDER — TACROLIMUS 1 MG/1
1 CAPSULE ORAL ONCE
Status: COMPLETED | OUTPATIENT
Start: 2023-08-16 | End: 2023-08-16

## 2023-08-16 RX ORDER — ERGOCALCIFEROL 1.25 MG/1
50000 CAPSULE ORAL
Qty: 12 CAPSULE | Refills: 0 | Status: CANCELLED | COMMUNITY
Start: 2023-08-16

## 2023-08-16 RX ORDER — INSULIN GLARGINE 100 [IU]/ML
16 INJECTION, SOLUTION SUBCUTANEOUS DAILY
Qty: 15 ML | Refills: 11 | Status: SHIPPED | OUTPATIENT
Start: 2023-08-17 | End: 2023-09-06 | Stop reason: DRUGHIGH

## 2023-08-16 RX ORDER — TACROLIMUS 1 MG/1
3 CAPSULE ORAL EVERY 12 HOURS
Qty: 180 CAPSULE | Refills: 11 | Status: SHIPPED | OUTPATIENT
Start: 2023-08-16 | End: 2023-09-05

## 2023-08-16 RX ORDER — OXYCODONE HYDROCHLORIDE 5 MG/1
5 TABLET ORAL EVERY 6 HOURS PRN
Qty: 20 TABLET | Refills: 0 | Status: SHIPPED | OUTPATIENT
Start: 2023-08-16 | End: 2023-09-05 | Stop reason: ALTCHOICE

## 2023-08-16 RX ORDER — CARVEDILOL 3.12 MG/1
3.12 TABLET ORAL 2 TIMES DAILY
Qty: 60 TABLET | Refills: 11 | Status: SHIPPED | OUTPATIENT
Start: 2023-08-16 | End: 2023-08-18 | Stop reason: DRUGHIGH

## 2023-08-16 RX ORDER — FUROSEMIDE 20 MG/1
20 TABLET ORAL EVERY OTHER DAY
Qty: 5 TABLET | Refills: 0 | Status: SHIPPED | OUTPATIENT
Start: 2023-08-16 | End: 2023-08-16 | Stop reason: SDUPTHER

## 2023-08-16 RX ORDER — PEN NEEDLE, DIABETIC 30 GX3/16"
1 NEEDLE, DISPOSABLE MISCELLANEOUS 4 TIMES DAILY
Qty: 100 EACH | Refills: 11 | Status: SHIPPED | OUTPATIENT
Start: 2023-08-16

## 2023-08-16 RX ORDER — TACROLIMUS 1 MG/1
3 CAPSULE ORAL 2 TIMES DAILY
Status: DISCONTINUED | OUTPATIENT
Start: 2023-08-16 | End: 2023-08-16 | Stop reason: HOSPADM

## 2023-08-16 RX ORDER — NAPROXEN SODIUM 220 MG/1
81 TABLET, FILM COATED ORAL DAILY
Qty: 30 TABLET | Refills: 11 | Status: ON HOLD | OUTPATIENT
Start: 2023-08-16 | End: 2023-10-30 | Stop reason: SDUPTHER

## 2023-08-16 RX ORDER — LANCETS
1 EACH MISCELLANEOUS 3 TIMES DAILY
Qty: 100 EACH | Refills: 11 | Status: SHIPPED | OUTPATIENT
Start: 2023-08-16 | End: 2024-08-15

## 2023-08-16 RX ORDER — CALCIUM CARBONATE 500(1250)
2 TABLET ORAL DAILY
Qty: 60 TABLET | Refills: 1 | Status: SHIPPED | OUTPATIENT
Start: 2023-08-16 | End: 2023-09-20

## 2023-08-16 RX ORDER — INSULIN LISPRO 100 [IU]/ML
7 INJECTION, SOLUTION INTRAVENOUS; SUBCUTANEOUS
Qty: 15 ML | Refills: 3 | Status: SHIPPED | OUTPATIENT
Start: 2023-08-16 | End: 2023-08-31 | Stop reason: DRUGHIGH

## 2023-08-16 RX ORDER — TRAZODONE HYDROCHLORIDE 50 MG/1
50 TABLET ORAL NIGHTLY PRN
Qty: 30 TABLET | Refills: 1 | Status: SHIPPED | OUTPATIENT
Start: 2023-08-16 | End: 2023-09-20

## 2023-08-16 RX ORDER — POTASSIUM CHLORIDE 20 MEQ/1
40 TABLET, EXTENDED RELEASE ORAL EVERY 4 HOURS
Status: DISCONTINUED | OUTPATIENT
Start: 2023-08-16 | End: 2023-08-16 | Stop reason: HOSPADM

## 2023-08-16 RX ORDER — CARVEDILOL 3.12 MG/1
3.12 TABLET ORAL 2 TIMES DAILY
Status: DISCONTINUED | OUTPATIENT
Start: 2023-08-16 | End: 2023-08-16 | Stop reason: HOSPADM

## 2023-08-16 RX ORDER — FUROSEMIDE 20 MG/1
20 TABLET ORAL EVERY OTHER DAY
Qty: 5 TABLET | Refills: 0 | Status: SHIPPED | OUTPATIENT
Start: 2023-08-16 | End: 2023-08-17 | Stop reason: SDUPTHER

## 2023-08-16 RX ORDER — DEXTROSE 4 G
TABLET,CHEWABLE ORAL
Qty: 1 EACH | Refills: 0 | Status: ON HOLD | OUTPATIENT
Start: 2023-08-16 | End: 2023-10-28 | Stop reason: ALTCHOICE

## 2023-08-16 RX ADMIN — INSULIN ASPART 8 UNITS: 100 INJECTION, SOLUTION INTRAVENOUS; SUBCUTANEOUS at 01:08

## 2023-08-16 RX ADMIN — MYCOPHENOLATE MOFETIL 1000 MG: 250 CAPSULE ORAL at 08:08

## 2023-08-16 RX ADMIN — INSULIN DETEMIR 20 UNITS: 100 INJECTION, SOLUTION SUBCUTANEOUS at 09:08

## 2023-08-16 RX ADMIN — ACETAMINOPHEN 650 MG: 325 TABLET ORAL at 05:08

## 2023-08-16 RX ADMIN — ACETAMINOPHEN 650 MG: 325 TABLET ORAL at 12:08

## 2023-08-16 RX ADMIN — POTASSIUM CHLORIDE 40 MEQ: 1500 TABLET, EXTENDED RELEASE ORAL at 02:08

## 2023-08-16 RX ADMIN — HEPARIN SODIUM 5000 UNITS: 5000 INJECTION INTRAVENOUS; SUBCUTANEOUS at 05:08

## 2023-08-16 RX ADMIN — INSULIN ASPART 1 UNITS: 100 INJECTION, SOLUTION INTRAVENOUS; SUBCUTANEOUS at 03:08

## 2023-08-16 RX ADMIN — INSULIN ASPART 8 UNITS: 100 INJECTION, SOLUTION INTRAVENOUS; SUBCUTANEOUS at 09:08

## 2023-08-16 RX ADMIN — POTASSIUM CHLORIDE 40 MEQ: 1500 TABLET, EXTENDED RELEASE ORAL at 08:08

## 2023-08-16 RX ADMIN — TACROLIMUS 2 MG: 1 CAPSULE ORAL at 08:08

## 2023-08-16 RX ADMIN — ASPIRIN 81 MG 81 MG: 81 TABLET ORAL at 08:08

## 2023-08-16 RX ADMIN — HEPARIN SODIUM 5000 UNITS: 5000 INJECTION INTRAVENOUS; SUBCUTANEOUS at 02:08

## 2023-08-16 RX ADMIN — PREDNISONE 20 MG: 20 TABLET ORAL at 08:08

## 2023-08-16 RX ADMIN — TACROLIMUS 1 MG: 1 CAPSULE ORAL at 12:08

## 2023-08-16 RX ADMIN — CARVEDILOL 3.12 MG: 3.12 TABLET, FILM COATED ORAL at 12:08

## 2023-08-16 RX ADMIN — INSULIN ASPART 6 UNITS: 100 INJECTION, SOLUTION INTRAVENOUS; SUBCUTANEOUS at 01:08

## 2023-08-16 RX ADMIN — ALBUMIN (HUMAN) 25 G: 12.5 SOLUTION INTRAVENOUS at 12:08

## 2023-08-16 RX ADMIN — ALBUMIN (HUMAN) 25 G: 12.5 SOLUTION INTRAVENOUS at 06:08

## 2023-08-16 NOTE — PLAN OF CARE
Section Delivery Procedure Note    Name: Breanne Reynolds   Medical Record Number: [id number]   YOB: 1990  Today's Date: 2017      Preoperative Diagnosis: Fetal Intolerance of Labor    Postoperative Diagnosis: Same. Complications: possible uterine wall dissection. Procedure: Low Transverse  Section    Surgeon(s):  Bahman Collier MD    Anesthesia: General endotrachael    Prophylactic Antibiotics: Ancef pre-op dose. Procedure Details:    Patient was placed supine, pillow under right hip, scrubbed and draped. Pandey catheter was inserted. She was induced with general anesthesia. Pfannenstiel incision was made in the skin, extended through the subcutaneous tissue and the anterior rectus fascia. Hemostasis was obtained with cautery in the subcutaneous tissue. The rectus muscles were  in the midline and the peritoneum was similiarly incised. Bladder was deflected downward. There was a blue bulge to the lower uterine segment that seemed to me to be blood under the myometrium. Transverse incision was made in the lower segment of the uterus. Membranes were already ruptured and the fluid was noted to be moderate meconium. Bulb suctioning of the naso and oral pharynx was done. Baby was delivered in the routine manner through this incision. The cord was clamped and cut. Baby was handed off to waiting  staff. Placenta was now delivered manually and the uterine cavity was wiped free of clots and membranes and inspected to be sure that it was empty. Myometrium was closed in two layers of 0 Vicryl, the first running locking and the second running imbricating. Further figure of eight sutures were not placed to achieve hemostasis. The tubes and ovaries were noted to be normal.  Once hemostasis had been achieved and the gutters were cleaned, closure was begun. Normal sponge count was now obtained. The rectus muscles were re-approximated with 2-0 Vicryl.  The 63 year-old male admitted 8/9/23 for liver txp on 8/10/23. Pt has hx of HCC, ETOH cirrhosis, DM2, HTN, CVA, HLD  -AAOx4, ambulates independently  -20 G Rt FA  -16 G Lt FA  -Chevron with staples JEROME  -RLQ prev MILANA sites with sutures/ leaking at site, dressings changed multiple times this shift  -Accuchecks AC/HS & 0200  -Diabetic Diet, 1 L FR  -tele NSR/sinus tach  -K 3.1  -Potassium 40 mEq Q4  -Albumin IVPB x2  -Liver US complete  -spouse at bedside, pt sitting up in chair, wheels locked, non-skid socks in place, call light within reach  -pending discharge   rectus fascia was closed with a running suture of 1 Vicryl. The subcutaneous layer did not require interrupted 3-0 Vicryl sutures. The skin edges were closed with Insorb staples. Derma bond was applied, patient was recovered and sent to recovery room in good condition. All sponge and needle counts were correct. Estimated Blood Loss:       Fetal Description:  male    Apgar - One Minute: 8    Apgar - Five Minutes: 9    Placenta - was sent to Pathology    EBL: 800cc, 800 cytotec given rectally at case end.     Signed: Anay Reyes MD MD [facility]     2017

## 2023-08-16 NOTE — PROGRESS NOTES
"[facility] - Transplant Stepdown  Endocrinology  Progress Note    Admit Date: 2023     Reason for Consult: Management of T2DM, Hyperglycemia     Surgical Procedure and Date: S/p liver txp    Diabetes diagnosis year: >7 years    Home Diabetes Medications:  Glimepiride 4 mg, Januvia 100 mg.    How often checking glucose at home?  ALEXANDER    BG readings on regimen: ALEXANDER  Hypoglycemia on the regimen?  No  Missed doses on regimen?  No    Diabetes Complications include:     Hyperglycemia    Complicating diabetes co morbidities:   CIRRHOSIS and Glucocorticoid use       HPI:   Patient is a 63 y.o. 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 Salgado and Pio 8/10/23. Endocrine consulted for bg management.        Interval HPI:   Overnight events: No acute events overnight. Patient in room 33174/21301 A. Blood glucose stable. BG at and above goal on current insulin regimen (SSI, prandial, and basal insulin ). Steroid use- Prednisone 20 mg. 7 Days Post-Op  Renal function- Normal   Vasopressors-  None       Endocrine will continue to follow and manage insulin orders inpatient.         Diet diabetic  Calorie     Eatin%  Nausea: No  Hypoglycemia and intervention: No  Fever: No  TPN and/or TF: No      /77 (BP Location: Right arm, Patient Position: Sitting)   Pulse 82   Temp 98 °F (36.7 °C) (Oral)   Resp 18   Ht 5' 10" (1.778 m)   Wt 105.5 kg (232 lb 9.4 oz)   SpO2 96%   BMI 33.37 kg/m²     Labs Reviewed and Include    Recent Labs   Lab 23  0606   *   CALCIUM 8.0*   ALBUMIN 3.4*   PROT 5.1*   *   K 3.1*   CO2 26      BUN 23   CREATININE 0.8   ALKPHOS 90   ALT 80*   AST 36   BILITOT 2.1*     Lab Results   Component Value Date    WBC 12.63 2023    HGB 11.2 (L) 2023    HCT 31.3 (L) 2023    MCV 90 2023     (L) 2023     No results for input(s): " ""TSH", "FREET4" in the last 168 hours.  Lab Results   Component Value Date    HGBA1C 7.5 (H) 04/20/2023       Nutritional status:   Body mass index is 33.37 kg/m².  Lab Results   Component Value Date    ALBUMIN 3.4 (L) 08/16/2023    ALBUMIN 2.7 (L) 08/15/2023    ALBUMIN 2.4 (L) 08/14/2023     No results found for: "PREALBUMIN"    Estimated Creatinine Clearance: 116.5 mL/min (based on SCr of 0.8 mg/dL).    Accu-Checks  Recent Labs     08/14/23  1212 08/14/23  1726 08/14/23  2153 08/15/23  0157 08/15/23  0842 08/15/23  1225 08/15/23  1741 08/15/23  2321 08/16/23  0339 08/16/23  0852   POCTGLUCOSE 185* 262* 221* 177* 163* 215* 337* 277* 152* 115*       Current Medications and/or Treatments Impacting Glycemic Control  Immunotherapy:    Immunosuppressants           Stop Route Frequency     tacrolimus capsule 3 mg         -- Oral 2 times daily     tacrolimus capsule 1 mg         -- Oral Once     mycophenolate capsule 1,000 mg         -- Oral 2 times daily          Steroids:   Hormones (From admission, onward)      Start     Stop Route Frequency Ordered    08/16/23 0900  predniSONE tablet 20 mg  (methylprednisolone taper panel)        See Hyperspace for full Linked Orders Report.    -- Oral Daily 08/10/23 0436    08/11/23 2126  melatonin tablet 6 mg         -- Oral Nightly PRN 08/11/23 2026          Pressors:    Autonomic Drugs (From admission, onward)      None          Hyperglycemia/Diabetes Medications:   Antihyperglycemics (From admission, onward)      Start     Stop Route Frequency Ordered    08/17/23 0900  insulin detemir U-100 (Levemir) pen 16 Units         -- SubQ Daily 08/16/23 0944    08/15/23 1238  insulin aspart U-100 pen 1-10 Units         -- SubQ Before meals, nightly and at 0200 PRN 08/15/23 1139    08/15/23 1145  insulin aspart U-100 pen 4-8 Units         -- SubQ 3 times daily with meals 08/15/23 1139            ASSESSMENT and PLAN    Endocrine  Type 2 diabetes mellitus, without long-term current use of " insulin  Endocrinology consulted for BG management.   BG goal 140-180    - Levemir (Insulin Detemir) 16 units daily (20% reduction due to fasting blood glucose below goal.)  - Novolog (Insulin Aspart) 4-7 units TIDWM and prn for BG excursions MDC SSI (150/25) (20% reduction due to prandial blood glucose below goal)  - BG checks AC/HS  - Hypoglycemia protocol in place  - If blood glucose greater than 300, please ask patient not to eat food or drink anything other than water until correctional insulin has brought it back below 250    ** Please notify Endocrine for any change and/or advance in diet**  ** Please call Endocrine for any BG related issues **    Discharge Planning:   - Levemir 16 units qd (Eat a snack before bed if BG is < 100 mg/dl)  - Novolog 7 units TIDWM (Hold if BG < 100 mg/dL)  - Novolog SSI for BG excursions:    150 - 200 + 2 unit    201 - 250 + 4 units    251 - 300 + 6 units    301 - 350 + 8 units       > 350   + 10 units  - Insurance approved diabetes testing supplies to check blood sugar 4 times a day (Before meals and at bedtime) and as needed.  - BD pen needles   - Insurance approved glucagon for extreme hypoglycemia (Baqsimi or Zegalogue if insurance approved)  - Send BG logs in 4 days  - Follow-up in discharge clinic in 2 weeks.     Education:  Discharge Teaching:    Reviewed topics related to DM including: the need for insulin, how insulin works, what makes it a high risk medication, the importance of immediate follow up with either PCP or endocrine provider, importance of and how to check BG, how to record BG on logs, how to administer insulin, appropriate insulin administration sites, importance of rotating injection sites, hyper/hypoglycemia, how and when to treat hypoglycemia, when to hold insulin, how the correction scale works, importance of storing unused insulin in the refrigerator, and when to seek medical attention.  Patient verbalized understanding, answered all questions to  patient's satisfaction. Blood sugar logs given to patient.     Hypoglycemia (Low Blood Sugar)  Too little glucose (sugar) in your blood is called hypoglycemia or low blood sugar. Diabetes itself doesnt cause low blood sugar. But some of the treatments for diabetes, such as pills or insulin, may increase your risk for it. Low blood sugar may cause you to lose consciousness or have a seizure. So always treat low blood sugar right away.    Special note: Always carry a source of fast-acting sugar and a snack in case of hypoglycemia     What You May Notice  If you have low blood sugar, you may have these symptoms:   Shakiness or dizziness   Cold, clammy skin or sweating   Feelings of hunger   Headache   Nervousness   A hard, fast heartbeat   Weakness   Confusion or irritability   Blurred vision  What You Should Do   First, check your blood sugar. If it is too low (out of your target range), eat or drink 15 to 20 grams of fast-acting sugar. This may be 3 to 4 glucose tablets, 4 oz (half a cup) fruit juice or regular (non-diet) soda, 8 oz (one cup ) fat-free milk, or 1 tablespoon of honey. Dont take more than this, or your blood sugar may go too high.   Wait 15 minutes. Then recheck your blood sugar if you can.   If your blood sugar is still too low, repeat the steps above and check your blood sugar again. If your blood sugar still has not returned to your target range, contact your healthcare provider or seek emergency care.   Once your blood sugar returns to target range, eat a snack or meal.  Preventing Low Blood Sugar   Eat your meals and snacks at the same times each day. Dont skip meals!   Ask your healthcare provider if it is safe for you to drink alcohol. Never drink on an empty stomach.   Take your medication at the prescribed times.   Always carry a source of fast-acting sugar and a snack when youre away from home.  Other Things to Do   Carry a medical ID card or wear a medical alert  bracelet or necklace. It should say that you have diabetes. It should also say what to do if you pass out or have a seizure.   Make sure your family, friends, and coworkers know the signs of low blood sugar. Tell them what to do if your blood sugar falls very low and you cant treat yourself.   Keep a glucagon emergency kit handy. Be sure your family, friends, and coworkers know how and when to use it. Check it regularly and replace the glucagon before it expires.   Talk to your healthcare team about other things you can do to prevent low blood sugar.     If you experience hypoglycemia several times, call your doctor.   © 5169-2881 [facility], [address] [place] [address], [place] [address]. All rights reserved. This information is not intended as a substitute for professional medical care. Always follow your healthcare professional's instructions.             GI  * Status post liver transplant  Managed per primary team  Optimize bg control        Other  Adverse effect of adrenal cortical steroids  Steroids may increase BG           Marquez Contreras, DNP, FNP  Endocrinology  [facility] - Transplant Stepdown

## 2023-08-16 NOTE — TELEPHONE ENCOUNTER
Handoff report called to me by Hallie Bravo, Transplant ANA.  New Liver transplant  from 8/10/23 for HCC being discharged today with incision with staples and drainage.  ON protocol DCD donor.  Liver US with increased RI and decreased Hepatic artery velocities reviewed by surgeons.   Issues are hyponatremia treated with 1.5L fluid restriction.  Lasix dosing changes to 20 mg every other day.  Next labs 8/17/23. Patient being discharged with HH and will go to a local air B&B.  Patient has a history of HTN CVA but BP is stable.

## 2023-08-16 NOTE — PROGRESS NOTES
DISCHARGE NOTE:    Jed Chávez is a 63 y.o. male s/p LIVER Donation after Circulatory Death transplant on 8/10/2023 (Liver) for ESLD secondary to Primary Liver Malignancy: Hepatoma (HCC) and Cirrhosis.      Past Medical History:   Diagnosis Date    Alcoholic cirrhosis     CVA (cerebral vascular accident)     DM (diabetes mellitus)     Dyslipidemia     Hepatocellular carcinoma     HTN (hypertension)     Intracranial hemorrhage     Skin cancer        Hospital Course: S/p DCD OLTxp 8/10/23. Surgery without complications and progressed appropriately - admission to TSU on 8/11/23. Episode of hyponatremia during stay with improvement prior to discharge - pt discharged with week supply of furosemide QOD. Pt has history of CVA event - blood pressures on lower end during admission but discharged with coreg with hold parameters. Pt has history of DM and prior to admission on PO medications to control blood glucose. Discharged home with long-acting and short-acting insulin. Pt will need endo follow-up.     Pt well appearing on discharge - caregiver present at all pharmacy educational sessions.     Allergies: NKDA    Patient Pharmacy: [facility] - 30 day supply delivered to bedside  - pt educated on transferring of prescriptions to local pharmacy if warranted    Discharge Medications:     Medication List        START taking these medications      aspirin 81 MG Chew  Take 1 tablet (81 mg total) by mouth once daily.     calcium carbonate 500 mg calcium (1,250 mg) tablet  Commonly known as: OS-NANCIE  Take 2 tablets (1,000 mg total) by mouth once daily.     carvediloL 3.125 MG tablet  Commonly known as: COREG  Take 1 tablet (3.125 mg total) by mouth 2 (two) times daily. HOLD SBP <130, HR <60     CONTOUR NEXT GEN METER Misc  Generic drug: blood-glucose meter  Test blood glucose as directed     CONTOUR NEXT TEST STRIPS Strp  Generic drug: blood sugar diagnostic  Test blood glucose 3 (three) times daily.     famotidine  "20 MG tablet  Commonly known as: PEPCID  Take 1 tablet (20 mg total) by mouth every evening.     furosemide 20 MG tablet  Commonly known as: LASIX  Take 1 tablet (20 mg total) by mouth every other day. STOP 8/24/23     HumaLOG KwikPen Insulin 100 unit/mL pen  Generic drug: insulin lispro  Inject 7 Units into the skin 3 (three) times daily with meals.     insulin glargine 100 units/mL SubQ pen  Inject 16 Units into the skin once daily.  Start taking on: August 17, 2023     MICROLET LANCET Misc  Generic drug: lancets  Test blood glucose 3 (three) times daily.     mycophenolate 250 mg Cap  Commonly known as: CELLCEPT  Take 4 capsules (1,000 mg total) by mouth 2 (two) times daily.     oxyCODONE 5 MG immediate release tablet  Commonly known as: ROXICODONE  Take 1 tablet (5 mg total) by mouth every 6 (six) hours as needed for Pain.     predniSONE 5 MG tablet  Commonly known as: DELTASONE  Take by mouth daily;  8/16 - 8/22 : 20 mg;  8/23 - 8/29 : 15 mg; 8/30 - 9/5 :10 mg; 9/6 - 9/12 : 5 mg; stop on 9/13/23     sulfamethoxazole-trimethoprim 400-80mg 400-80 mg per tablet  Commonly known as: BACTRIM,SEPTRA  Take 1 tablet by mouth once daily. Stop 2/6/24     tacrolimus 1 MG Cap  Commonly known as: PROGRAF  Take 3 capsules (3 mg total) by mouth every 12 (twelve) hours.     traZODone 50 MG tablet  Commonly known as: DESYREL  Take 1 tablet (50 mg total) by mouth nightly as needed for Insomnia.     TRUEPLUS PEN NEEDLE 32 gauge x 5/32" Ndle  Generic drug: pen needle, diabetic  Use to inject insulin into the skin 4 (four) times daily.     valGANciclovir 450 mg Tab  Commonly known as: VALCYTE  Take 1 tablet (450 mg total) by mouth once daily. Stop 2/6/24            CONTINUE taking these medications      ergocalciferol 50,000 unit Cap  Commonly known as: ERGOCALCIFEROL  Take 1 capsule (50,000 Units total) by mouth every 7 days.            STOP taking these medications      amLODIPine 10 MG tablet  Commonly known as: NORVASC   " "  ezetimibe 10 mg tablet  Commonly known as: ZETIA     glimepiride 4 MG tablet  Commonly known as: AMARYL     JANUVIA 100 MG Tab  Generic drug: SITagliptin phosphate     lisinopriL-hydrochlorothiazide 20-12.5 mg per tablet  Commonly known as: PRINZIDE,ZESTORETIC     nadoloL 20 MG tablet  Commonly known as: CORGARD               Where to Get Your Medications        These [facility]  [address] [facility] [address]      Hours: Mon-Fri 7a-7p, Sat-Sun 10a-4p Phone: [phone]   aspirin 81 MG Chew  calcium carbonate 500 mg calcium (1,250 mg) tablet  carvediloL 3.125 MG tablet  CONTOUR NEXT GEN METER Misc  CONTOUR NEXT TEST STRIPS Strp  famotidine 20 MG tablet  furosemide 20 MG tablet  HumaLOG KwikPen Insulin 100 unit/mL pen  insulin glargine 100 units/mL SubQ pen  MICROLET LANCET Misc  mycophenolate 250 mg Cap  oxyCODONE 5 MG immediate release tablet  predniSONE 5 MG tablet  sulfamethoxazole-trimethoprim 400-80mg 400-80 mg per tablet  tacrolimus 1 MG Cap  traZODone 50 MG tablet  TRUEPLUS PEN NEEDLE 32 gauge x 5/32" Ndle  valGANciclovir 450 mg Tab          Pharmacy Interventions/Recommendations:  1) Transplant Immunosuppression: Induction with steroids, and maintenance tac 3/3, MMF 1g BID, and prednisone taper over 5 weeks (discharged on 20 mg QD)    2) Opportunistic Infection prophylaxis: Bactrim (PJP) until 2/6/24; Valcyte (CMV high risk) until 2/6/24    3) Osteoporosis Prevention measures (liver txp): ergo 50k weekly for 5 additional weeks (started on 7/14/23) and calcium carbonate 1000 mg daily for 8 weeks then yair/vitD combo pill daily thereafter    4) Patient Counseling/Education: Demonstrated the use of the BP cuff, thermometer.    5) Follow-Up/Discharge Needs:   - Lantus Rx needs to be picked up from pharmacy  - Pt placed on lasix 20 mg QOD x1 week on discharge (prescribed 5 tablets)    6) Patient Assistance Information: N/A    7) The following medications have " been placed on HOLD and should be restarted in the outpatient setting (when appropriate): Unsure if pt taking Zetia prior to admission (pt had Rx bottle but was not re-started inpt); if so, patient can resume or follow-up with PCP for management.     Jed and his caregiver verbalized their understanding and had the opportunity to ask questions.

## 2023-08-16 NOTE — PROGRESS NOTES
Discharge Note       presented to patient bedside to discuss discharge plans, as well as assess any concerns or needs.     Patient was alert and oriented x 4 and communicative. Patient will discharge to home today with Home Health care PT and nursing. Patient is staying locally at [address] [place] [address], [address] [place] [address].  SW has set up Home Health care via [facility]. Patient's wife Leida, who was at bedside will transport patient upon discharge. Patient is coping well with support from family.      SW discussed plans to maintain sobriety with patient as last alcohol use was in October of 2022. Patient shared that they will be participating in virtual Smart Recovery Meetings after discharge and emailing SW with verification of attendance.    SW will see patient in clinic in two weeks to follow up on Substance Abuse Relapse Prevention Treatment.      Patient reports agreeing with the discharge plan and has no other psychosocial concerns. Patient and caregiver verbalize understanding and are involved in treatment planning and discharge process. Patient nor caregiver had any further concerns or questions at this time.     SW remains available and will continue to follow, providing psychosocial support, education and discharge planning as indicated. Transplant team remains available and patient states understanding of how to access team and resources as needed.

## 2023-08-16 NOTE — SUBJECTIVE & OBJECTIVE
"Interval HPI:   Overnight events: No acute events overnight. Patient in room 20682/39421 A. Blood glucose stable. BG at and above goal on current insulin regimen (SSI, prandial, and basal insulin ). Steroid use- Prednisone 20 mg. 7 Days Post-Op  Renal function- Normal   Vasopressors-  None       Endocrine will continue to follow and manage insulin orders inpatient.         Diet diabetic  Calorie     Eatin%  Nausea: No  Hypoglycemia and intervention: No  Fever: No  TPN and/or TF: No      /77 (BP Location: Right arm, Patient Position: Sitting)   Pulse 82   Temp 98 °F (36.7 °C) (Oral)   Resp 18   Ht 5' 10" (1.778 m)   Wt 105.5 kg (232 lb 9.4 oz)   SpO2 96%   BMI 33.37 kg/m²     Labs Reviewed and Include    Recent Labs   Lab 23  0606   *   CALCIUM 8.0*   ALBUMIN 3.4*   PROT 5.1*   *   K 3.1*   CO2 26      BUN 23   CREATININE 0.8   ALKPHOS 90   ALT 80*   AST 36   BILITOT 2.1*     Lab Results   Component Value Date    WBC 12.63 2023    HGB 11.2 (L) 2023    HCT 31.3 (L) 2023    MCV 90 2023     (L) 2023     No results for input(s): "TSH", "FREET4" in the last 168 hours.  Lab Results   Component Value Date    HGBA1C 7.5 (H) 2023       Nutritional status:   Body mass index is 33.37 kg/m².  Lab Results   Component Value Date    ALBUMIN 3.4 (L) 2023    ALBUMIN 2.7 (L) 08/15/2023    ALBUMIN 2.4 (L) 2023     No results found for: "PREALBUMIN"    Estimated Creatinine Clearance: 116.5 mL/min (based on SCr of 0.8 mg/dL).    Accu-Checks  Recent Labs     23  1212 23  1726 23  2153 08/15/23  0157 08/15/23  0842 08/15/23  1225 08/15/23  1741 08/15/23  2321 23  0339 23  0852   POCTGLUCOSE 185* 262* 221* 177* 163* 215* 337* 277* 152* 115*       Current Medications and/or Treatments Impacting Glycemic Control  Immunotherapy:    Immunosuppressants           Stop Route Frequency     tacrolimus capsule 3 mg     "     -- Oral 2 times daily     tacrolimus capsule 1 mg         -- Oral Once     mycophenolate capsule 1,000 mg         -- Oral 2 times daily          Steroids:   Hormones (From admission, onward)      Start     Stop Route Frequency Ordered    08/16/23 0900  predniSONE tablet 20 mg  (methylprednisolone taper panel)        See Hyperspace for full Linked Orders Report.    -- Oral Daily 08/10/23 0436    08/11/23 2126  melatonin tablet 6 mg         -- Oral Nightly PRN 08/11/23 2026          Pressors:    Autonomic Drugs (From admission, onward)      None          Hyperglycemia/Diabetes Medications:   Antihyperglycemics (From admission, onward)      Start     Stop Route Frequency Ordered    08/17/23 0900  insulin detemir U-100 (Levemir) pen 16 Units         -- SubQ Daily 08/16/23 0944    08/15/23 1238  insulin aspart U-100 pen 1-10 Units         -- SubQ Before meals, nightly and at 0200 PRN 08/15/23 1139    08/15/23 1145  insulin aspart U-100 pen 4-8 Units         -- SubQ 3 times daily with meals 08/15/23 1139

## 2023-08-16 NOTE — PROGRESS NOTES
Met with patient and his wife, Leida,  in preparation for discharge today.  Reviewed their answers to the questions in My New Journey.  All questions were answered correctly.  These questions cover: post op care, medication management, infection prevention and emergency contacts.  Outpatient coordinator assigned. Outpatient appointments reviewed.  Allowed time for questions and answers.

## 2023-08-16 NOTE — PHYSICIAN QUERY
PT Name: Jed Chávez  MR #: [id number]     DOCUMENTATION CLARIFICATION     CDS/: Vinny Enciso Jr, RN CCDS              Contact information:[email or web address]   This form is a permanent document in the medical record.     Query Date: 2023    By submitting this query, we are merely seeking further clarification of documentation.  Please utilize your independent clinical judgment when addressing the question(s) below.  The Medical Record contains the following   Indicators   Supporting Clinical Findings Location in Medical Record   x Documentation of Respiratory Failure, ARDS Respiratory: acute hypoxemic respiratory failure requiring mechanical ventilatory support 8/10 H&P   x Subjective Respiratory Signs/Symptoms:   SOB, SOLORZANO, Cough, etc. Respiratory:  Negative for cough and shortness of breath  Liver Transplant Progress Note      x Objective Respiratory Signs/Symptoms: Respiratory distress, Accessory muscle use, tachypnea, wheezing, etc. Pulmonary:      Effort: Pulmonary effort is normal. No respiratory distress.     Critical Care Progress note   x RR     ABGs     O2 sat RR=26-->27-->28-->18    O2 Sat=94-->92-->93-->95      08/10/23 00:01  POC PH: 7.387  POC PCO2: 44.3  POC PO2: 113 (H)  POC HCO3: 26.7  Sodium, Blood Gas: 138  Potassium, Blood Gas: 3.4 (L)  POC SATURATED O2: 98  Sample: ARTERIAL      (H): Data is abnormally high  (L): Data is abnormally low 8/10- VS Flow sheet    8/10- VS Flow sheet      8/10 ABG's     Hypoxia/Hypercapnia     x BiPAP/Intubation/Mechanical Ventilation Pulm:  - Intubated with mechanical vent  Vent Mode: A/C  Oxygen Concentration (%):  [] 50  Resp Rate Total:  [18 br/min-20 br/min] 18 br/min  Vt Set:  [500 mL] 500 mL  PEEP/CPAP:  [5 cmH20] 5 cmH20  Mean Airway Pressure:  [9.1 cmH20] 9.1 cmH20  - Post op AB.4/43.5/317/27  - ABG prn  - Will attempt extubation later this afternoon  - CXR showed ETT in good position    8/10 H&P   x  Supplemental O2 Extubated patient to 3L NC per MD order. RN and RT at bedside. NARN will continue to monitor. 8/10 Respiratory Therapy Care update Note    Home O2, Oxygen Dependence     x Radiology findings FINDINGS:  Endotracheal tube tip lies 3.6 cm above the boaz.  PA catheter entering from a right jugular approach has its tip in the pulmonary outflow tract/main pulmonary artery.  Right jugular venous catheter tip is in the superior vena cava.  Enteric tube tip projects inferior to the imaged volume, but the distal side port of the tube is seen and lies close to the GE junction.  Heart size and the appearance of the cardiomediastinal silhouette have not changed appreciably since the examination referenced above.  Allowing for the poor inspiratory depth level, the lung zones are essentially clear and are free of significant airspace consolidation or volume loss.  No pleural fluid of any substantial volume is seen on either side.  No pneumothorax.     Impression:     As above 8/10 Chest X Ray (0505)   x Acute/Chronic Illness End stage liver disease 8/11 Critical Care Progress Note   x Treatment Pulm:  - extubated yesterday  - RA, using IS 8/11 Critical Care Progress Note    Other         The noted clinical guidelines following a diagnosis are only system guidelines and do not replace the providers clinical judgment.    Due to the conflicting clinical picture, please clinically validate the diagnosis of       Acute Hypoxemic Respiratory Failure        If validated, please provide additional clinical support for the diagnosis.     [   x ] Acute Hypoxemic Respiratory Failure is not confirmed and/or it has been ruled out, other diagnosis ruled in (please specify):_______________   [    ] Acute Hypoxemic Respiratory Failure (ABG pO2 < 60 mmHg or O2 sat of <91% on room air and respiratory symptoms documented) diagnosis is confirmed and additional clinical support/decision-making indicators for the diagnosis include  (please specify): _______________________________________________   [    ] Respiratory Failure ruled out, patient maintained on Vent for Routine Care or Airway Protection -  purposely intubated for airway protection (e.g.: angioedema, stroke, trauma); without meeting the criteria for respiratory failure.   [    ] Other clarification (please specify): ___________________         Please document in your progress notes daily for the duration of treatment until resolved and include in your discharge summary.     Reference:    LOUISE Tellez MD. (2020, March 13). Acute respiratory distress syndrome: Clinical features, diagnosis, and complications in adults ([id number] SYEDA Summers MD & [id number] CRISTINE Grossman MD, Eds.). Retrieved November 13, 2020, from https://www.[employer].com/contents/acute-respiratory-distress-syndrome-clinical-features-diagnosis-and-complications-in-adults?search=ards&source=search_result&selectedTitle=1~150&usage_type=default&display_rank=1  Form No. [id number]

## 2023-08-16 NOTE — PLAN OF CARE
AAOx4, VSS, RA  Telemetry monitoring  Glucose monitoring ACHS, 2am; SSI administered  Up ad mary kay, independent    Chevron with stapes, small amount SS drainage  2 old MILANA sites with sutures on RQ, significant SS drainage     Bed in lowest position, call light within reach. No concerns voiced at this time.

## 2023-08-16 NOTE — PHYSICIAN QUERY
PT Name: Jed Chávez  MR #: [id number]     DOCUMENTATION CLARIFICATION     CDS/: Vinny Enciso Jr, RN CCDS               Contact information:[email or web address]   This form is a permanent document in the medical record.     Query Date: August 16, 2023    By submitting this query, we are merely seeking further clarification of documentation.  Please utilize your independent clinical judgment when addressing the question(s) below.  The Medical Record contains the following:  Indicators Supporting Clinical Findings Location in Medical Record   x Acute Illness (e.g. AMI, Sepsis, etc.) Circulatory: distributive shock refractory to resuscitation requiring vasopressor medications to maintain mean arterial pressures (resolved; off vasopressors 08/10/23)   8/11 Critical Care Progress Note   x Vital Signs  SC=198/70-->106/72-->120/69-->120/80-->132/77-->113/73 8/10-8/15 VS Flow sheet     Acidosis documented     x ABGs/Labs 08/10/23 09:11  POC PH: 7.409  POC PCO2: 39.7  POC PO2: 119 (H)  POC HCO3: 25.1  POC SATURATED O2: 99  Sample: ARTERIAL      (H): Data is abnormally high 8/10 ABG's    Hypotension or Low Blood Pressure documented     x Altered Mental Status or Confusion Psychiatric/Behavioral:  Negative for behavioral problems, confusion and decreased concentration.   8/9 Liver Transplant H&P    Diaphoresis, Cold Extremities or Cyanosis      Oliguria     x Medication/Treatment  -Vasopressors  -Inotropic Drugs  -IV Fluids   -IV Antibiotics  -Cardiac Assist Devices  -Hemodynamic Monitoring  -Blood/Blood Products NORepinephrine 4 mg in dextrose 5% 250 mL infusion (premix) (titrating)  Last Dose: Stopped (08/10/23 0516)  Start: 08/10/23 0600 End: 08/10/23 0707 8/10 MAR    Other         Due to the conflicting clinical picture, please clinically validate the diagnosis of        Distributive Shock         If validated, please provide additional clinical support for the diagnosis.       [ x  ] Distributive Shock   is  not confirmed and/or it has been ruled out   [   ] Distributive Shock   is not confirmed and/or it has been ruled out, other diagnosis ruled in (please          specify):_______________   [   ] Distributive Shock   is confirmed. Please specify clinical support (signs, symptoms, and treatment) for  the confirmed diagnosis: ____________________   [   ] Other clarification (please specify): ___________________       Form No. [id number]

## 2023-08-16 NOTE — PROGRESS NOTES
Discharge instructions and education given to pt. Pt verbalized understanding. Reviewed medication changes, new medications, future appointments and medication compliance. Pt informed to use Care Coordinator or call 24/7 [facility] on-call nurse for any further questions or concerns. Reviewed worsening signs or symptoms of liver disease. Peripheral IVs removed, catheter intact. Telemetry monitors removed. Personal items sent with pt. Pt waiting for wheelchair services to provide transport. Pharmacy came to bedside and provided updated blue card. Vitals signs stable at time of discharge.

## 2023-08-16 NOTE — PT/OT/SLP PROGRESS
Physical Therapy Treatment    Patient Name:  Jed Chávez   MRN:  [id number]  Admitting Diagnosis: Status post liver transplant  Recent Surgery: Procedure(s) (LRB):  TRANSPLANT, LIVER (N/A) 7 Days Post-Op    Recommendations:     Discharge Recommendations:  other (see comments)   Discharge Equipment Recommendations: shower chair   Barriers to discharge: None    Highest Level of Mobility: ascended/descended 5 steps  Assistance Needed: stand by assistance    Assessment:     Jed Chávez is a 63 y.o. male admitted with a medical diagnosis of Status post liver transplant. Patient tolerated PT treatment well today. He  is most limited today by decreased endurance.  He was able to practice standing, transfers, gait training, and step negotiation. Focus of treatment was preparing for upcoming discharge home. Pt is progressing well. See detailed treatment note below:    Problem List: weakness, impaired endurance, impaired self care skills, impaired functional mobility, gait instability, impaired balance, pain, edema, impaired skin, impaired cardiopulmonary response to activity. weakness, impaired endurance, impaired self care skills, impaired functional mobility, gait instability, impaired balance, pain, edema, impaired skin, impaired cardiopulmonary response to activity  Rehab Prognosis: Good     GOALS:   Multidisciplinary Problems       Physical Therapy Goals          Problem: Physical Therapy    Goal Priority Disciplines Outcome Goal Variances Interventions   Physical Therapy Goal     PT, PT/OT Ongoing, Progressing     Description: Goals to be met by: 2023    Patient will increase functional independence with mobility by performin. Supine to sit with Supervision  2. Sit to stand transfer with Supervision  3. Gait  x 400 feet with Supervision.                        Plan:     During this hospitalization, patient to be seen 4 x/week to address the listed problems via gait training, therapeutic activities,  "therapeutic exercises, neuromuscular re-education  Plan of Care Expires:  09/11/23  Plan of Care Reviewed with: patient, spouse    Subjective     Communicated with RN prior to session.  Patient found resting in recliner upon PT entry to room.   "I'll be winded when we get back"    Pain/Comfort:  Pain Rating 1:  (did not rate)  Location - Orientation 1: generalized  Location 1: abdomen  Pain Addressed 1: Reposition, Distraction  Pain Rating Post-Intervention 1:  (did not rate)    Objective:     Patient found with: telemetry, peripheral IV   Mental Status: Patient is Alert and Cooperative during session.    General Precautions: Standard, fall   Orthopedic Precautions:N/A   Braces: N/A   Respiratory Status: room air  Vital Signs (Most Recent):    Temp: 98 °F (36.7 °C) (08/16/23 0730)  Pulse: 82 (08/16/23 0730)  Resp: 18 (08/16/23 0730)  BP: 132/77 (08/16/23 0730)  SpO2: 96 % (08/16/23 0730)    Functional Mobility:    Transfers:   Sit <> Stand Transfer: stand by assistance with no assistive device   Bed <> Chair Transfer: Step Transfer technique with stand by assistance with no assistive device      Gait:  Patient ambulated: ~120 feet   Patient required: standy by assistance  Patient used:  No Assistive Device  Gait Deviation(s): decreased speed      Stairs:  Pt ascended/descended 5 stair(s) with No Assistive Device with left handrail with Stand-by Assistance.     Education:  Patient was educated on the following:  Progress of PT goals and plan of care  In room safety and use of call button  Importance of continued upright mobility and exercise  Safety at discharge  Transferring into car    Patient left up in chair with all lines intact, call button in reach, and RN notified.    AM-PAC 6 CLICK MOBILITY  Turning over in bed (including adjusting bedclothes, sheets and blankets)?: 4  Sitting down on and standing up from a chair with arms (e.g., wheelchair, bedside commode, etc.): 4  Moving from lying on back to sitting on " the side of the bed?: 4  Moving to and from a bed to a chair (including a wheelchair)?: 4  Need to walk in hospital room?: 4  Climbing 3-5 steps with a railing?: 4  Basic Mobility Total Score: 24       Time Tracking:     PT Received On: 08/16/23  PT Start Time: 0942     PT Stop Time: 1005  PT Total Time (min): 23 min     Billable Minutes:   Gait Training 15 and Therapeutic Activity 8    Treatment Type: Treatment  PT/PTA: MICHAEL Alvarez PT, DPT  08/16/2023

## 2023-08-16 NOTE — ASSESSMENT & PLAN NOTE
Endocrinology consulted for BG management.   BG goal 140-180    - Levemir (Insulin Detemir) 16 units daily (20% reduction due to fasting blood glucose below goal.)  - Novolog (Insulin Aspart) 4-7 units TIDWM and prn for BG excursions MDC SSI (150/25) (20% reduction due to prandial blood glucose below goal)  - BG checks AC/HS  - Hypoglycemia protocol in place  - If blood glucose greater than 300, please ask patient not to eat food or drink anything other than water until correctional insulin has brought it back below 250    ** Please notify Endocrine for any change and/or advance in diet**  ** Please call Endocrine for any BG related issues **    Discharge Planning:   - Levemir 16 units qd (Eat a snack before bed if BG is < 100 mg/dl)  - Novolog 7 units TIDWM (Hold if BG < 100 mg/dL)  - Novolog SSI for BG excursions:    150 - 200 + 2 unit    201 - 250 + 4 units    251 - 300 + 6 units    301 - 350 + 8 units       > 350   + 10 units  - Insurance approved diabetes testing supplies to check blood sugar 4 times a day (Before meals and at bedtime) and as needed.  - BD pen needles   - Insurance approved glucagon for extreme hypoglycemia (Baqsimi or [place]egalogue if insurance approved)  - Send BG logs in 4 days  - Follow-up in discharge clinic in 2 weeks.     Education:  Discharge Teaching:    Reviewed topics related to DM including: the need for insulin, how insulin works, what makes it a high risk medication, the importance of immediate follow up with either PCP or endocrine provider, importance of and how to check BG, how to record BG on logs, how to administer insulin, appropriate insulin administration sites, importance of rotating injection sites, hyper/hypoglycemia, how and when to treat hypoglycemia, when to hold insulin, how the correction scale works, importance of storing unused insulin in the refrigerator, and when to seek medical attention.  Patient verbalized understanding, answered all questions to patient's  satisfaction. Blood sugar logs given to patient.     Hypoglycemia (Low Blood Sugar)  Too little glucose (sugar) in your blood is called hypoglycemia or low blood sugar. Diabetes itself doesnt cause low blood sugar. But some of the treatments for diabetes, such as pills or insulin, may increase your risk for it. Low blood sugar may cause you to lose consciousness or have a seizure. So always treat low blood sugar right away.    Special note: Always carry a source of fast-acting sugar and a snack in case of hypoglycemia     What You May Notice  If you have low blood sugar, you may have these symptoms:   Shakiness or dizziness   Cold, clammy skin or sweating   Feelings of hunger   Headache   Nervousness   A hard, fast heartbeat   Weakness   Confusion or irritability   Blurred vision  What You Should Do   First, check your blood sugar. If it is too low (out of your target range), eat or drink 15 to 20 grams of fast-acting sugar. This may be 3 to 4 glucose tablets, 4 oz (half a cup) fruit juice or regular (non-diet) soda, 8 oz (one cup ) fat-free milk, or 1 tablespoon of honey. Dont take more than this, or your blood sugar may go too high.   Wait 15 minutes. Then recheck your blood sugar if you can.   If your blood sugar is still too low, repeat the steps above and check your blood sugar again. If your blood sugar still has not returned to your target range, contact your healthcare provider or seek emergency care.   Once your blood sugar returns to target range, eat a snack or meal.  Preventing Low Blood Sugar   Eat your meals and snacks at the same times each day. Dont skip meals!   Ask your healthcare provider if it is safe for you to drink alcohol. Never drink on an empty stomach.   Take your medication at the prescribed times.   Always carry a source of fast-acting sugar and a snack when youre away from home.  Other Things to Do   Carry a medical ID card or wear a medical alert bracelet or  necklace. It should say that you have diabetes. It should also say what to do if you pass out or have a seizure.   Make sure your family, friends, and coworkers know the signs of low blood sugar. Tell them what to do if your blood sugar falls very low and you cant treat yourself.   Keep a glucagon emergency kit handy. Be sure your family, friends, and coworkers know how and when to use it. Check it regularly and replace the glucagon before it expires.   Talk to your healthcare team about other things you can do to prevent low blood sugar.     If you experience hypoglycemia several times, call your doctor.   © 6611-1632 [facility], [address] [place] [address], [place] [address]. All rights reserved. This information is not intended as a substitute for professional medical care. Always follow your healthcare professional's instructions.

## 2023-08-17 ENCOUNTER — PATIENT MESSAGE (OUTPATIENT)
Dept: TRANSPLANT | Facility: CLINIC | Age: 63
End: 2023-08-17

## 2023-08-17 ENCOUNTER — CLINICAL SUPPORT (OUTPATIENT)
Dept: TRANSPLANT | Facility: CLINIC | Age: 63
End: 2023-08-17
Payer: COMMERCIAL

## 2023-08-17 ENCOUNTER — LAB VISIT (OUTPATIENT)
Dept: LAB | Facility: HOSPITAL | Age: 63
End: 2023-08-17
Attending: SURGERY
Payer: COMMERCIAL

## 2023-08-17 VITALS
HEIGHT: 70 IN | TEMPERATURE: 97 F | DIASTOLIC BLOOD PRESSURE: 78 MMHG | HEIGHT: 70 IN | HEART RATE: 75 BPM | SYSTOLIC BLOOD PRESSURE: 149 MMHG | OXYGEN SATURATION: 97 % | BODY MASS INDEX: 31.41 KG/M2 | BODY MASS INDEX: 31.41 KG/M2 | SYSTOLIC BLOOD PRESSURE: 149 MMHG | TEMPERATURE: 97 F | RESPIRATION RATE: 17 BRPM | WEIGHT: 219.38 LBS | WEIGHT: 219.38 LBS | RESPIRATION RATE: 17 BRPM | HEART RATE: 75 BPM | OXYGEN SATURATION: 97 % | DIASTOLIC BLOOD PRESSURE: 78 MMHG

## 2023-08-17 DIAGNOSIS — Z94.4 STATUS POST LIVER TRANSPLANT: ICD-10-CM

## 2023-08-17 DIAGNOSIS — Z94.4 LIVER REPLACED BY TRANSPLANT: Primary | ICD-10-CM

## 2023-08-17 DIAGNOSIS — Z94.4 LIVER REPLACED BY TRANSPLANT: ICD-10-CM

## 2023-08-17 LAB
ALBUMIN SERPL BCP-MCNC: 3.4 G/DL (ref 3.5–5.2)
ALP SERPL-CCNC: 127 U/L (ref 55–135)
ALT SERPL W/O P-5'-P-CCNC: 98 U/L (ref 10–44)
ANION GAP SERPL CALC-SCNC: 5 MMOL/L (ref 8–16)
ANISOCYTOSIS BLD QL SMEAR: SLIGHT
AST SERPL-CCNC: 53 U/L (ref 10–40)
BASOPHILS # BLD AUTO: ABNORMAL K/UL (ref 0–0.2)
BASOPHILS NFR BLD: 0 % (ref 0–1.9)
BILIRUB DIRECT SERPL-MCNC: 1.3 MG/DL (ref 0.1–0.3)
BILIRUB SERPL-MCNC: 2 MG/DL (ref 0.1–1)
BUN SERPL-MCNC: 17 MG/DL (ref 8–23)
CALCIUM SERPL-MCNC: 8.1 MG/DL (ref 8.7–10.5)
CHLORIDE SERPL-SCNC: 103 MMOL/L (ref 95–110)
CO2 SERPL-SCNC: 26 MMOL/L (ref 23–29)
CREAT SERPL-MCNC: 0.8 MG/DL (ref 0.5–1.4)
DIFFERENTIAL METHOD: ABNORMAL
EOSINOPHIL # BLD AUTO: ABNORMAL K/UL (ref 0–0.5)
EOSINOPHIL NFR BLD: 3 % (ref 0–8)
ERYTHROCYTE [DISTWIDTH] IN BLOOD BY AUTOMATED COUNT: 16.3 % (ref 11.5–14.5)
EST. GFR  (NO RACE VARIABLE): >60 ML/MIN/1.73 M^2
GLUCOSE SERPL-MCNC: 188 MG/DL (ref 70–110)
HCT VFR BLD AUTO: 32.7 % (ref 40–54)
HGB BLD-MCNC: 11.4 G/DL (ref 14–18)
IMM GRANULOCYTES # BLD AUTO: ABNORMAL K/UL (ref 0–0.04)
IMM GRANULOCYTES NFR BLD AUTO: ABNORMAL % (ref 0–0.5)
LYMPHOCYTES # BLD AUTO: ABNORMAL K/UL (ref 1–4.8)
LYMPHOCYTES NFR BLD: 8 % (ref 18–48)
MCH RBC QN AUTO: 31.9 PG (ref 27–31)
MCHC RBC AUTO-ENTMCNC: 34.9 G/DL (ref 32–36)
MCV RBC AUTO: 92 FL (ref 82–98)
METAMYELOCYTES NFR BLD MANUAL: 1 %
MONOCYTES # BLD AUTO: ABNORMAL K/UL (ref 0.3–1)
MONOCYTES NFR BLD: 4 % (ref 4–15)
NEUTROPHILS NFR BLD: 83 % (ref 38–73)
NEUTS BAND NFR BLD MANUAL: 1 %
NRBC BLD-RTO: 0 /100 WBC
PLATELET # BLD AUTO: 147 K/UL (ref 150–450)
PLATELET BLD QL SMEAR: ABNORMAL
PMV BLD AUTO: 9.7 FL (ref 9.2–12.9)
POIKILOCYTOSIS BLD QL SMEAR: SLIGHT
POLYCHROMASIA BLD QL SMEAR: ABNORMAL
POTASSIUM SERPL-SCNC: 3.9 MMOL/L (ref 3.5–5.1)
PROT SERPL-MCNC: 5.3 G/DL (ref 6–8.4)
RBC # BLD AUTO: 3.57 M/UL (ref 4.6–6.2)
SMUDGE CELLS BLD QL SMEAR: PRESENT
SODIUM SERPL-SCNC: 134 MMOL/L (ref 136–145)
TACROLIMUS BLD-MCNC: 7.6 NG/ML (ref 5–15)
WBC # BLD AUTO: 12.43 K/UL (ref 3.9–12.7)

## 2023-08-17 PROCEDURE — 82248 BILIRUBIN DIRECT: CPT | Performed by: SURGERY

## 2023-08-17 PROCEDURE — 85007 BL SMEAR W/DIFF WBC COUNT: CPT | Performed by: SURGERY

## 2023-08-17 PROCEDURE — 99999 PR PBB SHADOW E&M-EST. PATIENT-LVL III: ICD-10-PCS | Mod: PBBFAC,,,

## 2023-08-17 PROCEDURE — 36415 COLL VENOUS BLD VENIPUNCTURE: CPT | Performed by: SURGERY

## 2023-08-17 PROCEDURE — 99999 PR PBB SHADOW E&M-EST. PATIENT-LVL III: CPT | Mod: PBBFAC,,,

## 2023-08-17 PROCEDURE — 85027 COMPLETE CBC AUTOMATED: CPT | Performed by: SURGERY

## 2023-08-17 PROCEDURE — 80197 ASSAY OF TACROLIMUS: CPT | Performed by: SURGERY

## 2023-08-17 PROCEDURE — 80053 COMPREHEN METABOLIC PANEL: CPT | Performed by: SURGERY

## 2023-08-17 NOTE — TELEPHONE ENCOUNTER
Pt's wife calls on behalf of pt who is s/p liver txp on 8/10. Wife calls with questions regarding how to administer insulin injection. She states that when she attempts to prime the novolog pen, no liquid comes out of the end of the needle. Pt's wife is directed to https://www.novologFandium/administration-options/insulin-pens.html (manufacture's website) to watch a video on proper priming and administration instructions. She is instructed to call back with any further questions or concerns. Pt's wife verbalizes understanding.   Reason for Disposition   Caller has medicine question only, adult not sick, AND triager answers question    Protocols used: Medication Question Call-A-

## 2023-08-17 NOTE — PROGRESS NOTES
"Clinic Note: First Return to Clinic Post-  Liver Transplant    Jed Chávez  is a 63 y.o. male  S/p   LIVER transplant on 8/10/2023 (Liver) for Primary Liver Malignancy: Hepatoma (HCC) and Cirrhosis.      Discharge Course (Issues/Concerns): Patient presents with confusion about how to use insulin pen. Demonstrated the pens have two "caps" and patient displayed understanding. Patient also had questions about when to take lasix (every other day), will let coordinator decide whether to start today or tomorrow.    Objective:   There were no vitals filed for this visit.    Met with patient and his caregiver in the clinic to review current medication list.     Current Outpatient Medications   Medication Sig Dispense Refill    aspirin 81 MG Chew Take 1 tablet (81 mg total) by mouth once daily. 30 tablet 11    blood sugar diagnostic Strp Test blood glucose 3 (three) times daily. 100 strip 11    blood-glucose meter Misc Test blood glucose as directed 1 each 0    calcium carbonate (OS-NANCIE) 500 mg calcium (1,250 mg) tablet Take 2 tablets (1,000 mg total) by mouth once daily. 60 tablet 1    carvediloL (COREG) 3.125 MG tablet Take 1 tablet (3.125 mg total) by mouth 2 (two) times daily. HOLD SBP <130, HR <60 60 tablet 11    ergocalciferol (ERGOCALCIFEROL) 50,000 unit Cap Take 1 capsule (50,000 Units total) by mouth every 7 days. 12 capsule 0    famotidine (PEPCID) 20 MG tablet Take 1 tablet (20 mg total) by mouth every evening. 30 tablet 0    furosemide (LASIX) 20 MG tablet Take 1 tablet (20 mg total) by mouth every other day. STOP 8/24/23 5 tablet 0    insulin lispro (HUMALOG KWIKPEN INSULIN) 100 unit/mL pen Inject 7 Units into the skin 3 (three) times daily with meals. 15 mL 3    insulin glargine 100 units/mL SubQ pen Inject 16 Units into the skin once daily. 15 mL 11    lancets Misc Test blood glucose 3 (three) times daily. 100 each 11    mycophenolate (CELLCEPT) 250 mg Cap Take 4 capsules (1,000 mg total) by mouth 2 (two) " "times daily. 240 capsule 11    oxyCODONE (ROXICODONE) 5 MG immediate release tablet Take 1 tablet (5 mg total) by mouth every 6 (six) hours as needed for Pain. 20 tablet 0    pen needle, diabetic 32 gauge x 5/32" Ndle Use to inject insulin into the skin 4 (four) times daily. 100 each 11    predniSONE (DELTASONE) 5 MG tablet Take by mouth daily;  8/16 - 8/22 : 20 mg;  8/23 - 8/29 : 15 mg; 8/30 - 9/5 :10 mg; 9/6 - 9/12 : 5 mg; stop on 9/13/23 70 tablet 0    sulfamethoxazole-trimethoprim 400-80mg (BACTRIM,SEPTRA) 400-80 mg per tablet Take 1 tablet by mouth once daily. Stop 2/6/24 30 tablet 5    tacrolimus (PROGRAF) 1 MG Cap Take 3 capsules (3 mg total) by mouth every 12 (twelve) hours. 180 capsule 11    traZODone (DESYREL) 50 MG tablet Take 1 tablet (50 mg total) by mouth nightly as needed for Insomnia. 30 tablet 1    valGANciclovir (VALCYTE) 450 mg Tab Take 1 tablet (450 mg total) by mouth once daily. Stop 2/6/24 30 tablet 5     No current facility-administered medications for this visit.       Pharmacy Interventions/Recommendations:     1) Graft Function & Immunosuppression Issues: Tacrolimus 3/3, MMF, and Prednisone    2) Opportunistic Infection prophylaxis:   PCP ppx: Bactrim until 2/6/24  CMV ppx: Valcyte until 2/6/24  Fungal ppx: None    3) Donor Serologies & Monitoring:     Donor CMV Status: Positive  Donor HCV Status: Negative  Donor HBcAb: Negative  Donor HBV SURENDRA: Negative  Donor HCV SURENDRA: Negative      4) Pain Management & Bowel Regimen: Oxycodone    5) Blood Pressure Management: Coreg    6) Blood Sugar Management & Follow-up: Lantus and Humalog, patient to send in BG logs on Monday and follow up with endocrine dc clinic in 2 weeks (nothing scheduled yet)    7) Electrolyte Management: None    8) Osteoporosis Prevention Strategy (Liver Transplant): Daily Ca, weekly vit D    9) OTHER medication follow-up (patient assistance, held medications, etc): None    10) Reinforced medication education conducted in the " hospital, including medication indications, dosing, administration, side effects, monitoring-- including timing of immunosuppressant levels.     Patient received their FIRST fill of medications from [facility].  Discussed the process for obtaining refills of medications, including verifying the dose and mailing address to have medications delivered.     Jed and his caregivers verbalized understanding and had the opportunity to ask questions.

## 2023-08-17 NOTE — PROGRESS NOTES
1ST NURSING VISIT POST DISCHARGE NOTE    1st RN appointment with Jed Chávez post discharge 8/16/23 s/p liver transplant 8/10/23.  Patient's spouse accompanied him.  Upon assessment, patient complains of incisional drainage.  Incision has drainage. Staples intact. Multiple skin tears noted to patient's abdomen. Patient will try to use chucks pad to cover incision when at rest to give his skin a break for tape. Patient and spouse were able to explain daily incision care and showering instructions.  Medication list and rationale were reviewed.  Patient states did bring blue medication card and medication bottles for review with pharmD.  Time allowed for questions.  Patient expressed understanding of daily care including BID VS and medications.  Patient aware that nurse will review today's labs with a transplant physician and call with any dose changes indicated.  Next labs TBD and first post-operative transplant team appointment scheduled for Tuesday 8/22/23.

## 2023-08-17 NOTE — TELEPHONE ENCOUNTER
Spoke with pt's spouse, Leida. Reviewed lab results. Discussed that pt can increase lasix to 20mg daily. Repeat labs due Saturday at 8am. She repeated back instructions and agreed with plan.  ----- Message from Ameya Suero MD sent at 8/17/2023  2:11 PM CDT -----  Lasix 20mg daily

## 2023-08-17 NOTE — PHYSICIAN QUERY
PT Name: Jed Chávez  MR #: [id number]  DOCUMENTATION CLARIFICATION     CDS/: Vinny Enciso Jr, RN CCDS               Contact information:[email or web address]   This form is a permanent document in the medical record.     Query Date: August 17, 2023    By submitting this query, we are merely seeking further clarification of documentation. Please utilize your independent clinical judgment when addressing the question(s) below.    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 s/p DCD OLTxp 8/10/23 8/16 Discharge Summary         8/16 Discharge Summary    x BUN/Creatinine                          GFR BUN=10-->35-->23    Creatinine=0.8-->0.7-->0.8    GFR=>60-->60-->60 8/9-8/16 Labs    8/9-8/16 Labs    8/9-8/16 Labs    Dehydration      Nausea / Vomiting     x Dialysis / CRRT Dialysis:  Prior to admission, no 8/10  Progress Note    Medication      Treatment     x Other Chronic Conditions Reason for Consult: Management of T2DM, Hyperglycemia    8/15 Endocrinology Progress Note   x Other Renal function- Normal  8/15 Endocrinology Progress Note     Due to the conflicting clinical picture, please clinically validate the diagnosis of                ESRD               If validated, please provide additional clinical support for the diagnosis.       [  x ] ESRD is not confirmed and/or it has been ruled out   [   ] ESRD is not confirmed and/or it has been ruled out, other diagnosis ruled in (please specify):_______________   [   ] ESRD is confirmed.     Please specify any signs, symptoms, and/ior treatment to validate  the confirmed diagnosis:(please specify) ____________________     [   ] Other clarification (please specify): ___________________       Please document in your  progress notes daily for the duration of treatment until resolved and include in your discharge summary.    Reference:     CHRISS Moy MD, & REECE Gonsalez MD, MS. (2020, June 18). Definition and staging of chronic kidney disease in adults ([id number] CRISTINE Duran MD, ScD & [id number] JENNI Salcido MD, MSc, Eds.). Retrieved October 21, 2020, from https://www.GPMESS/contents/definition-and-staging-of-chronic-kidney-disease-in-adults?search=ckd%20staging&source=search_result&selectedTitle=1~150&usage_type=default&display_rank=1    Form No. 29917

## 2023-08-18 ENCOUNTER — PATIENT MESSAGE (OUTPATIENT)
Dept: TRANSPLANT | Facility: CLINIC | Age: 63
End: 2023-08-18
Payer: COMMERCIAL

## 2023-08-18 ENCOUNTER — NURSE TRIAGE (OUTPATIENT)
Dept: ADMINISTRATIVE | Facility: CLINIC | Age: 63
End: 2023-08-18
Payer: COMMERCIAL

## 2023-08-18 DIAGNOSIS — Z94.4 STATUS POST LIVER TRANSPLANT: ICD-10-CM

## 2023-08-18 PROCEDURE — G0180 PR HOME HEALTH MD CERTIFICATION: ICD-10-PCS | Mod: ,,, | Performed by: SURGERY

## 2023-08-18 PROCEDURE — G0180 MD CERTIFICATION HHA PATIENT: HCPCS | Mod: ,,, | Performed by: SURGERY

## 2023-08-18 RX ORDER — FUROSEMIDE 20 MG/1
20 TABLET ORAL DAILY
Qty: 5 TABLET | Refills: 0
Start: 2023-08-18 | End: 2023-08-22

## 2023-08-18 NOTE — PHYSICIAN QUERY
PT Name: Jed Chávez  MR #: [id number]     DOCUMENTATION CLARIFICATION     CDS/: Vinny Enciso Jr, RN CCDS              Contact information:[email or web address]   This form is a permanent document in the medical record.     Query Date: 2023    By submitting this query, we are merely seeking further clarification of documentation.  Please utilize your independent clinical judgment when addressing the question(s) below.  The Medical Record contains the following   Indicators   Supporting Clinical Findings Location in Medical Record   x Documentation of Respiratory Failure, ARDS Respiratory: acute hypoxemic respiratory failure requiring mechanical ventilatory support 8/10 H&P   x Subjective Respiratory Signs/Symptoms:   SOB, SOLORZANO, Cough, etc. Respiratory:  Negative for cough and shortness of breath  Liver Transplant Progress Note      x Objective Respiratory Signs/Symptoms: Respiratory distress, Accessory muscle use, tachypnea, wheezing, etc. Pulmonary:      Effort: Pulmonary effort is normal. No respiratory distress.     Critical Care Progress note   x RR     ABGs     O2 sat RR=26-->27-->28-->18    O2 Sat=94-->92-->93-->95      08/10/23 00:01  POC PH: 7.387  POC PCO2: 44.3  POC PO2: 113 (H)  POC HCO3: 26.7  Sodium, Blood Gas: 138  Potassium, Blood Gas: 3.4 (L)  POC SATURATED O2: 98  Sample: ARTERIAL      (H): Data is abnormally high  (L): Data is abnormally low 8/10- VS Flow sheet    8/10- VS Flow sheet      8/10 ABG's     Hypoxia/Hypercapnia     x BiPAP/Intubation/Mechanical Ventilation Pulm:  - Intubated with mechanical vent  Vent Mode: A/C  Oxygen Concentration (%):  [] 50  Resp Rate Total:  [18 br/min-20 br/min] 18 br/min  Vt Set:  [500 mL] 500 mL  PEEP/CPAP:  [5 cmH20] 5 cmH20  Mean Airway Pressure:  [9.1 cmH20] 9.1 cmH20  - Post op AB.4/43.5/317/27  - ABG prn  - Will attempt extubation later this afternoon  - CXR showed ETT in good position    8/10 H&P   x  Supplemental O2 Extubated patient to 3L NC per MD order. RN and RT at bedside. NARN will continue to monitor. 8/10 Respiratory Therapy Care update Note    Home O2, Oxygen Dependence     x Radiology findings FINDINGS:  Endotracheal tube tip lies 3.6 cm above the boaz.  PA catheter entering from a right jugular approach has its tip in the pulmonary outflow tract/main pulmonary artery.  Right jugular venous catheter tip is in the superior vena cava.  Enteric tube tip projects inferior to the imaged volume, but the distal side port of the tube is seen and lies close to the GE junction.  Heart size and the appearance of the cardiomediastinal silhouette have not changed appreciably since the examination referenced above.  Allowing for the poor inspiratory depth level, the lung zones are essentially clear and are free of significant airspace consolidation or volume loss.  No pleural fluid of any substantial volume is seen on either side.  No pneumothorax.     Impression:     As above 8/10 Chest X Ray (0505)   x Acute/Chronic Illness End stage liver disease 8/11 Critical Care Progress Note   x Treatment Pulm:  - extubated yesterday  - RA, using IS 8/11 Critical Care Progress Note    Other         The noted clinical guidelines following a diagnosis are only system guidelines and do not replace the providers clinical judgment.    Due to the conflicting clinical picture, please clinically validate the diagnosis of       Acute Hypoxemic Respiratory Failure        If validated, please provide additional clinical support for the diagnosis.     [  x  ] Acute Hypoxemic Respiratory Failure is not confirmed and/or it has been ruled out, other diagnosis ruled in   (please specify):___hypervolemia and hyponatremia____________     [    ] Acute Hypoxemic Respiratory Failure (ABG pO2 < 60 mmHg or O2 sat of <91% on room air and respiratory symptoms documented) diagnosis is confirmed and additional clinical support/decision-making  indicators for the diagnosis include (please specify): _______________________________________________   [    ] Respiratory Failure ruled out, patient maintained on Vent for Routine Care or Airway Protection -  purposely intubated for airway protection (e.g.: angioedema, stroke, trauma); without meeting the criteria for respiratory failure.   [    ] Other clarification (please specify): ___________________         Please document in your progress notes daily for the duration of treatment until resolved and include in your discharge summary.     Reference:    LOUISE Tellez MD. (2020, March 13). Acute respiratory distress syndrome: Clinical features, diagnosis, and complications in adults ([id number] SYEDA Summers MD & [id number] CRISTINE Grossman MD, Eds.). Retrieved November 13, 2020, from https://www.CatchThatBusdate.com/contents/acute-respiratory-distress-syndrome-clinical-features-diagnosis-and-complications-in-adults?search=ards&source=search_result&selectedTitle=1~150&usage_type=default&display_rank=1  Form No. 41610

## 2023-08-18 NOTE — TELEPHONE ENCOUNTER
Post-Liver Transplant - last Wednesday    Wife states current blood pressure is 161/87, hr 91. Denies any symptoms at this time. Care advice discussed, understanding verbalized. Patient already has an appointment on 8/22. Please contact caller directly to discuss any further care advice.    Reason for Disposition   Systolic BP  >= 160 OR Diastolic >= 100   Systolic BP >= 160 OR Diastolic >= 100    Additional Information   Negative: Difficult to awaken or acting confused (e.g., disoriented, slurred speech)   Negative: SEVERE difficulty breathing (e.g., struggling for each breath, speaks in single words)   Negative: [1] Weakness of the face, arm or leg on one side of the body AND [2] new-onset   Negative: [1] Numbness (i.e., loss of sensation) of the face, arm or leg on one side of the body AND [2] new-onset   Negative: [1] Chest pain lasts > 5 minutes AND [2] history of heart disease (i.e., heart attack, bypass surgery, angina, angioplasty, CHF)   Negative: [1] Chest pain AND [2] took nitrogylcerin AND [3] pain was not relieved   Negative: Sounds like a life-threatening emergency to the triager   Negative: [1] Systolic BP  >= 160 OR Diastolic >= 100 AND [2] cardiac (e.g., breathing difficulty, chest pain) or neurologic symptoms (e.g., new-onset blurred or double vision, unsteady gait)   Negative: [1] Pregnant 20 or more weeks (or postpartum < 6 weeks) AND [2] new hand or face swelling   Negative: [1] Pregnant 20 or more weeks (or postpartum < 6 weeks) AND [2] Systolic BP >= 160 OR Diastolic >= 110   Negative: [1] Systolic BP  >= 200 OR Diastolic >= 120 AND [2] having NO cardiac or neurologic symptoms   Negative: [1] Pregnant 20 or more weeks (or postpartum < 6 weeks) AND [2] Systolic BP  >= 140 OR Diastolic >= 90   Negative: [1] Systolic BP  >= 180 OR Diastolic >= 110 AND [2] missed most recent dose of blood pressure medication   Negative: Systolic BP  >= 180 OR Diastolic >= 110   Negative: Ran out of BP  medications   Negative: Sounds like a life-threatening emergency to the triager   Negative: Systolic BP >= 160 OR Diastolic >= 100, and any cardiac (e.g., breathing difficulty, chest pain) or neurologic symptoms (e.g., new-onset blurred or double vision)   Negative: Pregnant 20 or more weeks (or postpartum < 6 weeks) with new hand or face swelling   Negative: Pregnant 20 or more weeks (or postpartum < 6 weeks) and Systolic BP >= 160 OR Diastolic >= 110   Negative: Patient sounds very sick or weak to the triager   Negative: Systolic BP >= 200 OR Diastolic >= 120 and having NO cardiac or neurologic symptoms   Negative: Pregnant 20 or more weeks (or postpartum < 6 weeks) with Systolic BP >= 140 OR Diastolic >= 90   Negative: Systolic BP >= 180 OR Diastolic >= 110, and missed most recent dose of blood pressure medication   Negative: Systolic BP >= 180 OR Diastolic >= 110   Negative: Patient wants to be seen   Negative: Ran out of BP medications   Negative: Taking BP medications and feels is having side effects (e.g., impotence, cough, dizziness)    Protocols used: Blood Pressure - High-A-, Blood Pressure - High-A-OH

## 2023-08-18 NOTE — TELEPHONE ENCOUNTER
Pt's spouse called. She states pt's BP has been running 160s/80-90s. HR has been 80s-90s.     Reviewed with Marquez Harkins pharmD. Recommended pt increase coreg to 6.25mg BID and monitor BP and HR TID.     Spoke with both pt and spouse. Reviewed dose change and need to monitor BP and HR 3 times day. They both verbalized understanding and understood to call back with any concerns or questions.

## 2023-08-19 ENCOUNTER — NURSE TRIAGE (OUTPATIENT)
Dept: ADMINISTRATIVE | Facility: CLINIC | Age: 63
End: 2023-08-19
Payer: COMMERCIAL

## 2023-08-19 ENCOUNTER — LAB VISIT (OUTPATIENT)
Dept: LAB | Facility: HOSPITAL | Age: 63
End: 2023-08-19
Attending: SURGERY
Payer: COMMERCIAL

## 2023-08-19 ENCOUNTER — PATIENT MESSAGE (OUTPATIENT)
Dept: TRANSPLANT | Facility: CLINIC | Age: 63
End: 2023-08-19
Payer: COMMERCIAL

## 2023-08-19 ENCOUNTER — TELEPHONE (OUTPATIENT)
Dept: TRANSPLANT | Facility: CLINIC | Age: 63
End: 2023-08-19

## 2023-08-19 DIAGNOSIS — K72.10 END STAGE LIVER DISEASE: ICD-10-CM

## 2023-08-19 DIAGNOSIS — Z94.4 LIVER REPLACED BY TRANSPLANT: ICD-10-CM

## 2023-08-19 LAB
ALBUMIN SERPL BCP-MCNC: 3.1 G/DL (ref 3.5–5.2)
ALP SERPL-CCNC: 184 U/L (ref 55–135)
ALT SERPL W/O P-5'-P-CCNC: 198 U/L (ref 10–44)
ANION GAP SERPL CALC-SCNC: 9 MMOL/L (ref 8–16)
ANISOCYTOSIS BLD QL SMEAR: SLIGHT
AST SERPL-CCNC: 91 U/L (ref 10–40)
BASOPHILS NFR BLD: 0 % (ref 0–1.9)
BILIRUB DIRECT SERPL-MCNC: 1 MG/DL (ref 0.1–0.3)
BILIRUB SERPL-MCNC: 1.7 MG/DL (ref 0.1–1)
BUN SERPL-MCNC: 10 MG/DL (ref 8–23)
CALCIUM SERPL-MCNC: 8.4 MG/DL (ref 8.7–10.5)
CHLORIDE SERPL-SCNC: 102 MMOL/L (ref 95–110)
CO2 SERPL-SCNC: 26 MMOL/L (ref 23–29)
CREAT SERPL-MCNC: 0.8 MG/DL (ref 0.5–1.4)
DIFFERENTIAL METHOD: ABNORMAL
EOSINOPHIL NFR BLD: 8 % (ref 0–8)
ERYTHROCYTE [DISTWIDTH] IN BLOOD BY AUTOMATED COUNT: 17.2 % (ref 11.5–14.5)
EST. GFR  (NO RACE VARIABLE): >60 ML/MIN/1.73 M^2
GLUCOSE SERPL-MCNC: 251 MG/DL (ref 70–110)
HCT VFR BLD AUTO: 35.6 % (ref 40–54)
HGB BLD-MCNC: 11.8 G/DL (ref 14–18)
IMM GRANULOCYTES # BLD AUTO: ABNORMAL K/UL (ref 0–0.04)
IMM GRANULOCYTES NFR BLD AUTO: ABNORMAL % (ref 0–0.5)
LYMPHOCYTES NFR BLD: 6 % (ref 18–48)
MCH RBC QN AUTO: 31.8 PG (ref 27–31)
MCHC RBC AUTO-ENTMCNC: 33.1 G/DL (ref 32–36)
MCV RBC AUTO: 96 FL (ref 82–98)
METAMYELOCYTES NFR BLD MANUAL: 1 %
MONOCYTES NFR BLD: 6 % (ref 4–15)
MYELOCYTES NFR BLD MANUAL: 1 %
NEUTROPHILS NFR BLD: 76 % (ref 38–73)
NEUTS BAND NFR BLD MANUAL: 2 %
NRBC BLD-RTO: 0 /100 WBC
PLATELET # BLD AUTO: 200 K/UL (ref 150–450)
PLATELET BLD QL SMEAR: ABNORMAL
PMV BLD AUTO: 10 FL (ref 9.2–12.9)
POLYCHROMASIA BLD QL SMEAR: ABNORMAL
POTASSIUM SERPL-SCNC: 4.2 MMOL/L (ref 3.5–5.1)
PROT SERPL-MCNC: 5.5 G/DL (ref 6–8.4)
RBC # BLD AUTO: 3.71 M/UL (ref 4.6–6.2)
SODIUM SERPL-SCNC: 137 MMOL/L (ref 136–145)
TACROLIMUS BLD-MCNC: 8 NG/ML (ref 5–15)
WBC # BLD AUTO: 12.78 K/UL (ref 3.9–12.7)

## 2023-08-19 PROCEDURE — 85027 COMPLETE CBC AUTOMATED: CPT | Performed by: SURGERY

## 2023-08-19 PROCEDURE — 80053 COMPREHEN METABOLIC PANEL: CPT | Performed by: SURGERY

## 2023-08-19 PROCEDURE — 36415 COLL VENOUS BLD VENIPUNCTURE: CPT | Performed by: SURGERY

## 2023-08-19 PROCEDURE — 85007 BL SMEAR W/DIFF WBC COUNT: CPT | Performed by: SURGERY

## 2023-08-19 PROCEDURE — 80197 ASSAY OF TACROLIMUS: CPT | Performed by: SURGERY

## 2023-08-19 PROCEDURE — 82248 BILIRUBIN DIRECT: CPT | Performed by: SURGERY

## 2023-08-19 RX ORDER — TACROLIMUS 1 MG/1
4 CAPSULE ORAL EVERY 12 HOURS
Qty: 180 CAPSULE | Refills: 11 | Status: CANCELLED | OUTPATIENT
Start: 2023-08-19

## 2023-08-19 RX ORDER — CARVEDILOL 3.12 MG/1
6.25 TABLET ORAL 2 TIMES DAILY WITH MEALS
Qty: 120 TABLET | Refills: 1 | Status: ON HOLD | OUTPATIENT
Start: 2023-08-19 | End: 2023-10-24 | Stop reason: SDUPTHER

## 2023-08-19 NOTE — TELEPHONE ENCOUNTER
Labs reviewed with Dr. Suero. Instructed patient change Prograf to/ 4 mg twice daily. Repeat labs Monday.

## 2023-08-19 NOTE — TELEPHONE ENCOUNTER
No Contact/Duplicate Call. Patient's wife states that she has spoken with the Liver Transplant Coordinator and does not need Triage Services at this time.     Reason for Disposition   Caller has already spoken with the PCP and has no further questions.    Additional Information   Negative: Caller is angry or rude (e.g., hangs up, verbally abusive, yelling)   Negative: Caller hangs up    Protocols used: No Contact or Duplicate Contact Call-A-AH

## 2023-08-21 ENCOUNTER — PATIENT MESSAGE (OUTPATIENT)
Dept: TRANSPLANT | Facility: CLINIC | Age: 63
End: 2023-08-21
Payer: COMMERCIAL

## 2023-08-21 ENCOUNTER — LAB VISIT (OUTPATIENT)
Dept: LAB | Facility: HOSPITAL | Age: 63
End: 2023-08-21
Attending: SURGERY
Payer: COMMERCIAL

## 2023-08-21 ENCOUNTER — TELEPHONE (OUTPATIENT)
Dept: TRANSPLANT | Facility: CLINIC | Age: 63
End: 2023-08-21
Payer: COMMERCIAL

## 2023-08-21 DIAGNOSIS — Z94.4 LIVER REPLACED BY TRANSPLANT: Primary | ICD-10-CM

## 2023-08-21 DIAGNOSIS — Z94.4 LIVER REPLACED BY TRANSPLANT: ICD-10-CM

## 2023-08-21 DIAGNOSIS — D72.829 LEUKOCYTOSIS, UNSPECIFIED TYPE: ICD-10-CM

## 2023-08-21 LAB
ALBUMIN SERPL BCP-MCNC: 3 G/DL (ref 3.5–5.2)
ALP SERPL-CCNC: 178 U/L (ref 55–135)
ALT SERPL W/O P-5'-P-CCNC: 163 U/L (ref 10–44)
ANION GAP SERPL CALC-SCNC: 7 MMOL/L (ref 8–16)
AST SERPL-CCNC: 44 U/L (ref 10–40)
BASOPHILS # BLD AUTO: 0.06 K/UL (ref 0–0.2)
BASOPHILS NFR BLD: 0.5 % (ref 0–1.9)
BILIRUB DIRECT SERPL-MCNC: 0.8 MG/DL (ref 0.1–0.3)
BILIRUB SERPL-MCNC: 1.4 MG/DL (ref 0.1–1)
BUN SERPL-MCNC: 14 MG/DL (ref 8–23)
CALCIUM SERPL-MCNC: 8.6 MG/DL (ref 8.7–10.5)
CHLORIDE SERPL-SCNC: 100 MMOL/L (ref 95–110)
CO2 SERPL-SCNC: 28 MMOL/L (ref 23–29)
CREAT SERPL-MCNC: 0.7 MG/DL (ref 0.5–1.4)
DIFFERENTIAL METHOD: ABNORMAL
EOSINOPHIL # BLD AUTO: 0.2 K/UL (ref 0–0.5)
EOSINOPHIL NFR BLD: 1.4 % (ref 0–8)
ERYTHROCYTE [DISTWIDTH] IN BLOOD BY AUTOMATED COUNT: 17.4 % (ref 11.5–14.5)
EST. GFR  (NO RACE VARIABLE): >60 ML/MIN/1.73 M^2
GLUCOSE SERPL-MCNC: 255 MG/DL (ref 70–110)
HCT VFR BLD AUTO: 34.8 % (ref 40–54)
HGB BLD-MCNC: 11.7 G/DL (ref 14–18)
IMM GRANULOCYTES # BLD AUTO: 0.2 K/UL (ref 0–0.04)
IMM GRANULOCYTES NFR BLD AUTO: 1.5 % (ref 0–0.5)
LYMPHOCYTES # BLD AUTO: 1.1 K/UL (ref 1–4.8)
LYMPHOCYTES NFR BLD: 8.2 % (ref 18–48)
MCH RBC QN AUTO: 32.4 PG (ref 27–31)
MCHC RBC AUTO-ENTMCNC: 33.6 G/DL (ref 32–36)
MCV RBC AUTO: 96 FL (ref 82–98)
MONOCYTES # BLD AUTO: 0.9 K/UL (ref 0.3–1)
MONOCYTES NFR BLD: 6.9 % (ref 4–15)
NEUTROPHILS # BLD AUTO: 10.8 K/UL (ref 1.8–7.7)
NEUTROPHILS NFR BLD: 81.5 % (ref 38–73)
NRBC BLD-RTO: 0 /100 WBC
PLATELET # BLD AUTO: 212 K/UL (ref 150–450)
PMV BLD AUTO: 9.6 FL (ref 9.2–12.9)
POTASSIUM SERPL-SCNC: 3.7 MMOL/L (ref 3.5–5.1)
PROT SERPL-MCNC: 5.6 G/DL (ref 6–8.4)
RBC # BLD AUTO: 3.61 M/UL (ref 4.6–6.2)
SODIUM SERPL-SCNC: 135 MMOL/L (ref 136–145)
TACROLIMUS BLD-MCNC: 9.4 NG/ML (ref 5–15)
WBC # BLD AUTO: 13.27 K/UL (ref 3.9–12.7)

## 2023-08-21 PROCEDURE — 85025 COMPLETE CBC W/AUTO DIFF WBC: CPT | Performed by: SURGERY

## 2023-08-21 PROCEDURE — 80053 COMPREHEN METABOLIC PANEL: CPT | Performed by: SURGERY

## 2023-08-21 PROCEDURE — 80197 ASSAY OF TACROLIMUS: CPT | Performed by: SURGERY

## 2023-08-21 PROCEDURE — 36415 COLL VENOUS BLD VENIPUNCTURE: CPT | Performed by: SURGERY

## 2023-08-21 PROCEDURE — 82248 BILIRUBIN DIRECT: CPT | Performed by: SURGERY

## 2023-08-21 NOTE — TELEPHONE ENCOUNTER
Will have pt get blood and urine cultures tomorrow in clinic.  Labs will be scheduled Wednesday.  ----- Message from Ameya Suero MD sent at 8/21/2023  2:06 PM CDT -----  Urine and blood cultures   Repeat labs Wednesday

## 2023-08-22 ENCOUNTER — LAB VISIT (OUTPATIENT)
Dept: LAB | Facility: HOSPITAL | Age: 63
End: 2023-08-22
Attending: SURGERY
Payer: COMMERCIAL

## 2023-08-22 ENCOUNTER — TELEPHONE (OUTPATIENT)
Dept: TRANSPLANT | Facility: CLINIC | Age: 63
End: 2023-08-22
Payer: COMMERCIAL

## 2023-08-22 ENCOUNTER — PATIENT MESSAGE (OUTPATIENT)
Dept: TRANSPLANT | Facility: CLINIC | Age: 63
End: 2023-08-22
Payer: COMMERCIAL

## 2023-08-22 ENCOUNTER — OFFICE VISIT (OUTPATIENT)
Dept: TRANSPLANT | Facility: CLINIC | Age: 63
End: 2023-08-22
Payer: COMMERCIAL

## 2023-08-22 VITALS
SYSTOLIC BLOOD PRESSURE: 151 MMHG | TEMPERATURE: 98 F | HEIGHT: 71 IN | HEART RATE: 81 BPM | BODY MASS INDEX: 33.92 KG/M2 | OXYGEN SATURATION: 96 % | RESPIRATION RATE: 18 BRPM | DIASTOLIC BLOOD PRESSURE: 83 MMHG | WEIGHT: 242.31 LBS

## 2023-08-22 DIAGNOSIS — D72.829 LEUKOCYTOSIS, UNSPECIFIED TYPE: ICD-10-CM

## 2023-08-22 DIAGNOSIS — Z79.899 ENCOUNTER FOR LONG-TERM (CURRENT) USE OF OTHER MEDICATIONS: ICD-10-CM

## 2023-08-22 DIAGNOSIS — Z94.4 LIVER REPLACED BY TRANSPLANT: ICD-10-CM

## 2023-08-22 DIAGNOSIS — Z94.4 STATUS POST LIVER TRANSPLANT: ICD-10-CM

## 2023-08-22 DIAGNOSIS — Z51.81 ENCOUNTER FOR THERAPEUTIC DRUG MONITORING: Primary | ICD-10-CM

## 2023-08-22 LAB
BACTERIA #/AREA URNS AUTO: NORMAL /HPF
BILIRUB UR QL STRIP: NEGATIVE
CLARITY UR REFRACT.AUTO: CLEAR
COLOR UR AUTO: YELLOW
GLUCOSE UR QL STRIP: ABNORMAL
HGB UR QL STRIP: NEGATIVE
KETONES UR QL STRIP: NEGATIVE
LEUKOCYTE ESTERASE UR QL STRIP: NEGATIVE
MICROSCOPIC COMMENT: NORMAL
NITRITE UR QL STRIP: NEGATIVE
PH UR STRIP: 5 [PH] (ref 5–8)
PROT UR QL STRIP: NEGATIVE
RBC #/AREA URNS AUTO: 0 /HPF (ref 0–4)
SP GR UR STRIP: 1.01 (ref 1–1.03)
URN SPEC COLLECT METH UR: ABNORMAL
WBC #/AREA URNS AUTO: 0 /HPF (ref 0–5)
YEAST UR QL AUTO: NORMAL

## 2023-08-22 PROCEDURE — 99999 PR PBB SHADOW E&M-EST. PATIENT-LVL III: ICD-10-PCS | Mod: PBBFAC,,,

## 2023-08-22 PROCEDURE — 99215 OFFICE O/P EST HI 40 MIN: CPT | Mod: 24,S$GLB,, | Performed by: TRANSPLANT SURGERY

## 2023-08-22 PROCEDURE — 81001 URINALYSIS AUTO W/SCOPE: CPT | Performed by: SURGERY

## 2023-08-22 PROCEDURE — 99999 PR PBB SHADOW E&M-EST. PATIENT-LVL III: CPT | Mod: PBBFAC,,,

## 2023-08-22 PROCEDURE — 99215 PR OFFICE/OUTPT VISIT, EST, LEVL V, 40-54 MIN: ICD-10-PCS | Mod: 24,S$GLB,, | Performed by: TRANSPLANT SURGERY

## 2023-08-22 RX ORDER — FUROSEMIDE 40 MG/1
40 TABLET ORAL DAILY
Qty: 30 TABLET | Refills: 5 | Status: SHIPPED | OUTPATIENT
Start: 2023-08-22 | End: 2023-08-30

## 2023-08-22 NOTE — TELEPHONE ENCOUNTER
Spoke with pt's spouse. Requested pt have urine and blood cultures on the 2nd floor prior to clinic. Moved clinic appointment to 2pm today. She agreed with plan.

## 2023-08-23 ENCOUNTER — TELEPHONE (OUTPATIENT)
Dept: TRANSPLANT | Facility: CLINIC | Age: 63
End: 2023-08-23
Payer: COMMERCIAL

## 2023-08-23 ENCOUNTER — HOSPITAL ENCOUNTER (OUTPATIENT)
Dept: RADIOLOGY | Facility: HOSPITAL | Age: 63
Discharge: HOME | End: 2023-08-23
Attending: SURGERY
Payer: COMMERCIAL

## 2023-08-23 DIAGNOSIS — Z94.4 LIVER REPLACED BY TRANSPLANT: ICD-10-CM

## 2023-08-23 PROCEDURE — 76705 ECHO EXAM OF ABDOMEN: CPT | Mod: 26,59,, | Performed by: RADIOLOGY

## 2023-08-23 PROCEDURE — 93976 VASCULAR STUDY: CPT | Mod: 26,,, | Performed by: RADIOLOGY

## 2023-08-23 PROCEDURE — 93976 VASCULAR STUDY: CPT | Mod: TC

## 2023-08-23 PROCEDURE — 76705 US DOPPLER LIVER TRANSPLANT POST (XPD): ICD-10-PCS | Mod: 26,59,, | Performed by: RADIOLOGY

## 2023-08-23 PROCEDURE — 93976 US DOPPLER LIVER TRANSPLANT POST (XPD): ICD-10-PCS | Mod: 26,,, | Performed by: RADIOLOGY

## 2023-08-23 NOTE — TELEPHONE ENCOUNTER
Spoke with pt and spouse. Pt's blood glucose 393. He will take insulin with his meal now as instructed by endocrine's dosing and sliding scale.  ----- Message from Meera Woodson sent at 8/23/2023  4:15 PM CDT -----  Regarding: wife req call back Asap to advise wether she can give his insulin #ASAP wife req#  Contact: wife  [phone]  The patient wife called requesting to speak to Nurse Linda - Patient has been here all day fasting for an UA    No further information provided      Patient Wife can be contacted @# [phone]

## 2023-08-24 ENCOUNTER — PATIENT MESSAGE (OUTPATIENT)
Dept: ADMINISTRATIVE | Facility: OTHER | Age: 63
End: 2023-08-24
Payer: COMMERCIAL

## 2023-08-24 ENCOUNTER — PATIENT MESSAGE (OUTPATIENT)
Dept: TRANSPLANT | Facility: CLINIC | Age: 63
End: 2023-08-24
Payer: COMMERCIAL

## 2023-08-24 ENCOUNTER — TELEPHONE (OUTPATIENT)
Dept: TRANSPLANT | Facility: CLINIC | Age: 63
End: 2023-08-24
Payer: COMMERCIAL

## 2023-08-24 ENCOUNTER — LAB VISIT (OUTPATIENT)
Dept: LAB | Facility: HOSPITAL | Age: 63
End: 2023-08-24
Attending: SURGERY
Payer: COMMERCIAL

## 2023-08-24 DIAGNOSIS — Z94.4 LIVER REPLACED BY TRANSPLANT: ICD-10-CM

## 2023-08-24 LAB
ALBUMIN SERPL BCP-MCNC: 3 G/DL (ref 3.5–5.2)
ALP SERPL-CCNC: 182 U/L (ref 55–135)
ALT SERPL W/O P-5'-P-CCNC: 114 U/L (ref 10–44)
ANION GAP SERPL CALC-SCNC: 7 MMOL/L (ref 8–16)
AST SERPL-CCNC: 37 U/L (ref 10–40)
BASOPHILS # BLD AUTO: 0.1 K/UL (ref 0–0.2)
BASOPHILS NFR BLD: 0.9 % (ref 0–1.9)
BILIRUB DIRECT SERPL-MCNC: 0.8 MG/DL (ref 0.1–0.3)
BILIRUB SERPL-MCNC: 1.4 MG/DL (ref 0.1–1)
BUN SERPL-MCNC: 15 MG/DL (ref 8–23)
CALCIUM SERPL-MCNC: 8.7 MG/DL (ref 8.7–10.5)
CHLORIDE SERPL-SCNC: 101 MMOL/L (ref 95–110)
CO2 SERPL-SCNC: 32 MMOL/L (ref 23–29)
CREAT SERPL-MCNC: 0.9 MG/DL (ref 0.5–1.4)
DIFFERENTIAL METHOD: ABNORMAL
EOSINOPHIL # BLD AUTO: 0.1 K/UL (ref 0–0.5)
EOSINOPHIL NFR BLD: 1.3 % (ref 0–8)
ERYTHROCYTE [DISTWIDTH] IN BLOOD BY AUTOMATED COUNT: 17.3 % (ref 11.5–14.5)
EST. GFR  (NO RACE VARIABLE): >60 ML/MIN/1.73 M^2
GLUCOSE SERPL-MCNC: 260 MG/DL (ref 70–110)
HCT VFR BLD AUTO: 34.6 % (ref 40–54)
HGB BLD-MCNC: 11.3 G/DL (ref 14–18)
IMM GRANULOCYTES # BLD AUTO: 0.07 K/UL (ref 0–0.04)
IMM GRANULOCYTES NFR BLD AUTO: 0.7 % (ref 0–0.5)
LYMPHOCYTES # BLD AUTO: 0.7 K/UL (ref 1–4.8)
LYMPHOCYTES NFR BLD: 6.8 % (ref 18–48)
MAGNESIUM SERPL-MCNC: 1.3 MG/DL (ref 1.6–2.6)
MCH RBC QN AUTO: 31.8 PG (ref 27–31)
MCHC RBC AUTO-ENTMCNC: 32.7 G/DL (ref 32–36)
MCV RBC AUTO: 98 FL (ref 82–98)
MONOCYTES # BLD AUTO: 0.8 K/UL (ref 0.3–1)
MONOCYTES NFR BLD: 7 % (ref 4–15)
NEUTROPHILS # BLD AUTO: 8.9 K/UL (ref 1.8–7.7)
NEUTROPHILS NFR BLD: 83.3 % (ref 38–73)
NRBC BLD-RTO: 0 /100 WBC
PLATELET # BLD AUTO: 187 K/UL (ref 150–450)
PMV BLD AUTO: 9.7 FL (ref 9.2–12.9)
POTASSIUM SERPL-SCNC: 4.3 MMOL/L (ref 3.5–5.1)
PROT SERPL-MCNC: 5.7 G/DL (ref 6–8.4)
RBC # BLD AUTO: 3.55 M/UL (ref 4.6–6.2)
SODIUM SERPL-SCNC: 140 MMOL/L (ref 136–145)
TACROLIMUS BLD-MCNC: 8.6 NG/ML (ref 5–15)
WBC # BLD AUTO: 10.64 K/UL (ref 3.9–12.7)

## 2023-08-24 PROCEDURE — 85025 COMPLETE CBC W/AUTO DIFF WBC: CPT | Performed by: SURGERY

## 2023-08-24 PROCEDURE — 83735 ASSAY OF MAGNESIUM: CPT | Performed by: SURGERY

## 2023-08-24 PROCEDURE — 80053 COMPREHEN METABOLIC PANEL: CPT | Performed by: SURGERY

## 2023-08-24 PROCEDURE — 36415 COLL VENOUS BLD VENIPUNCTURE: CPT | Performed by: SURGERY

## 2023-08-24 PROCEDURE — 82248 BILIRUBIN DIRECT: CPT | Performed by: SURGERY

## 2023-08-24 PROCEDURE — 80197 ASSAY OF TACROLIMUS: CPT | Performed by: SURGERY

## 2023-08-24 NOTE — TELEPHONE ENCOUNTER
Pt advised via portal of stable labs and ultrasound. Advised that no medication changes ordered. Repeat labs 8/28/23 and ultrasound 8/30/23.  ----- Message from Ameya Suero MD sent at 8/24/2023  1:08 PM CDT -----  Results ok. No action needed

## 2023-08-24 NOTE — TELEPHONE ENCOUNTER
Blood cultures NGTD.  ----- Message from Ameya Suero MD sent at 8/24/2023  1:05 PM CDT -----  Results ok. No action needed

## 2023-08-24 NOTE — TELEPHONE ENCOUNTER
Pt advised of stable ultrasound.  Next ultrasound scheduled 8/30/23.  ----- Message from Ameya Suero MD sent at 8/24/2023  1:09 PM CDT -----  Results ok. No action needed

## 2023-08-25 ENCOUNTER — NURSE TRIAGE (OUTPATIENT)
Dept: ADMINISTRATIVE | Facility: CLINIC | Age: 63
End: 2023-08-25
Payer: COMMERCIAL

## 2023-08-25 NOTE — TELEPHONE ENCOUNTER
Reason for Disposition   Caller has medicine question only, adult not sick, and triager answers question    Protocols used: Medication Question Call-A-OH    Jed's wife Leida called.  He did have liver transplant on 08/10/2023. They are still in [place] for post-op following. She states she forgot to give him his medications last night.  He had none of his PM meds last night.  He took all of his morning meds as scheduled at 0700 this morning. Per [facility] transplant protocol, BPA 9, went over recommendations for a missed 12- hour dosing schedule. She will give him his evening, missed Pepcid with his breakfast this morning, as it is the only once-daily medication that he has, it was not given last night. Questions answered, and encouraged call back for any new concerns, questions.  She states she will.  Assured her will message Jed's liver transplant coordinator, Linda Donahue RN(post-liver transplant clinical team), and encouraged her to call coordinator with questions / concerns that occur during clinic hours.     BPA 9    [de-identified] : XR scoliosis AP and lateral: There is 12 degree thoracic curve noted. Risser 1.

## 2023-08-25 NOTE — PROGRESS NOTES
Transplant Surgery  Liver Transplant Recipient Follow-up    Original Referring Physician: Abdulkadir Germain Jr.  Current Corresponding Physician: Abdulkadir Germain Jr.    Chief Complaint: Jed is here for follow up of his liver transplant performed 8/10/2023 for the primary diagnosis (UNOS) of Primary Liver Malignancy: Hepatoma (HCC) and Cirrhosis    ORGAN: LIVER  Whole or Partial: whole liver  Donor Type: donation after circulatory death   PHS Increased Risk: no  Donor CMV Status: Positive  Donor HCV Status: Negative  Donor HBcAb: Negative  Donor HBV SURENDRA:   Donor HCV SURENDRA: Negative    Biliary Anastomosis: end to end  Arterial Anatomy: standard  IVC reconstruction: end to end ivc  Portal vein status: patent    Subjective:     History of Present Illness: He has had the following complications since transplant: none.  The noted complications are well controlled.    Interval History: Currently, he is doing adequately.  Current complaints include none.  Jed is here for management of his immunosuppression medication.    External provider notes reviewed: Yes    Review of Systems   Constitutional:  Negative for activity change and appetite change.   HENT:  Negative for congestion and tinnitus.    Eyes:  Negative for redness and visual disturbance.   Respiratory:  Negative for cough and shortness of breath.    Cardiovascular:  Negative for chest pain and palpitations.   Gastrointestinal:  Positive for abdominal distention. Negative for abdominal pain.   Endocrine: Negative for polydipsia and polyuria.   Genitourinary:  Negative for decreased urine volume and dysuria.   Musculoskeletal:  Negative for arthralgias and back pain.   Skin:  Negative for pallor and rash.   Allergic/Immunologic: Positive for immunocompromised state. Negative for environmental allergies.   Neurological:  Negative for tremors and headaches.   Hematological:  Negative for adenopathy. Does not bruise/bleed easily.   Psychiatric/Behavioral:  Negative  for behavioral problems and confusion.      Objective:     Physical Exam  Constitutional:       General: He is not in acute distress.     Appearance: He is well-developed. He is not diaphoretic.   HENT:      Head: Normocephalic.   Eyes:      General: No scleral icterus.  Neck:      Vascular: No JVD.   Cardiovascular:      Rate and Rhythm: Normal rate and regular rhythm.   Pulmonary:      Effort: Pulmonary effort is normal. No respiratory distress.   Abdominal:      Palpations: Abdomen is soft. There is no mass.      Tenderness: There is no guarding or rebound.   Musculoskeletal:         General: Normal range of motion.      Cervical back: Normal range of motion and neck supple.   Skin:     General: Skin is warm and dry.   Neurological:      Mental Status: He is alert and oriented to person, place, and time.   Psychiatric:         Behavior: Behavior normal.         Thought Content: Thought content normal.         Judgment: Judgment normal.       Lab Results   Component Value Date    BILITOT 1.4 (H) 08/24/2023    AST 37 08/24/2023     (H) 08/24/2023    ALKPHOS 182 (H) 08/24/2023    CREATININE 0.9 08/24/2023    ALBUMIN 3.0 (L) 08/24/2023     Lab Results   Component Value Date    WBC 10.64 08/24/2023    HGB 11.3 (L) 08/24/2023    HCT 34.6 (L) 08/24/2023    HCT 37 08/10/2023     08/24/2023     Lab Results   Component Value Date    TACROLIMUS 8.6 08/24/2023       Diagnostics:  The following labs have been reviewed: CBC  CMP  The following radiology images have been independently reviewed and interpreted: Abdominal US    Assessment/Plan:          S/P liver transplant.  Chronic immunosuppressive medications for rejection prophylaxis at target.  Plan: no adjustment needed.  Continue monitoring symptoms, labs and drug levels for drug-related toxicity and side effects.  Incision: staples in place; wound clean, dry, and intact  Femoral arterial line site: no complications evident    Additional testing to be  completed according to Written Order Guidelines for Post-Liver and Combined Liver/Kidney Transplant Follow-up (LI-09)    Interpretation of tests and discussion of patient management involves all members of the multidisciplinary transplant team  Patient advised that it is recommended that all transplanted patients, and their close contacts and household members receive Covid vaccination.  Jed Salgado MD       [facility] Patient Status  Functional Status: 70% - Cares for self: unable to carry on normal activity or active work  Physical Capacity: No Limitations

## 2023-08-28 ENCOUNTER — PATIENT MESSAGE (OUTPATIENT)
Dept: TRANSPLANT | Facility: CLINIC | Age: 63
End: 2023-08-28
Payer: COMMERCIAL

## 2023-08-28 ENCOUNTER — TELEPHONE (OUTPATIENT)
Dept: TRANSPLANT | Facility: CLINIC | Age: 63
End: 2023-08-28
Payer: COMMERCIAL

## 2023-08-28 ENCOUNTER — LAB VISIT (OUTPATIENT)
Dept: LAB | Facility: HOSPITAL | Age: 63
End: 2023-08-28
Attending: SURGERY
Payer: COMMERCIAL

## 2023-08-28 DIAGNOSIS — Z94.4 LIVER REPLACED BY TRANSPLANT: ICD-10-CM

## 2023-08-28 LAB
ALBUMIN SERPL BCP-MCNC: 3.1 G/DL (ref 3.5–5.2)
ALP SERPL-CCNC: 214 U/L (ref 55–135)
ALT SERPL W/O P-5'-P-CCNC: 143 U/L (ref 10–44)
ANION GAP SERPL CALC-SCNC: 9 MMOL/L (ref 8–16)
AST SERPL-CCNC: 63 U/L (ref 10–40)
BASOPHILS # BLD AUTO: 0.06 K/UL (ref 0–0.2)
BASOPHILS NFR BLD: 1.1 % (ref 0–1.9)
BILIRUB DIRECT SERPL-MCNC: 0.7 MG/DL (ref 0.1–0.3)
BILIRUB SERPL-MCNC: 1.4 MG/DL (ref 0.1–1)
BUN SERPL-MCNC: 17 MG/DL (ref 8–23)
CALCIUM SERPL-MCNC: 8.7 MG/DL (ref 8.7–10.5)
CHLORIDE SERPL-SCNC: 99 MMOL/L (ref 95–110)
CO2 SERPL-SCNC: 30 MMOL/L (ref 23–29)
CREAT SERPL-MCNC: 0.8 MG/DL (ref 0.5–1.4)
DIFFERENTIAL METHOD: ABNORMAL
EOSINOPHIL # BLD AUTO: 0.1 K/UL (ref 0–0.5)
EOSINOPHIL NFR BLD: 2.2 % (ref 0–8)
ERYTHROCYTE [DISTWIDTH] IN BLOOD BY AUTOMATED COUNT: 16.7 % (ref 11.5–14.5)
EST. GFR  (NO RACE VARIABLE): >60 ML/MIN/1.73 M^2
GLUCOSE SERPL-MCNC: 361 MG/DL (ref 70–110)
HCT VFR BLD AUTO: 33.6 % (ref 40–54)
HGB BLD-MCNC: 10.7 G/DL (ref 14–18)
IMM GRANULOCYTES # BLD AUTO: 0.02 K/UL (ref 0–0.04)
IMM GRANULOCYTES NFR BLD AUTO: 0.4 % (ref 0–0.5)
LYMPHOCYTES # BLD AUTO: 0.5 K/UL (ref 1–4.8)
LYMPHOCYTES NFR BLD: 8.5 % (ref 18–48)
MCH RBC QN AUTO: 31.8 PG (ref 27–31)
MCHC RBC AUTO-ENTMCNC: 31.8 G/DL (ref 32–36)
MCV RBC AUTO: 100 FL (ref 82–98)
MONOCYTES # BLD AUTO: 0.4 K/UL (ref 0.3–1)
MONOCYTES NFR BLD: 7.2 % (ref 4–15)
NEUTROPHILS # BLD AUTO: 4.5 K/UL (ref 1.8–7.7)
NEUTROPHILS NFR BLD: 80.6 % (ref 38–73)
NRBC BLD-RTO: 0 /100 WBC
PLATELET # BLD AUTO: 147 K/UL (ref 150–450)
PMV BLD AUTO: 10 FL (ref 9.2–12.9)
POTASSIUM SERPL-SCNC: 4 MMOL/L (ref 3.5–5.1)
PROT SERPL-MCNC: 5.9 G/DL (ref 6–8.4)
RBC # BLD AUTO: 3.36 M/UL (ref 4.6–6.2)
SODIUM SERPL-SCNC: 138 MMOL/L (ref 136–145)
TACROLIMUS BLD-MCNC: 7.5 NG/ML (ref 5–15)
WBC # BLD AUTO: 5.53 K/UL (ref 3.9–12.7)

## 2023-08-28 PROCEDURE — 82248 BILIRUBIN DIRECT: CPT | Performed by: SURGERY

## 2023-08-28 PROCEDURE — 85025 COMPLETE CBC W/AUTO DIFF WBC: CPT | Performed by: SURGERY

## 2023-08-28 PROCEDURE — 36415 COLL VENOUS BLD VENIPUNCTURE: CPT | Performed by: SURGERY

## 2023-08-28 PROCEDURE — 80197 ASSAY OF TACROLIMUS: CPT | Performed by: SURGERY

## 2023-08-28 PROCEDURE — 80053 COMPREHEN METABOLIC PANEL: CPT | Performed by: SURGERY

## 2023-08-28 NOTE — TELEPHONE ENCOUNTER
Pt advised that AST and ALT are slightly more elevated. Requested repeat labs Wednesday morning.   ----- Message from Nasreen Acosta MD sent at 8/28/2023  2:04 PM CDT -----  Results ok. No action needed.

## 2023-08-29 ENCOUNTER — OFFICE VISIT (OUTPATIENT)
Dept: TRANSPLANT | Facility: CLINIC | Age: 63
End: 2023-08-29
Payer: COMMERCIAL

## 2023-08-29 VITALS
DIASTOLIC BLOOD PRESSURE: 76 MMHG | SYSTOLIC BLOOD PRESSURE: 149 MMHG | OXYGEN SATURATION: 96 % | HEART RATE: 83 BPM | RESPIRATION RATE: 18 BRPM | WEIGHT: 220.25 LBS | HEIGHT: 70 IN | TEMPERATURE: 97 F | BODY MASS INDEX: 31.53 KG/M2

## 2023-08-29 DIAGNOSIS — Z79.60 LONG-TERM USE OF IMMUNOSUPPRESSANT MEDICATION: ICD-10-CM

## 2023-08-29 DIAGNOSIS — Z94.4 S/P LIVER TRANSPLANT: Primary | ICD-10-CM

## 2023-08-29 DIAGNOSIS — Z51.81 ENCOUNTER FOR THERAPEUTIC DRUG MONITORING: ICD-10-CM

## 2023-08-29 PROCEDURE — 99214 OFFICE O/P EST MOD 30 MIN: CPT | Mod: 24,S$GLB,, | Performed by: TRANSPLANT SURGERY

## 2023-08-29 PROCEDURE — 99999 PR PBB SHADOW E&M-EST. PATIENT-LVL IV: ICD-10-PCS | Mod: PBBFAC,,,

## 2023-08-29 PROCEDURE — 99214 PR OFFICE/OUTPT VISIT, EST, LEVL IV, 30-39 MIN: ICD-10-PCS | Mod: 24,S$GLB,, | Performed by: TRANSPLANT SURGERY

## 2023-08-29 PROCEDURE — 99999 PR PBB SHADOW E&M-EST. PATIENT-LVL IV: CPT | Mod: PBBFAC,,,

## 2023-08-29 NOTE — PROGRESS NOTES
Transplant Surgery  Liver Transplant Recipient Follow-up    Original Referring Physician: Abdulkadir Germain Jr.  Current Corresponding Physician: Abdulkadir Germain Jr.    Chief Complaint: Jed is here for follow up of his liver transplant performed 8/10/2023 for the primary diagnosis (UNOS) of Primary Liver Malignancy: Hepatoma (HCC) and Cirrhosis    ORGAN: LIVER  Whole or Partial: whole liver  Donor Type: donation after circulatory death   PHS Increased Risk: no  Donor CMV Status: Positive  Donor HCV Status: Negative  Donor HBcAb: Negative  Donor HBV SURENDRA:   Donor HCV SURENDRA: Negative    Biliary Anastomosis: end to end  Arterial Anatomy: standard  IVC reconstruction: end to end ivc  Portal vein status: patent    Subjective:     History of Present Illness: He has had the following complications since transplant: none.  The noted complications are well controlled.    Interval History: Currently, he is doing well.  Current complaints include edema, fatigue, and soft stools .  LFT increased somewhat, repeating tomorrow and gettign US. Will p[ossibly get biopsy  Jed is here for management of his immunosuppression medication.    External provider notes reviewed: Yes    Review of Systems  Objective:     Physical Exam  Constitutional:       Appearance: He is well-developed.   HENT:      Head: Normocephalic and atraumatic.   Eyes:      Pupils: Pupils are equal, round, and reactive to light.   Neck:      Vascular: No JVD.   Cardiovascular:      Rate and Rhythm: Normal rate and regular rhythm.      Heart sounds: Normal heart sounds.   Pulmonary:      Effort: Pulmonary effort is normal.      Breath sounds: Normal breath sounds. No stridor.   Abdominal:      General: There is no distension.      Palpations: Abdomen is soft.      Tenderness: There is no abdominal tenderness.       Musculoskeletal:         General: Normal range of motion.      Right lower leg: Edema present.      Left lower leg: Edema present.   Skin:     General:  Skin is warm and dry.   Neurological:      Mental Status: He is alert and oriented to person, place, and time.   Psychiatric:         Behavior: Behavior normal.       Lab Results   Component Value Date    BILITOT 1.4 (H) 08/28/2023    AST 63 (H) 08/28/2023     (H) 08/28/2023    ALKPHOS 214 (H) 08/28/2023    CREATININE 0.8 08/28/2023    ALBUMIN 3.1 (L) 08/28/2023     Lab Results   Component Value Date    WBC 5.53 08/28/2023    HGB 10.7 (L) 08/28/2023    HCT 33.6 (L) 08/28/2023    HCT 37 08/10/2023     (L) 08/28/2023     Lab Results   Component Value Date    TACROLIMUS 7.5 08/28/2023       Diagnostics:  The following labs have been reviewed: CBC  CMP  INR  The following radiology images have been independently reviewed and interpreted: Liver US    Assessment/Plan:          S/P liver transplant.  Chronic immunosuppressive medications for rejection prophylaxis at target.  Plan: no adjustment needed.  Continue monitoring symptoms, labs and drug levels for drug-related toxicity and side effects.  Incision: staples in place; wound clean, dry, and intact  Femoral arterial line site: no complications evident    Additional testing to be completed according to Written Order Guidelines for Post-Liver and Combined Liver/Kidney Transplant Follow-up (LI-09)    Interpretation of tests and discussion of patient management involves all members of the multidisciplinary transplant team  Patient advised that it is recommended that all transplanted patients, and their close contacts and household members receive Covid vaccination.  Damion Baum Jr, MD       [facility] Patient Status  Functional Status: 50% - Requires considerable assistance and frequent medical care  Physical Capacity: No Limitations

## 2023-08-30 ENCOUNTER — TELEPHONE (OUTPATIENT)
Dept: TRANSPLANT | Facility: CLINIC | Age: 63
End: 2023-08-30
Payer: COMMERCIAL

## 2023-08-30 ENCOUNTER — HOSPITAL ENCOUNTER (OUTPATIENT)
Dept: RADIOLOGY | Facility: HOSPITAL | Age: 63
Discharge: HOME | End: 2023-08-30
Attending: SURGERY
Payer: COMMERCIAL

## 2023-08-30 ENCOUNTER — PATIENT MESSAGE (OUTPATIENT)
Dept: TRANSPLANT | Facility: CLINIC | Age: 63
End: 2023-08-30
Payer: COMMERCIAL

## 2023-08-30 DIAGNOSIS — Z94.4 STATUS POST LIVER TRANSPLANT: ICD-10-CM

## 2023-08-30 DIAGNOSIS — Z94.4 LIVER REPLACED BY TRANSPLANT: ICD-10-CM

## 2023-08-30 PROCEDURE — 76705 US DOPPLER LIVER TRANSPLANT POST (XPD): ICD-10-PCS | Mod: 26,59,, | Performed by: RADIOLOGY

## 2023-08-30 PROCEDURE — 93976 VASCULAR STUDY: CPT | Mod: 26,,, | Performed by: RADIOLOGY

## 2023-08-30 PROCEDURE — 93976 VASCULAR STUDY: CPT | Mod: TC

## 2023-08-30 PROCEDURE — 76705 ECHO EXAM OF ABDOMEN: CPT | Mod: 59,TC

## 2023-08-30 PROCEDURE — 93976 US DOPPLER LIVER TRANSPLANT POST (XPD): ICD-10-PCS | Mod: 26,,, | Performed by: RADIOLOGY

## 2023-08-30 PROCEDURE — 76705 ECHO EXAM OF ABDOMEN: CPT | Mod: 26,59,, | Performed by: RADIOLOGY

## 2023-08-30 RX ORDER — FUROSEMIDE 40 MG/1
40 TABLET ORAL 2 TIMES DAILY
Qty: 60 TABLET | Refills: 0 | Status: SHIPPED | OUTPATIENT
Start: 2023-08-30 | End: 2023-09-05 | Stop reason: ALTCHOICE

## 2023-08-30 NOTE — TELEPHONE ENCOUNTER
Reviewed labs with Dr. Acosta. Per MD, liver tests improving and no medication changes needed. Repeat labs needed Monday.  Ultrasound report pending but Dr. Acosta able to review imaging. Repeat ultrasound needed next week.    Pt advised of above.

## 2023-08-30 NOTE — TELEPHONE ENCOUNTER
----- Message from Linda Donahue RN sent at 8/30/2023  2:19 PM CDT -----  Regarding: FW: Refill  Contact: [phone]    ----- Message -----  From: Sophia Ovalles RN  Sent: 8/30/2023   2:13 PM CDT  To: [facility] Post-Liver Transplant Clinical  Subject: FW: Refill                                         ----- Message -----  From: Maria Del Carmen Bass  Sent: 8/30/2023   2:10 PM CDT  To: [facility] Staff  Subject: Refill                                           Rx Refill/Request         Is this a Refill or New Rx:       refill    Rx Name and Strength:    -   insulin lispro (HUMALOG KWIKPEN INSULIN) 100 unit/mL pen 15 mL     Preferred Pharmacy with phone number:    [facility]  [address] [address]  Phone: [phone] Fax: [phone]         Additional Information Pt wife states the doctor change the units which needs to be give and the pt will run out of medication before time. Would like an refill please send authorization. Please call

## 2023-08-31 ENCOUNTER — PATIENT MESSAGE (OUTPATIENT)
Dept: ADMINISTRATIVE | Facility: OTHER | Age: 63
End: 2023-08-31
Payer: COMMERCIAL

## 2023-08-31 DIAGNOSIS — E11.69 TYPE 2 DIABETES MELLITUS WITH OTHER SPECIFIED COMPLICATION, WITHOUT LONG-TERM CURRENT USE OF INSULIN: Primary | ICD-10-CM

## 2023-08-31 RX ORDER — INSULIN LISPRO 100 [IU]/ML
7 INJECTION, SOLUTION INTRAVENOUS; SUBCUTANEOUS
Qty: 15 ML | Refills: 0 | Status: SHIPPED | OUTPATIENT
Start: 2023-08-31 | End: 2023-09-06

## 2023-08-31 RX ORDER — INSULIN LISPRO 100 [IU]/ML
7 INJECTION, SOLUTION INTRAVENOUS; SUBCUTANEOUS
Qty: 15 ML | Refills: 3 | Status: SHIPPED | OUTPATIENT
Start: 2023-08-31 | End: 2023-09-06 | Stop reason: DRUGHIGH

## 2023-09-04 ENCOUNTER — TELEPHONE (OUTPATIENT)
Dept: TRANSPLANT | Facility: CLINIC | Age: 63
End: 2023-09-04
Payer: COMMERCIAL

## 2023-09-04 ENCOUNTER — LAB VISIT (OUTPATIENT)
Dept: LAB | Facility: HOSPITAL | Age: 63
End: 2023-09-04
Attending: SURGERY
Payer: COMMERCIAL

## 2023-09-04 DIAGNOSIS — Z94.4 LIVER REPLACED BY TRANSPLANT: ICD-10-CM

## 2023-09-04 LAB
ALBUMIN SERPL BCP-MCNC: 3.5 G/DL (ref 3.5–5.2)
ALP SERPL-CCNC: 180 U/L (ref 55–135)
ALT SERPL W/O P-5'-P-CCNC: 63 U/L (ref 10–44)
ANION GAP SERPL CALC-SCNC: 11 MMOL/L (ref 8–16)
AST SERPL-CCNC: 26 U/L (ref 10–40)
BASOPHILS # BLD AUTO: 0.08 K/UL (ref 0–0.2)
BASOPHILS NFR BLD: 1.6 % (ref 0–1.9)
BILIRUB DIRECT SERPL-MCNC: 0.6 MG/DL (ref 0.1–0.3)
BILIRUB SERPL-MCNC: 1.4 MG/DL (ref 0.1–1)
BUN SERPL-MCNC: 18 MG/DL (ref 8–23)
CALCIUM SERPL-MCNC: 9 MG/DL (ref 8.7–10.5)
CHLORIDE SERPL-SCNC: 101 MMOL/L (ref 95–110)
CO2 SERPL-SCNC: 27 MMOL/L (ref 23–29)
CREAT SERPL-MCNC: 0.8 MG/DL (ref 0.5–1.4)
DIFFERENTIAL METHOD: ABNORMAL
EOSINOPHIL # BLD AUTO: 0.2 K/UL (ref 0–0.5)
EOSINOPHIL NFR BLD: 4.6 % (ref 0–8)
ERYTHROCYTE [DISTWIDTH] IN BLOOD BY AUTOMATED COUNT: 15.8 % (ref 11.5–14.5)
EST. GFR  (NO RACE VARIABLE): >60 ML/MIN/1.73 M^2
GLUCOSE SERPL-MCNC: 273 MG/DL (ref 70–110)
HCT VFR BLD AUTO: 38.4 % (ref 40–54)
HGB BLD-MCNC: 12 G/DL (ref 14–18)
IMM GRANULOCYTES # BLD AUTO: 0.01 K/UL (ref 0–0.04)
IMM GRANULOCYTES NFR BLD AUTO: 0.2 % (ref 0–0.5)
LYMPHOCYTES # BLD AUTO: 0.4 K/UL (ref 1–4.8)
LYMPHOCYTES NFR BLD: 8.9 % (ref 18–48)
MCH RBC QN AUTO: 29.6 PG (ref 27–31)
MCHC RBC AUTO-ENTMCNC: 31.3 G/DL (ref 32–36)
MCV RBC AUTO: 95 FL (ref 82–98)
MONOCYTES # BLD AUTO: 0.5 K/UL (ref 0.3–1)
MONOCYTES NFR BLD: 10.5 % (ref 4–15)
NEUTROPHILS # BLD AUTO: 3.7 K/UL (ref 1.8–7.7)
NEUTROPHILS NFR BLD: 74.2 % (ref 38–73)
NRBC BLD-RTO: 0 /100 WBC
PLATELET # BLD AUTO: 214 K/UL (ref 150–450)
PMV BLD AUTO: 9.2 FL (ref 9.2–12.9)
POTASSIUM SERPL-SCNC: 4.1 MMOL/L (ref 3.5–5.1)
PROT SERPL-MCNC: 6.5 G/DL (ref 6–8.4)
RBC # BLD AUTO: 4.05 M/UL (ref 4.6–6.2)
SODIUM SERPL-SCNC: 139 MMOL/L (ref 136–145)
TACROLIMUS BLD-MCNC: 6.6 NG/ML (ref 5–15)
WBC # BLD AUTO: 4.95 K/UL (ref 3.9–12.7)

## 2023-09-04 PROCEDURE — 80053 COMPREHEN METABOLIC PANEL: CPT | Performed by: SURGERY

## 2023-09-04 PROCEDURE — 85025 COMPLETE CBC W/AUTO DIFF WBC: CPT | Performed by: SURGERY

## 2023-09-04 PROCEDURE — 36415 COLL VENOUS BLD VENIPUNCTURE: CPT | Performed by: SURGERY

## 2023-09-04 PROCEDURE — 80197 ASSAY OF TACROLIMUS: CPT | Performed by: SURGERY

## 2023-09-04 PROCEDURE — 82248 BILIRUBIN DIRECT: CPT | Performed by: SURGERY

## 2023-09-04 NOTE — TELEPHONE ENCOUNTER
Informed pt and wife labs reviewed with Dr Baum. No medication changes needed. Pt to repeat labs on Thursday. They both verbalize understanding.

## 2023-09-05 ENCOUNTER — OFFICE VISIT (OUTPATIENT)
Dept: TRANSPLANT | Facility: CLINIC | Age: 63
End: 2023-09-05
Payer: COMMERCIAL

## 2023-09-05 ENCOUNTER — TELEPHONE (OUTPATIENT)
Dept: TRANSPLANT | Facility: CLINIC | Age: 63
End: 2023-09-05

## 2023-09-05 VITALS
SYSTOLIC BLOOD PRESSURE: 129 MMHG | WEIGHT: 211.88 LBS | OXYGEN SATURATION: 97 % | HEIGHT: 70 IN | BODY MASS INDEX: 30.33 KG/M2 | DIASTOLIC BLOOD PRESSURE: 71 MMHG | TEMPERATURE: 97 F | HEART RATE: 89 BPM | RESPIRATION RATE: 16 BRPM

## 2023-09-05 DIAGNOSIS — K72.10 END STAGE LIVER DISEASE: ICD-10-CM

## 2023-09-05 DIAGNOSIS — Z94.4 S/P LIVER TRANSPLANT: ICD-10-CM

## 2023-09-05 DIAGNOSIS — Z29.89 PROPHYLACTIC IMMUNOTHERAPY: Primary | ICD-10-CM

## 2023-09-05 DIAGNOSIS — Z79.60 LONG-TERM USE OF IMMUNOSUPPRESSANT MEDICATION: ICD-10-CM

## 2023-09-05 DIAGNOSIS — C22.0 HCC (HEPATOCELLULAR CARCINOMA): ICD-10-CM

## 2023-09-05 PROBLEM — E87.1 HYPONATREMIA: Status: RESOLVED | Noted: 2023-08-14 | Resolved: 2023-09-05

## 2023-09-05 PROCEDURE — 99999 PR PBB SHADOW E&M-EST. PATIENT-LVL V: ICD-10-PCS | Mod: PBBFAC,,, | Performed by: TRANSPLANT SURGERY

## 2023-09-05 PROCEDURE — 99999 PR PBB SHADOW E&M-EST. PATIENT-LVL V: CPT | Mod: PBBFAC,,, | Performed by: TRANSPLANT SURGERY

## 2023-09-05 PROCEDURE — 99214 PR OFFICE/OUTPT VISIT, EST, LEVL IV, 30-39 MIN: ICD-10-PCS | Mod: 24,S$GLB,, | Performed by: TRANSPLANT SURGERY

## 2023-09-05 PROCEDURE — 99214 OFFICE O/P EST MOD 30 MIN: CPT | Mod: 24,S$GLB,, | Performed by: TRANSPLANT SURGERY

## 2023-09-05 RX ORDER — TACROLIMUS 1 MG/1
4 CAPSULE ORAL EVERY 12 HOURS
Qty: 240 CAPSULE | Refills: 11 | Status: SHIPPED | OUTPATIENT
Start: 2023-09-05 | End: 2023-09-13 | Stop reason: DRUGHIGH

## 2023-09-05 NOTE — PROGRESS NOTES
Transplant Surgery  Liver Transplant Recipient Follow-up    Original Referring Physician: Abdulkadir Germain Jr.  Current Corresponding Physician: Abdulkadir Germain Jr.    Chief Complaint: Jed is here for follow up of his liver transplant performed 8/10/2023 for the primary diagnosis (UNOS) of Primary Liver Malignancy: Hepatoma (HCC) and Cirrhosis    ORGAN: LIVER  Whole or Partial: whole liver  Donor Type: donation after circulatory death   PHS Increased Risk: no  Donor CMV Status: Positive  Donor HCV Status: Negative  Donor HBcAb: Negative  Donor HBV SURENDRA:   Donor HCV SURENDRA: Negative    Biliary Anastomosis: end to end  Arterial Anatomy: standard  IVC reconstruction: end to end ivc  Portal vein status: patent    Subjective:     History of Present Illness: He has had the following complications since transplant: none.  The noted complications are well controlled.    Interval History: Currently, he is doing well.  Current complaints include none.  Jed is here for management of his immunosuppression medication.    External provider notes reviewed: No    Review of Systems   Constitutional: Negative.  Negative for fatigue and fever.   HENT:  Negative for hearing loss, nosebleeds and sinus pressure.    Eyes:  Negative for photophobia and visual disturbance.   Respiratory:  Negative for cough, shortness of breath, wheezing and stridor.    Cardiovascular:  Negative for chest pain, palpitations and leg swelling.   Gastrointestinal:  Negative for abdominal distention, blood in stool, constipation, diarrhea and nausea.   Endocrine: Negative for polydipsia and polyphagia.   Genitourinary:  Negative for dysuria, flank pain and hematuria.   Musculoskeletal:  Negative for arthralgias, joint swelling and myalgias.   Skin:  Negative for color change and rash.   Neurological:  Negative for dizziness, tremors, seizures, syncope, weakness and numbness.   Hematological:  Negative for adenopathy. Does not bruise/bleed easily.    Psychiatric/Behavioral:  Negative for behavioral problems, confusion, decreased concentration, dysphoric mood and hallucinations.      Objective:     Physical Exam  Vitals and nursing note reviewed.   Constitutional:       Appearance: He is well-developed.   HENT:      Head: Normocephalic and atraumatic.      Mouth/Throat:      Pharynx: No oropharyngeal exudate.   Eyes:      General: No scleral icterus.     Pupils: Pupils are equal, round, and reactive to light.   Neck:      Thyroid: No thyromegaly.      Vascular: No JVD.      Trachea: No tracheal deviation.   Cardiovascular:      Rate and Rhythm: Normal rate and regular rhythm.      Heart sounds: Normal heart sounds.   Pulmonary:      Effort: Pulmonary effort is normal. No respiratory distress.      Breath sounds: Normal breath sounds. No stridor. No wheezing or rales.   Chest:      Chest wall: No tenderness.   Abdominal:      General: Bowel sounds are normal. There is no distension.      Palpations: Abdomen is soft. There is no mass.      Tenderness: There is no abdominal tenderness. There is no rebound.       Musculoskeletal:         General: No tenderness. Normal range of motion.      Cervical back: Normal range of motion.   Lymphadenopathy:      Cervical: No cervical adenopathy.   Skin:     General: Skin is warm and dry.      Findings: No erythema or rash.   Neurological:      Mental Status: He is alert and oriented to person, place, and time.   Psychiatric:         Behavior: Behavior normal.       Lab Results   Component Value Date    BILITOT 1.4 (H) 09/04/2023    AST 26 09/04/2023    ALT 63 (H) 09/04/2023    ALKPHOS 180 (H) 09/04/2023    CREATININE 0.8 09/04/2023    ALBUMIN 3.5 09/04/2023     Lab Results   Component Value Date    WBC 4.95 09/04/2023    HGB 12.0 (L) 09/04/2023    HCT 38.4 (L) 09/04/2023    HCT 37 08/10/2023     09/04/2023     Lab Results   Component Value Date    TACROLIMUS 6.6 09/04/2023       Diagnostics:  The following labs have  been reviewed: CBC  CMP  The following radiology images have been independently reviewed and interpreted: Liver US    Assessment/Plan:          S/P liver transplant.  Chronic immunosuppressive medications for rejection prophylaxis at target.  Plan: no adjustment needed.  Continue monitoring symptoms, labs and drug levels for drug-related toxicity and side effects.  Incision: staples ready to be removed  Stop Lasix  Femoral arterial line site: no complications evident  OK to return to [facility]    Additional testing to be completed according to Written Order Guidelines for Post-Liver and Combined Liver/Kidney Transplant Follow-up (LI-09)    Interpretation of tests and discussion of patient management involves all members of the multidisciplinary transplant team  Patient advised that it is recommended that all transplanted patients, and their close contacts and household members receive Covid vaccination.  Lee Gill MD       OS Patient Status  Functional Status: 80% - Normal activity with effort: some symptoms of disease  Physical Capacity: No Limitations

## 2023-09-05 NOTE — LETTER
September 5, 2023        Abdulkadir Germain Jr.  [address] [address]  Phone: [phone]  Fax: [phone]             [facility]  [address]  [place] [facility] [address]  Phone: [phone]   Patient: Jed Chávez   MR Number: [id number]   YOB: 1960   Date of Visit: 9/5/2023       Dear Dr. Abdulkadir Germain Jr.    Thank you for referring Jed Chávez to me for evaluation. Attached you will find relevant portions of my assessment and plan of care.    If you have questions, please do not hesitate to call me. I look forward to following Jed Chávez along with you.    Sincerely,    Lee Gill MD    Enclosure    If you would like to receive this communication electronically, please contact [email or web address] or [phone] to request [employer] [employer] access.    [employer] is a tool which provides read-only access to select patient information with whom you have a relationship. Its easy to use and provides real time access to review your patients record including encounter summaries, notes, results, and demographic information.    If you feel you have received this communication in error or would no longer like to receive these types of communications, please e-mail [email or web address]

## 2023-09-05 NOTE — PROGRESS NOTES
STAPLE REMOVAL NOTE    Staples removed from liver transplant incision, steri-strips applied with Benzoin per Dr. Gill's order.  Patient tolerated procedure well.  Skin dry and intact.  Patient instructed to shower with back to water spray and to pat dry incision and to let the steri-strips wear off on their own.  Patient instructed to report any redness, warmth, or drainage from incision to transplant coordinators.  All questions answered.

## 2023-09-05 NOTE — TELEPHONE ENCOUNTER
Pt aware. Repeat labs due Thursday.   ----- Message from Lee Gill MD sent at 9/5/2023  2:33 PM CDT -----  Results reviewed, no immediate action required.

## 2023-09-06 ENCOUNTER — HOSPITAL ENCOUNTER (OUTPATIENT)
Dept: RADIOLOGY | Facility: HOSPITAL | Age: 63
Discharge: HOME | End: 2023-09-06
Attending: SURGERY
Payer: COMMERCIAL

## 2023-09-06 ENCOUNTER — TELEPHONE (OUTPATIENT)
Dept: TRANSPLANT | Facility: CLINIC | Age: 63
End: 2023-09-06
Payer: COMMERCIAL

## 2023-09-06 ENCOUNTER — PATIENT MESSAGE (OUTPATIENT)
Dept: TRANSPLANT | Facility: CLINIC | Age: 63
End: 2023-09-06
Payer: COMMERCIAL

## 2023-09-06 DIAGNOSIS — Z94.4 LIVER REPLACED BY TRANSPLANT: ICD-10-CM

## 2023-09-06 DIAGNOSIS — E11.65 TYPE 2 DIABETES MELLITUS WITH HYPERGLYCEMIA, WITH LONG-TERM CURRENT USE OF INSULIN: ICD-10-CM

## 2023-09-06 DIAGNOSIS — R73.9 HYPERGLYCEMIA: ICD-10-CM

## 2023-09-06 DIAGNOSIS — Z94.4 S/P LIVER TRANSPLANT: Primary | ICD-10-CM

## 2023-09-06 DIAGNOSIS — Z94.4 STATUS POST LIVER TRANSPLANT: ICD-10-CM

## 2023-09-06 DIAGNOSIS — Z79.4 TYPE 2 DIABETES MELLITUS WITH HYPERGLYCEMIA, WITH LONG-TERM CURRENT USE OF INSULIN: ICD-10-CM

## 2023-09-06 PROCEDURE — 76705 US DOPPLER LIVER TRANSPLANT POST (XPD): ICD-10-PCS | Mod: 26,59,, | Performed by: STUDENT IN AN ORGANIZED HEALTH CARE EDUCATION/TRAINING PROGRAM

## 2023-09-06 PROCEDURE — 76705 ECHO EXAM OF ABDOMEN: CPT | Mod: 59,TC

## 2023-09-06 PROCEDURE — 93976 VASCULAR STUDY: CPT | Mod: 26,,, | Performed by: STUDENT IN AN ORGANIZED HEALTH CARE EDUCATION/TRAINING PROGRAM

## 2023-09-06 PROCEDURE — 76705 ECHO EXAM OF ABDOMEN: CPT | Mod: 26,59,, | Performed by: STUDENT IN AN ORGANIZED HEALTH CARE EDUCATION/TRAINING PROGRAM

## 2023-09-06 PROCEDURE — 93976 US DOPPLER LIVER TRANSPLANT POST (XPD): ICD-10-PCS | Mod: 26,,, | Performed by: STUDENT IN AN ORGANIZED HEALTH CARE EDUCATION/TRAINING PROGRAM

## 2023-09-06 PROCEDURE — 93976 VASCULAR STUDY: CPT | Mod: TC

## 2023-09-06 RX ORDER — INSULIN GLARGINE 100 [IU]/ML
25 INJECTION, SOLUTION SUBCUTANEOUS DAILY
Qty: 15 ML | Refills: 11 | Status: SHIPPED | OUTPATIENT
Start: 2023-09-06 | End: 2023-09-12 | Stop reason: SDUPTHER

## 2023-09-06 RX ORDER — INSULIN LISPRO 100 [IU]/ML
INJECTION, SOLUTION INTRAVENOUS; SUBCUTANEOUS
Qty: 30 ML | Refills: 11 | Status: ON HOLD | OUTPATIENT
Start: 2023-09-06 | End: 2023-10-03 | Stop reason: SDUPTHER

## 2023-09-06 NOTE — PROGRESS NOTES
Spoke with pt's wife.  BG remains above goal in 200s-300s throughout the day.  Will increase regimen to the below.  Lantus 25 units daily and Humalog 15 units + SSI.  Prednisone dose decreased today.  Will also setup appointment for f/u with [facility].

## 2023-09-06 NOTE — TELEPHONE ENCOUNTER
Notified pt via [employer]  ----- Message from Lee Gill MD sent at 9/6/2023  1:51 PM CDT -----  Repeat ultrasound in two weeks

## 2023-09-07 ENCOUNTER — LAB VISIT (OUTPATIENT)
Dept: LAB | Facility: HOSPITAL | Age: 63
End: 2023-09-07
Attending: SURGERY
Payer: COMMERCIAL

## 2023-09-07 DIAGNOSIS — Z94.4 S/P LIVER TRANSPLANT: Primary | ICD-10-CM

## 2023-09-07 DIAGNOSIS — Z94.4 S/P LIVER TRANSPLANT: ICD-10-CM

## 2023-09-07 DIAGNOSIS — Z94.4 LIVER REPLACED BY TRANSPLANT: ICD-10-CM

## 2023-09-07 LAB
ALBUMIN SERPL BCP-MCNC: 3.5 G/DL (ref 3.5–5.2)
ALP SERPL-CCNC: 133 U/L (ref 55–135)
ALT SERPL W/O P-5'-P-CCNC: 44 U/L (ref 10–44)
ANION GAP SERPL CALC-SCNC: 13 MMOL/L (ref 8–16)
AST SERPL-CCNC: 28 U/L (ref 10–40)
BASOPHILS # BLD AUTO: 0.06 K/UL (ref 0–0.2)
BASOPHILS NFR BLD: 1.4 % (ref 0–1.9)
BILIRUB DIRECT SERPL-MCNC: 0.5 MG/DL (ref 0.1–0.3)
BILIRUB SERPL-MCNC: 1.3 MG/DL (ref 0.1–1)
BUN SERPL-MCNC: 16 MG/DL (ref 8–23)
CALCIUM SERPL-MCNC: 8.9 MG/DL (ref 8.7–10.5)
CHLORIDE SERPL-SCNC: 103 MMOL/L (ref 95–110)
CO2 SERPL-SCNC: 24 MMOL/L (ref 23–29)
CREAT SERPL-MCNC: 0.8 MG/DL (ref 0.5–1.4)
DIFFERENTIAL METHOD: ABNORMAL
EOSINOPHIL # BLD AUTO: 0.2 K/UL (ref 0–0.5)
EOSINOPHIL NFR BLD: 5.1 % (ref 0–8)
ERYTHROCYTE [DISTWIDTH] IN BLOOD BY AUTOMATED COUNT: 14.7 % (ref 11.5–14.5)
EST. GFR  (NO RACE VARIABLE): >60 ML/MIN/1.73 M^2
GLUCOSE SERPL-MCNC: 211 MG/DL (ref 70–110)
HCT VFR BLD AUTO: 39.1 % (ref 40–54)
HGB BLD-MCNC: 12.3 G/DL (ref 14–18)
IMM GRANULOCYTES # BLD AUTO: 0.02 K/UL (ref 0–0.04)
IMM GRANULOCYTES NFR BLD AUTO: 0.5 % (ref 0–0.5)
LYMPHOCYTES # BLD AUTO: 0.4 K/UL (ref 1–4.8)
LYMPHOCYTES NFR BLD: 9.4 % (ref 18–48)
MAGNESIUM SERPL-MCNC: 1.6 MG/DL (ref 1.6–2.6)
MCH RBC QN AUTO: 29.9 PG (ref 27–31)
MCHC RBC AUTO-ENTMCNC: 31.5 G/DL (ref 32–36)
MCV RBC AUTO: 95 FL (ref 82–98)
MONOCYTES # BLD AUTO: 0.4 K/UL (ref 0.3–1)
MONOCYTES NFR BLD: 9.2 % (ref 4–15)
NEUTROPHILS # BLD AUTO: 3.1 K/UL (ref 1.8–7.7)
NEUTROPHILS NFR BLD: 74.4 % (ref 38–73)
NRBC BLD-RTO: 0 /100 WBC
PLATELET # BLD AUTO: 231 K/UL (ref 150–450)
PMV BLD AUTO: 9.8 FL (ref 9.2–12.9)
POTASSIUM SERPL-SCNC: 4.5 MMOL/L (ref 3.5–5.1)
PROT SERPL-MCNC: 6.4 G/DL (ref 6–8.4)
RBC # BLD AUTO: 4.12 M/UL (ref 4.6–6.2)
SODIUM SERPL-SCNC: 140 MMOL/L (ref 136–145)
WBC # BLD AUTO: 4.14 K/UL (ref 3.9–12.7)

## 2023-09-07 PROCEDURE — 80197 ASSAY OF TACROLIMUS: CPT | Performed by: SURGERY

## 2023-09-07 PROCEDURE — 85025 COMPLETE CBC W/AUTO DIFF WBC: CPT | Performed by: SURGERY

## 2023-09-07 PROCEDURE — 83735 ASSAY OF MAGNESIUM: CPT | Performed by: SURGERY

## 2023-09-07 PROCEDURE — 87522 HEPATITIS C REVRS TRNSCRPJ: CPT | Performed by: SURGERY

## 2023-09-07 PROCEDURE — 36415 COLL VENOUS BLD VENIPUNCTURE: CPT | Performed by: SURGERY

## 2023-09-07 PROCEDURE — 87536 HIV-1 QUANT&REVRSE TRNSCRPJ: CPT | Performed by: SURGERY

## 2023-09-07 PROCEDURE — 80053 COMPREHEN METABOLIC PANEL: CPT | Performed by: SURGERY

## 2023-09-07 PROCEDURE — 87517 HEPATITIS B DNA QUANT: CPT | Performed by: SURGERY

## 2023-09-07 PROCEDURE — 82248 BILIRUBIN DIRECT: CPT | Performed by: SURGERY

## 2023-09-08 ENCOUNTER — TELEPHONE (OUTPATIENT)
Dept: TRANSPLANT | Facility: CLINIC | Age: 63
End: 2023-09-08
Payer: COMMERCIAL

## 2023-09-08 ENCOUNTER — PATIENT MESSAGE (OUTPATIENT)
Dept: TRANSPLANT | Facility: CLINIC | Age: 63
End: 2023-09-08
Payer: COMMERCIAL

## 2023-09-08 LAB
HCV RNA SERPL QL NAA+PROBE: NOT DETECTED
HCV RNA SPEC NAA+PROBE-ACNC: NOT DETECTED IU/ML
HIV1 RNA # SERPL NAA+PROBE: NOT DETECTED COPIES/ML
HIV1 RNA SERPL QL NAA+PROBE: NOT DETECTED
TACROLIMUS BLD-MCNC: 8.1 NG/ML (ref 5–15)

## 2023-09-08 NOTE — TELEPHONE ENCOUNTER
Pt notified via portal of stable labs and that no medication changes are needed. Repeat labs due 9/11/23 per protocol.   ----- Message from Lee Gill MD sent at 9/8/2023 11:11 AM CDT -----  Results reviewed, no immediate action required.

## 2023-09-09 ENCOUNTER — NURSE TRIAGE (OUTPATIENT)
Dept: ADMINISTRATIVE | Facility: CLINIC | Age: 63
End: 2023-09-09
Payer: COMMERCIAL

## 2023-09-09 NOTE — TELEPHONE ENCOUNTER
Liver Transplant pt 8/10/23.    Wife Leida  states that she completed a Questionnaire and was advised to call provider/coordinator based on answers. Leida states that she does not remember answers to questionnaire. Denies any s/s at this time. Dr. Rico  notified per protocol. Provider does not have questionnaire; advised to f/u on Monday and to callback if develop any symptoms. Leida made aware. Verbalized understanding. Encounter routed to provider for f/u on Monday.      Reason for Disposition   [1] Follow-up call from patient regarding patient's clinical status AND [2] information urgent    Protocols used: PCP Call - No Triage-A-

## 2023-09-11 ENCOUNTER — TELEPHONE (OUTPATIENT)
Dept: ENDOCRINOLOGY | Facility: CLINIC | Age: 63
End: 2023-09-11
Payer: COMMERCIAL

## 2023-09-11 ENCOUNTER — LAB VISIT (OUTPATIENT)
Dept: LAB | Facility: HOSPITAL | Age: 63
End: 2023-09-11
Attending: SURGERY
Payer: COMMERCIAL

## 2023-09-11 DIAGNOSIS — Z94.4 LIVER REPLACED BY TRANSPLANT: ICD-10-CM

## 2023-09-11 LAB
ALBUMIN SERPL BCP-MCNC: 3.6 G/DL (ref 3.5–5.2)
ALP SERPL-CCNC: 129 U/L (ref 55–135)
ALT SERPL W/O P-5'-P-CCNC: 35 U/L (ref 10–44)
ANION GAP SERPL CALC-SCNC: 10 MMOL/L (ref 8–16)
AST SERPL-CCNC: 21 U/L (ref 10–40)
BASOPHILS # BLD AUTO: 0.04 K/UL (ref 0–0.2)
BASOPHILS NFR BLD: 0.9 % (ref 0–1.9)
BILIRUB DIRECT SERPL-MCNC: 0.7 MG/DL (ref 0.1–0.3)
BILIRUB SERPL-MCNC: 1.7 MG/DL (ref 0.1–1)
BUN SERPL-MCNC: 10 MG/DL (ref 8–23)
CALCIUM SERPL-MCNC: 9.3 MG/DL (ref 8.7–10.5)
CHLORIDE SERPL-SCNC: 102 MMOL/L (ref 95–110)
CO2 SERPL-SCNC: 27 MMOL/L (ref 23–29)
CREAT SERPL-MCNC: 0.8 MG/DL (ref 0.5–1.4)
DIFFERENTIAL METHOD: ABNORMAL
EOSINOPHIL # BLD AUTO: 0.1 K/UL (ref 0–0.5)
EOSINOPHIL NFR BLD: 2.6 % (ref 0–8)
ERYTHROCYTE [DISTWIDTH] IN BLOOD BY AUTOMATED COUNT: 14.7 % (ref 11.5–14.5)
EST. GFR  (NO RACE VARIABLE): >60 ML/MIN/1.73 M^2
GLUCOSE SERPL-MCNC: 208 MG/DL (ref 70–110)
HBV DNA SERPL NAA+PROBE-ACNC: <10 IU/ML
HBV DNA SERPL NAA+PROBE-LOG IU: <1 LOG (10) IU/ML
HBV DNA SERPL QL NAA+PROBE: NOT DETECTED
HCT VFR BLD AUTO: 40.9 % (ref 40–54)
HGB BLD-MCNC: 12.8 G/DL (ref 14–18)
IMM GRANULOCYTES # BLD AUTO: 0.02 K/UL (ref 0–0.04)
IMM GRANULOCYTES NFR BLD AUTO: 0.4 % (ref 0–0.5)
LYMPHOCYTES # BLD AUTO: 0.5 K/UL (ref 1–4.8)
LYMPHOCYTES NFR BLD: 9.6 % (ref 18–48)
MAGNESIUM SERPL-MCNC: 1.5 MG/DL (ref 1.6–2.6)
MCH RBC QN AUTO: 29.7 PG (ref 27–31)
MCHC RBC AUTO-ENTMCNC: 31.3 G/DL (ref 32–36)
MCV RBC AUTO: 95 FL (ref 82–98)
MONOCYTES # BLD AUTO: 0.6 K/UL (ref 0.3–1)
MONOCYTES NFR BLD: 13.2 % (ref 4–15)
NEUTROPHILS # BLD AUTO: 3.5 K/UL (ref 1.8–7.7)
NEUTROPHILS NFR BLD: 73.3 % (ref 38–73)
NRBC BLD-RTO: 0 /100 WBC
PLATELET # BLD AUTO: 188 K/UL (ref 150–450)
PMV BLD AUTO: 9.6 FL (ref 9.2–12.9)
POTASSIUM SERPL-SCNC: 4.2 MMOL/L (ref 3.5–5.1)
PROT SERPL-MCNC: 6.6 G/DL (ref 6–8.4)
RBC # BLD AUTO: 4.31 M/UL (ref 4.6–6.2)
SODIUM SERPL-SCNC: 139 MMOL/L (ref 136–145)
WBC # BLD AUTO: 4.7 K/UL (ref 3.9–12.7)

## 2023-09-11 PROCEDURE — 36415 COLL VENOUS BLD VENIPUNCTURE: CPT | Performed by: SURGERY

## 2023-09-11 PROCEDURE — 80197 ASSAY OF TACROLIMUS: CPT | Performed by: SURGERY

## 2023-09-11 PROCEDURE — 83735 ASSAY OF MAGNESIUM: CPT | Performed by: SURGERY

## 2023-09-11 PROCEDURE — 82248 BILIRUBIN DIRECT: CPT | Performed by: SURGERY

## 2023-09-11 PROCEDURE — 85025 COMPLETE CBC W/AUTO DIFF WBC: CPT | Performed by: SURGERY

## 2023-09-11 PROCEDURE — 80053 COMPREHEN METABOLIC PANEL: CPT | Performed by: SURGERY

## 2023-09-12 ENCOUNTER — OFFICE VISIT (OUTPATIENT)
Dept: ENDOCRINOLOGY | Facility: CLINIC | Age: 63
End: 2023-09-12
Payer: COMMERCIAL

## 2023-09-12 ENCOUNTER — PATIENT MESSAGE (OUTPATIENT)
Dept: ENDOCRINOLOGY | Facility: CLINIC | Age: 63
End: 2023-09-12

## 2023-09-12 DIAGNOSIS — Z94.4 STATUS POST LIVER TRANSPLANT: Primary | ICD-10-CM

## 2023-09-12 DIAGNOSIS — E11.69 TYPE 2 DIABETES MELLITUS WITH OTHER SPECIFIED COMPLICATION, WITHOUT LONG-TERM CURRENT USE OF INSULIN: ICD-10-CM

## 2023-09-12 LAB — TACROLIMUS BLD-MCNC: 10.3 NG/ML (ref 5–15)

## 2023-09-12 PROCEDURE — 99214 PR OFFICE/OUTPT VISIT, EST, LEVL IV, 30-39 MIN: ICD-10-PCS | Mod: 95,,, | Performed by: NURSE PRACTITIONER

## 2023-09-12 PROCEDURE — 99214 OFFICE O/P EST MOD 30 MIN: CPT | Mod: 95,,, | Performed by: NURSE PRACTITIONER

## 2023-09-12 RX ORDER — BLOOD-GLUCOSE SENSOR
EACH MISCELLANEOUS
Qty: 2 EACH | Refills: 3 | Status: SHIPPED | OUTPATIENT
Start: 2023-09-12 | End: 2024-02-13

## 2023-09-12 RX ORDER — INSULIN GLARGINE 100 [IU]/ML
28 INJECTION, SOLUTION SUBCUTANEOUS DAILY
Qty: 15 ML | Refills: 11 | Status: ON HOLD | OUTPATIENT
Start: 2023-09-12 | End: 2023-10-03 | Stop reason: SDUPTHER

## 2023-09-12 RX ORDER — TIRZEPATIDE 2.5 MG/.5ML
2.5 INJECTION, SOLUTION SUBCUTANEOUS
Qty: 4 PEN | Refills: 0 | Status: ON HOLD | OUTPATIENT
Start: 2023-09-12 | End: 2023-10-30 | Stop reason: SDUPTHER

## 2023-09-12 NOTE — ASSESSMENT & PLAN NOTE
-- Reviewed goals of therapy are to get the best control we can without hypoglycemia  -- Refer to diabetes education  Consult diabetes education   Check labs   He will stop prednisone tomorrow. Discussed he should need less insulin without steroids. Discussed I would like to introduce other noninsulin medications to reduce risk of hypoglycemia. Will ask transplant if he can start Mounjaro.    I recommend continuous glucose monitor for high and low blood sugar alerts. Will call in [employer] and let me know if not covered. Also, let me know when you apply and I will invite you to clinic.    Medication Changes     Increase Lantus 28 units daily   Decrease Humalog 10 units + SSI isf 25/150  Start Mounjaro 2.5 mg weekly for 4 weeks & then increase to 5 mg weekly. We will re-evaluate your blood sugars in 4 weeks and determine need for increased dose. Return demonstration of how to use pen done in office today. Discussed one pen can stay out of the refrigerator for 21 days. Please take pen out of the refrigerator 10 minutes prior to injection.   Call [phone] for access to savings plan.    Let me know if you have bllod sugar less than 70.  Please send me a log in one to two weeks    Avoid Metformin until he is one year post transplant   Avoid Jardiance until he is one year post transplant   -- Reviewed patient's current insulin regimen. Clarified proper insulin dose and timing in relation to meals, etc. Insulin injection sites and proper rotation instructed.    -- Advised frequent self blood glucose monitoring.  Patient encouraged to document glucose results and bring them to every clinic visit    -- Hypoglycemia precautions discussed. Instructed on precautions before driving.    -- Call for Bg repeatedly < 90 or > 180.   -- Close adherence to lifestyle changes recommended.   -- Periodic follow ups for eye evaluations, foot care and dental care suggested

## 2023-09-12 NOTE — Clinical Note
Good afternoon,  I saw patient today for diabetes management. I am trying to decrease his insulin by introducing Mounjaro to titrate to max dose tolerated. Are you aware of any contraindications to mounjaro at this time? Thanks, Miriam Almaguer

## 2023-09-12 NOTE — PATIENT INSTRUCTIONS
Diabetes    Consult diabetes education   Check labs   He will stop prednisone tomorrow. Discussed he should need less insulin without steroids. Discussed I would like to introduce other noninsulin medications to reduce risk of hypoglycemia. Will ask transplant if he can start Mounjaro.    I recommend continuous glucose monitor for high and low blood sugar alerts. Will call in Tenantrex 3 and let me know if not covered. Also, let me know when you apply and I will invite you to clinic.    Medication Changes     Increase Lantus 28 units daily   Decrease Humalog 10 units + SSI isf 25/150  Start Mounjaro 2.5 mg weekly for 4 weeks & then increase to 5 mg weekly. We will re-evaluate your blood sugars in 4 weeks and determine need for increased dose. Return demonstration of how to use pen done in office today. Discussed one pen can stay out of the refrigerator for 21 days. Please take pen out of the refrigerator 10 minutes prior to injection.   Call [phone] for access to savings plan.    Let me know if you have bllod sugar less than 70.  Please send me a log in one to two weeks    Avoid Metformin until he is one year post transplant   Avoid Jardiance until he is one year post transplant     Goal blood sugar is  fasting   Goal blood sugar 1 hour after meal is less than 180  Goal blood sugar 2 hour after meal is less than 140    Hypoglycemia - Low Blood Sugar    What can you do?  Rule of 15:   Test your blood sugar  If glucose is between 50-70 mg/dL then ingest 15 grams of fast-acting carbs  If glucose is less than 50 mg/dL then ingest 30 grams of fast-acting carbs  Ingest 15 grams of fast-acting carbohydrate - such as:   3-4 glucose tablets  4 oz juice  ½ can regular soda pop  15 skittles or mini jelly beans   Re-check your blood sugar in 15 minutes. If its less than 70mg/dl, repeat steps 2 and 3.  If your next meal is more than 1 hour away, eat an additional 15 grams of carbohydrate and 1 ounce of protein (examples  include crackers with cheese or one-half of a sandwich with peanut butter). It is important not to eat too much because this can raise your blood sugar above the target level.      After your blood sugar has normalized, think about why you went low. If you notice a pattern of low blood sugars, contact your Diabetes Team. We may need to adjust your medication.      Snacks can be an important part of a balanced, healthy meal plan. They allow you to eat more frequently, feeling full and satisfied throughout the day. Also, they allow you to spread carbohydrates evenly, which may stabilize blood sugars.  Plus, snacks are enjoyable!     The amount of carbohydrate needed at snacks varies. Generally, about 15-30 grams of carbohydrate per snack is recommended.  Below you will find some tasty treats.       0-5 gm carb  Crystal Light  Vitamin Water Zero  Herbal tea, unsweetened  2 tsp peanut butter on celery  1./2 cup sugar-free jell-o  1 sugar-free popsicle  ¼ cup blueberries  8oz Blue Makayla unsweetened almond milk  5 baby carrots & celery sticks, cucumbers, bell peppers dipped in ¼ cup salsa, 2Tbsp light ranch dressing or 2Tbsp plain Greek yogurt  10 Goldfish crackers  ½ oz low-fat cheese or string cheese  1 closed handful of nuts, unsalted  1 Tbsp of sunflower seeds, unsalted  1 cup Smart Pop popcorn  1 whole grain brown rice cake        15 gm carb  1 small piece of fruit or ½ banana or 1/2 cup lite canned fruit  3 katlyn cracker squares  3 cups Smart Pop popcorn, top spray butter, Richard lite salt or cinnamon and Truvia  5 Vanilla Wafers  ½ cup low fat, no added sugar ice cream or frozen yogurt (Blue bell, Blue Bunny, Weight Watchers, Skinny Cow)  ½ turkey, ham, or chicken sandwich  ½ c fruit with ½ c Cottage cheese  4-6 unsalted wheat crackers with 1 oz low fat cheese or 1 tbsp peanut butter   30-45 goldfish crackers (depending on flavor)   7-8 Taoist mini brown rice cakes (caramel, apple cinnamon, chocolate)   12  Protestant mini brown rice cakes (cheddar, bbq, ranch)   1/3 cup hummus dip with raw veg  1/2 whole wheat felicia, 1Tbsp hummus  Mini Pizza (1/2 whole wheat English muffin, low-fat  cheese, tomato sauce)  100 calorie snack pack (Oreo, Chips Ahoy, Ritz Mix, Baked Cheetos)  4-6 oz. light or Greek Style yogurt (Chobani, Yoplait, Okios, [place])  ½ cup sugar-free pudding    6 in. wheat tortilla or felicia oven toasted chips (topped with spray butter flavoring, cinnamon, Truvia OR spray butter, garlic powder, chili powder)   18 BBQ Popchips (available at [employer], [employer], [employer])       Eating the Right Number of Calories (0181-6982 Guidelines)    Calories are a measure of the energy you get from food. If you eat more calories than you use, you will gain weight. If you eat fewer calories than you use, you will lose weight. Below are tables that give the number of calories needed each day. Look for your gender, age, and activity level. If you stick to this number, you should neither gain nor lose weight. Note that this is an estimated number of calories.* Your exact number may differ.    Women  Age in years Low activity level (calories/day) Moderate activity level (calories/day) High activity level (calories/day)   19 to 30 1,800-2,000 2,000-2,200 2,400   31 to 50 1,800 2,000 2,200   51 and older 1,600 1,800 2,000-2,200      Men  Age in years Low activity level  (calories/day) Moderate activity level (calories/day) High activity level (calories/day)   19 to 30 2,400-2,600 2,600-2,800 3,000   31 to 50 2,200-2,400 2,400-2,600 2,800-3,000   51 and older 2,000-2,200 2,200-2,400 2,400-2,800     Activity levels defined  Low. Only light physical activity such as that done during typical daily life.  Moderate. Light physical activity done during typical daily life AND physical activity equal to walking about 1.5 to 3 miles a day at 3 to 4 miles per hour.  High. Light physical activity done during typical daily life AND physical  activity equal to walking more than 3 miles a day at 3 to 4 miles per hour.  *From Dietary Guidelines for Americans, 9408-7819, [place] Department of Health and Human Services.    Eat less fat  A gram of fat has almost 2.5 times the calories of a gram of protein or carbohydrates. Try to balance your food choices so that only 20% to 35% of your calories comes from total fat. This means an average of 2½ to 3½ grams of fat for each 100 calories you eat.    Eat more fiber  High-fiber foods are digested more slowly than low-fiber foods, so you feel full longer. Try to get at least 25 grams of fiber each day for a 2000 calorie diet. Foods high in fiber include:  Vegetables and fruits  Whole-grain or bran breads, pastas, and cereals  Legumes (beans) and peas  As you begin to eat more fiber, be sure to drink plenty of water to keep your digestive system working smoothly.    Tips  Do's and don'ts include:   Dont skip meals. This often leads to overeating later on. Its best to spread your eating throughout the day.  Eat a variety of foods, not just a few favorites.  If you find yourself eating when youre not hungry, ask yourself why. Many of us eat when were bored, stressed, or just to be polite. Listen to your body. If youre not hungry, get busy doing something else instead of eating.  Eat slower, shooting for 20 to 30 minutes for each meal. It takes 20 minutes for your stomach to tell your brain that its full. Slow eaters tend to eat less and are still satisfied, while fast eaters may tend to be overeaters.   Pay attention to what you eat. Dont read or watch TV during your meal.     ---------------------------------------------------------------------------------------------------------------------------------------------------    Losing Weight for Heart Health  Excess weight is a major risk factor for heart disease. Losing weight has many benefits including lowering your blood pressure, improving your cholesterol  level, and decreasing your risk for diseases such as diabetes and heart disease. It may help keep your arteries open so that your heart can get the oxygen-rich blood it needs. All in all, losing weight makes you healthier.       Exercise with a friend. When activity is fun, you're more likely to stick with it.     Calories and weight loss  Calories are the fuel your body burns for energy. You get the calories you need from the food you eat. For healthy weight loss, women should eat at least 1,200 calories a day, men at least 1,500.  When you eat more calories than you need, your body stores the extra calories as fat. One pound of fat equals 3,500 calories.  To lose weight, try to reduce your total calorie intake by 500 calories. To do this, eat 250 calories less each day. Add activity to burn the other 250 calories. Walking 2.5 miles burns about 250 calories. Other more intense activities can burn more calories in the time you spend doing them, such as swimming and running. It is important to understand that reducing calorie intake is much more effective at weight loss than is exercise.  Eat a variety of healthy foods to get the nutrients you need.    Tips for losing weight  Drink 8 to 10 glasses of water a day.  Dont skip meals. Instead, eat smaller portions.  Eat your meals earlier in the day.  Cut out sugary drinks such as soda and fruit juices.  Make your later meals lighter than your earlier meals.     What can exercise help?  Blood sugar. Regular exercise improves blood sugar control by helping your body use insulin.  Mental and emotional health. Physical activity relieves stress and helps you sleep better.  Heart health. With regular exercise, you can reduce your risk of heart disease and high blood pressure. You can also improve your cholesterol and triglyceride levels.  Weight. Exercise helps you lose fat, gain muscle, and control your weight.  Health of blood vessels and nerves. Activity helps lower blood  sugar. This helps prevent damage to blood vessels and nerves that can cause problems with your brain, eyes, feet, and legs.  Finances. If you manage your blood sugar, you may spend less on medical care.    2 types of exercise  Two types of exercise help your body use blood sugar. Experts advise both types of exercise for people with diabetes:  Aerobic exercise. This is a rhythmic, repeated, continued movement of large muscle groups for at least 10 minutes at a time. You should do this about 30 minutes a day on most days of the week. Examples include walking, bicycling, jogging, swimming, water aerobics, and many sports.  Resistance exercise (strength training). This type of exercise uses muscles to move weight or work against resistance. You can do it with free weights, machines, resistance tubing, or your own body weight. Adults with diabetes should aim for 2 to 3 sessions of resistance exercise each week. Its best to skip a day in between.    A goal to shoot for  Your main goal is to become more active. Even a little bit helps. Choose an activity that you like. Walking is one great form of exercise that everyone can do. Talk to your healthcare provider about any limits you may have before starting with an exercise program. Then aim for 150 minutes a week of physical activity. Dont let more than 2 days go by without exercise. When you are sitting for long periods of time, get up for short sessions of light activity every 30 minutes.    Getting activity into your day  Being more active doesnt have to be hard work. Try these to get more activity into your day:  Take the stairs instead of the elevator  Garden, do housework, and yard work  Choose a parking space farther from the store  Walk to talk to a co-worker instead of calling  Take a 10-minute walk around the block at lunch  Walk to a bus stop a little farther from your home or office  Walk the dog after dinner      ---------------------------------------------------------------------------------------------------------------------------------------------------    Reading Food Labels  Look for the Nutrition Facts label on packaged foods. Reading labels is a big step toward eating healthier. The tips below help you know what to look for.    Serving size. Read this closely because the package, jar, or can may contain more than 1 serving. This is how to measure 1 serving of the food in the package. If you eat more than 1 serving, you get more of everything on the label -- including fat, cholesterol, and calories.  Total fat. This tells you how many grams (g) of fat are in 1 serving. Fat is high in calories. A healthy goal is to have less than 25% of your daily calories come from fat.  Saturated fat. This tells you how much saturated fat is in 1 serving. Saturated fat raises your cholesterol the most. Look for foods that have little or no saturated fat.  Trans fat. This tells you how much trans fat is in 1 serving. Even a small amount of trans fat can harm your health. Choose foods that have no trans fat.  Cholesterol. This tells you how much cholesterol is in 1 serving. For many years, it had been recommended to eat less than 300 milligrams (mg) of cholesterol a day. New guidelines have removed this limitation as cholesterol has been recently shown to not raise blood cholesterol levels as significantly as previously thought. However, many foods high in cholesterol are also high in saturated fat. It is recommended to limit saturated fat in your diet.  Calories from fat. This number tells you how many calories from fat are in 1 serving (there are 9 calories per gram of fat). Look for foods with few calories from fat.  % Daily value. The higher the number, the more 1 serving has of that nutrient. Look for foods that have low numbers for total fat, saturated fat, cholesterol, and sodium.  Sodium. This tells you how much sodium  (salt) is in 1 serving. Choose foods with low numbers for sodium.  Dietary fiber. This number tells you how much fiber is in 1 serving. Foods that are high in fiber can help you feel full. They can also be good for your heart and digestion. The recommended daily amount of fiber is 25 grams for women and 38 grams for men. After age 50, your daily fiber needs drop to 21 grams for women and 30 grams for men.       ---------------------------------------------------------------------------------------------------------------------------------------------------    Understanding Carbohydrates, Fats, and Protein  Food is a source of fuel and nourishment for your body. Its also a source of pleasure. Having diabetes doesnt mean you have to eat special foods or give up desserts. Instead, your dietitian can show you how to plan meals to suit your body. To start, learn how different foods affect blood sugar.    Carbohydrates  Carbohydrates are the main source of fuel for the body. Carbohydrates raise blood sugar. Many people think carbohydrates are only found in pasta or bread. But carbohydrates are actually in many kinds of foods:  Sugars occur naturally in foods such as fruit, milk, honey, and molasses. Sugars can also be added to many foods, from cereals and yogurt to candy and desserts. Sugars raise blood sugar.  Starches are found in bread, cereals, pasta, and dried beans. Theyre also found in corn, peas, potatoes, yam, acorn squash, and butternut squash. Starches also raise blood sugar.   Fiber is found in foods such as vegetables, fruits, beans, and whole grains. Unlike other carbs, fiber isnt digested or absorbed. So it doesnt raise blood sugar. In fact, fiber can help keep blood sugar from rising too fast. It also helps keep blood cholesterol at a healthy level.  Did you know?  Even though carbohydrates raise blood sugar, its best to have some in every meal. They are an important part of a healthy diet.      Fat  Fat is an energy source that can be stored until needed. Fat does not raise blood sugar. However, it can raise blood cholesterol, increasing the risk of heart disease. Fat is also high in calories, which can cause weight gain. Not all types of fat are the same.    More Healthy:  Monounsaturated fats are mostly found in vegetable oils, such as olive, canola, and peanut oils. They are also found in avocados and some nuts. Monounsaturated fats are healthy for your heart. Thats because they lower LDL (unhealthy) cholesterol.  Polyunsaturated fats are mostly found in vegetable oils, such as corn, safflower, and soybean oils. They are also found in some seeds, nuts, and fish. Polyunsaturated fats lower LDL (unhealthy) cholesterol. So, choosing them instead of saturated fats is healthy for your heart. Certain unsaturated fats can help lower triglycerides.     Less Healthy:  Saturated fats are found in animal products, such as meat, poultry, whole milk, lard, and butter. Saturated fats raise LDL cholesterol and are not healthy for your heart.  Hydrogenated oils and trans fats are formed when vegetable oils are processed into solid fats. They are found in many processed foods. Hydrogenated oils and trans fats raise LDL cholesterol and lower HDL (healthy) cholesterol. They are not healthy for your heart.    Protein  Protein helps the body build and repair muscle and other tissue. Protein has little or no effect on blood sugar. However, many foods that contain protein also contain saturated fat. By choosing low-fat protein sources, you can get the benefits of protein without the extra fat:  Plant protein is found in dry beans and peas, nuts, and soy products, such as tofu and soymilk. These sources tend to be cholesterol-free and low in saturated fat.  Animal protein is found in fish, poultry, meat, cheese, milk, and eggs. These contain cholesterol and can be high in saturated fat. Aim for lean, lower-fat  choices.    ---------------------------------------------------------------------------------------------------------------------------------------------------    Understanding Carbohydrates    A car needs the right type of fuel to run. And you need the right kind of food to function. To keep your energy level up, your body needs food that has carbohydrates. But carbohydrates raise blood sugar levels higher and faster than other kinds of food. Your dietitian will work with you to figure out the amount of carbohydrates you need.    Starches  Starches are found in grains, some vegetables, and beans. Grain products include bread, pasta, cereal, and tortillas. Starchy vegetables include potatoes, peas, corn, lima beans, yams, and squash. Kidney beans, huerta beans, black beans, garbanzo beans, and lentils also contain starches.    Sugars  Sugars are found naturally in many foods. Or sugar can be added. Foods that contain natural sugar include fruits and fruit juices, dairy products, honey, and molasses. Added sugars are found in most desserts, processed foods, candy, regular soda, and fruit drinks. These are very helpful for treating low blood sugar, or hypoglycemia. They provide sugar quickly. Try to keep at least 15 to 20 grams of these simple sugars with you at all times. Eat this if you begin to have low blood sugar symptoms.    Fiber  Fiber comes from plant foods. Most fiber isnt digested by the body. Instead of raising blood sugar levels like other carbohydrates, it actually stops blood sugar from rising too fast. Fiber is found in fruits, vegetables, whole grains, beans, peas, and many nuts.    Carb counting  Keep track of the amount of carbohydrates you eat. This can help you keep the right balance of physical activity and medicine. The amount of carbohydrates needed will vary for each person. It depends on many things such as your health, the medicines you take, and how active you are. Your healthcare team will  help you figure out the right amount of carbohydrates for you. You may start with around 45 to 60 grams of carbohydrate per meal, depending on your situation. Carb counting is a system that helps you keep track of the carbohydrates you eat at each meal.  Carbohydrates come from a variety of foods. These include grains, starchy vegetables, fruit, milk, beans, and snack foods. You can either count carbohydrate grams or carbohydrate servings. When you count carbohydrate servings, 1 carbohydrate serving = 15 grams of carbohydrates.  Here are some examples of foods containing about 15 grams of carbohydrates (1 serving of carbohydrates):  1/2 cup of canned or frozen fruit  A small piece of fresh fruit (4 ounces)  1 slice of bread  1/2 cup of oatmeal  1/3 cup of rice  4 to 6 crackers  1/2 English muffin  1/2 cup of black beans  1/4 of a large baked potato (3 ounces)  2/3 cup of plain fat-free yogurt  1 cup of soup  1/2 cup of casserole  6 chicken nuggets  2-inch-square brownie or cake without frosting  2 small cookies  1/2 cup of ice cream or sherbet    Carb counting is easier when food labels are available. Look at the label to see how many grams of total carbohydrates the food contains. Then you can figure out how much you should eat.  Two very important lines to look at on the label are the serving size and the total carbohydrate amount. Here are some tips for using food labels to count your carbohydrate intake:  Check the serving size. The information on the label is based on that serving size. If you eat more than the listed serving size, you may have to double or triple the other information on the label.   Check the total grams of carbohydrates. Total carbohydrate from the label includes sugar, starch, and fiber. Be sure to use the total carbohydrate number and not sugar alone.  Know how many grams of carbohydrates you can have.  Be familiar with the matching portion sizes.  Compare labels. Compare the labels of  different products, looking at serving sizes and total carbohydrates to find the products that work best for you.   Don't forget protein and fat. With all the focus on carb counting, it might be easy to forget protein and fat in your meals. Don't forget to include sources of protein and healthy fat to balance your meals.  Its also important to be consistent with the amount and time you eat when taking a fixed dose of diabetes medicine. Work with your healthcare provider or dietitian if you need additional help. He or she can help you keep track of your carbohydrate intake. He or she can also help you figure out how many grams of carbohydrates you should have.      ---------------------------------------------------------------------------------------------------------------------------------------------------    Diabetes: Learning About Serving and Portion Sizes     A good rule of thumb: Devote half your plate to vegetables and green salad. Split the other half between protein and starchy carbohydrates. Fruit makes a good dessert.     Servings and portions. Whats the difference? These terms can be very confusing. But learning to measure serving sizes can help you figure out how many carbohydrates (carbs) and other foods you eat each day. They are also powerful tools for managing your weight.    Servings and portions  Many different words are used to describe amounts of food. If your health care provider uses a term youre not sure of, dont be afraid to ask. It helps to know the difference between servings and portions:  A serving size is a fixed size. Food producers use this term to describe their products. For example, the label on a cereal box could say that 1 cup of dry cereal = 1 serving.  A portion (also called a helping) is how much you eat or how much you put on your plate at a meal. For example, you might eat 2 cups of cereal at breakfast.    Using serving information  The portion you choose to eat  "(such as 2 cups of cereal) may be more than one serving as listed on the food label (such as 1 cup of cereal). Thats why it helps to measure or weigh the food you eat. Because the food label values are based on servings, youll need to know how many servings you eat at one sitting.     Ounces: 2 to 3 ounces is about the size of your palm.       1 Cup: 1 cup (or a medium-sized piece) is about the size of your fist.       1/2 Cup: 1/2 cup is about the size of your cupped hand.      One tablespoon is about the size of your thumb.  One teaspoon is about the size of the tip of your thumb.    Keeping track of serving sizes  When youre planning for a snack or a meal, keep servings in mind. If you dont have measuring cups or a scale handy, there are ways to eyeball serving sizes, such as comparing your food to the size of your hand (see pictures above).    Managing portion sizes  If your weight is a concern, reducing your portions can help. You can eat more than one serving of a food at once. But to keep from eating too much at one meal, learn how to manage your portions. A portion is the amount of each type of food on your plate. See the plate diagram for an example of balanced portions.    ---------------------------------------------------------------------------------------------------------------------------------------------------    Diabetes: Shopping for and Preparing Meals    Having diabetes doesnt mean you have to shop in a special aisle or look for special foods. But you will need to make choices. By comparing items and reading food labels, you can find the healthiest foods for you and your family.  Comparing items  When you shop, compare items to find the best ones for your needs. Keep these facts in mind:  No sugar added does not mean a product is sugar-free.  "Sugar-free" means less than 1/2 gram (g) of sugar per serving.  Fat free means less than 1/2 g of fat per serving. This does not necessarily " mean the product is low in calories.  Low fat means 3 g fat or less per serving. Reduced fat or less fat means 25% less fat than the regular version. Some of this fat may be saturated or trans fat. And calories per serving may be similar to the regular version.    Making small changes  Dont try to change all of your eating habits at once. Here are some ideas to start with:  Try fat-free or low-fat cheese, milk, and yogurt. Also try leaner cuts of meat. This will help you cut down on saturated fat.  Try whole-grain breads, brown rice, and whole-wheat pasta.  Load up on fresh or frozen vegetables. If you buy canned, choose low-sodium vegetables.  Avoid processed foods as much as possible. They tend to be low in fiber and high in trans fats and sodium.  Try tofu, soymilk, or meat substitutes.?They can help you cut cholesterol and saturated fat out of your diet.    Learning to read food labels  To find healthy foods that help you control blood sugar, learn how to read food labels. Look for the Nutrition Facts label on packaged foods. It will tell you how much carbohydrate, sugar, fat, and fiber is in each serving. Then, you can decide whether or not the food fits into your meal plan.    Using the food label  So, once you have the food label, what do you do with it? The food label helps in many ways. Use it to:  Compare items and decide which is the best for your health needs.  Track the number of carbohydrates in your portions.  Figure out how many servings of a food you can have and still stay within the number of carbohydrates for that meal.    Planning meals  For good blood sugar control, plan what and when youll eat. Start by creating a meal plan that includes all the food groups. Then, time your meals to help keep your blood sugar level steady. You may need to adjust your plan for special situations.    Eat from all the food groups  The basis of a healthy meal plan is variety (eating many different types of  foods). Look for lean meats, fresh fruits and vegetables, whole grains, and low-fat or non-fat dairy products. Eating a wide variety of foods provides the nutrients your body needs. It can also keep you from getting bored with your meal plan.    Reduce liquid sugars  Extra calories from sodas, sports drinks, and fruit drinks make it hard to keep blood sugar in range. Cut as many liquid sugars from your meal plan as you can. This includes most fruit juices, which are often high in natural or added sugar. Instead, drink plenty of water and other sugar-free beverages.    Eat less fat  If you need to lose some weight, try to reduce the amount of fat in your diet. This can also help lower your cholesterol level to keep blood vessels healthier. Cut fat by using only small amounts of liquid oil for cooking. Read food labels carefully to avoid foods with unhealthy trans fats.    Timing your meals  When it comes to blood sugar control, when you eat is as important as what you eat. You may need to eat several small meals spaced evenly throughout the day to stay in your target range. So dont skip breakfast or wait until late in the day to get most of your calories. Doing so can cause your blood sugar to rise too high or fall too low.    Cooking wisely  Broil, steam, bake, or grill meats and vegetables, instead of frying.  Instead of cream-based sauces or sugary glazes, flavor foods with vegetable purée, lemon or lime juice, or herb seasonings.  Remove skin from chicken and turkey before serving.  Look in cookbooks for easy, low-fat, low-sugar recipes. When making your usual recipes, cut sugar by 1/2 and fat by 1/3.      ---------------------------------------------------------------------------------------------------------------------------------------------------    Healthy Meals for Diabetes    Ask your healthcare team to help you make a meal plan that fits your needs. Your meal plan tells you when to eat your meals and  snacks, what kinds of foods to eat, and how much of each food to eat. You dont have to give up all the foods you like. But you do need to follow some guidelines.  Choose healthy carbohydrates  Starches, sugars, and fiber are all types of carbohydrates. Fiber can help lower your cholesterol and triglycerides. Fiber is also healthy for your heart. You should have 20 to 35 grams of total fiber each day. Fiber-rich foods include:  Whole-grain breads and cereals  Bulgur wheat  Brown rice     Whole-wheat pasta  Fruits and vegetables  Dry beans, and peas   Keep track of the amount of carbohydrates you eat. This can help you keep the right balance of physical activity and medicine. The amount of carbohydrates needed will vary for each person. It depends on many things such as your health, the medicines you take, and how active you are. Your healthcare team will help you figure out the right amount of carbohydrates for you. You may start with around 45 to 60 grams of carbohydrates per meal, depending on your situation.   Here are some examples of foods containing about 15 grams of carbohydrates (1 serving of carbohydrates):  1/2 cup of canned or frozen fruit  A small piece of fresh fruit (4 ounces)  1 slice of bread  1/2 cup of oatmeal  1/3 cup of rice  4 to 6 crackers  1/2 English muffin  1/2 cup of black beans  1/4 of a large baked potato (3 ounces)  2/3 cup of plain fat-free yogurt  1 cup of soup  1/2 cup of casserole  6 chicken nuggets  2-inch-square brownie or cake without frosting  2 small cookies  1/2 cup of ice cream or sherbet    Choose healthy protein foods  Eating protein that is low in fat can help you control your weight. It also helps keep your heart healthy. Low-fat protein foods include:  Fish  Plant proteins, such as dry beans and peas, nuts, and soy products like tofu and soymilk  Lean meat with all visible fat removed  Poultry with the skin removed  Low-fat or nonfat milk, cheese, and yogurt    Limit  unhealthy fats and sugar  Saturated and trans fats are unhealthy for your heart. They raise LDL (bad) cholesterol. Fat is also high in calories, so it can make you gain weight. To cut down on unhealthy fats and sugar, limit these foods:  Butter or margarine  Palm and palm kernel oils and coconut oil  Cream  Cheese  [place]awa  Lunch meats     Ice cream  Sweet bakery goods such as pies, muffins, and donuts  Jams and jellies  Candy bars  Regular sodas     How much to eat  The amount of food you eat affects your blood sugar. It also affects your weight. Your healthcare team will tell you how much of each type of food you should eat.  Use measuring cups and spoons and a food scale to measure serving sizes.  Learn what a correct serving size looks like on your plate. This will help when you are away from home and cant measure your servings.  Eat only the number of servings given on your meal plan for each food. Dont take seconds.  Learn to read food labels. Be sure to look at serving size, total carbohydrates, fiber, calories, sugar, and saturated and trans fats. Look for healthier alternatives to foods that have added sugar.  Plan ahead for parties so you can still have a good time without going overboard with unhealthy food choices. Set a good example yourself by bringing a healthy dish to pot lucks.     Choose healthy snacks  When it comes to snacks, we usually think about foods with added sugar and fats. But there are many other options for healthier snack choices. Here are a few snack ideas to choose from:  Snacks with less than 5 grams of carbohydrates  1 piece of string cheese  3 celery sticks plus 1 tablespoon of peanut butter  5 cherry tomatoes plus 1 tablespoon of ranch dressing  1 hard-boiled egg  1/4 cup of fresh blueberries   5 baby carrots  1 cup of light popcorn  1/2 cup of sugar-free gelatin  15 almonds  Snacks with about 10 to 20 grams of carbohydrates  1/3 cup of hummus plus 1 cup of fresh cut nonstarchy  vegetables (carrots, green peppers, broccoli, celery, or a combination)  1/2 cup of fresh or canned fruit plus 1/4 cup of cottage cheese  1/2 cup of tuna salad with 4 crackers  2 rice cakes and a tablespoon of peanut butter  1 small apple or orange  3 cups light popcorn  1/2 of a turkey sandwich (1 slice of whole-wheat bread, 2 ounces of turkey, and mustard)  Portion sizes are important to controlling your blood sugar and staying at a healthy weight. Stock up on healthy snack items so you always have them on hand.    When to eat  Your meal plan will likely include breakfast, lunch, dinner, and some snacks.  Try to eat your meals and snacks at about the same times each day.  Eat all your meals and snacks. Skipping a meal or snack can make your blood sugar drop too low. It can also cause you to eat too much at the next meal or snack. Then your blood sugar could get too high.    ---------------------------------------------------------------------------------------------------------------------------------------------------    Eating Out When You Have Diabetes  Eating right is an important part of keeping your blood sugar in your target range. You just need to make healthy choices.    Be creative when eating out. Many places dont make you stick strictly to the menu. Instead of ordering a large entree, choose an appetizer or two and a bowl of soup. A mix of side orders can also make a good meal. Read the descriptions of other entrees and specials. If another entree comes with baby carrots, ask for a side order of them even if they dont come with your entree. Ask how food is prepared or if it can be made differently.  Tips for making the most of your meal out  Ask to have high-fat, high-calorie extras, such as French fries and potato chips, left off your plate so you wont be tempted.   Ask what substitutions are available. Instead of French fries, you may be able to have a side of salad with low-calorie  dressing.  Order low-fat milk instead of cream for your coffee.  Ask for vegetables and main courses to be served without sauces, butter, margarine, or oil.  Be aware of portion size. You dont have to clean your plate. Take half your meal home in a doggie bag to eat the next day.  Look for heart-healthy or low-fat entrees that include whole grains, vegetables, or fruits.  Choose foods that are grilled, broiled, or steamed.  Avoid dishes that are described on the menu as fried, breaded, smothered, rich, or creamy.    Tips for restaurant meals  When you eat away from home try these tips:  Try to schedule your dining-out meal at your normal meal time. Make a reservation if possible, so you don't have to wait to eat. If you can't make a reservation, try to arrive at the restaurant at a less-busy time to cut down your wait time. Eat a small fruit or starch snack at your regular mealtime if your restaurant meal is going to be later than usual.   Call ahead to see if the restaurant can meet your dietary needs if you've never been there before. Or you can go online to see the menu ahead of time.  Carry some crackers with you in case the restaurant needs you to wait until you can be served.  Ask how foods are prepared before you order.  Instead of fried, sautéed, or breaded foods, choose ones that are broiled, steamed, grilled, or baked.  Ask for sauces, gravies, and dressings on the side.  Only eat an amount that fits your meal plan. Remember: You can take home the leftovers.  Save dessert for special occasions. Then choose a small dessert or share one with a friend or family member.    Make healthy choices  Fast food  Garden salad with light dressing on the side  Baked potato with vegetables or herbs  Broiled, roasted, or grilled chicken sandwich  Sliced turkey or lean roast beef sandwich    Mexican  Chicken enchilada, without cheese or sour cream   Small burrito with whole beans and chicken  Whole beans (not refried) and  rice  Chicken or fish fajitas    Steakhouse  Grilled or broiled lean cuts of beef  Baked potato with vegetables or herbs  Broiled or baked chicken. Dont eat the skin.  Steamed vegetables    Asian  Steamed dumplings or potstickers  Broiled, boiled, or steamed meats or fish  Sushi or sashimi  Steamed rice or boiled noodles. One serving is equal to 1/3 cup.      © 3873-2321 [employer]. [address] [place] [id number]. All rights reserved. This information is not intended as a substitute for professional medical care. Always follow your healthcare professional's instructions.

## 2023-09-12 NOTE — PROGRESS NOTES
Subjective:      Patient ID: Jed Chávez is a 63 y.o. male.    Chief Complaint:  No chief complaint on file.    History of Present Illness  Jed Chávez with ESLD 2/2 HCC and alcoholic cirrhosis MELD 24, T2DM, and HTN c/b hypertensive CVA (2015 with no residual deficits) s/p DCD liver transplant with Dr. Hewitt 8/10/23 presents today for follow up of diabetes. This is his first visit with me.     The patient location is: at home  The chief complaint leading to consultation is: diabetes    Visit type: audiovisual    Face to Face time with patient: 28 minutes  38 minutes of total time spent on the encounter, which includes face to face time and non-face to face time preparing to see the patient (eg, review of tests), Obtaining and/or reviewing separately obtained history, Documenting clinical information in the electronic or other health record, Independently interpreting results (not separately reported) and communicating results to the patient/family/caregiver, or Care coordination (not separately reported).    Each patient to whom he or she provides medical services by telemedicine is:  (1) informed of the relationship between the physician and patient and the respective role of any other health care provider with respect to management of the patient; and (2) notified that he or she may decline to receive medical services by telemedicine and may withdraw from such care at any time.    We met patient during Aug 2023 admission for ESRD s/p liver transplant and management of hyperglycemia. PTA he was on glimepiride 4 mg, Januvia 100 mg. He was discharged on Lantus 25 units daily and Humalog 15 units + SSI.    He is on prednisone taper -currently on 5 mg daily and should stop tomorrow.     Reports he has lost 30-40 pounds over the last 3-4 months. He would like to lose more weight but is trying to build muscle mass.     With regards to the diabetes:    Diagnosed with Type 2 DM around 2017  Family History of Type  "2 DM: none  Known complications:  DKA/HHS: none  RN: up to date on exam; denies  PN: denies   Nephropathy: needs  Gastroparesis: denies  CAD: denies   Stroke in 2015/2016 due to hypertension     Current regimen:  Lantus 25 units daily   Humalog 15 units + SSI isf 25/150    He is changing needle every time. He is rotating injection sites. He is giving humalog 15 minutes before a meal.     Missed doses- denies    Other medications tried:  Januvia   Glimepiride  Metformin -stopped due to liver disease    3# times a day testing via fingerstick  Log reviewed: oral recall  AM blood sugars have been 246, 194, 236, 214  Before lunch 244, 232, 324  Before dinner 333, 159, 196  He is having a bedtime snack but not giving insulin with bedtime snack     Hypoglycemic event-denies   Knows how to correct with 15 grams of carbs- juice, coke, or a peppermint. Has glucose tabs     Dietary recall: He feels that he is following diabetic diet.   Eats 3 meals a day. Cheats every once in awhile  Breakfast: smart ones, crack an egg    Lunch: smart ones   Dinner: spaghetti, noodles, salad  Snacks: little on cookies  Drinks: water; diet chacho and Dr Pepper    Exercise - he is going to the gym -walking and stationary bike     Education - last visit: none    Has a Medic alert tag-denies  Glucagon/Baqsimi-denies     Diabetes Management Status  Statin: Not taking  ACE/ARB: Not taking    Denies history of pancreatitis or FH of thyroid cancer.     Lab Results   Component Value Date    HGBA1C 7.5 (H) 04/20/2023    HGBA1C 7.1 (H) 10/19/2022    HGBA1C 7.4 (H) 01/27/2022     Screening or Prevention Patient's value Goal Complete/Controlled?   HgA1C Testing and Control   Lab Results   Component Value Date    HGBA1C 7.5 (H) 04/20/2023      Annually/Less than 8% Yes   Lipid profile : 04/20/2023 Annually No   LDL control No results found for: "LDLCALC" Annually/Less than 100 mg/dl  No   Nephropathy screening No results found for: "LABMICR"  Lab Results " "  Component Value Date    PROTEINUA Negative 08/22/2023    Annually Yes   Blood pressure BP Readings from Last 1 Encounters:   09/05/23 129/71    Less than 140/90 Yes   Dilated retinal exam : 09/21/2017 Annually Yes   Foot exam   Most Recent Foot Exam Date: Not Found Annually No     Review of Systems    Lab Review:   Lab Results   Component Value Date    HGBA1C 7.5 (H) 04/20/2023    HGBA1C 7.1 (H) 10/19/2022    HGBA1C 7.4 (H) 01/27/2022      No results found for: "CHOL", "HDL", "LDLCALC", "TRIG", "CHOLHDL"  Lab Results   Component Value Date     09/11/2023    K 4.2 09/11/2023     09/11/2023    CO2 27 09/11/2023     (H) 09/11/2023    BUN 10 09/11/2023    CREATININE 0.8 09/11/2023    CALCIUM 9.3 09/11/2023    PROT 6.6 09/11/2023    ALBUMIN 3.6 09/11/2023    BILITOT 1.7 (H) 09/11/2023    ALKPHOS 129 09/11/2023    AST 21 09/11/2023    ALT 35 09/11/2023    ANIONGAP 10 09/11/2023    TSH 2.096 06/15/2023     Vit D, 25-Hydroxy   Date Value Ref Range Status   06/15/2023 13 (L) 30 - 96 ng/mL Final     Comment:     Vitamin D deficiency.........<10 ng/mL                              Vitamin D insufficiency......10-29 ng/mL       Vitamin D sufficiency........> or equal to 30 ng/mL  Vitamin D toxicity............>100 ng/mL       Assessment and Plan     1. Status post liver transplant  insulin glargine 100 units/mL SubQ pen      2. Type 2 diabetes mellitus with other specified complication, without long-term current use of insulin  Ambulatory referral/consult to Diabetes Education    Hemoglobin A1C    Comprehensive Metabolic Panel    Fructosamine    blood-glucose sensor (FREESTYLE PUNEET 3 SENSOR) Neelam    tirzepatide (MOUNJARO) 2.5 mg/0.5 mL PnIj          Type 2 diabetes mellitus, without long-term current use of insulin  -- Reviewed goals of therapy are to get the best control we can without hypoglycemia  -- Refer to diabetes education  Consult diabetes education   Check labs   He will stop prednisone tomorrow. " Discussed he should need less insulin without steroids. Discussed I would like to introduce other noninsulin medications to reduce risk of hypoglycemia. Will ask transplant if he can start Mounjaro.    I recommend continuous glucose monitor for high and low blood sugar alerts. Will call in [employer] and let me know if not covered. Also, let me know when you apply and I will invite you to clinic.    Medication Changes     Increase Lantus 28 units daily   Decrease Humalog 10 units + SSI isf 25/150  Start Mounjaro 2.5 mg weekly for 4 weeks & then increase to 5 mg weekly. We will re-evaluate your blood sugars in 4 weeks and determine need for increased dose. Return demonstration of how to use pen done in office today. Discussed one pen can stay out of the refrigerator for 21 days. Please take pen out of the refrigerator 10 minutes prior to injection.   Call [phone] for access to savings plan.    Let me know if you have bllod sugar less than 70.  Please send me a log in one to two weeks    Avoid Metformin until he is one year post transplant   Avoid Jardiance until he is one year post transplant   -- Reviewed patient's current insulin regimen. Clarified proper insulin dose and timing in relation to meals, etc. Insulin injection sites and proper rotation instructed.    -- Advised frequent self blood glucose monitoring.  Patient encouraged to document glucose results and bring them to every clinic visit    -- Hypoglycemia precautions discussed. Instructed on precautions before driving.    -- Call for Bg repeatedly < 90 or > 180.   -- Close adherence to lifestyle changes recommended.   -- Periodic follow ups for eye evaluations, foot care and dental care suggested      Follow up in about 4 months (around 1/12/2024).

## 2023-09-13 ENCOUNTER — TELEPHONE (OUTPATIENT)
Dept: DIABETES | Facility: CLINIC | Age: 63
End: 2023-09-13
Payer: COMMERCIAL

## 2023-09-13 ENCOUNTER — PATIENT MESSAGE (OUTPATIENT)
Dept: TRANSPLANT | Facility: CLINIC | Age: 63
End: 2023-09-13
Payer: COMMERCIAL

## 2023-09-13 ENCOUNTER — LAB VISIT (OUTPATIENT)
Dept: LAB | Facility: HOSPITAL | Age: 63
End: 2023-09-13
Attending: NURSE PRACTITIONER
Payer: COMMERCIAL

## 2023-09-13 DIAGNOSIS — E11.69 TYPE 2 DIABETES MELLITUS WITH OTHER SPECIFIED COMPLICATION, WITHOUT LONG-TERM CURRENT USE OF INSULIN: ICD-10-CM

## 2023-09-13 DIAGNOSIS — K72.10 END STAGE LIVER DISEASE: ICD-10-CM

## 2023-09-13 LAB
ALBUMIN SERPL BCP-MCNC: 3.7 G/DL (ref 3.5–5.2)
ALP SERPL-CCNC: 125 U/L (ref 55–135)
ALT SERPL W/O P-5'-P-CCNC: 30 U/L (ref 10–44)
ANION GAP SERPL CALC-SCNC: 14 MMOL/L (ref 8–16)
AST SERPL-CCNC: 20 U/L (ref 10–40)
BILIRUB SERPL-MCNC: 1.8 MG/DL (ref 0.1–1)
BUN SERPL-MCNC: 16 MG/DL (ref 8–23)
CALCIUM SERPL-MCNC: 9.7 MG/DL (ref 8.7–10.5)
CHLORIDE SERPL-SCNC: 104 MMOL/L (ref 95–110)
CO2 SERPL-SCNC: 22 MMOL/L (ref 23–29)
CREAT SERPL-MCNC: 0.9 MG/DL (ref 0.5–1.4)
EST. GFR  (NO RACE VARIABLE): >60 ML/MIN/1.73 M^2
ESTIMATED AVG GLUCOSE: 134 MG/DL (ref 68–131)
GLUCOSE SERPL-MCNC: 154 MG/DL (ref 70–110)
HBA1C MFR BLD: 6.3 % (ref 4–5.6)
POTASSIUM SERPL-SCNC: 4.2 MMOL/L (ref 3.5–5.1)
PROT SERPL-MCNC: 6.6 G/DL (ref 6–8.4)
SODIUM SERPL-SCNC: 140 MMOL/L (ref 136–145)

## 2023-09-13 PROCEDURE — 83036 HEMOGLOBIN GLYCOSYLATED A1C: CPT | Performed by: NURSE PRACTITIONER

## 2023-09-13 PROCEDURE — 80053 COMPREHEN METABOLIC PANEL: CPT | Performed by: NURSE PRACTITIONER

## 2023-09-13 PROCEDURE — 82985 ASSAY OF GLYCATED PROTEIN: CPT | Performed by: NURSE PRACTITIONER

## 2023-09-13 RX ORDER — TACROLIMUS 1 MG/1
3 CAPSULE ORAL EVERY 12 HOURS
Qty: 180 CAPSULE | Refills: 11 | Status: SHIPPED | OUTPATIENT
Start: 2023-09-13 | End: 2023-10-13 | Stop reason: DRUGHIGH

## 2023-09-13 NOTE — TELEPHONE ENCOUNTER
Pt advised of lab results, dose change and need to repeat labs Monday.  ----- Message from Ameya Suero MD sent at 9/13/2023  1:10 PM CDT -----  Fk 3 bid

## 2023-09-14 ENCOUNTER — PATIENT MESSAGE (OUTPATIENT)
Dept: ADMINISTRATIVE | Facility: OTHER | Age: 63
End: 2023-09-14
Payer: COMMERCIAL

## 2023-09-18 ENCOUNTER — LAB VISIT (OUTPATIENT)
Dept: LAB | Facility: HOSPITAL | Age: 63
End: 2023-09-18
Attending: SURGERY
Payer: COMMERCIAL

## 2023-09-18 DIAGNOSIS — Z94.4 LIVER REPLACED BY TRANSPLANT: ICD-10-CM

## 2023-09-18 LAB
ALBUMIN SERPL BCP-MCNC: 3.6 G/DL (ref 3.5–5.2)
ALP SERPL-CCNC: 115 U/L (ref 55–135)
ALT SERPL W/O P-5'-P-CCNC: 30 U/L (ref 10–44)
ANION GAP SERPL CALC-SCNC: 10 MMOL/L (ref 8–16)
AST SERPL-CCNC: 22 U/L (ref 10–40)
BASOPHILS # BLD AUTO: 0.02 K/UL (ref 0–0.2)
BASOPHILS NFR BLD: 0.6 % (ref 0–1.9)
BILIRUB DIRECT SERPL-MCNC: 0.7 MG/DL (ref 0.1–0.3)
BILIRUB SERPL-MCNC: 1.9 MG/DL (ref 0.1–1)
BUN SERPL-MCNC: 16 MG/DL (ref 8–23)
CALCIUM SERPL-MCNC: 9.1 MG/DL (ref 8.7–10.5)
CHLORIDE SERPL-SCNC: 101 MMOL/L (ref 95–110)
CO2 SERPL-SCNC: 25 MMOL/L (ref 23–29)
CREAT SERPL-MCNC: 0.9 MG/DL (ref 0.5–1.4)
DIFFERENTIAL METHOD: ABNORMAL
EOSINOPHIL # BLD AUTO: 0.1 K/UL (ref 0–0.5)
EOSINOPHIL NFR BLD: 1.4 % (ref 0–8)
ERYTHROCYTE [DISTWIDTH] IN BLOOD BY AUTOMATED COUNT: 14.5 % (ref 11.5–14.5)
EST. GFR  (NO RACE VARIABLE): >60 ML/MIN/1.73 M^2
FINAL PATHOLOGIC DIAGNOSIS: ABNORMAL
FRUCTOSAMINE SERPL-SCNC: 446 UMOL /L (ref 151–300)
GLUCOSE SERPL-MCNC: 325 MG/DL (ref 70–110)
GROSS: ABNORMAL
HCT VFR BLD AUTO: 36.9 % (ref 40–54)
HGB BLD-MCNC: 12.1 G/DL (ref 14–18)
IMM GRANULOCYTES # BLD AUTO: 0.01 K/UL (ref 0–0.04)
IMM GRANULOCYTES NFR BLD AUTO: 0.3 % (ref 0–0.5)
LYMPHOCYTES # BLD AUTO: 0.4 K/UL (ref 1–4.8)
LYMPHOCYTES NFR BLD: 11.1 % (ref 18–48)
Lab: ABNORMAL
MAGNESIUM SERPL-MCNC: 1.5 MG/DL (ref 1.6–2.6)
MCH RBC QN AUTO: 28.9 PG (ref 27–31)
MCHC RBC AUTO-ENTMCNC: 32.8 G/DL (ref 32–36)
MCV RBC AUTO: 88 FL (ref 82–98)
MONOCYTES # BLD AUTO: 0.4 K/UL (ref 0.3–1)
MONOCYTES NFR BLD: 11.4 % (ref 4–15)
NEUTROPHILS # BLD AUTO: 2.7 K/UL (ref 1.8–7.7)
NEUTROPHILS NFR BLD: 75.2 % (ref 38–73)
NRBC BLD-RTO: 0 /100 WBC
PLATELET # BLD AUTO: 123 K/UL (ref 150–450)
PMV BLD AUTO: 10.1 FL (ref 9.2–12.9)
POTASSIUM SERPL-SCNC: 4.4 MMOL/L (ref 3.5–5.1)
PROT SERPL-MCNC: 6.6 G/DL (ref 6–8.4)
RBC # BLD AUTO: 4.18 M/UL (ref 4.6–6.2)
SODIUM SERPL-SCNC: 136 MMOL/L (ref 136–145)
SUPPLEMENTAL DIAGNOSIS: ABNORMAL
WBC # BLD AUTO: 3.6 K/UL (ref 3.9–12.7)

## 2023-09-18 PROCEDURE — 85025 COMPLETE CBC W/AUTO DIFF WBC: CPT | Performed by: SURGERY

## 2023-09-18 PROCEDURE — 80053 COMPREHEN METABOLIC PANEL: CPT | Performed by: SURGERY

## 2023-09-18 PROCEDURE — 82248 BILIRUBIN DIRECT: CPT | Performed by: SURGERY

## 2023-09-18 PROCEDURE — 80197 ASSAY OF TACROLIMUS: CPT | Performed by: SURGERY

## 2023-09-18 PROCEDURE — 36415 COLL VENOUS BLD VENIPUNCTURE: CPT | Performed by: SURGERY

## 2023-09-18 PROCEDURE — 83735 ASSAY OF MAGNESIUM: CPT | Performed by: SURGERY

## 2023-09-19 LAB — TACROLIMUS BLD-MCNC: 7.3 NG/ML (ref 5–15)

## 2023-09-20 ENCOUNTER — OFFICE VISIT (OUTPATIENT)
Dept: HEPATOLOGY | Facility: CLINIC | Age: 63
End: 2023-09-20
Payer: COMMERCIAL

## 2023-09-20 ENCOUNTER — EXTERNAL HOME HEALTH (OUTPATIENT)
Dept: HOME HEALTH SERVICES | Facility: HOSPITAL | Age: 63
End: 2023-09-20
Payer: COMMERCIAL

## 2023-09-20 ENCOUNTER — TELEPHONE (OUTPATIENT)
Dept: TRANSPLANT | Facility: CLINIC | Age: 63
End: 2023-09-20
Payer: COMMERCIAL

## 2023-09-20 VITALS
SYSTOLIC BLOOD PRESSURE: 124 MMHG | HEIGHT: 70 IN | BODY MASS INDEX: 29.86 KG/M2 | HEART RATE: 79 BPM | DIASTOLIC BLOOD PRESSURE: 78 MMHG | WEIGHT: 208.56 LBS

## 2023-09-20 DIAGNOSIS — E55.9 VITAMIN D DEFICIENCY: ICD-10-CM

## 2023-09-20 DIAGNOSIS — Z94.4 S/P LIVER TRANSPLANT: Primary | ICD-10-CM

## 2023-09-20 DIAGNOSIS — Z94.4 LIVER TRANSPLANTED: ICD-10-CM

## 2023-09-20 DIAGNOSIS — D72.819 LEUKOPENIA, UNSPECIFIED TYPE: ICD-10-CM

## 2023-09-20 DIAGNOSIS — D84.9 IMMUNOSUPPRESSION: Primary | ICD-10-CM

## 2023-09-20 DIAGNOSIS — Z85.05 HISTORY OF LIVER CANCER: ICD-10-CM

## 2023-09-20 PROCEDURE — 99999 PR PBB SHADOW E&M-EST. PATIENT-LVL IV: CPT | Mod: PBBFAC,,, | Performed by: INTERNAL MEDICINE

## 2023-09-20 PROCEDURE — 99999 PR PBB SHADOW E&M-EST. PATIENT-LVL IV: ICD-10-PCS | Mod: PBBFAC,,, | Performed by: INTERNAL MEDICINE

## 2023-09-20 PROCEDURE — 99214 PR OFFICE/OUTPT VISIT, EST, LEVL IV, 30-39 MIN: ICD-10-PCS | Mod: S$GLB,,, | Performed by: INTERNAL MEDICINE

## 2023-09-20 PROCEDURE — 99214 OFFICE O/P EST MOD 30 MIN: CPT | Mod: S$GLB,,, | Performed by: INTERNAL MEDICINE

## 2023-09-20 RX ORDER — MYCOPHENOLATE MOFETIL 250 MG/1
500 CAPSULE ORAL 2 TIMES DAILY
Qty: 120 CAPSULE | Refills: 11 | Status: ON HOLD
Start: 2023-09-20 | End: 2023-10-30 | Stop reason: HOSPADM

## 2023-09-20 NOTE — PROGRESS NOTES
Transplant Hepatology  Liver Transplant Recipient Follow-up    Transplant Date: 8/10/2023  [facility] Native Liver Dx: Primary Liver Malignancy: Hepatoma (HCC) and Cirrhosis    Jed is here for follow up of his liver transplant.    ORGAN: LIVER  Whole or Partial: whole liver  Donor Type: donation after circulatory death   CDC High Risk: no  Donor CMV Status: Positive  Donor HCV Status: Negative  Donor HBcAb: Negative  Biliary Anastomosis: end to end  Arterial Anatomy: standard  IVC reconstruction: end to end ivc  Portal vein status: patent  .    Subjective:     Interval History:  Currently, he is doing adequately. Current complaints include no significant issues.  He has been doing very well since transplant.  Does not look like he is had any major problems.  He reports compliance with medications.          Abnormal   1. Liver, donor ( cardiac death, hepatitis B and C virus negative), biopsy:   - Steatosis, predominantly microvesicular, 30%.     - Zone 3 and perisinusoidal fibrosis, stage 1 (of 4), on trichrome stain.     - Minimal siderosis, 1+, within hepatocytes and marked iron deposition within Kupffer cells on iron stain.     - See comment.       2. Liver, explant, hepatectomy:   - Hepatocellular carcinoma.   - Cirrhosis, stage 4 (of 4).        Etiology: Clinical history of alcohol   - Steatohepatitis with Alejandra hyaline.   - Gallbladder with cholelithiasis.   - See synoptic report   - See comment.            Review of Systems    Objective:     Physical Exam  Vitals reviewed.   Constitutional:       General: He is not in acute distress.     Appearance: He is well-developed.   HENT:      Head: Normocephalic and atraumatic.      Mouth/Throat:      Pharynx: No oropharyngeal exudate.   Eyes:      General: No scleral icterus.        Right eye: No discharge.         Left eye: No discharge.      Conjunctiva/sclera: Conjunctivae normal.      Pupils: Pupils are equal, round, and reactive to light.   Pulmonary:       Effort: Pulmonary effort is normal. No respiratory distress.      Breath sounds: Normal breath sounds. No wheezing.   Abdominal:      General: There is no distension.      Palpations: Abdomen is soft.      Tenderness: There is no abdominal tenderness.      Comments: Steri-Strips still on half of the incision.  It appears to be healing.  No evidence of discharge.   Musculoskeletal:      Right lower leg: No edema.      Left lower leg: No edema.   Neurological:      Mental Status: He is alert and oriented to person, place, and time.   Psychiatric:         Behavior: Behavior normal.         WBC   Date Value Ref Range Status   09/18/2023 3.60 (L) 3.90 - 12.70 K/uL Final     Hemoglobin   Date Value Ref Range Status   09/18/2023 12.1 (L) 14.0 - 18.0 g/dL Final     POC Hematocrit   Date Value Ref Range Status   08/10/2023 37 36 - 54 %PCV Final     Hematocrit   Date Value Ref Range Status   09/18/2023 36.9 (L) 40.0 - 54.0 % Final     Platelets   Date Value Ref Range Status   09/18/2023 123 (L) 150 - 450 K/uL Final     BUN   Date Value Ref Range Status   09/18/2023 16 8 - 23 mg/dL Final     Creatinine   Date Value Ref Range Status   09/18/2023 0.9 0.5 - 1.4 mg/dL Final     Glucose   Date Value Ref Range Status   09/18/2023 325 (H) 70 - 110 mg/dL Final     Calcium   Date Value Ref Range Status   09/18/2023 9.1 8.7 - 10.5 mg/dL Final     Sodium   Date Value Ref Range Status   09/18/2023 136 136 - 145 mmol/L Final     Potassium   Date Value Ref Range Status   09/18/2023 4.4 3.5 - 5.1 mmol/L Final     Chloride   Date Value Ref Range Status   09/18/2023 101 95 - 110 mmol/L Final     Magnesium   Date Value Ref Range Status   09/18/2023 1.5 (L) 1.6 - 2.6 mg/dL Final     AST   Date Value Ref Range Status   09/18/2023 22 10 - 40 U/L Final     ALT   Date Value Ref Range Status   09/18/2023 30 10 - 44 U/L Final     Alkaline Phosphatase   Date Value Ref Range Status   09/18/2023 115 55 - 135 U/L Final     Total Bilirubin   Date Value Ref  Range Status   09/18/2023 1.9 (H) 0.1 - 1.0 mg/dL Final     Comment:     For infants and newborns, interpretation of results should be based  on gestational age, weight and in agreement with clinical  observations.    Premature Infant recommended reference ranges:  Up to 24 hours.............<8.0 mg/dL  Up to 48 hours............<12.0 mg/dL  3-5 days..................<15.0 mg/dL  6-29 days.................<15.0 mg/dL       Albumin   Date Value Ref Range Status   09/18/2023 3.6 3.5 - 5.2 g/dL Final     INR   Date Value Ref Range Status   08/15/2023 1.2 0.8 - 1.2 Final     Comment:     Coumadin Therapy:  2.0 - 3.0 for INR for all indicators except mechanical heart valves  and antiphospholipid syndromes which should use 2.5 - 3.5.       Lab Results   Component Value Date    TACROLIMUS 7.3 09/18/2023           Assessment/Plan:     1. Immunosuppression    2. Liver transplanted    3. History of liver cancer    4. Leukopenia, unspecified type      Immunosuppression-concerned that white blood cell count is decreasing  -decrease CellCept to 500 mg twice a day  -     mycophenolate (CELLCEPT) 250 mg Cap; Take 2 capsules (500 mg total) by mouth 2 (two) times daily.  Dispense: 120 capsule; Refill: 11    Liver transplanted-good allograft function  -continue with routine lab monitoring    History of liver cancer  -continue with routine surveillance    Leukopenia, unspecified type-likely medication related  -     mycophenolate (CELLCEPT) 250 mg Cap; Take 2 capsules (500 mg total) by mouth 2 (two) times daily.  Dispense: 120 capsule; Refill: 11    Vitamin D deficiency  -repeat vitamin-D with next labs    RTC in 3 months    Patient was seen in the liver transplant department at [facility].      MD SHANIKA Dey Patient Status  Functional Status: 70% - Cares for self: unable to carry on normal activity or active work  Physical Capacity: No Limitations

## 2023-09-22 ENCOUNTER — TELEPHONE (OUTPATIENT)
Dept: TRANSPLANT | Facility: CLINIC | Age: 63
End: 2023-09-22
Payer: COMMERCIAL

## 2023-09-22 ENCOUNTER — HOSPITAL ENCOUNTER (OUTPATIENT)
Dept: RADIOLOGY | Facility: HOSPITAL | Age: 63
Discharge: HOME | End: 2023-09-22
Attending: SURGERY
Payer: COMMERCIAL

## 2023-09-22 ENCOUNTER — TELEPHONE (OUTPATIENT)
Dept: DIABETES | Facility: CLINIC | Age: 63
End: 2023-09-22

## 2023-09-22 ENCOUNTER — TELEPHONE (OUTPATIENT)
Dept: ENDOCRINOLOGY | Facility: CLINIC | Age: 63
End: 2023-09-22
Payer: COMMERCIAL

## 2023-09-22 DIAGNOSIS — Z94.4 S/P LIVER TRANSPLANT: ICD-10-CM

## 2023-09-22 DIAGNOSIS — Z94.4 S/P LIVER TRANSPLANT: Primary | ICD-10-CM

## 2023-09-22 DIAGNOSIS — E11.69 TYPE 2 DIABETES MELLITUS WITH OTHER SPECIFIED COMPLICATION, WITHOUT LONG-TERM CURRENT USE OF INSULIN: Primary | ICD-10-CM

## 2023-09-22 PROCEDURE — 93976 VASCULAR STUDY: CPT | Mod: 26,,, | Performed by: RADIOLOGY

## 2023-09-22 PROCEDURE — 76705 US DOPPLER LIVER TRANSPLANT POST (XPD): ICD-10-PCS | Mod: 26,59,, | Performed by: RADIOLOGY

## 2023-09-22 PROCEDURE — 76705 ECHO EXAM OF ABDOMEN: CPT | Mod: TC

## 2023-09-22 PROCEDURE — 76705 ECHO EXAM OF ABDOMEN: CPT | Mod: 26,59,, | Performed by: RADIOLOGY

## 2023-09-22 PROCEDURE — 93976 US DOPPLER LIVER TRANSPLANT POST (XPD): ICD-10-PCS | Mod: 26,,, | Performed by: RADIOLOGY

## 2023-09-22 NOTE — TELEPHONE ENCOUNTER
Spoke with pt. Discussed that his ultrasound today was not normal and he needs to repeat an ultrasound here Monday. He agreed with plan.  ----- Message from Janice Sands MA sent at 9/22/2023  3:25 PM CDT -----  Regarding: FW: Appointment    ----- Message -----  From: Mackenzie Estrada  Sent: 9/22/2023   3:18 PM CDT  To: [facility] Post-Liver Transplant Non-Clinical  Subject: Appointment                                          Name Of Caller:    Jed      Contact Preference:     [phone]      Nature of call:    Pt wants to change the location of his ultrasound to the [facility] in [place].          n/a

## 2023-09-25 ENCOUNTER — TELEPHONE (OUTPATIENT)
Dept: TRANSPLANT | Facility: CLINIC | Age: 63
End: 2023-09-25
Payer: COMMERCIAL

## 2023-09-25 ENCOUNTER — LAB VISIT (OUTPATIENT)
Dept: LAB | Facility: HOSPITAL | Age: 63
End: 2023-09-25
Attending: INTERNAL MEDICINE
Payer: COMMERCIAL

## 2023-09-25 ENCOUNTER — PATIENT MESSAGE (OUTPATIENT)
Dept: TRANSPLANT | Facility: CLINIC | Age: 63
End: 2023-09-25
Payer: COMMERCIAL

## 2023-09-25 ENCOUNTER — HOSPITAL ENCOUNTER (OUTPATIENT)
Dept: RADIOLOGY | Facility: HOSPITAL | Age: 63
Discharge: HOME | End: 2023-09-25
Attending: SURGERY
Payer: COMMERCIAL

## 2023-09-25 DIAGNOSIS — Z76.82 ORGAN TRANSPLANT CANDIDATE: ICD-10-CM

## 2023-09-25 DIAGNOSIS — Z94.4 S/P LIVER TRANSPLANT: Primary | ICD-10-CM

## 2023-09-25 DIAGNOSIS — Z94.4 S/P LIVER TRANSPLANT: ICD-10-CM

## 2023-09-25 DIAGNOSIS — E55.9 VITAMIN D DEFICIENCY: ICD-10-CM

## 2023-09-25 LAB
25(OH)D3+25(OH)D2 SERPL-MCNC: 26 NG/ML (ref 30–96)
ALBUMIN SERPL BCP-MCNC: 3.9 G/DL (ref 3.5–5.2)
ALP SERPL-CCNC: 112 U/L (ref 55–135)
ALT SERPL W/O P-5'-P-CCNC: 29 U/L (ref 10–44)
ANION GAP SERPL CALC-SCNC: 9 MMOL/L (ref 8–16)
AST SERPL-CCNC: 27 U/L (ref 10–40)
BASOPHILS # BLD AUTO: 0.03 K/UL (ref 0–0.2)
BASOPHILS NFR BLD: 0.9 % (ref 0–1.9)
BILIRUB SERPL-MCNC: 1.8 MG/DL (ref 0.1–1)
BUN SERPL-MCNC: 14 MG/DL (ref 8–23)
CALCIUM SERPL-MCNC: 9.4 MG/DL (ref 8.7–10.5)
CHLORIDE SERPL-SCNC: 108 MMOL/L (ref 95–110)
CO2 SERPL-SCNC: 27 MMOL/L (ref 23–29)
CREAT SERPL-MCNC: 0.8 MG/DL (ref 0.5–1.4)
DIFFERENTIAL METHOD: ABNORMAL
EOSINOPHIL # BLD AUTO: 0.1 K/UL (ref 0–0.5)
EOSINOPHIL NFR BLD: 2.8 % (ref 0–8)
ERYTHROCYTE [DISTWIDTH] IN BLOOD BY AUTOMATED COUNT: 14.5 % (ref 11.5–14.5)
EST. GFR  (NO RACE VARIABLE): >60 ML/MIN/1.73 M^2
GLUCOSE SERPL-MCNC: 96 MG/DL (ref 70–110)
HCT VFR BLD AUTO: 37.6 % (ref 40–54)
HGB BLD-MCNC: 12.1 G/DL (ref 14–18)
IMM GRANULOCYTES # BLD AUTO: 0.02 K/UL (ref 0–0.04)
IMM GRANULOCYTES NFR BLD AUTO: 0.6 % (ref 0–0.5)
LYMPHOCYTES # BLD AUTO: 0.6 K/UL (ref 1–4.8)
LYMPHOCYTES NFR BLD: 17 % (ref 18–48)
MCH RBC QN AUTO: 28.3 PG (ref 27–31)
MCHC RBC AUTO-ENTMCNC: 32.2 G/DL (ref 32–36)
MCV RBC AUTO: 88 FL (ref 82–98)
MONOCYTES # BLD AUTO: 0.4 K/UL (ref 0.3–1)
MONOCYTES NFR BLD: 13 % (ref 4–15)
NEUTROPHILS # BLD AUTO: 2.1 K/UL (ref 1.8–7.7)
NEUTROPHILS NFR BLD: 65.7 % (ref 38–73)
NRBC BLD-RTO: 0 /100 WBC
PLATELET # BLD AUTO: 108 K/UL (ref 150–450)
PMV BLD AUTO: 9.2 FL (ref 9.2–12.9)
POTASSIUM SERPL-SCNC: 4.2 MMOL/L (ref 3.5–5.1)
PROT SERPL-MCNC: 6.7 G/DL (ref 6–8.4)
RBC # BLD AUTO: 4.28 M/UL (ref 4.6–6.2)
SODIUM SERPL-SCNC: 144 MMOL/L (ref 136–145)
WBC # BLD AUTO: 3.23 K/UL (ref 3.9–12.7)

## 2023-09-25 PROCEDURE — 85025 COMPLETE CBC W/AUTO DIFF WBC: CPT | Performed by: INTERNAL MEDICINE

## 2023-09-25 PROCEDURE — 76705 ECHO EXAM OF ABDOMEN: CPT | Mod: TC

## 2023-09-25 PROCEDURE — 36415 COLL VENOUS BLD VENIPUNCTURE: CPT | Performed by: INTERNAL MEDICINE

## 2023-09-25 PROCEDURE — 93976 US DOPPLER LIVER TRANSPLANT POST (XPD): ICD-10-PCS | Mod: 26,,, | Performed by: STUDENT IN AN ORGANIZED HEALTH CARE EDUCATION/TRAINING PROGRAM

## 2023-09-25 PROCEDURE — 80197 ASSAY OF TACROLIMUS: CPT | Performed by: INTERNAL MEDICINE

## 2023-09-25 PROCEDURE — 80053 COMPREHEN METABOLIC PANEL: CPT | Performed by: INTERNAL MEDICINE

## 2023-09-25 PROCEDURE — 76705 ECHO EXAM OF ABDOMEN: CPT | Mod: 26,59,, | Performed by: STUDENT IN AN ORGANIZED HEALTH CARE EDUCATION/TRAINING PROGRAM

## 2023-09-25 PROCEDURE — 93976 VASCULAR STUDY: CPT | Mod: 26,,, | Performed by: STUDENT IN AN ORGANIZED HEALTH CARE EDUCATION/TRAINING PROGRAM

## 2023-09-25 PROCEDURE — 76705 US DOPPLER LIVER TRANSPLANT POST (XPD): ICD-10-PCS | Mod: 26,59,, | Performed by: STUDENT IN AN ORGANIZED HEALTH CARE EDUCATION/TRAINING PROGRAM

## 2023-09-25 PROCEDURE — 82306 VITAMIN D 25 HYDROXY: CPT | Performed by: INTERNAL MEDICINE

## 2023-09-25 NOTE — TELEPHONE ENCOUNTER
Informed pt liver ultrasound reviewed, will schedule for CTA this week. Pt to call back once he is back home to schedule day/time that works.       ----- Message from Ameya Suero MD sent at 9/25/2023  2:06 PM CDT -----  CTA this week

## 2023-09-26 ENCOUNTER — TELEPHONE (OUTPATIENT)
Dept: TRANSPLANT | Facility: CLINIC | Age: 63
End: 2023-09-26
Payer: COMMERCIAL

## 2023-09-26 LAB — TACROLIMUS BLD-MCNC: 9.9 NG/ML (ref 5–15)

## 2023-09-26 NOTE — TELEPHONE ENCOUNTER
Spoke with pt's spouse. Discussed CTA scan and why we need it tomorrow. She agreed with plan. All questions answered.  ----- Message from Valencia Monsalve sent at 9/26/2023 12:54 PM CDT -----  Regarding: Speak to coordinator  Contact: Leida  Regarding:speak to coordinator          Name Of Caller: Leida        Contact Preference: [phone](home)        Nature of call:  pt wife Leida is calling to speak to coordinator regarding, testing  for pt and some other questions. Please advise. Requesting a call back anytime after 3:00 pm

## 2023-09-27 ENCOUNTER — TELEPHONE (OUTPATIENT)
Dept: TRANSPLANT | Facility: CLINIC | Age: 63
End: 2023-09-27
Payer: COMMERCIAL

## 2023-09-27 ENCOUNTER — HOSPITAL ENCOUNTER (OUTPATIENT)
Dept: RADIOLOGY | Facility: HOSPITAL | Age: 63
Discharge: HOME | End: 2023-09-27
Attending: SURGERY
Payer: COMMERCIAL

## 2023-09-27 DIAGNOSIS — Z94.4 S/P LIVER TRANSPLANT: ICD-10-CM

## 2023-09-27 PROCEDURE — 25500020 PHARM REV CODE 255: Performed by: SURGERY

## 2023-09-27 PROCEDURE — 74175 CTA ABDOMEN W/CONTRAST: CPT | Mod: TC

## 2023-09-27 PROCEDURE — 74175 CTA ABDOMEN W/CONTRAST: CPT | Mod: 26,,, | Performed by: INTERNAL MEDICINE

## 2023-09-27 PROCEDURE — 74175 CTA ABDOMEN: ICD-10-PCS | Mod: 26,,, | Performed by: INTERNAL MEDICINE

## 2023-09-27 RX ADMIN — IOHEXOL 100 ML: 350 INJECTION, SOLUTION INTRAVENOUS at 01:09

## 2023-09-27 NOTE — TELEPHONE ENCOUNTER
Reviewed CTA with Dr. Suero. Per MD, pt to be admitted Sunday for hepatic angiogram on Monday. Pt will need heparin on admission and heparin is not to stop preop.     Spoke with pt's spouse. Reviewed CTA report and plan above. Answered all questions. Advised that she contact me back to discuss any further questions or concerns. Will also call her back once admission has been completed and inpatient plan is in place. She agreed with plan.    Reached out to lead [facility], Dacia Cox. Will start admission Friday and contact her for inpatient orders. Discussed above plan for heparin upon admission which should not be stopped preop and hepatic angiogram Monday.

## 2023-09-28 ENCOUNTER — TELEPHONE (OUTPATIENT)
Dept: TRANSPLANT | Facility: CLINIC | Age: 63
End: 2023-09-28
Payer: COMMERCIAL

## 2023-09-28 NOTE — TELEPHONE ENCOUNTER
----- Message from Ameya Suero MD sent at 9/28/2023  2:29 PM CDT -----  Admit Sunday for heparin and angio Monday

## 2023-09-29 ENCOUNTER — TELEPHONE (OUTPATIENT)
Dept: TRANSPLANT | Facility: CLINIC | Age: 63
End: 2023-09-29
Payer: COMMERCIAL

## 2023-09-29 ENCOUNTER — PATIENT MESSAGE (OUTPATIENT)
Dept: TRANSPLANT | Facility: CLINIC | Age: 63
End: 2023-09-29
Payer: COMMERCIAL

## 2023-09-29 PROBLEM — I77.1 STENOSIS OF HEPATIC ARTERY OF TRANSPLANTED LIVER: Status: ACTIVE | Noted: 2023-09-29

## 2023-09-29 PROBLEM — D63.8 ANEMIA OF CHRONIC DISEASE: Status: ACTIVE | Noted: 2023-09-29

## 2023-09-29 PROBLEM — T86.49 STENOSIS OF HEPATIC ARTERY OF TRANSPLANTED LIVER: Status: ACTIVE | Noted: 2023-09-29

## 2023-09-29 NOTE — HPI
Mr. Jed Chávez is a 62 y/o male with ESRD 2/2 HCC s/p DCD OLTxp 8/10/23. Of note, has a history of a hypertensive CVA in 2015. No focal deficits noted. Transplant surgery w/o issues. He progressed very well post operatively. Patient is being admitted for treatment of stenosis of the proper hepatic artery seen on Liver US and CTA as an outpatient. He reports feeling fine. Plan for heparin gtt, IR consult, and angiogram.

## 2023-09-29 NOTE — TELEPHONE ENCOUNTER
----- Message from Joleen Carr MD sent at 9/27/2023  4:16 PM CDT -----  Reviewed, nothing to do; repeat per routine

## 2023-09-29 NOTE — TELEPHONE ENCOUNTER
Resv for [facility] 10/1/23 admit placed with Kell in the admit office.  Spoke with TWAN Goddard who will place admit orders, IR consult orders, heparin orders and discuss bed assignment with TSU charge for Sunday.     Team discussed admit. Shadia Black NP on service this weekend will anticipate pt's arrival around 10am Sunday.     Weekend on call coordinator, Dmitriy Marley, notified of above plan.    Pt and spouse advised via portal. Will call pt's wife after 3pm per her request.

## 2023-09-29 NOTE — ASSESSMENT & PLAN NOTE
- Seen on Liver US and CTA   - Plan for heparin gtt  - IR consulted  - NPO at midnight for angio on Monday 10/2/23  - Continue to monitor.

## 2023-09-29 NOTE — TELEPHONE ENCOUNTER
----- Message from Joleen Carr MD sent at 9/25/2023  5:23 PM CDT -----  Vit D improved ok for patient to take OTC vit D 200 unit daily for maintenance

## 2023-09-29 NOTE — TELEPHONE ENCOUNTER
Returned call. Spoke with patient. Reviewed plan for Sunday. All questions answered.    ----- Message from Carol Ann Medina sent at 9/29/2023  3:21 PM CDT -----  Regarding: Returning a Missed Call  Contact: Leida, patient's wife  Returning a Missed Call    Caller: Leida, patient's wife      Returning call to: Linda coordinator       Caller can be reached @: [phone] (home)      Nature of the call: Patient's wife calling to speak to coordinator. Patient checking [employer] message as well.

## 2023-09-29 NOTE — TELEPHONE ENCOUNTER
Called pt's wife to review admission Sunday. No answer. Left detailed VM that patient needs to check into the admit office [facility] 10/1/23 at 10am. Requested return call with any questions or concerns.     Message also sent via [employer].

## 2023-10-01 ENCOUNTER — HOSPITAL ENCOUNTER (INPATIENT)
Facility: HOSPITAL | Age: 63
LOS: 2 days | Discharge: HOME | DRG: 425 | End: 2023-10-03
Attending: TRANSPLANT SURGERY | Admitting: TRANSPLANT SURGERY
Payer: COMMERCIAL

## 2023-10-01 DIAGNOSIS — E11.69 TYPE 2 DIABETES MELLITUS WITH OTHER SPECIFIED COMPLICATION, WITHOUT LONG-TERM CURRENT USE OF INSULIN: ICD-10-CM

## 2023-10-01 DIAGNOSIS — Z29.89 PROPHYLACTIC IMMUNOTHERAPY: ICD-10-CM

## 2023-10-01 DIAGNOSIS — Z79.4 TYPE 2 DIABETES MELLITUS WITH HYPERGLYCEMIA, WITH LONG-TERM CURRENT USE OF INSULIN: Primary | ICD-10-CM

## 2023-10-01 DIAGNOSIS — Z94.4 STATUS POST LIVER TRANSPLANT: ICD-10-CM

## 2023-10-01 DIAGNOSIS — D63.8 ANEMIA OF CHRONIC DISEASE: ICD-10-CM

## 2023-10-01 DIAGNOSIS — Z79.60 LONG-TERM USE OF IMMUNOSUPPRESSANT MEDICATION: ICD-10-CM

## 2023-10-01 DIAGNOSIS — E11.65 TYPE 2 DIABETES MELLITUS WITH HYPERGLYCEMIA, WITH LONG-TERM CURRENT USE OF INSULIN: Primary | ICD-10-CM

## 2023-10-01 DIAGNOSIS — T86.49 STENOSIS OF HEPATIC ARTERY OF TRANSPLANTED LIVER: ICD-10-CM

## 2023-10-01 DIAGNOSIS — I77.1 STENOSIS OF HEPATIC ARTERY OF TRANSPLANTED LIVER: ICD-10-CM

## 2023-10-01 DIAGNOSIS — I10 HYPERTENSION, UNSPECIFIED TYPE: ICD-10-CM

## 2023-10-01 LAB
ABO + RH BLD: NORMAL
ALBUMIN SERPL BCP-MCNC: 3.7 G/DL (ref 3.5–5.2)
ALP SERPL-CCNC: 107 U/L (ref 55–135)
ALT SERPL W/O P-5'-P-CCNC: 32 U/L (ref 10–44)
ANION GAP SERPL CALC-SCNC: 6 MMOL/L (ref 8–16)
ANISOCYTOSIS BLD QL SMEAR: SLIGHT
ANISOCYTOSIS BLD QL SMEAR: SLIGHT
APTT PPP: 27.7 SEC (ref 21–32)
APTT PPP: 27.7 SEC (ref 21–32)
APTT PPP: 49 SEC (ref 21–32)
AST SERPL-CCNC: 33 U/L (ref 10–40)
BACTERIA #/AREA URNS AUTO: NORMAL /HPF
BASOPHILS # BLD AUTO: 0.06 K/UL (ref 0–0.2)
BASOPHILS # BLD AUTO: 0.06 K/UL (ref 0–0.2)
BASOPHILS NFR BLD: 1.3 % (ref 0–1.9)
BASOPHILS NFR BLD: 1.3 % (ref 0–1.9)
BILIRUB SERPL-MCNC: 1.7 MG/DL (ref 0.1–1)
BILIRUB UR QL STRIP: NEGATIVE
BLD GP AB SCN CELLS X3 SERPL QL: NORMAL
BUN SERPL-MCNC: 17 MG/DL (ref 8–23)
CALCIUM SERPL-MCNC: 9.3 MG/DL (ref 8.7–10.5)
CHLORIDE SERPL-SCNC: 108 MMOL/L (ref 95–110)
CLARITY UR REFRACT.AUTO: CLEAR
CO2 SERPL-SCNC: 26 MMOL/L (ref 23–29)
COLOR UR AUTO: NORMAL
CREAT SERPL-MCNC: 0.9 MG/DL (ref 0.5–1.4)
DIFFERENTIAL METHOD: ABNORMAL
DIFFERENTIAL METHOD: ABNORMAL
EOSINOPHIL # BLD AUTO: 0.1 K/UL (ref 0–0.5)
EOSINOPHIL # BLD AUTO: 0.1 K/UL (ref 0–0.5)
EOSINOPHIL NFR BLD: 1.7 % (ref 0–8)
EOSINOPHIL NFR BLD: 1.7 % (ref 0–8)
ERYTHROCYTE [DISTWIDTH] IN BLOOD BY AUTOMATED COUNT: 15 % (ref 11.5–14.5)
ERYTHROCYTE [DISTWIDTH] IN BLOOD BY AUTOMATED COUNT: 15 % (ref 11.5–14.5)
EST. GFR  (NO RACE VARIABLE): >60 ML/MIN/1.73 M^2
GLUCOSE SERPL-MCNC: 124 MG/DL (ref 70–110)
GLUCOSE UR QL STRIP: NEGATIVE
HCT VFR BLD AUTO: 36.7 % (ref 40–54)
HCT VFR BLD AUTO: 36.7 % (ref 40–54)
HGB BLD-MCNC: 11.7 G/DL (ref 14–18)
HGB BLD-MCNC: 11.7 G/DL (ref 14–18)
HGB UR QL STRIP: NEGATIVE
HYPOCHROMIA BLD QL SMEAR: ABNORMAL
HYPOCHROMIA BLD QL SMEAR: ABNORMAL
IMM GRANULOCYTES # BLD AUTO: 0.02 K/UL (ref 0–0.04)
IMM GRANULOCYTES # BLD AUTO: 0.02 K/UL (ref 0–0.04)
IMM GRANULOCYTES NFR BLD AUTO: 0.4 % (ref 0–0.5)
IMM GRANULOCYTES NFR BLD AUTO: 0.4 % (ref 0–0.5)
INR PPP: 1.1 (ref 0.8–1.2)
INR PPP: 1.1 (ref 0.8–1.2)
KETONES UR QL STRIP: NEGATIVE
LEUKOCYTE ESTERASE UR QL STRIP: NEGATIVE
LYMPHOCYTES # BLD AUTO: 2.6 K/UL (ref 1–4.8)
LYMPHOCYTES # BLD AUTO: 2.6 K/UL (ref 1–4.8)
LYMPHOCYTES NFR BLD: 54.5 % (ref 18–48)
LYMPHOCYTES NFR BLD: 54.5 % (ref 18–48)
MCH RBC QN AUTO: 27.7 PG (ref 27–31)
MCH RBC QN AUTO: 27.7 PG (ref 27–31)
MCHC RBC AUTO-ENTMCNC: 31.9 G/DL (ref 32–36)
MCHC RBC AUTO-ENTMCNC: 31.9 G/DL (ref 32–36)
MCV RBC AUTO: 87 FL (ref 82–98)
MCV RBC AUTO: 87 FL (ref 82–98)
MICROSCOPIC COMMENT: NORMAL
MONOCYTES # BLD AUTO: 0.4 K/UL (ref 0.3–1)
MONOCYTES # BLD AUTO: 0.4 K/UL (ref 0.3–1)
MONOCYTES NFR BLD: 7.5 % (ref 4–15)
MONOCYTES NFR BLD: 7.5 % (ref 4–15)
NEUTROPHILS # BLD AUTO: 1.7 K/UL (ref 1.8–7.7)
NEUTROPHILS # BLD AUTO: 1.7 K/UL (ref 1.8–7.7)
NEUTROPHILS NFR BLD: 34.6 % (ref 38–73)
NEUTROPHILS NFR BLD: 34.6 % (ref 38–73)
NITRITE UR QL STRIP: NEGATIVE
NRBC BLD-RTO: 0 /100 WBC
NRBC BLD-RTO: 0 /100 WBC
OVALOCYTES BLD QL SMEAR: ABNORMAL
OVALOCYTES BLD QL SMEAR: ABNORMAL
PH UR STRIP: 5 [PH] (ref 5–8)
PLATELET # BLD AUTO: 109 K/UL (ref 150–450)
PLATELET # BLD AUTO: 109 K/UL (ref 150–450)
PMV BLD AUTO: 9.6 FL (ref 9.2–12.9)
PMV BLD AUTO: 9.6 FL (ref 9.2–12.9)
POCT GLUCOSE: 161 MG/DL (ref 70–110)
POCT GLUCOSE: 208 MG/DL (ref 70–110)
POIKILOCYTOSIS BLD QL SMEAR: SLIGHT
POIKILOCYTOSIS BLD QL SMEAR: SLIGHT
POLYCHROMASIA BLD QL SMEAR: ABNORMAL
POLYCHROMASIA BLD QL SMEAR: ABNORMAL
POTASSIUM SERPL-SCNC: 4.4 MMOL/L (ref 3.5–5.1)
PROT SERPL-MCNC: 6.5 G/DL (ref 6–8.4)
PROT UR QL STRIP: NEGATIVE
PROTHROMBIN TIME: 11.4 SEC (ref 9–12.5)
PROTHROMBIN TIME: 11.4 SEC (ref 9–12.5)
RBC # BLD AUTO: 4.22 M/UL (ref 4.6–6.2)
RBC # BLD AUTO: 4.22 M/UL (ref 4.6–6.2)
RBC #/AREA URNS AUTO: 1 /HPF (ref 0–4)
SARS-COV-2 RDRP RESP QL NAA+PROBE: NEGATIVE
SODIUM SERPL-SCNC: 140 MMOL/L (ref 136–145)
SP GR UR STRIP: 1.02 (ref 1–1.03)
SPECIMEN OUTDATE: NORMAL
SPHEROCYTES BLD QL SMEAR: ABNORMAL
SPHEROCYTES BLD QL SMEAR: ABNORMAL
URN SPEC COLLECT METH UR: NORMAL
WBC # BLD AUTO: 4.77 K/UL (ref 3.9–12.7)
WBC # BLD AUTO: 4.77 K/UL (ref 3.9–12.7)
WBC #/AREA URNS AUTO: 1 /HPF (ref 0–5)

## 2023-10-01 PROCEDURE — 80053 COMPREHEN METABOLIC PANEL: CPT | Performed by: PHYSICIAN ASSISTANT

## 2023-10-01 PROCEDURE — 86901 BLOOD TYPING SEROLOGIC RH(D): CPT | Performed by: PHYSICIAN ASSISTANT

## 2023-10-01 PROCEDURE — 85610 PROTHROMBIN TIME: CPT | Performed by: PHYSICIAN ASSISTANT

## 2023-10-01 PROCEDURE — 25000003 PHARM REV CODE 250: Performed by: TRANSPLANT SURGERY

## 2023-10-01 PROCEDURE — U0002 COVID-19 LAB TEST NON-CDC: HCPCS | Performed by: PHYSICIAN ASSISTANT

## 2023-10-01 PROCEDURE — 85730 THROMBOPLASTIN TIME PARTIAL: CPT | Performed by: TRANSPLANT SURGERY

## 2023-10-01 PROCEDURE — 63600175 PHARM REV CODE 636 W HCPCS: Performed by: NURSE PRACTITIONER

## 2023-10-01 PROCEDURE — 99223 1ST HOSP IP/OBS HIGH 75: CPT | Mod: 24,,, | Performed by: NURSE PRACTITIONER

## 2023-10-01 PROCEDURE — 99222 PR INITIAL HOSPITAL CARE,LEVL II: ICD-10-PCS | Mod: ,,, | Performed by: NURSE PRACTITIONER

## 2023-10-01 PROCEDURE — 20600001 HC STEP DOWN PRIVATE ROOM

## 2023-10-01 PROCEDURE — 36415 COLL VENOUS BLD VENIPUNCTURE: CPT | Performed by: TRANSPLANT SURGERY

## 2023-10-01 PROCEDURE — 81001 URINALYSIS AUTO W/SCOPE: CPT | Performed by: PHYSICIAN ASSISTANT

## 2023-10-01 PROCEDURE — 85025 COMPLETE CBC W/AUTO DIFF WBC: CPT | Performed by: PHYSICIAN ASSISTANT

## 2023-10-01 PROCEDURE — 99223 PR INITIAL HOSPITAL CARE,LEVL III: ICD-10-PCS | Mod: 24,,, | Performed by: NURSE PRACTITIONER

## 2023-10-01 PROCEDURE — 63600175 PHARM REV CODE 636 W HCPCS: Performed by: PHYSICIAN ASSISTANT

## 2023-10-01 PROCEDURE — 99222 1ST HOSP IP/OBS MODERATE 55: CPT | Mod: ,,, | Performed by: NURSE PRACTITIONER

## 2023-10-01 PROCEDURE — 25000003 PHARM REV CODE 250: Performed by: PHYSICIAN ASSISTANT

## 2023-10-01 RX ORDER — ACETAMINOPHEN 325 MG/1
650 TABLET ORAL EVERY 8 HOURS PRN
Status: DISCONTINUED | OUTPATIENT
Start: 2023-10-01 | End: 2023-10-03 | Stop reason: HOSPADM

## 2023-10-01 RX ORDER — MYCOPHENOLATE MOFETIL 250 MG/1
500 CAPSULE ORAL 2 TIMES DAILY
Status: DISCONTINUED | OUTPATIENT
Start: 2023-10-01 | End: 2023-10-03 | Stop reason: HOSPADM

## 2023-10-01 RX ORDER — CARVEDILOL 6.25 MG/1
6.25 TABLET ORAL 2 TIMES DAILY WITH MEALS
Status: DISCONTINUED | OUTPATIENT
Start: 2023-10-01 | End: 2023-10-03 | Stop reason: HOSPADM

## 2023-10-01 RX ORDER — IBUPROFEN 200 MG
16 TABLET ORAL
Status: DISCONTINUED | OUTPATIENT
Start: 2023-10-01 | End: 2023-10-03 | Stop reason: HOSPADM

## 2023-10-01 RX ORDER — TACROLIMUS 1 MG/1
3 CAPSULE ORAL 2 TIMES DAILY
Status: DISCONTINUED | OUTPATIENT
Start: 2023-10-01 | End: 2023-10-03 | Stop reason: HOSPADM

## 2023-10-01 RX ORDER — SODIUM CHLORIDE 0.9 % (FLUSH) 0.9 %
10 SYRINGE (ML) INJECTION
Status: DISCONTINUED | OUTPATIENT
Start: 2023-10-01 | End: 2023-10-03 | Stop reason: HOSPADM

## 2023-10-01 RX ORDER — ERGOCALCIFEROL 1.25 MG/1
50000 CAPSULE ORAL
COMMUNITY
End: 2023-10-19

## 2023-10-01 RX ORDER — GLUCAGON 1 MG
1 KIT INJECTION
Status: DISCONTINUED | OUTPATIENT
Start: 2023-10-01 | End: 2023-10-03 | Stop reason: HOSPADM

## 2023-10-01 RX ORDER — SULFAMETHOXAZOLE AND TRIMETHOPRIM 400; 80 MG/1; MG/1
1 TABLET ORAL DAILY
Status: DISCONTINUED | OUTPATIENT
Start: 2023-10-01 | End: 2023-10-03 | Stop reason: HOSPADM

## 2023-10-01 RX ORDER — HEPARIN SODIUM,PORCINE/D5W 25000/250
0-40 INTRAVENOUS SOLUTION INTRAVENOUS CONTINUOUS
Status: DISCONTINUED | OUTPATIENT
Start: 2023-10-01 | End: 2023-10-02

## 2023-10-01 RX ORDER — INSULIN ASPART 100 [IU]/ML
0-5 INJECTION, SOLUTION INTRAVENOUS; SUBCUTANEOUS
Status: DISCONTINUED | OUTPATIENT
Start: 2023-10-01 | End: 2023-10-03 | Stop reason: HOSPADM

## 2023-10-01 RX ORDER — CALCIUM CARBONATE 500(1250)
TABLET ORAL DAILY
COMMUNITY
End: 2023-10-19

## 2023-10-01 RX ORDER — NAPROXEN SODIUM 220 MG/1
81 TABLET, FILM COATED ORAL DAILY
Status: DISCONTINUED | OUTPATIENT
Start: 2023-10-01 | End: 2023-10-03 | Stop reason: HOSPADM

## 2023-10-01 RX ORDER — IBUPROFEN 200 MG
24 TABLET ORAL
Status: DISCONTINUED | OUTPATIENT
Start: 2023-10-01 | End: 2023-10-03 | Stop reason: HOSPADM

## 2023-10-01 RX ORDER — INSULIN ASPART 100 [IU]/ML
8 INJECTION, SOLUTION INTRAVENOUS; SUBCUTANEOUS
Status: DISCONTINUED | OUTPATIENT
Start: 2023-10-01 | End: 2023-10-03 | Stop reason: HOSPADM

## 2023-10-01 RX ORDER — VALGANCICLOVIR 450 MG/1
450 TABLET, FILM COATED ORAL DAILY
Status: DISCONTINUED | OUTPATIENT
Start: 2023-10-01 | End: 2023-10-03 | Stop reason: HOSPADM

## 2023-10-01 RX ORDER — FAMOTIDINE 20 MG/1
20 TABLET, FILM COATED ORAL NIGHTLY
Status: DISCONTINUED | OUTPATIENT
Start: 2023-10-01 | End: 2023-10-03 | Stop reason: HOSPADM

## 2023-10-01 RX ORDER — TALC
6 POWDER (GRAM) TOPICAL NIGHTLY PRN
Status: DISCONTINUED | OUTPATIENT
Start: 2023-10-01 | End: 2023-10-03 | Stop reason: HOSPADM

## 2023-10-01 RX ORDER — ONDANSETRON 8 MG/1
8 TABLET, ORALLY DISINTEGRATING ORAL EVERY 8 HOURS PRN
Status: DISCONTINUED | OUTPATIENT
Start: 2023-10-01 | End: 2023-10-03 | Stop reason: HOSPADM

## 2023-10-01 RX ADMIN — INSULIN ASPART 8 UNITS: 100 INJECTION, SOLUTION INTRAVENOUS; SUBCUTANEOUS at 12:10

## 2023-10-01 RX ADMIN — MYCOPHENOLATE MOFETIL 500 MG: 250 CAPSULE ORAL at 08:10

## 2023-10-01 RX ADMIN — INSULIN ASPART 8 UNITS: 100 INJECTION, SOLUTION INTRAVENOUS; SUBCUTANEOUS at 04:10

## 2023-10-01 RX ADMIN — HEPARIN SODIUM 11.95 UNITS/KG/HR: 10000 INJECTION, SOLUTION INTRAVENOUS at 12:10

## 2023-10-01 RX ADMIN — INSULIN DETEMIR 20 UNITS: 100 INJECTION, SOLUTION SUBCUTANEOUS at 08:10

## 2023-10-01 RX ADMIN — FAMOTIDINE 20 MG: 20 TABLET ORAL at 08:10

## 2023-10-01 RX ADMIN — TACROLIMUS 3 MG: 1 CAPSULE ORAL at 05:10

## 2023-10-01 RX ADMIN — INSULIN ASPART 2 UNITS: 100 INJECTION, SOLUTION INTRAVENOUS; SUBCUTANEOUS at 04:10

## 2023-10-01 RX ADMIN — CARVEDILOL 6.25 MG: 6.25 TABLET, FILM COATED ORAL at 04:10

## 2023-10-01 NOTE — H&P
[facility] - Transplant Stepdown  Liver Transplant  History & Physical    Patient Name: Jed Chávez  MRN: [id number]  Admission Date: 10/1/2023  Code Status: Full Code  Primary Care Provider: Alee Pink MD  Post-Operative Day: 52     ORGAN:   LIVER  Disease Etiology: Primary Liver Malignancy: Hepatoma (HCC) and Cirrhosis  Donor Type:   Donation after Circulatory Death   CDC High Risk:   No  Donor CMV Status:   Donor CMV Status: Positive  Donor HBcAB:   Negative  Donor HCV Status:   Negative  Donor HBV SURENDRA:   Donor HCV SURENDRA: Negative  Whole or Partial: Whole Liver  Biliary Anastomosis: End to End  Arterial Anatomy: Standard    Subjective:     History of Present Illness:  Mr. Jed Chávez is a 64 y/o male with ESRD 2/2 HCC s/p DCD OLTxp 8/10/23. Of note, has a history of a hypertensive CVA in 2015. No focal deficits noted. Transplant surgery w/o issues. He progressed very well post operatively. Patient is being admitted for treatment of stenosis of the proper hepatic artery seen on Liver US and CTA as an outpatient. He reports feeling fine. Plan for heparin gtt, IR consult, and angiogram.       Past Medical History:   Diagnosis Date    Alcoholic cirrhosis     CVA (cerebral vascular accident)     DM (diabetes mellitus)     Dyslipidemia     Hepatocellular carcinoma     HTN (hypertension)     Intracranial hemorrhage     Skin cancer        Past Surgical History:   Procedure Laterality Date    HERNIA REPAIR N/A     LIVER TRANSPLANT N/A 8/9/2023    Procedure: TRANSPLANT, LIVER;  Surgeon: Ameya Suero MD;  Location: [facility] OR [address] [place] [address];  Service: Transplant;  Laterality: N/A;       Review of patient's allergies indicates:  No Known Allergies    Family History    None       Tobacco Use    Smoking status: Never    Smokeless tobacco: Never   Substance and Sexual Activity    Alcohol use: Not Currently    Drug use: Never    Sexual activity: Not Currently     Partners: Female       PTA Medications  "  Medication Sig    aspirin 81 MG Chew Take 1 tablet (81 mg total) by mouth once daily.    blood sugar diagnostic Strp Test blood glucose 3 (three) times daily.    blood-glucose meter Misc Test blood glucose as directed    blood-glucose sensor (FREESTYLE PUNEET 3 SENSOR) Neelam To change every 14 days    carvediloL (COREG) 3.125 MG tablet Take 2 tablets (6.25 mg total) by mouth 2 (two) times daily with meals. HOLD SBP <130, HR <60    famotidine (PEPCID) 20 MG tablet Take 1 tablet (20 mg total) by mouth every evening.    insulin glargine 100 units/mL SubQ pen Inject 28 Units into the skin once daily.    insulin lispro (HUMALOG KWIKPEN INSULIN) 100 unit/mL pen Inject 15 Units into the skin 3 (three) times daily before meals AND 1-10 Units 3 (three) times daily before meals. sliding scale insulin as needed. Max daily dose 75 units daily...    lancets Misc Test blood glucose 3 (three) times daily.    mycophenolate (CELLCEPT) 250 mg Cap Take 2 capsules (500 mg total) by mouth 2 (two) times daily.    pen needle, diabetic 32 gauge x 5/32" Ndle Use to inject insulin into the skin 4 (four) times daily.    sulfamethoxazole-trimethoprim 400-80mg (BACTRIM,SEPTRA) 400-80 mg per tablet Take 1 tablet by mouth once daily. Stop 2/6/24    tacrolimus (PROGRAF) 1 MG Cap Take 3 capsules (3 mg total) by mouth every 12 (twelve) hours.    tirzepatide (MOUNJARO) 2.5 mg/0.5 mL PnIj Inject 2.5 mg into the skin every 7 days.    valGANciclovir (VALCYTE) 450 mg Tab Take 1 tablet (450 mg total) by mouth once daily. Stop 2/6/24       Review of Systems   Constitutional:  Negative for activity change and appetite change.   Eyes:  Negative for visual disturbance.   Respiratory:  Negative for cough and shortness of breath.    Cardiovascular:  Negative for chest pain, palpitations and leg swelling.   Gastrointestinal:  Negative for abdominal distention, abdominal pain, constipation, diarrhea, nausea and vomiting.   Genitourinary:  Negative " for difficulty urinating.   Musculoskeletal:  Negative for arthralgias.   Skin:  Negative for wound.   Allergic/Immunologic: Positive for immunocompromised state.   Neurological:  Negative for dizziness.   Hematological:  Negative for adenopathy. Does not bruise/bleed easily.   Psychiatric/Behavioral:  Negative for agitation.      Objective:     Vital Signs (Most Recent):  Temp: 98 °F (36.7 °C) (10/01/23 1034)  Pulse: 71 (10/01/23 1034)  Resp: 16 (10/01/23 1034)  BP: 118/71 (10/01/23 1034)  SpO2: 97 % (10/01/23 1034) Vital Signs (24h Range):  Temp:  [98 °F (36.7 °C)] 98 °F (36.7 °C)  Pulse:  [71] 71  Resp:  [16] 16  SpO2:  [97 %] 97 %  BP: (118)/(71) 118/71     Weight: 93.6 kg (206 lb 7.4 oz)  Body mass index is 29.62 kg/m².    No intake or output data in the 24 hours ending 10/01/23 1104     Physical Exam  Vitals and nursing note reviewed.   Constitutional:       Appearance: He is well-developed.   HENT:      Head: Normocephalic.   Eyes:      Conjunctiva/sclera: Conjunctivae normal.   Cardiovascular:      Rate and Rhythm: Normal rate and regular rhythm.      Heart sounds: No murmur heard.  Pulmonary:      Effort: Pulmonary effort is normal.      Breath sounds: Normal breath sounds.   Abdominal:      General: Bowel sounds are normal. There is no distension.      Palpations: Abdomen is soft.      Tenderness: There is no abdominal tenderness.   Musculoskeletal:         General: Normal range of motion.      Cervical back: Normal range of motion.   Skin:     General: Skin is warm and dry.      Capillary Refill: Capillary refill takes less than 2 seconds.   Neurological:      Mental Status: He is alert and oriented to person, place, and time.   Psychiatric:         Behavior: Behavior normal.         Thought Content: Thought content normal.         Judgment: Judgment normal.          Laboratory:  CBC:   Recent Labs   Lab 09/25/23  0903   WBC 3.23*   RBC 4.28*   HGB 12.1*   HCT 37.6*   *   MCV 88   MCH 28.3   MCHC  32.2     CMP:   Recent Labs   Lab 09/25/23  0903   GLU 96   CALCIUM 9.4   ALBUMIN 3.9   PROT 6.7      K 4.2   CO2 27      BUN 14   CREATININE 0.8   ALKPHOS 112   ALT 29   AST 27   BILITOT 1.8*       Diagnostic Results:  US Liver Transplant Post:  Results for orders placed during the hospital encounter of 09/25/23    US Doppler Liver Transplant Post (xpd)    Narrative  EXAMINATION:  US DOPPLER LIVER TRANSPLANT POST (XPD)    CLINICAL HISTORY:  Liver transplant status    TECHNIQUE:  Limited abdominal ultrasound of the transplant liver with Doppler evaluation.  Color and spectral Doppler were performed.    COMPARISON:  Ultrasound 09/22/2023    FINDINGS:  Patient is status post orthotopic liver transplant.  The liver demonstrates homogeneous echotexture.  No focal hepatic lesions are seen.    Two similar fluid collections adjacent to the right lobe measuring 1.3 x 2.5 x 2.9 cm and 1.1 x 1.0 x 0.9 cm.    The common duct is dilated measuring 10 mm with persistent thickened walls, similar to prior exam.  No dilated intrahepatic radicles are seen.    Color flow and spectral waveform analysis was performed.  The main portal vein, right portal vein, left portal vein, middle hepatic vein, right hepatic vein, left hepatic vein, and IVC are patent with proper directional flow.    Anastomosis site of the main hepatic artery demonstrates a peak systolic velocity 795 cm/sec (previously 204 cm/sec).    Main hepatic artery demonstrates resistive index 0.34 with tardus parvus waveform.    Left hepatic artery shows resistive index 0.45 with delayed systolic upstroke    Anterior branch of the right hepatic artery demonstrates resistive index 0.37 with delayed systolic upstroke.    Posterior branch of the right hepatic artery demonstrates resistive index 0.35 with tardus parvus waveform    Right pleural effusion.    Impression  Interval markedly elevated peak systolic velocity of the extrahepatic main hepatic artery.   Diffusely low intraparenchymal arterial resistive indices with abnormal tardus parvus waveforms.  Findings are concerning for hepatic artery stenosis.    Stable dilated common bile duct with persistent thickened walls.    Similar peritransplant fluid collections.    Findings were relayed by me to Dr. Suero on 09/25/2023 at 13:55.    This report was flagged in Epic as abnormal.      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       Anemia of chronic disease  - H/H stable  - Monitor with daily cbc      Prophylactic immunotherapy  - See long term use of immunosuppressant medication      Long-term use of immunosuppressant medication  - Continue prograf. Monitor prograf level daily, monitor for toxic side effects, and adjust for therapeutic dose      Status post liver transplant  - LFTs stable  - See HAS      Type 2 diabetes mellitus, without long-term current use of insulin  - Endocrine consulted for help with management. Appreciate their assistance      Hypertension  - Continue coreg          Discharge Planning:  PharmD Review: BG still elevated, did patient ever send BG logs to endo?  (MM9/5)      Medical decision making for this encounter includes review of pertinent labs and diagnostic studies, assessment and planning, discussions with consulting providers, discussion with patient/family, and participation in multidisciplinary rounds. Time spent caring for patient: 30 minutes    Shadia Parkinson, TANNERP  Liver Transplant  [facility] - Transplant Stepdown

## 2023-10-01 NOTE — PLAN OF CARE
Pt aao x3. Wife at bedside. Pt steady on his feet. Labs collected and Heparin drip bolus given and drip initiated as per order. UA and UC collected. Vss. No acute distress. Pt NPO after midnight for stent placement tomorrow. Pt has old chevron incision healed. Blood sugar being monitored ac and hs. Pt has prandial insulin and correction dose available. See assessment for full chart details. Will continue to monitor, assess and adjust care as needed.

## 2023-10-01 NOTE — CONSULTS
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 Freestyle Jodie    BG readings on regimen: 100-300  Hypoglycemia on the regimen?  No  Missed doses on regimen?  No    Diabetes Complications include:     Hyperglycemia    Complicating diabetes co morbidities:   ESLD s/p transplant, previous CVA       HPI:   Patient is a 63 y.o. male with a diagnosis of ESLD 2/2 HCC s/p DCD OLTxp 8/10/23. Of note, has a history of a hypertensive CVA in . No focal deficits noted. Transplant surgery w/o issues. He progressed very well post operatively. Patient is being admitted for treatment of stenosis of the proper hepatic artery seen on Liver US and CTA as an outpatient. He reports feeling fine. Plan for heparin gtt, IR consult, and angiogram. Endocrinology consulted for management of T2DM.              Interval HPI:   Overnight events: Remains in TSU. BG within goal ranges at time of consult. NPO at midnight for angio on Monday. Diet diabetic 2000 Calorie  Diet NPO    Eatin%  Nausea: No  Hypoglycemia and intervention: No  Fever: No  TPN and/or TF: No  If yes, type of TF/TPN and rate: n/a    PMH, PSH, FH, SH reviewed     ROS:  Constitutional: Negative for weight changes.  Eyes: Negative for visual disturbance.  Respiratory: Negative for cough.   Cardiovascular: Negative for chest pain.  Gastrointestinal: Negative for nausea.  Endocrine: Negative for polyuria,  polydipsia.  Musculoskeletal: Negative for back pain.  Skin: Negative for rash.  Neurological: Negative for syncope.  Psychiatric/Behavioral: Negative for depression.      Review of Systems    Current Medications and/or Treatments Impacting Glycemic Control  Immunotherapy:    Immunosuppressants           Stop Route Frequency     mycophenolate capsule 500 mg         -- Oral 2 times daily     tacrolimus capsule 3 mg         -- Oral 2 times daily          Steroids:   Hormones (From admission, onward)      Start     Stop Route Frequency Ordered    10/01/23 1044  melatonin tablet 6 mg         -- Oral Nightly PRN 10/01/23 1044          Pressors:    Autonomic Drugs (From admission, onward)      None          Hyperglycemia/Diabetes Medications:   Antihyperglycemics (From admission, onward)      Start     Stop Route Frequency Ordered    10/01/23 2100  insulin detemir U-100 (Levemir) pen 20 Units         -- SubQ Nightly 10/01/23 1233    10/01/23 1145  insulin aspart U-100 pen 8 Units         -- SubQ 3 times daily with meals 10/01/23 1140    10/01/23 1044  insulin aspart U-100 pen 0-5 Units         -- SubQ Before meals & nightly PRN 10/01/23 1044             PHYSICAL EXAMINATION:  Vitals:    10/01/23 1148   BP: 104/67   Pulse: 71   Resp: 18   Temp: 98.2 °F (36.8 °C)     Body mass index is 28.8 kg/m².     Physical Exam   Constitutional: Well developed, well nourished, NAD.  ENT: External ears no masses with nose patent; normal hearing.  Neck: Supple; trachea midline.  Cardiovascular: Normal heart sounds, no LE edema. DP +2 bilaterally.  Lungs: Normal effort; lungs anterior bilaterally clear to auscultation.  Abdomen: Soft, no masses, no hernias.  MS: No clubbing or cyanosis of nails noted;  unable to assess gait.  Skin: No rashes, lesions, or ulcers; no nodules. Injection sites are ok. No lipo hypertropthy or atrophy.  Psychiatric: Good judgement and insight; normal mood and affect.  Neurological: Cranial nerves are grossly  "intact.  Foot: Nails in good condition, no amputations noted.        Labs Reviewed and Include   Recent Labs   Lab 10/01/23  1124   *   CALCIUM 9.3   ALBUMIN 3.7   PROT 6.5      K 4.4   CO2 26      BUN 17   CREATININE 0.9   ALKPHOS 107   ALT 32   AST 33   BILITOT 1.7*     Lab Results   Component Value Date    WBC 4.77 10/01/2023    WBC 4.77 10/01/2023    HGB 11.7 (L) 10/01/2023    HGB 11.7 (L) 10/01/2023    HCT 36.7 (L) 10/01/2023    HCT 36.7 (L) 10/01/2023    MCV 87 10/01/2023    MCV 87 10/01/2023     (L) 10/01/2023     (L) 10/01/2023     No results for input(s): "TSH", "FREET4" in the last 168 hours.  Lab Results   Component Value Date    HGBA1C 6.3 (H) 09/13/2023       Nutritional status:   Body mass index is 28.8 kg/m².  Lab Results   Component Value Date    ALBUMIN 3.7 10/01/2023    ALBUMIN 3.9 09/25/2023    ALBUMIN 3.6 09/18/2023     No results found for: "PREALBUMIN"    Estimated Creatinine Clearance: 99.5 mL/min (based on SCr of 0.9 mg/dL).    Accu-Checks  No results for input(s): "POCTGLUCOSE" in the last 72 hours.     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             Plan discussed with patient, family, and RN at bedside.     Kelly Rose NP  Endocrinology  [facility] - Transplant Stepdown  "

## 2023-10-01 NOTE — CONSULTS
Inpatient consult to Interventional Radiology  Consult performed by: Tessa Andrews MD  Consult ordered by: Dacia Cox PA-C  Reason for consult: Critical stenosis of transplant hepatic artery  Assessment/Recommendations: Notes, labs and images were reviewed. We will see patient for pre-op evaluation and consent later today or tomorrow morning.    Plan:  - Will proceed with visceral/hepatic angiogram and possible stent placement tomorrow  - Please keep NPO past midnight.  - Continue heparin drip, do NOT hold for procedure.

## 2023-10-01 NOTE — PROGRESS NOTES
Admit  Note     SW met with Patient and wife to assess patients needs due to patient being intubated.  Patient is a 63 y.o.  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 Patient received a liver transplant on 8/10/2023    Patient admitted from home on 10/1/2023 .  At this time, patient presents as alert and oriented x 4, pleasant, good eye contact, recall good, concentration/judgement good, calm, communicative, cooperative, and asking and answering questions appropriately.  At this time, patients caregiver, Leida, presents as alert and oriented x 4 and asking and answering questions appropriately.      Household/Family Systems (as reported by patients caregiver)     Patient resides with patient's wife, at     [address] [facility] [address].        Support system includes wife, daughter and in laws.  Patient does not have dependents that are need of being cared for.     Patients primary caregiver is Leida Chávez, patients wife, phone number [phone].  Confirmed patients contact information is [phone] (home);   Telephone Information:   Mobile [phone]   .    During admission, patient's caregiver plans to stay in patient's room.  Confirmed patient and patients caregivers do have access to reliable transportation.    Cognitive Status/Learning     Patient reports reading ability as college and states patient does have difficulty with seeing and wears glasses. Patients caregiver reports patient learns best by written information.     Needed: No.   Highest education level: Associate/Bachelor Degree    Vocation/Disability (as reported by patients caregiver)    Working for Income: yes - Pt reports he is on leave at this time.   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    Adherence     Patients reports a high level of  adherence to patients health care regimen.  Adherence counseling and education provided.  Patient's caregiver verbalizes understanding.    Substance Use    Patient reports the following:    Tobacco:  per chart review, patient quit tobacco use last year .  Alcohol:  per chart review, patient's last alcohol use was in 2022 .  Illicit Drugs/Non-prescribed Medications: none, patient denies any use.  Patients caregiver states clear understanding of the potential impact of substance use.  Substance abstinence/cessation counseling, education and resources provided and reviewed.     Services Utilizing/ADLS (as reported by patients caregiver)    Infusion Service: Prior to admission, patient utilizing? no  Home Health: Prior to admission, patient utilizing? no  DME: Prior to admission, yes- BP Cuff  Pulmonary/Cardiac Rehab: Prior to admission, no  Dialysis:  Prior to admission, no  Transplant Specialty Pharmacy:  Prior to admission, yes;  .    Prior to admission, patients caregiver reports patient was independent with ADLS and was driving.  Patients caregiver reports patient is able to care for self at this time..  Patient reports patient indicates a willingness to care for self.     Insurance/Medications    Insured by   Payer/Plan Subscr  Sex Relation Sub. Ins. ID Effective Group Num   1. [employer]* RODRIGUE BOYKIN 1960 Male Self [id number]                                    [address] [address]   2. [employer]* FIFI BOYKIN 1960 Female Spouse [id number]                                   [address] [address]      Primary Insurance (for UNOS reporting): Private Insurance  Secondary Insurance (for UNOS reporting): None    Patients caregiver reports patient is able to obtain and afford medications at this time and at time of discharge.    Living Will/Healthcare Power of     Patients caregiver reports patient has a LW and/or HCPA.   provided education regarding LW and  HCPA and the completion of forms.    Coping/Mental Health (as reported by patients caregiver)    Patient is coping adequately with the aid of  family members. Patients caregiver is coping adequately with the aid of  family members.      Discharge Planning (as reported by patients caregiver)    At time of discharge, patient plans to patient's home under the care of Leida. Patients wife will transport patient.  Per rounds today, expected discharge date has not been medically determined at this time. Patient and caretaker verbalizes understanding and is involved in treatment planning and discharge process.    Additional Concerns    Patient's caretaker denies additional needs and/or concerns at this time. Patient is being followed for needs, education, resources, information, emotional support, supportive counseling, and for supportive and skilled discharge plan of care.  providing ongoing psychosocial support, education, resources and d/c planning as needed.  SW remains available.  provided resource list, patient choice, psychosocial and supportive counseling, resources, education, assistance and discharge planning with patient and caregiver involvement, ongoing SW availability and services as appropriate.  remains available. Patient's caregiver verbalizes understanding and agreement with information reviewed,  availability and how to access available resources as needed. Patient denies additional needs and/or concerns at this time. Patient verbalizes understanding and agreement with information reviewed, social work availability, and how to access available resources as needed.

## 2023-10-01 NOTE — SUBJECTIVE & OBJECTIVE
"Past Medical History:   Diagnosis Date    Alcoholic cirrhosis     CVA (cerebral vascular accident)     DM (diabetes mellitus)     Dyslipidemia     Hepatocellular carcinoma     HTN (hypertension)     Intracranial hemorrhage     Skin cancer        Past Surgical History:   Procedure Laterality Date    HERNIA REPAIR N/A     LIVER TRANSPLANT N/A 8/9/2023    Procedure: TRANSPLANT, LIVER;  Surgeon: Ameya Suero MD;  Location: [facility] OR [address] [place] [address];  Service: Transplant;  Laterality: N/A;       Review of patient's allergies indicates:  No Known Allergies    Family History    None       Tobacco Use    Smoking status: Never    Smokeless tobacco: Never   Substance and Sexual Activity    Alcohol use: Not Currently    Drug use: Never    Sexual activity: Not Currently     Partners: Female       PTA Medications   Medication Sig    aspirin 81 MG Chew Take 1 tablet (81 mg total) by mouth once daily.    blood sugar diagnostic Strp Test blood glucose 3 (three) times daily.    blood-glucose meter Misc Test blood glucose as directed    blood-glucose sensor (GridPointSTYLE PUNEET 3 SENSOR) Neelam To change every 14 days    carvediloL (COREG) 3.125 MG tablet Take 2 tablets (6.25 mg total) by mouth 2 (two) times daily with meals. HOLD SBP <130, HR <60    famotidine (PEPCID) 20 MG tablet Take 1 tablet (20 mg total) by mouth every evening.    insulin glargine 100 units/mL SubQ pen Inject 28 Units into the skin once daily.    insulin lispro (HUMALOG KWIKPEN INSULIN) 100 unit/mL pen Inject 15 Units into the skin 3 (three) times daily before meals AND 1-10 Units 3 (three) times daily before meals. sliding scale insulin as needed. Max daily dose 75 units daily...    lancets Misc Test blood glucose 3 (three) times daily.    mycophenolate (CELLCEPT) 250 mg Cap Take 2 capsules (500 mg total) by mouth 2 (two) times daily.    pen needle, diabetic 32 gauge x 5/32" Ndle Use to inject insulin into the skin 4 (four) times daily.    sulfamethoxazole-trimethoprim " 400-80mg (BACTRIM,SEPTRA) 400-80 mg per tablet Take 1 tablet by mouth once daily. Stop 2/6/24    tacrolimus (PROGRAF) 1 MG Cap Take 3 capsules (3 mg total) by mouth every 12 (twelve) hours.    tirzepatide (MOUNJARO) 2.5 mg/0.5 mL PnIj Inject 2.5 mg into the skin every 7 days.    valGANciclovir (VALCYTE) 450 mg Tab Take 1 tablet (450 mg total) by mouth once daily. Stop 2/6/24       Review of Systems   Constitutional:  Negative for activity change and appetite change.   Eyes:  Negative for visual disturbance.   Respiratory:  Negative for cough and shortness of breath.    Cardiovascular:  Negative for chest pain, palpitations and leg swelling.   Gastrointestinal:  Negative for abdominal distention, abdominal pain, constipation, diarrhea, nausea and vomiting.   Genitourinary:  Negative for difficulty urinating.   Musculoskeletal:  Negative for arthralgias.   Skin:  Negative for wound.   Allergic/Immunologic: Positive for immunocompromised state.   Neurological:  Negative for dizziness.   Hematological:  Negative for adenopathy. Does not bruise/bleed easily.   Psychiatric/Behavioral:  Negative for agitation.      Objective:     Vital Signs (Most Recent):  Temp: 98 °F (36.7 °C) (10/01/23 1034)  Pulse: 71 (10/01/23 1034)  Resp: 16 (10/01/23 1034)  BP: 118/71 (10/01/23 1034)  SpO2: 97 % (10/01/23 1034) Vital Signs (24h Range):  Temp:  [98 °F (36.7 °C)] 98 °F (36.7 °C)  Pulse:  [71] 71  Resp:  [16] 16  SpO2:  [97 %] 97 %  BP: (118)/(71) 118/71     Weight: 93.6 kg (206 lb 7.4 oz)  Body mass index is 29.62 kg/m².    No intake or output data in the 24 hours ending 10/01/23 1104     Physical Exam  Vitals and nursing note reviewed.   Constitutional:       Appearance: He is well-developed.   HENT:      Head: Normocephalic.   Eyes:      Conjunctiva/sclera: Conjunctivae normal.   Cardiovascular:      Rate and Rhythm: Normal rate and regular rhythm.      Heart sounds: No murmur heard.  Pulmonary:      Effort: Pulmonary effort is  normal.      Breath sounds: Normal breath sounds.   Abdominal:      General: Bowel sounds are normal. There is no distension.      Palpations: Abdomen is soft.      Tenderness: There is no abdominal tenderness.   Musculoskeletal:         General: Normal range of motion.      Cervical back: Normal range of motion.   Skin:     General: Skin is warm and dry.      Capillary Refill: Capillary refill takes less than 2 seconds.   Neurological:      Mental Status: He is alert and oriented to person, place, and time.   Psychiatric:         Behavior: Behavior normal.         Thought Content: Thought content normal.         Judgment: Judgment normal.          Laboratory:  CBC:   Recent Labs   Lab 09/25/23  0903   WBC 3.23*   RBC 4.28*   HGB 12.1*   HCT 37.6*   *   MCV 88   MCH 28.3   MCHC 32.2     CMP:   Recent Labs   Lab 09/25/23  0903   GLU 96   CALCIUM 9.4   ALBUMIN 3.9   PROT 6.7      K 4.2   CO2 27      BUN 14   CREATININE 0.8   ALKPHOS 112   ALT 29   AST 27   BILITOT 1.8*       Diagnostic Results:  US Liver Transplant Post:  Results for orders placed during the hospital encounter of 09/25/23    US Doppler Liver Transplant Post (xpd)    Narrative  EXAMINATION:  US DOPPLER LIVER TRANSPLANT POST (XPD)    CLINICAL HISTORY:  Liver transplant status    TECHNIQUE:  Limited abdominal ultrasound of the transplant liver with Doppler evaluation.  Color and spectral Doppler were performed.    COMPARISON:  Ultrasound 09/22/2023    FINDINGS:  Patient is status post orthotopic liver transplant.  The liver demonstrates homogeneous echotexture.  No focal hepatic lesions are seen.    Two similar fluid collections adjacent to the right lobe measuring 1.3 x 2.5 x 2.9 cm and 1.1 x 1.0 x 0.9 cm.    The common duct is dilated measuring 10 mm with persistent thickened walls, similar to prior exam.  No dilated intrahepatic radicles are seen.    Color flow and spectral waveform analysis was performed.  The main portal vein,  right portal vein, left portal vein, middle hepatic vein, right hepatic vein, left hepatic vein, and IVC are patent with proper directional flow.    Anastomosis site of the main hepatic artery demonstrates a peak systolic velocity 795 cm/sec (previously 204 cm/sec).    Main hepatic artery demonstrates resistive index 0.34 with tardus parvus waveform.    Left hepatic artery shows resistive index 0.45 with delayed systolic upstroke    Anterior branch of the right hepatic artery demonstrates resistive index 0.37 with delayed systolic upstroke.    Posterior branch of the right hepatic artery demonstrates resistive index 0.35 with tardus parvus waveform    Right pleural effusion.    Impression  Interval markedly elevated peak systolic velocity of the extrahepatic main hepatic artery.  Diffusely low intraparenchymal arterial resistive indices with abnormal tardus parvus waveforms.  Findings are concerning for hepatic artery stenosis.    Stable dilated common bile duct with persistent thickened walls.    Similar peritransplant fluid collections.    Findings were relayed by me to Dr. Suero on 09/25/2023 at 13:55.    This report was flagged in Epic as abnormal.      Electronically signed by: Raj Kang  Date:    09/25/2023  Time:    14:00

## 2023-10-01 NOTE — SUBJECTIVE & OBJECTIVE
Interval HPI:   Overnight events: Remains in TSU. BG within goal ranges at time of consult. NPO at midnight for angio on Monday. Diet diabetic 2000 Calorie  Diet NPO    Eatin%  Nausea: No  Hypoglycemia and intervention: No  Fever: No  TPN and/or TF: No  If yes, type of TF/TPN and rate: n/a    PMH, PSH, FH, SH reviewed     ROS:  Constitutional: Negative for weight changes.  Eyes: Negative for visual disturbance.  Respiratory: Negative for cough.   Cardiovascular: Negative for chest pain.  Gastrointestinal: Negative for nausea.  Endocrine: Negative for polyuria, polydipsia.  Musculoskeletal: Negative for back pain.  Skin: Negative for rash.  Neurological: Negative for syncope.  Psychiatric/Behavioral: Negative for depression.      Review of Systems    Current Medications and/or Treatments Impacting Glycemic Control  Immunotherapy:    Immunosuppressants           Stop Route Frequency     mycophenolate capsule 500 mg         -- Oral 2 times daily     tacrolimus capsule 3 mg         -- Oral 2 times daily          Steroids:   Hormones (From admission, onward)      Start     Stop Route Frequency Ordered    10/01/23 1044  melatonin tablet 6 mg         -- Oral Nightly PRN 10/01/23 1044          Pressors:    Autonomic Drugs (From admission, onward)      None          Hyperglycemia/Diabetes Medications:   Antihyperglycemics (From admission, onward)      Start     Stop Route Frequency Ordered    10/01/23 2100  insulin detemir U-100 (Levemir) pen 20 Units         -- SubQ Nightly 10/01/23 1233    10/01/23 1145  insulin aspart U-100 pen 8 Units         -- SubQ 3 times daily with meals 10/01/23 1140    10/01/23 1044  insulin aspart U-100 pen 0-5 Units         -- SubQ Before meals & nightly PRN 10/01/23 1044             PHYSICAL EXAMINATION:  Vitals:    10/01/23 1148   BP: 104/67   Pulse: 71   Resp: 18   Temp: 98.2 °F (36.8 °C)     Body mass index is 28.8 kg/m².     Physical Exam   Constitutional: Well developed, well  nourished, NAD.  ENT: External ears no masses with nose patent; normal hearing.  Neck: Supple; trachea midline.  Cardiovascular: Normal heart sounds, no LE edema. DP +2 bilaterally.  Lungs: Normal effort; lungs anterior bilaterally clear to auscultation.  Abdomen: Soft, no masses, no hernias.  MS: No clubbing or cyanosis of nails noted;  unable to assess gait.  Skin: No rashes, lesions, or ulcers; no nodules. Injection sites are ok. No lipo hypertropthy or atrophy.  Psychiatric: Good judgement and insight; normal mood and affect.  Neurological: Cranial nerves are grossly intact.  Foot: Nails in good condition, no amputations noted.

## 2023-10-01 NOTE — ASSESSMENT & PLAN NOTE
BG goal 140-180    Start Levemir 20 units daily (reduced home dose )  Start Novolog 8 units TID with meals (20% reduction from home dose)  Low Dose Correction Scale  BG monitoring ac/hs    ** Please call Endocrine for any BG related issues **    Discharge plans: TBD    Lab Results   Component Value Date    HGBA1C 6.3 (H) 09/13/2023

## 2023-10-01 NOTE — H&P
VIR Pre-procedure H&P    Chief Complaint: transplant hepatic artery stenosis    History of Present Illness:  Jed Chávez is a 63 y.o. male who presents for with ESRD 2/2 HCC s/p DCD OLTxp 8/10/23., hypertensive CVA in 2015.  Patient was admitted for treatment of stenosis of the proper hepatic artery seen on Liver US and CTA as an outpatient.     VIR consulted for management of transplant hepatic artery stenosis. US and CTA independently reviewed demonstrating critical stenosis of the proper hepatic artery immediately distal to the GDA.          .  Admission H&P reviewed.  Past Medical History:   Diagnosis Date    Alcoholic cirrhosis     CVA (cerebral vascular accident)     DM (diabetes mellitus)     Dyslipidemia     Hepatocellular carcinoma     HTN (hypertension)     Intracranial hemorrhage     Skin cancer      Past Surgical History:   Procedure Laterality Date    HERNIA REPAIR N/A     LIVER TRANSPLANT N/A 8/9/2023    Procedure: TRANSPLANT, LIVER;  Surgeon: Ameya Suero MD;  Location: [facility] OR [address] [place] [address];  Service: Transplant;  Laterality: N/A;       Review of Systems:   As documented in primary team H&P    Home Meds:   Prior to Admission medications    Medication Sig Start Date End Date Taking? Authorizing Provider   aspirin 81 MG Chew Take 1 tablet (81 mg total) by mouth once daily. 8/16/23  Yes Braulio Huerta MD   carvediloL (COREG) 3.125 MG tablet Take 2 tablets (6.25 mg total) by mouth 2 (two) times daily with meals. HOLD SBP <130, HR <60 8/19/23 8/18/24 Yes Ameya Suero MD   blood sugar diagnostic Strp Test blood glucose 3 (three) times daily. 8/16/23   Braulio Huerta MD   blood-glucose meter Misc Test blood glucose as directed 8/16/23   Braulio Huerta MD   blood-glucose sensor (FREESTYLE PUNEET 3 SENSOR) Neelam To change every 14 days 9/12/23   Miriam Almaguer, NP   calcium carbonate (OS-NANCIE) 500 mg calcium (1,250 mg) tablet Take 2 tablets by mouth once daily.    Provider, Historical   ergocalciferol  "(ERGOCALCIFEROL) 50,000 unit Cap Take 50,000 Units by mouth every 7 days. Thursdays    Provider, Historical   famotidine (PEPCID) 20 MG tablet Take 1 tablet (20 mg total) by mouth every evening. 8/11/23 8/10/24  Damion Baum Jr., MD   insulin glargine 100 units/mL SubQ pen Inject 28 Units into the skin once daily. 9/12/23 9/11/24  Miriam Almaguer NP   insulin lispro (HUMALOG KWIKPEN INSULIN) 100 unit/mL pen Inject 15 Units into the skin 3 (three) times daily before meals AND 1-10 Units 3 (three) times daily before meals. sliding scale insulin as needed. Max daily dose 75 units daily... 9/6/23 9/5/24  Carson Jesus PA-C   lancets Misc Test blood glucose 3 (three) times daily. 8/16/23 8/15/24  Braulio Huerta MD   mycophenolate (CELLCEPT) 250 mg Cap Take 2 capsules (500 mg total) by mouth 2 (two) times daily. 9/20/23 9/19/24  Joleen Carr MD   pen needle, diabetic 32 gauge x 5/32" Ndle Use to inject insulin into the skin 4 (four) times daily. 8/16/23   Braulio Huerta MD   sulfamethoxazole-trimethoprim 400-80mg (BACTRIM,SEPTRA) 400-80 mg per tablet Take 1 tablet by mouth once daily. Stop 2/6/24 8/10/23 2/6/24  Damion Baum Jr., MD   tacrolimus (PROGRAF) 1 MG Cap Take 3 capsules (3 mg total) by mouth every 12 (twelve) hours. 9/13/23 9/12/24  Ameya Suero MD   tirzepatide (MOUNJARO) 2.5 mg/0.5 mL PnIj Inject 2.5 mg into the skin every 7 days. 9/12/23   Miriam Almaguer, NP   valGANciclovir (VALCYTE) 450 mg Tab Take 1 tablet (450 mg total) by mouth once daily. Stop 2/6/24 8/10/23 2/6/24  Damion Baum Jr., MD     Scheduled Meds:    aspirin  81 mg Oral Daily    carvediloL  6.25 mg Oral BID WM    famotidine  20 mg Oral QHS    insulin aspart U-100  8 Units Subcutaneous TIDWM    insulin detemir U-100  20 Units Subcutaneous QHS    mycophenolate  500 mg Oral BID    sulfamethoxazole-trimethoprim 400-80mg  1 tablet Oral Daily    tacrolimus  3 mg Oral BID    valGANciclovir  450 mg Oral " Daily     Continuous Infusions:    heparin (porcine) in D5W 11.953 Units/kg/hr (10/01/23 1230)     PRN Meds:acetaminophen, dextrose 10%, dextrose 10%, glucagon (human recombinant), glucose, glucose, heparin (PORCINE), heparin (PORCINE), insulin aspart U-100, melatonin, ondansetron, sodium chloride 0.9%  Anticoagulants/Antiplatelets: Heparin    Allergies: Review of patient's allergies indicates:  No Known Allergies  Sedation Hx: have not been any systemic reactions    Labs:  Recent Labs   Lab 10/01/23  1124   INR 1.1  1.1       Recent Labs   Lab 10/01/23  1124   WBC 4.77  4.77   HGB 11.7*  11.7*   HCT 36.7*  36.7*   MCV 87  87   *  109*      Recent Labs   Lab 10/01/23  1124   *      K 4.4      CO2 26   BUN 17   CREATININE 0.9   CALCIUM 9.3   ALT 32   AST 33   ALBUMIN 3.7   BILITOT 1.7*         Vitals:  Temp: 97.5 °F (36.4 °C) (10/01/23 1609)  Pulse: 73 (10/01/23 1609)  Resp: 18 (10/01/23 1609)  BP: 116/70 (10/01/23 1609)  SpO2: 95 % (10/01/23 1609)     Physical Exam:  ASA: per anesthesia  Mallampati: per anesthesia    General: no acute distress  Mental Status: alert and oriented to person, place and time  HEENT: normocephalic, atraumatic  Chest: unlabored breathing  Heart: regular heart rate  Abdomen: nondistended  Extremity: moves all extremities      ASSESSMENT/PLAN:     Jed Chávez is a 63 y.o. male who presents for with ESRD 2/2 HCC s/p DCD OLTxp 8/10/23., hypertensive CVA in 2015.  Patient was admitted for treatment of stenosis of the proper hepatic artery seen on Liver US and CTA as an outpatient.     VIR consulted for management of transplant hepatic artery stenosis. US and CTA independently reviewed demonstrating critical stenosis of the proper hepatic artery immediately distal to the GDA.      Plan:  Sedation Plan: per anesthesia  2. Patient will undergo visceral/hepatic angiogram and possible stent placement tomorrow. Informed consent obtained.   3. Keep NPO past midnight  4.  Continue heparin drip. Do NOT hold for the procedure.         Tessa Andrews MD  VIR Fellow

## 2023-10-01 NOTE — HPI
Reason for Consult: Management of T2DM, Hyperglycemia     Surgical Procedure and Date: OLTxp 8/10/23    Diabetes diagnosis year: > 10 years ago    Home Diabetes Medications:  Humalog 10 units TID with meals and correction scale, Lantus 28 units daily, Mounjaro 2.5 mg weekly  Lab Results   Component Value Date    HGBA1C 6.3 (H) 09/13/2023       How often checking glucose at home? Freestyle Jodie    BG readings on regimen: 100-300  Hypoglycemia on the regimen?  No  Missed doses on regimen?  No    Diabetes Complications include:     Hyperglycemia    Complicating diabetes co morbidities:   ESLD s/p transplant, previous CVA       HPI:   Patient is a 63 y.o. male with a diagnosis of ESLD 2/2 HCC s/p DCD OLTxp 8/10/23. Of note, has a history of a hypertensive CVA in 2015. No focal deficits noted. Transplant surgery w/o issues. He progressed very well post operatively. Patient is being admitted for treatment of stenosis of the proper hepatic artery seen on Liver US and CTA as an outpatient. He reports feeling fine. Plan for heparin gtt, IR consult, and angiogram. Endocrinology consulted for management of T2DM.

## 2023-10-02 ENCOUNTER — ANESTHESIA (OUTPATIENT)
Dept: INTERVENTIONAL RADIOLOGY/VASCULAR | Facility: HOSPITAL | Age: 63
DRG: 425 | End: 2023-10-02
Payer: COMMERCIAL

## 2023-10-02 LAB
ALBUMIN SERPL BCP-MCNC: 3.7 G/DL (ref 3.5–5.2)
ALP SERPL-CCNC: 110 U/L (ref 55–135)
ALT SERPL W/O P-5'-P-CCNC: 31 U/L (ref 10–44)
ANION GAP SERPL CALC-SCNC: 9 MMOL/L (ref 8–16)
ANISOCYTOSIS BLD QL SMEAR: SLIGHT
ANISOCYTOSIS BLD QL SMEAR: SLIGHT
APTT PPP: 40.2 SEC (ref 21–32)
APTT PPP: 46.1 SEC (ref 21–32)
AST SERPL-CCNC: 31 U/L (ref 10–40)
BASOPHILS # BLD AUTO: 0.07 K/UL (ref 0–0.2)
BASOPHILS # BLD AUTO: 0.07 K/UL (ref 0–0.2)
BASOPHILS NFR BLD: 1.5 % (ref 0–1.9)
BASOPHILS NFR BLD: 1.5 % (ref 0–1.9)
BILIRUB SERPL-MCNC: 1.7 MG/DL (ref 0.1–1)
BUN SERPL-MCNC: 15 MG/DL (ref 8–23)
CALCIUM SERPL-MCNC: 8.8 MG/DL (ref 8.7–10.5)
CHLORIDE SERPL-SCNC: 107 MMOL/L (ref 95–110)
CO2 SERPL-SCNC: 22 MMOL/L (ref 23–29)
CREAT SERPL-MCNC: 0.8 MG/DL (ref 0.5–1.4)
DIFFERENTIAL METHOD: ABNORMAL
DIFFERENTIAL METHOD: ABNORMAL
EOSINOPHIL # BLD AUTO: 0.1 K/UL (ref 0–0.5)
EOSINOPHIL # BLD AUTO: 0.1 K/UL (ref 0–0.5)
EOSINOPHIL NFR BLD: 1.7 % (ref 0–8)
EOSINOPHIL NFR BLD: 1.7 % (ref 0–8)
ERYTHROCYTE [DISTWIDTH] IN BLOOD BY AUTOMATED COUNT: 15.1 % (ref 11.5–14.5)
ERYTHROCYTE [DISTWIDTH] IN BLOOD BY AUTOMATED COUNT: 15.1 % (ref 11.5–14.5)
EST. GFR  (NO RACE VARIABLE): >60 ML/MIN/1.73 M^2
GLUCOSE SERPL-MCNC: 85 MG/DL (ref 70–110)
HCT VFR BLD AUTO: 36.9 % (ref 40–54)
HCT VFR BLD AUTO: 36.9 % (ref 40–54)
HGB BLD-MCNC: 11.5 G/DL (ref 14–18)
HGB BLD-MCNC: 11.5 G/DL (ref 14–18)
IMM GRANULOCYTES # BLD AUTO: 0.02 K/UL (ref 0–0.04)
IMM GRANULOCYTES # BLD AUTO: 0.02 K/UL (ref 0–0.04)
IMM GRANULOCYTES NFR BLD AUTO: 0.4 % (ref 0–0.5)
IMM GRANULOCYTES NFR BLD AUTO: 0.4 % (ref 0–0.5)
LYMPHOCYTES # BLD AUTO: 2.8 K/UL (ref 1–4.8)
LYMPHOCYTES # BLD AUTO: 2.8 K/UL (ref 1–4.8)
LYMPHOCYTES NFR BLD: 61 % (ref 18–48)
LYMPHOCYTES NFR BLD: 61 % (ref 18–48)
MAGNESIUM SERPL-MCNC: 1.5 MG/DL (ref 1.6–2.6)
MCH RBC QN AUTO: 27.4 PG (ref 27–31)
MCH RBC QN AUTO: 27.4 PG (ref 27–31)
MCHC RBC AUTO-ENTMCNC: 31.2 G/DL (ref 32–36)
MCHC RBC AUTO-ENTMCNC: 31.2 G/DL (ref 32–36)
MCV RBC AUTO: 88 FL (ref 82–98)
MCV RBC AUTO: 88 FL (ref 82–98)
MONOCYTES # BLD AUTO: 0.3 K/UL (ref 0.3–1)
MONOCYTES # BLD AUTO: 0.3 K/UL (ref 0.3–1)
MONOCYTES NFR BLD: 7.1 % (ref 4–15)
MONOCYTES NFR BLD: 7.1 % (ref 4–15)
NEUTROPHILS # BLD AUTO: 1.3 K/UL (ref 1.8–7.7)
NEUTROPHILS # BLD AUTO: 1.3 K/UL (ref 1.8–7.7)
NEUTROPHILS NFR BLD: 28.3 % (ref 38–73)
NEUTROPHILS NFR BLD: 28.3 % (ref 38–73)
NRBC BLD-RTO: 0 /100 WBC
NRBC BLD-RTO: 0 /100 WBC
OVALOCYTES BLD QL SMEAR: ABNORMAL
OVALOCYTES BLD QL SMEAR: ABNORMAL
PLATELET # BLD AUTO: 102 K/UL (ref 150–450)
PLATELET # BLD AUTO: 102 K/UL (ref 150–450)
PLATELET BLD QL SMEAR: ABNORMAL
PLATELET BLD QL SMEAR: ABNORMAL
PMV BLD AUTO: 10 FL (ref 9.2–12.9)
PMV BLD AUTO: 10 FL (ref 9.2–12.9)
POCT GLUCOSE: 147 MG/DL (ref 70–110)
POCT GLUCOSE: 183 MG/DL (ref 70–110)
POCT GLUCOSE: 76 MG/DL (ref 70–110)
POIKILOCYTOSIS BLD QL SMEAR: SLIGHT
POIKILOCYTOSIS BLD QL SMEAR: SLIGHT
POTASSIUM SERPL-SCNC: 3.8 MMOL/L (ref 3.5–5.1)
PROT SERPL-MCNC: 6.4 G/DL (ref 6–8.4)
RBC # BLD AUTO: 4.2 M/UL (ref 4.6–6.2)
RBC # BLD AUTO: 4.2 M/UL (ref 4.6–6.2)
SODIUM SERPL-SCNC: 138 MMOL/L (ref 136–145)
TACROLIMUS BLD-MCNC: 8.2 NG/ML (ref 5–15)
WBC # BLD AUTO: 4.64 K/UL (ref 3.9–12.7)
WBC # BLD AUTO: 4.64 K/UL (ref 3.9–12.7)

## 2023-10-02 PROCEDURE — 63600175 PHARM REV CODE 636 W HCPCS: Performed by: NURSE PRACTITIONER

## 2023-10-02 PROCEDURE — 85730 THROMBOPLASTIN TIME PARTIAL: CPT | Performed by: NURSE PRACTITIONER

## 2023-10-02 PROCEDURE — D9220A PRA ANESTHESIA: ICD-10-PCS | Mod: CRNA,,, | Performed by: STUDENT IN AN ORGANIZED HEALTH CARE EDUCATION/TRAINING PROGRAM

## 2023-10-02 PROCEDURE — 63600175 PHARM REV CODE 636 W HCPCS: Performed by: RADIOLOGY

## 2023-10-02 PROCEDURE — 80197 ASSAY OF TACROLIMUS: CPT | Performed by: PHYSICIAN ASSISTANT

## 2023-10-02 PROCEDURE — 80053 COMPREHEN METABOLIC PANEL: CPT | Performed by: PHYSICIAN ASSISTANT

## 2023-10-02 PROCEDURE — 25000003 PHARM REV CODE 250: Performed by: STUDENT IN AN ORGANIZED HEALTH CARE EDUCATION/TRAINING PROGRAM

## 2023-10-02 PROCEDURE — 25000003 PHARM REV CODE 250: Performed by: TRANSPLANT SURGERY

## 2023-10-02 PROCEDURE — 94761 N-INVAS EAR/PLS OXIMETRY MLT: CPT

## 2023-10-02 PROCEDURE — 85025 COMPLETE CBC W/AUTO DIFF WBC: CPT | Performed by: PHYSICIAN ASSISTANT

## 2023-10-02 PROCEDURE — D9220A PRA ANESTHESIA: Mod: CRNA,,, | Performed by: STUDENT IN AN ORGANIZED HEALTH CARE EDUCATION/TRAINING PROGRAM

## 2023-10-02 PROCEDURE — 20600001 HC STEP DOWN PRIVATE ROOM

## 2023-10-02 PROCEDURE — 99233 PR SUBSEQUENT HOSPITAL CARE,LEVL III: ICD-10-PCS | Mod: 24,,, | Performed by: NURSE PRACTITIONER

## 2023-10-02 PROCEDURE — 83735 ASSAY OF MAGNESIUM: CPT | Performed by: PHYSICIAN ASSISTANT

## 2023-10-02 PROCEDURE — 27000221 HC OXYGEN, UP TO 24 HOURS

## 2023-10-02 PROCEDURE — 63600175 PHARM REV CODE 636 W HCPCS: Performed by: PHYSICIAN ASSISTANT

## 2023-10-02 PROCEDURE — 25500020 PHARM REV CODE 255: Performed by: TRANSPLANT SURGERY

## 2023-10-02 PROCEDURE — D9220A PRA ANESTHESIA: Mod: ANES,,, | Performed by: ANESTHESIOLOGY

## 2023-10-02 PROCEDURE — 63600175 PHARM REV CODE 636 W HCPCS: Performed by: STUDENT IN AN ORGANIZED HEALTH CARE EDUCATION/TRAINING PROGRAM

## 2023-10-02 PROCEDURE — 25000003 PHARM REV CODE 250: Performed by: PHYSICIAN ASSISTANT

## 2023-10-02 PROCEDURE — D9220A PRA ANESTHESIA: ICD-10-PCS | Mod: ANES,,, | Performed by: ANESTHESIOLOGY

## 2023-10-02 PROCEDURE — 36415 COLL VENOUS BLD VENIPUNCTURE: CPT | Performed by: NURSE PRACTITIONER

## 2023-10-02 PROCEDURE — 99233 SBSQ HOSP IP/OBS HIGH 50: CPT | Mod: 24,,, | Performed by: NURSE PRACTITIONER

## 2023-10-02 RX ORDER — LIDOCAINE HYDROCHLORIDE 20 MG/ML
INJECTION INTRAVENOUS
Status: DISCONTINUED | OUTPATIENT
Start: 2023-10-02 | End: 2023-10-02

## 2023-10-02 RX ORDER — PROCHLORPERAZINE EDISYLATE 5 MG/ML
5 INJECTION INTRAMUSCULAR; INTRAVENOUS EVERY 30 MIN PRN
Status: DISCONTINUED | OUTPATIENT
Start: 2023-10-02 | End: 2023-10-03 | Stop reason: HOSPADM

## 2023-10-02 RX ORDER — ASPIRIN 325 MG
TABLET ORAL
Status: DISCONTINUED | OUTPATIENT
Start: 2023-10-02 | End: 2023-10-02

## 2023-10-02 RX ORDER — CLOPIDOGREL 300 MG/1
TABLET, FILM COATED ORAL
Status: DISCONTINUED | OUTPATIENT
Start: 2023-10-02 | End: 2023-10-02

## 2023-10-02 RX ORDER — HEPARIN SODIUM 1000 [USP'U]/ML
INJECTION, SOLUTION INTRAVENOUS; SUBCUTANEOUS
Status: DISCONTINUED | OUTPATIENT
Start: 2023-10-02 | End: 2023-10-02

## 2023-10-02 RX ORDER — NITROGLYCERIN 5 MG/ML
INJECTION, SOLUTION INTRAVENOUS
Status: COMPLETED | OUTPATIENT
Start: 2023-10-02 | End: 2023-10-02

## 2023-10-02 RX ORDER — PHENYLEPHRINE HYDROCHLORIDE 10 MG/ML
INJECTION INTRAVENOUS
Status: DISCONTINUED | OUTPATIENT
Start: 2023-10-02 | End: 2023-10-02

## 2023-10-02 RX ORDER — FENTANYL CITRATE 50 UG/ML
INJECTION, SOLUTION INTRAMUSCULAR; INTRAVENOUS
Status: DISCONTINUED | OUTPATIENT
Start: 2023-10-02 | End: 2023-10-02

## 2023-10-02 RX ORDER — CLOPIDOGREL BISULFATE 75 MG/1
75 TABLET ORAL DAILY
Qty: 90 TABLET | Refills: 0 | Status: ON HOLD | OUTPATIENT
Start: 2023-10-02 | End: 2023-10-30 | Stop reason: SDUPTHER

## 2023-10-02 RX ORDER — PROPOFOL 10 MG/ML
VIAL (ML) INTRAVENOUS
Status: DISCONTINUED | OUTPATIENT
Start: 2023-10-02 | End: 2023-10-02

## 2023-10-02 RX ORDER — ASPIRIN 81 MG/1
81 TABLET ORAL DAILY
Qty: 360 TABLET | Refills: 0 | Status: SHIPPED | OUTPATIENT
Start: 2023-10-02 | End: 2023-10-03 | Stop reason: HOSPADM

## 2023-10-02 RX ORDER — HYDROMORPHONE HYDROCHLORIDE 1 MG/ML
0.2 INJECTION, SOLUTION INTRAMUSCULAR; INTRAVENOUS; SUBCUTANEOUS EVERY 5 MIN PRN
Status: DISCONTINUED | OUTPATIENT
Start: 2023-10-02 | End: 2023-10-03 | Stop reason: HOSPADM

## 2023-10-02 RX ORDER — ROCURONIUM BROMIDE 10 MG/ML
INJECTION, SOLUTION INTRAVENOUS
Status: DISCONTINUED | OUTPATIENT
Start: 2023-10-02 | End: 2023-10-02

## 2023-10-02 RX ORDER — ONDANSETRON 2 MG/ML
4 INJECTION INTRAMUSCULAR; INTRAVENOUS DAILY PRN
Status: DISCONTINUED | OUTPATIENT
Start: 2023-10-02 | End: 2023-10-03 | Stop reason: HOSPADM

## 2023-10-02 RX ORDER — MIDAZOLAM HYDROCHLORIDE 1 MG/ML
INJECTION INTRAMUSCULAR; INTRAVENOUS
Status: DISCONTINUED | OUTPATIENT
Start: 2023-10-02 | End: 2023-10-02

## 2023-10-02 RX ADMIN — NITROGLYCERIN 200 MCG: 5 INJECTION, SOLUTION INTRAVENOUS at 02:10

## 2023-10-02 RX ADMIN — PHENYLEPHRINE HYDROCHLORIDE 100 MCG: 10 INJECTION INTRAVENOUS at 12:10

## 2023-10-02 RX ADMIN — SODIUM CHLORIDE 0.4 MCG/KG/MIN: 9 INJECTION, SOLUTION INTRAVENOUS at 01:10

## 2023-10-02 RX ADMIN — FENTANYL CITRATE 50 MCG: 0.05 INJECTION, SOLUTION INTRAMUSCULAR; INTRAVENOUS at 12:10

## 2023-10-02 RX ADMIN — MYCOPHENOLATE MOFETIL 500 MG: 250 CAPSULE ORAL at 08:10

## 2023-10-02 RX ADMIN — ROCURONIUM BROMIDE 10 MG: 10 INJECTION, SOLUTION INTRAVENOUS at 01:10

## 2023-10-02 RX ADMIN — CARVEDILOL 6.25 MG: 6.25 TABLET, FILM COATED ORAL at 10:10

## 2023-10-02 RX ADMIN — CLOPIDOGREL BISULFATE 300 MG: 300 TABLET, FILM COATED ORAL at 02:10

## 2023-10-02 RX ADMIN — HEPARIN SODIUM 12 UNITS/KG/HR: 10000 INJECTION, SOLUTION INTRAVENOUS at 05:10

## 2023-10-02 RX ADMIN — MIDAZOLAM HYDROCHLORIDE 2 MG: 1 INJECTION INTRAMUSCULAR; INTRAVENOUS at 12:10

## 2023-10-02 RX ADMIN — PROPOFOL 150 MG: 10 INJECTION, EMULSION INTRAVENOUS at 12:10

## 2023-10-02 RX ADMIN — SODIUM CHLORIDE: 0.9 INJECTION, SOLUTION INTRAVENOUS at 12:10

## 2023-10-02 RX ADMIN — PHENYLEPHRINE HYDROCHLORIDE 100 MCG: 10 INJECTION INTRAVENOUS at 01:10

## 2023-10-02 RX ADMIN — INSULIN ASPART 8 UNITS: 100 INJECTION, SOLUTION INTRAVENOUS; SUBCUTANEOUS at 05:10

## 2023-10-02 RX ADMIN — HEPARIN SODIUM 2000 UNITS: 1000 INJECTION, SOLUTION INTRAVENOUS; SUBCUTANEOUS at 01:10

## 2023-10-02 RX ADMIN — PROPOFOL 20 MG: 10 INJECTION, EMULSION INTRAVENOUS at 01:10

## 2023-10-02 RX ADMIN — ASPIRIN 325 MG ORAL TABLET 325 MG: 325 PILL ORAL at 02:10

## 2023-10-02 RX ADMIN — ROCURONIUM BROMIDE 30 MG: 10 INJECTION, SOLUTION INTRAVENOUS at 12:10

## 2023-10-02 RX ADMIN — INSULIN DETEMIR 20 UNITS: 100 INJECTION, SOLUTION SUBCUTANEOUS at 09:10

## 2023-10-02 RX ADMIN — TACROLIMUS 3 MG: 1 CAPSULE ORAL at 08:10

## 2023-10-02 RX ADMIN — ROCURONIUM BROMIDE 20 MG: 10 INJECTION, SOLUTION INTRAVENOUS at 01:10

## 2023-10-02 RX ADMIN — TACROLIMUS 3 MG: 1 CAPSULE ORAL at 05:10

## 2023-10-02 RX ADMIN — MYCOPHENOLATE MOFETIL 500 MG: 250 CAPSULE ORAL at 09:10

## 2023-10-02 RX ADMIN — IOHEXOL 130 ML: 300 INJECTION, SOLUTION INTRAVENOUS at 02:10

## 2023-10-02 RX ADMIN — VALGANCICLOVIR 450 MG: 450 TABLET, FILM COATED ORAL at 05:10

## 2023-10-02 RX ADMIN — FAMOTIDINE 20 MG: 20 TABLET ORAL at 09:10

## 2023-10-02 RX ADMIN — SULFAMETHOXAZOLE AND TRIMETHOPRIM 1 TABLET: 400; 80 TABLET ORAL at 05:10

## 2023-10-02 RX ADMIN — LIDOCAINE HYDROCHLORIDE 50 MG: 20 INJECTION INTRAVENOUS at 12:10

## 2023-10-02 NOTE — ASSESSMENT & PLAN NOTE
- Seen on Liver US and CTA   - Plan for heparin gtt  - IR consulted  - NPO for visceral angiogram today with IR  - Continue to monitor.

## 2023-10-02 NOTE — PROCEDURES
VIR procedure note      Pre Op Diagnosis: Hepatic artery stenosis  Post Op Diagnosis: Proper & right hepatic artery occlusion    Procedure: Visceral angiogram with angioplasty and stent    Procedure performed by:  Tessa Andrews MD / Jones Gong MD / Ramiro Snider MD    Written Informed Consent Obtained: Yes  Specimen Removed: NO  Estimated Blood Loss: Minimal    Findings:   R SFA access (high bifurcation)    Celiac and common hepatic angiogram showed occlusion of proper hepatic artery    3 and 4 mm angioplasty (Fulton balloon) of proper and right hepatic artery    Post angioplasty angiogram showed patent hepatic arteries with non-flow limiting retrograde dissection of proper hepatic artery    4.5 mm x 1.5 mm SE BMS was placed into the proper hepatic artery with post stent angiogram showing successful recanalization.      Patient tolerated procedure well.       Plan:    Loading doses aspirin and plavix were given post op  Place patient on aspirin 81 mg for life and plavix 75 mg for 3-6 months. Prescriptions were placed and sent to patient's pharmacy.  Follow up in IR clinic in 3 months with liver doppler US.           Tessa Andrews MD  VIR Fellow

## 2023-10-02 NOTE — NURSING
Pt arrived to [facility] for hepatic artery stent placement with anesthesia,escorted to room by RN and CRNA who will assume care. Pt oriented to unit and staff. Plan of care reviewed with patient, patient verbalizes understanding. Comfort measures utilized. Pt safely transferred from stretcher to procedural table. Fall risk reviewed with patient, fall risk interventions maintained with direct observation/attendance. Safety strap applied, positioner pillows utilized to minimize pressure points. Blankets applied. Pt prepped and draped utilizing standard sterile technique. Patient placed on continuous monitoring, as required by sedation policy. Timeouts completed utilizing standard universal time-out, per department and facility policy. RN to remain at bedside, continuous monitoring maintained. Pt resting comfortably. Denies pain/discomfort. Will continue to monitor. See flow sheets for monitoring, medication administration, and updates.

## 2023-10-02 NOTE — PROGRESS NOTES
[facility] - Transplant Stepdown  Liver Transplant  Progress Note    Patient Name: eJd Chávez  MRN: [id number]  Admission Date: 10/1/2023  Hospital Length of Stay: 1 days  Code Status: Full Code  Primary Care Provider: Alee Pink MD  Post-Operative Day: 53    ORGAN:   LIVER  Disease Etiology: Primary Liver Malignancy: Hepatoma (HCC) and Cirrhosis  Donor Type:   Donation after Circulatory Death   CDC High Risk:   No  Donor CMV Status:   Donor CMV Status: Positive  Donor HBcAB:   Negative  Donor HCV Status:   Negative  Donor HBV SURENDRA:   Donor HCV SURENDRA: Negative  Whole or Partial: Whole Liver  Biliary Anastomosis: End to End  Arterial Anatomy: Standard  Subjective:     History of Present Illness:  Mr. Jed Chávez is a 62 y/o male with ESRD 2/2 HCC s/p DCD OLTxp 8/10/23. Of note, has a history of a hypertensive CVA in 2015. No focal deficits noted. Transplant surgery w/o issues. He progressed very well post operatively. Patient is being admitted for treatment of stenosis of the proper hepatic artery seen on Liver US and CTA as an outpatient. He reports feeling fine. Plan for heparin gtt, IR consult, and angiogram.       Hospital Course:  Patient admitted for HAS. Heparin gtt initiated 10/1. IR consulted for visceral angiogram with angioplasty.     Interval History: no acute events overnight. Feels well. LFTs normal. CTA and Liver US with severe stenosis of proper hepatic artery. Heparin gtt initiated yesterday. NPO this AM for visceral angiogram with IR.       Scheduled Meds:   aspirin  81 mg Oral Daily    carvediloL  6.25 mg Oral BID WM    famotidine  20 mg Oral QHS    insulin aspart U-100  8 Units Subcutaneous TIDWM    insulin detemir U-100  20 Units Subcutaneous QHS    mycophenolate  500 mg Oral BID    sulfamethoxazole-trimethoprim 400-80mg  1 tablet Oral Daily    tacrolimus  3 mg Oral BID    valGANciclovir  450 mg Oral Daily     Continuous Infusions:   heparin (porcine) in D5W 12 Units/kg/hr  (10/02/23 0524)     PRN Meds:acetaminophen, dextrose 10%, dextrose 10%, glucagon (human recombinant), glucose, glucose, heparin (PORCINE), heparin (PORCINE), insulin aspart U-100, melatonin, ondansetron, sodium chloride 0.9%    Review of Systems   Constitutional:  Negative for appetite change, chills, fatigue and fever.   Respiratory:  Negative for cough, chest tightness and shortness of breath.    Cardiovascular:  Negative for chest pain, palpitations and leg swelling.   Gastrointestinal:  Negative for abdominal distention, abdominal pain, constipation, diarrhea, nausea and vomiting.   Genitourinary:  Negative for difficulty urinating, dysuria, frequency and urgency.   Musculoskeletal:  Negative for back pain and neck pain.   Skin:  Negative for color change, pallor, rash and wound.   Allergic/Immunologic: Positive for immunocompromised state.   Neurological:  Negative for dizziness, weakness and headaches.   Psychiatric/Behavioral:  Negative for confusion and sleep disturbance.      Objective:     Vital Signs (Most Recent):  Temp: 97.8 °F (36.6 °C) (10/02/23 1117)  Pulse: 73 (10/02/23 1117)  Resp: 20 (10/02/23 1117)  BP: 125/69 (10/02/23 1117)  SpO2: 97 % (10/02/23 1117) Vital Signs (24h Range):  Temp:  [97.5 °F (36.4 °C)-99 °F (37.2 °C)] 97.8 °F (36.6 °C)  Pulse:  [70-76] 73  Resp:  [18-20] 20  SpO2:  [95 %-97 %] 97 %  BP: (104-139)/(67-79) 125/69     Weight: 93.6 kg (206 lb 7.4 oz)  Body mass index is 28.8 kg/m².    Intake/Output - Last 3 Shifts       None             Physical Exam  Vitals and nursing note reviewed.   Constitutional:       Appearance: He is well-developed.   HENT:      Head: Normocephalic.   Eyes:      Conjunctiva/sclera: Conjunctivae normal.   Cardiovascular:      Rate and Rhythm: Normal rate and regular rhythm.      Heart sounds: No murmur heard.  Pulmonary:      Effort: Pulmonary effort is normal.      Breath sounds: Normal breath sounds.   Abdominal:      General: Bowel sounds are normal.  There is no distension.      Palpations: Abdomen is soft.      Tenderness: There is no abdominal tenderness.   Musculoskeletal:         General: Normal range of motion.      Cervical back: Normal range of motion.   Skin:     General: Skin is warm and dry.      Capillary Refill: Capillary refill takes less than 2 seconds.   Neurological:      Mental Status: He is alert and oriented to person, place, and time.   Psychiatric:         Behavior: Behavior normal.         Thought Content: Thought content normal.         Judgment: Judgment normal.          Laboratory:  Immunosuppressants           Stop Route Frequency     mycophenolate capsule 500 mg         -- Oral 2 times daily     tacrolimus capsule 3 mg         -- Oral 2 times daily          CBC:   Recent Labs   Lab 10/02/23  0546   WBC 4.64  4.64   RBC 4.20*  4.20*   HGB 11.5*  11.5*   HCT 36.9*  36.9*   *  102*   MCV 88  88   MCH 27.4  27.4   MCHC 31.2*  31.2*     CMP:   Recent Labs   Lab 10/02/23  0546   GLU 85   CALCIUM 8.8   ALBUMIN 3.7   PROT 6.4      K 3.8   CO2 22*      BUN 15   CREATININE 0.8   ALKPHOS 110   ALT 31   AST 31   BILITOT 1.7*     Coagulation:   Recent Labs   Lab 10/01/23  1124 10/01/23  1821 10/02/23  0546   INR 1.1  1.1  --   --    APTT 27.7  27.7   < > 40.2*    < > = values in this interval not displayed.     Labs within the past 24 hours have been reviewed.    Diagnostic Results:  US Liver Transplant Post:  Results for orders placed during the hospital encounter of 09/25/23    US Doppler Liver Transplant Post (xpd)    Narrative  EXAMINATION:  US DOPPLER LIVER TRANSPLANT POST (XPD)    CLINICAL HISTORY:  Liver transplant status    TECHNIQUE:  Limited abdominal ultrasound of the transplant liver with Doppler evaluation.  Color and spectral Doppler were performed.    COMPARISON:  Ultrasound 09/22/2023    FINDINGS:  Patient is status post orthotopic liver transplant.  The liver demonstrates homogeneous echotexture.  No  focal hepatic lesions are seen.    Two similar fluid collections adjacent to the right lobe measuring 1.3 x 2.5 x 2.9 cm and 1.1 x 1.0 x 0.9 cm.    The common duct is dilated measuring 10 mm with persistent thickened walls, similar to prior exam.  No dilated intrahepatic radicles are seen.    Color flow and spectral waveform analysis was performed.  The main portal vein, right portal vein, left portal vein, middle hepatic vein, right hepatic vein, left hepatic vein, and IVC are patent with proper directional flow.    Anastomosis site of the main hepatic artery demonstrates a peak systolic velocity 795 cm/sec (previously 204 cm/sec).    Main hepatic artery demonstrates resistive index 0.34 with tardus parvus waveform.    Left hepatic artery shows resistive index 0.45 with delayed systolic upstroke    Anterior branch of the right hepatic artery demonstrates resistive index 0.37 with delayed systolic upstroke.    Posterior branch of the right hepatic artery demonstrates resistive index 0.35 with tardus parvus waveform    Right pleural effusion.    Impression  Interval markedly elevated peak systolic velocity of the extrahepatic main hepatic artery.  Diffusely low intraparenchymal arterial resistive indices with abnormal tardus parvus waveforms.  Findings are concerning for hepatic artery stenosis.    Stable dilated common bile duct with persistent thickened walls.    Similar peritransplant fluid collections.    Findings were relayed by me to Dr. Suero on 09/25/2023 at 13:55.    This report was flagged in Epic as abnormal.      Electronically signed by: Raj Kang  Date:    09/25/2023  Time:    14:00    Debility/Functional status: No debility noted.          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       Type 2 diabetes mellitus with  hyperglycemia        Anemia of chronic disease  - H/H stable  - Monitor with daily cbc      Prophylactic immunotherapy  - See long term use of immunosuppressant medication      Long-term use of immunosuppressant medication  - Continue prograf. Monitor prograf level daily, monitor for toxic side effects, and adjust for therapeutic dose      Status post liver transplant  - LFTs stable  - See HAS      Type 2 diabetes mellitus, without long-term current use of insulin  - Endocrine consulted for help with management. Appreciate their assistance      Hypertension  - Continue coreg          VTE Risk Mitigation (From admission, onward)         Ordered     heparin 25,000 units in dextrose 5% (100 units/ml) IV bolus from bag - ADDITIONAL PRN BOLUS - 60 units/kg  As needed (PRN)        Question:  Heparin Infusion Adjustment (DO NOT MODIFY ANSWER)  Answer:  \\[email or web address]\epic\Images\Pharmacy\HeparinInfusions\heparin LOW INTENSITY nomogram for [facility] [id number]    10/01/23 1109     heparin 25,000 units in dextrose 5% (100 units/ml) IV bolus from bag - ADDITIONAL PRN BOLUS - 30 units/kg  As needed (PRN)        Question:  Heparin Infusion Adjustment (DO NOT MODIFY ANSWER)  Answer:  \\[email or web address]\epic\Images\Pharmacy\HeparinInfusions\heparin LOW INTENSITY nomogram for [facility] [id number]    10/01/23 1109     heparin 25,000 units in dextrose 5% 250 mL (100 units/mL) infusion LOW INTENSITY nomogram - [facility]  Continuous        Question Answer Comment   Heparin Infusion Adjustment (DO NOT MODIFY ANSWER) \\[email or web address]\epic\Images\Pharmacy\HeparinInfusions\heparin LOW INTENSITY nomogram for [facility] [id number].pdf    Begin at (in units/kg/hr) 12        10/01/23 1109     IP VTE LOW RISK PATIENT  Once         10/01/23 1044     Place sequential compression device  Until discontinued         10/01/23 1044                The patients clinical status was discussed at multidisplinary rounds, involving transplant surgery, transplant medicine, pharmacy, nursing,  nutrition, and social work.    Discharge Planning: not stable for dc at this time. Possible dc tomorrow.     Medical decision making for this encounter includes review of pertinent labs and diagnostic studies, assessment and planning, discussions with consulting providers, discussion with patient/family, and participation in multidisciplinary rounds. Time spent caring for patient: 60 minutes.    Davida Busatmante DNP  Liver Transplant  [facility] - Transplant Stepdown

## 2023-10-02 NOTE — ANESTHESIA PREPROCEDURE EVALUATION
10/02/2023  Jed Chávez is a 63 y.o., male.  Pre-operative evaluation for * No procedures listed *    Chief Complaint:stenosis of the proper hepatic artery     PMH:  HCC  S/p OLT 8/10/23  Past Surgical History:   Procedure Laterality Date    HERNIA REPAIR N/A     LIVER TRANSPLANT N/A 8/9/2023    Procedure: TRANSPLANT, LIVER;  Surgeon: Ameya Suero MD;  Location: [facility] OR [address] [place] [address];  Service: Transplant;  Laterality: N/A;         Vital Signs Range (Last 24H):  Temp:  [36.4 °C (97.5 °F)-37.2 °C (99 °F)]   Pulse:  [70-77]   Resp:  [18-20]   BP: (104-139)/(67-79)   SpO2:  [95 %-97 %]       CBC:     Recent Labs   Lab 09/04/23  0906 09/07/23  0844 09/11/23  0846 09/18/23  1028 09/25/23  0903 10/01/23  1124 10/02/23  0546   WBC 4.95 4.14 4.70 3.60* 3.23* 4.77  4.77 4.64  4.64   RBC 4.05* 4.12* 4.31* 4.18* 4.28* 4.22*  4.22* 4.20*  4.20*   HGB 12.0* 12.3* 12.8* 12.1* 12.1* 11.7*  11.7* 11.5*  11.5*   HCT 38.4* 39.1* 40.9 36.9* 37.6* 36.7*  36.7* 36.9*  36.9*    231 188 123* 108* 109*  109* 102*  102*   MCV 95 95 95 88 88 87  87 88  88   MCH 29.6 29.9 29.7 28.9 28.3 27.7  27.7 27.4  27.4   MCHC 31.3* 31.5* 31.3* 32.8 32.2 31.9*  31.9* 31.2*  31.2*       CMP:   Recent Labs   Lab 09/04/23  0906 09/07/23  0844 09/11/23  0846 09/13/23  1155 09/18/23  1028 09/25/23  0903 10/01/23  1124 10/02/23  0546    140 139 140 136 144 140 138   K 4.1 4.5 4.2 4.2 4.4 4.2 4.4 3.8    103 102 104 101 108 108 107   CO2 27 24 27 22* 25 27 26 22*   BUN 18 16 10 16 16 14 17 15   CREATININE 0.8 0.8 0.8 0.9 0.9 0.8 0.9 0.8   * 211* 208* 154* 325* 96 124* 85   MG  --  1.6 1.5*  --  1.5*  --   --  1.5*   CALCIUM 9.0 8.9 9.3 9.7 9.1 9.4 9.3 8.8   ALBUMIN 3.5 3.5 3.6 3.7 3.6 3.9 3.7 3.7   PROT 6.5 6.4 6.6 6.6 6.6 6.7 6.5 6.4   ALKPHOS 180* 133 129 125 115 112 107 110   ALT 63* 44 35 30 30 29 32 31   AST 26  28 21 20 22 27 33 31   BILITOT 1.4* 1.3* 1.7* 1.8* 1.9* 1.8* 1.7* 1.7*       INR:  Recent Labs   Lab 10/01/23  1124 10/01/23  1821 10/01/23  2337 10/02/23  0546   INR 1.1  1.1  --   --   --    APTT 27.7  27.7 49.0* 46.1* 40.2*         Diagnostic Studies:      EKD Echo:  Summary     The estimated ejection fraction is 65%.   The quantitatively derived ejection fraction is 63%.   During stress, the following significant arrhythmias were observed: occasional PACs.   The left ventricle is normal in size with normal systolic function.   Mild to moderate left atrial enlargement.   Normal left ventricular diastolic function.   Mild to moderate tricuspid regurgitation.   Normal right ventricular size with normal right ventricular systolic function.   Moderate right atrial enlargement.   Trivial pericardial effusion.   Indeterminate central venous pressure.   There is abnormal septal wall motion.   The stress echo portion of this study is negative for myocardial ischemia.    Pre-op Assessment    I have reviewed the Patient Summary Reports.     I have reviewed the Nursing Notes. I have reviewed the NPO Status.   I have reviewed the Medications.     Review of Systems  Anesthesia Hx:  No problems with previous Anesthesia    Social:  Non-Smoker, No Alcohol Use    Cardiovascular:  Cardiovascular Normal     Pulmonary:  Pulmonary Normal    Neurological:  Neurology Normal        Physical Exam  General: Well nourished, Cooperative, Alert and Oriented    Airway:  Mallampati: I   Mouth Opening: Normal  TM Distance: Normal  Tongue: Normal  Neck ROM: Normal ROM    Dental:  Intact    Chest/Lungs:  Normal Respiratory Rate        Anesthesia Plan  Type of Anesthesia, risks & benefits discussed:    Anesthesia Type: Gen Natural Airway, MAC, Gen ETT  Intra-op Monitoring Plan: Standard ASA Monitors  Post Op Pain Control Plan: multimodal analgesia  Induction:  IV  ASA Score: 3  Day of Surgery Review of History &  Physical: H&P Update referred to the surgeon/provider.    Ready For Surgery From Anesthesia Perspective.     .

## 2023-10-02 NOTE — SUBJECTIVE & OBJECTIVE
Scheduled Meds:   aspirin  81 mg Oral Daily    carvediloL  6.25 mg Oral BID WM    famotidine  20 mg Oral QHS    insulin aspart U-100  8 Units Subcutaneous TIDWM    insulin detemir U-100  20 Units Subcutaneous QHS    mycophenolate  500 mg Oral BID    sulfamethoxazole-trimethoprim 400-80mg  1 tablet Oral Daily    tacrolimus  3 mg Oral BID    valGANciclovir  450 mg Oral Daily     Continuous Infusions:   heparin (porcine) in D5W 12 Units/kg/hr (10/02/23 0524)     PRN Meds:acetaminophen, dextrose 10%, dextrose 10%, glucagon (human recombinant), glucose, glucose, heparin (PORCINE), heparin (PORCINE), insulin aspart U-100, melatonin, ondansetron, sodium chloride 0.9%    Review of Systems   Constitutional:  Negative for appetite change, chills, fatigue and fever.   Respiratory:  Negative for cough, chest tightness and shortness of breath.    Cardiovascular:  Negative for chest pain, palpitations and leg swelling.   Gastrointestinal:  Negative for abdominal distention, abdominal pain, constipation, diarrhea, nausea and vomiting.   Genitourinary:  Negative for difficulty urinating, dysuria, frequency and urgency.   Musculoskeletal:  Negative for back pain and neck pain.   Skin:  Negative for color change, pallor, rash and wound.   Allergic/Immunologic: Positive for immunocompromised state.   Neurological:  Negative for dizziness, weakness and headaches.   Psychiatric/Behavioral:  Negative for confusion and sleep disturbance.      Objective:     Vital Signs (Most Recent):  Temp: 97.8 °F (36.6 °C) (10/02/23 1117)  Pulse: 73 (10/02/23 1117)  Resp: 20 (10/02/23 1117)  BP: 125/69 (10/02/23 1117)  SpO2: 97 % (10/02/23 1117) Vital Signs (24h Range):  Temp:  [97.5 °F (36.4 °C)-99 °F (37.2 °C)] 97.8 °F (36.6 °C)  Pulse:  [70-76] 73  Resp:  [18-20] 20  SpO2:  [95 %-97 %] 97 %  BP: (104-139)/(67-79) 125/69     Weight: 93.6 kg (206 lb 7.4 oz)  Body mass index is 28.8 kg/m².    Intake/Output - Last 3 Shifts       None             Physical  Exam  Vitals and nursing note reviewed.   Constitutional:       Appearance: He is well-developed.   HENT:      Head: Normocephalic.   Eyes:      Conjunctiva/sclera: Conjunctivae normal.   Cardiovascular:      Rate and Rhythm: Normal rate and regular rhythm.      Heart sounds: No murmur heard.  Pulmonary:      Effort: Pulmonary effort is normal.      Breath sounds: Normal breath sounds.   Abdominal:      General: Bowel sounds are normal. There is no distension.      Palpations: Abdomen is soft.      Tenderness: There is no abdominal tenderness.   Musculoskeletal:         General: Normal range of motion.      Cervical back: Normal range of motion.   Skin:     General: Skin is warm and dry.      Capillary Refill: Capillary refill takes less than 2 seconds.   Neurological:      Mental Status: He is alert and oriented to person, place, and time.   Psychiatric:         Behavior: Behavior normal.         Thought Content: Thought content normal.         Judgment: Judgment normal.          Laboratory:  Immunosuppressants           Stop Route Frequency     mycophenolate capsule 500 mg         -- Oral 2 times daily     tacrolimus capsule 3 mg         -- Oral 2 times daily          CBC:   Recent Labs   Lab 10/02/23  0546   WBC 4.64  4.64   RBC 4.20*  4.20*   HGB 11.5*  11.5*   HCT 36.9*  36.9*   *  102*   MCV 88  88   MCH 27.4  27.4   MCHC 31.2*  31.2*     CMP:   Recent Labs   Lab 10/02/23  0546   GLU 85   CALCIUM 8.8   ALBUMIN 3.7   PROT 6.4      K 3.8   CO2 22*      BUN 15   CREATININE 0.8   ALKPHOS 110   ALT 31   AST 31   BILITOT 1.7*     Coagulation:   Recent Labs   Lab 10/01/23  1124 10/01/23  1821 10/02/23  0546   INR 1.1  1.1  --   --    APTT 27.7  27.7   < > 40.2*    < > = values in this interval not displayed.     Labs within the past 24 hours have been reviewed.    Diagnostic Results:  US Liver Transplant Post:  Results for orders placed during the hospital encounter of 09/25/23    US  Doppler Liver Transplant Post (xpd)    Narrative  EXAMINATION:  US DOPPLER LIVER TRANSPLANT POST (XPD)    CLINICAL HISTORY:  Liver transplant status    TECHNIQUE:  Limited abdominal ultrasound of the transplant liver with Doppler evaluation.  Color and spectral Doppler were performed.    COMPARISON:  Ultrasound 09/22/2023    FINDINGS:  Patient is status post orthotopic liver transplant.  The liver demonstrates homogeneous echotexture.  No focal hepatic lesions are seen.    Two similar fluid collections adjacent to the right lobe measuring 1.3 x 2.5 x 2.9 cm and 1.1 x 1.0 x 0.9 cm.    The common duct is dilated measuring 10 mm with persistent thickened walls, similar to prior exam.  No dilated intrahepatic radicles are seen.    Color flow and spectral waveform analysis was performed.  The main portal vein, right portal vein, left portal vein, middle hepatic vein, right hepatic vein, left hepatic vein, and IVC are patent with proper directional flow.    Anastomosis site of the main hepatic artery demonstrates a peak systolic velocity 795 cm/sec (previously 204 cm/sec).    Main hepatic artery demonstrates resistive index 0.34 with tardus parvus waveform.    Left hepatic artery shows resistive index 0.45 with delayed systolic upstroke    Anterior branch of the right hepatic artery demonstrates resistive index 0.37 with delayed systolic upstroke.    Posterior branch of the right hepatic artery demonstrates resistive index 0.35 with tardus parvus waveform    Right pleural effusion.    Impression  Interval markedly elevated peak systolic velocity of the extrahepatic main hepatic artery.  Diffusely low intraparenchymal arterial resistive indices with abnormal tardus parvus waveforms.  Findings are concerning for hepatic artery stenosis.    Stable dilated common bile duct with persistent thickened walls.    Similar peritransplant fluid collections.    Findings were relayed by me to Dr. Suero on 09/25/2023 at 13:55.    This  report was flagged in Epic as abnormal.      Electronically signed by: Raj Kang  Date:    09/25/2023  Time:    14:00    Debility/Functional status: No debility noted.

## 2023-10-02 NOTE — ANESTHESIA PROCEDURE NOTES
Intubation    Date/Time: 10/2/2023 12:39 PM    Performed by: Anita Alvarenga CRNA  Authorized by: Dixon Banks MD    Intubation:     Induction:  Intravenous    Intubated:  Postinduction    Mask Ventilation:  Easy with oral airway    Attempts:  1    Attempted By:  CRNA    Method of Intubation:  Video laryngoscopy    Blade:  [place] 3    Laryngeal View Grade: Grade I - full view of cords      Difficult Airway Encountered?: No      Complications:  None    Airway Device:  Oral endotracheal tube    Airway Device Size:  7.5    Style/Cuff Inflation:  Cuffed (inflated to minimal occlusive pressure)    Tube secured:  22    Secured at:  The teeth    Placement Verified By:  Capnometry    Complicating Factors:  None    Findings Post-Intubation:  BS equal bilateral and atraumatic/condition of teeth unchanged

## 2023-10-02 NOTE — PLAN OF CARE
63 year-old male admitted 10/1/23 for stenosis of hepatic artery. Pt has hx ofHCC, ETOH, liver txp 8/10/23, HTN, CVA, DM2, Anemia  -AAOx4, ambulates independently  -18 Lt FA  -22 G RT FA  -Diabetic Diet  -Accuchecks AC/HS, prandial insulin  -IR for stent placement today  -monitoring Rt groin site, dressing CDI  -Heparin gtt discontinued  -Liver US scheduled for 10/3  -spouse at bedside, pt lying in bed, bed in lowest position, wheels locked, non-skid socks in place, call light within reach

## 2023-10-02 NOTE — PLAN OF CARE
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

## 2023-10-02 NOTE — NURSING
Hepatic artery stent placement after angioplasty complete. Pt tolerated well. VSS. No signs or symptoms of distress noted.Angioseal closure device in place. Hemostasis 1415. Pt to remain flat until 1615 Pt will be transferred to PACU bed after extubation/stabilization escorted by RN and CRNA who will give report.

## 2023-10-02 NOTE — CARE UPDATE
Care Update:     No acute events overnight. Patient on the TSU in room 01860/13342 A. Blood glucose stable. BG at goal on current insulin regimen (SSI, prandial, and basal insulin ). Steroid use- None.    Renal function- Normal   Vasopressors-  None       Diet NPO     POCT Glucose   Date Value Ref Range Status   10/02/2023 183 (H) 70 - 110 mg/dL Final   10/01/2023 161 (H) 70 - 110 mg/dL Final   10/01/2023 208 (H) 70 - 110 mg/dL Final     Lab Results   Component Value Date    HGBA1C 6.3 (H) 09/13/2023       Outpatient RegimenL  Diabetes Medications               insulin glargine 100 units/mL SubQ pen Inject 28 Units into the skin once daily.    insulin lispro (HUMALOG KWIKPEN INSULIN) 100 unit/mL pen Inject 15 Units into the skin 3 (three) times daily before meals AND 1-10 Units 3 (three) times daily before meals. sliding scale insulin as needed. Max daily dose 75 units daily...    tirzepatide (MOUNJARO) 2.5 mg/0.5 mL PnIj Inject 2.5 mg into the skin every 7 days.          Endocrine  Type 2 diabetes mellitus with hyperglycemia  BG goal 140-180     Levemir 20 units daily (reduced home dose )  Novolog 8 units TID with meals (20% reduction from home dose)  Low Dose Correction Scale  BG monitoring ac/hs       ** Please notify Endocrine for any change and/or advance in diet**  ** Please call Endocrine for any BG related issues **    Discharge Planning:   TBD. Please notify endocrinology prior to discharge.      Marquez Contreras DNP, FNP-C  Department of Endocrinology  Inpatient Glycemic Management

## 2023-10-02 NOTE — HOSPITAL COURSE
Patient admitted for HAS. Heparin gtt initiated 10/1. IR consulted for visceral angiogram with angioplasty.     Interval History: no acute events overnight. Feels well. LFTs normal. CTA and Liver US with severe stenosis of proper hepatic artery. Heparin gtt initiated yesterday. NPO this AM for visceral angiogram with IR.

## 2023-10-02 NOTE — NURSING
Patient presents to IR pre-procedure for hepatic angiogram. PIV x2 flushed/patent. Heparin drip infusing as ordered - see Dr. Andrews's note from 10/1. Consents verified, procedure and anesthesia consents on paper. IR board notified patient ready.

## 2023-10-02 NOTE — TRANSFER OF CARE
"Anesthesia Transfer of Care Note    Patient: Jed Chávez    Procedure(s) Performed: * No procedures listed *    Patient location: PACU    Anesthesia Type: general    Transport from OR: Transported from OR on 6-10 L/min O2 by face mask with adequate spontaneous ventilation    Post pain: adequate analgesia    Post assessment: no apparent anesthetic complications and tolerated procedure well    Post vital signs: stable    Level of consciousness: responds to stimulation and sedated    Nausea/Vomiting: no nausea/vomiting    Complications: none    Transfer of care protocol was followed      Last vitals:   Visit Vitals  BP (!) 104/58   Pulse 76   Temp 37.2 °C (98.9 °F) (Temporal)   Resp 20   Ht 5' 11" (1.803 m)   Wt 93.6 kg (206 lb 7.4 oz)   SpO2 100%   BMI 28.80 kg/m²     "

## 2023-10-03 ENCOUNTER — PATIENT MESSAGE (OUTPATIENT)
Dept: ENDOCRINOLOGY | Facility: HOSPITAL | Age: 63
End: 2023-10-03
Payer: COMMERCIAL

## 2023-10-03 VITALS
HEIGHT: 71 IN | HEART RATE: 79 BPM | TEMPERATURE: 99 F | SYSTOLIC BLOOD PRESSURE: 135 MMHG | BODY MASS INDEX: 28.9 KG/M2 | RESPIRATION RATE: 16 BRPM | DIASTOLIC BLOOD PRESSURE: 77 MMHG | WEIGHT: 206.44 LBS | OXYGEN SATURATION: 94 %

## 2023-10-03 LAB
ALBUMIN SERPL BCP-MCNC: 3.6 G/DL (ref 3.5–5.2)
ALP SERPL-CCNC: 106 U/L (ref 55–135)
ALT SERPL W/O P-5'-P-CCNC: 33 U/L (ref 10–44)
ANION GAP SERPL CALC-SCNC: 9 MMOL/L (ref 8–16)
ANISOCYTOSIS BLD QL SMEAR: SLIGHT
AST SERPL-CCNC: 36 U/L (ref 10–40)
BASOPHILS # BLD AUTO: 0.06 K/UL (ref 0–0.2)
BASOPHILS NFR BLD: 1.2 % (ref 0–1.9)
BILIRUB SERPL-MCNC: 1.9 MG/DL (ref 0.1–1)
BUN SERPL-MCNC: 13 MG/DL (ref 8–23)
CALCIUM SERPL-MCNC: 8.5 MG/DL (ref 8.7–10.5)
CHLORIDE SERPL-SCNC: 108 MMOL/L (ref 95–110)
CO2 SERPL-SCNC: 21 MMOL/L (ref 23–29)
CREAT SERPL-MCNC: 0.8 MG/DL (ref 0.5–1.4)
DIFFERENTIAL METHOD: ABNORMAL
EOSINOPHIL # BLD AUTO: 0.1 K/UL (ref 0–0.5)
EOSINOPHIL NFR BLD: 1 % (ref 0–8)
ERYTHROCYTE [DISTWIDTH] IN BLOOD BY AUTOMATED COUNT: 15.4 % (ref 11.5–14.5)
EST. GFR  (NO RACE VARIABLE): >60 ML/MIN/1.73 M^2
GLUCOSE SERPL-MCNC: 81 MG/DL (ref 70–110)
HCT VFR BLD AUTO: 37.2 % (ref 40–54)
HGB BLD-MCNC: 11.3 G/DL (ref 14–18)
IMM GRANULOCYTES # BLD AUTO: 0.02 K/UL (ref 0–0.04)
IMM GRANULOCYTES NFR BLD AUTO: 0.4 % (ref 0–0.5)
LYMPHOCYTES # BLD AUTO: 2.7 K/UL (ref 1–4.8)
LYMPHOCYTES NFR BLD: 52 % (ref 18–48)
MAGNESIUM SERPL-MCNC: 1.7 MG/DL (ref 1.6–2.6)
MCH RBC QN AUTO: 27.1 PG (ref 27–31)
MCHC RBC AUTO-ENTMCNC: 30.4 G/DL (ref 32–36)
MCV RBC AUTO: 89 FL (ref 82–98)
MONOCYTES # BLD AUTO: 0.3 K/UL (ref 0.3–1)
MONOCYTES NFR BLD: 6.7 % (ref 4–15)
NEUTROPHILS # BLD AUTO: 2 K/UL (ref 1.8–7.7)
NEUTROPHILS NFR BLD: 38.7 % (ref 38–73)
NRBC BLD-RTO: 0 /100 WBC
PLATELET # BLD AUTO: 105 K/UL (ref 150–450)
PLATELET BLD QL SMEAR: ABNORMAL
PMV BLD AUTO: 9.4 FL (ref 9.2–12.9)
POC ACTIVATED CLOTTING TIME K: 167 SEC (ref 74–137)
POC ACTIVATED CLOTTING TIME K: 185 SEC (ref 74–137)
POCT GLUCOSE: 81 MG/DL (ref 70–110)
POIKILOCYTOSIS BLD QL SMEAR: SLIGHT
POTASSIUM SERPL-SCNC: 4.1 MMOL/L (ref 3.5–5.1)
PROT SERPL-MCNC: 6.2 G/DL (ref 6–8.4)
RBC # BLD AUTO: 4.17 M/UL (ref 4.6–6.2)
SAMPLE: ABNORMAL
SAMPLE: ABNORMAL
SODIUM SERPL-SCNC: 138 MMOL/L (ref 136–145)
SPHEROCYTES BLD QL SMEAR: ABNORMAL
TACROLIMUS BLD-MCNC: 7.9 NG/ML (ref 5–15)
WBC # BLD AUTO: 5.1 K/UL (ref 3.9–12.7)

## 2023-10-03 PROCEDURE — 85025 COMPLETE CBC W/AUTO DIFF WBC: CPT | Performed by: PHYSICIAN ASSISTANT

## 2023-10-03 PROCEDURE — 25000003 PHARM REV CODE 250: Performed by: PHYSICIAN ASSISTANT

## 2023-10-03 PROCEDURE — 99232 PR SUBSEQUENT HOSPITAL CARE,LEVL II: ICD-10-PCS | Mod: ,,, | Performed by: PHYSICIAN ASSISTANT

## 2023-10-03 PROCEDURE — 99239 HOSP IP/OBS DSCHRG MGMT >30: CPT | Mod: 24,,, | Performed by: NURSE PRACTITIONER

## 2023-10-03 PROCEDURE — 25000003 PHARM REV CODE 250: Performed by: NURSE PRACTITIONER

## 2023-10-03 PROCEDURE — 83735 ASSAY OF MAGNESIUM: CPT | Performed by: PHYSICIAN ASSISTANT

## 2023-10-03 PROCEDURE — 36415 COLL VENOUS BLD VENIPUNCTURE: CPT | Performed by: PHYSICIAN ASSISTANT

## 2023-10-03 PROCEDURE — 80053 COMPREHEN METABOLIC PANEL: CPT | Performed by: PHYSICIAN ASSISTANT

## 2023-10-03 PROCEDURE — 99232 SBSQ HOSP IP/OBS MODERATE 35: CPT | Mod: ,,, | Performed by: PHYSICIAN ASSISTANT

## 2023-10-03 PROCEDURE — 80197 ASSAY OF TACROLIMUS: CPT | Performed by: PHYSICIAN ASSISTANT

## 2023-10-03 PROCEDURE — 63600175 PHARM REV CODE 636 W HCPCS: Performed by: PHYSICIAN ASSISTANT

## 2023-10-03 PROCEDURE — 99239 PR HOSPITAL DISCHARGE DAY,>30 MIN: ICD-10-PCS | Mod: 24,,, | Performed by: NURSE PRACTITIONER

## 2023-10-03 RX ORDER — INSULIN LISPRO 100 [IU]/ML
INJECTION, SOLUTION INTRAVENOUS; SUBCUTANEOUS
Qty: 12 ML | Refills: 11 | Status: SHIPPED | OUTPATIENT
Start: 2023-10-03 | End: 2023-10-19 | Stop reason: SDUPTHER

## 2023-10-03 RX ORDER — CLOPIDOGREL BISULFATE 75 MG/1
75 TABLET ORAL DAILY
Status: DISCONTINUED | OUTPATIENT
Start: 2023-10-03 | End: 2023-10-03 | Stop reason: HOSPADM

## 2023-10-03 RX ORDER — INSULIN GLARGINE 100 [IU]/ML
20 INJECTION, SOLUTION SUBCUTANEOUS DAILY
Qty: 6 ML | Refills: 11 | Status: SHIPPED | OUTPATIENT
Start: 2023-10-03 | End: 2023-10-19 | Stop reason: SDUPTHER

## 2023-10-03 RX ADMIN — MYCOPHENOLATE MOFETIL 500 MG: 250 CAPSULE ORAL at 09:10

## 2023-10-03 RX ADMIN — TACROLIMUS 3 MG: 1 CAPSULE ORAL at 09:10

## 2023-10-03 RX ADMIN — ASPIRIN 81 MG CHEWABLE TABLET 81 MG: 81 TABLET CHEWABLE at 09:10

## 2023-10-03 RX ADMIN — SULFAMETHOXAZOLE AND TRIMETHOPRIM 1 TABLET: 400; 80 TABLET ORAL at 09:10

## 2023-10-03 RX ADMIN — CLOPIDOGREL BISULFATE 75 MG: 75 TABLET ORAL at 09:10

## 2023-10-03 RX ADMIN — CARVEDILOL 6.25 MG: 6.25 TABLET, FILM COATED ORAL at 09:10

## 2023-10-03 RX ADMIN — VALGANCICLOVIR 450 MG: 450 TABLET, FILM COATED ORAL at 09:10

## 2023-10-03 NOTE — DISCHARGE SUMMARY
[facility] - Transplant Stepdown  Liver Transplant  Discharge Summary      Patient Name: Jed Chávez  MRN: [id number]  Admission Date: 10/1/2023  Hospital Length of Stay: 2 days  Discharge Date and Time:  10/03/2023 12:00 PM  Attending Physician: Lee Gill MD   Discharging Provider: Davida Bustamante DNP  Primary Care Provider: Alee Pink MD  HPI:   Mr. Jed Chávez is a 64 y/o male with ESRD 2/2 HCC s/p DCD OLTxp 8/10/23. Of note, has a history of a hypertensive CVA in 2015. No focal deficits noted. Transplant surgery w/o issues. He progressed very well post operatively. Patient is being admitted for treatment of stenosis of the proper hepatic artery seen on Liver US and CTA as an outpatient. He reports feeling fine. Plan for heparin gtt, IR consult, and angiogram.     Hospital Course:    Patient admitted for HAS. Heparin gtt initiated 10/1. IR consulted, hepatic angiogram showed occlusion of proper hepatic artery, post angioplasty (3 & 4mm balloon) angiogram with non-flow limiting retrograde dissection of proper hepatic artery, therefore stent placed into proper hepatic artery with post-stent angiogram showing successful recanalization. He was loaded with plavix and ASA, will continue ASA lifelong and plavix for 3-6 months. Patient to follow up in IR clinic in 3 months with Liver US. Liver US 10/3 showed normalization of peak systolic velocity of the extrahepatic main hepatic artery and RI's without abnormal waveforms. Patient is stable and ready for discharge. He will have labs in 1 week, clinic appointment in 2-4 weeks with hepatology. IR to schedule follow up appointment in 3 months.     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 [T86.49, I77.1] 09/29/2023 Yes    Type 2 diabetes mellitus with hyperglycemia [E11.65] 10/01/2023  Yes    Anemia of chronic disease [D63.8] 09/29/2023 Yes    Status post liver transplant [Z94.4] 08/12/2023 Not Applicable    Long-term use of immunosuppressant medication [Z79.60] 08/12/2023 Not Applicable    Prophylactic immunotherapy [Z29.89] 08/12/2023 Not Applicable    Hypertension [I10] 06/06/2023 Yes    Type 2 diabetes mellitus, without long-term current use of insulin [E11.9] 06/06/2023 Yes      Problems Resolved During this Admission:       Consults (From admission, onward)          Status Ordering Provider     Inpatient consult to Interventional Radiology  Once        Provider:  (Not yet assigned)    Completed JUAN ATWOOD     Inpatient consult to Endocrinology  Once        Provider:  (Not yet assigned)    Completed JUAN ATWOOD            Pending Diagnostic Studies:       None          Significant Diagnostic Studies: N/A    The patients clinical status was discussed at multidisplinary rounds, involving transplant surgery, transplant medicine, pharmacy, nursing, nutrition, and social work    Discharged Condition: good    Disposition: Home or Self Care    Follow Up: see hospital course    Patient Instructions:      Diet Adult Regular     No dressing needed     Notify your health care provider if you experience any of the following:  temperature >100.4     Notify your health care provider if you experience any of the following:  persistent nausea and vomiting or diarrhea     Notify your health care provider if you experience any of the following:  severe uncontrolled pain     Notify your health care provider if you experience any of the following:  redness, tenderness, or signs of infection (pain, swelling, redness, odor or green/yellow discharge around incision site)     Notify your health care provider if you experience any of the following:  difficulty breathing or increased cough     Notify your health care provider if you experience any of the following:  severe persistent headache  "    Notify your health care provider if you experience any of the following:  worsening rash     Notify your health care provider if you experience any of the following:  persistent dizziness, light-headedness, or visual disturbances     Notify your health care provider if you experience any of the following:  increased confusion or weakness     Notify your health care provider if you experience any of the following:   Order Comments: Any other concerning signs and symptoms. If you have not received your scheduled follow up within 24 hours of your discharge or for any questions or concerns, please call [phone] for further assistance.     Activity as tolerated     Medications:  Reconciled Home Medications:      Medication List        START taking these medications      clopidogreL 75 mg tablet  Commonly known as: PLAVIX  Take 1 tablet (75 mg total) by mouth once daily.            CHANGE how you take these medications      HumaLOG KwikPen Insulin 100 unit/mL pen  Generic drug: insulin lispro  Inject 8 Units into the skin 3 (three) times daily before meals AND 1-5 Units 3 (three) times daily before meals. sliding scale insulin as needed. Max daily dose 75 units daily...  What changed: See the new instructions.     LANTUS SOLOSTAR U-100 INSULIN glargine 100 units/mL SubQ pen  Generic drug: insulin  Inject 20 Units into the skin once daily.  What changed: how much to take            CONTINUE taking these medications      aspirin 81 MG Chew  Take 1 tablet (81 mg total) by mouth once daily.     BD ULTRA-FINE MERCEDES PEN NEEDLE 32 gauge x 5/32" Ndle  Generic drug: pen needle, diabetic  Use to inject insulin into the skin 4 (four) times daily.     calcium carbonate 500 mg calcium (1,250 mg) tablet  Commonly known as: OS-NANCIE  Take 2 tablets by mouth once daily.     carvediloL 3.125 MG tablet  Commonly known as: COREG  Take 2 tablets (6.25 mg total) by mouth 2 (two) times daily with meals. HOLD SBP <130, HR <60     CONTOUR " NEXT GEN METER Misc  Generic drug: blood-glucose meter  Test blood glucose as directed     CONTOUR NEXT TEST STRIPS Strp  Generic drug: blood sugar diagnostic  Test blood glucose 3 (three) times daily.     ergocalciferol 50,000 unit Cap  Commonly known as: ERGOCALCIFEROL  Take 50,000 Units by mouth every 7 days. Thursdays     famotidine 20 MG tablet  Commonly known as: PEPCID  Take 1 tablet (20 mg total) by mouth every evening.     FREESTYLE PUNEET 3 SENSOR Neelam  Generic drug: blood-glucose sensor  To change every 14 days     MICROLET LANCET Misc  Generic drug: lancets  Test blood glucose 3 (three) times daily.     MOUNJARO 2.5 mg/0.5 mL Pnij  Generic drug: tirzepatide  Inject 2.5 mg into the skin every 7 days.     mycophenolate 250 mg Cap  Commonly known as: CELLCEPT  Take 2 capsules (500 mg total) by mouth 2 (two) times daily.     sulfamethoxazole-trimethoprim 400-80mg 400-80 mg per tablet  Commonly known as: BACTRIM,SEPTRA  Take 1 tablet by mouth once daily. Stop 2/6/24     tacrolimus 1 MG Cap  Commonly known as: PROGRAF  Take 3 capsules (3 mg total) by mouth every 12 (twelve) hours.     valGANciclovir 450 mg Tab  Commonly known as: VALCYTE  Take 1 tablet (450 mg total) by mouth once daily. Stop 2/6/24            Time spent caring for patient (Greater than 1/2 spent in direct face-to-face contact): > 30 minutes    Davida Bustamante DNP  Liver Transplant  [facility] - Transplant Stepdown

## 2023-10-03 NOTE — PROGRESS NOTES
"Discharge Medication Note:    Hospital Course:  Patient admitted for IR hepatic and celiac angiogram with plasty of the proper and R hepatic artery and placement of BMS into the proper hepatic artery. US shows improved flow. Patient to take clopidigrel for 3-6 months and ASA for life.    Met with Jed Chávez at discharge to review discharge medications and to update the blue medication card.           Medication List        START taking these medications      clopidogreL 75 mg tablet  Commonly known as: PLAVIX  Take 1 tablet (75 mg total) by mouth once daily.            CHANGE how you take these medications      HumaLOG KwikPen Insulin 100 unit/mL pen  Generic drug: insulin lispro  Inject 8 Units into the skin 3 (three) times daily before meals AND 1-5 Units 3 (three) times daily before meals. sliding scale insulin as needed. Max daily dose 75 units daily...  What changed: See the new instructions.     LANTUS SOLOSTAR U-100 INSULIN glargine 100 units/mL SubQ pen  Generic drug: insulin  Inject 20 Units into the skin once daily.  What changed: how much to take            CONTINUE taking these medications      aspirin 81 MG Chew  Take 1 tablet (81 mg total) by mouth once daily.     BD ULTRA-FINE MERCEDES PEN NEEDLE 32 gauge x 5/32" Ndle  Generic drug: pen needle, diabetic  Use to inject insulin into the skin 4 (four) times daily.     calcium carbonate 500 mg calcium (1,250 mg) tablet  Commonly known as: OS-NANCIE     carvediloL 3.125 MG tablet  Commonly known as: COREG  Take 2 tablets (6.25 mg total) by mouth 2 (two) times daily with meals. HOLD SBP <130, HR <60     CONTOUR NEXT GEN METER Misc  Generic drug: blood-glucose meter  Test blood glucose as directed     CONTOUR NEXT TEST STRIPS Strp  Generic drug: blood sugar diagnostic  Test blood glucose 3 (three) times daily.     ergocalciferol 50,000 unit Cap  Commonly known as: ERGOCALCIFEROL     famotidine 20 MG tablet  Commonly known as: PEPCID  Take 1 tablet (20 mg total) by " mouth every evening.     FREESTYLE PUNEET 3 SENSOR Neelam  Generic drug: blood-glucose sensor  To change every 14 days     MICROLET LANCET Misc  Generic drug: lancets  Test blood glucose 3 (three) times daily.     MOUNJARO 2.5 mg/0.5 mL Pnij  Generic drug: tirzepatide  Inject 2.5 mg into the skin every 7 days.     mycophenolate 250 mg Cap  Commonly known as: CELLCEPT  Take 2 capsules (500 mg total) by mouth 2 (two) times daily.     sulfamethoxazole-trimethoprim 400-80mg 400-80 mg per tablet  Commonly known as: BACTRIM,SEPTRA  Take 1 tablet by mouth once daily. Stop 2/6/24     tacrolimus 1 MG Cap  Commonly known as: PROGRAF  Take 3 capsules (3 mg total) by mouth every 12 (twelve) hours.     valGANciclovir 450 mg Tab  Commonly known as: VALCYTE  Take 1 tablet (450 mg total) by mouth once daily. Stop 2/6/24               Where to Get Your Medications        These medications were sent to [facility] [address] [address]      Hours: Mon-Fri 7a-7p, Sat-Sun 10a-4p Phone: [phone]   clopidogreL 75 mg tablet  HumaLOG KwikPen Insulin 100 unit/mL pen  LANTUS SOLOSTAR U-100 INSULIN glargine 100 units/mL SubQ pen            The following medications have been placed on HOLD and should be restarted in the outpatient setting (when appropriate): None    Jed Chávez verbalized understanding and had the opportunity to ask questions.

## 2023-10-03 NOTE — PROGRESS NOTES
Interventional Radiology Progress Note      SUBJECTIVE:     History of Present Illness:  Jed Chávez is a 63 y.o. male with a PMHx of alcoholic cirrhosis and HCC s/p OLTx 8/10/23 (uncomplicated post op course), T2DM, HTN, h/o CVA 2015 w/o residual deficits who was admitted on 10/3/23 for treatment of severe HAS seen on outpatient US and CTA. Pt is now s/p IR hepatic and celiac angiogram with plasty of the proper and R hepatic artery and placement of BMS into the proper hepatic artery. Pt tolerated procedure well. He is doing well this AM, no complaints. Denies pain/swelling at R groin access site. Liver US completed this AM, shows improvement in MHA velocity and arterial resistive indices. LFTs and H/H stable.       Scheduled Meds:   aspirin  81 mg Oral Daily    carvediloL  6.25 mg Oral BID WM    clopidogreL  75 mg Oral Daily    famotidine  20 mg Oral QHS    insulin aspart U-100  8 Units Subcutaneous TIDWM    insulin detemir U-100  20 Units Subcutaneous QHS    mycophenolate  500 mg Oral BID    sulfamethoxazole-trimethoprim 400-80mg  1 tablet Oral Daily    tacrolimus  3 mg Oral BID    valGANciclovir  450 mg Oral Daily     Continuous Infusions:  PRN Meds:acetaminophen, dextrose 10%, dextrose 10%, glucagon (human recombinant), glucose, glucose, HYDROmorphone, insulin aspart U-100, melatonin, ondansetron, ondansetron, prochlorperazine, sodium chloride 0.9%    Review of patient's allergies indicates:  No Known Allergies    Past Medical History:   Diagnosis Date    Alcoholic cirrhosis     CVA (cerebral vascular accident)     DM (diabetes mellitus)     Dyslipidemia     Hepatocellular carcinoma     HTN (hypertension)     Intracranial hemorrhage     Skin cancer      Past Surgical History:   Procedure Laterality Date    HERNIA REPAIR N/A     LIVER TRANSPLANT N/A 8/9/2023    Procedure: TRANSPLANT, LIVER;  Surgeon: Ameya Suero MD;  Location: [facility] OR [address] [place] [address];  Service: Transplant;  Laterality: N/A;     History reviewed. No  pertinent family history.  Social History     Tobacco Use    Smoking status: Never    Smokeless tobacco: Never   Substance Use Topics    Alcohol use: Not Currently    Drug use: Never       OBJECTIVE:     Vital Signs (Most Recent)  Temp: 98.6 °F (37 °C) (10/03/23 0744)  Pulse: 79 (10/03/23 0744)  Resp: 16 (10/03/23 0744)  BP: 135/77 (10/03/23 0744)  SpO2: (!) 94 % (10/03/23 0744)    Physical Exam:  Physical Exam  Vitals and nursing note reviewed.   Constitutional:       General: He is not in acute distress.     Appearance: He is not ill-appearing.   HENT:      Head: Normocephalic and atraumatic.      Right Ear: External ear normal.      Left Ear: External ear normal.   Eyes:      Extraocular Movements: Extraocular movements intact.      Conjunctiva/sclera: Conjunctivae normal.      Pupils: Pupils are equal, round, and reactive to light.   Pulmonary:      Effort: Pulmonary effort is normal. No respiratory distress.   Abdominal:      General: Abdomen is flat.      Comments: Well healed chevron incision   Musculoskeletal:      Comments: R groin access site with dressing c/d/I; no severe TTP, swelling, bruising, or pulsatility   Skin:     General: Skin is warm and dry.      Coloration: Skin is not jaundiced.   Neurological:      General: No focal deficit present.      Mental Status: He is alert and oriented to person, place, and time.   Psychiatric:         Mood and Affect: Mood normal.         Behavior: Behavior normal.         Thought Content: Thought content normal.         Judgment: Judgment normal.         Laboratory  I have reviewed all pertinent lab results within the past 24 hours.  CBC:   Recent Labs   Lab 10/03/23  0617   WBC 5.10   RBC 4.17*   HGB 11.3*   HCT 37.2*   *   MCV 89   MCH 27.1   MCHC 30.4*     BMP:   Recent Labs   Lab 10/03/23  0618   GLU 81      K 4.1      CO2 21*   BUN 13   CREATININE 0.8   CALCIUM 8.5*   MG 1.7     CMP:   Recent Labs   Lab 10/03/23  0618   GLU 81   CALCIUM  8.5*   ALBUMIN 3.6   PROT 6.2      K 4.1   CO2 21*      BUN 13   CREATININE 0.8   ALKPHOS 106   ALT 33   AST 36   BILITOT 1.9*     LFTs:   Recent Labs   Lab 10/03/23  0618   ALT 33   AST 36   ALKPHOS 106   BILITOT 1.9*   PROT 6.2   ALBUMIN 3.6     Coagulation:   Recent Labs   Lab 10/01/23  1124 10/01/23  1821 10/02/23  0546   LABPROT 11.4  11.4  --   --    INR 1.1  1.1  --   --    APTT 27.7  27.7   < > 40.2*    < > = values in this interval not displayed.     Microbiology Results (last 7 days)       ** No results found for the last 168 hours. **            Imaging:  Liver US on 10/3/23 which was independently reviewed by Dr. Jones Gong:     EXAMINATION:  US DOPPLER LIVER TRANSPLANT POST (XPD)     CLINICAL HISTORY:  s/p HA stent placement with IR, follow up waveforms and HA velocity;     TECHNIQUE:  Limited abdominal ultrasound of the transplant liver with Doppler evaluation.  Color and spectral Doppler were performed.     COMPARISON:  CTA 09/27/2023; ultrasound 09/25/2023, 09/22/2023     FINDINGS:  Patient is status post orthotopic liver transplant on 08/09/2020.  The liver demonstrates homogeneous echotexture.  No focal hepatic lesions are seen.  Interval placement of main hepatic artery stent.     Grossly stable fluid collections adjacent to the right hepatic lobe measuring 0.9 x 2.4 x 2.7 cm (previously 1.3 x 2.5 x 2.9 cm) and 1.0 x 1.1 x 1 2 cm (previously 1.1 x 1.0 x 0.9 cm).     The common duct is not dilated, measuring 11 mm with redemonstration of thickened walls stable when compared to multiple prior exams.  No dilated intrahepatic radicles are seen.     Color flow and spectral waveform analysis was performed.  The main portal vein, right portal vein, left portal vein, middle hepatic vein, right hepatic vein, left hepatic vein, and IVC are patent with proper directional flow.     Interval placement of main hepatic artery stent.  Anastomosis site of the main hepatic artery demonstrates a  peak systolic velocity 116 cm/sec, improved when compared to prior (795 cm/sec).     Main hepatic artery demonstrates resistive index 0.73 with normal waveform, previously 0.34.     Left hepatic artery shows resistive index 0.73 with normal waveform, previously 0.45.     Anterior branch of the right hepatic artery demonstrates resistive index 0.73 with normal waveform, previously 0.37.     Posterior branch of the right hepatic artery demonstrates resistive index 0.66 with normal waveform, previously 0.35.     Trace ascites and right pleural effusion present.     Impression:     Interval placement of main hepatic artery stent with normalization of peak systolic velocity of the extrahepatic main hepatic artery and intraparenchymal arterial resistive indices.     Stable dilated common bile duct with persistent thickened walls.     Similar peritransplant fluid collections.     Additional findings as above.     Electronically signed by resident: Marcela Peng  Date:                                            10/03/2023  Time:                                           09:43     Electronically signed by: Robert Fowler MD  Date:                                            10/03/2023  Time:                                           10:37    ASSESSMENT/PLAN:     Assessment:  63 y.o. male with a PMHx of s/p OLTx c/b HAS is s/p IR  hepatic/celiac angiogram with plasty and proper hepatic artery stent placement  on 10/2/23.    Plan:  Pt tolerated procedure well, no immediate complications noted  LFTs and H/H stable  Liver US this AM reviewed. Plan to repeat liver US in 1-2 weeks and then will defer continued surveillance to transplant team.   DAPT with ASA 81 mg qd for life and plavix 75 mg qd for 6 months  R groin access site inspected, no signs of PSA. Educated pt on PSA symptoms to watch out for- severe TTP, increased bruising or swelling, pulsatility  No lifting > 10 lbs for the next 3 days to protect R groin access  site  IR contact information provided to pt should he have any questions or concerns  IR will sign off. Please contact with questions via [employer] secure chat or spectra link.      Irina Kelly PA-C  Interventional Radiology  Spectra: [phone]

## 2023-10-03 NOTE — CARE UPDATE
Care Update:     No acute events overnight. Patient on the TSU in room 99178/52654 A. Blood glucose stable. BG at or below goal on current insulin regimen (SSI, prandial, and basal insulin ). Steroid use- None.    Renal function- Normal   Vasopressors-  None       Diet diabetic 2000 Calorie     POCT Glucose   Date Value Ref Range Status   10/03/2023 81 70 - 110 mg/dL Final   10/02/2023 147 (H) 70 - 110 mg/dL Final   10/02/2023 76 70 - 110 mg/dL Final   10/02/2023 183 (H) 70 - 110 mg/dL Final   10/01/2023 161 (H) 70 - 110 mg/dL Final   10/01/2023 208 (H) 70 - 110 mg/dL Final     Lab Results   Component Value Date    HGBA1C 6.3 (H) 09/13/2023       Outpatient RegimenL  Diabetes Medications               insulin glargine 100 units/mL SubQ pen Inject 28 Units into the skin once daily.    insulin lispro (HUMALOG KWIKPEN INSULIN) 100 unit/mL pen Inject 15 Units into the skin 3 (three) times daily before meals AND 1-10 Units 3 (three) times daily before meals. sliding scale insulin as needed. Max daily dose 75 units daily...    tirzepatide (MOUNJARO) 2.5 mg/0.5 mL PnIj Inject 2.5 mg into the skin every 7 days.          Endocrine  Type 2 diabetes mellitus with hyperglycemia  BG goal 140-180     Decrease Levemir to 16 units daily (fasting BG below goal ranges)   Novolog 8 units TID with meals (20% reduction from home dose)  Low Dose Correction Scale  BG monitoring ac/hs       ** Please notify Endocrine for any change and/or advance in diet**  ** Please call Endocrine for any BG related issues **    Discharge Planning:   TBD. Please notify endocrinology prior to discharge.

## 2023-10-03 NOTE — ANESTHESIA POSTPROCEDURE EVALUATION
Anesthesia Post Evaluation    Patient: Jed Chávez    Procedure(s) Performed: * No procedures listed *    Final Anesthesia Type: general      Patient location during evaluation: PACU  Patient participation: Yes- Able to Participate  Level of consciousness: awake and alert and oriented  Post-procedure vital signs: reviewed and stable  Pain management: adequate  Airway patency: patent    PONV status at discharge: No PONV  Anesthetic complications: no      Cardiovascular status: hemodynamically stable  Respiratory status: unassisted  Hydration status: euvolemic  Follow-up not needed.                Event Time   Out of Recovery 10/02/2023 16:03:00         Pain/Chanell Score: Pain Rating Prior to Med Admin: 0 (10/2/2023  2:39 PM)  Pain Rating Post Med Admin: 0 (10/2/2023  2:39 PM)  Chanell Score: 10 (10/2/2023  3:30 PM)

## 2023-10-03 NOTE — Clinical Note
Reduce Lantus to 20 units daily  Reduce Humalog to 8 units TID with meals in addition to the following correction scale   150 - 200 + 1 unit  201 - 250 + 2 units  251 - 300 + 3 units  301 - 350 + 4 units   > 350   + 5 units  Resume Mounjaro 2.5 mg weekly

## 2023-10-03 NOTE — PROGRESS NOTES
Discharge Note       presented to patient's bedside to discuss discharge plans, as well as assess any concerns or needs.     Patient was alert and oriented x 4 and communicative. Patient will discharge to home today with no needs.  Patient's wife Leida, who was at bedside will transport patient upon discharge. Patient shared that he has participated in a few virtual AA Meetings and will continue to engage in meetings after discharge. Patient is coping well with support from family.      Patient reports agreeing with the discharge plan and has no other psychosocial concerns. Patient and caregiver verbalize understanding and are involved in treatment planning and discharge process. Patient nor caregiver had any further concerns or questions at this time.     SW remains available and will continue to follow, providing psychosocial support, education and discharge planning as indicated. Transplant team remains available and patient states understanding of how to access team and resources as needed.

## 2023-10-04 DIAGNOSIS — T86.49 STENOSIS OF HEPATIC ARTERY OF TRANSPLANTED LIVER: Primary | ICD-10-CM

## 2023-10-04 DIAGNOSIS — I77.1 STENOSIS OF HEPATIC ARTERY OF TRANSPLANTED LIVER: Primary | ICD-10-CM

## 2023-10-09 ENCOUNTER — PATIENT MESSAGE (OUTPATIENT)
Dept: TRANSPLANT | Facility: CLINIC | Age: 63
End: 2023-10-09
Payer: COMMERCIAL

## 2023-10-10 ENCOUNTER — TELEPHONE (OUTPATIENT)
Dept: TRANSPLANT | Facility: CLINIC | Age: 63
End: 2023-10-10
Payer: COMMERCIAL

## 2023-10-11 ENCOUNTER — LAB VISIT (OUTPATIENT)
Dept: LAB | Facility: HOSPITAL | Age: 63
End: 2023-10-11
Attending: INTERNAL MEDICINE
Payer: COMMERCIAL

## 2023-10-11 DIAGNOSIS — Z94.4 S/P LIVER TRANSPLANT: ICD-10-CM

## 2023-10-11 LAB
ALBUMIN SERPL BCP-MCNC: 3.9 G/DL (ref 3.5–5.2)
ALP SERPL-CCNC: 99 U/L (ref 55–135)
ALT SERPL W/O P-5'-P-CCNC: 25 U/L (ref 10–44)
ANION GAP SERPL CALC-SCNC: 8 MMOL/L (ref 8–16)
AST SERPL-CCNC: 25 U/L (ref 10–40)
BASOPHILS # BLD AUTO: 0.03 K/UL (ref 0–0.2)
BASOPHILS NFR BLD: 0.6 % (ref 0–1.9)
BILIRUB SERPL-MCNC: 1.5 MG/DL (ref 0.1–1)
BUN SERPL-MCNC: 12 MG/DL (ref 8–23)
CALCIUM SERPL-MCNC: 9.4 MG/DL (ref 8.7–10.5)
CHLORIDE SERPL-SCNC: 106 MMOL/L (ref 95–110)
CO2 SERPL-SCNC: 26 MMOL/L (ref 23–29)
CREAT SERPL-MCNC: 0.9 MG/DL (ref 0.5–1.4)
DIFFERENTIAL METHOD: ABNORMAL
EOSINOPHIL # BLD AUTO: 0 K/UL (ref 0–0.5)
EOSINOPHIL NFR BLD: 0.8 % (ref 0–8)
ERYTHROCYTE [DISTWIDTH] IN BLOOD BY AUTOMATED COUNT: 15.6 % (ref 11.5–14.5)
EST. GFR  (NO RACE VARIABLE): >60 ML/MIN/1.73 M^2
GLUCOSE SERPL-MCNC: 174 MG/DL (ref 70–110)
HCT VFR BLD AUTO: 42.9 % (ref 40–54)
HGB BLD-MCNC: 12.8 G/DL (ref 14–18)
IMM GRANULOCYTES # BLD AUTO: 0.01 K/UL (ref 0–0.04)
IMM GRANULOCYTES NFR BLD AUTO: 0.2 % (ref 0–0.5)
LYMPHOCYTES # BLD AUTO: 2.7 K/UL (ref 1–4.8)
LYMPHOCYTES NFR BLD: 56.2 % (ref 18–48)
MCH RBC QN AUTO: 26.3 PG (ref 27–31)
MCHC RBC AUTO-ENTMCNC: 29.8 G/DL (ref 32–36)
MCV RBC AUTO: 88 FL (ref 82–98)
MONOCYTES # BLD AUTO: 0.3 K/UL (ref 0.3–1)
MONOCYTES NFR BLD: 6.7 % (ref 4–15)
NEUTROPHILS # BLD AUTO: 1.7 K/UL (ref 1.8–7.7)
NEUTROPHILS NFR BLD: 35.5 % (ref 38–73)
NRBC BLD-RTO: 0 /100 WBC
PLATELET # BLD AUTO: 122 K/UL (ref 150–450)
PMV BLD AUTO: 9.5 FL (ref 9.2–12.9)
POTASSIUM SERPL-SCNC: 4.7 MMOL/L (ref 3.5–5.1)
PROT SERPL-MCNC: 6.7 G/DL (ref 6–8.4)
RBC # BLD AUTO: 4.87 M/UL (ref 4.6–6.2)
SODIUM SERPL-SCNC: 140 MMOL/L (ref 136–145)
WBC # BLD AUTO: 4.79 K/UL (ref 3.9–12.7)

## 2023-10-11 PROCEDURE — 36415 COLL VENOUS BLD VENIPUNCTURE: CPT | Performed by: INTERNAL MEDICINE

## 2023-10-11 PROCEDURE — 80053 COMPREHEN METABOLIC PANEL: CPT | Performed by: INTERNAL MEDICINE

## 2023-10-11 PROCEDURE — 80197 ASSAY OF TACROLIMUS: CPT | Performed by: INTERNAL MEDICINE

## 2023-10-11 PROCEDURE — 85025 COMPLETE CBC W/AUTO DIFF WBC: CPT | Performed by: INTERNAL MEDICINE

## 2023-10-12 ENCOUNTER — PATIENT MESSAGE (OUTPATIENT)
Dept: TRANSPLANT | Facility: CLINIC | Age: 63
End: 2023-10-12
Payer: COMMERCIAL

## 2023-10-12 LAB — TACROLIMUS BLD-MCNC: 12.8 NG/ML (ref 5–15)

## 2023-10-13 ENCOUNTER — PATIENT MESSAGE (OUTPATIENT)
Dept: TRANSPLANT | Facility: CLINIC | Age: 63
End: 2023-10-13
Payer: COMMERCIAL

## 2023-10-13 DIAGNOSIS — K72.10 END STAGE LIVER DISEASE: ICD-10-CM

## 2023-10-13 RX ORDER — TACROLIMUS 1 MG/1
CAPSULE ORAL
Qty: 150 CAPSULE | Refills: 11 | Status: SHIPPED | OUTPATIENT
Start: 2023-10-13 | End: 2023-11-07 | Stop reason: DRUGHIGH

## 2023-10-13 NOTE — TELEPHONE ENCOUNTER
Pt advised via portal. Repeat labs requested 10/17/23.  ----- Message from Joleen Carr MD sent at 10/13/2023  8:32 AM CDT -----  Tacro is running high decrease to 3/2 and repeat labs next week

## 2023-10-17 ENCOUNTER — HOSPITAL ENCOUNTER (OUTPATIENT)
Dept: RADIOLOGY | Facility: HOSPITAL | Age: 63
Discharge: HOME | End: 2023-10-17
Attending: PHYSICIAN ASSISTANT
Payer: COMMERCIAL

## 2023-10-17 DIAGNOSIS — T86.49 STENOSIS OF HEPATIC ARTERY OF TRANSPLANTED LIVER: ICD-10-CM

## 2023-10-17 DIAGNOSIS — I77.1 STENOSIS OF HEPATIC ARTERY OF TRANSPLANTED LIVER: ICD-10-CM

## 2023-10-17 PROCEDURE — 93976 VASCULAR STUDY: CPT | Mod: TC

## 2023-10-17 PROCEDURE — 76705 US DOPPLER LIVER TRANSPLANT POST (XPD): ICD-10-PCS | Mod: 26,59,, | Performed by: RADIOLOGY

## 2023-10-17 PROCEDURE — 76705 ECHO EXAM OF ABDOMEN: CPT | Mod: 26,59,, | Performed by: RADIOLOGY

## 2023-10-17 PROCEDURE — 76705 ECHO EXAM OF ABDOMEN: CPT | Mod: TC

## 2023-10-17 PROCEDURE — 93976 VASCULAR STUDY: CPT | Mod: 26,,, | Performed by: RADIOLOGY

## 2023-10-17 PROCEDURE — 93976 US DOPPLER LIVER TRANSPLANT POST (XPD): ICD-10-PCS | Mod: 26,,, | Performed by: RADIOLOGY

## 2023-10-18 ENCOUNTER — TELEPHONE (OUTPATIENT)
Dept: TRANSPLANT | Facility: CLINIC | Age: 63
End: 2023-10-18
Payer: COMMERCIAL

## 2023-10-18 NOTE — TELEPHONE ENCOUNTER
----- Message from Joleen Carr MD sent at 10/17/2023  9:20 PM CDT -----  Labs ok awaiting immunosuppression level

## 2023-10-19 ENCOUNTER — TELEPHONE (OUTPATIENT)
Dept: TRANSPLANT | Facility: CLINIC | Age: 63
End: 2023-10-19
Payer: COMMERCIAL

## 2023-10-19 ENCOUNTER — OFFICE VISIT (OUTPATIENT)
Dept: INTERNAL MEDICINE | Facility: CLINIC | Age: 63
End: 2023-10-19
Payer: COMMERCIAL

## 2023-10-19 ENCOUNTER — PATIENT MESSAGE (OUTPATIENT)
Dept: TRANSPLANT | Facility: CLINIC | Age: 63
End: 2023-10-19
Payer: COMMERCIAL

## 2023-10-19 VITALS
HEIGHT: 71 IN | BODY MASS INDEX: 29.02 KG/M2 | RESPIRATION RATE: 18 BRPM | OXYGEN SATURATION: 98 % | DIASTOLIC BLOOD PRESSURE: 80 MMHG | SYSTOLIC BLOOD PRESSURE: 124 MMHG | WEIGHT: 207.25 LBS | TEMPERATURE: 98 F | HEART RATE: 79 BPM

## 2023-10-19 DIAGNOSIS — Z01.00 DIABETIC EYE EXAM: ICD-10-CM

## 2023-10-19 DIAGNOSIS — Z79.4 TYPE 2 DIABETES MELLITUS WITH OTHER SPECIFIED COMPLICATION, WITH LONG-TERM CURRENT USE OF INSULIN: Chronic | ICD-10-CM

## 2023-10-19 DIAGNOSIS — E11.9 DIABETIC EYE EXAM: ICD-10-CM

## 2023-10-19 DIAGNOSIS — D84.821 IMMUNOSUPPRESSION DUE TO DRUG THERAPY: Chronic | ICD-10-CM

## 2023-10-19 DIAGNOSIS — Z85.05 HISTORY OF HEPATOCELLULAR CARCINOMA: Primary | ICD-10-CM

## 2023-10-19 DIAGNOSIS — Z94.4 STATUS POST LIVER TRANSPLANT: ICD-10-CM

## 2023-10-19 DIAGNOSIS — E11.69 TYPE 2 DIABETES MELLITUS WITH OTHER SPECIFIED COMPLICATION, WITH LONG-TERM CURRENT USE OF INSULIN: Chronic | ICD-10-CM

## 2023-10-19 DIAGNOSIS — Z79.899 IMMUNOSUPPRESSION DUE TO DRUG THERAPY: Chronic | ICD-10-CM

## 2023-10-19 PROBLEM — E11.59 HYPERTENSION COMPLICATING DIABETES: Chronic | Status: ACTIVE | Noted: 2023-06-06

## 2023-10-19 PROBLEM — I10 HYPERTENSION COMPLICATING DIABETES: Chronic | Status: ACTIVE | Noted: 2023-06-06

## 2023-10-19 PROBLEM — I15.2 HYPERTENSION COMPLICATING DIABETES: Chronic | Status: ACTIVE | Noted: 2023-06-06

## 2023-10-19 PROCEDURE — 90686 IIV4 VACC NO PRSV 0.5 ML IM: CPT | Mod: S$GLB,,, | Performed by: FAMILY MEDICINE

## 2023-10-19 PROCEDURE — 99204 OFFICE O/P NEW MOD 45 MIN: CPT | Mod: 25,S$GLB,, | Performed by: FAMILY MEDICINE

## 2023-10-19 PROCEDURE — 99204 PR OFFICE/OUTPT VISIT, NEW, LEVL IV, 45-59 MIN: ICD-10-PCS | Mod: 25,S$GLB,, | Performed by: FAMILY MEDICINE

## 2023-10-19 PROCEDURE — 99999 PR PBB SHADOW E&M-EST. PATIENT-LVL V: CPT | Mod: PBBFAC,,, | Performed by: FAMILY MEDICINE

## 2023-10-19 PROCEDURE — 99999 PR PBB SHADOW E&M-EST. PATIENT-LVL V: ICD-10-PCS | Mod: PBBFAC,,, | Performed by: FAMILY MEDICINE

## 2023-10-19 PROCEDURE — 90471 FLU VACCINE (QUAD) GREATER THAN OR EQUAL TO 3YO PRESERVATIVE FREE IM: ICD-10-PCS | Mod: S$GLB,,, | Performed by: FAMILY MEDICINE

## 2023-10-19 PROCEDURE — 90471 IMMUNIZATION ADMIN: CPT | Mod: S$GLB,,, | Performed by: FAMILY MEDICINE

## 2023-10-19 PROCEDURE — 90686 FLU VACCINE (QUAD) GREATER THAN OR EQUAL TO 3YO PRESERVATIVE FREE IM: ICD-10-PCS | Mod: S$GLB,,, | Performed by: FAMILY MEDICINE

## 2023-10-19 RX ORDER — INSULIN LISPRO 100 [IU]/ML
INJECTION, SOLUTION INTRAVENOUS; SUBCUTANEOUS
Qty: 15 ML | Refills: 0 | Status: SHIPPED | OUTPATIENT
Start: 2023-10-19 | End: 2023-11-26 | Stop reason: SDUPTHER

## 2023-10-19 RX ORDER — INSULIN GLARGINE 100 [IU]/ML
20 INJECTION, SOLUTION SUBCUTANEOUS DAILY
Qty: 15 ML | Refills: 0 | Status: SHIPPED | OUTPATIENT
Start: 2023-10-19 | End: 2023-11-29 | Stop reason: SDUPTHER

## 2023-10-19 RX ORDER — AMLODIPINE BESYLATE 10 MG/1
TABLET ORAL
COMMUNITY
Start: 2023-08-20 | End: 2023-10-19

## 2023-10-19 NOTE — ASSESSMENT & PLAN NOTE
"Diabetes Management Status    Statin: Not taking  ACE/ARB: Not taking    Screening or Prevention Patient's value Goal Complete/Controlled?   HgA1C Testing and Control   Lab Results   Component Value Date    HGBA1C 6.3 (H) 09/13/2023      Annually/Less than 8% Yes   Lipid profile : 04/20/2023 Annually No   LDL control No results found for: "LDLCALC" Annually/Less than 100 mg/dl  No   Nephropathy screening No results found for: "LABMICR"  Lab Results   Component Value Date    PROTEINUA Negative 10/01/2023    Annually Yes   Blood pressure BP Readings from Last 1 Encounters:   10/19/23 124/80    Less than 140/90 Yes   Dilated retinal exam : 09/21/2017 Annually Yes   Foot exam   Most Recent Foot Exam Date: Not Found Annually No     Lab Results   Component Value Date    HGBA1C 6.3 (H) 09/13/2023    HGBA1C 7.5 (H) 04/20/2023    HGBA1C 7.1 (H) 10/19/2022    EGFRNORACEVR >60.0 10/17/2023    MICALBCREAT  04/20/2023     Unable to calculate Microalbumin/Creatinine ratio due to microalbumin result being less than the linearity.     No results found for: "GLUTAMICACID", "CPEPTIDE"   Last 5 Patient Entered Readings                                          Most Recent A1c:          No data to display               HEALTH MAINTENANCE: Diabetic health maintenance interventions reviewed and are up to date except for:      Topic    Eye Exam       "

## 2023-10-19 NOTE — PROGRESS NOTES
OFFICE VISIT 10/19/23  4:00 PM CDT    CHIEF COMPLAINT: Establish Care    HPI  1. History of hepatocellular carcinoma, in remission after liver transplant  Overview:  Synoptic Report  Procedure: Total hepatectomy.  Histologic type: Hepatocellular carcinoma.  Histologic grade: G1, well-differentiated.  Tumor focality: Multifocal.  Tumor characteristics:  Tumor #1 (blocks 2B-2E): Tumor site: Right lobe. Tumor size: 6.2 cm in greatest dimension grossly. Treatment effect: Complete necrosis (no viable tumor).  Tumor #2 (blocks 2F-2H): Tumor site: Right lobe. Tumor size: 4.2 cm in greatest dimension grossly. Treatment effect: Complete necrosis (no viable tumor).  Tumor #3 (blocks 2I-2J): Tumor site: Right lobe. Tumor size: approximately 1.3 cm largest focus measured on glass slide (multifocal reticulin loss arising in a 4 cm nodule). Treatment effect: No known pre-surgical therapy.  Tumor extent: Tumor confined to liver.  Vascular invasion: Not identified.  Margin status: All margins negative for invasive carcinoma. Closest margin to invasive carcinoma: Hilar. Distance from invasive carcinoma to closest margin: 9.2 cm from hilar margin grossly.  Regional lymph node status: Not applicable (no regional lymph nodes submitted or found.  PATHOLOGIC STAGE CLASSIFICATION: (pTNM, AJCC 8th Edition): y p T1a NX.      2. Status post liver transplant  Assessment & Plan:  Appears to be recovering well.    Orders:  -     insulin glargine 100 units/mL SubQ pen; Inject 20 Units into the skin once daily.  Dispense: 15 mL; Refill: 0  -     insulin lispro (HUMALOG KWIKPEN INSULIN) 100 unit/mL pen; Inject 8 Units into the skin 3 (three) times daily before meals AND 1-5 Units 3 (three) times daily before meals. sliding scale insulin as needed. Max daily dose 75 units daily...  Dispense: 15 mL; Refill: 0    3. Type 2 diabetes mellitus with other specified complication, with long-term current use of insulin  Assessment & Plan:  Diabetes  "Management Status    Statin: Not taking  ACE/ARB: Not taking    Screening or Prevention Patient's value Goal Complete/Controlled?   HgA1C Testing and Control   Lab Results   Component Value Date    HGBA1C 6.3 (H) 09/13/2023      Annually/Less than 8% Yes   Lipid profile : 04/20/2023 Annually No   LDL control No results found for: "LDLCALC" Annually/Less than 100 mg/dl  No   Nephropathy screening No results found for: "LABMICR"  Lab Results   Component Value Date    PROTEINUA Negative 10/01/2023    Annually Yes   Blood pressure BP Readings from Last 1 Encounters:   10/19/23 124/80    Less than 140/90 Yes   Dilated retinal exam : 09/21/2017 Annually Yes   Foot exam   Most Recent Foot Exam Date: Not Found Annually No     Lab Results   Component Value Date    HGBA1C 6.3 (H) 09/13/2023    HGBA1C 7.5 (H) 04/20/2023    HGBA1C 7.1 (H) 10/19/2022    EGFRNORACEVR >60.0 10/17/2023    MICALBCREAT  04/20/2023     Unable to calculate Microalbumin/Creatinine ratio due to microalbumin result being less than the linearity.     No results found for: "GLUTAMICACID", "CPEPTIDE"   Last 5 Patient Entered Readings                                          Most Recent A1c:            No data to display               HEALTH MAINTENANCE: Diabetic health maintenance interventions reviewed and are up to date except for:      Topic    Eye Exam         Orders:  -     Hemoglobin A1C; Standing  -     Lipid Panel; Standing  -     Microalbumin/Creatinine Ratio, Urine; Standing  -     Ambulatory referral/consult to Ophthalmology; Future; Expected date: 10/26/2023    4. Diabetic eye exam  -     Ambulatory referral/consult to Ophthalmology; Future; Expected date: 10/26/2023    5. Immunosuppression due to drug therapy  Overview:  On tacrolimus for liver transplant.    Assessment & Plan:  ASSESSMENT: No S/Sx infection.  TREATMENT PLAN: Continue present treatment plan.       Other orders  -     Influenza - Quadrivalent (PF)    Unless noted herein, any " "chronic conditions are represented as and appear stable, and no other significant complaints or concerns were reported.    Follow up in about 2 months (around 12/19/2023) for virtual visit, review test results after completion, discuss plan, re-evaluation.     Review of Systems   Constitutional:  Negative for fever.   Respiratory:  Negative for chest tightness and shortness of breath.    Cardiovascular:  Negative for chest pain.   Endocrine: Negative for polydipsia and polyuria.       Vitals:    10/19/23 1618   BP: 124/80   BP Location: Right arm   Patient Position: Sitting   BP Method: Large (Manual)   Pulse: 79   Resp: 18   Temp: 97.5 °F (36.4 °C)   SpO2: 98%   Weight: 94 kg (207 lb 3.7 oz)   Height: 5' 11" (1.803 m)   Physical Exam  Vitals reviewed.   Constitutional:       General: He is not in acute distress.     Appearance: Normal appearance. He is not ill-appearing or diaphoretic.   Eyes:      General: No scleral icterus.  Cardiovascular:      Rate and Rhythm: Normal rate and regular rhythm.   Pulmonary:      Effort: Pulmonary effort is normal.      Breath sounds: Normal breath sounds.   Abdominal:      Tenderness: There is no abdominal tenderness.   Skin:     General: Skin is warm and dry.   Neurological:      Mental Status: He is alert and oriented to person, place, and time. Mental status is at baseline.   Psychiatric:         Mood and Affect: Mood normal.         Behavior: Behavior normal.         Judgment: Judgment normal.       MEDICAL DECISION MAKING  Medical decision making performed by me for this encounter included: review and interpretation of lab results, review and interpretation of imaging test results, review and interpretation of cardiology test results, reviewing consulting specialist notes, review of ED/Hospital records, ordering labs, and review and summarization of records (See Synoptic Report for Diagnosis #1.)    Documentation entered by me for this encounter may have been done in part " "using speech-recognition technology. Although I have made an effort to ensure accuracy, "sound like" errors may exist and should be interpreted in context.   "

## 2023-10-19 NOTE — TELEPHONE ENCOUNTER
Pt notified via portal of stable labs and that no medication changes are needed. Repeat labs due 10/24/23 per protocol.   ----- Message from Joleen Carr MD sent at 10/19/2023  9:56 AM CDT -----  Reviewed, nothing to do; repeat per routine

## 2023-10-23 ENCOUNTER — TELEPHONE (OUTPATIENT)
Dept: INTERNAL MEDICINE | Facility: CLINIC | Age: 63
End: 2023-10-23
Payer: COMMERCIAL

## 2023-10-23 DIAGNOSIS — C22.0 HCC (HEPATOCELLULAR CARCINOMA): ICD-10-CM

## 2023-10-23 DIAGNOSIS — Z00.6 RESEARCH STUDY PATIENT: Primary | ICD-10-CM

## 2023-10-23 NOTE — TELEPHONE ENCOUNTER
----- Message from Cecil Luis sent at 10/23/2023  2:59 PM CDT -----  Contact: ml-wife  .Type:  Patient Returning Call    Who Called:Ml  Who Left Message for Patient:nurse  Does the patient know what this is regarding?:yes  Would the patient rather a call back or a response via [facility]? Call back  Best Call Back Number:[phone]  Additional Information: na    Thanks  SW

## 2023-10-23 NOTE — TELEPHONE ENCOUNTER
----- Message from Nydia Harman sent at 10/23/2023  2:04 PM CDT -----  Contact: Leida Casarez needs a call back at [phone](after 3pm), Regards to speaking with someone about some concerns she has about his mental state.    Thanks  Td

## 2023-10-24 ENCOUNTER — LAB VISIT (OUTPATIENT)
Dept: LAB | Facility: HOSPITAL | Age: 63
End: 2023-10-24
Attending: FAMILY MEDICINE
Payer: COMMERCIAL

## 2023-10-24 ENCOUNTER — RESEARCH ENCOUNTER (OUTPATIENT)
Dept: RESEARCH | Facility: HOSPITAL | Age: 63
End: 2023-10-24
Payer: COMMERCIAL

## 2023-10-24 ENCOUNTER — HOSPITAL ENCOUNTER (EMERGENCY)
Facility: HOSPITAL | Age: 63
Discharge: STILL ADMITTED OR ADMINISTRATIVE | End: 2023-10-24
Attending: EMERGENCY MEDICINE
Payer: COMMERCIAL

## 2023-10-24 ENCOUNTER — HOSPITAL ENCOUNTER (INPATIENT)
Facility: HOSPITAL | Age: 63
LOS: 6 days | Discharge: HOME HEALTH | DRG: 025 | End: 2023-10-30
Attending: NEUROLOGICAL SURGERY | Admitting: NEUROLOGICAL SURGERY
Payer: COMMERCIAL

## 2023-10-24 VITALS
WEIGHT: 206.69 LBS | SYSTOLIC BLOOD PRESSURE: 167 MMHG | RESPIRATION RATE: 22 BRPM | DIASTOLIC BLOOD PRESSURE: 92 MMHG | OXYGEN SATURATION: 95 % | HEART RATE: 71 BPM | BODY MASS INDEX: 28.83 KG/M2 | TEMPERATURE: 98 F

## 2023-10-24 DIAGNOSIS — Z94.4 S/P LIVER TRANSPLANT: ICD-10-CM

## 2023-10-24 DIAGNOSIS — G93.6 VASOGENIC BRAIN EDEMA: ICD-10-CM

## 2023-10-24 DIAGNOSIS — D49.6 BRAIN TUMOR: ICD-10-CM

## 2023-10-24 DIAGNOSIS — D49.6 BRAIN TUMOR: Primary | ICD-10-CM

## 2023-10-24 DIAGNOSIS — D63.8 ANEMIA OF CHRONIC DISEASE: ICD-10-CM

## 2023-10-24 DIAGNOSIS — G93.89 LEFT FRONTAL LOBE MASS: ICD-10-CM

## 2023-10-24 DIAGNOSIS — Z94.4 STATUS POST LIVER TRANSPLANT: ICD-10-CM

## 2023-10-24 DIAGNOSIS — R07.9 CHEST PAIN: ICD-10-CM

## 2023-10-24 DIAGNOSIS — T86.49 STENOSIS OF HEPATIC ARTERY OF TRANSPLANTED LIVER: ICD-10-CM

## 2023-10-24 DIAGNOSIS — E11.69 TYPE 2 DIABETES MELLITUS WITH OTHER SPECIFIED COMPLICATION, WITH LONG-TERM CURRENT USE OF INSULIN: ICD-10-CM

## 2023-10-24 DIAGNOSIS — T38.0X5A ADRENAL CORTICAL STEROIDS CAUSING ADVERSE EFFECT IN THERAPEUTIC USE: ICD-10-CM

## 2023-10-24 DIAGNOSIS — Z79.60 LONG-TERM USE OF IMMUNOSUPPRESSANT MEDICATION: ICD-10-CM

## 2023-10-24 DIAGNOSIS — Z29.89 PROPHYLACTIC IMMUNOTHERAPY: ICD-10-CM

## 2023-10-24 DIAGNOSIS — Z91.89 AT RISK FOR OPPORTUNISTIC INFECTIONS: Primary | ICD-10-CM

## 2023-10-24 DIAGNOSIS — I15.2 HYPERTENSION COMPLICATING DIABETES: Chronic | ICD-10-CM

## 2023-10-24 DIAGNOSIS — E11.69 TYPE 2 DIABETES MELLITUS WITH OTHER SPECIFIED COMPLICATION, WITHOUT LONG-TERM CURRENT USE OF INSULIN: ICD-10-CM

## 2023-10-24 DIAGNOSIS — Z79.4 TYPE 2 DIABETES MELLITUS WITH OTHER SPECIFIED COMPLICATION, WITH LONG-TERM CURRENT USE OF INSULIN: Chronic | ICD-10-CM

## 2023-10-24 DIAGNOSIS — E11.59 HYPERTENSION COMPLICATING DIABETES: Chronic | ICD-10-CM

## 2023-10-24 DIAGNOSIS — I77.1 STENOSIS OF HEPATIC ARTERY OF TRANSPLANTED LIVER: ICD-10-CM

## 2023-10-24 DIAGNOSIS — E11.69 TYPE 2 DIABETES MELLITUS WITH OTHER SPECIFIED COMPLICATION, WITH LONG-TERM CURRENT USE OF INSULIN: Chronic | ICD-10-CM

## 2023-10-24 DIAGNOSIS — Z79.4 TYPE 2 DIABETES MELLITUS WITH OTHER SPECIFIED COMPLICATION, WITH LONG-TERM CURRENT USE OF INSULIN: ICD-10-CM

## 2023-10-24 LAB
ALBUMIN SERPL BCP-MCNC: 3.5 G/DL (ref 3.5–5.2)
ALBUMIN SERPL BCP-MCNC: 3.5 G/DL (ref 3.5–5.2)
ALBUMIN/CREAT UR: 22.5 UG/MG (ref 0–30)
ALP SERPL-CCNC: 86 U/L (ref 55–135)
ALP SERPL-CCNC: 92 U/L (ref 55–135)
ALT SERPL W/O P-5'-P-CCNC: 24 U/L (ref 10–44)
ALT SERPL W/O P-5'-P-CCNC: 26 U/L (ref 10–44)
AMMONIA PLAS-SCNC: 37 UMOL/L (ref 10–50)
ANION GAP SERPL CALC-SCNC: 11 MMOL/L (ref 8–16)
ANION GAP SERPL CALC-SCNC: 8 MMOL/L (ref 8–16)
APTT PPP: 27.6 SEC (ref 21–32)
AST SERPL-CCNC: 20 U/L (ref 10–40)
AST SERPL-CCNC: 21 U/L (ref 10–40)
BACTERIA #/AREA URNS HPF: NORMAL /HPF
BASOPHILS # BLD AUTO: 0.02 K/UL (ref 0–0.2)
BASOPHILS # BLD AUTO: 0.03 K/UL (ref 0–0.2)
BASOPHILS NFR BLD: 0.5 % (ref 0–1.9)
BASOPHILS NFR BLD: 0.7 % (ref 0–1.9)
BILIRUB SERPL-MCNC: 1.2 MG/DL (ref 0.1–1)
BILIRUB SERPL-MCNC: 1.4 MG/DL (ref 0.1–1)
BILIRUB UR QL STRIP: NEGATIVE
BNP SERPL-MCNC: 76 PG/ML (ref 0–99)
BUN SERPL-MCNC: 12 MG/DL (ref 8–23)
BUN SERPL-MCNC: 15 MG/DL (ref 8–23)
CALCIUM SERPL-MCNC: 8.7 MG/DL (ref 8.7–10.5)
CALCIUM SERPL-MCNC: 8.7 MG/DL (ref 8.7–10.5)
CHLORIDE SERPL-SCNC: 104 MMOL/L (ref 95–110)
CHLORIDE SERPL-SCNC: 107 MMOL/L (ref 95–110)
CLARITY UR: CLEAR
CO2 SERPL-SCNC: 21 MMOL/L (ref 23–29)
CO2 SERPL-SCNC: 23 MMOL/L (ref 23–29)
COLOR UR: YELLOW
CREAT SERPL-MCNC: 0.7 MG/DL (ref 0.5–1.4)
CREAT SERPL-MCNC: 1.1 MG/DL (ref 0.5–1.4)
CREAT UR-MCNC: 160 MG/DL (ref 23–375)
DIFFERENTIAL METHOD: ABNORMAL
DIFFERENTIAL METHOD: ABNORMAL
EOSINOPHIL # BLD AUTO: 0 K/UL (ref 0–0.5)
EOSINOPHIL # BLD AUTO: 0.1 K/UL (ref 0–0.5)
EOSINOPHIL NFR BLD: 0.7 % (ref 0–8)
EOSINOPHIL NFR BLD: 1.7 % (ref 0–8)
ERYTHROCYTE [DISTWIDTH] IN BLOOD BY AUTOMATED COUNT: 15.7 % (ref 11.5–14.5)
ERYTHROCYTE [DISTWIDTH] IN BLOOD BY AUTOMATED COUNT: 15.7 % (ref 11.5–14.5)
EST. GFR  (NO RACE VARIABLE): >60 ML/MIN/1.73 M^2
EST. GFR  (NO RACE VARIABLE): >60 ML/MIN/1.73 M^2
GLUCOSE SERPL-MCNC: 192 MG/DL (ref 70–110)
GLUCOSE SERPL-MCNC: 443 MG/DL (ref 70–110)
GLUCOSE UR QL STRIP: ABNORMAL
HCT VFR BLD AUTO: 38.3 % (ref 40–54)
HCT VFR BLD AUTO: 38.9 % (ref 40–54)
HGB BLD-MCNC: 12.1 G/DL (ref 14–18)
HGB BLD-MCNC: 12.4 G/DL (ref 14–18)
HGB UR QL STRIP: NEGATIVE
IMM GRANULOCYTES # BLD AUTO: 0.01 K/UL (ref 0–0.04)
IMM GRANULOCYTES # BLD AUTO: 0.01 K/UL (ref 0–0.04)
IMM GRANULOCYTES NFR BLD AUTO: 0.2 % (ref 0–0.5)
IMM GRANULOCYTES NFR BLD AUTO: 0.2 % (ref 0–0.5)
INR PPP: 1.1 (ref 0.8–1.2)
KETONES UR QL STRIP: NEGATIVE
LEUKOCYTE ESTERASE UR QL STRIP: NEGATIVE
LYMPHOCYTES # BLD AUTO: 1.9 K/UL (ref 1–4.8)
LYMPHOCYTES # BLD AUTO: 2.2 K/UL (ref 1–4.8)
LYMPHOCYTES NFR BLD: 48 % (ref 18–48)
LYMPHOCYTES NFR BLD: 53.2 % (ref 18–48)
MCH RBC QN AUTO: 26.7 PG (ref 27–31)
MCH RBC QN AUTO: 27.3 PG (ref 27–31)
MCHC RBC AUTO-ENTMCNC: 31.6 G/DL (ref 32–36)
MCHC RBC AUTO-ENTMCNC: 31.9 G/DL (ref 32–36)
MCV RBC AUTO: 84 FL (ref 82–98)
MCV RBC AUTO: 86 FL (ref 82–98)
MICROALBUMIN UR DL<=1MG/L-MCNC: 36 UG/ML
MICROSCOPIC COMMENT: NORMAL
MONOCYTES # BLD AUTO: 0.3 K/UL (ref 0.3–1)
MONOCYTES # BLD AUTO: 0.4 K/UL (ref 0.3–1)
MONOCYTES NFR BLD: 7.5 % (ref 4–15)
MONOCYTES NFR BLD: 9.5 % (ref 4–15)
NEUTROPHILS # BLD AUTO: 1.6 K/UL (ref 1.8–7.7)
NEUTROPHILS # BLD AUTO: 1.6 K/UL (ref 1.8–7.7)
NEUTROPHILS NFR BLD: 37.9 % (ref 38–73)
NEUTROPHILS NFR BLD: 39.9 % (ref 38–73)
NITRITE UR QL STRIP: NEGATIVE
NRBC BLD-RTO: 0 /100 WBC
NRBC BLD-RTO: 0 /100 WBC
PH UR STRIP: 6 [PH] (ref 5–8)
PLATELET # BLD AUTO: 85 K/UL (ref 150–450)
PLATELET # BLD AUTO: 86 K/UL (ref 150–450)
PMV BLD AUTO: 8.7 FL (ref 9.2–12.9)
PMV BLD AUTO: 9.3 FL (ref 9.2–12.9)
POCT GLUCOSE: 166 MG/DL (ref 70–110)
POTASSIUM SERPL-SCNC: 3.9 MMOL/L (ref 3.5–5.1)
POTASSIUM SERPL-SCNC: 4.5 MMOL/L (ref 3.5–5.1)
PROT SERPL-MCNC: 6 G/DL (ref 6–8.4)
PROT SERPL-MCNC: 6.1 G/DL (ref 6–8.4)
PROT UR QL STRIP: NEGATIVE
PROTHROMBIN TIME: 11.6 SEC (ref 9–12.5)
RBC # BLD AUTO: 4.54 M/UL (ref 4.6–6.2)
RBC # BLD AUTO: 4.54 M/UL (ref 4.6–6.2)
SODIUM SERPL-SCNC: 136 MMOL/L (ref 136–145)
SODIUM SERPL-SCNC: 138 MMOL/L (ref 136–145)
SP GR UR STRIP: 1.02 (ref 1–1.03)
TROPONIN I SERPL DL<=0.01 NG/ML-MCNC: 0.01 NG/ML (ref 0–0.03)
TROPONIN I SERPL DL<=0.01 NG/ML-MCNC: <0.006 NG/ML (ref 0–0.03)
URN SPEC COLLECT METH UR: ABNORMAL
UROBILINOGEN UR STRIP-ACNC: NEGATIVE EU/DL
WBC # BLD AUTO: 4.02 K/UL (ref 3.9–12.7)
WBC # BLD AUTO: 4.12 K/UL (ref 3.9–12.7)
YEAST URNS QL MICRO: NORMAL

## 2023-10-24 PROCEDURE — 80053 COMPREHEN METABOLIC PANEL: CPT | Performed by: EMERGENCY MEDICINE

## 2023-10-24 PROCEDURE — 93010 EKG 12-LEAD: ICD-10-PCS | Mod: ,,, | Performed by: INTERNAL MEDICINE

## 2023-10-24 PROCEDURE — 85025 COMPLETE CBC W/AUTO DIFF WBC: CPT | Performed by: EMERGENCY MEDICINE

## 2023-10-24 PROCEDURE — 81000 URINALYSIS NONAUTO W/SCOPE: CPT | Performed by: EMERGENCY MEDICINE

## 2023-10-24 PROCEDURE — 84484 ASSAY OF TROPONIN QUANT: CPT | Mod: 91 | Performed by: EMERGENCY MEDICINE

## 2023-10-24 PROCEDURE — 93010 ELECTROCARDIOGRAM REPORT: CPT | Mod: ,,, | Performed by: INTERNAL MEDICINE

## 2023-10-24 PROCEDURE — 82570 ASSAY OF URINE CREATININE: CPT | Performed by: FAMILY MEDICINE

## 2023-10-24 PROCEDURE — 85610 PROTHROMBIN TIME: CPT

## 2023-10-24 PROCEDURE — 83880 ASSAY OF NATRIURETIC PEPTIDE: CPT | Performed by: EMERGENCY MEDICINE

## 2023-10-24 PROCEDURE — 25500020 PHARM REV CODE 255: Performed by: EMERGENCY MEDICINE

## 2023-10-24 PROCEDURE — 25000003 PHARM REV CODE 250

## 2023-10-24 PROCEDURE — 11000001 HC ACUTE MED/SURG PRIVATE ROOM

## 2023-10-24 PROCEDURE — 82140 ASSAY OF AMMONIA: CPT | Performed by: EMERGENCY MEDICINE

## 2023-10-24 PROCEDURE — 36415 COLL VENOUS BLD VENIPUNCTURE: CPT

## 2023-10-24 PROCEDURE — 99291 CRITICAL CARE FIRST HOUR: CPT

## 2023-10-24 PROCEDURE — 80053 COMPREHEN METABOLIC PANEL: CPT | Mod: 91

## 2023-10-24 PROCEDURE — 25000003 PHARM REV CODE 250: Performed by: EMERGENCY MEDICINE

## 2023-10-24 PROCEDURE — 85025 COMPLETE CBC W/AUTO DIFF WBC: CPT | Mod: 91

## 2023-10-24 PROCEDURE — 85730 THROMBOPLASTIN TIME PARTIAL: CPT

## 2023-10-24 PROCEDURE — A9585 GADOBUTROL INJECTION: HCPCS | Performed by: EMERGENCY MEDICINE

## 2023-10-24 PROCEDURE — 93005 ELECTROCARDIOGRAM TRACING: CPT

## 2023-10-24 RX ORDER — GLUCAGON 1 MG
1 KIT INJECTION
Status: DISCONTINUED | OUTPATIENT
Start: 2023-10-24 | End: 2023-10-27

## 2023-10-24 RX ORDER — GADOBUTROL 604.72 MG/ML
10 INJECTION INTRAVENOUS
Status: COMPLETED | OUTPATIENT
Start: 2023-10-24 | End: 2023-10-24

## 2023-10-24 RX ORDER — IBUPROFEN 200 MG
24 TABLET ORAL
Status: DISCONTINUED | OUTPATIENT
Start: 2023-10-24 | End: 2023-10-27

## 2023-10-24 RX ORDER — IBUPROFEN 200 MG
16 TABLET ORAL
Status: DISCONTINUED | OUTPATIENT
Start: 2023-10-24 | End: 2023-10-27

## 2023-10-24 RX ORDER — CARVEDILOL 6.25 MG/1
6.25 TABLET ORAL 2 TIMES DAILY WITH MEALS
Status: DISCONTINUED | OUTPATIENT
Start: 2023-10-24 | End: 2023-10-30 | Stop reason: HOSPADM

## 2023-10-24 RX ORDER — HYDRALAZINE HYDROCHLORIDE 20 MG/ML
10 INJECTION INTRAMUSCULAR; INTRAVENOUS EVERY 6 HOURS PRN
Status: DISCONTINUED | OUTPATIENT
Start: 2023-10-24 | End: 2023-10-27

## 2023-10-24 RX ADMIN — CARVEDILOL 6.25 MG: 6.25 TABLET, FILM COATED ORAL at 11:10

## 2023-10-24 RX ADMIN — GADOBUTROL 9 ML: 604.72 INJECTION INTRAVENOUS at 04:10

## 2023-10-24 RX ADMIN — SODIUM CHLORIDE 1000 ML: 9 INJECTION, SOLUTION INTRAVENOUS at 01:10

## 2023-10-24 NOTE — PHARMACY MED REC
"  Admission Medication History     The home medication history was taken by Kristie Enciso.    You may go to "Admission" then "Reconcile Home Medications" tabs to review and/or act upon these items.     The home medication list has been updated by the Pharmacy department.   Please read ALL comments highlighted in yellow.   Please address this information as you see fit.    Feel free to contact us if you have any questions or require assistance.        Medications listed below were obtained from: Patient/family and Analytic software- [employer],Wife brought in medication schedule to help  (Not in a hospital admission)          Kristie Enciso  [id number]      Current Outpatient Medications on File Prior to Encounter   Medication Sig Dispense Refill Last Dose    aspirin 81 MG Chew Take 1 tablet (81 mg total) by mouth once daily. 30 tablet 11 10/23/2023    carvediloL (COREG) 3.125 MG tablet Take 2 tablets (6.25 mg total) by mouth 2 (two) times daily with meals. HOLD SBP <130, HR <60 120 tablet 1 10/23/2023    clopidogreL (PLAVIX) 75 mg tablet Take 1 tablet (75 mg total) by mouth once daily. 90 tablet 0 10/23/2023    insulin glargine 100 units/mL SubQ pen Inject 20 Units into the skin once daily. 15 mL 0 10/23/2023    insulin lispro (HUMALOG KWIKPEN INSULIN) 100 unit/mL pen Inject 8 Units into the skin 3 (three) times daily before meals AND 1-5 Units 3 (three) times daily before meals. sliding scale insulin as needed. Max daily dose 75 units daily... 15 mL 0 10/23/2023    mycophenolate (CELLCEPT) 250 mg Cap Take 2 capsules (500 mg total) by mouth 2 (two) times daily. 120 capsule 11 10/23/2023    sulfamethoxazole-trimethoprim 400-80mg (BACTRIM,SEPTRA) 400-80 mg per tablet Take 1 tablet by mouth once daily. Stop 2/6/24 30 tablet 5 10/23/2023    tacrolimus (PROGRAF) 1 MG Cap Take 3 capsules (3 mg total) by mouth every morning AND 2 capsules (2 mg total) every evening. 150 capsule 11 10/23/2023    tirzepatide (MOUNJARO) " "2.5 mg/0.5 mL PnIj Inject 2.5 mg into the skin every 7 days. (Patient taking differently: Inject 2.5 mg into the skin every 7 days. WEDNESDAYS) 4 pen 0 10/23/2023    valGANciclovir (VALCYTE) 450 mg Tab Take 1 tablet (450 mg total) by mouth once daily. Stop 2/6/24 30 tablet 5 10/23/2023    blood sugar diagnostic Strp Test blood glucose 3 (three) times daily. 100 strip 11     blood-glucose meter Misc Test blood glucose as directed 1 each 0     blood-glucose sensor (FREESTYLE PUNEET 3 SENSOR) Neelam To change every 14 days 2 each 3     famotidine (PEPCID) 20 MG tablet Take 1 tablet (20 mg total) by mouth every evening. (Patient not taking: Reported on 10/24/2023) 30 tablet 0 Not Taking    lancets Misc Test blood glucose 3 (three) times daily. 100 each 11     pen needle, diabetic 32 gauge x 5/32" Ndle Use to inject insulin into the skin 4 (four) times daily. 100 each 11                          .          "

## 2023-10-24 NOTE — ED PROVIDER NOTES
"SCRIBE #1 NOTE: I, Blanca Fowler, am scribing for, and in the presence of, Kayy Michelle MD. I have scribed the entire note.      History      Chief Complaint   Patient presents with    Altered Mental Status     Per pt's wife pt has been altered from his baseline x2 weeks. Pt has been having episodes of "fogginess" for the last two weeks but has now been having extreme confusion during day to day activities as well as irritability.   Noted wound to abdomen x1 week.       Review of patient's allergies indicates:  No Known Allergies     HPI   HPI    10/24/2023, 12:15 PM   History obtained from the patient and the pt's wife at bedside      History of Present Illness: Jed Chávez is a 63 y.o. male patient with a PMHx of CVA, DM, dyslipidemia, intracranial hemorrhage, skin cancer, and hepatocellular carcinoma and alcoholic cirrhosis s/p liver transplant 75 days ago who presents to the Emergency Department for confusion which onset gradually over 2 weeks ago. Per the pt's wife, for over 2 weeks, the pt has been intermittently confused and has had personality changes. She states that the pt will not answer questions appropriately and will become agitated. Yesterday, the pt's confusion worsened. The pt's wife reports that she was called by friends yesterday because the pt was agitated, diaphoretic, shaking, and very confused while running at errand at a sore. When the pt's wife called the pt after he got back home from running the errand, he had not remembered running the errand. When the pt's wife got home, she reports that the pt was normal. Also, the pt's wife reports that she was out of town for this weekend from 10/20 to 10/22. When the pt's wife got back home, she noted that the pt's pill box was empty like the pt had taken all of his medications, but the pt stated that he did not take his medications on the night on 10/22. Also, the pt did not take his medications this morning because his blood sugar was " elevated, and he was fasting for labs. Today, the pt's wife contacted Dr. Carr, the pt's Hepatologist, about his sxs and was advised to bring the pt to the ER. Symptoms are episodic and moderate in severity. No mitigating or exacerbating factors reported. Associated sxs include a wound to the epigastric region of the abdomen. Patient denies any weakness, dizziness, abdominal pain, leg pain, CP, SOB, and all other sxs at this time. Prior Tx includes Neosporin for the abdominal wound. Pt is not on lactulose. Per the pt's wife, the pt had a brain bleed several years and is not supposed to be on Plavix, but had a stent placed and is currently taking Plavix. No further complaints or concerns at this time.         Arrival mode: Personal vehicle     PCP: Alee Pink MD       Past Medical History:  Past Medical History:   Diagnosis Date    Alcoholic cirrhosis     CVA (cerebral vascular accident)     DM (diabetes mellitus)     Dyslipidemia     Hepatocellular carcinoma     HTN (hypertension)     Intracranial hemorrhage     Skin cancer        Past Surgical History:  Past Surgical History:   Procedure Laterality Date    HERNIA REPAIR N/A     LIVER TRANSPLANT N/A 8/9/2023    Procedure: TRANSPLANT, LIVER;  Surgeon: Ameya Suero MD;  Location: [facility] OR [address] [place] [address];  Service: Transplant;  Laterality: N/A;         Family History:  No family history on file.    Social History:  Social History     Tobacco Use    Smoking status: Never    Smokeless tobacco: Never   Substance and Sexual Activity    Alcohol use: Not Currently    Drug use: Never    Sexual activity: Not Currently     Partners: Female       ROS   Review of Systems   Constitutional:  Negative for fever.   HENT:  Negative for sore throat.    Respiratory:  Negative for shortness of breath.    Cardiovascular:  Negative for chest pain.   Gastrointestinal:  Negative for abdominal pain and nausea.   Genitourinary:  Negative for dysuria.   Musculoskeletal:  Negative  for back pain.        (-) leg pain   Skin:  Positive for wound (to epigastric region). Negative for rash.   Neurological:  Negative for dizziness and weakness.   Hematological:  Does not bruise/bleed easily.   Psychiatric/Behavioral:  Positive for confusion.    All other systems reviewed and are negative.      Physical Exam      Initial Vitals [10/24/23 1101]   BP Pulse Resp Temp SpO2   129/84 78 18 98.1 °F (36.7 °C) 99 %      MAP       --          Physical Exam  Nursing Notes and Vital Signs Reviewed.  Constitutional: Patient is in no acute distress. Well-developed and well-nourished.  Head: Atraumatic. Normocephalic.  Eyes: PERRL. EOM intact. Conjunctivae are not pale. No scleral icterus.  ENT: Mucous membranes are moist. Oropharynx is clear and symmetric.    Neck: Supple. Full ROM. No lymphadenopathy.  Cardiovascular: Regular rate. Regular rhythm. No murmurs, rubs, or gallops. Distal pulses are 2+ and symmetric.  Pulmonary/Chest: No respiratory distress. Clear to auscultation bilaterally. No wheezing or rales.  Abdominal: Soft and non-distended.  There is no tenderness.  No rebound, guarding, or rigidity.   Musculoskeletal: Moves all extremities. No obvious deformities. No edema.  Skin: There is an ulcerated wound to the abdomen. See pictures below.  Neurological:  Alert, awake, and appropriate.  Alert and oriented x3 Normal speech.  No acute focal neurological deficits are appreciated. GCS 15.  Psychiatric: Flat affect. Good eye contact. Appropriate in content.        ED Course    Critical Care    Date/Time: 10/24/2023 1:32 PM    Performed by: Kayy Michelle MD  Authorized by: Kayy Michelle MD  Direct patient critical care time: 21 minutes  Additional history critical care time: 9 minutes  Ordering / reviewing critical care time: 7 minutes  Documentation critical care time: 6 minutes  Consulting other physicians critical care time: 9 minutes  Total critical care time (exclusive of procedural time) :  52 minutes  Critical care time was exclusive of separately billable procedures and treating other patients and teaching time.  Critical care was necessary to treat or prevent imminent or life-threatening deterioration of the following conditions: vasogenic edema.  Critical care was time spent personally by me on the following activities: blood draw for specimens, development of treatment plan with patient or surrogate, discussions with consultants, interpretation of cardiac output measurements, evaluation of patient's response to treatment, examination of patient, obtaining history from patient or surrogate, ordering and performing treatments and interventions, ordering and review of laboratory studies, pulse oximetry, ordering and review of radiographic studies, re-evaluation of patient's condition and review of old charts.        ED Vital Signs:  Vitals:    10/24/23 1101 10/24/23 1135 10/24/23 1140 10/24/23 1235   BP: 129/84  124/75 (!) 141/78   Pulse: 78 73 75 74   Resp: 18  (!) 24 20   Temp: 98.1 °F (36.7 °C)      TempSrc: Oral      SpO2: 99%  99% 96%   Weight: 93.8 kg (206 lb 10.9 oz)       10/24/23 1352 10/24/23 1445 10/24/23 1510   BP: 138/79  (!) 148/83   Pulse: 76 71 70   Resp: (!) 21 (!) 23 20   Temp:      TempSrc:      SpO2: 96% 98% 98%   Weight:          Abnormal Lab Results:  Labs Reviewed   CBC W/ AUTO DIFFERENTIAL - Abnormal; Notable for the following components:       Result Value    RBC 4.54 (*)     Hemoglobin 12.4 (*)     Hematocrit 38.9 (*)     MCHC 31.9 (*)     RDW 15.7 (*)     Platelets 85 (*)     Gran # (ANC) 1.6 (*)     Gran % 37.9 (*)     Lymph % 53.2 (*)     All other components within normal limits   COMPREHENSIVE METABOLIC PANEL - Abnormal; Notable for the following components:    CO2 21 (*)     Glucose 443 (*)     Total Bilirubin 1.2 (*)     All other components within normal limits   URINALYSIS, REFLEX TO URINE CULTURE - Abnormal; Notable for the following components:    Glucose, UA 4+  (*)     All other components within normal limits    Narrative:     Specimen Source->Urine   TROPONIN I   TROPONIN I   B-TYPE NATRIURETIC PEPTIDE   AMMONIA   URINALYSIS MICROSCOPIC    Narrative:     Specimen Source->Urine        All Lab Results:  Results for orders placed or performed during the hospital encounter of 10/24/23   CBC auto differential   Result Value Ref Range    WBC 4.12 3.90 - 12.70 K/uL    RBC 4.54 (L) 4.60 - 6.20 M/uL    Hemoglobin 12.4 (L) 14.0 - 18.0 g/dL    Hematocrit 38.9 (L) 40.0 - 54.0 %    MCV 86 82 - 98 fL    MCH 27.3 27.0 - 31.0 pg    MCHC 31.9 (L) 32.0 - 36.0 g/dL    RDW 15.7 (H) 11.5 - 14.5 %    Platelets 85 (L) 150 - 450 K/uL    MPV 9.3 9.2 - 12.9 fL    Immature Granulocytes 0.2 0.0 - 0.5 %    Gran # (ANC) 1.6 (L) 1.8 - 7.7 K/uL    Immature Grans (Abs) 0.01 0.00 - 0.04 K/uL    Lymph # 2.2 1.0 - 4.8 K/uL    Mono # 0.3 0.3 - 1.0 K/uL    Eos # 0.0 0.0 - 0.5 K/uL    Baso # 0.02 0.00 - 0.20 K/uL    nRBC 0 0 /100 WBC    Gran % 37.9 (L) 38.0 - 73.0 %    Lymph % 53.2 (H) 18.0 - 48.0 %    Mono % 7.5 4.0 - 15.0 %    Eosinophil % 0.7 0.0 - 8.0 %    Basophil % 0.5 0.0 - 1.9 %    Differential Method Automated    Comprehensive metabolic panel   Result Value Ref Range    Sodium 136 136 - 145 mmol/L    Potassium 4.5 3.5 - 5.1 mmol/L    Chloride 104 95 - 110 mmol/L    CO2 21 (L) 23 - 29 mmol/L    Glucose 443 (H) 70 - 110 mg/dL    BUN 15 8 - 23 mg/dL    Creatinine 1.1 0.5 - 1.4 mg/dL    Calcium 8.7 8.7 - 10.5 mg/dL    Total Protein 6.1 6.0 - 8.4 g/dL    Albumin 3.5 3.5 - 5.2 g/dL    Total Bilirubin 1.2 (H) 0.1 - 1.0 mg/dL    Alkaline Phosphatase 92 55 - 135 U/L    AST 21 10 - 40 U/L    ALT 26 10 - 44 U/L    eGFR >60 >60 mL/min/1.73 m^2    Anion Gap 11 8 - 16 mmol/L   Troponin I #1   Result Value Ref Range    Troponin I <0.006 0.000 - 0.026 ng/mL   Troponin I #2   Result Value Ref Range    Troponin I 0.010 0.000 - 0.026 ng/mL   BNP   Result Value Ref Range    BNP 76 0 - 99 pg/mL   Urinalysis, Reflex to  Urine Culture Urine, Clean Catch    Specimen: Urine   Result Value Ref Range    Specimen UA Urine, Clean Catch     Color, UA Yellow Yellow, Straw, Shelly    Appearance, UA Clear Clear    pH, UA 6.0 5.0 - 8.0    Specific Gravity, UA 1.025 1.005 - 1.030    Protein, UA Negative Negative    Glucose, UA 4+ (A) Negative    Ketones, UA Negative Negative    Bilirubin (UA) Negative Negative    Occult Blood UA Negative Negative    Nitrite, UA Negative Negative    Urobilinogen, UA Negative <2.0 EU/dL    Leukocytes, UA Negative Negative   Ammonia   Result Value Ref Range    Ammonia 37 10 - 50 umol/L   Urinalysis Microscopic   Result Value Ref Range    Bacteria None None-Occ /hpf    Yeast, UA None None    Microscopic Comment SEE COMMENT         Imaging Results:  Imaging Results              CT Head Without Contrast (Final result)  Result time 10/24/23 13:15:39      Final result by Graham Donahue MD (10/24/23 13:15:39)                   Impression:      Left frontal lobe vasogenic edema with midline shift concerning for potential subfalcine herniation.  No underlying lesion demonstrated by noncontrast CT.  MRI brain without and with contrast is recommended.    Findings communicated to Dr. Michelle by epic chat on October 24, 2023 at 1314.      Electronically signed by: Graham Donahue  Date:    10/24/2023  Time:    13:15               Narrative:    EXAMINATION:  CT HEAD WITHOUT CONTRAST    CLINICAL HISTORY:  Mental status change, unknown cause;    TECHNIQUE:  Low dose axial CT images obtained throughout the head without intravenous contrast. Sagittal and coronal reconstructions were performed.  The CT exam was performed using one or more of the following dose reduction techniques- Automated exposure control, adjustment of the mA and/or kV according to patient size, and/or use of iterative reconstructed technique.    COMPARISON:  Patient has remote history of hemorrhagic infarct in the left basal ganglia from outside MRI brain report  dictated January 20, 2014.    FINDINGS:  Intracranial compartment:    Left frontal lobe vasogenic edema with preservation of the surrounding cortex.  There is left to right midline shift between the frontal lobes of approximately 13 mm.  No definite underlying lesion.  Subfalcine herniation is a concern.    Frontal horn of the left lateral ventricle is compressed.  No axillary axial fluid collection.  No acute intracranial hemorrhage.    Skull/extracranial contents (limited evaluation): No fracture. Mastoid air cells and paranasal sinuses are essentially clear.                                       X-Ray Chest AP Portable (Final result)  Result time 10/24/23 11:55:57      Final result by Graham Donahue MD (10/24/23 11:55:57)                   Impression:      No acute finding in the chest.      Electronically signed by: Graham Donahue  Date:    10/24/2023  Time:    11:55               Narrative:    EXAMINATION:  XR CHEST AP PORTABLE    CLINICAL HISTORY:  Chest Pain;    FINDINGS:  Comparison: August 10, 2023.  Previous lines and tubes have been removed.    Mediastinal silhouette is within normal limits.  The lungs are clear.  No pneumothorax or pleural effusion.  No acute osseous finding.                                     The EKG was ordered, reviewed, and independently interpreted by the ED provider.  Interpretation time: 11:45  Rate: 73 BPM  Rhythm: normal sinus rhythm  Interpretation: RBBB. No STEMI.           The Emergency Provider reviewed the vital signs and test results, which are outlined above.    ED Discussion     2:02 PM: Consult with Dr. Altamirano (Neuro Critical Care) at [facility] concerning pt. There are no Neurosurgery services, which the patient requires, offered at [facility] at this time. Dr. Altamirano expresses understanding and will accept transfer for Neurosurgery services  Accepting Facility: [facility]  Accepting Physician: Dr. Altamirano      2:03 PM: Re-evaluated pt. Informed pt  and family that there are no neurosurgery services available at this time. I have discussed test results, shared treatment plan, and the need for transfer with patient and family at bedside. All historical, clinical, radiographic, and laboratory findings were reviewed with the patient/family in detail. Patient will be transferred by [facility] services with ACLS care required en route. Patient understands that there could be unforeseen motor vehicle accidents or loss of vital signs that could result in potential death or permanent disability. Pt and family express understanding at this time and agree with all information. All questions answered. Pt and family have no further questions or concerns at this time. Pt is ready for and awaiting transfer.    ED Course as of 10/24/23 1544   Tue Oct 24, 2023   1524 Patient is still stable, GCS 15.  Still waiting for transfer [TD]   1544 Still awaiting transfer at the time.  GCS still 15.  MRI is about to come grab him for testing. [TD]      ED Course User Index  [TD] Kayy Michelle MD       ED Medication(s):  Medications   sodium chloride 0.9% bolus 1,000 mL 1,000 mL (0 mLs Intravenous Stopped 10/24/23 1450)           New Prescriptions    No medications on file         Medical Decision Making    Medical Decision Making  Amount and/or Complexity of Data Reviewed  Independent Historian: spouse  External Data Reviewed: notes.  Labs: ordered. Decision-making details documented in ED Course.     Details: CBC is normal. CMP shows an elevated glucose at 443. Normal anion gap. Troponin normal.  Radiology: ordered. Decision-making details documented in ED Course.     Details: CXR is normal.  ECG/medicine tests: ordered and independent interpretation performed. Decision-making details documented in ED Course.     Details: No STEMI  Discussion of management or test interpretation with external provider(s): 1:23 PM:  CT scan with vasogenic edema of the left frontal lobe, Will arrange  for transfer to [facility], if possible, as the pt is a liver transplant pt. If not able to transfer to [facility], will transfer to [facility]. At this time, the pt's vitals are stable and he has a GCS of 15. No plan to intubate at this time, as it is not deemed necessary at this time. If mental status changes or vitals change, will re-evaluate the pt.    1:32 PM: Called MRI. They state that they have someone on the table and they will fit the pt in next.    2:01 PM: 2:02 PM: Consult with Dr. Altamirano (Neuro Critical Care) at [facility] concerning pt. There are no Neurosurgery services, which the patient requires, offered at [facility] at this time. Dr. Altamirano expresses understanding and will accept transfer for Neurosurgery services. Dr. Altamirano also states that the pt is not emergent as he has a GCS of 15. Although there is herniation, pt can get a MRI at [facility], per Dr. Altamirano.      Risk  Risk Details: Differential diagnoses: CVA, TIA, electrolyte abnormality, sepsis, polypharmacy, withdrawal state, hyperglycemia, hypoglycemia, hypoxia, CNS infection encephalitis meningitis, intracranial hemorrhage or subdural, medication side effect, infection such as pneumonia COVID influenza or UTI                Scribe Attestation:   Scribe #1: I performed the above scribed service and the documentation accurately describes the services I performed. I attest to the accuracy of the note.    Attending:   Physician Attestation Statement for Scribe #1: I, Kayy Michelle MD, personally performed the services described in this documentation, as scribed by Blanca Fowler, in my presence, and it is both accurate and complete.          Clinical Impression       ICD-10-CM ICD-9-CM   1. Brain tumor  D49.6 239.6   2. Chest pain  R07.9 786.50   3. Vasogenic brain edema  G93.6 348.5       Disposition:   Disposition: Transferred  Condition: Stable         Kayy Michelle MD  10/24/23 1543       Kayy Michelle  MD Tuyet  10/24/23 2222

## 2023-10-24 NOTE — TELEPHONE ENCOUNTER
Spoke with pt's spouse. Pt now with severe confusion. He is not remembering leaving the house, certain errands he went on, events, etc. Also has been more agitated.     Reviewed with Dr. Joleen Carr. Per MD, pt needs to report to ED for AMS.    Spoke with pt's spouse. Reviewed Dr. Carr's recommendations. She agreed with plan. She will bring him to [facility] and have team there contact transplant staff.    ----- Message from Mackenzie Estrada sent at 10/24/2023  8:16 AM CDT -----  Regarding: Questions      Name Of Caller:     Leida (Pt's Wife)      Contact Preference:   [phone]      Nature of call:  Pt's Wife is requesting to speak w/ Linda. She's concerned about her husbands recent memory lapses.

## 2023-10-24 NOTE — PROGRESS NOTES
RESEARCH STUDY SPECIMEN COLLECTION ENCOUNTER  ORGAN TRANSPLANT  [facility]    Study Title: Role of Tumor-Induced Immune Tolerance in the Patient Response to Locoregional Therapy: Implications in Assessment Risk of Hepatocellular Carcinoma Recurrence Following Liver Transplantation    IRB #: [id number]    IRB Approval Date: 6/8/2016    : Ameya Suero MD  Sub-investigator: Cecil Reilly, PhD    Patient Number: [id number]    In accordance with the study protocol, Research Lab orders were placed and follow-up specimens were collected on (date: 10/24/2023) in:  [facility] VNP: YES/NO: No  [facility] LABTX: YES/NO: No  [facility] IM: YES/NO: No  Other [facility] location: YES/NO: Yes   Location: [facility]    A  was used to transport blood specimens to I[facility]-Transplant for processing: YES/NO: Yes  Blood specimens were transported to [facility] for processing: YES/NO: Yes    Rob Tee  Admin Research- Liver Transplant

## 2023-10-25 ENCOUNTER — ANESTHESIA EVENT (OUTPATIENT)
Dept: SURGERY | Facility: HOSPITAL | Age: 63
DRG: 025 | End: 2023-10-25
Payer: COMMERCIAL

## 2023-10-25 PROBLEM — G93.89 LEFT FRONTAL LOBE MASS: Status: ACTIVE | Noted: 2023-10-25

## 2023-10-25 LAB
ABO + RH BLD: NORMAL
ALBUMIN SERPL BCP-MCNC: 3.3 G/DL (ref 3.5–5.2)
ALP SERPL-CCNC: 82 U/L (ref 55–135)
ALT SERPL W/O P-5'-P-CCNC: 24 U/L (ref 10–44)
ANION GAP SERPL CALC-SCNC: 8 MMOL/L (ref 8–16)
AST SERPL-CCNC: 23 U/L (ref 10–40)
BASOPHILS # BLD AUTO: 0.02 K/UL (ref 0–0.2)
BASOPHILS NFR BLD: 0.5 % (ref 0–1.9)
BILIRUB SERPL-MCNC: 1.4 MG/DL (ref 0.1–1)
BLD GP AB SCN CELLS X3 SERPL QL: NORMAL
BUN SERPL-MCNC: 10 MG/DL (ref 8–23)
CALCIUM SERPL-MCNC: 8.5 MG/DL (ref 8.7–10.5)
CHLORIDE SERPL-SCNC: 105 MMOL/L (ref 95–110)
CO2 SERPL-SCNC: 26 MMOL/L (ref 23–29)
CREAT SERPL-MCNC: 0.8 MG/DL (ref 0.5–1.4)
DIFFERENTIAL METHOD: ABNORMAL
EOSINOPHIL # BLD AUTO: 0.1 K/UL (ref 0–0.5)
EOSINOPHIL NFR BLD: 2.1 % (ref 0–8)
ERYTHROCYTE [DISTWIDTH] IN BLOOD BY AUTOMATED COUNT: 15.8 % (ref 11.5–14.5)
EST. GFR  (NO RACE VARIABLE): >60 ML/MIN/1.73 M^2
GLUCOSE SERPL-MCNC: 195 MG/DL (ref 70–110)
HCT VFR BLD AUTO: 36.6 % (ref 40–54)
HGB BLD-MCNC: 11.7 G/DL (ref 14–18)
IMM GRANULOCYTES # BLD AUTO: 0.01 K/UL (ref 0–0.04)
IMM GRANULOCYTES NFR BLD AUTO: 0.3 % (ref 0–0.5)
LYMPHOCYTES # BLD AUTO: 1.9 K/UL (ref 1–4.8)
LYMPHOCYTES NFR BLD: 49.7 % (ref 18–48)
MAGNESIUM SERPL-MCNC: 1.5 MG/DL (ref 1.6–2.6)
MCH RBC QN AUTO: 27.3 PG (ref 27–31)
MCHC RBC AUTO-ENTMCNC: 32 G/DL (ref 32–36)
MCV RBC AUTO: 85 FL (ref 82–98)
MONOCYTES # BLD AUTO: 0.4 K/UL (ref 0.3–1)
MONOCYTES NFR BLD: 9.4 % (ref 4–15)
NEUTROPHILS # BLD AUTO: 1.5 K/UL (ref 1.8–7.7)
NEUTROPHILS NFR BLD: 38 % (ref 38–73)
NRBC BLD-RTO: 0 /100 WBC
PLATELET # BLD AUTO: 83 K/UL (ref 150–450)
PMV BLD AUTO: 10.1 FL (ref 9.2–12.9)
POCT GLUCOSE: 217 MG/DL (ref 70–110)
POCT GLUCOSE: 254 MG/DL (ref 70–110)
POCT GLUCOSE: 291 MG/DL (ref 70–110)
POCT GLUCOSE: 355 MG/DL (ref 70–110)
POTASSIUM SERPL-SCNC: 3.6 MMOL/L (ref 3.5–5.1)
PROT SERPL-MCNC: 5.7 G/DL (ref 6–8.4)
RBC # BLD AUTO: 4.29 M/UL (ref 4.6–6.2)
SODIUM SERPL-SCNC: 139 MMOL/L (ref 136–145)
SPECIMEN OUTDATE: NORMAL
TACROLIMUS BLD-MCNC: 6.4 NG/ML (ref 5–15)
WBC # BLD AUTO: 3.84 K/UL (ref 3.9–12.7)

## 2023-10-25 PROCEDURE — 25000003 PHARM REV CODE 250

## 2023-10-25 PROCEDURE — 85025 COMPLETE CBC W/AUTO DIFF WBC: CPT

## 2023-10-25 PROCEDURE — 99499 UNLISTED E&M SERVICE: CPT | Mod: ,,, | Performed by: NEUROLOGICAL SURGERY

## 2023-10-25 PROCEDURE — 86901 BLOOD TYPING SEROLOGIC RH(D): CPT | Performed by: STUDENT IN AN ORGANIZED HEALTH CARE EDUCATION/TRAINING PROGRAM

## 2023-10-25 PROCEDURE — 99233 PR SUBSEQUENT HOSPITAL CARE,LEVL III: ICD-10-PCS | Mod: ,,,

## 2023-10-25 PROCEDURE — 25000003 PHARM REV CODE 250: Performed by: NURSE PRACTITIONER

## 2023-10-25 PROCEDURE — 63600175 PHARM REV CODE 636 W HCPCS

## 2023-10-25 PROCEDURE — 99233 SBSQ HOSP IP/OBS HIGH 50: CPT | Mod: ,,,

## 2023-10-25 PROCEDURE — 99253 IP/OBS CNSLTJ NEW/EST LOW 45: CPT | Mod: 24,,, | Performed by: PHYSICIAN ASSISTANT

## 2023-10-25 PROCEDURE — 99499 NO LOS: ICD-10-PCS | Mod: ,,, | Performed by: NEUROLOGICAL SURGERY

## 2023-10-25 PROCEDURE — 99222 PR INITIAL HOSPITAL CARE,LEVL II: ICD-10-PCS | Mod: ,,, | Performed by: NURSE PRACTITIONER

## 2023-10-25 PROCEDURE — 36415 COLL VENOUS BLD VENIPUNCTURE: CPT | Performed by: PHYSICIAN ASSISTANT

## 2023-10-25 PROCEDURE — 99222 1ST HOSP IP/OBS MODERATE 55: CPT | Mod: ,,, | Performed by: NURSE PRACTITIONER

## 2023-10-25 PROCEDURE — 99253 PR INITIAL INPATIENT CONSULT,LEVL III: ICD-10-PCS | Mod: 24,,, | Performed by: PHYSICIAN ASSISTANT

## 2023-10-25 PROCEDURE — 11000001 HC ACUTE MED/SURG PRIVATE ROOM

## 2023-10-25 PROCEDURE — 80197 ASSAY OF TACROLIMUS: CPT | Performed by: PHYSICIAN ASSISTANT

## 2023-10-25 PROCEDURE — 80053 COMPREHEN METABOLIC PANEL: CPT | Performed by: PHYSICIAN ASSISTANT

## 2023-10-25 PROCEDURE — 36415 COLL VENOUS BLD VENIPUNCTURE: CPT | Performed by: STUDENT IN AN ORGANIZED HEALTH CARE EDUCATION/TRAINING PROGRAM

## 2023-10-25 PROCEDURE — 83735 ASSAY OF MAGNESIUM: CPT | Performed by: PHYSICIAN ASSISTANT

## 2023-10-25 PROCEDURE — 63600175 PHARM REV CODE 636 W HCPCS: Performed by: NURSE PRACTITIONER

## 2023-10-25 RX ORDER — CARVEDILOL 6.25 MG/1
6.25 TABLET ORAL 2 TIMES DAILY WITH MEALS
Qty: 60 TABLET | Refills: 1 | Status: SHIPPED | OUTPATIENT
Start: 2023-10-25 | End: 2023-12-19 | Stop reason: SDUPTHER

## 2023-10-25 RX ORDER — SODIUM CHLORIDE 9 MG/ML
INJECTION, SOLUTION INTRAVENOUS CONTINUOUS
Status: DISCONTINUED | OUTPATIENT
Start: 2023-10-25 | End: 2023-10-27

## 2023-10-25 RX ORDER — TACROLIMUS 1 MG/1
3 CAPSULE ORAL EVERY MORNING
Status: DISCONTINUED | OUTPATIENT
Start: 2023-10-25 | End: 2023-10-26

## 2023-10-25 RX ORDER — DEXAMETHASONE 4 MG/1
4 TABLET ORAL ONCE
Status: DISCONTINUED | OUTPATIENT
Start: 2023-10-25 | End: 2023-10-25

## 2023-10-25 RX ORDER — DEXAMETHASONE 4 MG/1
4 TABLET ORAL EVERY 8 HOURS
Status: DISCONTINUED | OUTPATIENT
Start: 2023-10-25 | End: 2023-10-29

## 2023-10-25 RX ORDER — INSULIN ASPART 100 [IU]/ML
6 INJECTION, SOLUTION INTRAVENOUS; SUBCUTANEOUS
Status: DISCONTINUED | OUTPATIENT
Start: 2023-10-25 | End: 2023-10-25

## 2023-10-25 RX ORDER — VALGANCICLOVIR 450 MG/1
450 TABLET, FILM COATED ORAL DAILY
Status: DISCONTINUED | OUTPATIENT
Start: 2023-10-25 | End: 2023-10-30 | Stop reason: HOSPADM

## 2023-10-25 RX ORDER — MYCOPHENOLATE MOFETIL 250 MG/1
500 CAPSULE ORAL 2 TIMES DAILY
Status: DISCONTINUED | OUTPATIENT
Start: 2023-10-25 | End: 2023-10-25

## 2023-10-25 RX ORDER — SULFAMETHOXAZOLE AND TRIMETHOPRIM 400; 80 MG/1; MG/1
1 TABLET ORAL DAILY
Status: DISCONTINUED | OUTPATIENT
Start: 2023-10-25 | End: 2023-10-30 | Stop reason: HOSPADM

## 2023-10-25 RX ORDER — HYDROCODONE BITARTRATE AND ACETAMINOPHEN 500; 5 MG/1; MG/1
TABLET ORAL
Status: DISCONTINUED | OUTPATIENT
Start: 2023-10-26 | End: 2023-10-27

## 2023-10-25 RX ORDER — GLUCAGON 1 MG
1 KIT INJECTION
Status: DISCONTINUED | OUTPATIENT
Start: 2023-10-25 | End: 2023-10-27

## 2023-10-25 RX ORDER — IBUPROFEN 200 MG
24 TABLET ORAL
Status: DISCONTINUED | OUTPATIENT
Start: 2023-10-25 | End: 2023-10-27

## 2023-10-25 RX ORDER — DOCUSATE SODIUM 100 MG/1
100 CAPSULE, LIQUID FILLED ORAL 2 TIMES DAILY
Status: DISCONTINUED | OUTPATIENT
Start: 2023-10-25 | End: 2023-10-30 | Stop reason: HOSPADM

## 2023-10-25 RX ORDER — TACROLIMUS 1 MG/1
2 CAPSULE ORAL EVERY EVENING
Status: DISCONTINUED | OUTPATIENT
Start: 2023-10-25 | End: 2023-10-26

## 2023-10-25 RX ORDER — IBUPROFEN 200 MG
16 TABLET ORAL
Status: DISCONTINUED | OUTPATIENT
Start: 2023-10-25 | End: 2023-10-27

## 2023-10-25 RX ORDER — INSULIN ASPART 100 [IU]/ML
12 INJECTION, SOLUTION INTRAVENOUS; SUBCUTANEOUS
Status: DISCONTINUED | OUTPATIENT
Start: 2023-10-25 | End: 2023-10-26

## 2023-10-25 RX ORDER — FAMOTIDINE 20 MG/1
20 TABLET, FILM COATED ORAL 2 TIMES DAILY
Status: DISCONTINUED | OUTPATIENT
Start: 2023-10-25 | End: 2023-10-30 | Stop reason: HOSPADM

## 2023-10-25 RX ORDER — INSULIN ASPART 100 [IU]/ML
0-10 INJECTION, SOLUTION INTRAVENOUS; SUBCUTANEOUS EVERY 6 HOURS PRN
Status: DISCONTINUED | OUTPATIENT
Start: 2023-10-25 | End: 2023-10-27

## 2023-10-25 RX ADMIN — INSULIN DETEMIR 8 UNITS: 100 INJECTION, SOLUTION SUBCUTANEOUS at 08:10

## 2023-10-25 RX ADMIN — INSULIN ASPART 10 UNITS: 100 INJECTION, SOLUTION INTRAVENOUS; SUBCUTANEOUS at 06:10

## 2023-10-25 RX ADMIN — CARVEDILOL 6.25 MG: 6.25 TABLET, FILM COATED ORAL at 08:10

## 2023-10-25 RX ADMIN — DEXAMETHASONE 4 MG: 4 TABLET ORAL at 02:10

## 2023-10-25 RX ADMIN — INSULIN DETEMIR 8 UNITS: 100 INJECTION, SOLUTION SUBCUTANEOUS at 09:10

## 2023-10-25 RX ADMIN — DEXAMETHASONE 4 MG: 4 TABLET ORAL at 01:10

## 2023-10-25 RX ADMIN — INSULIN ASPART 6 UNITS: 100 INJECTION, SOLUTION INTRAVENOUS; SUBCUTANEOUS at 12:10

## 2023-10-25 RX ADMIN — SODIUM CHLORIDE: 9 INJECTION, SOLUTION INTRAVENOUS at 09:10

## 2023-10-25 RX ADMIN — TACROLIMUS 3 MG: 1 CAPSULE ORAL at 08:10

## 2023-10-25 RX ADMIN — TACROLIMUS 2 MG: 1 CAPSULE ORAL at 05:10

## 2023-10-25 RX ADMIN — INSULIN DETEMIR 10 UNITS: 100 INJECTION, SOLUTION SUBCUTANEOUS at 01:10

## 2023-10-25 RX ADMIN — FAMOTIDINE 20 MG: 20 TABLET ORAL at 08:10

## 2023-10-25 RX ADMIN — DEXAMETHASONE 4 MG: 4 TABLET ORAL at 06:10

## 2023-10-25 RX ADMIN — INSULIN ASPART 12 UNITS: 100 INJECTION, SOLUTION INTRAVENOUS; SUBCUTANEOUS at 05:10

## 2023-10-25 RX ADMIN — SODIUM CHLORIDE: 9 INJECTION, SOLUTION INTRAVENOUS at 03:10

## 2023-10-25 RX ADMIN — DOCUSATE SODIUM 100 MG: 100 CAPSULE, LIQUID FILLED ORAL at 08:10

## 2023-10-25 RX ADMIN — CARVEDILOL 6.25 MG: 6.25 TABLET, FILM COATED ORAL at 05:10

## 2023-10-25 RX ADMIN — VALGANCICLOVIR 450 MG: 450 TABLET, FILM COATED ORAL at 08:10

## 2023-10-25 RX ADMIN — INSULIN ASPART 4 UNITS: 100 INJECTION, SOLUTION INTRAVENOUS; SUBCUTANEOUS at 06:10

## 2023-10-25 RX ADMIN — DEXAMETHASONE 4 MG: 4 TABLET ORAL at 09:10

## 2023-10-25 RX ADMIN — SULFAMETHOXAZOLE AND TRIMETHOPRIM 1 TABLET: 400; 80 TABLET ORAL at 08:10

## 2023-10-25 NOTE — HOSPITAL COURSE
10/25: Patient's surgery moved to tomorrow AM. Patient is neuro stable. Ambulating well around the room. Denies headaches, vision changes, focal weakness, nausea or vomiting. Okay for diet at this time. NPO at midnight. Platelets and DDAVP ordered for tomorrow prior to OR. Family at bedside.  10/26: OR today for crani. All labs reviewed. LFTs/tbili stable. Tacro level 9.5. Will switch dose to 2mg bid. Cont holding cellcept until path results. Post op CTH with expected post op changes. MRI brain in AM. Dex 4q6h. Keppra 500 mg BID. Patient with significant oozing from incision. Reinforced and head wrap placed. CTM.   10/27: POD 1 s/p R crani for meningioma. NAEON. AFVSS. Exam stable. Pain controlled. Tolerating PO. Marroquin in place, will d/c. Continue active medical management of significant vasogenic edema with steroids. Wife states that personality improved but still some cognitive issues, requesting SLP in addition to PT/OT. LFTs remain wnl. Prograf level  for left frontal lobe mass, being followed by Neuro surgery. All labs reviewed. LFTs/tbili stable. Tacro level 5.2, cont Prograf 2mg bid. Cellcept on hold, can cont to hold while recovering from surgery.   10/28:POD2 doing well, MRI with GTR of extraaxial mass, improved adjacent intraparenchymal enhancement, persistent edema. LFTs WNL. Tac up to 11 this morning, will hold today. D/C insulin drip, aspart 12 units with meals, 20 detemir daily  10/29: 10/29: POD3 stepped down to NSGY floor with transplant following, remains neuro intact. Got out of bed yesterday, eating well. SG HV zero output. BG in 300s during day today, dex discontinued.  10/30: NAEON. AFVSS. Neurologically stable. Tolerating PO diet and voiding spontaneously. Medically stable to discharge home with HH. Follow up in clinic in 2 weeks. Discharge instructions given verbally to patient. All of his questions were answered. He is encouraged to call the clinic with any questions or concerns prior to  "follow up appt.  - LFTs WNL. Prograf level low today, 4.1. Will increase prograf to 2 mg BID today, then titrate up to 3 mg BID tomorrow 10/31. Patient will be provided updated "transplant blue card" with list of correct medication dosages prior to discharge. Patient's liver coordinator notified of discharge. Will plan for labs Thursday to monitor prograf level.   - Per NSGY, will restart plavix 11/5 and ASA 11/9. Pt has f/u Liver US ordered 11/7 d/t hx HAS w/ metal stent in place.  - Continue to hold Cellcept on discharge.    The above is purely a summary of documentation by other providers. This discharge summary is in no way a substitute for medical documentation that has been provided throughout the stay by care-giving providers. Please reference all documentation in the   Electronic medical record should any questions or concerns arise.      Physical Exam 10/30/23:  General: well developed, well nourished, no distress.   Head: normocephalic  Neurologic: Alert and oriented. Thought content appropriate.  GCS: Motor: 6/Verbal: 5/Eyes: 4 GCS Total: 15  Mental Status: Awake, Alert, Oriented x 4  Language: No aphasia  Speech: No dysarthria  Cranial nerves: face symmetric, tongue midline, CN II-XII grossly intact.   Eyes: pupils equal, round, reactive to light with accommodation, EOMI.   Pulmonary: normal respirations, no signs of respiratory distress  Abdomen: soft, non-distended, not tender to palpation  Skin: Skin is warm, dry and intact.  Sensory: intact to light touch throughout  Motor Strength:Moves all extremities spontaneously with good tone.  Full strength upper and lower extremities. No abnormal movements seen.   Incision: Clean, dry, staples intact. Skin edges well approximated. No surrounding erythema or edema. No drainage or TTP.                   "

## 2023-10-25 NOTE — PLAN OF CARE
Problem: Adult Inpatient Plan of Care  Goal: Absence of Hospital-Acquired Illness or Injury  Outcome: Ongoing, Progressing     Problem: Diabetes Comorbidity  Goal: Blood Glucose Level Within Targeted Range  Outcome: Ongoing, Progressing  Intervention: Monitor and Manage Glycemia  Flowsheets (Taken 10/25/2023 1609)  Glycemic Management: blood glucose monitored     Problem: Impaired Wound Healing  Goal: Optimal Wound Healing  Outcome: Ongoing, Progressing

## 2023-10-25 NOTE — ASSESSMENT & PLAN NOTE
Endocrinology consulted for BG management.   BG goal 140-180      WBD 0.4 units/kg/day (20% reduction in home dosing)  - Levemir (Insulin Detemir) 9 units BID  - Novolog (Insulin Aspart) 6 units TIDWM and prn for BG excursions [facility] SSI (150/25)  - BG checks AC/HS  - Hypoglycemia protocol in place  - If blood glucose greater than 300, please ask patient not to eat food or drink anything other than water until correctional insulin has brought it back below 250    ** Please notify Endocrine for any change and/or advance in diet**  ** Please call Endocrine for any BG related issues **    Discharge Planning:   TBD. Please notify endocrinology prior to discharge.       Hemostasis: Electrocautery

## 2023-10-25 NOTE — ANESTHESIA PREPROCEDURE EVALUATION
[facility]  Anesthesia Pre-Operative Evaluation   10/25/2023        Jed Chávez, 1960  [id number]  Procedure(s) (LRB):  CRANIOTOMY, WITH NEOPLASM EXCISION USING COMPUTER-ASSISTED NAVIGATION (Left)    Subjective    Jed Chávez is a 63 y.o. male w/ a significant PMHx of IDDM, HTN, L basal ganglia ICH (in 2014, no residual deficits), HCC/EtOH cirrhosis (s/p liver transplant on 8/10/23 complicated by hepatic art stenosis s/p angioplasty/stent on 10/2/23), non-melanoma skin cancer who presents as transfer from OSH for L frontal mass after 2 weeks of confusion. NSGY planning for craniotomy with neoplasm excision. Imaging shows 3 cm mass with edema. On coreg and immunosuppressants. On Mounjaro, last took on 10/18. Last took plavix 10/24.  Pt is alert and oriented x3 and understands the procedure is about to undergo. IPad consent obtained.     Patient now presents for above procedure(s).     Prev Airway:   Induction:  Intravenous    Intubated:  Postinduction    Mask Ventilation:  Easy with oral airway    Attempts:  1    Attempted By:  CRNA    Method of Intubation:  Video laryngoscopy    Blade:  [place] 3    Laryngeal View Grade: Grade I - full view of cords      Difficult Airway Encountered?: No      Complications:  None    Airway Device:  Oral endotracheal tube    Airway Device Size:  7.5    Style/Cuff Inflation:  Cuffed (inflated to minimal occlusive pressure)    Tube secured:  22    Secured at:  The teeth    Placement Verified By:  Capnometry    Complicating Factors:  None    Findings Post-Intubation:  BS equal bilateral and atraumatic/condition of teeth unchanged     Stress Echo (5/16/23):   The estimated ejection fraction is 65%.   The quantitatively derived ejection fraction is 63%.   During stress, the following significant arrhythmias were observed: occasional PACs.   The left ventricle is normal in size with normal systolic  function.   Mild to moderate left atrial enlargement.   Normal left ventricular diastolic function.   Mild to moderate tricuspid regurgitation.   Normal right ventricular size with normal right ventricular systolic function.   Moderate right atrial enlargement.   Trivial pericardial effusion.   Indeterminate central venous pressure.   There is abnormal septal wall motion.   The stress echo portion of this study is negative for myocardial ischemia.       LDA:        Peripheral IV - Single Lumen 10/24/23 1800 18 G (Active)   Site Assessment Clean;Dry;Intact 10/25/23 0952   Extremity Assessment Distal to IV No warmth;No swelling;No redness;No abnormal discoloration 10/25/23 0952   Line Status Blood return noted 10/25/23 0952   Dressing Status Clean;Dry;Intact 10/25/23 0952   Dressing Intervention Integrity maintained 10/25/23 0952   Number of days: 0       Drips:     Patient Active Problem List   Diagnosis    HCC (hepatocellular carcinoma)    Left-sided nontraumatic intracerebral hemorrhage    Hypertension complicating diabetes    Type 2 diabetes mellitus with other specified complication, with chronic insulin use    Adrenal cortical steroids causing adverse effect in therapeutic use    Status post liver transplant    Acute blood loss anemia    At risk for opportunistic infections    Long-term use of immunosuppressant medication    Prophylactic immunotherapy    Vitamin D deficiency    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    Anemia of chronic disease    Type 2 diabetes mellitus with hyperglycemia    Left frontal lobe mass       Review of patient's allergies indicates:  No Known Allergies    Current Inpatient Medications:       Current Facility-Administered Medications on File Prior to Encounter   Medication Dose Route Frequency Provider Last Rate Last Admin    0.9%  NaCl infusion   Intravenous Continuous Rosy Osuna MD 75 mL/hr at 10/25/23 0344 New Bag at 10/25/23 0344     carvediloL tablet 6.25 mg  6.25 mg Oral BID WM Rosy Osuna MD   6.25 mg at 10/25/23 0809    dexAMETHasone tablet 4 mg  4 mg Oral Q8H Rosy Osuna MD   4 mg at 10/25/23 0600    dextrose 10% bolus 125 mL 125 mL  12.5 g Intravenous PRN Rosy Osuna MD        dextrose 10% bolus 250 mL 250 mL  25 g Intravenous PRRosy Mohan MD        dextrose 10% bolus 250 mL 250 mL  25 g Intravenous PRN Rosy Osuna MD        docusate sodium capsule 100 mg  100 mg Oral BID Rosy Osuna MD   100 mg at 10/25/23 0809    famotidine tablet 20 mg  20 mg Oral BID Rosy Osuna MD   20 mg at 10/25/23 0808    glucagon (human recombinant) injection 1 mg  1 mg Intramuscular Rosy El MD        glucagon (human recombinant) injection 1 mg  1 mg Intramuscular PRRosy Mohan MD        glucagon (human recombinant) injection 1 mg  1 mg Intramuscular Marquez Marti DNP, FNP        glucose chewable tablet 16 g  16 g Oral PRRosy Mohan MD        glucose chewable tablet 16 g  16 g Oral PRN Rosy Osuna MD        glucose chewable tablet 16 g  16 g Oral PRN Marquez Contreras DNP, FNP        glucose chewable tablet 24 g  24 g Oral Rosy El MD        glucose chewable tablet 24 g  24 g Oral Marquez Marti DNP, FNP        hydrALAZINE injection 10 mg  10 mg Intravenous Q6H Rosy El MD        insulin aspart U-100 pen 0-10 Units  0-10 Units Subcutaneous Q6H PRRosy Mohan MD   6 Units at 10/25/23 1227    insulin aspart U-100 pen 6 Units  6 Units Subcutaneous TIDWM Marquez Contreras DNP, FNP   6 Units at 10/25/23 1227    insulin detemir U-100 (Levemir) pen 8 Units  8 Units Subcutaneous BID Marquez Contreras DNP, FNP   8 Units at 10/25/23 0850    sulfamethoxazole-trimethoprim 400-80mg per tablet 1 tablet  1 tablet Oral Daily Rosy Osuna MD   1 tablet at 10/25/23 0808    tacrolimus capsule 2 mg  2 mg Oral Daily PM  "Rosy Osuna MD        tacrolimus capsule 3 mg  3 mg Oral Daily AM Rosy Osuna MD   3 mg at 10/25/23 0808    valGANciclovir tablet 450 mg  450 mg Oral Daily Rosy Osuna MD   450 mg at 10/25/23 0808     Current Outpatient Medications on File Prior to Encounter   Medication Sig Dispense Refill    aspirin 81 MG Chew Take 1 tablet (81 mg total) by mouth once daily. 30 tablet 11    blood sugar diagnostic Strp Test blood glucose 3 (three) times daily. 100 strip 11    blood-glucose meter Misc Test blood glucose as directed 1 each 0    blood-glucose sensor (FREESTYLE PUNEET 3 SENSOR) Neelam To change every 14 days 2 each 3    carvediloL (COREG) 3.125 MG tablet Take 2 tablets (6.25 mg total) by mouth 2 (two) times daily with meals. HOLD SBP <130, HR <60 120 tablet 1    clopidogreL (PLAVIX) 75 mg tablet Take 1 tablet (75 mg total) by mouth once daily. 90 tablet 0    famotidine (PEPCID) 20 MG tablet Take 1 tablet (20 mg total) by mouth every evening. 30 tablet 0    insulin glargine 100 units/mL SubQ pen Inject 20 Units into the skin once daily. 15 mL 0    insulin lispro (HUMALOG KWIKPEN INSULIN) 100 unit/mL pen Inject 8 Units into the skin 3 (three) times daily before meals AND 1-5 Units 3 (three) times daily before meals. sliding scale insulin as needed. Max daily dose 75 units daily... 15 mL 0    lancets Misc Test blood glucose 3 (three) times daily. 100 each 11    mycophenolate (CELLCEPT) 250 mg Cap Take 2 capsules (500 mg total) by mouth 2 (two) times daily. 120 capsule 11    pen needle, diabetic 32 gauge x 5/32" Ndle Use to inject insulin into the skin 4 (four) times daily. 100 each 11    sulfamethoxazole-trimethoprim 400-80mg (BACTRIM,SEPTRA) 400-80 mg per tablet Take 1 tablet by mouth once daily. Stop 2/6/24 30 tablet 5    tacrolimus (PROGRAF) 1 MG Cap Take 3 capsules (3 mg total) by mouth every morning AND 2 capsules (2 mg total) every evening. 150 capsule 11    tirzepatide (MOUNJARO) 2.5 " mg/0.5 mL PnIj Inject 2.5 mg into the skin every 7 days. (Patient taking differently: Inject 2.5 mg into the skin every 7 days. WEDNESDAYS) 4 pen 0    valGANciclovir (VALCYTE) 450 mg Tab Take 1 tablet (450 mg total) by mouth once daily. Stop 2/6/24 30 tablet 5       Past Surgical History:   Procedure Laterality Date    HERNIA REPAIR N/A     LIVER TRANSPLANT N/A 8/9/2023    Procedure: TRANSPLANT, LIVER;  Surgeon: Ameya Suero MD;  Location: [address];  Service: Transplant;  Laterality: N/A;       Social History:  Tobacco Use: Low Risk  (10/25/2023)    Patient History     Smoking Tobacco Use: Never     Smokeless Tobacco Use: Never     Passive Exposure: Not on file       Alcohol Use: Not on file       Objective    Vital Signs Range:  BMI Readings from Last 1 Encounters:   10/25/23 28.66 kg/m²       Temp:  [36.5 °C (97.7 °F)-36.9 °C (98.4 °F)]   Pulse:  [60-76]   Resp:  [16-18]   BP: (153-171)/(82-89)   SpO2:  [93 %-96 %]        Significant Labs:        Component Value Date/Time    WBC 3.84 (L) 10/25/2023 0332    HGB 11.7 (L) 10/25/2023 0332    HCT 36.6 (L) 10/25/2023 0332    HCT 37 08/10/2023 0447    PLT 83 (L) 10/25/2023 0332     10/25/2023 0332    K 3.6 10/25/2023 0332     10/25/2023 0332    CO2 26 10/25/2023 0332     (H) 10/25/2023 0332    BUN 10 10/25/2023 0332    CREATININE 0.8 10/25/2023 0332    MG 1.5 (L) 10/25/2023 0332    PHOS 3.3 08/11/2023 0426    CALCIUM 8.5 (L) 10/25/2023 0332    ALBUMIN 3.3 (L) 10/25/2023 0332    PROT 5.7 (L) 10/25/2023 0332    ALKPHOS 82 10/25/2023 0332    BILITOT 1.4 (H) 10/25/2023 0332    AST 23 10/25/2023 0332    ALT 24 10/25/2023 0332    INR 1.1 10/24/2023 2225    HGBA1C 6.1 (H) 10/24/2023 0753        Please see Results Review for additional labs.     Diagnostic Studies: All relevant studies, reviewed.      EKG:   Results for orders placed or performed during the hospital encounter of 10/24/23   EKG 12-lead    Collection Time: 10/24/23 11:45 AM     Narrative    Test Reason : R07.9,    Vent. Rate : 073 BPM     Atrial Rate : 073 BPM     P-R Int : 186 ms          QRS Dur : 126 ms      QT Int : 424 ms       P-R-T Axes : 030 052 012 degrees     QTc Int : 467 ms    Normal sinus rhythm  Right bundle branch block  Abnormal ECG  When compared with ECG of 09-AUG-2023 17:58,  No significant change was found  Confirmed by SANG MARIE MD ([id number]) on 10/24/2023 10:33:01 PM    Referred By: [email or web address]   SELF           Confirmed By:SANG MARIE MD       ECHO:   The estimated ejection fraction is 65%.   The quantitatively derived ejection fraction is 63%.   During stress, the following significant arrhythmias were observed: occasional PACs.   The left ventricle is normal in size with normal systolic function.   Mild to moderate left atrial enlargement.   Normal left ventricular diastolic function.   Mild to moderate tricuspid regurgitation.   Normal right ventricular size with normal right ventricular systolic function.   Moderate right atrial enlargement.   Trivial pericardial effusion.   Indeterminate central venous pressure.   There is abnormal septal wall motion.   The stress echo portion of this study is negative for myocardial ischemia.           Pre-op Assessment    I have reviewed the Patient Summary Reports.     I have reviewed the Nursing Notes. I have reviewed the NPO Status.   I have reviewed the Medications.     Review of Systems  Anesthesia Hx:  History of prior surgery of interest to airway management or planning: liver transplant. Denies Family Hx of Anesthesia complications.   Denies Personal Hx of Anesthesia complications.   Social:  Non-Smoker    Hematology/Oncology:         -- Denies Anemia:   Cardiovascular:   Hypertension Denies MI.   Denies CABG/stent.   Denies CHF. ECG has been reviewed.    Pulmonary:   Denies COPD.  Denies Asthma.    Renal/:   Denies Chronic Renal Disease.     Hepatic/GI:   Denies GERD. Liver Disease,     Musculoskeletal:  Denies Spine Disorders    Neurological:   CVA Denies Seizures.    Endocrine:   Diabetes Denies Hypothyroidism.        Physical Exam  General: Well nourished, Cooperative, Alert and Oriented    Airway:  Mallampati: I   Mouth Opening: Normal  TM Distance: Normal  Tongue: Normal  Neck ROM: Normal ROM    Dental:  Intact        Anesthesia Plan  Type of Anesthesia, risks & benefits discussed:    Anesthesia Type: Gen ETT  Intra-op Monitoring Plan: Standard ASA Monitors and Art Line  Post Op Pain Control Plan: multimodal analgesia and IV/PO Opioids PRN  Induction:  IV  Airway Plan: Direct and Video  Informed Consent: Informed consent signed with the Patient and all parties understand the risks and agree with anesthesia plan.  All questions answered. Patient consented to blood products? Yes  ASA Score: 3  Day of Surgery Review of History & Physical: H&P Update referred to the surgeon/provider.I have interviewed and examined the patient. I have reviewed the patient's H&P dated: There are no significant changes.     Ready For Surgery From Anesthesia Perspective.     .

## 2023-10-25 NOTE — PLAN OF CARE
Patient and family informed that surgery date is 10/26 and will be heading back up to [facility]PU. Patient offered sprite and crackers at this time.

## 2023-10-25 NOTE — PLAN OF CARE
Notified charge nurse of coffee intake this AM with mariposa, patient status pending - rescheduled to tomorrow. Neurosurgery paged to clarify and speak with patient.

## 2023-10-25 NOTE — PROGRESS NOTES
Please see previous social work notes for continuity.    SW met with patient, his wife Leida and Leida's brother in the OR.  Patient is admitted under Neurosurgery.  When this SW met with patient and family, they were unaware that patient's surgery has been moved to tomorrow.  Leida expressed her relief at finally knowing why patient was acting out of character for the past few weeks.  Patient has good family support and will reach out to primary team if they have any needs.  SW will assist as needed.

## 2023-10-25 NOTE — CONSULTS
"[facility] - Neurosurgery ([facility])  Endocrinology  Diabetes Consult Note    Consult Requested by: Gibran Altamirano MD   Reason for admit: Left frontal lobe mass    HISTORY OF PRESENT ILLNESS:  Reason for Consult: Management of T2DM, Hyperglycemia     Diabetes diagnosis year: > 10 years ago     Home Diabetes Medications:  Humalog 8 units TID with meals and correction scale, Lantus 20 units daily, Mounjaro 2.5 mg weekly     How often checking glucose at home? Freestyle Jodie    BG readings on regimen: 100-300  Hypoglycemia on the regimen?  No  Missed doses on regimen?  No     Diabetes Complications include:     Hyperglycemia     Complicating diabetes co morbidities:   ESLD s/p transplant, previous CVA        HPI:   Patient is a 63 y.o. male with a diagnosis of  IDDM, HTN, L basal ganglia ICH (in 2014, no residual deficits), HCC/EtOH cirrhosis (s/p liver transplant on 8/10/23 complicated by hepatic art stenosis s/p angioplasty/stent on 10/2/23), non-melanoma skin cancer who presents as transfer from OSH for L frontal mass after 2 weeks of confusion. Interview conducted with patient as well as his wife at bedside. Patient unable to give good description of the confusion he has been experiencing - per his wife he has been "in a fog" for the last 2 weeks, often saying "ok" inappropriately in response to questions and exhibiting strange behaviors such as turning the lights on/off. She says his personality is different and he is less excited about things than usual. On 10/23, he had an episode where he had worsened confusion and agitation while out at a store, he later did not recall going out. The patient and his wife deny any weakness, numbness, speech difficulty, vision changes, or seizure activity. CT and MRI w/wo at OSH show a 3 cm L frontal pole enhancing extraaxial mass with some adjacent intraaxial enhancement, as well as considerable surrounding edema with mass effect and midline shift. Endocrinology consulted for " BG/ DM management.      Lab Results   Component Value Date    HGBA1C 6.1 (H) 10/24/2023               Interval HPI:   Overnight events: remains in 950/950 A BG at or above goal with scheduled insulin and prn correction scale. Dexamethasone 4 mg every 8 hours.   Eating: Diet Adult Regular (IDDSI Level 7)  Diet NPO  Nausea: No  Hypoglycemia and intervention: No  Fever: No  TPN and/or TF: No    PMH, PSH, FH, SH reviewed     ROS:  Review of Systems  Constitutional: Negative for weight changes.  Eyes: Negative for visual disturbance.  Respiratory: Negative for cough.   Cardiovascular: Negative for chest pain.  Gastrointestinal: Negative for nausea.  Endocrine: Negative for polyuria, polydipsia.  Musculoskeletal: Negative for back pain.  Skin: Negative for rash.  Neurological: Negative for syncope.  Psychiatric/Behavioral: Negative for depression.    Current Medications and/or Treatments Impacting Glycemic Control  Immunotherapy:    Immunosuppressants           Stop Route Frequency     tacrolimus capsule 2 mg         -- Oral Every evening     tacrolimus capsule 3 mg         -- Oral Every morning          Steroids:   Hormones (From admission, onward)      Start     Stop Route Frequency Ordered    10/26/23 0300  desmopressin (DDAVP) 37.28 mcg in sodium chloride 0.9% 50 mL IVPB         -- IV Once 10/25/23 1425    10/25/23 0130  dexAMETHasone tablet 4 mg         -- Oral Every 8 hours 10/25/23 0109          Pressors:    Autonomic Drugs (From admission, onward)      None          Hyperglycemia/Diabetes Medications:   Antihyperglycemics (From admission, onward)      Start     Stop Route Frequency Ordered    10/25/23 1130  insulin aspart U-100 pen 6 Units         -- SubQ 3 times daily with meals 10/25/23 0831    10/25/23 0900  insulin detemir U-100 (Levemir) pen 8 Units         -- SubQ 2 times daily 10/25/23 0831    10/25/23 0247  insulin aspart U-100 pen 0-10 Units         -- SubQ Every 6 hours PRN 10/25/23 0147          "    PHYSICAL EXAMINATION:  Vitals:    10/25/23 1514   BP: 118/68   Pulse: 82   Resp: 16   Temp: 97.6 °F (36.4 °C)     Body mass index is 28.66 kg/m².     Physical Exam   Constitutional: Well developed, well nourished, NAD.  ENT: External ears no masses with nose patent; normal hearing.  Neck: Supple; trachea midline.  Cardiovascular: Normal heart sounds, no LE edema. DP +2 bilaterally.  Lungs: Normal effort; lungs anterior bilaterally clear to auscultation.  Abdomen: Soft, no masses, no hernias.  MS: No clubbing or cyanosis of nails noted; unable to assess gait.  Skin: No rashes, lesions, or ulcers; no nodules. Injection sites are ok. No lipo hypertropthy or atrophy.  Psychiatric: Good judgement and insight; normal mood and affect.  Neurological: Cranial nerves are grossly intact.   Foot: nails in good condition, no amputations noted.        Labs Reviewed and Include   Recent Labs   Lab 10/25/23  0332   *   CALCIUM 8.5*   ALBUMIN 3.3*   PROT 5.7*      K 3.6   CO2 26      BUN 10   CREATININE 0.8   ALKPHOS 82   ALT 24   AST 23   BILITOT 1.4*     Lab Results   Component Value Date    WBC 3.84 (L) 10/25/2023    HGB 11.7 (L) 10/25/2023    HCT 36.6 (L) 10/25/2023    MCV 85 10/25/2023    PLT 83 (L) 10/25/2023     No results for input(s): "TSH", "FREET4" in the last 168 hours.  Lab Results   Component Value Date    HGBA1C 6.1 (H) 10/24/2023       Nutritional status:   Body mass index is 28.66 kg/m².  Lab Results   Component Value Date    ALBUMIN 3.3 (L) 10/25/2023    ALBUMIN 3.5 10/24/2023    ALBUMIN 3.5 10/24/2023     No results found for: "PREALBUMIN"    Estimated Creatinine Clearance: 111.7 mL/min (based on SCr of 0.8 mg/dL).    Accu-Checks  Recent Labs     10/24/23  2242 10/25/23  0642 10/25/23  1221 10/25/23  1513   POCTGLUCOSE 166* 217* 291* 355*        ASSESSMENT and PLAN    Neuro  * Left frontal lobe mass  Managed per primary.         Endocrine  Type 2 diabetes mellitus with other specified " complication, with chronic insulin use  Endocrinology consulted for BG management.   BG goal 140-180      WBD 0.4 units/kg/day (20% reduction in home dosing)  - Levemir (Insulin Detemir) 9 units BID  - Novolog (Insulin Aspart) 6 units TIDWM and prn for BG excursions [facility] SSI (150/25)  - BG checks AC/HS  - Hypoglycemia protocol in place  - If blood glucose greater than 300, please ask patient not to eat food or drink anything other than water until correctional insulin has brought it back below 250    Addendum: BG remains elevated on 12 units of scheduled insulin with SSI, increased prandial BG to 12 units TIDWM.   ** Please notify Endocrine for any change and/or advance in diet**  ** Please call Endocrine for any BG related issues **    Discharge Planning:   TBD. Please notify endocrinology prior to discharge.        Other  Adrenal cortical steroids causing adverse effect in therapeutic use  Glucocorticoids markedly increase prandial glucoses. Expect the steroid taper will help glucose control.             Plan discussed with patient, family, and RN at bedside.        Marquez Contreras, DNP, FNP  Endocrinology  [facility] - Neurosurgery ([facility])

## 2023-10-25 NOTE — PLAN OF CARE
"    SUBJECTIVE:     History of Present Illness:  Mr Chávez is a 63M w/ hx IDDM, HTN, L basal ganglia ICH (in 2014, no residual deficits), HCC/EtOH cirrhosis (s/p liver transplant on 8/10/23 complicated by hepatic art stenosis s/p angioplasty/stent on 10/2/23), non-melanoma skin cancer who presents as transfer from OSH for L frontal mass after 2 weeks of confusion. Interview conducted with patient as well as his wife at bedside. Patient unable to give good description of the confusion he has been experiencing - per his wife he has been "in a fog" for the last 2 weeks, often saying "ok" inappropriately in response to questions and exhibiting strange behaviors such as turning the lights on/off. She says his personality is different and he is less excited about things than usual. On 10/23, he had an episode where he had worsened confusion and agitation while out at a store, he later did not recall going out. The patient and his wife deny any weakness, numbness, speech difficulty, vision changes, or seizure activity.      He has a recent liver transplant and hepatic artery stent, was on ASA already and started plavix after recent stent placement. Last dose 10/24 AM, which patient was able to recall taking. Platelets 86 on admit.      CT and MRI w/wo at OSH show a 3 cm L frontal pole enhancing extraaxial mass with some adjacent intraaxial enhancement, as well as considerable surrounding edema with mass effect and midline shift.     Interval history: LTS consulted for immunosuppression management. Tacro level 6.4, no changes made to prograf dose. Cont holding cellcept pending dx of mass. LFTs and tbili remain stable. Will continue to monitor.     Review of patient's allergies indicates:  No Known Allergies    Past Medical History:   Diagnosis Date    Alcoholic cirrhosis     CVA (cerebral vascular accident)     DM (diabetes mellitus)     Dyslipidemia     Hepatocellular carcinoma     HTN (hypertension)     Intracranial " hemorrhage     Skin cancer      Past Surgical History:   Procedure Laterality Date    HERNIA REPAIR N/A     LIVER TRANSPLANT N/A 8/9/2023    Procedure: TRANSPLANT, LIVER;  Surgeon: Ameya Suero MD;  Location: [facility] OR [address] [place] [address];  Service: Transplant;  Laterality: N/A;     History reviewed. No pertinent family history.  Social History     Tobacco Use    Smoking status: Never    Smokeless tobacco: Never   Substance Use Topics    Alcohol use: Not Currently    Drug use: Never       OBJECTIVE:     Vital Signs:  Temp:  [97.7 °F (36.5 °C)-98.4 °F (36.9 °C)]   Pulse:  [60-76]   Resp:  [16-18]   BP: (153-171)/(82-89)   SpO2:  [93 %-96 %]     Laboratory:  I have reviewed all pertinent lab results within the past 24 hours.  CBC:   Recent Labs   Lab 10/25/23  0332   WBC 3.84*   RBC 4.29*   HGB 11.7*   HCT 36.6*   PLT 83*   MCV 85   MCH 27.3   MCHC 32.0     CMP:   Recent Labs   Lab 10/25/23  0332   *   CALCIUM 8.5*   ALBUMIN 3.3*   PROT 5.7*      K 3.6   CO2 26      BUN 10   CREATININE 0.8   ALKPHOS 82   ALT 24   AST 23   BILITOT 1.4*       Diagnostic Results:  Labs: Reviewed  CT: Reviewed  MRI: Reviewed      ASSESSMENT/PLAN:     Plan: Cont Tacrolimus 3 mg/2 mg. Cont holding cellcept pending mass dx.     Significant Labs:        Recent Labs   Lab 10/24/23  0753 10/24/23  1137 10/24/23  2225   * 443* 192*    136 138   K 4.5 4.5 3.9    104 107   CO2 25 21* 23   BUN 12 15 12   CREATININE 1.0 1.1 0.7   CALCIUM 9.1 8.7 8.7            Recent Labs   Lab 10/24/23  0753 10/24/23  1137 10/24/23  2225   WBC 4.67 4.12 4.02   HGB 13.6* 12.4* 12.1*   HCT 44.6 38.9* 38.3*   * 85* 86*          Recent Labs   Lab 10/24/23  2225   INR 1.1   APTT 27.6      Microbiology Results (last 7 days)         ** No results found for the last 168 hours. **             All pertinent labs from the last 24 hours have been reviewed.     Significant Diagnostics:  CT: CT Head Without Contrast     Result Date:  10/24/2023  Left frontal lobe vasogenic edema with midline shift concerning for potential subfalcine herniation.  No underlying lesion demonstrated by noncontrast CT.  MRI brain without and with contrast is recommended. Findings communicated to Dr. Michelle by [employer] chat on October 24, 2023 at 1314. Electronically signed by:          Graham Donahue Date:                                                10/24/2023 Time:                                            13:15   MRI: No results found in the last 24 hours.     Assessment and Plan:      *Left frontal lobe mass  - Plan per Neuro    S/p Liver transplant       - s/p liver 8/10/23 for HCC/etoh       - LFTs and tbili remain stable       - Tacro level 6.4, cont current prograf dose     Long term use of immunosuppressant medication      - Continue prograf and steroids.      - Continue to monitor prograf levels daily, monitor for toxic side effects, and adjust for therapeutic dose.       - Continue to hold cellcept    Prophylactic immunotherapy     - see long term use of immunosuppression         Nasreen Gill PA-C  Liver Transplant  [facility]

## 2023-10-25 NOTE — ASSESSMENT & PLAN NOTE
Mr Kyler is a 63M w/ hx IDDM, HTN, L basal ganglia ICH (in 2014, no residual deficits), HCC/EtOH cirrhosis (s/p liver transplant on 8/10/23 complicated by hepatic art stenosis s/p angioplasty/stent on 10/2/23), non-melanoma skin cancer who presents as transfer from OSH for L frontal mass after 2 weeks of confusion. Last dose ASA, plavix 10/24 AM. Platelets 86 on admit. Neuro intact on initial exam other than higher order confusion.      CT and MRI w/wo at OSH showed a 3 cm L frontal pole enhancing extraaxial mass with some adjacent intraaxial enhancement, as well as considerable surrounding edema with resultant mass effect and midline shift. Ddx includes metastasis vs meningioma, although brain mets from HCC are uncommon and cancer had not previously metastasized as far as pt and family were aware.    - admitted to NS on NPU stepdown unit, q2h neuro checks  - transplant service consulted for comanagement of transplant meds and OR clearance    - cleared for OR, okay to hold ASA/plavix, cellcept held pending dx of mass   - endocrine consulted to assist with insulin management - currently ordered glargine 10 and ISS  - dex 4 q8h for cerebral edema  - given symptomatic 3 cm mass with considerable edema, patient will benefit from surgical resection - added on for OR today, keep npo   - will consider plts/DDAVP prior to OR, other coags wnl    - will discuss surgical consent further with patient and his wife  - hold ASA/Plavix and all other AC/AP  - ppx: SCDs, H2B    Discussed with staff Dr. Altamirano

## 2023-10-25 NOTE — SUBJECTIVE & OBJECTIVE
Interval HPI:   Overnight events: remains in 950/950 A BG at or above goal with scheduled insulin and prn correction scale. Dexamethasone 4 mg every 8 hours.   Eating: Diet Adult Regular (IDDSI Level 7)  Diet NPO  Nausea: No  Hypoglycemia and intervention: No  Fever: No  TPN and/or TF: No    PMH, PSH, FH, SH reviewed     ROS:  Review of Systems  Constitutional: Negative for weight changes.  Eyes: Negative for visual disturbance.  Respiratory: Negative for cough.   Cardiovascular: Negative for chest pain.  Gastrointestinal: Negative for nausea.  Endocrine: Negative for polyuria, polydipsia.  Musculoskeletal: Negative for back pain.  Skin: Negative for rash.  Neurological: Negative for syncope.  Psychiatric/Behavioral: Negative for depression.    Current Medications and/or Treatments Impacting Glycemic Control  Immunotherapy:    Immunosuppressants           Stop Route Frequency     tacrolimus capsule 2 mg         -- Oral Every evening     tacrolimus capsule 3 mg         -- Oral Every morning          Steroids:   Hormones (From admission, onward)      Start     Stop Route Frequency Ordered    10/26/23 0300  desmopressin (DDAVP) 37.28 mcg in sodium chloride 0.9% 50 mL IVPB         -- IV Once 10/25/23 1425    10/25/23 0130  dexAMETHasone tablet 4 mg         -- Oral Every 8 hours 10/25/23 0109          Pressors:    Autonomic Drugs (From admission, onward)      None          Hyperglycemia/Diabetes Medications:   Antihyperglycemics (From admission, onward)      Start     Stop Route Frequency Ordered    10/25/23 1130  insulin aspart U-100 pen 6 Units         -- SubQ 3 times daily with meals 10/25/23 0831    10/25/23 0900  insulin detemir U-100 (Levemir) pen 8 Units         -- SubQ 2 times daily 10/25/23 0831    10/25/23 0247  insulin aspart U-100 pen 0-10 Units         -- SubQ Every 6 hours PRN 10/25/23 0147             PHYSICAL EXAMINATION:  Vitals:    10/25/23 1514   BP: 118/68   Pulse: 82   Resp: 16   Temp: 97.6 °F (36.4  °C)     Body mass index is 28.66 kg/m².     Physical Exam   Constitutional: Well developed, well nourished, NAD.  ENT: External ears no masses with nose patent; normal hearing.  Neck: Supple; trachea midline.  Cardiovascular: Normal heart sounds, no LE edema. DP +2 bilaterally.  Lungs: Normal effort; lungs anterior bilaterally clear to auscultation.  Abdomen: Soft, no masses, no hernias.  MS: No clubbing or cyanosis of nails noted; unable to assess gait.  Skin: No rashes, lesions, or ulcers; no nodules. Injection sites are ok. No lipo hypertropthy or atrophy.  Psychiatric: Good judgement and insight; normal mood and affect.  Neurological: Cranial nerves are grossly intact.   Foot: nails in good condition, no amputations noted.

## 2023-10-25 NOTE — NURSING
Nurses Note -- 4 Eyes      10/24/2023   11:57 PM      Skin assessed during: Admit      [] No Altered Skin Integrity Present    []Prevention Measures Documented      [x] Yes- Altered Skin Integrity Present or Discovered   [x] LDA Added if Not in Epic (Describe Wound)   [x] New Altered Skin Integrity was Present on Admit and Documented in LDA   [x] Wound Image Taken    Wound Care Consulted? Yes    Attending Nurse:  Brittney Kent RN    Second RN/Staff Member:  Asad Hanks RN

## 2023-10-25 NOTE — PLAN OF CARE
Prepared for surgery. Family at bedside. Awaiting anesthesia consent. Patient states he had coffee with a small amount of creamer about 0730.

## 2023-10-25 NOTE — HPI
"Reason for Consult: Management of T2DM, Hyperglycemia     Diabetes diagnosis year: > 10 years ago     Home Diabetes Medications:  Humalog 8 units TID with meals and correction scale, Lantus 20 units daily, Mounjaro 2.5 mg weekly     How often checking glucose at home? Freestyle Jodie    BG readings on regimen: 100-300  Hypoglycemia on the regimen?  No  Missed doses on regimen?  No     Diabetes Complications include:     Hyperglycemia     Complicating diabetes co morbidities:   ESLD s/p transplant, previous CVA        HPI:   Patient is a 63 y.o. male with a diagnosis of  IDDM, HTN, L basal ganglia ICH (in 2014, no residual deficits), HCC/EtOH cirrhosis (s/p liver transplant on 8/10/23 complicated by hepatic art stenosis s/p angioplasty/stent on 10/2/23), non-melanoma skin cancer who presents as transfer from OSH for L frontal mass after 2 weeks of confusion. Interview conducted with patient as well as his wife at bedside. Patient unable to give good description of the confusion he has been experiencing - per his wife he has been "in a fog" for the last 2 weeks, often saying "ok" inappropriately in response to questions and exhibiting strange behaviors such as turning the lights on/off. She says his personality is different and he is less excited about things than usual. On 10/23, he had an episode where he had worsened confusion and agitation while out at a store, he later did not recall going out. The patient and his wife deny any weakness, numbness, speech difficulty, vision changes, or seizure activity. CT and MRI w/wo at OSH show a 3 cm L frontal pole enhancing extraaxial mass with some adjacent intraaxial enhancement, as well as considerable surrounding edema with mass effect and midline shift. Endocrinology consulted for BG/ DM management.      Lab Results   Component Value Date    HGBA1C 6.1 (H) 10/24/2023           "

## 2023-10-25 NOTE — ASSESSMENT & PLAN NOTE
Mr Kyler is a 63M w/ hx IDDM, HTN, L basal ganglia ICH (in 2014, no residual deficits), HCC/EtOH cirrhosis (s/p liver transplant on 8/10/23 complicated by hepatic art stenosis s/p angioplasty/stent on 10/2/23), non-melanoma skin cancer who presents as transfer from OSH for L frontal mass after 2 weeks of confusion. Last dose ASA, plavix 10/24 AM. Platelets 86 on admit. Neuro intact on initial exam other than higher order confusion.      CT and MRI w/wo at OSH showed a 3 cm L frontal pole enhancing extraaxial mass with some adjacent intraaxial enhancement, as well as considerable surrounding edema with resultant mass effect and midline shift. Ddx includes metastasis vs meningioma, although brain mets from HCC are uncommon and cancer had not previously metastasized as far as pt and family were aware.    - Admitted to NS on NPU stepdown unit, q4h neuro checks.   - Transplant service consulted for comanagement of transplant meds and OR clearance.    - cleared for OR, okay to hold ASA/plavix, cellcept held pending dx of mass   - Endocrine consulted to assist with insulin management.  - Dex 4 q8h for cerebral edema.  - Given symptomatic 3 cm mass with considerable edema, patient will benefit from surgical resection - OR tomorrow for L crani for tumor resection with Dr. Degroot.   - Platelets and DDAVP prior to OR tomorrow due to DAPT, last taken AM of 10/24. Coags WNL.   - NPO at midnight tonight.  - Hold ASA/Plavix and all other AC/AP.  - DVT ppx: SCDs/fabiola hose.    Discussed with staff Dr. Altamirano

## 2023-10-25 NOTE — SUBJECTIVE & OBJECTIVE
Interval History: Patient's surgery moved to tomorrow AM. Patient is neuro stable. Ambulating well around the room. Denies headaches, vision changes, focal weakness, nausea or vomiting. Okay for diet at this time. NPO at midnight. Platelets and DDAVP ordered for tomorrow prior to OR. Family at bedside.    Medications:  Continuous Infusions:   sodium chloride 0.9% 75 mL/hr at 10/25/23 0344     Scheduled Meds:   carvediloL  6.25 mg Oral BID WM    [START ON 10/26/2023] desmopressin (DDAVP) 37.28 mcg in sodium chloride 0.9% 50 mL IVPB  0.4 mcg/kg Intravenous Once    dexAMETHasone  4 mg Oral Q8H    docusate sodium  100 mg Oral BID    famotidine  20 mg Oral BID    insulin aspart U-100  6 Units Subcutaneous TIDWM    insulin detemir U-100  8 Units Subcutaneous BID    sulfamethoxazole-trimethoprim 400-80mg  1 tablet Oral Daily    tacrolimus  2 mg Oral Daily PM    tacrolimus  3 mg Oral Daily AM    valGANciclovir  450 mg Oral Daily     PRN Meds:[START ON 10/26/2023] 0.9%  NaCl infusion (for blood administration), dextrose 10%, dextrose 10%, dextrose 10%, glucagon (human recombinant), glucagon (human recombinant), glucagon (human recombinant), glucose, glucose, glucose, glucose, glucose, hydrALAZINE, insulin aspart U-100     Review of Systems  Objective:     Weight: 93.2 kg (205 lb 7.5 oz)  Body mass index is 28.66 kg/m².  Vital Signs (Most Recent):  Temp: 98.2 °F (36.8 °C) (10/25/23 1224)  Pulse: 76 (10/25/23 1224)  Resp: 18 (10/25/23 1224)  BP: (!) 158/89 (10/25/23 0952)  SpO2: 95 % (10/25/23 1224) Vital Signs (24h Range):  Temp:  [97.7 °F (36.5 °C)-98.4 °F (36.9 °C)] 98.2 °F (36.8 °C)  Pulse:  [60-76] 76  Resp:  [16-23] 18  SpO2:  [93 %-98 %] 95 %  BP: (148-188)/(82-96) 158/89       Neurosurgery Physical Exam  General: Well developed, well nourished, not in acute distress.   Head: Normocephalic, atraumatic.  Neurologic: Alert and oriented x 4. Thought content appropriate. Higher order confusion present.  GCS: Motor:6   Verbal:5  Eyes:4   GCS Total: 15  Language: No aphasia.  Speech: No dysarthria.  Cranial nerves: Face symmetric, tongue midline, CN II-XII grossly intact.   Eyes: Pupils equal, round, reactive to light with accomodation, EOMI.   Pulmonary: Normal respirations, no signs of respiratory distress.  Abdomen: Soft, non-distended, non-tender to palpation.  Sensory: Intact to light touch throughout.  Motor Strength: Moves all extremities spontaneously with good tone. Full strength upper and lower extremities. No abnormal movements seen.   Pronator Drift: No drift noted.  Finger-to-nose: Intact bilaterally.  Skin: Skin is warm, dry and intact.    Significant Labs:  Recent Labs   Lab 10/24/23  1137 10/24/23  2225 10/25/23  0332   * 192* 195*    138 139   K 4.5 3.9 3.6    107 105   CO2 21* 23 26   BUN 15 12 10   CREATININE 1.1 0.7 0.8   CALCIUM 8.7 8.7 8.5*   MG  --   --  1.5*     Recent Labs   Lab 10/24/23  1137 10/24/23  2225 10/25/23  0332   WBC 4.12 4.02 3.84*   HGB 12.4* 12.1* 11.7*   HCT 38.9* 38.3* 36.6*   PLT 85* 86* 83*     Recent Labs   Lab 10/24/23  2225   INR 1.1   APTT 27.6     Microbiology Results (last 7 days)       ** No results found for the last 168 hours. **          All pertinent labs from the last 24 hours have been reviewed.    Significant Diagnostics:  I have reviewed and interpreted all pertinent imaging results/findings within the past 24 hours.

## 2023-10-25 NOTE — PLAN OF CARE
Safety measures maintained. Patient bed in low position. Bed alarm set. Patient call bell with in reach. Patient belongings with in reach. VSS. Full assessment in chart.       Problem: Adult Inpatient Plan of Care  Goal: Plan of Care Review  Outcome: Ongoing, Progressing  Goal: Patient-Specific Goal (Individualized)  Outcome: Ongoing, Progressing  Goal: Absence of Hospital-Acquired Illness or Injury  Outcome: Ongoing, Progressing  Goal: Optimal Comfort and Wellbeing  Outcome: Ongoing, Progressing  Goal: Readiness for Transition of Care  Outcome: Ongoing, Progressing     Problem: Diabetes Comorbidity  Goal: Blood Glucose Level Within Targeted Range  Outcome: Ongoing, Progressing     Problem: Fall Injury Risk  Goal: Absence of Fall and Fall-Related Injury  Outcome: Ongoing, Progressing     Problem: Impaired Wound Healing  Goal: Optimal Wound Healing  Outcome: Ongoing, Progressing

## 2023-10-25 NOTE — CARE UPDATE
Due to several were emergent urgent cases today in lack of neurosurgery flank time home we will have to postpone the case until tomorrow.  Patient was clinically stable.  Dr. Jose E Degroot one of our Neuro-Oncology surgeons we will take on his case.

## 2023-10-25 NOTE — PLAN OF CARE
Patient is recent LTX   Plan for Neuro-Sx anthony in OR  Neuro-Surg primary currently for medical treatment   Transplant will likely be the discharge team   Transplant SW spoke with family today upon admission   Transplant CM/UR following for Utilization Management throughout the admission as well

## 2023-10-25 NOTE — PLAN OF CARE
Contacted by Neurosurgery team regarding Mr. Chávez who was transferred from OSH to their service today.     Patient is a 64 y/o M s/p OLTx on 8/10/23 for HCC/etoh cirrhosis. Post transplant course has been significant for hepatic artery stenosis s/p bare metal stent placement on 10/2/23 with IR. He is on plavix and ASA for the BMS.    Patient presented to an OSH with a 2 week history of AMS, confusion, personality changes (agitation). CT head and MRI were obtained with concerning findings for new left frontal brain mass with midline shift. He was transferred here and admitted to Neurosurgery team.    Neurosurgery team planning on taking patient to the OR today 10/25/23. Discussed patient with staff attending. Patient has surgical clearance from transplant to be taken to the OR with Neurosurgery including need to hold ASA/plavix for surgery.    He will remain under Neurosurgery as primary team with plans for admission to NICU post surgery.     Transplant will follow along as a consultant to help manage immunosuppression medications and liver transplant issues.     Recommend consulting with endocrinology for insulin management.     Full progress note to follow by transplant team 10/25/23.

## 2023-10-25 NOTE — H&P
"[facility] - Neurosurgery ([facility])  Neuorsurgery  History and Physical     Patient Name: Jed Chávez  MRN: [id number]  Admission Date: 10/24/2023  Attending Physician: Gibran Altamirano MD   Primary Care Physician: Alee Pink MD    Patient information was obtained from patient, spouse/SO and ER records.     Subjective:     Chief Complaint/Reason for Admission: L frontal mass     HPI:  Mr Chávez is a 63M w/ hx IDDM, HTN, L basal ganglia ICH (in 2014, no residual deficits), HCC/EtOH cirrhosis (s/p liver transplant on 8/10/23 complicated by hepatic art stenosis s/p angioplasty/stent on 10/2/23), non-melanoma skin cancer who presents as transfer from OSH for L frontal mass after 2 weeks of confusion. Interview conducted with patient as well as his wife at bedside. Patient unable to give good description of the confusion he has been experiencing - per his wife he has been "in a fog" for the last 2 weeks, often saying "ok" inappropriately in response to questions and exhibiting strange behaviors such as turning the lights on/off. She says his personality is different and he is less excited about things than usual. On 10/23, he had an episode where he had worsened confusion and agitation while out at a store, he later did not recall going out. The patient and his wife deny any weakness, numbness, speech difficulty, vision changes, or seizure activity.     He has a recent liver transplant and hepatic artery stent, was on ASA already and started plavix after recent stent placement. Last dose 10/24 AM, which patient was able to recall taking. Platelets 86 on admit.     CT and MRI w/wo at OSH show a 3 cm L frontal pole enhancing extraaxial mass with some adjacent intraaxial enhancement, as well as considerable surrounding edema with mass effect and midline shift.       Medications Prior to Admission   Medication Sig Dispense Refill Last Dose    aspirin 81 MG Chew Take 1 tablet (81 mg total) by mouth once daily. " "30 tablet 11 10/23/2023    carvediloL (COREG) 3.125 MG tablet Take 2 tablets (6.25 mg total) by mouth 2 (two) times daily with meals. HOLD SBP <130, HR <60 120 tablet 1 10/23/2023    clopidogreL (PLAVIX) 75 mg tablet Take 1 tablet (75 mg total) by mouth once daily. 90 tablet 0 10/23/2023    insulin glargine 100 units/mL SubQ pen Inject 20 Units into the skin once daily. 15 mL 0 10/23/2023    insulin lispro (HUMALOG KWIKPEN INSULIN) 100 unit/mL pen Inject 8 Units into the skin 3 (three) times daily before meals AND 1-5 Units 3 (three) times daily before meals. sliding scale insulin as needed. Max daily dose 75 units daily... 15 mL 0 10/23/2023    mycophenolate (CELLCEPT) 250 mg Cap Take 2 capsules (500 mg total) by mouth 2 (two) times daily. 120 capsule 11 10/23/2023    sulfamethoxazole-trimethoprim 400-80mg (BACTRIM,SEPTRA) 400-80 mg per tablet Take 1 tablet by mouth once daily. Stop 2/6/24 30 tablet 5 10/23/2023    tacrolimus (PROGRAF) 1 MG Cap Take 3 capsules (3 mg total) by mouth every morning AND 2 capsules (2 mg total) every evening. 150 capsule 11 10/23/2023    valGANciclovir (VALCYTE) 450 mg Tab Take 1 tablet (450 mg total) by mouth once daily. Stop 2/6/24 30 tablet 5 10/23/2023    blood sugar diagnostic Strp Test blood glucose 3 (three) times daily. 100 strip 11     blood-glucose meter Misc Test blood glucose as directed 1 each 0     blood-glucose sensor (FREESTYLE PUNEET 3 SENSOR) Neelam To change every 14 days 2 each 3     famotidine (PEPCID) 20 MG tablet Take 1 tablet (20 mg total) by mouth every evening. (Patient not taking: Reported on 10/24/2023) 30 tablet 0     lancets Misc Test blood glucose 3 (three) times daily. 100 each 11     pen needle, diabetic 32 gauge x 5/32" Ndle Use to inject insulin into the skin 4 (four) times daily. 100 each 11     tirzepatide (MOUNJARO) 2.5 mg/0.5 mL PnIj Inject 2.5 mg into the skin every 7 days. (Patient taking differently: Inject 2.5 mg into the skin every " 7 days. WEDNESDAYS) 4 pen 0 10/18/2023       Review of patient's allergies indicates:  No Known Allergies    Past Medical History:   Diagnosis Date    Alcoholic cirrhosis     CVA (cerebral vascular accident)     DM (diabetes mellitus)     Dyslipidemia     Hepatocellular carcinoma     HTN (hypertension)     Intracranial hemorrhage     Skin cancer      Past Surgical History:   Procedure Laterality Date    HERNIA REPAIR N/A     LIVER TRANSPLANT N/A 8/9/2023    Procedure: TRANSPLANT, LIVER;  Surgeon: Ameya Suero MD;  [facility] OR [address] [place] [address];  Service: Transplant;  Laterality: N/A;     Family History    None       Tobacco Use    Smoking status: Never    Smokeless tobacco: Never   Substance and Sexual Activity    Alcohol use: Not Currently    Drug use: Never    Sexual activity: Not Currently     Partners: Female     Review of Systems   Constitutional:  Negative for chills and fever.   HENT:  Negative for rhinorrhea and sore throat.    Eyes:  Negative for pain and visual disturbance.   Respiratory:  Negative for cough and shortness of breath.    Cardiovascular:  Negative for chest pain and palpitations.   Gastrointestinal:  Negative for abdominal pain, constipation, nausea and vomiting.   Genitourinary:  Negative for dysuria, frequency and hematuria.   Musculoskeletal:  Negative for back pain and neck pain.   Skin:  Positive for wound. Negative for pallor and rash.   Neurological:  Negative for seizures, facial asymmetry, speech difficulty, weakness, numbness and headaches.   Psychiatric/Behavioral:  Positive for confusion.      Objective:     Weight: 93.2 kg (205 lb 7.5 oz)  Body mass index is 28.66 kg/m².  Vital Signs (Most Recent):  Temp: 98.4 °F (36.9 °C) (10/25/23 0056)  Pulse: 64 (10/25/23 0056)  Resp: 16 (10/25/23 0056)  BP: (!) 157/82 (10/25/23 0056)  SpO2: 96 % (10/25/23 0056) Vital Signs (24h Range):  Temp:  [97.9 °F (36.6 °C)-98.4 °F (36.9 °C)] 98.4 °F (36.9 °C)  Pulse:  [64-78] 64  Resp:   [16-24] 16  SpO2:  [95 %-99 %] 96 %  BP: (124-188)/(75-96) 157/82                                 Physical Exam         Neurosurgery Physical Exam    Neurosurgery Physical Exam    General: well developed, well nourished, no distress.   HEENT: normocephalic, atraumatic  CV: regular rate   Pulmonary: normal respirations, no signs of respiratory distress  Abdomen: soft, non-distended, not tender to palpation, ulcerated wound  Skin: Skin is warm, dry and intact  Heme: no bruising    Neuro:   Mental Status: GCS 15, AO x4, subtle higher order confusion, no aphasia, no dysarthria   CN: PERRL, EOMI, VF intact to confrontation, sensation intact bilaterally, eyebrow raise and grimace symmetric, tongue midline  Motor: moves all extremities spontaneously, full strength throughout, no pronator drift   Sensory: intact to light touch throughout  Cerebellar: finger to nose intact bilaterally  Reflexes: patellar: 2+ bilaterally   Gait: deferred    Significant Labs:  Recent Labs   Lab 10/24/23  0753 10/24/23  1137 10/24/23  2225   * 443* 192*    136 138   K 4.5 4.5 3.9    104 107   CO2 25 21* 23   BUN 12 15 12   CREATININE 1.0 1.1 0.7   CALCIUM 9.1 8.7 8.7     Recent Labs   Lab 10/24/23  0753 10/24/23  1137 10/24/23  2225   WBC 4.67 4.12 4.02   HGB 13.6* 12.4* 12.1*   HCT 44.6 38.9* 38.3*   * 85* 86*     Recent Labs   Lab 10/24/23  2225   INR 1.1   APTT 27.6     Microbiology Results (last 7 days)       ** No results found for the last 168 hours. **          All pertinent labs from the last 24 hours have been reviewed.    Significant Diagnostics:  CT: CT Head Without Contrast    Result Date: 10/24/2023  Left frontal lobe vasogenic edema with midline shift concerning for potential subfalcine herniation.  No underlying lesion demonstrated by noncontrast CT.  MRI brain without and with contrast is recommended. Findings communicated to Dr. Michelle by epic chat on October 24, 2023 at 1314. Electronically signed  by: Graham Donahue Date:    10/24/2023 Time:    13:15   MRI: No results found in the last 24 hours.    Assessment and Plan:     Left frontal lobe mass  Mr Kyler is a 63M w/ hx IDDM, HTN, L basal ganglia ICH (in 2014, no residual deficits), HCC/EtOH cirrhosis (s/p liver transplant on 8/10/23 complicated by hepatic art stenosis s/p angioplasty/stent on 10/2/23), non-melanoma skin cancer who presents as transfer from OSH for L frontal mass after 2 weeks of confusion. Last dose ASA, plavix 10/24 AM. Platelets 86 on admit. Neuro intact on initial exam other than higher order confusion.      CT and MRI w/wo at OSH showed a 3 cm L frontal pole enhancing extraaxial mass with some adjacent intraaxial enhancement, as well as considerable surrounding edema with resultant mass effect and midline shift. Ddx includes metastasis vs meningioma, although brain mets from HCC are uncommon and cancer had not previously metastasized as far as pt and family were aware.    - admitted to NS on NPU stepdown unit, q2h neuro checks  - transplant service consulted for comanagement of transplant meds and OR clearance    - cleared for OR, okay to hold ASA/plavix, cellcept held pending dx of mass   - endocrine consulted to assist with insulin management - currently ordered glargine 10 and ISS  - dex 4 q8h for cerebral edema  - given symptomatic 3 cm mass with considerable edema, patient will benefit from surgical resection - added on for OR today, keep npo   - will consider plts/DDAVP prior to OR, other coags wnl    - will discuss surgical consent further with patient and his wife  - hold ASA/Plavix and all other AC/AP  - ppx: SCDs, H2B    Discussed with staff Dr. Evelia Osuna MD  Neurosurgery  [facility] ([facility])

## 2023-10-25 NOTE — PROGRESS NOTES
"[facility] ([facility])  Neurosurgery  Progress Note    Subjective:     History of Present Illness: Mr Kyler is a 63M w/ hx IDDM, HTN, L basal ganglia ICH (in 2014, no residual deficits), HCC/EtOH cirrhosis (s/p liver transplant on 8/10/23 complicated by hepatic art stenosis s/p angioplasty/stent on 10/2/23), non-melanoma skin cancer who presents as transfer from OSH for L frontal mass after 2 weeks of confusion. Interview conducted with patient as well as his wife at bedside. Patient unable to give good description of the confusion he has been experiencing - per his wife he has been "in a fog" for the last 2 weeks, often saying "ok" inappropriately in response to questions and exhibiting strange behaviors such as turning the lights on/off. She says his personality is different and he is less excited about things than usual. On 10/23, he had an episode where he had worsened confusion and agitation while out at a store, he later did not recall going out. The patient and his wife deny any weakness, numbness, speech difficulty, vision changes, or seizure activity.     He has a recent liver transplant and hepatic artery stent, was on ASA already and started plavix after recent stent placement. Last dose 10/24 AM, which patient was able to recall taking. Platelets 86 on admit.     CT and MRI w/wo at OSH show a 3 cm L frontal pole enhancing extraaxial mass with some adjacent intraaxial enhancement, as well as considerable surrounding edema with mass effect and midline shift.       Post-Op Info:  Procedure(s) (LRB):  CRANIOTOMY, FOR INTRACRANIAL NEOPLASM EXCISION (Left)   Day of Surgery     Interval History: Patient's surgery moved to tomorrow AM. Patient is neuro stable. Ambulating well around the room. Denies headaches, vision changes, focal weakness, nausea or vomiting. Okay for diet at this time. NPO at midnight. Platelets and DDAVP ordered for tomorrow prior to OR. Family at " bedside.    Medications:  Continuous Infusions:   sodium chloride 0.9% 75 mL/hr at 10/25/23 0344     Scheduled Meds:   carvediloL  6.25 mg Oral BID WM    [START ON 10/26/2023] desmopressin (DDAVP) 37.28 mcg in sodium chloride 0.9% 50 mL IVPB  0.4 mcg/kg Intravenous Once    dexAMETHasone  4 mg Oral Q8H    docusate sodium  100 mg Oral BID    famotidine  20 mg Oral BID    insulin aspart U-100  6 Units Subcutaneous TIDWM    insulin detemir U-100  8 Units Subcutaneous BID    sulfamethoxazole-trimethoprim 400-80mg  1 tablet Oral Daily    tacrolimus  2 mg Oral Daily PM    tacrolimus  3 mg Oral Daily AM    valGANciclovir  450 mg Oral Daily     PRN Meds:[START ON 10/26/2023] 0.9%  NaCl infusion (for blood administration), dextrose 10%, dextrose 10%, dextrose 10%, glucagon (human recombinant), glucagon (human recombinant), glucagon (human recombinant), glucose, glucose, glucose, glucose, glucose, hydrALAZINE, insulin aspart U-100     Review of Systems  Objective:     Weight: 93.2 kg (205 lb 7.5 oz)  Body mass index is 28.66 kg/m².  Vital Signs (Most Recent):  Temp: 98.2 °F (36.8 °C) (10/25/23 1224)  Pulse: 76 (10/25/23 1224)  Resp: 18 (10/25/23 1224)  BP: (!) 158/89 (10/25/23 0952)  SpO2: 95 % (10/25/23 1224) Vital Signs (24h Range):  Temp:  [97.7 °F (36.5 °C)-98.4 °F (36.9 °C)] 98.2 °F (36.8 °C)  Pulse:  [60-76] 76  Resp:  [16-23] 18  SpO2:  [93 %-98 %] 95 %  BP: (148-188)/(82-96) 158/89       Neurosurgery Physical Exam  General: Well developed, well nourished, not in acute distress.   Head: Normocephalic, atraumatic.  Neurologic: Alert and oriented x 4. Thought content appropriate. Higher order confusion present.  GCS: Motor:6  Verbal:5  Eyes:4   GCS Total: 15  Language: No aphasia.  Speech: No dysarthria.  Cranial nerves: Face symmetric, tongue midline, CN II-XII grossly intact.   Eyes: Pupils equal, round, reactive to light with accomodation, EOMI.   Pulmonary: Normal respirations, no signs of respiratory  distress.  Abdomen: Soft, non-distended, non-tender to palpation.  Sensory: Intact to light touch throughout.  Motor Strength: Moves all extremities spontaneously with good tone. Full strength upper and lower extremities. No abnormal movements seen.   Pronator Drift: No drift noted.  Finger-to-nose: Intact bilaterally.  Skin: Skin is warm, dry and intact.    Significant Labs:  Recent Labs   Lab 10/24/23  1137 10/24/23  2225 10/25/23  0332   * 192* 195*    138 139   K 4.5 3.9 3.6    107 105   CO2 21* 23 26   BUN 15 12 10   CREATININE 1.1 0.7 0.8   CALCIUM 8.7 8.7 8.5*   MG  --   --  1.5*     Recent Labs   Lab 10/24/23  1137 10/24/23  2225 10/25/23  0332   WBC 4.12 4.02 3.84*   HGB 12.4* 12.1* 11.7*   HCT 38.9* 38.3* 36.6*   PLT 85* 86* 83*     Recent Labs   Lab 10/24/23  2225   INR 1.1   APTT 27.6     Microbiology Results (last 7 days)       ** No results found for the last 168 hours. **          All pertinent labs from the last 24 hours have been reviewed.    Significant Diagnostics:  I have reviewed and interpreted all pertinent imaging results/findings within the past 24 hours.    Assessment/Plan:     * Left frontal lobe mass  Mr Chávze is a 63M w/ hx IDDM, HTN, L basal ganglia ICH (in 2014, no residual deficits), HCC/EtOH cirrhosis (s/p liver transplant on 8/10/23 complicated by hepatic art stenosis s/p angioplasty/stent on 10/2/23), non-melanoma skin cancer who presents as transfer from OSH for L frontal mass after 2 weeks of confusion. Last dose ASA, plavix 10/24 AM. Platelets 86 on admit. Neuro intact on initial exam other than higher order confusion.      CT and MRI w/wo at OSH showed a 3 cm L frontal pole enhancing extraaxial mass with some adjacent intraaxial enhancement, as well as considerable surrounding edema with resultant mass effect and midline shift. Ddx includes metastasis vs meningioma, although brain mets from HCC are uncommon and cancer had not previously metastasized as far  as pt and family were aware.    - Admitted to NSGY on NPU stepdown unit, q4h neuro checks.   - Transplant service consulted for comanagement of transplant meds and OR clearance.    - cleared for OR, okay to hold ASA/plavix, cellcept held pending dx of mass   - Endocrine consulted to assist with insulin management.  - Dex 4 q8h for cerebral edema.  - Given symptomatic 3 cm mass with considerable edema, patient will benefit from surgical resection - OR tomorrow for L crani for tumor resection with Dr. Degroot.   - Platelets and DDAVP prior to OR tomorrow due to DAPT, last taken AM of 10/24. Coags WNL.   - NPO at midnight tonight.  - Hold ASA/Plavix and all other AC/AP.  - DVT ppx: SCDs/fabiola hose.    Discussed with staff Dr. Evelia Garcia, PASharonC  Neurosurgery  [facility] ([facility])

## 2023-10-25 NOTE — HPI
"Mr Chávez is a 63M w/ hx IDDM, HTN, L basal ganglia ICH (in 2014, no residual deficits), HCC/EtOH cirrhosis (s/p liver transplant on 8/10/23 complicated by hepatic art stenosis s/p angioplasty/stent on 10/2/23), non-melanoma skin cancer who presents as transfer from OSH for L frontal mass after 2 weeks of confusion. Interview conducted with patient as well as his wife at bedside. Patient unable to give good description of the confusion he has been experiencing - per his wife he has been "in a fog" for the last 2 weeks, often saying "ok" inappropriately in response to questions and exhibiting strange behaviors such as turning the lights on/off. She says his personality is different and he is less excited about things than usual. On 10/23, he had an episode where he had worsened confusion and agitation while out at a store, he later did not recall going out. The patient and his wife deny any weakness, numbness, speech difficulty, vision changes, or seizure activity.     He has a recent liver transplant and hepatic artery stent, was on ASA already and started plavix after recent stent placement. Last dose 10/24 AM, which patient was able to recall taking. Platelets 86 on admit.     CT and MRI w/wo at OSH show a 3 cm L frontal pole enhancing extraaxial mass with some adjacent intraaxial enhancement, as well as considerable surrounding edema with mass effect and midline shift.   "

## 2023-10-26 ENCOUNTER — TELEPHONE (OUTPATIENT)
Dept: TRANSPLANT | Facility: CLINIC | Age: 63
End: 2023-10-26
Payer: COMMERCIAL

## 2023-10-26 ENCOUNTER — ANESTHESIA (OUTPATIENT)
Dept: SURGERY | Facility: HOSPITAL | Age: 63
DRG: 025 | End: 2023-10-26
Payer: COMMERCIAL

## 2023-10-26 LAB
ALBUMIN SERPL BCP-MCNC: 3.6 G/DL (ref 3.5–5.2)
ALP SERPL-CCNC: 87 U/L (ref 55–135)
ALT SERPL W/O P-5'-P-CCNC: 26 U/L (ref 10–44)
ANION GAP SERPL CALC-SCNC: 14 MMOL/L (ref 8–16)
AST SERPL-CCNC: 20 U/L (ref 10–40)
BASOPHILS # BLD AUTO: 0.01 K/UL (ref 0–0.2)
BASOPHILS NFR BLD: 0.2 % (ref 0–1.9)
BILIRUB SERPL-MCNC: 1.2 MG/DL (ref 0.1–1)
BLD PROD TYP BPU: NORMAL
BLOOD UNIT EXPIRATION DATE: NORMAL
BLOOD UNIT TYPE CODE: 5100
BLOOD UNIT TYPE CODE: 5100
BLOOD UNIT TYPE CODE: 6200
BLOOD UNIT TYPE CODE: 7300
BLOOD UNIT TYPE: NORMAL
BUN SERPL-MCNC: 15 MG/DL (ref 8–23)
CALCIUM SERPL-MCNC: 8.8 MG/DL (ref 8.7–10.5)
CHLORIDE SERPL-SCNC: 106 MMOL/L (ref 95–110)
CO2 SERPL-SCNC: 17 MMOL/L (ref 23–29)
CODING SYSTEM: NORMAL
CREAT SERPL-MCNC: 0.8 MG/DL (ref 0.5–1.4)
CROSSMATCH INTERPRETATION: NORMAL
DIFFERENTIAL METHOD: ABNORMAL
DISPENSE STATUS: NORMAL
EOSINOPHIL # BLD AUTO: 0 K/UL (ref 0–0.5)
EOSINOPHIL NFR BLD: 0 % (ref 0–8)
ERYTHROCYTE [DISTWIDTH] IN BLOOD BY AUTOMATED COUNT: 15.9 % (ref 11.5–14.5)
EST. GFR  (NO RACE VARIABLE): >60 ML/MIN/1.73 M^2
GLUCOSE SERPL-MCNC: 280 MG/DL (ref 70–110)
HCT VFR BLD AUTO: 38.8 % (ref 40–54)
HGB BLD-MCNC: 12.5 G/DL (ref 14–18)
IMM GRANULOCYTES # BLD AUTO: 0.03 K/UL (ref 0–0.04)
IMM GRANULOCYTES NFR BLD AUTO: 0.6 % (ref 0–0.5)
LYMPHOCYTES # BLD AUTO: 1.4 K/UL (ref 1–4.8)
LYMPHOCYTES NFR BLD: 26.5 % (ref 18–48)
MAGNESIUM SERPL-MCNC: 1.7 MG/DL (ref 1.6–2.6)
MCH RBC QN AUTO: 26.7 PG (ref 27–31)
MCHC RBC AUTO-ENTMCNC: 32.2 G/DL (ref 32–36)
MCV RBC AUTO: 83 FL (ref 82–98)
MONOCYTES # BLD AUTO: 0.1 K/UL (ref 0.3–1)
MONOCYTES NFR BLD: 2.6 % (ref 4–15)
NEUTROPHILS # BLD AUTO: 3.8 K/UL (ref 1.8–7.7)
NEUTROPHILS NFR BLD: 70.1 % (ref 38–73)
NRBC BLD-RTO: 0 /100 WBC
PLATELET # BLD AUTO: 100 K/UL (ref 150–450)
PMV BLD AUTO: 9.7 FL (ref 9.2–12.9)
POCT GLUCOSE: 246 MG/DL (ref 70–110)
POCT GLUCOSE: 267 MG/DL (ref 70–110)
POCT GLUCOSE: 273 MG/DL (ref 70–110)
POCT GLUCOSE: 386 MG/DL (ref 70–110)
POTASSIUM SERPL-SCNC: 4.1 MMOL/L (ref 3.5–5.1)
PROT SERPL-MCNC: 6.4 G/DL (ref 6–8.4)
RBC # BLD AUTO: 4.68 M/UL (ref 4.6–6.2)
SODIUM SERPL-SCNC: 137 MMOL/L (ref 136–145)
TACROLIMUS BLD-MCNC: 9.5 NG/ML (ref 5–15)
UNIT NUMBER: NORMAL
WBC # BLD AUTO: 5.44 K/UL (ref 3.9–12.7)

## 2023-10-26 PROCEDURE — 88342 IMHCHEM/IMCYTCHM 1ST ANTB: CPT | Performed by: STUDENT IN AN ORGANIZED HEALTH CARE EDUCATION/TRAINING PROGRAM

## 2023-10-26 PROCEDURE — D9220A PRA ANESTHESIA: Mod: ,,, | Performed by: ANESTHESIOLOGY

## 2023-10-26 PROCEDURE — C9248 INJ, CLEVIDIPINE BUTYRATE: HCPCS | Mod: JZ,JG

## 2023-10-26 PROCEDURE — 63600175 PHARM REV CODE 636 W HCPCS

## 2023-10-26 PROCEDURE — 63600175 PHARM REV CODE 636 W HCPCS: Performed by: STUDENT IN AN ORGANIZED HEALTH CARE EDUCATION/TRAINING PROGRAM

## 2023-10-26 PROCEDURE — 61781 SCAN PROC CRANIAL INTRA: CPT | Mod: ,,, | Performed by: NEUROLOGICAL SURGERY

## 2023-10-26 PROCEDURE — 27800903 OPTIME MED/SURG SUP & DEVICES OTHER IMPLANTS: Performed by: NEUROLOGICAL SURGERY

## 2023-10-26 PROCEDURE — 99233 SBSQ HOSP IP/OBS HIGH 50: CPT | Mod: 24,,, | Performed by: PHYSICIAN ASSISTANT

## 2023-10-26 PROCEDURE — 25000003 PHARM REV CODE 250: Performed by: STUDENT IN AN ORGANIZED HEALTH CARE EDUCATION/TRAINING PROGRAM

## 2023-10-26 PROCEDURE — 99233 PR SUBSEQUENT HOSPITAL CARE,LEVL III: ICD-10-PCS | Mod: 24,,, | Performed by: PHYSICIAN ASSISTANT

## 2023-10-26 PROCEDURE — 88307 TISSUE EXAM BY PATHOLOGIST: CPT | Mod: 26,,, | Performed by: STUDENT IN AN ORGANIZED HEALTH CARE EDUCATION/TRAINING PROGRAM

## 2023-10-26 PROCEDURE — 25000003 PHARM REV CODE 250: Performed by: NURSE PRACTITIONER

## 2023-10-26 PROCEDURE — 88342 IMHCHEM/IMCYTCHM 1ST ANTB: CPT | Mod: 26,,, | Performed by: STUDENT IN AN ORGANIZED HEALTH CARE EDUCATION/TRAINING PROGRAM

## 2023-10-26 PROCEDURE — 61512 CRNEC TREPH EXC MNGIOMA STTL: CPT | Mod: ,,, | Performed by: NEUROLOGICAL SURGERY

## 2023-10-26 PROCEDURE — P9035 PLATELET PHERES LEUKOREDUCED: HCPCS | Performed by: STUDENT IN AN ORGANIZED HEALTH CARE EDUCATION/TRAINING PROGRAM

## 2023-10-26 PROCEDURE — 63600175 PHARM REV CODE 636 W HCPCS: Performed by: PHYSICIAN ASSISTANT

## 2023-10-26 PROCEDURE — 88307 TISSUE EXAM BY PATHOLOGIST: CPT | Mod: 59 | Performed by: STUDENT IN AN ORGANIZED HEALTH CARE EDUCATION/TRAINING PROGRAM

## 2023-10-26 PROCEDURE — 36000713 HC OR TIME LEV V EA ADD 15 MIN: Performed by: NEUROLOGICAL SURGERY

## 2023-10-26 PROCEDURE — 94761 N-INVAS EAR/PLS OXIMETRY MLT: CPT

## 2023-10-26 PROCEDURE — 63600175 PHARM REV CODE 636 W HCPCS: Mod: JG

## 2023-10-26 PROCEDURE — 61512 PR EXCIS SUPRATENT MENINGIOMA: ICD-10-PCS | Mod: ,,, | Performed by: NEUROLOGICAL SURGERY

## 2023-10-26 PROCEDURE — 99291 PR CRITICAL CARE, E/M 30-74 MINUTES: ICD-10-PCS | Mod: ,,, | Performed by: PSYCHIATRY & NEUROLOGY

## 2023-10-26 PROCEDURE — 36620 ARTERIAL: ICD-10-PCS | Mod: 59,,, | Performed by: ANESTHESIOLOGY

## 2023-10-26 PROCEDURE — 88331 PATH CONSLTJ SURG 1 BLK 1SPC: CPT | Performed by: STUDENT IN AN ORGANIZED HEALTH CARE EDUCATION/TRAINING PROGRAM

## 2023-10-26 PROCEDURE — 88307 PR  SURG PATH,LEVEL V: ICD-10-PCS | Mod: 26,,, | Performed by: STUDENT IN AN ORGANIZED HEALTH CARE EDUCATION/TRAINING PROGRAM

## 2023-10-26 PROCEDURE — 86920 COMPATIBILITY TEST SPIN: CPT | Performed by: NEUROLOGICAL SURGERY

## 2023-10-26 PROCEDURE — 85025 COMPLETE CBC W/AUTO DIFF WBC: CPT

## 2023-10-26 PROCEDURE — 88331 PATH CONSLTJ SURG 1 BLK 1SPC: CPT | Mod: 26,,, | Performed by: STUDENT IN AN ORGANIZED HEALTH CARE EDUCATION/TRAINING PROGRAM

## 2023-10-26 PROCEDURE — 25000003 PHARM REV CODE 250

## 2023-10-26 PROCEDURE — 63600175 PHARM REV CODE 636 W HCPCS: Performed by: NEUROLOGICAL SURGERY

## 2023-10-26 PROCEDURE — 37000009 HC ANESTHESIA EA ADD 15 MINS: Performed by: NEUROLOGICAL SURGERY

## 2023-10-26 PROCEDURE — 25000003 PHARM REV CODE 250: Performed by: NEUROLOGICAL SURGERY

## 2023-10-26 PROCEDURE — 88341 PR IHC OR ICC EACH ADD'L SINGLE ANTIBODY  STAINPR: ICD-10-PCS | Mod: 26,,, | Performed by: STUDENT IN AN ORGANIZED HEALTH CARE EDUCATION/TRAINING PROGRAM

## 2023-10-26 PROCEDURE — 88341 IMHCHEM/IMCYTCHM EA ADD ANTB: CPT | Mod: 26,,, | Performed by: STUDENT IN AN ORGANIZED HEALTH CARE EDUCATION/TRAINING PROGRAM

## 2023-10-26 PROCEDURE — 36430 TRANSFUSION BLD/BLD COMPNT: CPT

## 2023-10-26 PROCEDURE — 36620 INSERTION CATHETER ARTERY: CPT | Mod: 59,,, | Performed by: ANESTHESIOLOGY

## 2023-10-26 PROCEDURE — 88341 IMHCHEM/IMCYTCHM EA ADD ANTB: CPT | Performed by: STUDENT IN AN ORGANIZED HEALTH CARE EDUCATION/TRAINING PROGRAM

## 2023-10-26 PROCEDURE — 99291 CRITICAL CARE FIRST HOUR: CPT | Mod: ,,, | Performed by: PSYCHIATRY & NEUROLOGY

## 2023-10-26 PROCEDURE — 80197 ASSAY OF TACROLIMUS: CPT | Performed by: PHYSICIAN ASSISTANT

## 2023-10-26 PROCEDURE — 37000008 HC ANESTHESIA 1ST 15 MINUTES: Performed by: NEUROLOGICAL SURGERY

## 2023-10-26 PROCEDURE — 36000712 HC OR TIME LEV V 1ST 15 MIN: Performed by: NEUROLOGICAL SURGERY

## 2023-10-26 PROCEDURE — 63600175 PHARM REV CODE 636 W HCPCS: Mod: JZ,JG

## 2023-10-26 PROCEDURE — 99232 PR SUBSEQUENT HOSPITAL CARE,LEVL II: ICD-10-PCS | Mod: ,,, | Performed by: NURSE PRACTITIONER

## 2023-10-26 PROCEDURE — P9035 PLATELET PHERES LEUKOREDUCED: HCPCS

## 2023-10-26 PROCEDURE — 80053 COMPREHEN METABOLIC PANEL: CPT | Performed by: PHYSICIAN ASSISTANT

## 2023-10-26 PROCEDURE — C1713 ANCHOR/SCREW BN/BN,TIS/BN: HCPCS | Performed by: NEUROLOGICAL SURGERY

## 2023-10-26 PROCEDURE — D9220A PRA ANESTHESIA: ICD-10-PCS | Mod: ,,, | Performed by: ANESTHESIOLOGY

## 2023-10-26 PROCEDURE — 88331 PR  PATH CONSULT IN SURG,W FRZ SEC: ICD-10-PCS | Mod: 26,,, | Performed by: STUDENT IN AN ORGANIZED HEALTH CARE EDUCATION/TRAINING PROGRAM

## 2023-10-26 PROCEDURE — 83735 ASSAY OF MAGNESIUM: CPT | Performed by: PHYSICIAN ASSISTANT

## 2023-10-26 PROCEDURE — 27201423 OPTIME MED/SURG SUP & DEVICES STERILE SUPPLY: Performed by: NEUROLOGICAL SURGERY

## 2023-10-26 PROCEDURE — 63600175 PHARM REV CODE 636 W HCPCS: Performed by: NURSE PRACTITIONER

## 2023-10-26 PROCEDURE — 36415 COLL VENOUS BLD VENIPUNCTURE: CPT | Performed by: PHYSICIAN ASSISTANT

## 2023-10-26 PROCEDURE — 20000000 HC ICU ROOM

## 2023-10-26 PROCEDURE — 61781 PR STEREOTACTIC COMP ASSIST PROC,CRANIAL,INTRADURAL: ICD-10-PCS | Mod: ,,, | Performed by: NEUROLOGICAL SURGERY

## 2023-10-26 PROCEDURE — 99232 SBSQ HOSP IP/OBS MODERATE 35: CPT | Mod: ,,, | Performed by: NURSE PRACTITIONER

## 2023-10-26 PROCEDURE — 88342 CHG IMMUNOCYTOCHEMISTRY: ICD-10-PCS | Mod: 26,,, | Performed by: STUDENT IN AN ORGANIZED HEALTH CARE EDUCATION/TRAINING PROGRAM

## 2023-10-26 DEVICE — SCREW UN3 AXS SD 1.5X4MM: Type: IMPLANTABLE DEVICE | Site: CRANIAL | Status: FUNCTIONAL

## 2023-10-26 DEVICE — DURAMATRIX ONLAY 3X3: Type: IMPLANTABLE DEVICE | Site: CRANIAL | Status: FUNCTIONAL

## 2023-10-26 DEVICE — PLATE BONE BUR HOLE COVER 10MM: Type: IMPLANTABLE DEVICE | Site: CRANIAL | Status: FUNCTIONAL

## 2023-10-26 RX ORDER — NICARDIPINE HYDROCHLORIDE 0.2 MG/ML
INJECTION INTRAVENOUS CONTINUOUS PRN
Status: DISCONTINUED | OUTPATIENT
Start: 2023-10-26 | End: 2023-10-26

## 2023-10-26 RX ORDER — BACITRACIN ZINC 500 UNIT/G
OINTMENT (GRAM) TOPICAL
Status: DISCONTINUED | OUTPATIENT
Start: 2023-10-26 | End: 2023-10-26 | Stop reason: HOSPADM

## 2023-10-26 RX ORDER — DEXAMETHASONE SODIUM PHOSPHATE 4 MG/ML
INJECTION, SOLUTION INTRA-ARTICULAR; INTRALESIONAL; INTRAMUSCULAR; INTRAVENOUS; SOFT TISSUE
Status: DISCONTINUED | OUTPATIENT
Start: 2023-10-26 | End: 2023-10-26

## 2023-10-26 RX ORDER — OXYCODONE HYDROCHLORIDE 5 MG/1
5 TABLET ORAL EVERY 4 HOURS PRN
Status: DISCONTINUED | OUTPATIENT
Start: 2023-10-26 | End: 2023-10-30 | Stop reason: HOSPADM

## 2023-10-26 RX ORDER — LEVETIRACETAM 500 MG/1
500 TABLET ORAL EVERY 12 HOURS
Status: DISCONTINUED | OUTPATIENT
Start: 2023-10-26 | End: 2023-10-30 | Stop reason: HOSPADM

## 2023-10-26 RX ORDER — TACROLIMUS 1 MG/1
2 CAPSULE ORAL 2 TIMES DAILY
Status: DISCONTINUED | OUTPATIENT
Start: 2023-10-26 | End: 2023-10-28

## 2023-10-26 RX ORDER — NICARDIPINE HYDROCHLORIDE 0.2 MG/ML
0-15 INJECTION INTRAVENOUS CONTINUOUS
Status: DISCONTINUED | OUTPATIENT
Start: 2023-10-26 | End: 2023-10-26

## 2023-10-26 RX ORDER — HYDROMORPHONE HYDROCHLORIDE 1 MG/ML
0.2 INJECTION, SOLUTION INTRAMUSCULAR; INTRAVENOUS; SUBCUTANEOUS EVERY 5 MIN PRN
Status: CANCELLED | OUTPATIENT
Start: 2023-10-26

## 2023-10-26 RX ORDER — SODIUM CHLORIDE 0.9 % (FLUSH) 0.9 %
10 SYRINGE (ML) INJECTION
Status: CANCELLED | OUTPATIENT
Start: 2023-10-26

## 2023-10-26 RX ORDER — HALOPERIDOL 5 MG/ML
0.5 INJECTION INTRAMUSCULAR EVERY 10 MIN PRN
Status: CANCELLED | OUTPATIENT
Start: 2023-10-26

## 2023-10-26 RX ORDER — ACETAMINOPHEN 500 MG
1000 TABLET ORAL 3 TIMES DAILY
Status: DISCONTINUED | OUTPATIENT
Start: 2023-10-26 | End: 2023-10-30

## 2023-10-26 RX ORDER — MORPHINE SULFATE 2 MG/ML
2 INJECTION, SOLUTION INTRAMUSCULAR; INTRAVENOUS EVERY 4 HOURS PRN
Status: DISCONTINUED | OUTPATIENT
Start: 2023-10-26 | End: 2023-10-30

## 2023-10-26 RX ORDER — HEPARIN SODIUM 5000 [USP'U]/ML
5000 INJECTION, SOLUTION INTRAVENOUS; SUBCUTANEOUS EVERY 8 HOURS
Status: DISCONTINUED | OUTPATIENT
Start: 2023-10-27 | End: 2023-10-30 | Stop reason: HOSPADM

## 2023-10-26 RX ORDER — LIDOCAINE HYDROCHLORIDE 20 MG/ML
INJECTION, SOLUTION EPIDURAL; INFILTRATION; INTRACAUDAL; PERINEURAL
Status: DISCONTINUED | OUTPATIENT
Start: 2023-10-26 | End: 2023-10-26

## 2023-10-26 RX ORDER — LABETALOL HYDROCHLORIDE 5 MG/ML
INJECTION, SOLUTION INTRAVENOUS CONTINUOUS PRN
Status: DISCONTINUED | OUTPATIENT
Start: 2023-10-26 | End: 2023-10-26

## 2023-10-26 RX ORDER — LEVETIRACETAM 500 MG/5ML
INJECTION, SOLUTION, CONCENTRATE INTRAVENOUS
Status: DISCONTINUED | OUTPATIENT
Start: 2023-10-26 | End: 2023-10-26

## 2023-10-26 RX ORDER — LABETALOL HYDROCHLORIDE 5 MG/ML
INJECTION, SOLUTION INTRAVENOUS
Status: DISCONTINUED | OUTPATIENT
Start: 2023-10-26 | End: 2023-10-26

## 2023-10-26 RX ORDER — HYDROCODONE BITARTRATE AND ACETAMINOPHEN 500; 5 MG/1; MG/1
TABLET ORAL
Status: DISCONTINUED | OUTPATIENT
Start: 2023-10-26 | End: 2023-10-27

## 2023-10-26 RX ORDER — FENTANYL CITRATE 50 UG/ML
INJECTION, SOLUTION INTRAMUSCULAR; INTRAVENOUS
Status: DISCONTINUED | OUTPATIENT
Start: 2023-10-26 | End: 2023-10-26

## 2023-10-26 RX ORDER — MUPIROCIN 20 MG/G
OINTMENT TOPICAL 2 TIMES DAILY
Status: DISCONTINUED | OUTPATIENT
Start: 2023-10-26 | End: 2023-10-30 | Stop reason: HOSPADM

## 2023-10-26 RX ORDER — ROCURONIUM BROMIDE 10 MG/ML
INJECTION, SOLUTION INTRAVENOUS
Status: DISCONTINUED | OUTPATIENT
Start: 2023-10-26 | End: 2023-10-26

## 2023-10-26 RX ORDER — GENTAMICIN SULFATE 40 MG/ML
INJECTION, SOLUTION INTRAMUSCULAR; INTRAVENOUS
Status: DISCONTINUED | OUTPATIENT
Start: 2023-10-26 | End: 2023-10-26 | Stop reason: HOSPADM

## 2023-10-26 RX ORDER — SODIUM CHLORIDE 9 MG/ML
INJECTION, SOLUTION INTRAVENOUS CONTINUOUS
Status: DISCONTINUED | OUTPATIENT
Start: 2023-10-26 | End: 2023-10-27

## 2023-10-26 RX ORDER — INSULIN ASPART 100 [IU]/ML
15 INJECTION, SOLUTION INTRAVENOUS; SUBCUTANEOUS
Status: DISCONTINUED | OUTPATIENT
Start: 2023-10-26 | End: 2023-10-27

## 2023-10-26 RX ORDER — ONDANSETRON 2 MG/ML
INJECTION INTRAMUSCULAR; INTRAVENOUS
Status: DISCONTINUED | OUTPATIENT
Start: 2023-10-26 | End: 2023-10-26

## 2023-10-26 RX ORDER — LIDOCAINE HYDROCHLORIDE AND EPINEPHRINE 10; 10 MG/ML; UG/ML
INJECTION, SOLUTION INFILTRATION; PERINEURAL
Status: DISCONTINUED | OUTPATIENT
Start: 2023-10-26 | End: 2023-10-26 | Stop reason: HOSPADM

## 2023-10-26 RX ORDER — PROPOFOL 10 MG/ML
VIAL (ML) INTRAVENOUS
Status: DISCONTINUED | OUTPATIENT
Start: 2023-10-26 | End: 2023-10-26

## 2023-10-26 RX ADMIN — CEFTRIAXONE 2 G: 1 INJECTION, POWDER, FOR SOLUTION INTRAMUSCULAR; INTRAVENOUS at 07:10

## 2023-10-26 RX ADMIN — DEXAMETHASONE 4 MG: 4 TABLET ORAL at 01:10

## 2023-10-26 RX ADMIN — NICARDIPINE HYDROCHLORIDE 5 MG/HR: 0.2 INJECTION, SOLUTION INTRAVENOUS at 09:10

## 2023-10-26 RX ADMIN — SODIUM CHLORIDE, SODIUM GLUCONATE, SODIUM ACETATE, POTASSIUM CHLORIDE, MAGNESIUM CHLORIDE, SODIUM PHOSPHATE, DIBASIC, AND POTASSIUM PHOSPHATE: .53; .5; .37; .037; .03; .012; .00082 INJECTION, SOLUTION INTRAVENOUS at 07:10

## 2023-10-26 RX ADMIN — CARVEDILOL 6.25 MG: 6.25 TABLET, FILM COATED ORAL at 04:10

## 2023-10-26 RX ADMIN — ACETAMINOPHEN 1000 MG: 500 TABLET ORAL at 09:10

## 2023-10-26 RX ADMIN — LEVETIRACETAM 500 MG: 500 TABLET, FILM COATED ORAL at 11:10

## 2023-10-26 RX ADMIN — PROPOFOL 150 MG: 10 INJECTION, EMULSION INTRAVENOUS at 07:10

## 2023-10-26 RX ADMIN — SODIUM CHLORIDE: 9 INJECTION, SOLUTION INTRAVENOUS at 11:10

## 2023-10-26 RX ADMIN — SODIUM CHLORIDE 0.3 MCG/KG/MIN: 9 INJECTION, SOLUTION INTRAVENOUS at 08:10

## 2023-10-26 RX ADMIN — PROPOFOL 30 MG: 10 INJECTION, EMULSION INTRAVENOUS at 07:10

## 2023-10-26 RX ADMIN — OXYCODONE HYDROCHLORIDE 5 MG: 5 TABLET ORAL at 11:10

## 2023-10-26 RX ADMIN — DOCUSATE SODIUM 100 MG: 100 CAPSULE, LIQUID FILLED ORAL at 09:10

## 2023-10-26 RX ADMIN — CEFAZOLIN 2 G: 2 INJECTION, POWDER, FOR SOLUTION INTRAMUSCULAR; INTRAVENOUS at 07:10

## 2023-10-26 RX ADMIN — FAMOTIDINE 20 MG: 20 TABLET ORAL at 09:10

## 2023-10-26 RX ADMIN — FENTANYL CITRATE 50 MCG: 50 INJECTION, SOLUTION INTRAMUSCULAR; INTRAVENOUS at 07:10

## 2023-10-26 RX ADMIN — SUGAMMADEX 200 MG: 100 INJECTION, SOLUTION INTRAVENOUS at 09:10

## 2023-10-26 RX ADMIN — CLEVIPIDINE 16 MG/HR: 0.5 EMULSION INTRAVENOUS at 01:10

## 2023-10-26 RX ADMIN — DESMOPRESSIN ACETATE 37.28 MCG: 4 SOLUTION INTRAVENOUS at 05:10

## 2023-10-26 RX ADMIN — INSULIN ASPART 15 UNITS: 100 INJECTION, SOLUTION INTRAVENOUS; SUBCUTANEOUS at 04:10

## 2023-10-26 RX ADMIN — MUPIROCIN: 20 OINTMENT TOPICAL at 11:10

## 2023-10-26 RX ADMIN — SODIUM CHLORIDE 4 UNITS/HR: 9 INJECTION, SOLUTION INTRAVENOUS at 08:10

## 2023-10-26 RX ADMIN — DEXAMETHASONE SODIUM PHOSPHATE 8 MG: 4 INJECTION, SOLUTION INTRAMUSCULAR; INTRAVENOUS at 07:10

## 2023-10-26 RX ADMIN — ROCURONIUM BROMIDE 4 MG: 10 INJECTION, SOLUTION INTRAVENOUS at 07:10

## 2023-10-26 RX ADMIN — DEXAMETHASONE 4 MG: 4 TABLET ORAL at 09:10

## 2023-10-26 RX ADMIN — SODIUM CHLORIDE: 9 INJECTION, SOLUTION INTRAVENOUS at 10:10

## 2023-10-26 RX ADMIN — SODIUM CHLORIDE: 0.9 INJECTION, SOLUTION INTRAVENOUS at 07:10

## 2023-10-26 RX ADMIN — LIDOCAINE HYDROCHLORIDE 80 MG: 20 INJECTION, SOLUTION EPIDURAL; INFILTRATION; INTRACAUDAL; PERINEURAL at 07:10

## 2023-10-26 RX ADMIN — ACETAMINOPHEN 1000 MG: 500 TABLET ORAL at 03:10

## 2023-10-26 RX ADMIN — CLEVIPIDINE 1 MG/HR: 0.5 EMULSION INTRAVENOUS at 10:10

## 2023-10-26 RX ADMIN — PROPOFOL 50 MG: 10 INJECTION, EMULSION INTRAVENOUS at 07:10

## 2023-10-26 RX ADMIN — CEFAZOLIN 2 G: 2 INJECTION, POWDER, FOR SOLUTION INTRAMUSCULAR; INTRAVENOUS at 11:10

## 2023-10-26 RX ADMIN — CLEVIPIDINE 14 MG/HR: 0.5 EMULSION INTRAVENOUS at 05:10

## 2023-10-26 RX ADMIN — CLEVIPIDINE 16 MG/HR: 0.5 EMULSION INTRAVENOUS at 11:10

## 2023-10-26 RX ADMIN — LEVETIRACETAM 1000 MG: 100 INJECTION INTRAVENOUS at 08:10

## 2023-10-26 RX ADMIN — MORPHINE SULFATE 2 MG: 2 INJECTION, SOLUTION INTRAMUSCULAR; INTRAVENOUS at 11:10

## 2023-10-26 RX ADMIN — ONDANSETRON 4 MG: 2 INJECTION INTRAMUSCULAR; INTRAVENOUS at 09:10

## 2023-10-26 RX ADMIN — INSULIN ASPART 10 UNITS: 100 INJECTION, SOLUTION INTRAVENOUS; SUBCUTANEOUS at 04:10

## 2023-10-26 RX ADMIN — MORPHINE SULFATE 2 MG: 2 INJECTION, SOLUTION INTRAMUSCULAR; INTRAVENOUS at 03:10

## 2023-10-26 RX ADMIN — LEVETIRACETAM 500 MG: 500 TABLET, FILM COATED ORAL at 09:10

## 2023-10-26 RX ADMIN — OXYCODONE HYDROCHLORIDE 5 MG: 5 TABLET ORAL at 04:10

## 2023-10-26 RX ADMIN — TACROLIMUS 2 MG: 1 CAPSULE ORAL at 06:10

## 2023-10-26 RX ADMIN — MUPIROCIN: 20 OINTMENT TOPICAL at 09:10

## 2023-10-26 RX ADMIN — LABETALOL HYDROCHLORIDE 10 MG: 5 INJECTION, SOLUTION INTRAVENOUS at 08:10

## 2023-10-26 RX ADMIN — INSULIN ASPART 6 UNITS: 100 INJECTION, SOLUTION INTRAVENOUS; SUBCUTANEOUS at 11:10

## 2023-10-26 RX ADMIN — INSULIN DETEMIR 9 UNITS: 100 INJECTION, SOLUTION SUBCUTANEOUS at 09:10

## 2023-10-26 NOTE — NURSING
12:21 - RN called Dr. Arce to notify of worsening headache following morphine and oxy 5. Fresh blood noted from crani dressing. NCC instructed RN to call NSGY to notify them. Neuro exam remains unchanged and intact.     12:28 - NSGY called and made aware of fresh blood from dressing. Spoke with Dr. Altamirano. Team to come to bedside to assess patient.

## 2023-10-26 NOTE — BRIEF OP NOTE
[facility]surgery ([facility])  Brief Operative Note    SUMMARY     Surgery Date: 10/26/2023     Surgeon(s) and Role:     * Jose E Degroot, DO - Primary    Assisting Surgeon: Milton Gaxiola MD    Pre-op Diagnosis:  Brain tumor [D49.6]    Post-op Diagnosis:  Post-Op Diagnosis Codes:     * Brain tumor [D49.6]    Procedure(s) (LRB):  CRANIOTOMY, WITH NEOPLASM EXCISION USING COMPUTER-ASSISTED NAVIGATION (Left)    Anesthesia: General    Implants:  Implant Name Type Inv. Item Serial No.  Lot No. LRB No. Used Action   SCREW UN3 AXS SD 1.5X4MM - [id number]  SCREW UN3 AXS SD 1.5X4MM  [employer].  Left 17 Implanted   DURAMATRIX ONLAY 3X3 - [id number]  DURAMATRIX ONLAY 3X3  [employer]. [id number] Left 1 Implanted   PLATE BONE BUR HOLE COVER 10MM - [id number]  PLATE BONE BUR HOLE COVER 10MM  [employer].  Left 3 Implanted       Operative Findings: Right frontal craniotomy for parasaggital meningioma    Estimated Blood Loss: 100cc    Estimated Blood Loss has been documented.         Specimens:   Specimen (24h ago, onward)       Start     Ordered    10/26/23 0916  Specimen to Pathology, Surgery Neurosurgery  Once        Comments: Pre-op Diagnosis: Brain tumor [D49.6]Procedure(s):CRANIOTOMY, WITH NEOPLASM EXCISION USING COMPUTER-ASSISTED NAVIGATION Number of specimens: 2Name of specimens: 1. LEFT FRONTAL TUMOR--FROZEN- SENT TO PATH 2. LEFT FRONTAL TUMOR--PERM     References:    Click here for ordering Quick Tip   Question Answer Comment   Procedure Type: Neurosurgery    Which provider would you like to cc? JOSE E DEGROOT    Release to patient Immediate        10/26/23 0917                    [id number]

## 2023-10-26 NOTE — SUBJECTIVE & OBJECTIVE
"Interval HPI:   Overnight events: No acute events overnight. Patient in room 950/950 A. Blood glucose stable. BG at goal on current insulin regimen (SSI, prandial, and basal insulin ). Steroid use- Dexamethasone  4 mg q8hr. Day of Surgery  Renal function- Normal   Vasopressors-  None       Endocrine will continue to follow and manage insulin orders inpatient.         Diet NPO     Eating:   NPO  Nausea: No  Hypoglycemia and intervention: No  Fever: No  TPN and/or TF: No      /71   Pulse 74   Temp 97.5 °F (36.4 °C) (Oral)   Resp 18   Ht 5' 11" (1.803 m)   Wt 93.2 kg (205 lb 7.5 oz)   SpO2 (!) 94%   BMI 28.66 kg/m²     Labs Reviewed and Include    Recent Labs   Lab 10/26/23  0317   *   CALCIUM 8.8   ALBUMIN 3.6   PROT 6.4      K 4.1   CO2 17*      BUN 15   CREATININE 0.8   ALKPHOS 87   ALT 26   AST 20   BILITOT 1.2*     Lab Results   Component Value Date    WBC 5.44 10/26/2023    HGB 12.5 (L) 10/26/2023    HCT 38.8 (L) 10/26/2023    MCV 83 10/26/2023     (L) 10/26/2023     No results for input(s): "TSH", "FREET4" in the last 168 hours.  Lab Results   Component Value Date    HGBA1C 6.1 (H) 10/24/2023       Nutritional status:   Body mass index is 28.66 kg/m².  Lab Results   Component Value Date    ALBUMIN 3.6 10/26/2023    ALBUMIN 3.3 (L) 10/25/2023    ALBUMIN 3.5 10/24/2023     No results found for: "PREALBUMIN"    Estimated Creatinine Clearance: 111.7 mL/min (based on SCr of 0.8 mg/dL).    Accu-Checks  Recent Labs     10/24/23  2242 10/25/23  0642 10/25/23  1221 10/25/23  1513 10/25/23  2113 10/26/23  0526   POCTGLUCOSE 166* 217* 291* 355* 254* 273*       Current Medications and/or Treatments Impacting Glycemic Control  Immunotherapy:    Immunosuppressants           Stop Route Frequency     tacrolimus capsule 2 mg         -- Oral Every evening     tacrolimus capsule 3 mg         -- Oral Every morning          Steroids:   Hormones (From admission, onward)      Start     Stop Route " Frequency Ordered    10/25/23 0130  dexAMETHasone tablet 4 mg         -- Oral Every 8 hours 10/25/23 0109          Pressors:    Autonomic Drugs (From admission, onward)      None          Hyperglycemia/Diabetes Medications:   Antihyperglycemics (From admission, onward)      Start     Stop Route Frequency Ordered    10/26/23 1130  insulin aspart U-100 pen 15 Units         -- SubQ 3 times daily with meals 10/26/23 0845    10/26/23 0900  insulin detemir U-100 (Levemir) pen 9 Units         -- SubQ 2 times daily 10/26/23 0845    10/25/23 0247  insulin aspart U-100 pen 0-10 Units         -- SubQ Every 6 hours PRN 10/25/23 0147

## 2023-10-26 NOTE — HPI
"Mr. Chávez is a 64 yo M w/ PMHx of DM, dyslipidemia, HTN, L basal ganglia ICH (in 2014, no residual deficits), HCC/EtOH cirrhosis (s/p liver transplant on 8/10/23 complicated by hepatic art stenosis s/p angioplasty/stent on 10/2/23), non-melanoma skin cancer who presents as transfer from OSH for L frontal mass after 2 weeks of confusion. Interview conducted with patient as well as his wife at bedside. Patient unable to give good description of the confusion he has been experiencing - per his wife he has been "in a fog" for the last 2 weeks, often saying "ok" inappropriately in response to questions and exhibiting strange behaviors such as turning the lights on/off. She says his personality is different and he is less excited about things than usual. On 10/23, he had an episode where he had worsened confusion and agitation while out at a store.  He later did not recall going out. The patient and his wife deny any weakness, numbness, speech difficulty, vision changes, or seizure activity.     He had a recent liver transplant and hepatic artery stent, was on ASA already and started plavix after recent stent placement. Last dose 10/24 AM, which patient was able to recall taking. Platelets 86 on admission.  CT and MRI w/wo at OSH show a 3 cm L frontal pole enhancing extraaxial mass with some adjacent intraaxial enhancement, as well as considerable surrounding edema with mass effect and midline shift.  On 10/26, patient had craniotomy with mass excision.  "

## 2023-10-26 NOTE — SUBJECTIVE & OBJECTIVE
"  Past Medical History:   Diagnosis Date    Alcoholic cirrhosis     CVA (cerebral vascular accident)     DM (diabetes mellitus)     Dyslipidemia     Hepatocellular carcinoma     HTN (hypertension)     Intracranial hemorrhage     Skin cancer      Past Surgical History:   Procedure Laterality Date    HERNIA REPAIR N/A     LIVER TRANSPLANT N/A 8/9/2023    Procedure: TRANSPLANT, LIVER;  Surgeon: Ameya Suero MD;  Location: [facility] OR [address] [place] [address];  Service: Transplant;  Laterality: N/A;      No current facility-administered medications on file prior to encounter.     Current Outpatient Medications on File Prior to Encounter   Medication Sig Dispense Refill    aspirin 81 MG Chew Take 1 tablet (81 mg total) by mouth once daily. 30 tablet 11    carvediloL (COREG) 3.125 MG tablet Take 2 tablets (6.25 mg total) by mouth 2 (two) times daily with meals. HOLD SBP <130, HR <60 120 tablet 1    clopidogreL (PLAVIX) 75 mg tablet Take 1 tablet (75 mg total) by mouth once daily. 90 tablet 0    famotidine (PEPCID) 20 MG tablet Take 1 tablet (20 mg total) by mouth every evening. 30 tablet 0    insulin glargine 100 units/mL SubQ pen Inject 20 Units into the skin once daily. 15 mL 0    insulin lispro (HUMALOG KWIKPEN INSULIN) 100 unit/mL pen Inject 8 Units into the skin 3 (three) times daily before meals AND 1-5 Units 3 (three) times daily before meals. sliding scale insulin as needed. Max daily dose 75 units daily... 15 mL 0    mycophenolate (CELLCEPT) 250 mg Cap Take 2 capsules (500 mg total) by mouth 2 (two) times daily. 120 capsule 11    pen needle, diabetic 32 gauge x 5/32" Ndle Use to inject insulin into the skin 4 (four) times daily. 100 each 11    sulfamethoxazole-trimethoprim 400-80mg (BACTRIM,SEPTRA) 400-80 mg per tablet Take 1 tablet by mouth once daily. Stop 2/6/24 30 tablet 5    tacrolimus (PROGRAF) 1 MG Cap Take 3 capsules (3 mg total) by mouth every morning AND 2 capsules (2 mg total) every evening. 150 capsule 11    tirzepatide " (MOUNJARO) 2.5 mg/0.5 mL PnIj Inject 2.5 mg into the skin every 7 days. (Patient taking differently: Inject 2.5 mg into the skin every 7 days. WEDNESDAYS) 4 pen 0    valGANciclovir (VALCYTE) 450 mg Tab Take 1 tablet (450 mg total) by mouth once daily. Stop 2/6/24 30 tablet 5    blood sugar diagnostic Strp Test blood glucose 3 (three) times daily. 100 strip 11    blood-glucose meter Misc Test blood glucose as directed 1 each 0    blood-glucose sensor (FREESTYLE PUNEET 3 SENSOR) Neelam To change every 14 days 2 each 3    lancets Misc Test blood glucose 3 (three) times daily. 100 each 11      Allergies: Patient has no known allergies.    History reviewed. No pertinent family history.  Social History     Tobacco Use    Smoking status: Never    Smokeless tobacco: Never   Substance Use Topics    Alcohol use: Not Currently    Drug use: Never     Review of Systems   Constitutional:  Negative for fever.   Eyes:  Negative for visual disturbance.   Respiratory:  Negative for shortness of breath.    Cardiovascular:  Negative for chest pain.   Gastrointestinal:  Negative for abdominal pain, nausea and vomiting.   Genitourinary:  Negative for difficulty urinating.   Neurological:  Positive for headaches. Negative for speech difficulty, weakness and numbness.     Objective:     Vitals:    Temp: 98 °F (36.7 °C)  Pulse: 82  Rhythm: normal sinus rhythm  BP: 122/65  MAP (mmHg): 88  Resp: 16  SpO2: (!) 91 %    Temp  Min: 97.5 °F (36.4 °C)  Max: 98 °F (36.7 °C)  Pulse  Min: 60  Max: 85  BP  Min: 115/64  Max: 165/84  MAP (mmHg)  Min: 84  Max: 111  Resp  Min: 16  Max: 23  SpO2  Min: 91 %  Max: 100 %    10/25 0701 - 10/26 0700  In: 213.3   Out: -    Unmeasured Output  Urine Occurrence: 2  Stool Occurrence: 0        Physical Exam  Vitals and nursing note reviewed.   HENT:      Head: Normocephalic.      Comments: Dressing stained with blood and slow trickle of blood draining from left head surgical site.      Right Ear: External ear normal.       Left Ear: External ear normal.      Mouth/Throat:      Mouth: Mucous membranes are moist.   Eyes:      Extraocular Movements: Extraocular movements intact.      Conjunctiva/sclera: Conjunctivae normal.      Pupils: Pupils are equal, round, and reactive to light.   Cardiovascular:      Rate and Rhythm: Normal rate and regular rhythm.      Pulses: Normal pulses.      Heart sounds: Normal heart sounds.   Pulmonary:      Effort: Pulmonary effort is normal.      Breath sounds: Normal breath sounds.   Abdominal:      General: There is no distension.      Palpations: Abdomen is soft.      Tenderness: There is no abdominal tenderness.   Musculoskeletal:         General: Normal range of motion.   Skin:     General: Skin is warm.      Capillary Refill: Capillary refill takes less than 2 seconds.   Neurological:      General: No focal deficit present.      Mental Status: He is alert.      Sensory: No sensory deficit.      Motor: No weakness.      Comments: Light sensation intact bilaterally in UE and LE.  5/5 motor strength in UE and LE bilaterally.   Psychiatric:         Mood and Affect: Mood normal.        Today I personally reviewed pertinent medications, imaging, laboratory results, notably:    Recent Labs   Lab 10/26/23  0317   WBC 5.44   RBC 4.68   HGB 12.5*   HCT 38.8*   *   MCV 83   MCH 26.7*   MCHC 32.2      Recent Labs   Lab 10/26/23  0317   CALCIUM 8.8   ALBUMIN 3.6   PROT 6.4      K 4.1   CO2 17*      BUN 15   CREATININE 0.8   ALKPHOS 87   ALT 26   AST 20   BILITOT 1.2*

## 2023-10-26 NOTE — TRANSFER OF CARE
"Anesthesia Transfer of Care Note    Patient: Jed Chávez    Procedure(s) Performed: Procedure(s) (LRB):  CRANIOTOMY, WITH NEOPLASM EXCISION USING COMPUTER-ASSISTED NAVIGATION (Left)    Patient location: ICU    Anesthesia Type: general    Transport from OR: Transported from OR on 6-10 L/min O2 by face mask with adequate spontaneous ventilation    Post pain: adequate analgesia    Post assessment: no apparent anesthetic complications    Post vital signs: stable    Level of consciousness: awake    Nausea/Vomiting: no nausea/vomiting    Complications: none    Transfer of care protocol was followed      Last vitals:   Visit Vitals  /71   Pulse 74   Temp 36.4 °C (97.5 °F) (Oral)   Resp 18   Ht 5' 11" (1.803 m)   Wt 93.2 kg (205 lb 7.5 oz)   SpO2 (!) 94%   BMI 28.66 kg/m²     "

## 2023-10-26 NOTE — CONSULTS
[facility]  Wound Care    Patient Name:  Jed Chávez   MRN:  [id number]  Date: 10/26/2023  Diagnosis: Left frontal lobe mass    History:     Past Medical History:   Diagnosis Date    Alcoholic cirrhosis     CVA (cerebral vascular accident)     DM (diabetes mellitus)     Dyslipidemia     Hepatocellular carcinoma     HTN (hypertension)     Intracranial hemorrhage     Skin cancer        Social History     Socioeconomic History    Marital status:    Tobacco Use    Smoking status: Never    Smokeless tobacco: Never   Substance and Sexual Activity    Alcohol use: Not Currently    Drug use: Never    Sexual activity: Not Currently     Partners: Female       Precautions:     Allergies as of 10/24/2023    (No Known Allergies)       WO Assessment Details/Treatment     Patient seen for wound care consultation for ABD wound.   Reviewed chart for this encounter.   See Flow Sheet for findings.    Pt found lying in bed, OK for care at this time. Pt has irregularly shaped wound JEROME to ABD ML. Wound is maroon and dry to R side, L side w/ some moisture and yellow adhered slough. Periwound is red and intact. Wound cleansed gently w/ NS. Cut and placed Enluxtra to wound bed to optimize moisture balance, secured w/ Island boarder dressing.   Family states pt has had the wound for a couple weeks now, unsure of where it came from.    RECOMMENDATIONS:  Q2 days - ABD - cleanse wound w/ NS, do not pat dry. Cut a piece of Enluxtra larger than wound size so that Enluxtra overlaps onto the periwound. Place Enluxtra onto wound bed w/ side w/ blue line facing away from skin. Secure w/ Island boarder dressing.    Discussed POC with patient and primary nurse.   See EMR for orders & patient education.      Nursing to continue care & monitoring.  Nursing to maintain pressure injury prevention interventions.       10/26/23 1117   WOCN Assessment   WOCN Total Time (mins) 15   Visit Date 10/26/23   Visit Time 1117   Consult  Type New   WOCN Speciality Wound   Intervention assessed;changed;chart review;applied;coordination of care;orders   Teaching on-going        Altered Skin Integrity 10/24/23 2208 medial Upper quadrant   Date First Assessed/Time First Assessed: 10/24/23 2208   Altered Skin Integrity Present on Admission - Did Patient arrive to the hospital with altered skin?: yes  Orientation: medial  Location: Upper quadrant   Wound Image    Description of Altered Skin Integrity Partial thickness tissue loss. Shallow open ulcer with a red or pink wound bed, without slough. Intact or Open/Ruptured Serum-filled blister.   Dressing Appearance Open to air   Drainage Amount None   Drainage Characteristics/Odor No odor   Appearance Red;Maroon;Dry;Slough   Periwound Area Intact;Dry;Pink   Wound Edges Irregular   Care Cleansed with:;Sterile normal saline   Dressing Applied;Other (comment);Foam  (Enluxtra)   Dressing Change Due 10/28/23

## 2023-10-26 NOTE — ASSESSMENT & PLAN NOTE
Consulted Liver Transplant Surgery, appreciate recs   - s/p liver 8/10/23 for HCC/etoh   - LFTs and tbili remain stable   - Tacro level 9.5, change dose to 2 mg bid

## 2023-10-26 NOTE — OP NOTE
"DATE: 10/26/23    PREOP DIAGNOSIS:  Left parasagittal extra-axial tumor  Cerebral edema and compression    POSTOP DIAGNOSIS:  Left parasagittal meningioma  Cerebral edema and compression    OPERATION PERFORMED:  Left frontal craniotomy for resection of meningioma  Use of intraop neuronavigation    SURGEON:  Jose E Degroot D.O.    ASSISTANT:  Milton Gaxiola M.D.    LEVEL OF INVOLVEMENT OF ATTENDING:  Full    INDICATION:  63M w/ hx IDDM, HTN, L basal ganglia ICH (in 2014, no residual deficits), HCC/EtOH cirrhosis (s/p liver transplant on 8/10/23 complicated by hepatic art stenosis s/p angioplasty/stent on 10/2/23), non-melanoma skin cancer who presents as transfer from OSH for L frontal mass after 2 weeks of confusion. Interview conducted with patient as well as his wife at bedside. Patient unable to give good description of the confusion he has been experiencing - per his wife he has been "in a fog" for the last 2 weeks, often saying "ok" inappropriately in response to questions and exhibiting strange behaviors such as turning the lights on/off. She says his personality is different and he is less excited about things than usual. On 10/23, he had an episode where he had worsened confusion and agitation while out at a store, he later did not recall going out. The patient and his wife deny any weakness, numbness, speech difficulty, vision changes, or seizure activity.      He has a recent liver transplant and hepatic artery stent, was on ASA already and started plavix after recent stent placement. Last dose 10/24 AM, which patient was able to recall taking. Platelets 86 on admit.      CT and MRI w/wo at OSH show a 3 cm L frontal pole enhancing extraaxial mass with some adjacent intraaxial enhancement, as well as considerable surrounding edema with mass effect and midline shift.     Consents were obtained and risks, benefits, and alternatives to surgery were discussed.      PROCEDURE IN DETAIL:  The patient was correctly " identified and taken to the operating room where anesthesia administered general endotracheal anesthesia.  The patient was kept supine with the head fixed in a Mayers head frame in a neutral position.  The patient's head was registered using [employer] neuro navigation and the boundaries of the tumor were demarcated.  A by coronal incision was planned just behind the hairline and the hair was clipped.  The area was prepped and draped in typical sterile fashion.  A time-out was performed and prophylactic IV antibiotics were given as well as Decadron and Keppra.  The incision was infiltrated local anesthetic and then incised with a 10 blade scalpel.  Bovie cautery was used to complete dissection down to the skull.  The scalp flap was then raised and retracted anteriorly using a Yulia bar.  Periosteum was elevated using a periosteal elevator.  Next using neuro navigation the tumor boundaries were once again demarcated and a bone flap was planned.  Two bur holes were placed over the sagittal sinus and 1 out laterally.  These were connected using craniotome footplate and the bone flap was elevated.  The underlying dura was cauterized with bipolar cautery and then opened with a 15 blade scalpel and Metzenbaum scissors.  Tack-up sutures were placed along the perimeter of the bone flap.  The extra-axial dural-based tumor was encountered immediately upon opening the dura.  There were some draining veins that were preserved.  The tumor was then cauterized along its capsule allowing it to tighten up and establish a plane between the normal brain and the tumor.  A core sample was taken and sent for frozen which returned as meningioma.  The tumor was debulked allowing for greater mobilization of the capsule.  A plane was established circumferentially around the tumor until the falx was encountered.  The tumor was then cut and removed from its attachment point at the lateral aspect of the superior sagittal sinus.  It was sent for  permanent pathology specimen.  Any remaining tumor was cauterized and cut parallel to the sagittal sinus.  And hemostasis was achieved with bipolar cautery.  Gross total resection was felt to be achieved.  Cotton lucy soaked in hydrogen peroxide were placed for hemostasis and finally with Surgicel stamps.  The dura was incised and sent with the specimen.  A dura matrix onlay was placed the bone was replated with titanium screws and plates.  A Hemovac drain was placed the subgaleal space and the wound was closed in layered fashion 1st with 2-0 Vicryl in the galea followed by staples on the skin.    COMPLICATIONS:  None    INCISION:  Bicoronal    WOUND CLASS:  Clean    FINDINGS:  Extra-axial mass attached to the dura and lateral sinus.  Preliminary pathology returned as meningioma.    DRAIN:  Hemovac    CONDITION:  Stable    PROGNOSIS:  Good

## 2023-10-26 NOTE — ANESTHESIA PROCEDURE NOTES
Arterial    Diagnosis: brain mass    Patient location during procedure: done in OR    Staffing  Authorizing Provider: Holli Mendoza MD  Performing Provider: Sebastian Cisneros MD    Staffing  Performed by: Sebastian Cisneros MD  Authorized by: Holli Mendoza MD    Anesthesiologist was present at the time of the procedure.    Preanesthetic Checklist  Completed: patient identified, IV checked, site marked, risks and benefits discussed, surgical consent, monitors and equipment checked, pre-op evaluation, timeout performed and anesthesia consent givenArterial  Skin Prep: chlorhexidine gluconate  Local Infiltration: none  Orientation: left  Location: radial    Catheter Size: 20 G  Catheter placement by Ultrasound guidance. Heme positive aspiration all ports.   Vessel Caliber: small, patent  Needle advanced into vessel with real time Ultrasound guidance.Insertion Attempts: 1  Assessment  Dressing: secured with tape and tegaderm  Patient: Tolerated well

## 2023-10-26 NOTE — ANESTHESIA PROCEDURE NOTES
Intubation    Date/Time: 10/26/2023 7:20 AM    Performed by: Sebastian Cisneros MD  Authorized by: Holli Mendoza MD    Intubation:     Induction:  Intravenous    Intubated:  Postinduction    Mask Ventilation:  Easy mask    Attempts:  2    Attempted By:  Student    Method of Intubation:  Direct    Blade:  Naga 3    Laryngeal View Grade: Grade III - only epiglottis visible      Attempted By (2nd Attempt):  Staff anesthesiologist    Method of Intubation (2nd Attempt):  Direct    Blade (2nd Attempt):  Naga 3    Laryngeal View Grade (2nd Attempt): Grade IIa - cords partially seen      Difficult Airway Encountered?: No      Complications:  None    Airway Device:  Oral endotracheal tube    Airway Device Size:  7.5    Style/Cuff Inflation:  Cuffed (inflated to minimal occlusive pressure)    Tube secured:  22    Secured at:  The teeth    Placement Verified By:  Capnometry    Complicating Factors:  Anterior larynx and poor neck/head extension    Findings Post-Intubation:  BS equal bilateral and atraumatic/condition of teeth unchanged

## 2023-10-26 NOTE — ASSESSMENT & PLAN NOTE
Endocrinology consulted for BG management.   BG goal 140-180      WBD 0.4 units/kg/day   - Levemir (Insulin Detemir) 9 units BID  - Novolog (Insulin Aspart) 15 units TIDWM and prn for BG excursions MDC SSI (150/25) (20% increase due to prandial blood glucose  above goal)   - BG checks AC/HS  - Hypoglycemia protocol in place  - If blood glucose greater than 300, please ask patient not to eat food or drink anything other than water until correctional insulin has brought it back below 250    ** Please notify Endocrine for any change and/or advance in diet**  ** Please call Endocrine for any BG related issues **    Discharge Planning:   TBD. Please notify endocrinology prior to discharge.

## 2023-10-26 NOTE — TELEPHONE ENCOUNTER
Pt admitted  ----- Message from Joleen Carr MD sent at 10/25/2023  6:01 PM CDT -----  Reviewed, nothing to do; repeat per routine

## 2023-10-26 NOTE — ASSESSMENT & PLAN NOTE
Pt is s/p left mass excision.    - SBP <140  - MAP >65  - Neurosurgery recs  - Keppra  - Heparin  - Dexamethasone  - Postop abx  - Pain management

## 2023-10-26 NOTE — PROGRESS NOTES
"[facility] ([facility])  Endocrinology  Progress Note    Admit Date: 10/24/2023     Reason for Consult: Management of T2DM, Hyperglycemia     Diabetes diagnosis year: > 10 years ago     Home Diabetes Medications:  Humalog 8 units TID with meals and correction scale, Lantus 20 units daily, Mounjaro 2.5 mg weekly     How often checking glucose at home? Freestyle Jodie    BG readings on regimen: 100-300  Hypoglycemia on the regimen?  No  Missed doses on regimen?  No     Diabetes Complications include:     Hyperglycemia     Complicating diabetes co morbidities:   ESLD s/p transplant, previous CVA        HPI:   Patient is a 63 y.o. male with a diagnosis of  IDDM, HTN, L basal ganglia ICH (in 2014, no residual deficits), HCC/EtOH cirrhosis (s/p liver transplant on 8/10/23 complicated by hepatic art stenosis s/p angioplasty/stent on 10/2/23), non-melanoma skin cancer who presents as transfer from OSH for L frontal mass after 2 weeks of confusion. Interview conducted with patient as well as his wife at bedside. Patient unable to give good description of the confusion he has been experiencing - per his wife he has been "in a fog" for the last 2 weeks, often saying "ok" inappropriately in response to questions and exhibiting strange behaviors such as turning the lights on/off. She says his personality is different and he is less excited about things than usual. On 10/23, he had an episode where he had worsened confusion and agitation while out at a store, he later did not recall going out. The patient and his wife deny any weakness, numbness, speech difficulty, vision changes, or seizure activity. CT and MRI w/wo at OSH show a 3 cm L frontal pole enhancing extraaxial mass with some adjacent intraaxial enhancement, as well as considerable surrounding edema with mass effect and midline shift. Endocrinology consulted for BG/ DM management.      Lab Results   Component Value Date    HGBA1C 6.1 (H) 10/24/2023 " "              Interval HPI:   Overnight events: No acute events overnight. Patient in room 950/950 A. Blood glucose stable. BG at goal on current insulin regimen (SSI, prandial, and basal insulin ). Steroid use- Dexamethasone  4 mg q8hr. Day of Surgery  Renal function- Normal   Vasopressors-  None       Endocrine will continue to follow and manage insulin orders inpatient.         Diet NPO     Eating:   NPO  Nausea: No  Hypoglycemia and intervention: No  Fever: No  TPN and/or TF: No      /71   Pulse 74   Temp 97.5 °F (36.4 °C) (Oral)   Resp 18   Ht 5' 11" (1.803 m)   Wt 93.2 kg (205 lb 7.5 oz)   SpO2 (!) 94%   BMI 28.66 kg/m²     Labs Reviewed and Include    Recent Labs   Lab 10/26/23  0317   *   CALCIUM 8.8   ALBUMIN 3.6   PROT 6.4      K 4.1   CO2 17*      BUN 15   CREATININE 0.8   ALKPHOS 87   ALT 26   AST 20   BILITOT 1.2*     Lab Results   Component Value Date    WBC 5.44 10/26/2023    HGB 12.5 (L) 10/26/2023    HCT 38.8 (L) 10/26/2023    MCV 83 10/26/2023     (L) 10/26/2023     No results for input(s): "TSH", "FREET4" in the last 168 hours.  Lab Results   Component Value Date    HGBA1C 6.1 (H) 10/24/2023       Nutritional status:   Body mass index is 28.66 kg/m².  Lab Results   Component Value Date    ALBUMIN 3.6 10/26/2023    ALBUMIN 3.3 (L) 10/25/2023    ALBUMIN 3.5 10/24/2023     No results found for: "PREALBUMIN"    Estimated Creatinine Clearance: 111.7 mL/min (based on SCr of 0.8 mg/dL).    Accu-Checks  Recent Labs     10/24/23  2242 10/25/23  0642 10/25/23  1221 10/25/23  1513 10/25/23  2113 10/26/23  0526   POCTGLUCOSE 166* 217* 291* 355* 254* 273*       Current Medications and/or Treatments Impacting Glycemic Control  Immunotherapy:    Immunosuppressants           Stop Route Frequency     tacrolimus capsule 2 mg         -- Oral Every evening     tacrolimus capsule 3 mg         -- Oral Every morning          Steroids:   Hormones (From admission, onward)      Start  "    Stop Route Frequency Ordered    10/25/23 0130  dexAMETHasone tablet 4 mg         -- Oral Every 8 hours 10/25/23 0109          Pressors:    Autonomic Drugs (From admission, onward)      None          Hyperglycemia/Diabetes Medications:   Antihyperglycemics (From admission, onward)      Start     Stop Route Frequency Ordered    10/26/23 1130  insulin aspart U-100 pen 15 Units         -- SubQ 3 times daily with meals 10/26/23 0845    10/26/23 0900  insulin detemir U-100 (Levemir) pen 9 Units         -- SubQ 2 times daily 10/26/23 0845    10/25/23 0247  insulin aspart U-100 pen 0-10 Units         -- SubQ Every 6 hours PRN 10/25/23 0147            ASSESSMENT and PLAN    Neuro  * Left frontal lobe mass  Managed per primary.         Endocrine  Type 2 diabetes mellitus with other specified complication, with chronic insulin use  Endocrinology consulted for BG management.   BG goal 140-180      WBD 0.4 units/kg/day   - Levemir (Insulin Detemir) 9 units BID  - Novolog (Insulin Aspart) 15 units TIDWM and prn for BG excursions MDC SSI (150/25) (20% increase due to prandial blood glucose  above goal)   - BG checks AC/HS  - Hypoglycemia protocol in place  - If blood glucose greater than 300, please ask patient not to eat food or drink anything other than water until correctional insulin has brought it back below 250    ** Please notify Endocrine for any change and/or advance in diet**  ** Please call Endocrine for any BG related issues **    Discharge Planning:   TBD. Please notify endocrinology prior to discharge.        Other  Adrenal cortical steroids causing adverse effect in therapeutic use  Glucocorticoids markedly increase prandial glucoses. Expect the steroid taper will help glucose control.              Marquez Contreras, DNP, FNP  Endocrinology  [facility] ([facility])

## 2023-10-26 NOTE — NURSING
15:16 - RN spoke with TWAN Ferguson with NSGY about dressing still bleeding fresh blood. NSGY to come to bedside to take a look at dressing. Neuro exam remains intact. WCCURRY.

## 2023-10-26 NOTE — NURSING
Patient arrived to [facility] from OR by bed    Report received from: CRNA    Type of stroke/diagnosis:  L frontal lobe mass resection    Current symptoms: following commands, MARISELA, pupils 3mm brisk PERRLA    Skin Assessment done: Y  Wounds noted:N; drain site and crani incision  *If wounds noted, was Wound Care consulted? N  *If wounds noted, LDA placed? N  Skin Assessment Verified by:  KELLY Pacheco Completed? Pending    Patient Belongings on Admit: none    NCC notified: Dr. Walters

## 2023-10-26 NOTE — PLAN OF CARE
POC reviewed with Jed Chávez and family at 1400. Pt verbalized understanding. Questions and concerns addressed. No acute events today. Pt progressing toward goals. Will continue to monitor. See below and flowsheets for full assessment and VS info.     - Post op CTH completed.   - Initial dressing was having constant bloody drainage, NSGY notified and to bedside to change dressing. See note for more details.   - Oxycodone 5 mg and Morphine 2 mg given x2 for headache pain management.   - Cleviprex gtt titrated as tolerated.       Neuro:  [place] Coma Scale  Best Eye Response: 4-->(E4) spontaneous  Best Motor Response: 6-->(M6) obeys commands  Best Verbal Response: 5-->(V5) oriented  Jann Coma Scale Score: 15  Assessment Qualifiers: patient not sedated/intubated, no eye obstruction present  Pupil PERRLA: yes     24 hr Temp:  [97.5 °F (36.4 °C)-98 °F (36.7 °C)]     CV:   Rhythm: normal sinus rhythm  BP goals:   SBP < 140  MAP > 65    Resp:           Plan: N/A    GI/:     Diet/Nutrition Received: regular  Last Bowel Movement: 10/24/23  Voiding Characteristics: urethral catheter (bladder)    Intake/Output Summary (Last 24 hours) at 10/26/2023 1730  Last data filed at 10/26/2023 1710  Gross per 24 hour   Intake 4013.43 ml   Output 1275 ml   Net 2738.43 ml     Unmeasured Output  Urine Occurrence: 2  Stool Occurrence: 0    Labs/Accuchecks:  Recent Labs   Lab 10/26/23  0317   WBC 5.44   RBC 4.68   HGB 12.5*   HCT 38.8*   *      Recent Labs   Lab 10/26/23  0317      K 4.1   CO2 17*      BUN 15   CREATININE 0.8   ALKPHOS 87   ALT 26   AST 20   BILITOT 1.2*      Recent Labs   Lab 10/24/23  2225   INR 1.1   APTT 27.6      Recent Labs   Lab 10/24/23  1503   TROPONINI 0.010       Electrolytes: No replacement orders  Accuchecks: ACHS    Gtts:   sodium chloride 0.9% 75 mL/hr at 10/26/23 1052    sodium chloride 0.9% 100 mL/hr at 10/26/23 1710    clevidipine 14 mg/hr (10/26/23 1710)        LDA/Wounds:  Lines/Drains/Airways       Drain  Duration                  Closed/Suction Drain 10/26/23 0942 Left Scalp Accordion 10 Fr. <1 day         Urethral Catheter 10/26/23 0725 Non-latex 16 Fr. <1 day              Arterial Line  Duration             Arterial Line 10/26/23 0753 Left Radial <1 day              Peripheral Intravenous Line  Duration                  Peripheral IV - Single Lumen 10/24/23 1800 18 G 1 day         Peripheral IV - Single Lumen 10/26/23 1215 20 G Posterior;Right Hand <1 day                  Wounds: Yes  Wound care consulted: Yes    Is this a stroke patient? No.       Problem: Adult Inpatient Plan of Care  Goal: Plan of Care Review  Outcome: Ongoing, Progressing     Problem: Adult Inpatient Plan of Care  Goal: Optimal Comfort and Wellbeing  Outcome: Ongoing, Progressing     Problem: Adult Inpatient Plan of Care  Goal: Readiness for Transition of Care  Outcome: Ongoing, Progressing     Problem: Diabetes Comorbidity  Goal: Blood Glucose Level Within Targeted Range  Outcome: Ongoing, Progressing     Problem: Fall Injury Risk  Goal: Absence of Fall and Fall-Related Injury  Outcome: Ongoing, Progressing     Problem: Impaired Wound Healing  Goal: Optimal Wound Healing  Outcome: Ongoing, Progressing

## 2023-10-26 NOTE — PROGRESS NOTES
Subjective   Interval history: No acute events overnight. Pt to surgery for left frontal lobe mass, being followed by Neuro surgery. All labs reviewed. LFTs/tbili stable. Tacro level 9.5. Will switch dose to 2mg bid. Cont holding cellcept until path results. Will continue to monitor    Objective   Vitals:    10/26/23 1015 10/26/23 1100 10/26/23 1101 10/26/23 1153   BP: (!) 149/85  130/72    BP Location: Left arm  Left arm    Patient Position: Lying  Lying    Pulse: 85  75    Resp: 18 19 19 (!) 23   Temp: 97.6 °F (36.4 °C)  97.6 °F (36.4 °C)    TempSrc: Axillary  Axillary    SpO2: 99%  100%    Weight:       Height:           Laboratory:  I have reviewed all pertinent lab results within the past 24 hours.    Recent Labs   Lab 10/26/23  0317   WBC 5.44   RBC 4.68   HGB 12.5*   HCT 38.8*   *   MCV 83   MCH 26.7*   MCHC 32.2     Recent Labs   Lab 10/26/23  0317   CALCIUM 8.8   ALBUMIN 3.6   PROT 6.4      K 4.1   CO2 17*      BUN 15   CREATININE 0.8   ALKPHOS 87   ALT 26   AST 20   BILITOT 1.2*        Assessment and Plan:      *Left frontal lobe mass  - Plan per Neuro     S/p Liver transplant       - s/p liver 8/10/23 for HCC/etoh       - LFTs and tbili remain stable       - Tacro level 9.5, change dose to 2 mg bid     Long term use of immunosuppressant medication      - Continue prograf and steroids.      - Continue to monitor prograf levels daily, monitor for toxic side effects, and adjust for therapeutic dose.       - Continue to hold cellcept     Prophylactic immunotherapy     - see long term use of immunosuppression        Nasreen Gill PA-C  Liver Transplant  [facility]

## 2023-10-26 NOTE — CARE UPDATE
Care Update:    NSICU request endocrine sign off for their team to manage.     Please re-consult when stepped down.       Marquez Contreras DNP, FNP-C  Department of Endocrinology  Inpatient Glycemic Management

## 2023-10-26 NOTE — NURSING
NSGY at bedside to assess dressing. Another staple was placed by NSGY and a new pressure dressing to be applied.

## 2023-10-26 NOTE — H&P
"[facility]  Neurocritical Care  History & Physical    Admit Date: 10/24/2023  Service Date: 10/26/2023  Length of Stay: 2    Subjective:     Chief Complaint: Left frontal lobe mass    History of Present Illness: Mr. Chávez is a 62 yo M w/ PMHx of DM, dyslipidemia, HTN, L basal ganglia ICH (in 2014, no residual deficits), HCC/EtOH cirrhosis (s/p liver transplant on 8/10/23 complicated by hepatic art stenosis s/p angioplasty/stent on 10/2/23), non-melanoma skin cancer who presents as transfer from OSH for L frontal mass after 2 weeks of confusion. Interview conducted with patient as well as his wife at bedside. Patient unable to give good description of the confusion he has been experiencing - per his wife he has been "in a fog" for the last 2 weeks, often saying "ok" inappropriately in response to questions and exhibiting strange behaviors such as turning the lights on/off. She says his personality is different and he is less excited about things than usual. On 10/23, he had an episode where he had worsened confusion and agitation while out at a store.  He later did not recall going out. The patient and his wife deny any weakness, numbness, speech difficulty, vision changes, or seizure activity.     He had a recent liver transplant and hepatic artery stent, was on ASA already and started plavix after recent stent placement. Last dose 10/24 AM, which patient was able to recall taking. Platelets 86 on admission.  CT and MRI w/wo at OSH show a 3 cm L frontal pole enhancing extraaxial mass with some adjacent intraaxial enhancement, as well as considerable surrounding edema with mass effect and midline shift.  On 10/26, patient had craniotomy with mass excision.        Past Medical History:   Diagnosis Date    Alcoholic cirrhosis     CVA (cerebral vascular accident)     DM (diabetes mellitus)     Dyslipidemia     Hepatocellular carcinoma     HTN (hypertension)     Intracranial hemorrhage     " "Skin cancer      Past Surgical History:   Procedure Laterality Date    HERNIA REPAIR N/A     LIVER TRANSPLANT N/A 8/9/2023    Procedure: TRANSPLANT, LIVER;  Surgeon: Ameya Suero MD;  Location: [facility] OR [address] [place] [address];  Service: Transplant;  Laterality: N/A;      No current facility-administered medications on file prior to encounter.     Current Outpatient Medications on File Prior to Encounter   Medication Sig Dispense Refill    aspirin 81 MG Chew Take 1 tablet (81 mg total) by mouth once daily. 30 tablet 11    carvediloL (COREG) 3.125 MG tablet Take 2 tablets (6.25 mg total) by mouth 2 (two) times daily with meals. HOLD SBP <130, HR <60 120 tablet 1    clopidogreL (PLAVIX) 75 mg tablet Take 1 tablet (75 mg total) by mouth once daily. 90 tablet 0    famotidine (PEPCID) 20 MG tablet Take 1 tablet (20 mg total) by mouth every evening. 30 tablet 0    insulin glargine 100 units/mL SubQ pen Inject 20 Units into the skin once daily. 15 mL 0    insulin lispro (HUMALOG KWIKPEN INSULIN) 100 unit/mL pen Inject 8 Units into the skin 3 (three) times daily before meals AND 1-5 Units 3 (three) times daily before meals. sliding scale insulin as needed. Max daily dose 75 units daily... 15 mL 0    mycophenolate (CELLCEPT) 250 mg Cap Take 2 capsules (500 mg total) by mouth 2 (two) times daily. 120 capsule 11    pen needle, diabetic 32 gauge x 5/32" Ndle Use to inject insulin into the skin 4 (four) times daily. 100 each 11    sulfamethoxazole-trimethoprim 400-80mg (BACTRIM,SEPTRA) 400-80 mg per tablet Take 1 tablet by mouth once daily. Stop 2/6/24 30 tablet 5    tacrolimus (PROGRAF) 1 MG Cap Take 3 capsules (3 mg total) by mouth every morning AND 2 capsules (2 mg total) every evening. 150 capsule 11    tirzepatide (MOUNJARO) 2.5 mg/0.5 mL PnIj Inject 2.5 mg into the skin every 7 days. (Patient taking differently: Inject 2.5 mg into the skin every 7 days. WEDNESDAYS) 4 pen 0    valGANciclovir (VALCYTE) 450 mg Tab Take 1 " tablet (450 mg total) by mouth once daily. Stop 2/6/24 30 tablet 5    blood sugar diagnostic Strp Test blood glucose 3 (three) times daily. 100 strip 11    blood-glucose meter Misc Test blood glucose as directed 1 each 0    blood-glucose sensor (FREESTYLE PUNEET 3 SENSOR) Neelam To change every 14 days 2 each 3    lancets Misc Test blood glucose 3 (three) times daily. 100 each 11      Allergies: Patient has no known allergies.    History reviewed. No pertinent family history.  Social History     Tobacco Use    Smoking status: Never    Smokeless tobacco: Never   Substance Use Topics    Alcohol use: Not Currently    Drug use: Never     Review of Systems   Constitutional:  Negative for fever.   Eyes:  Negative for visual disturbance.   Respiratory:  Negative for shortness of breath.    Cardiovascular:  Negative for chest pain.   Gastrointestinal:  Negative for abdominal pain, nausea and vomiting.   Genitourinary:  Negative for difficulty urinating.   Neurological:  Positive for headaches. Negative for speech difficulty, weakness and numbness.     Objective:     Vitals:    Temp: 98 °F (36.7 °C)  Pulse: 82  Rhythm: normal sinus rhythm  BP: 122/65  MAP (mmHg): 88  Resp: 16  SpO2: (!) 91 %    Temp  Min: 97.5 °F (36.4 °C)  Max: 98 °F (36.7 °C)  Pulse  Min: 60  Max: 85  BP  Min: 115/64  Max: 165/84  MAP (mmHg)  Min: 84  Max: 111  Resp  Min: 16  Max: 23  SpO2  Min: 91 %  Max: 100 %    10/25 0701 - 10/26 0700  In: 213.3   Out: -    Unmeasured Output  Urine Occurrence: 2  Stool Occurrence: 0        Physical Exam  Vitals and nursing note reviewed.   HENT:      Head: Normocephalic.      Comments: Dressing stained with blood and slow trickle of blood draining from left head surgical site.      Right Ear: External ear normal.      Left Ear: External ear normal.      Mouth/Throat:      Mouth: Mucous membranes are moist.   Eyes:      Extraocular Movements: Extraocular movements intact.      Conjunctiva/sclera: Conjunctivae  normal.      Pupils: Pupils are equal, round, and reactive to light.   Cardiovascular:      Rate and Rhythm: Normal rate and regular rhythm.      Pulses: Normal pulses.      Heart sounds: Normal heart sounds.   Pulmonary:      Effort: Pulmonary effort is normal.      Breath sounds: Normal breath sounds.   Abdominal:      General: There is no distension.      Palpations: Abdomen is soft.      Tenderness: There is no abdominal tenderness.   Musculoskeletal:         General: Normal range of motion.   Skin:     General: Skin is warm.      Capillary Refill: Capillary refill takes less than 2 seconds.   Neurological:      General: No focal deficit present.      Mental Status: He is alert.      Sensory: No sensory deficit.      Motor: No weakness.      Comments: Light sensation intact bilaterally in UE and LE.  5/5 motor strength in UE and LE bilaterally.   Psychiatric:         Mood and Affect: Mood normal.        Today I personally reviewed pertinent medications, imaging, laboratory results, notably:    Recent Labs   Lab 10/26/23  0317   WBC 5.44   RBC 4.68   HGB 12.5*   HCT 38.8*   *   MCV 83   MCH 26.7*   MCHC 32.2      Recent Labs   Lab 10/26/23  0317   CALCIUM 8.8   ALBUMIN 3.6   PROT 6.4      K 4.1   CO2 17*      BUN 15   CREATININE 0.8   ALKPHOS 87   ALT 26   AST 20   BILITOT 1.2*          Assessment/Plan:     Neuro  * Left frontal lobe mass  Pt is s/p left mass excision.    - SBP <140  - MAP >65  - Neurosurgery recs  - Keppra  - Heparin  - Dexamethasone  - Postop abx  - Pain management    Immunology/Multi System  Prophylactic immunotherapy  Please see long term immunotherapy    Endocrine  Type 2 diabetes mellitus with other specified complication, with chronic insulin use  Pt has hx of DM2    - SSI    GI  S/P liver transplant  Consulted Liver Transplant Surgery, appreciate recs   - s/p liver 8/10/23 for HCC/etoh   - LFTs and tbili remain stable   - Tacro level 9.5, change dose to 2 mg  bid    Palliative Care  Long-term use of immunosuppressant medication  Consulted Liver Transplant Surgery, appreciate recs   - Continue prograf and steroids.   - Continue to monitor prograf levels daily, monitor for toxic side effects, and adjust for therapeutic dose.    - Continue to hold cellcept          The patient is being Prophylaxed for:  Venous Thromboembolism with: Mechanical or Chemical  Stress Ulcer with: PPI  Ventilator Pneumonia with: not applicable      Activity Orders          Turn patient every 2 hours starting at 10/26 1400    Diet Adult Regular (IDDSI Level 7): Regular starting at 10/26 0906        Full Code    Chao Yoon MD  Neurocritical Care  [facility]

## 2023-10-26 NOTE — ASSESSMENT & PLAN NOTE
Consulted Liver Transplant Surgery, appreciate recs   - Continue prograf and steroids.   - Continue to monitor prograf levels daily, monitor for toxic side effects, and adjust for therapeutic dose.    - Continue to hold cellcept

## 2023-10-27 PROBLEM — D49.6 BRAIN TUMOR: Status: ACTIVE | Noted: 2023-10-27

## 2023-10-27 LAB
ALBUMIN SERPL BCP-MCNC: 3.6 G/DL (ref 3.5–5.2)
ALP SERPL-CCNC: 87 U/L (ref 55–135)
ALT SERPL W/O P-5'-P-CCNC: 20 U/L (ref 10–44)
ANION GAP SERPL CALC-SCNC: 11 MMOL/L (ref 8–16)
AST SERPL-CCNC: 15 U/L (ref 10–40)
BASOPHILS # BLD AUTO: 0 K/UL (ref 0–0.2)
BASOPHILS NFR BLD: 0 % (ref 0–1.9)
BILIRUB SERPL-MCNC: 0.9 MG/DL (ref 0.1–1)
BUN SERPL-MCNC: 17 MG/DL (ref 8–23)
CALCIUM SERPL-MCNC: 8.6 MG/DL (ref 8.7–10.5)
CHLORIDE SERPL-SCNC: 100 MMOL/L (ref 95–110)
CO2 SERPL-SCNC: 21 MMOL/L (ref 23–29)
CREAT SERPL-MCNC: 0.8 MG/DL (ref 0.5–1.4)
DIFFERENTIAL METHOD: ABNORMAL
EOSINOPHIL # BLD AUTO: 0 K/UL (ref 0–0.5)
EOSINOPHIL NFR BLD: 0 % (ref 0–8)
ERYTHROCYTE [DISTWIDTH] IN BLOOD BY AUTOMATED COUNT: 16.2 % (ref 11.5–14.5)
EST. GFR  (NO RACE VARIABLE): >60 ML/MIN/1.73 M^2
GLUCOSE SERPL-MCNC: 297 MG/DL (ref 70–110)
GLUCOSE SERPL-MCNC: 401 MG/DL (ref 70–110)
HCO3 UR-SCNC: 22.5 MMOL/L (ref 24–28)
HCT VFR BLD AUTO: 37.5 % (ref 40–54)
HCT VFR BLD CALC: 31 %PCV (ref 36–54)
HGB BLD-MCNC: 12.3 G/DL (ref 14–18)
IMM GRANULOCYTES # BLD AUTO: 0.04 K/UL (ref 0–0.04)
IMM GRANULOCYTES NFR BLD AUTO: 0.5 % (ref 0–0.5)
LYMPHOCYTES # BLD AUTO: 1.3 K/UL (ref 1–4.8)
LYMPHOCYTES NFR BLD: 16.6 % (ref 18–48)
MAGNESIUM SERPL-MCNC: 1.8 MG/DL (ref 1.6–2.6)
MCH RBC QN AUTO: 27.3 PG (ref 27–31)
MCHC RBC AUTO-ENTMCNC: 32.8 G/DL (ref 32–36)
MCV RBC AUTO: 83 FL (ref 82–98)
MONOCYTES # BLD AUTO: 0.5 K/UL (ref 0.3–1)
MONOCYTES NFR BLD: 6 % (ref 4–15)
NEUTROPHILS # BLD AUTO: 6 K/UL (ref 1.8–7.7)
NEUTROPHILS NFR BLD: 76.9 % (ref 38–73)
NRBC BLD-RTO: 0 /100 WBC
PCO2 BLDA: 36.5 MMHG (ref 35–45)
PH SMN: 7.4 [PH] (ref 7.35–7.45)
PHOSPHATE SERPL-MCNC: 2.6 MG/DL (ref 2.7–4.5)
PLATELET # BLD AUTO: 121 K/UL (ref 150–450)
PMV BLD AUTO: 10 FL (ref 9.2–12.9)
PO2 BLDA: 150 MMHG (ref 80–100)
POC BE: -2 MMOL/L
POC IONIZED CALCIUM: 1.13 MMOL/L (ref 1.06–1.42)
POC SATURATED O2: 99 % (ref 95–100)
POC TCO2: 24 MMOL/L (ref 23–27)
POCT GLUCOSE: 279 MG/DL (ref 70–110)
POCT GLUCOSE: 330 MG/DL (ref 70–110)
POCT GLUCOSE: 351 MG/DL (ref 70–110)
POCT GLUCOSE: 358 MG/DL (ref 70–110)
POCT GLUCOSE: 366 MG/DL (ref 70–110)
POCT GLUCOSE: 372 MG/DL (ref 70–110)
POCT GLUCOSE: 389 MG/DL (ref 70–110)
POCT GLUCOSE: 407 MG/DL (ref 70–110)
POCT GLUCOSE: 421 MG/DL (ref 70–110)
POCT GLUCOSE: 430 MG/DL (ref 70–110)
POCT GLUCOSE: 445 MG/DL (ref 70–110)
POTASSIUM BLD-SCNC: 4 MMOL/L (ref 3.5–5.1)
POTASSIUM SERPL-SCNC: 4.1 MMOL/L (ref 3.5–5.1)
PROT SERPL-MCNC: 6.4 G/DL (ref 6–8.4)
RBC # BLD AUTO: 4.51 M/UL (ref 4.6–6.2)
SAMPLE: ABNORMAL
SODIUM BLD-SCNC: 138 MMOL/L (ref 136–145)
SODIUM SERPL-SCNC: 132 MMOL/L (ref 136–145)
TACROLIMUS BLD-MCNC: 5.2 NG/ML (ref 5–15)
WBC # BLD AUTO: 7.85 K/UL (ref 3.9–12.7)

## 2023-10-27 PROCEDURE — 63600175 PHARM REV CODE 636 W HCPCS: Performed by: PSYCHIATRY & NEUROLOGY

## 2023-10-27 PROCEDURE — 99291 CRITICAL CARE FIRST HOUR: CPT | Mod: ,,, | Performed by: PSYCHIATRY & NEUROLOGY

## 2023-10-27 PROCEDURE — 63600175 PHARM REV CODE 636 W HCPCS

## 2023-10-27 PROCEDURE — 92523 SPEECH SOUND LANG COMPREHEN: CPT

## 2023-10-27 PROCEDURE — 25000003 PHARM REV CODE 250

## 2023-10-27 PROCEDURE — 80053 COMPREHEN METABOLIC PANEL: CPT | Performed by: PHYSICIAN ASSISTANT

## 2023-10-27 PROCEDURE — 27000221 HC OXYGEN, UP TO 24 HOURS

## 2023-10-27 PROCEDURE — 97165 OT EVAL LOW COMPLEX 30 MIN: CPT

## 2023-10-27 PROCEDURE — 25500020 PHARM REV CODE 255: Performed by: NEUROLOGICAL SURGERY

## 2023-10-27 PROCEDURE — 99291 PR CRITICAL CARE, E/M 30-74 MINUTES: ICD-10-PCS | Mod: ,,, | Performed by: PSYCHIATRY & NEUROLOGY

## 2023-10-27 PROCEDURE — 83735 ASSAY OF MAGNESIUM: CPT | Performed by: PHYSICIAN ASSISTANT

## 2023-10-27 PROCEDURE — 25000003 PHARM REV CODE 250: Performed by: STUDENT IN AN ORGANIZED HEALTH CARE EDUCATION/TRAINING PROGRAM

## 2023-10-27 PROCEDURE — 94761 N-INVAS EAR/PLS OXIMETRY MLT: CPT

## 2023-10-27 PROCEDURE — 97530 THERAPEUTIC ACTIVITIES: CPT

## 2023-10-27 PROCEDURE — 85025 COMPLETE CBC W/AUTO DIFF WBC: CPT

## 2023-10-27 PROCEDURE — 20000000 HC ICU ROOM

## 2023-10-27 PROCEDURE — 99900035 HC TECH TIME PER 15 MIN (STAT)

## 2023-10-27 PROCEDURE — 25000003 PHARM REV CODE 250: Performed by: PSYCHIATRY & NEUROLOGY

## 2023-10-27 PROCEDURE — 63600175 PHARM REV CODE 636 W HCPCS: Performed by: NURSE PRACTITIONER

## 2023-10-27 PROCEDURE — 84100 ASSAY OF PHOSPHORUS: CPT | Performed by: NEUROLOGICAL SURGERY

## 2023-10-27 PROCEDURE — 99233 PR SUBSEQUENT HOSPITAL CARE,LEVL III: ICD-10-PCS | Mod: 24,,, | Performed by: NURSE PRACTITIONER

## 2023-10-27 PROCEDURE — A9585 GADOBUTROL INJECTION: HCPCS | Performed by: NEUROLOGICAL SURGERY

## 2023-10-27 PROCEDURE — 99233 SBSQ HOSP IP/OBS HIGH 50: CPT | Mod: 24,,, | Performed by: NURSE PRACTITIONER

## 2023-10-27 PROCEDURE — 63600175 PHARM REV CODE 636 W HCPCS: Performed by: PHYSICIAN ASSISTANT

## 2023-10-27 PROCEDURE — 80197 ASSAY OF TACROLIMUS: CPT | Performed by: PHYSICIAN ASSISTANT

## 2023-10-27 PROCEDURE — 63600175 PHARM REV CODE 636 W HCPCS: Performed by: STUDENT IN AN ORGANIZED HEALTH CARE EDUCATION/TRAINING PROGRAM

## 2023-10-27 RX ORDER — IBUPROFEN 200 MG
24 TABLET ORAL
Status: DISCONTINUED | OUTPATIENT
Start: 2023-10-27 | End: 2023-10-27

## 2023-10-27 RX ORDER — HYDRALAZINE HYDROCHLORIDE 20 MG/ML
10 INJECTION INTRAMUSCULAR; INTRAVENOUS EVERY 6 HOURS PRN
Status: DISCONTINUED | OUTPATIENT
Start: 2023-10-27 | End: 2023-10-28

## 2023-10-27 RX ORDER — LOSARTAN POTASSIUM 25 MG/1
25 TABLET ORAL DAILY
Status: DISCONTINUED | OUTPATIENT
Start: 2023-10-27 | End: 2023-10-27

## 2023-10-27 RX ORDER — GLUCAGON 1 MG
1 KIT INJECTION
Status: DISCONTINUED | OUTPATIENT
Start: 2023-10-27 | End: 2023-10-27

## 2023-10-27 RX ORDER — INSULIN ASPART 100 [IU]/ML
14 INJECTION, SOLUTION INTRAVENOUS; SUBCUTANEOUS
Status: DISCONTINUED | OUTPATIENT
Start: 2023-10-27 | End: 2023-10-27

## 2023-10-27 RX ORDER — INSULIN ASPART 100 [IU]/ML
0-10 INJECTION, SOLUTION INTRAVENOUS; SUBCUTANEOUS
Status: DISCONTINUED | OUTPATIENT
Start: 2023-10-27 | End: 2023-10-27

## 2023-10-27 RX ORDER — NICARDIPINE HYDROCHLORIDE 0.2 MG/ML
0-15 INJECTION INTRAVENOUS CONTINUOUS
Status: DISCONTINUED | OUTPATIENT
Start: 2023-10-27 | End: 2023-10-28

## 2023-10-27 RX ORDER — IBUPROFEN 200 MG
16 TABLET ORAL
Status: DISCONTINUED | OUTPATIENT
Start: 2023-10-27 | End: 2023-10-27

## 2023-10-27 RX ORDER — LOSARTAN POTASSIUM 25 MG/1
25 TABLET ORAL DAILY
Status: COMPLETED | OUTPATIENT
Start: 2023-10-27 | End: 2023-10-27

## 2023-10-27 RX ORDER — LABETALOL HCL 20 MG/4 ML
10 SYRINGE (ML) INTRAVENOUS EVERY 4 HOURS PRN
Status: DISCONTINUED | OUTPATIENT
Start: 2023-10-27 | End: 2023-10-28

## 2023-10-27 RX ORDER — GADOBUTROL 604.72 MG/ML
10 INJECTION INTRAVENOUS
Status: COMPLETED | OUTPATIENT
Start: 2023-10-27 | End: 2023-10-27

## 2023-10-27 RX ORDER — LOSARTAN POTASSIUM 50 MG/1
50 TABLET ORAL DAILY
Status: DISCONTINUED | OUTPATIENT
Start: 2023-10-28 | End: 2023-10-30 | Stop reason: HOSPADM

## 2023-10-27 RX ORDER — INSULIN ASPART 100 [IU]/ML
12 INJECTION, SOLUTION INTRAVENOUS; SUBCUTANEOUS
Status: DISCONTINUED | OUTPATIENT
Start: 2023-10-27 | End: 2023-10-27

## 2023-10-27 RX ADMIN — INSULIN ASPART 6 UNITS: 100 INJECTION, SOLUTION INTRAVENOUS; SUBCUTANEOUS at 07:10

## 2023-10-27 RX ADMIN — INSULIN DETEMIR 6 UNITS: 100 INJECTION, SOLUTION SUBCUTANEOUS at 11:10

## 2023-10-27 RX ADMIN — HYDRALAZINE HYDROCHLORIDE 10 MG: 20 INJECTION, SOLUTION INTRAMUSCULAR; INTRAVENOUS at 05:10

## 2023-10-27 RX ADMIN — NICARDIPINE HYDROCHLORIDE 15 MG/HR: 0.2 INJECTION, SOLUTION INTRAVENOUS at 10:10

## 2023-10-27 RX ADMIN — FAMOTIDINE 20 MG: 20 TABLET ORAL at 08:10

## 2023-10-27 RX ADMIN — INSULIN ASPART 15 UNITS: 100 INJECTION, SOLUTION INTRAVENOUS; SUBCUTANEOUS at 07:10

## 2023-10-27 RX ADMIN — DEXAMETHASONE 4 MG: 4 TABLET ORAL at 02:10

## 2023-10-27 RX ADMIN — LABETALOL HYDROCHLORIDE 10 MG: 5 INJECTION, SOLUTION INTRAVENOUS at 09:10

## 2023-10-27 RX ADMIN — NICARDIPINE HYDROCHLORIDE 12.5 MG/HR: 0.2 INJECTION, SOLUTION INTRAVENOUS at 03:10

## 2023-10-27 RX ADMIN — TACROLIMUS 2 MG: 1 CAPSULE ORAL at 08:10

## 2023-10-27 RX ADMIN — ACETAMINOPHEN 1000 MG: 500 TABLET ORAL at 02:10

## 2023-10-27 RX ADMIN — LOSARTAN POTASSIUM 25 MG: 25 TABLET, FILM COATED ORAL at 09:10

## 2023-10-27 RX ADMIN — ACETAMINOPHEN 1000 MG: 500 TABLET ORAL at 08:10

## 2023-10-27 RX ADMIN — INSULIN DETEMIR 9 UNITS: 100 INJECTION, SOLUTION SUBCUTANEOUS at 10:10

## 2023-10-27 RX ADMIN — INSULIN ASPART 5 UNITS: 100 INJECTION, SOLUTION INTRAVENOUS; SUBCUTANEOUS at 12:10

## 2023-10-27 RX ADMIN — CARVEDILOL 6.25 MG: 6.25 TABLET, FILM COATED ORAL at 08:10

## 2023-10-27 RX ADMIN — NICARDIPINE HYDROCHLORIDE 2.5 MG/HR: 0.2 INJECTION, SOLUTION INTRAVENOUS at 12:10

## 2023-10-27 RX ADMIN — INSULIN ASPART 10 UNITS: 100 INJECTION, SOLUTION INTRAVENOUS; SUBCUTANEOUS at 04:10

## 2023-10-27 RX ADMIN — MUPIROCIN: 20 OINTMENT TOPICAL at 09:10

## 2023-10-27 RX ADMIN — HYDRALAZINE HYDROCHLORIDE 10 MG: 20 INJECTION, SOLUTION INTRAMUSCULAR; INTRAVENOUS at 08:10

## 2023-10-27 RX ADMIN — CARVEDILOL 6.25 MG: 6.25 TABLET, FILM COATED ORAL at 04:10

## 2023-10-27 RX ADMIN — INSULIN ASPART 12 UNITS: 100 INJECTION, SOLUTION INTRAVENOUS; SUBCUTANEOUS at 11:10

## 2023-10-27 RX ADMIN — CEFAZOLIN 2 G: 2 INJECTION, POWDER, FOR SOLUTION INTRAMUSCULAR; INTRAVENOUS at 11:10

## 2023-10-27 RX ADMIN — DEXAMETHASONE 4 MG: 4 TABLET ORAL at 10:10

## 2023-10-27 RX ADMIN — ACETAMINOPHEN 1000 MG: 500 TABLET ORAL at 09:10

## 2023-10-27 RX ADMIN — INSULIN ASPART 14 UNITS: 100 INJECTION, SOLUTION INTRAVENOUS; SUBCUTANEOUS at 04:10

## 2023-10-27 RX ADMIN — CEFAZOLIN 2 G: 2 INJECTION, POWDER, FOR SOLUTION INTRAMUSCULAR; INTRAVENOUS at 08:10

## 2023-10-27 RX ADMIN — OXYCODONE HYDROCHLORIDE 5 MG: 5 TABLET ORAL at 06:10

## 2023-10-27 RX ADMIN — NICARDIPINE HYDROCHLORIDE 2.5 MG/HR: 0.2 INJECTION, SOLUTION INTRAVENOUS at 05:10

## 2023-10-27 RX ADMIN — CEFAZOLIN 2 G: 2 INJECTION, POWDER, FOR SOLUTION INTRAMUSCULAR; INTRAVENOUS at 03:10

## 2023-10-27 RX ADMIN — GADOBUTROL 10 ML: 604.72 INJECTION INTRAVENOUS at 02:10

## 2023-10-27 RX ADMIN — NICARDIPINE HYDROCHLORIDE 12.5 MG/HR: 0.2 INJECTION, SOLUTION INTRAVENOUS at 10:10

## 2023-10-27 RX ADMIN — MUPIROCIN: 20 OINTMENT TOPICAL at 10:10

## 2023-10-27 RX ADMIN — DEXAMETHASONE 4 MG: 4 TABLET ORAL at 05:10

## 2023-10-27 RX ADMIN — HEPARIN SODIUM 5000 UNITS: 5000 INJECTION INTRAVENOUS; SUBCUTANEOUS at 09:10

## 2023-10-27 RX ADMIN — INSULIN ASPART 8 UNITS: 100 INJECTION, SOLUTION INTRAVENOUS; SUBCUTANEOUS at 05:10

## 2023-10-27 RX ADMIN — LEVETIRACETAM 500 MG: 500 TABLET, FILM COATED ORAL at 08:10

## 2023-10-27 RX ADMIN — DOCUSATE SODIUM 100 MG: 100 CAPSULE, LIQUID FILLED ORAL at 08:10

## 2023-10-27 RX ADMIN — LOSARTAN POTASSIUM 25 MG: 25 TABLET, FILM COATED ORAL at 10:10

## 2023-10-27 RX ADMIN — INSULIN ASPART 10 UNITS: 100 INJECTION, SOLUTION INTRAVENOUS; SUBCUTANEOUS at 11:10

## 2023-10-27 RX ADMIN — TACROLIMUS 2 MG: 1 CAPSULE ORAL at 05:10

## 2023-10-27 RX ADMIN — HEPARIN SODIUM 5000 UNITS: 5000 INJECTION INTRAVENOUS; SUBCUTANEOUS at 02:10

## 2023-10-27 RX ADMIN — VALGANCICLOVIR 450 MG: 450 TABLET, FILM COATED ORAL at 09:10

## 2023-10-27 RX ADMIN — LEVETIRACETAM 500 MG: 500 TABLET, FILM COATED ORAL at 10:10

## 2023-10-27 RX ADMIN — NICARDIPINE HYDROCHLORIDE 15 MG/HR: 0.2 INJECTION, SOLUTION INTRAVENOUS at 07:10

## 2023-10-27 RX ADMIN — SULFAMETHOXAZOLE AND TRIMETHOPRIM 1 TABLET: 400; 80 TABLET ORAL at 09:10

## 2023-10-27 RX ADMIN — INSULIN HUMAN 4 UNITS/HR: 1 INJECTION, SOLUTION INTRAVENOUS at 05:10

## 2023-10-27 RX ADMIN — HEPARIN SODIUM 5000 UNITS: 5000 INJECTION INTRAVENOUS; SUBCUTANEOUS at 05:10

## 2023-10-27 NOTE — ASSESSMENT & PLAN NOTE
Pt is s/p left mass excision.    - SBP <140  - MAP >65  - Start Losartan  - Wean off Cardene  - Neurosurgery recs  - On 10/26, neurosurg placed additional staples and dressing for bleeding control  - Keppra  - Heparin  - Dexamethasone  - Postop abx  - Pain management

## 2023-10-27 NOTE — HOSPITAL COURSE
Patient had craniotomy with left mass excision and came to [facility] on 10/26.  He had a mild constant leak of blood from surgical site which improved after neurosurgery team placed additional staples and dressing.  On 10/27, glucose has been in 200-300s.  Pain has been better controlled.  10/28/23: d/c insulin drip

## 2023-10-27 NOTE — SUBJECTIVE & OBJECTIVE
"Past Medical History:   Diagnosis Date    Alcoholic cirrhosis     CVA (cerebral vascular accident)     DM (diabetes mellitus)     Dyslipidemia     Hepatocellular carcinoma     HTN (hypertension)     Intracranial hemorrhage     Skin cancer        Past Surgical History:   Procedure Laterality Date    CRANIOTOMY, WITH NEOPLASM EXCISION USING COMPUTER-ASSISTED NAVIGATION Left 10/26/2023    Procedure: CRANIOTOMY, WITH NEOPLASM EXCISION USING COMPUTER-ASSISTED NAVIGATION;  Surgeon: Jose E Degroot DO;  [place] [facility] [place] [address] [place] [address];  Service: Neurosurgery;  Laterality: Left;    HERNIA REPAIR N/A     LIVER TRANSPLANT N/A 8/9/2023    Procedure: TRANSPLANT, LIVER;  Surgeon: Ameya Suero MD;  [place] [facility] [place] [address] [place] [address];  Service: Transplant;  Laterality: N/A;       Review of patient's allergies indicates:  No Known Allergies    Family History    None       Tobacco Use    Smoking status: Never    Smokeless tobacco: Never   Substance and Sexual Activity    Alcohol use: Not Currently    Drug use: Never    Sexual activity: Not Currently     Partners: Female       PTA Medications   Medication Sig    aspirin 81 MG Chew Take 1 tablet (81 mg total) by mouth once daily.    clopidogreL (PLAVIX) 75 mg tablet Take 1 tablet (75 mg total) by mouth once daily.    famotidine (PEPCID) 20 MG tablet Take 1 tablet (20 mg total) by mouth every evening.    insulin glargine 100 units/mL SubQ pen Inject 20 Units into the skin once daily.    insulin lispro (HUMALOG KWIKPEN INSULIN) 100 unit/mL pen Inject 8 Units into the skin 3 (three) times daily before meals AND 1-5 Units 3 (three) times daily before meals. sliding scale insulin as needed. Max daily dose 75 units daily...    mycophenolate (CELLCEPT) 250 mg Cap Take 2 capsules (500 mg total) by mouth 2 (two) times daily.    pen needle, diabetic 32 gauge x 5/32" Ndle Use to inject insulin into the skin 4 (four) times daily.    sulfamethoxazole-trimethoprim 400-80mg (BACTRIM,SEPTRA) 400-80 mg per tablet " Take 1 tablet by mouth once daily. Stop 2/6/24    tacrolimus (PROGRAF) 1 MG Cap Take 3 capsules (3 mg total) by mouth every morning AND 2 capsules (2 mg total) every evening.    tirzepatide (MOUNJARO) 2.5 mg/0.5 mL PnIj Inject 2.5 mg into the skin every 7 days. (Patient taking differently: Inject 2.5 mg into the skin every 7 days. WEDNESDAYS)    valGANciclovir (VALCYTE) 450 mg Tab Take 1 tablet (450 mg total) by mouth once daily. Stop 2/6/24    blood sugar diagnostic Strp Test blood glucose 3 (three) times daily.    blood-glucose meter Misc Test blood glucose as directed    blood-glucose sensor (Good.CoSTYLE PUNEET 3 SENSOR) Neelam To change every 14 days    lancets Misc Test blood glucose 3 (three) times daily.       Review of Systems   Constitutional:  Negative for activity change and appetite change.   Eyes:  Negative for visual disturbance.   Respiratory:  Negative for cough and shortness of breath.    Cardiovascular:  Negative for chest pain, palpitations and leg swelling.   Gastrointestinal:  Negative for abdominal distention, abdominal pain, constipation, diarrhea, nausea and vomiting.   Genitourinary:  Negative for difficulty urinating.   Musculoskeletal:  Negative for arthralgias.   Skin:  Negative for wound.   Allergic/Immunologic: Positive for immunocompromised state.   Neurological:  Negative for dizziness.   Hematological:  Negative for adenopathy. Does not bruise/bleed easily.   Psychiatric/Behavioral:  Negative for agitation.      Objective:     Vital Signs (Most Recent):  Temp: 97.7 °F (36.5 °C) (10/27/23 1101)  Pulse: 67 (10/27/23 1101)  Resp: 18 (10/27/23 1101)  BP: 118/65 (10/27/23 1101)  SpO2: (!) 93 % (10/27/23 1101) Vital Signs (24h Range):  Temp:  [97.7 °F (36.5 °C)-98.8 °F (37.1 °C)] 97.7 °F (36.5 °C)  Pulse:  [59-87] 67  Resp:  [12-28] 18  SpO2:  [91 %-97 %] 93 %  BP: (106-148)/(57-78) 118/65  Arterial Line BP: (136)/(57-60) 136/60     Weight: 93.2 kg (205 lb 7.5 oz)  Body mass index is 28.66  kg/m².      Intake/Output Summary (Last 24 hours) at 10/27/2023 1123  Last data filed at 10/27/2023 1101  Gross per 24 hour   Intake 5325.99 ml   Output 2825 ml   Net 2500.99 ml        Physical Exam  Vitals and nursing note reviewed.   Constitutional:       Appearance: He is well-developed.   HENT:      Head: Normocephalic.   Eyes:      Conjunctiva/sclera: Conjunctivae normal.   Cardiovascular:      Rate and Rhythm: Normal rate and regular rhythm.      Heart sounds: No murmur heard.  Pulmonary:      Effort: Pulmonary effort is normal.      Breath sounds: Normal breath sounds.   Abdominal:      General: Bowel sounds are normal. There is no distension.      Palpations: Abdomen is soft.      Tenderness: There is no abdominal tenderness.   Musculoskeletal:         General: Normal range of motion.      Cervical back: Normal range of motion.   Skin:     General: Skin is warm and dry.      Capillary Refill: Capillary refill takes less than 2 seconds.   Neurological:      Mental Status: He is alert and oriented to person, place, and time.   Psychiatric:         Mood and Affect: Mood normal.         Behavior: Behavior normal.         Thought Content: Thought content normal.         Judgment: Judgment normal.          Laboratory:  CBC:   Recent Labs   Lab 10/27/23  0039   WBC 7.85   RBC 4.51*   HGB 12.3*   HCT 37.5*   *   MCV 83   MCH 27.3   MCHC 32.8       CMP:   Recent Labs   Lab 10/27/23  0039   *   CALCIUM 8.6*   ALBUMIN 3.6   PROT 6.4   *   K 4.1   CO2 21*      BUN 17   CREATININE 0.8   ALKPHOS 87   ALT 20   AST 15   BILITOT 0.9         Diagnostic Results:  US Liver Transplant Post:  Results for orders placed during the hospital encounter of 09/25/23    US Doppler Liver Transplant Post (xpd)    Narrative  EXAMINATION:  US DOPPLER LIVER TRANSPLANT POST (XPD)    CLINICAL HISTORY:  Liver transplant status    TECHNIQUE:  Limited abdominal ultrasound of the transplant liver with Doppler evaluation.   Color and spectral Doppler were performed.    COMPARISON:  Ultrasound 09/22/2023    FINDINGS:  Patient is status post orthotopic liver transplant.  The liver demonstrates homogeneous echotexture.  No focal hepatic lesions are seen.    Two similar fluid collections adjacent to the right lobe measuring 1.3 x 2.5 x 2.9 cm and 1.1 x 1.0 x 0.9 cm.    The common duct is dilated measuring 10 mm with persistent thickened walls, similar to prior exam.  No dilated intrahepatic radicles are seen.    Color flow and spectral waveform analysis was performed.  The main portal vein, right portal vein, left portal vein, middle hepatic vein, right hepatic vein, left hepatic vein, and IVC are patent with proper directional flow.    Anastomosis site of the main hepatic artery demonstrates a peak systolic velocity 795 cm/sec (previously 204 cm/sec).    Main hepatic artery demonstrates resistive index 0.34 with tardus parvus waveform.    Left hepatic artery shows resistive index 0.45 with delayed systolic upstroke    Anterior branch of the right hepatic artery demonstrates resistive index 0.37 with delayed systolic upstroke.    Posterior branch of the right hepatic artery demonstrates resistive index 0.35 with tardus parvus waveform    Right pleural effusion.    Impression  Interval markedly elevated peak systolic velocity of the extrahepatic main hepatic artery.  Diffusely low intraparenchymal arterial resistive indices with abnormal tardus parvus waveforms.  Findings are concerning for hepatic artery stenosis.    Stable dilated common bile duct with persistent thickened walls.    Similar peritransplant fluid collections.    Findings were relayed by me to Dr. Suero on 09/25/2023 at 13:55.    This report was flagged in Epic as abnormal.      Electronically signed by: Raj Kang  Date:    09/25/2023  Time:    14:00

## 2023-10-27 NOTE — PLAN OF CARE
OT eval completed, POC established  Problem: Occupational Therapy  Goal: Occupational Therapy Goal  Description: Goals to be met by: 11/27/23     Patient will increase functional independence with ADLs by performing:    UE Dressing with Supervision.  LE Dressing with Supervision.  Grooming while standing at sink with Supervision.  Toileting from toilet with Supervision for hygiene and clothing management.   Supine to sit with [place].  Step transfer with Supervision  Toilet transfer to toilet with Supervision.    Outcome: Ongoing, Progressing

## 2023-10-27 NOTE — PROGRESS NOTES
"[facility]  Neurosurgery  Progress Note    Subjective:     History of Present Illness: Mr Kyler is a 63M w/ hx IDDM, HTN, L basal ganglia ICH (in 2014, no residual deficits), HCC/EtOH cirrhosis (s/p liver transplant on 8/10/23 complicated by hepatic art stenosis s/p angioplasty/stent on 10/2/23), non-melanoma skin cancer who presents as transfer from OSH for L frontal mass after 2 weeks of confusion. Interview conducted with patient as well as his wife at bedside. Patient unable to give good description of the confusion he has been experiencing - per his wife he has been "in a fog" for the last 2 weeks, often saying "ok" inappropriately in response to questions and exhibiting strange behaviors such as turning the lights on/off. She says his personality is different and he is less excited about things than usual. On 10/23, he had an episode where he had worsened confusion and agitation while out at a store, he later did not recall going out. The patient and his wife deny any weakness, numbness, speech difficulty, vision changes, or seizure activity.     He has a recent liver transplant and hepatic artery stent, was on ASA already and started plavix after recent stent placement. Last dose 10/24 AM, which patient was able to recall taking. Platelets 86 on admit.     CT and MRI w/wo at OSH show a 3 cm L frontal pole enhancing extraaxial mass with some adjacent intraaxial enhancement, as well as considerable surrounding edema with mass effect and midline shift.       Post-Op Info:  Procedure(s) (LRB):  CRANIOTOMY, WITH NEOPLASM EXCISION USING COMPUTER-ASSISTED NAVIGATION (Left)   1 Day Post-Op     Interval History: 10/27/2023: POD 1 s/p R crani for meningioma. NAEON. AFVSS. Exam stable. Pain controlled. Tolerating PO. Marroquin in place.    Medications:  Continuous Infusions:   sodium chloride 0.9% 100 mL/hr at 10/27/23 0605    nicardipine 12.5 mg/hr (10/27/23 0605)     Scheduled Meds:   acetaminophen "  1,000 mg Oral TID    carvediloL  6.25 mg Oral BID WM    ceFAZolin (ANCEF) IVPB  2 g Intravenous Q8H    dexAMETHasone  4 mg Oral Q8H    docusate sodium  100 mg Oral BID    famotidine  20 mg Oral BID    heparin (porcine)  5,000 Units Subcutaneous Q8H    insulin aspart U-100  15 Units Subcutaneous TIDWM    insulin detemir U-100  9 Units Subcutaneous BID    levETIRAcetam  500 mg Oral Q12H    mupirocin   Nasal BID    sulfamethoxazole-trimethoprim 400-80mg  1 tablet Oral Daily    tacrolimus  2 mg Oral BID    valGANciclovir  450 mg Oral Daily     PRN Meds:0.9%  NaCl infusion (for blood administration), 0.9%  NaCl infusion (for blood administration), dextrose 10%, dextrose 10%, dextrose 10%, glucagon (human recombinant), glucagon (human recombinant), glucagon (human recombinant), glucose, glucose, glucose, glucose, glucose, hydrALAZINE, insulin aspart U-100, morphine, oxyCODONE     Review of Systems  Objective:     Weight: 93.2 kg (205 lb 7.5 oz)  Body mass index is 28.66 kg/m².  Vital Signs (Most Recent):  Temp: 98.8 °F (37.1 °C) (10/27/23 0305)  Pulse: 72 (10/27/23 0605)  Resp: 18 (10/27/23 0648)  BP: 123/65 (10/27/23 0605)  SpO2: 95 % (10/27/23 0605) Vital Signs (24h Range):  Temp:  [97.6 °F (36.4 °C)-98.8 °F (37.1 °C)] 98.8 °F (37.1 °C)  Pulse:  [59-87] 72  Resp:  [12-28] 18  SpO2:  [91 %-100 %] 95 %  BP: (106-149)/(57-85) 123/65  Arterial Line BP: (136-145)/(57-63) 136/60                              Closed/Suction Drain 10/26/23 0942 Left Scalp Accordion 10 Fr. (Active)   Site Description Unable to view 10/27/23 0505   Dressing Type Other (Comment) 10/27/23 0505   Dressing Status Clean;Dry;Intact 10/27/23 0505   Dressing Intervention Integrity maintained 10/27/23 0505   Drainage None 10/27/23 0505   Status Other (Comment) 10/27/23 0505   Output (mL) 60 mL 10/27/23 0605            Urethral Catheter 10/26/23 0725 Non-latex 16 Fr. (Active)   Site Assessment Clean;Intact 10/27/23 0505   Collection Container  "Urimeter 10/27/23 0505   Securement Method secured to top of thigh w/ adhesive device 10/27/23 0505   Catheter Care Performed yes 10/27/23 0505   Reason for Continuing Urinary Catheterization Critically ill in ICU and requiring hourly monitoring of intake/output 10/27/23 0505   CAUTI Prevention Bundle Securement Device in place with 1" slack;Intact seal between catheter & drainage tubing;Drainage bag/urimeter off the floor;Sheeting clip in use;No dependent loops or kinks;Drainage bag/urimeter not overfilled (<2/3 full);Drainage bag/urimeter below bladder 10/26/23 1905   Output (mL) 550 mL 10/27/23 0605          Physical Exam         Neurosurgery Physical Exam    GENERAL: resting comfortably  HEENT: NCAT, PERRL, mucous membranes moist  NECK: supple, trachea midline  CV: normal capillary refill  PULM: aerating well, symmetric expansion, no distress  ABD: soft, NT, ND  EXT: no c/c/e    NEURO:    AAO x 3  CN II-XII grossly intact  Fc x 4 antigravity  SILT    No drift or dysmetria    Cranial dressing with moderate amount of blood staining but no active drainage    Significant Labs:  Recent Labs   Lab 10/26/23  0317 10/27/23  0039   * 401*    132*   K 4.1 4.1    100   CO2 17* 21*   BUN 15 17   CREATININE 0.8 0.8   CALCIUM 8.8 8.6*   MG 1.7 1.8     Recent Labs   Lab 10/26/23  0317 10/27/23  0039   WBC 5.44 7.85   HGB 12.5* 12.3*   HCT 38.8* 37.5*   * 121*     No results for input(s): "LABPT", "INR", "APTT" in the last 48 hours.  Microbiology Results (last 7 days)       ** No results found for the last 168 hours. **          All pertinent labs from the last 24 hours have been reviewed.    Significant Diagnostics:  I have reviewed all pertinent imaging results/findings within the past 24 hours.  MRI BRAIN W WO CONTRAST    Result Date: 10/27/2023  Postsurgical change recent left frontal extra-axial mass resection as above, without evidence of residual enhancing lesion. Persistent large region of " vasogenic edema throughout the left frontal lobe, but notably improved parenchymal enhancement subjacent to the surgical site. Electronically signed by: Marquez Vera MD Date:    10/27/2023 Time:    06:10    CT Head Without Contrast    Result Date: 10/26/2023  Postsurgical change recent left frontal extra-axial mass resection as further discussed apparent above, without apparent intracranial complication. Electronically signed by resident: Jed Watkins Date:    10/26/2023 Time:    11:45 Electronically signed by: Marquez Vera MD Date:    10/26/2023 Time:    13:05     Assessment/Plan:     * Left frontal lobe mass  Mr Chávez is a 63M w/ hx IDDM, HTN, L basal ganglia ICH (in 2014, no residual deficits), HCC/EtOH cirrhosis (s/p liver transplant on 8/10/23 complicated by hepatic art stenosis s/p angioplasty/stent on 10/2/23), non-melanoma skin cancer who presents as transfer from OSH for L frontal mass after 2 weeks of confusion. Last dose ASA, plavix 10/24 AM. Platelets 86 on admit. Neuro intact on initial exam other than higher order confusion.      CT and MRI w/wo at OSH showed a 3 cm L frontal pole enhancing extraaxial mass with some adjacent intraaxial enhancement, as well as considerable surrounding edema with resultant mass effect and midline shift. Ddx includes metastasis vs meningioma, although brain mets from HCC are uncommon and cancer had not previously metastasized as far as pt and family were aware.    --Admitted NCC with q1h neurochecks.  --All labs and diagnostics reviewed.  --Transplant service consulted for comanagement of transplant meds  --Endocrine consulted to assist with insulin management.  --Needs  consult for medical complexity  - Dex 4 q6h for cerebral edema.  - Hold ASA/Plavix and all other AC/AP.  - DVT ppx: SCDs/fabiola hose.    Dispo: TTF to [facility]    Discussed with staff Dr. Evelia Hope MD  Neurosurgery  [facility] - Neuro Critical Care

## 2023-10-27 NOTE — PROGRESS NOTES
"[facility]  Liver Transplant  Progress Note    Patient Name: Jed Chávez  MRN: [id number]  Admission Date: 10/24/2023  Hospital Length of Stay: 3 days  Code Status: Full Code  Primary Care Provider: Alee Pink MD  Post-Operative Day: 78    ORGAN:   LIVER  Disease Etiology: Primary Liver Malignancy: Hepatoma (HCC) and Cirrhosis  Donor Type:   Donation after Circulatory Death   CDC High Risk:   No  Donor CMV Status:   Donor CMV Status: Positive  Donor HBcAB:   Negative  Donor HCV Status:   Negative  Donor HBV SURENDRA:   Donor HCV SURENDRA: Negative  Whole or Partial: Whole Liver  Biliary Anastomosis: End to End  Arterial Anatomy: Standard  Subjective:     History of Present Illness:   Mr Chávez is a 63M w/ hx IDDM, HTN, L basal ganglia ICH (in 2014, no residual deficits), HCC/EtOH cirrhosis (s/p liver transplant on 8/10/23 complicated by hepatic art stenosis s/p angioplasty/stent on 10/2/23), non-melanoma skin cancer who presents as transfer from OSH for L frontal mass after 2 weeks of confusion. Interview conducted with patient as well as his wife at bedside. Patient unable to give good description of the confusion he has been experiencing - per his wife he has been "in a fog" for the last 2 weeks, often saying "ok" inappropriately in response to questions and exhibiting strange behaviors such as turning the lights on/off. She says his personality is different and he is less excited about things than usual. On 10/23, he had an episode where he had worsened confusion and agitation while out at a store, he later did not recall going out. The patient and his wife deny any weakness, numbness, speech difficulty, vision changes, or seizure activity.      He has a recent liver transplant and hepatic artery stent, was on ASA already and started plavix after recent stent placement. Last dose 10/24 AM, which patient was able to recall taking. Platelets 86 on admit.      CT and MRI w/wo at OSH show a " 3 cm L frontal pole enhancing extraaxial mass with some adjacent intraaxial enhancement, as well as considerable surrounding edema with mass effect and midline shift.       Hospital Course:  Patient is s/p Left parasagittal extra-axial tumor and cerebral edema, prelim meningioma. Pt is currently in NICU and transplant managing IS and LFTs    Interval history: No acute events overnight. Pt reports feeling better since surgery. He is AOx4. Wife at bedside reports he still gets some things confused but markedly better. LFTs remain wnl. Prograf level  for left frontal lobe mass, being followed by Neuro surgery. All labs reviewed. LFTs/tbili stable. Tacro level 5.2, cont Prograf 2mg bid. Cellcept on hold, can cont to hold while recovering from surgery. Original plan was to d/c after 3 months. Will continue to monitor. ASA and Plavix on hold, will default to Neurosurgery when ok to restart. Pt has bare mental stent in hepatic artery. Pt w elevated blood glucose while on Dexamethasone. Endo consulted. Pt has lesion below chevron. Per wife it has been there a few weeks. Wd care is following. Monitor.      Past Medical History:   Diagnosis Date    Alcoholic cirrhosis     CVA (cerebral vascular accident)     DM (diabetes mellitus)     Dyslipidemia     Hepatocellular carcinoma     HTN (hypertension)     Intracranial hemorrhage     Skin cancer        Past Surgical History:   Procedure Laterality Date    CRANIOTOMY, WITH NEOPLASM EXCISION USING COMPUTER-ASSISTED NAVIGATION Left 10/26/2023    Procedure: CRANIOTOMY, WITH NEOPLASM EXCISION USING COMPUTER-ASSISTED NAVIGATION;  Surgeon: Jose E Degroot DO;  Location: [facility] OR [address] [place] [address];  Service: Neurosurgery;  Laterality: Left;    HERNIA REPAIR N/A     LIVER TRANSPLANT N/A 8/9/2023    Procedure: TRANSPLANT, LIVER;  Surgeon: Ameya Suero MD;  Location: [facility] OR [address] [place] [address];  Service: Transplant;  Laterality: N/A;       Review of patient's allergies indicates:  No Known  "Allergies    Family History    None       Tobacco Use    Smoking status: Never    Smokeless tobacco: Never   Substance and Sexual Activity    Alcohol use: Not Currently    Drug use: Never    Sexual activity: Not Currently     Partners: Female       PTA Medications   Medication Sig    aspirin 81 MG Chew Take 1 tablet (81 mg total) by mouth once daily.    clopidogreL (PLAVIX) 75 mg tablet Take 1 tablet (75 mg total) by mouth once daily.    famotidine (PEPCID) 20 MG tablet Take 1 tablet (20 mg total) by mouth every evening.    insulin glargine 100 units/mL SubQ pen Inject 20 Units into the skin once daily.    insulin lispro (HUMALOG KWIKPEN INSULIN) 100 unit/mL pen Inject 8 Units into the skin 3 (three) times daily before meals AND 1-5 Units 3 (three) times daily before meals. sliding scale insulin as needed. Max daily dose 75 units daily...    mycophenolate (CELLCEPT) 250 mg Cap Take 2 capsules (500 mg total) by mouth 2 (two) times daily.    pen needle, diabetic 32 gauge x 5/32" Ndle Use to inject insulin into the skin 4 (four) times daily.    sulfamethoxazole-trimethoprim 400-80mg (BACTRIM,SEPTRA) 400-80 mg per tablet Take 1 tablet by mouth once daily. Stop 2/6/24    tacrolimus (PROGRAF) 1 MG Cap Take 3 capsules (3 mg total) by mouth every morning AND 2 capsules (2 mg total) every evening.    tirzepatide (MOUNJARO) 2.5 mg/0.5 mL PnIj Inject 2.5 mg into the skin every 7 days. (Patient taking differently: Inject 2.5 mg into the skin every 7 days. WEDNESDAYS)    valGANciclovir (VALCYTE) 450 mg Tab Take 1 tablet (450 mg total) by mouth once daily. Stop 2/6/24    blood sugar diagnostic Strp Test blood glucose 3 (three) times daily.    blood-glucose meter Misc Test blood glucose as directed    blood-glucose sensor (FREESTYLE PUNEET 3 SENSOR) Neelam To change every 14 days    lancets Misc Test blood glucose 3 (three) times daily.       Review of Systems   Constitutional:  Negative for activity change and " appetite change.   Eyes:  Negative for visual disturbance.   Respiratory:  Negative for cough and shortness of breath.    Cardiovascular:  Negative for chest pain, palpitations and leg swelling.   Gastrointestinal:  Negative for abdominal distention, abdominal pain, constipation, diarrhea, nausea and vomiting.   Genitourinary:  Negative for difficulty urinating.   Musculoskeletal:  Negative for arthralgias.   Skin:  Negative for wound.   Allergic/Immunologic: Positive for immunocompromised state.   Neurological:  Negative for dizziness.   Hematological:  Negative for adenopathy. Does not bruise/bleed easily.   Psychiatric/Behavioral:  Negative for agitation.      Objective:     Vital Signs (Most Recent):  Temp: 97.7 °F (36.5 °C) (10/27/23 1101)  Pulse: 67 (10/27/23 1101)  Resp: 18 (10/27/23 1101)  BP: 118/65 (10/27/23 1101)  SpO2: (!) 93 % (10/27/23 1101) Vital Signs (24h Range):  Temp:  [97.7 °F (36.5 °C)-98.8 °F (37.1 °C)] 97.7 °F (36.5 °C)  Pulse:  [59-87] 67  Resp:  [12-28] 18  SpO2:  [91 %-97 %] 93 %  BP: (106-148)/(57-78) 118/65  Arterial Line BP: (136)/(57-60) 136/60     Weight: 93.2 kg (205 lb 7.5 oz)  Body mass index is 28.66 kg/m².      Intake/Output Summary (Last 24 hours) at 10/27/2023 1123  Last data filed at 10/27/2023 1101  Gross per 24 hour   Intake 5325.99 ml   Output 2825 ml   Net 2500.99 ml       Physical Exam  Vitals and nursing note reviewed.   Constitutional:       Appearance: He is well-developed.   HENT:      Head: Normocephalic.   Eyes:      Conjunctiva/sclera: Conjunctivae normal.   Cardiovascular:      Rate and Rhythm: Normal rate and regular rhythm.      Heart sounds: No murmur heard.  Pulmonary:      Effort: Pulmonary effort is normal.      Breath sounds: Normal breath sounds.   Abdominal:      General: Bowel sounds are normal. There is no distension.      Palpations: Abdomen is soft.      Tenderness: There is no abdominal tenderness.   Musculoskeletal:         General: Normal range of  motion.      Cervical back: Normal range of motion.   Skin:     General: Skin is warm and dry.      Capillary Refill: Capillary refill takes less than 2 seconds.   Neurological:      Mental Status: He is alert and oriented to person, place, and time.   Psychiatric:         Mood and Affect: Mood normal.         Behavior: Behavior normal.         Thought Content: Thought content normal.         Judgment: Judgment normal.          Laboratory:  CBC:   Recent Labs   Lab 10/27/23  0039   WBC 7.85   RBC 4.51*   HGB 12.3*   HCT 37.5*   *   MCV 83   MCH 27.3   MCHC 32.8       CMP:   Recent Labs   Lab 10/27/23  0039   *   CALCIUM 8.6*   ALBUMIN 3.6   PROT 6.4   *   K 4.1   CO2 21*      BUN 17   CREATININE 0.8   ALKPHOS 87   ALT 20   AST 15   BILITOT 0.9         Diagnostic Results:  US Liver Transplant Post:  Results for orders placed during the hospital encounter of 09/25/23    US Doppler Liver Transplant Post (xpd)    Narrative  EXAMINATION:  US DOPPLER LIVER TRANSPLANT POST (XPD)    CLINICAL HISTORY:  Liver transplant status    TECHNIQUE:  Limited abdominal ultrasound of the transplant liver with Doppler evaluation.  Color and spectral Doppler were performed.    COMPARISON:  Ultrasound 09/22/2023    FINDINGS:  Patient is status post orthotopic liver transplant.  The liver demonstrates homogeneous echotexture.  No focal hepatic lesions are seen.    Two similar fluid collections adjacent to the right lobe measuring 1.3 x 2.5 x 2.9 cm and 1.1 x 1.0 x 0.9 cm.    The common duct is dilated measuring 10 mm with persistent thickened walls, similar to prior exam.  No dilated intrahepatic radicles are seen.    Color flow and spectral waveform analysis was performed.  The main portal vein, right portal vein, left portal vein, middle hepatic vein, right hepatic vein, left hepatic vein, and IVC are patent with proper directional flow.    Anastomosis site of the main hepatic artery demonstrates a peak systolic  velocity 795 cm/sec (previously 204 cm/sec).    Main hepatic artery demonstrates resistive index 0.34 with tardus parvus waveform.    Left hepatic artery shows resistive index 0.45 with delayed systolic upstroke    Anterior branch of the right hepatic artery demonstrates resistive index 0.37 with delayed systolic upstroke.    Posterior branch of the right hepatic artery demonstrates resistive index 0.35 with tardus parvus waveform    Right pleural effusion.    Impression  Interval markedly elevated peak systolic velocity of the extrahepatic main hepatic artery.  Diffusely low intraparenchymal arterial resistive indices with abnormal tardus parvus waveforms.  Findings are concerning for hepatic artery stenosis.    Stable dilated common bile duct with persistent thickened walls.    Similar peritransplant fluid collections.    Findings were relayed by me to Dr. Suero on 09/25/2023 at 13:55.    This report was flagged in Epic as abnormal.      Electronically signed by: Raj Kang  Date:    09/25/2023  Time:    14:00    Assessment/Plan:     Brain tumor  - s/p left parasagittal extra-axial tumor  - followed by NICU      Anemia of chronic disease        Prophylactic immunotherapy  - see Long term immuno      Long-term use of immunosuppressant medication  - Chronic Prophylactic Immunosuppression- cont to check prograf level daily.  Assess for toxicity and adjust level as needed  - cont to hold Cellcept      At risk for opportunistic infections  - cont OI prophylaxis      S/P liver transplant  - s/p liver transplant  - LFTs wnl      Adrenal cortical steroids causing adverse effect in therapeutic use        Type 2 diabetes mellitus with other specified complication, with chronic insulin use  - endo consulted and following          VTE Risk Mitigation (From admission, onward)         Ordered     heparin (porcine) injection 5,000 Units  Every 8 hours         10/26/23 0959     IP VTE HIGH RISK PATIENT  Once         10/26/23  0959     Place sequential compression device  Until discontinued         10/26/23 0959     Place sequential compression device  Until discontinued         10/24/23 2208                The patients clinical status was discussed at multidisplinary rounds, involving transplant surgery, transplant medicine, pharmacy, nursing, nutrition, and social work.    Discharge Planning: Discussed plan of care.  No plan for discharge today.    PharmD Review: BG still elevated, did patient ever send BG logs to endo?  (MM9/5)      Medical decision making for this encounter includes review of pertinent labs and diagnostic studies, assessment and planning, discussions with consulting providers, discussion with patient/family, and participation in multidisciplinary rounds. Time spent caring for patient: 30 minutes.    Shadia Magallanes, NP  Liver Transplant  [facility]

## 2023-10-27 NOTE — HPI
" Mr Chávez is a 63M w/ hx IDDM, HTN, L basal ganglia ICH (in 2014, no residual deficits), HCC/EtOH cirrhosis (s/p liver transplant on 8/10/23 complicated by hepatic art stenosis s/p angioplasty/stent on 10/2/23), non-melanoma skin cancer who presents as transfer from OSH for L frontal mass after 2 weeks of confusion. Interview conducted with patient as well as his wife at bedside. Patient unable to give good description of the confusion he has been experiencing - per his wife he has been "in a fog" for the last 2 weeks, often saying "ok" inappropriately in response to questions and exhibiting strange behaviors such as turning the lights on/off. She says his personality is different and he is less excited about things than usual. On 10/23, he had an episode where he had worsened confusion and agitation while out at a store, he later did not recall going out. The patient and his wife deny any weakness, numbness, speech difficulty, vision changes, or seizure activity.      He has a recent liver transplant and hepatic artery stent, was on ASA already and started plavix after recent stent placement. Last dose 10/24 AM, which patient was able to recall taking. Platelets 86 on admit.      CT and MRI w/wo at OSH show a 3 cm L frontal pole enhancing extraaxial mass with some adjacent intraaxial enhancement, as well as considerable surrounding edema with mass effect and midline shift.   "

## 2023-10-27 NOTE — ASSESSMENT & PLAN NOTE
- Chronic Prophylactic Immunosuppression- cont to check prograf level daily.  Assess for toxicity and adjust level as needed  - cont to hold Cellcept

## 2023-10-27 NOTE — PLAN OF CARE
[facility] Care Plan    POC reviewed with Jed MEDELLIN Kyler and family at 0300. Pt verbalized understanding. Questions and concerns addressed. No acute events overnight. Pt progressing toward goals. Will continue to monitor. See below and flowsheets for full assessment and VS info.     -Cardene started and titrated   -CT scan complete         Is this a stroke patient? no    Neuro:  [place] Coma Scale  Best Eye Response: 4-->(E4) spontaneous  Best Motor Response: 6-->(M6) obeys commands  Best Verbal Response: 5-->(V5) oriented  [place] Coma Scale Score: 15  Assessment Qualifiers: patient not sedated/intubated  Pupil PERRLA: yes     24hr Temp:  [97.5 °F (36.4 °C)-98.8 °F (37.1 °C)]     CV:   Rhythm: normal sinus rhythm  BP goals:   SBP < 140  MAP > 65    Resp:           Plan: N/A    GI/:     Diet/Nutrition Received: regular  Last Bowel Movement: 10/24/23  Voiding Characteristics: urethral catheter (bladder)    Intake/Output Summary (Last 24 hours) at 10/27/2023 0354  Last data filed at 10/27/2023 0305  Gross per 24 hour   Intake 5186.25 ml   Output 2580 ml   Net 2606.25 ml     Unmeasured Output  Urine Occurrence: 2  Stool Occurrence: 0    Labs/Accuchecks:  Recent Labs   Lab 10/27/23  0039   WBC 7.85   RBC 4.51*   HGB 12.3*   HCT 37.5*   *      Recent Labs   Lab 10/27/23  0039   *   K 4.1   CO2 21*      BUN 17   CREATININE 0.8   ALKPHOS 87   ALT 20   AST 15   BILITOT 0.9      Recent Labs   Lab 10/24/23  2225   INR 1.1   APTT 27.6      Recent Labs   Lab 10/24/23  1503   TROPONINI 0.010       Electrolytes: No replacement orders  Accuchecks: Q6H    Gtts:   sodium chloride 0.9% 100 mL/hr at 10/27/23 0305    nicardipine 12.5 mg/hr (10/27/23 0346)       LDA/Wounds:  Lines/Drains/Airways       Drain  Duration                  Closed/Suction Drain 10/26/23 0942 Left Scalp Accordion 10 Fr. <1 day         Urethral Catheter 10/26/23 0725 Non-latex 16 Fr. <1 day              Arterial Line  Duration             Arterial  Line 10/26/23 0753 Left Radial <1 day              Peripheral Intravenous Line  Duration                  Peripheral IV - Single Lumen 10/24/23 1800 18 G 2 days         Peripheral IV - Single Lumen 10/26/23 1215 20 G Posterior;Right Hand <1 day                  Wounds: Yes  Wound care consulted: Yes

## 2023-10-27 NOTE — SUBJECTIVE & OBJECTIVE
Interval History:  Pain has been well controlled. Glucose has been ranging in 200-300's.    Review of Systems   Constitutional:  Negative for fever.   Eyes:  Negative for visual disturbance.   Respiratory:  Negative for shortness of breath.    Cardiovascular:  Negative for chest pain.   Gastrointestinal:  Negative for abdominal pain, nausea and vomiting.   Genitourinary:  Negative for difficulty urinating.   Musculoskeletal:  Negative for neck pain.   Skin:  Negative for rash.   Neurological:  Positive for headaches. Negative for speech difficulty, weakness and numbness.     Objective:     Vitals:  Temp: 97.7 °F (36.5 °C)  Pulse: 69  Rhythm: normal sinus rhythm  BP: 117/64  MAP (mmHg): 85  Resp: 17  SpO2: 95 %    Temp  Min: 97.7 °F (36.5 °C)  Max: 98.8 °F (37.1 °C)  Pulse  Min: 59  Max: 87  BP  Min: 106/57  Max: 148/65  MAP (mmHg)  Min: 76  Max: 103  Resp  Min: 12  Max: 28  SpO2  Min: 91 %  Max: 97 %    10/26 0701 - 10/27 0700  In: 5510.3 [P.O.:180; I.V.:3431.8]  Out: 3300 [Urine:3130; Drains:170]   Unmeasured Output  Urine Occurrence: 2  Stool Occurrence: 0        Physical Exam  Vitals and nursing note reviewed.   HENT:      Head: Normocephalic.      Comments: Dressing mildly stained with blood.      Right Ear: External ear normal.      Left Ear: External ear normal.      Mouth/Throat:      Mouth: Mucous membranes are moist.   Eyes:      Extraocular Movements: Extraocular movements intact.      Conjunctiva/sclera: Conjunctivae normal.      Pupils: Pupils are equal, round, and reactive to light.   Cardiovascular:      Rate and Rhythm: Normal rate and regular rhythm.      Pulses: Normal pulses.      Heart sounds: Normal heart sounds.   Pulmonary:      Effort: Pulmonary effort is normal.      Breath sounds: Normal breath sounds.   Abdominal:      General: There is no distension.      Palpations: Abdomen is soft.      Tenderness: There is no abdominal tenderness.   Musculoskeletal:         General: Normal range of  motion.   Skin:     General: Skin is warm.      Capillary Refill: Capillary refill takes less than 2 seconds.   Neurological:      General: No focal deficit present.      Mental Status: He is alert.      Sensory: No sensory deficit.      Motor: No weakness.      Comments: Light sensation intact bilaterally in UE and LE.  5/5 motor strength in UE and LE bilaterally.   Psychiatric:         Mood and Affect: Mood normal.        Medications:  Continuoussodium chloride 0.9%, Last Rate: 100 mL/hr at 10/27/23 1301  nicardipine, Last Rate: 10 mg/hr (10/27/23 1301)    Scheduledacetaminophen, 1,000 mg, TID  carvediloL, 6.25 mg, BID WM  ceFAZolin (ANCEF) IVPB, 2 g, Q8H  dexAMETHasone, 4 mg, Q8H  docusate sodium, 100 mg, BID  famotidine, 20 mg, BID  heparin (porcine), 5,000 Units, Q8H  insulin aspart U-100, 12 Units, QID (AC & HS)  insulin detemir U-100, 15 Units, BID  levETIRAcetam, 500 mg, Q12H  losartan, 25 mg, Daily  mupirocin, , BID  sulfamethoxazole-trimethoprim 400-80mg, 1 tablet, Daily  tacrolimus, 2 mg, BID  valGANciclovir, 450 mg, Daily    PRNdextrose 10%, 12.5 g, PRN  dextrose 10%, 25 g, PRN  glucagon (human recombinant), 1 mg, PRN  glucose, 16 g, PRN  glucose, 24 g, PRN  hydrALAZINE, 10 mg, Q6H PRN  insulin aspart U-100, 0-10 Units, QID (AC + HS) PRN  morphine, 2 mg, Q4H PRN  oxyCODONE, 5 mg, Q4H PRN      Today I personally reviewed pertinent medications, imaging, laboratory results, notably:    Recent Labs   Lab 10/27/23  0039   WBC 7.85   RBC 4.51*   HGB 12.3*   HCT 37.5*   *   MCV 83   MCH 27.3   MCHC 32.8      Recent Labs   Lab 10/27/23  0039   CALCIUM 8.6*   ALBUMIN 3.6   PROT 6.4   *   K 4.1   CO2 21*      BUN 17   CREATININE 0.8   ALKPHOS 87   ALT 20   AST 15   BILITOT 0.9      CT Head (10/26):    FINDINGS:  Prior operative changes from recent left frontal craniotomy for resection of reported extra-axial mass.  Small volume air and fluid at the surgical site, expected.  No large hematoma.   Persistent large region of vasogenic edema insinuating throughout the majority of the left frontal lobe.  Probable slight improvement in degree of intracranial mass effect.  Rightward midline shift now 8 mm at the anterior interhemispheric fissure (previously 12 mm).  Continued sulcal crowding and partial effacement of the left lateral ventricle.     Focal encephalomalacia from remote left basal ganglia hemorrhage.  No new parenchymal hemorrhage or major vascular distribution infarct.  No new extra-axial blood or fluid collections remote from the surgical site.  No evidence of hydrocephalus.  Craniotomy in reasonable position.  Overlying surgical drain in place.  No new displaced calvarial fracture.  Mastoid air cells and paranasal sinuses are essentially clear.     Impression:     Postsurgical change recent left frontal extra-axial mass resection as further discussed apparent above, without apparent intracranial complication.    MRI Brain (10/27):    FINDINGS:  Postsurgical change recent frontal craniotomy for resection of an extra-axial mass centered over the left frontal lobe.  Small volume expected hemorrhage in fluid at the surgical site, with some associated intrinsic T1 hyperintensity.  No residual nodular or masslike enhancement.  Faint linear cortical enhancement subjacent to the surgical cavity, but the parenchymal enhancement notably improved from the preoperative study.  There is small region diffusion restriction in the underlying parenchymal cortex spanning the junction of the superior and middle frontal gyri (sequence 6 image 47) thought to reflect an area of marginal ischemic injury.  Persistent large region of vasogenic edema insinuating throughout nearly the entirety of the left frontal lobe.  This is similar to the preoperative examination.  Mass effect slightly improved although there is persistent sulcal and ventricular crowding with approximately 0.8 cm rightward midline shift of septum  pellucidum.     No new parenchymal edema, hemorrhage or infarct remote from the surgical site.  Remote hemorrhage in the left basal ganglia.  No new enhancing extra-axial mass elsewhere.  No new extra-axial blood or fluid collections elsewhere.  Ventricles remain distorted by mass effect.  No new ventricular entrapment or obstructive hydrocephalus.  The T2 skull base flow voids are preserved.  Bone marrow signal intensity is unremarkable.     Impression:     Postsurgical change recent left frontal extra-axial mass resection as above, without evidence of residual enhancing lesion.     Persistent large region of vasogenic edema throughout the left frontal lobe, but notably improved parenchymal enhancement subjacent to the surgical site.     Diet  Diet diabetic 2000 Calorie; Standard Tray  Diet diabetic 2000 Calorie; Standard Tray

## 2023-10-27 NOTE — SUBJECTIVE & OBJECTIVE
Interval History: 10/27/2023: POD 1 s/p R crani for meningioma. NAEON. AFVSS. Exam stable. Pain controlled. Tolerating PO. Marroquin in place.    Medications:  Continuous Infusions:   sodium chloride 0.9% 100 mL/hr at 10/27/23 0605    nicardipine 12.5 mg/hr (10/27/23 0605)     Scheduled Meds:   acetaminophen  1,000 mg Oral TID    carvediloL  6.25 mg Oral BID WM    ceFAZolin (ANCEF) IVPB  2 g Intravenous Q8H    dexAMETHasone  4 mg Oral Q8H    docusate sodium  100 mg Oral BID    famotidine  20 mg Oral BID    heparin (porcine)  5,000 Units Subcutaneous Q8H    insulin aspart U-100  15 Units Subcutaneous TIDWM    insulin detemir U-100  9 Units Subcutaneous BID    levETIRAcetam  500 mg Oral Q12H    mupirocin   Nasal BID    sulfamethoxazole-trimethoprim 400-80mg  1 tablet Oral Daily    tacrolimus  2 mg Oral BID    valGANciclovir  450 mg Oral Daily     PRN Meds:0.9%  NaCl infusion (for blood administration), 0.9%  NaCl infusion (for blood administration), dextrose 10%, dextrose 10%, dextrose 10%, glucagon (human recombinant), glucagon (human recombinant), glucagon (human recombinant), glucose, glucose, glucose, glucose, glucose, hydrALAZINE, insulin aspart U-100, morphine, oxyCODONE     Review of Systems  Objective:     Weight: 93.2 kg (205 lb 7.5 oz)  Body mass index is 28.66 kg/m².  Vital Signs (Most Recent):  Temp: 98.8 °F (37.1 °C) (10/27/23 0305)  Pulse: 72 (10/27/23 0605)  Resp: 18 (10/27/23 0648)  BP: 123/65 (10/27/23 0605)  SpO2: 95 % (10/27/23 0605) Vital Signs (24h Range):  Temp:  [97.6 °F (36.4 °C)-98.8 °F (37.1 °C)] 98.8 °F (37.1 °C)  Pulse:  [59-87] 72  Resp:  [12-28] 18  SpO2:  [91 %-100 %] 95 %  BP: (106-149)/(57-85) 123/65  Arterial Line BP: (136-145)/(57-63) 136/60                              Closed/Suction Drain 10/26/23 0942 Left Scalp Accordion 10 Fr. (Active)   Site Description Unable to view 10/27/23 0505   Dressing Type Other (Comment) 10/27/23 0505   Dressing Status Clean;Dry;Intact 10/27/23 0505  "  Dressing Intervention Integrity maintained 10/27/23 0505   Drainage None 10/27/23 0505   Status Other (Comment) 10/27/23 0505   Output (mL) 60 mL 10/27/23 0605            Urethral Catheter 10/26/23 0725 Non-latex 16 Fr. (Active)   Site Assessment Clean;Intact 10/27/23 0505   Collection Container Urimeter 10/27/23 0505   Securement Method secured to top of thigh w/ adhesive device 10/27/23 0505   Catheter Care Performed yes 10/27/23 0505   Reason for Continuing Urinary Catheterization Critically ill in ICU and requiring hourly monitoring of intake/output 10/27/23 0505   CAUTI Prevention Bundle Securement Device in place with 1" slack;Intact seal between catheter & drainage tubing;Drainage bag/urimeter off the floor;Sheeting clip in use;No dependent loops or kinks;Drainage bag/urimeter not overfilled (<2/3 full);Drainage bag/urimeter below bladder 10/26/23 1905   Output (mL) 550 mL 10/27/23 0605          Physical Exam         Neurosurgery Physical Exam    GENERAL: resting comfortably  HEENT: NCAT, PERRL, mucous membranes moist  NECK: supple, trachea midline  CV: normal capillary refill  PULM: aerating well, symmetric expansion, no distress  ABD: soft, NT, ND  EXT: no c/c/e    NEURO:    AAO x 3  CN II-XII grossly intact  Fc x 4 antigravity  SILT    No drift or dysmetria    Cranial dressing with moderate amount of blood staining but no active drainage    Significant Labs:  Recent Labs   Lab 10/26/23  0317 10/27/23  0039   * 401*    132*   K 4.1 4.1    100   CO2 17* 21*   BUN 15 17   CREATININE 0.8 0.8   CALCIUM 8.8 8.6*   MG 1.7 1.8     Recent Labs   Lab 10/26/23  0317 10/27/23  0039   WBC 5.44 7.85   HGB 12.5* 12.3*   HCT 38.8* 37.5*   * 121*     No results for input(s): "LABPT", "INR", "APTT" in the last 48 hours.  Microbiology Results (last 7 days)       ** No results found for the last 168 hours. **          All pertinent labs from the last 24 hours have been reviewed.    Significant " Diagnostics:  I have reviewed all pertinent imaging results/findings within the past 24 hours.  MRI BRAIN W WO CONTRAST    Result Date: 10/27/2023  Postsurgical change recent left frontal extra-axial mass resection as above, without evidence of residual enhancing lesion. Persistent large region of vasogenic edema throughout the left frontal lobe, but notably improved parenchymal enhancement subjacent to the surgical site. Electronically signed by: Marquez Vera MD Date:    10/27/2023 Time:    06:10    CT Head Without Contrast    Result Date: 10/26/2023  Postsurgical change recent left frontal extra-axial mass resection as further discussed apparent above, without apparent intracranial complication. Electronically signed by resident: Jed Watkins Date:    10/26/2023 Time:    11:45 Electronically signed by: Marquez Vera MD Date:    10/26/2023 Time:    13:05

## 2023-10-27 NOTE — PLAN OF CARE
[facility]  Discharge Assessment    Primary Care Provider: Alee Pink MD     No assessment needed.   Patient is being followed by the Transplant Case Manager and .      Discharge Assessment (most recent)       BRIEF DISCHARGE ASSESSMENT - 10/27/23 1224          Discharge Planning    Assessment Type Discharge Planning Reassessment     Resource/Environmental Concerns none     Support Systems Spouse/significant other                   Mariah Rios RN, CCRN-K, [facility]  Neuro-Critical Care   [phone]

## 2023-10-27 NOTE — PLAN OF CARE
POC reviewed with Jed Chávez and family at 1400. Pt verbalized understanding. Questions and concerns addressed. No acute events today. Pt progressing toward goals. Will continue to monitor. See below and flowsheets for full assessment and VS info.     - New PO BP med added.   - Weaning pt off cardene. Pt off cardene since 14:07.   - Adjusted insulin orders.   - Diabetic diet ordered.   - SLP and OT worked with patient today. Pt up in chair for a few hours this afternoon.     Neuro:  [place] Coma Scale  Best Eye Response: 4-->(E4) spontaneous  Best Motor Response: 6-->(M6) obeys commands  Best Verbal Response: 5-->(V5) oriented  [place] Coma Scale Score: 15  Assessment Qualifiers: patient not sedated/intubated, no eye obstruction present  Pupil PERRLA: yes     24 hr Temp:  [97.7 °F (36.5 °C)-98.8 °F (37.1 °C)]     CV:   Rhythm: normal sinus rhythm  BP goals:   SBP < 140  MAP > 65    Resp:           Plan: N/A    GI/:     Diet/Nutrition Received: consistent carb/diabetic diet  Last Bowel Movement: 10/24/23  Voiding Characteristics: urethral catheter (bladder)    Intake/Output Summary (Last 24 hours) at 10/27/2023 1648  Last data filed at 10/27/2023 1501  Gross per 24 hour   Intake 4266.17 ml   Output 2605 ml   Net 1661.17 ml     Unmeasured Output  Urine Occurrence: 2  Stool Occurrence: 0    Labs/Accuchecks:  Recent Labs   Lab 10/27/23  0039   WBC 7.85   RBC 4.51*   HGB 12.3*   HCT 37.5*   *      Recent Labs   Lab 10/27/23  0039   *   K 4.1   CO2 21*      BUN 17   CREATININE 0.8   ALKPHOS 87   ALT 20   AST 15   BILITOT 0.9      Recent Labs   Lab 10/24/23  2225   INR 1.1   APTT 27.6      Recent Labs   Lab 10/24/23  1503   TROPONINI 0.010       Electrolytes: No replacement orders  Accuchecks: ACHS    Gtts:   sodium chloride 0.9% 100 mL/hr at 10/27/23 1501    nicardipine Stopped (10/27/23 1407)       LDA/Wounds:  Lines/Drains/Airways       Drain  Duration                  Closed/Suction Drain  10/26/23 0942 Left Scalp Accordion 10 Fr. 1 day         Urethral Catheter 10/26/23 0725 Non-latex 16 Fr. 1 day              Arterial Line  Duration             Arterial Line 10/26/23 0753 Left Radial 1 day              Peripheral Intravenous Line  Duration                  Peripheral IV - Single Lumen 10/24/23 1800 18 G 2 days         Peripheral IV - Single Lumen 10/26/23 1215 20 G Posterior;Right Hand 1 day                  Wounds: Yes  Wound care consulted: Yes    Is this a stroke patient? No.       Problem: Adult Inpatient Plan of Care  Goal: Plan of Care Review  Outcome: Ongoing, Progressing     Problem: Adult Inpatient Plan of Care  Goal: Patient-Specific Goal (Individualized)  Outcome: Ongoing, Progressing     Problem: Adult Inpatient Plan of Care  Goal: Optimal Comfort and Wellbeing  Outcome: Ongoing, Progressing     Problem: Adult Inpatient Plan of Care  Goal: Readiness for Transition of Care  Outcome: Ongoing, Progressing    Problem: Diabetes Comorbidity  Goal: Blood Glucose Level Within Targeted Range  Outcome: Ongoing, Progressing     Problem: Fall Injury Risk  Goal: Absence of Fall and Fall-Related Injury  Outcome: Ongoing, Progressing

## 2023-10-27 NOTE — PT/OT/SLP EVAL
"Occupational Therapy   Evaluation    Name: Jed Chávez  MRN: [id number]  Admitting Diagnosis: Left frontal lobe mass  Recent Surgery: Procedure(s) (LRB):  CRANIOTOMY, WITH NEOPLASM EXCISION USING COMPUTER-ASSISTED NAVIGATION (Left) 1 Day Post-Op    Recommendations:     Discharge Recommendations: Low Intensity Therapy  Discharge Equipment Recommendations:  shower chair  Barriers to discharge:  None    Assessment:     Jed Chávez is a 63 y.o. male with a medical diagnosis of Left frontal lobe mass.  He presents with the following performance deficits affecting function: weakness, gait instability, impaired endurance, impaired self care skills, impaired functional mobility, impaired balance.  Pt found with HOB elevated, agreeably to therapy. Pt reports independence at baseline for ADLs and functional mobility. Pt mobilizing well, currently requiring SBA for bed mobility, STS, and functional mobility - limited by lines. Pt safe to D/C home when medically stable - would benefit from continued skilled therapy during admission, as well as upon D/C home.    Rehab Prognosis: Good; patient would benefit from acute skilled OT services to address these deficits and reach maximum level of function.       Plan:     Patient to be seen 3 x/week to address the above listed problems via self-care/home management, therapeutic activities, therapeutic exercises, neuromuscular re-education  Plan of Care Expires: 11/27/23  Plan of Care Reviewed with: patient    Subjective     Chief Complaint: none verbalized  Patient/Family Comments/goals: "I could work on it" when asked if his walking felt like it was at his baseline    Occupational Profile:  Living Environment: Pt lives with wife and dog in a [facility] with a threshold to enter. Bathroom setup consists of a WIS.  Previous level of function: Independent with ADLs and functional mobility  Roles and Routines: drives, owns a janitorial company  Equipment Used at Home: none  Assistance upon " Discharge: wife    Pain/Comfort:  Pain Rating 1: 0/10  Pain Rating Post-Intervention 1: 0/10    Patients cultural, spiritual, Bahai conflicts given the current situation: no    Objective:     Communicated with: RN prior to session.  Patient found HOB elevated with pulse ox (continuous), telemetry, blood pressure cuff, arterial line, oxygen, peripheral IV upon OT entry to room.    General Precautions: Standard, fall  Orthopedic Precautions: N/A  Braces: N/A  Respiratory Status: Nasal cannula    Occupational Performance:    Bed Mobility:    Patient completed Scooting/Bridging with minimum assistance  Patient completed Supine to Sit with stand by assistancewith increased time    Functional Mobility/Transfers:  Patient completed Sit <> Stand Transfer with stand by assistance  with  no assistive device   Patient completed Bed <> Chair Transfer using Step Transfer technique with stand by assistance with no assistive device  Functional Mobility: Pt completed functional mobility ~10' with no AD, SBA to simulate functional household distances. Limited by lines on this date.    Activities of Daily Living:  Grooming: setup assistance to complete face hygiene seated EOB  Upper Body Dressing: Min A to don gown as robe 2/2 lines  Lower Body Dressing: stand by assistance to doff/don socks  Toileting: dependence cath in place    Cognitive/Visual Perceptual:  Cognitive/Psychosocial Skills:     -       Oriented to: Person, Place, Time, and Situation   -       Follows Commands/attention:Follows one-step commands  -       Communication: clear/fluent  -       Safety awareness/insight to disability: intact   Visual/Perceptual:      -Intact tracking    Physical Exam:  Balance:    -       Supervision for sitting balance, SBA for standing balance  Sensation:    -       Intact  Dominant hand:    -       Right  Upper Extremity Range of Motion:     -       Right Upper Extremity: WNL  -       Left Upper Extremity: WNL  Upper Extremity  Strength:    -       Right Upper Extremity: WNL  -       Left Upper Extremity: WNL   Strength:    -       Right Upper Extremity: WNL  -       Left Upper Extremity: WNL  Fine Motor Coordination:    -       Impaired  Left hand, manipulation of objects   and Right hand, manipulation of objects      AMPAC 6 Click ADL:  AMPAC Total Score: 23    Treatment & Education:  Pt educated on the following:  - role of OT and OT POC  - use of call light to request for assistance with all functional mobility to ensure safety during hospital stay  - importance of continued mobilization  - benefits of continued participation in therapy.   - Pt educated on importance of calling for staff assist for functional mobility/transfers.  - All pt questions within OT scope of practice addressed, pt verbalized understanding.      Patient left up in chair with all lines intact, call button in reach, chair alarm on, and RN notified    GOALS:   Multidisciplinary Problems       Occupational Therapy Goals          Problem: Occupational Therapy    Goal Priority Disciplines Outcome Interventions   Occupational Therapy Goal     OT, PT/OT Ongoing, Progressing    Description: Goals to be met by: 11/27/23     Patient will increase functional independence with ADLs by performing:    UE Dressing with Supervision.  LE Dressing with Supervision.  Grooming while standing at sink with Supervision.  Toileting from toilet with Supervision for hygiene and clothing management.   Supine to sit with [place].  Step transfer with Supervision  Toilet transfer to toilet with Supervision.                         History:     Past Medical History:   Diagnosis Date    Alcoholic cirrhosis     CVA (cerebral vascular accident)     DM (diabetes mellitus)     Dyslipidemia     Hepatocellular carcinoma     HTN (hypertension)     Intracranial hemorrhage     Skin cancer          Past Surgical History:   Procedure Laterality Date    CRANIOTOMY, WITH NEOPLASM EXCISION USING  COMPUTER-ASSISTED NAVIGATION Left 10/26/2023    Procedure: CRANIOTOMY, WITH NEOPLASM EXCISION USING COMPUTER-ASSISTED NAVIGATION;  Surgeon: Jose E Degroot DO;  Location: [facility] OR [address] [place] [address];  Service: Neurosurgery;  Laterality: Left;    HERNIA REPAIR N/A     LIVER TRANSPLANT N/A 8/9/2023    Procedure: TRANSPLANT, LIVER;  Surgeon: Ameya Suero MD;  Location: [facility] OR [address] [place] [address];  Service: Transplant;  Laterality: N/A;       Time Tracking:     OT Date of Treatment: 10/27/23  OT Start Time: 1327  OT Stop Time: 1348  OT Total Time (min): 21 min    Billable Minutes:Evaluation 10  Therapeutic Activity 11    10/27/2023

## 2023-10-27 NOTE — PT/OT/SLP EVAL
Speech Language Pathology Evaluation  Cognitive Communication    Patient Name:  Jed Chávez   MRN:  [id number]  Admitting Diagnosis: Left frontal lobe mass    Recommendations:     Recommendations:                General Recommendations:  Cognitive-linguistic therapy  Diet recommendations:  Regular Diet - IDDSI Level 7, Thin liquids - IDDSI Level 0   Aspiration Precautions: Standard aspiration precautions   General Precautions: Standard, fall  Communication strategies:  none    Assessment:     Jed Chávez is a 63 y.o. male with an SLP diagnosis of Cognitive-Linguistic Impairment.    History:     Past Medical History:   Diagnosis Date    Alcoholic cirrhosis     CVA (cerebral vascular accident)     DM (diabetes mellitus)     Dyslipidemia     Hepatocellular carcinoma     HTN (hypertension)     Intracranial hemorrhage     Skin cancer        Past Surgical History:   Procedure Laterality Date    CRANIOTOMY, WITH NEOPLASM EXCISION USING COMPUTER-ASSISTED NAVIGATION Left 10/26/2023    Procedure: CRANIOTOMY, WITH NEOPLASM EXCISION USING COMPUTER-ASSISTED NAVIGATION;  Surgeon: Jose E Degroot DO;  Location: [facility] OR [address] [place] [address];  Service: Neurosurgery;  Laterality: Left;    HERNIA REPAIR N/A     LIVER TRANSPLANT N/A 8/9/2023    Procedure: TRANSPLANT, LIVER;  Surgeon: Ameya Suero MD;  Location: [facility] OR [address] [place] [address];  Service: Transplant;  Laterality: N/A;       Social History: Patient lives with his wife.    Prior Intubation HX:  10/26 [id number]    Modified Barium Swallow: none reported    Chest X-Rays: 10/24  Comparison: August 10, 2023.  Previous lines and tubes have been removed.  Mediastinal silhouette is within normal limits.  The lungs are clear.  No pneumothorax or pleural effusion.  No acute osseous finding.    Prior diet: regular/thins.      Subjective     Patient awake/alert    Pain/Comfort:  Pain Rating 1: 0/10  Pain Rating Post-Intervention 1: 0/10    Respiratory Status: Nasal cannula, flow 2 L/min    Objective:      Cognitive Status:    Orientation Oriented x4  Memory Immediate Recall 100% accuracy and Delayed 3/3 after 2 min delay  Problem Solving Categories 100%, Sequencing 100%, and Solutions 100%       Receptive Language:   Comprehension:   Questions Complex yes/no 100%  Commands  complex/abstract commands 100%    Pragmatics:    WFL    Expressive Language:  Verbal:    WFL      Motor Speech:  WFL    Voice:   WFL    Visual-Spatial:  Did not assess    Reading:   Did not assess      Written Expression:   Did not assess    Treatment: SLP discussed with the pt regarding the role of the SLP, overall impressions, cognitive-linguistic therapy, treatment plan, and POC. Pt demonstrated understanding of the education provided and agreed with plan. Will continue to follow up.     Goals:   Multidisciplinary Problems       SLP Goals          Problem: SLP    Goal Priority Disciplines Outcome   SLP Goal     SLP    Description: Speech Language Pathology Goals  Goals expected to be met by 11/3    1. Pt will complete higher level deductive reasoning tasks with 90% accuracy.                        Plan:   Patient to be seen:  3 x/week   Plan of Care expires:  11/27/23  Plan of Care reviewed with:  patient   SLP Follow-Up:  Yes       Discharge recommendations:        Time Tracking:     SLP Treatment Date:   10/27/23  Speech Start Time:  1353  Speech Stop Time:  1406     Speech Total Time (min):  13 min    Billable Minutes: Eval 13     10/27/2023

## 2023-10-27 NOTE — PROGRESS NOTES
"[facility] - Neuro Critical Care  Neurocritical Care  Progress Note    Admit Date: 10/24/2023  Service Date: 10/27/2023  Length of Stay: 3    Subjective:     Chief Complaint: Left frontal lobe mass    History of Present Illness: Mr. Chávez is a 62 yo M w/ PMHx of DM, dyslipidemia, HTN, L basal ganglia ICH (in 2014, no residual deficits), HCC/EtOH cirrhosis (s/p liver transplant on 8/10/23 complicated by hepatic art stenosis s/p angioplasty/stent on 10/2/23), non-melanoma skin cancer who presents as transfer from OSH for L frontal mass after 2 weeks of confusion. Interview conducted with patient as well as his wife at bedside. Patient unable to give good description of the confusion he has been experiencing - per his wife he has been "in a fog" for the last 2 weeks, often saying "ok" inappropriately in response to questions and exhibiting strange behaviors such as turning the lights on/off. She says his personality is different and he is less excited about things than usual. On 10/23, he had an episode where he had worsened confusion and agitation while out at a store.  He later did not recall going out. The patient and his wife deny any weakness, numbness, speech difficulty, vision changes, or seizure activity.     He had a recent liver transplant and hepatic artery stent, was on ASA already and started plavix after recent stent placement. Last dose 10/24 AM, which patient was able to recall taking. Platelets 86 on admission.  CT and MRI w/wo at OSH show a 3 cm L frontal pole enhancing extraaxial mass with some adjacent intraaxial enhancement, as well as considerable surrounding edema with mass effect and midline shift.  On 10/26, patient had craniotomy with mass excision.      Hospital Course: Patient had craniotomy with left mass excision and came to [facility] on 10/26.  He had a mild constant leak of blood from surgical site which improved after neurosurgery team placed additional staples and dressing.  On 10/27, glucose " has been in 200-300s.  Pain has been better controlled.      Interval History:  Pain has been well controlled. Glucose has been ranging in 200-300's.    Review of Systems   Constitutional:  Negative for fever.   Eyes:  Negative for visual disturbance.   Respiratory:  Negative for shortness of breath.    Cardiovascular:  Negative for chest pain.   Gastrointestinal:  Negative for abdominal pain, nausea and vomiting.   Genitourinary:  Negative for difficulty urinating.   Musculoskeletal:  Negative for neck pain.   Skin:  Negative for rash.   Neurological:  Positive for headaches. Negative for speech difficulty, weakness and numbness.     Objective:     Vitals:  Temp: 97.7 °F (36.5 °C)  Pulse: 69  Rhythm: normal sinus rhythm  BP: 117/64  MAP (mmHg): 85  Resp: 17  SpO2: 95 %    Temp  Min: 97.7 °F (36.5 °C)  Max: 98.8 °F (37.1 °C)  Pulse  Min: 59  Max: 87  BP  Min: 106/57  Max: 148/65  MAP (mmHg)  Min: 76  Max: 103  Resp  Min: 12  Max: 28  SpO2  Min: 91 %  Max: 97 %    10/26 0701 - 10/27 0700  In: 5510.3 [P.O.:180; I.V.:3431.8]  Out: 3300 [Urine:3130; Drains:170]   Unmeasured Output  Urine Occurrence: 2  Stool Occurrence: 0        Physical Exam  Vitals and nursing note reviewed.   HENT:      Head: Normocephalic.      Comments: Dressing mildly stained with blood.      Right Ear: External ear normal.      Left Ear: External ear normal.      Mouth/Throat:      Mouth: Mucous membranes are moist.   Eyes:      Extraocular Movements: Extraocular movements intact.      Conjunctiva/sclera: Conjunctivae normal.      Pupils: Pupils are equal, round, and reactive to light.   Cardiovascular:      Rate and Rhythm: Normal rate and regular rhythm.      Pulses: Normal pulses.      Heart sounds: Normal heart sounds.   Pulmonary:      Effort: Pulmonary effort is normal.      Breath sounds: Normal breath sounds.   Abdominal:      General: There is no distension.      Palpations: Abdomen is soft.      Tenderness: There is no abdominal  tenderness.   Musculoskeletal:         General: Normal range of motion.   Skin:     General: Skin is warm.      Capillary Refill: Capillary refill takes less than 2 seconds.   Neurological:      General: No focal deficit present.      Mental Status: He is alert.      Sensory: No sensory deficit.      Motor: No weakness.      Comments: Light sensation intact bilaterally in UE and LE.  5/5 motor strength in UE and LE bilaterally.   Psychiatric:         Mood and Affect: Mood normal.        Medications:  Continuoussodium chloride 0.9%, Last Rate: 100 mL/hr at 10/27/23 1301  nicardipine, Last Rate: 10 mg/hr (10/27/23 1301)    Scheduledacetaminophen, 1,000 mg, TID  carvediloL, 6.25 mg, BID WM  ceFAZolin (ANCEF) IVPB, 2 g, Q8H  dexAMETHasone, 4 mg, Q8H  docusate sodium, 100 mg, BID  famotidine, 20 mg, BID  heparin (porcine), 5,000 Units, Q8H  insulin aspart U-100, 12 Units, QID (AC & HS)  insulin detemir U-100, 15 Units, BID  levETIRAcetam, 500 mg, Q12H  losartan, 25 mg, Daily  mupirocin, , BID  sulfamethoxazole-trimethoprim 400-80mg, 1 tablet, Daily  tacrolimus, 2 mg, BID  valGANciclovir, 450 mg, Daily    PRNdextrose 10%, 12.5 g, PRN  dextrose 10%, 25 g, PRN  glucagon (human recombinant), 1 mg, PRN  glucose, 16 g, PRN  glucose, 24 g, PRN  hydrALAZINE, 10 mg, Q6H PRN  insulin aspart U-100, 0-10 Units, QID (AC + HS) PRN  morphine, 2 mg, Q4H PRN  oxyCODONE, 5 mg, Q4H PRN      Today I personally reviewed pertinent medications, imaging, laboratory results, notably:    Recent Labs   Lab 10/27/23  0039   WBC 7.85   RBC 4.51*   HGB 12.3*   HCT 37.5*   *   MCV 83   MCH 27.3   MCHC 32.8      Recent Labs   Lab 10/27/23  0039   CALCIUM 8.6*   ALBUMIN 3.6   PROT 6.4   *   K 4.1   CO2 21*      BUN 17   CREATININE 0.8   ALKPHOS 87   ALT 20   AST 15   BILITOT 0.9      CT Head (10/26):    FINDINGS:  Prior operative changes from recent left frontal craniotomy for resection of reported extra-axial mass.  Small volume air  and fluid at the surgical site, expected.  No large hematoma.  Persistent large region of vasogenic edema insinuating throughout the majority of the left frontal lobe.  Probable slight improvement in degree of intracranial mass effect.  Rightward midline shift now 8 mm at the anterior interhemispheric fissure (previously 12 mm).  Continued sulcal crowding and partial effacement of the left lateral ventricle.     Focal encephalomalacia from remote left basal ganglia hemorrhage.  No new parenchymal hemorrhage or major vascular distribution infarct.  No new extra-axial blood or fluid collections remote from the surgical site.  No evidence of hydrocephalus.  Craniotomy in reasonable position.  Overlying surgical drain in place.  No new displaced calvarial fracture.  Mastoid air cells and paranasal sinuses are essentially clear.     Impression:     Postsurgical change recent left frontal extra-axial mass resection as further discussed apparent above, without apparent intracranial complication.    MRI Brain (10/27):    FINDINGS:  Postsurgical change recent frontal craniotomy for resection of an extra-axial mass centered over the left frontal lobe.  Small volume expected hemorrhage in fluid at the surgical site, with some associated intrinsic T1 hyperintensity.  No residual nodular or masslike enhancement.  Faint linear cortical enhancement subjacent to the surgical cavity, but the parenchymal enhancement notably improved from the preoperative study.  There is small region diffusion restriction in the underlying parenchymal cortex spanning the junction of the superior and middle frontal gyri (sequence 6 image 47) thought to reflect an area of marginal ischemic injury.  Persistent large region of vasogenic edema insinuating throughout nearly the entirety of the left frontal lobe.  This is similar to the preoperative examination.  Mass effect slightly improved although there is persistent sulcal and ventricular crowding with  approximately 0.8 cm rightward midline shift of septum pellucidum.     No new parenchymal edema, hemorrhage or infarct remote from the surgical site.  Remote hemorrhage in the left basal ganglia.  No new enhancing extra-axial mass elsewhere.  No new extra-axial blood or fluid collections elsewhere.  Ventricles remain distorted by mass effect.  No new ventricular entrapment or obstructive hydrocephalus.  The T2 skull base flow voids are preserved.  Bone marrow signal intensity is unremarkable.     Impression:     Postsurgical change recent left frontal extra-axial mass resection as above, without evidence of residual enhancing lesion.     Persistent large region of vasogenic edema throughout the left frontal lobe, but notably improved parenchymal enhancement subjacent to the surgical site.     Diet  Diet diabetic 2000 Calorie; Standard Tray  Diet diabetic 2000 Calorie; Standard Tray      Assessment/Plan:     Neuro  * Left frontal lobe mass  Pt is s/p left mass excision.    - SBP <140  - MAP >65  - Start Losartan  - Wean off Cardene  - Neurosurgery recs  - On 10/26, neurosurg placed additional staples and dressing for bleeding control  - Keppra  - Heparin  - Dexamethasone  - Postop abx  - Pain management    Immunology/Multi System  Prophylactic immunotherapy  Please see long term immunotherapy    Endocrine  Type 2 diabetes mellitus with other specified complication, with chronic insulin use  Pt has hx of DM2    - SSI  - Diabetic diet    GI  S/P liver transplant  Consulted Liver Transplant Surgery, appreciate recs   - s/p liver 8/10/23 for HCC/etoh   - LFTs and tbili remain stable   - [place] level 9.5, change dose to 2 mg bid    Palliative Care  Long-term use of immunosuppressant medication  Consulted Liver Transplant Surgery, appreciate recs   - Continue prograf and steroids.   - Continue to monitor prograf levels daily, monitor for toxic side effects, and adjust for therapeutic dose.    - Continue to hold cellcept         The patient is being Prophylaxed for:  Venous Thromboembolism with: Mechanical or Chemical  Stress Ulcer with: PPI  Ventilator Pneumonia with: not applicable    Activity Orders          Diet diabetic 2000 Calorie; Standard Tray: Diabetic starting at 10/27 1340    Turn patient every 2 hours starting at 10/26 1400        Full Code    Chao Yoon MD  Neurocritical Care  [facility] - Neuro Critical Care

## 2023-10-27 NOTE — ASSESSMENT & PLAN NOTE
Mr Kyler is a 63M w/ hx IDDM, HTN, L basal ganglia ICH (in 2014, no residual deficits), HCC/EtOH cirrhosis (s/p liver transplant on 8/10/23 complicated by hepatic art stenosis s/p angioplasty/stent on 10/2/23), non-melanoma skin cancer who presents as transfer from OSH for L frontal mass after 2 weeks of confusion. Last dose ASA, plavix 10/24 AM. Platelets 86 on admit. Neuro intact on initial exam other than higher order confusion.      CT and MRI w/wo at OSH showed a 3 cm L frontal pole enhancing extraaxial mass with some adjacent intraaxial enhancement, as well as considerable surrounding edema with resultant mass effect and midline shift. Ddx includes metastasis vs meningioma, although brain mets from HCC are uncommon and cancer had not previously metastasized as far as pt and family were aware.    --Admitted NCC with q1h neurochecks.  --All labs and diagnostics reviewed.  --Transplant service consulted for comanagement of transplant meds  --Endocrine consulted to assist with insulin management.  --Needs  consult for medical complexity  - Dex 4 q6h for cerebral edema.  - Hold ASA/Plavix and all other AC/AP.  - DVT ppx: SCDs/fabiola hose.    Dispo: TTF to [facility]    Discussed with staff Dr. Altamirano

## 2023-10-27 NOTE — HOSPITAL COURSE
"Patient is s/p Left parasagittal extra-axial tumor and cerebral edema, prelim meningioma. Pt is currently in NICU and transplant managing IS and LFTs    Interval history:   - NAEON. Patient is POD4 from craniotomy per NSGY. Progressing well, management per NSGY. Per NSGY, pt to D/c today  - LFTs WNL. Prograf level low today, 4.1. Will increase prograf to 2 mg BID today, then titrate up to 3 mg BID tomorrow 10/31. Patient will be provided updated "transplant blue card" with list of correct medication dosages prior to discharge. Patient's liver coordinator notified of discharge. Will plan for labs Thursday to monitor prograf level.   - Continue to hold Cellcept on discharge.  "

## 2023-10-27 NOTE — NURSING
Pt arrived back to room following MRI        Pt was escorted by RN on cardiac monitoring, ambu bag, and IV pole.        Patient placed back on bedside monitor with alarms audible, bed in low position with bed alarm on, call light within reach. BRET.

## 2023-10-28 PROBLEM — D49.6 BRAIN TUMOR: Status: RESOLVED | Noted: 2023-10-27 | Resolved: 2023-10-28

## 2023-10-28 LAB
ALBUMIN SERPL BCP-MCNC: 3.5 G/DL (ref 3.5–5.2)
ALP SERPL-CCNC: 87 U/L (ref 55–135)
ALT SERPL W/O P-5'-P-CCNC: 19 U/L (ref 10–44)
ANION GAP SERPL CALC-SCNC: 12 MMOL/L (ref 8–16)
AST SERPL-CCNC: 14 U/L (ref 10–40)
BASOPHILS # BLD AUTO: 0.01 K/UL (ref 0–0.2)
BASOPHILS NFR BLD: 0.1 % (ref 0–1.9)
BILIRUB SERPL-MCNC: 0.7 MG/DL (ref 0.1–1)
BUN SERPL-MCNC: 18 MG/DL (ref 8–23)
CALCIUM SERPL-MCNC: 8.4 MG/DL (ref 8.7–10.5)
CHLORIDE SERPL-SCNC: 104 MMOL/L (ref 95–110)
CO2 SERPL-SCNC: 18 MMOL/L (ref 23–29)
CREAT SERPL-MCNC: 0.8 MG/DL (ref 0.5–1.4)
DIFFERENTIAL METHOD: ABNORMAL
EOSINOPHIL # BLD AUTO: 0 K/UL (ref 0–0.5)
EOSINOPHIL NFR BLD: 0 % (ref 0–8)
ERYTHROCYTE [DISTWIDTH] IN BLOOD BY AUTOMATED COUNT: 15.6 % (ref 11.5–14.5)
EST. GFR  (NO RACE VARIABLE): >60 ML/MIN/1.73 M^2
GLUCOSE SERPL-MCNC: 287 MG/DL (ref 70–110)
HCT VFR BLD AUTO: 37.2 % (ref 40–54)
HGB BLD-MCNC: 12.3 G/DL (ref 14–18)
IMM GRANULOCYTES # BLD AUTO: 0.06 K/UL (ref 0–0.04)
IMM GRANULOCYTES NFR BLD AUTO: 0.7 % (ref 0–0.5)
LYMPHOCYTES # BLD AUTO: 1.5 K/UL (ref 1–4.8)
LYMPHOCYTES NFR BLD: 17.2 % (ref 18–48)
MAGNESIUM SERPL-MCNC: 1.8 MG/DL (ref 1.6–2.6)
MCH RBC QN AUTO: 26.6 PG (ref 27–31)
MCHC RBC AUTO-ENTMCNC: 33.1 G/DL (ref 32–36)
MCV RBC AUTO: 80 FL (ref 82–98)
MONOCYTES # BLD AUTO: 0.4 K/UL (ref 0.3–1)
MONOCYTES NFR BLD: 4.2 % (ref 4–15)
NEUTROPHILS # BLD AUTO: 6.8 K/UL (ref 1.8–7.7)
NEUTROPHILS NFR BLD: 77.8 % (ref 38–73)
NRBC BLD-RTO: 0 /100 WBC
PHOSPHATE SERPL-MCNC: 1.7 MG/DL (ref 2.7–4.5)
PLATELET # BLD AUTO: 153 K/UL (ref 150–450)
PMV BLD AUTO: 9.6 FL (ref 9.2–12.9)
POCT GLUCOSE: 160 MG/DL (ref 70–110)
POCT GLUCOSE: 165 MG/DL (ref 70–110)
POCT GLUCOSE: 169 MG/DL (ref 70–110)
POCT GLUCOSE: 180 MG/DL (ref 70–110)
POCT GLUCOSE: 188 MG/DL (ref 70–110)
POCT GLUCOSE: 190 MG/DL (ref 70–110)
POCT GLUCOSE: 205 MG/DL (ref 70–110)
POCT GLUCOSE: 246 MG/DL (ref 70–110)
POCT GLUCOSE: 251 MG/DL (ref 70–110)
POCT GLUCOSE: 265 MG/DL (ref 70–110)
POCT GLUCOSE: 298 MG/DL (ref 70–110)
POCT GLUCOSE: 323 MG/DL (ref 70–110)
POCT GLUCOSE: 391 MG/DL (ref 70–110)
POTASSIUM SERPL-SCNC: 3.4 MMOL/L (ref 3.5–5.1)
PROT SERPL-MCNC: 6.2 G/DL (ref 6–8.4)
RBC # BLD AUTO: 4.63 M/UL (ref 4.6–6.2)
SODIUM SERPL-SCNC: 134 MMOL/L (ref 136–145)
TACROLIMUS BLD-MCNC: 11.6 NG/ML (ref 5–15)
WBC # BLD AUTO: 8.76 K/UL (ref 3.9–12.7)

## 2023-10-28 PROCEDURE — 97530 THERAPEUTIC ACTIVITIES: CPT

## 2023-10-28 PROCEDURE — 85025 COMPLETE CBC W/AUTO DIFF WBC: CPT

## 2023-10-28 PROCEDURE — 25000003 PHARM REV CODE 250

## 2023-10-28 PROCEDURE — 63600175 PHARM REV CODE 636 W HCPCS: Performed by: NURSE PRACTITIONER

## 2023-10-28 PROCEDURE — 25000003 PHARM REV CODE 250: Performed by: PSYCHIATRY & NEUROLOGY

## 2023-10-28 PROCEDURE — 84100 ASSAY OF PHOSPHORUS: CPT | Performed by: NEUROLOGICAL SURGERY

## 2023-10-28 PROCEDURE — 94761 N-INVAS EAR/PLS OXIMETRY MLT: CPT

## 2023-10-28 PROCEDURE — 11000001 HC ACUTE MED/SURG PRIVATE ROOM

## 2023-10-28 PROCEDURE — 80053 COMPREHEN METABOLIC PANEL: CPT | Performed by: PHYSICIAN ASSISTANT

## 2023-10-28 PROCEDURE — 63600175 PHARM REV CODE 636 W HCPCS: Performed by: STUDENT IN AN ORGANIZED HEALTH CARE EDUCATION/TRAINING PROGRAM

## 2023-10-28 PROCEDURE — 99233 SBSQ HOSP IP/OBS HIGH 50: CPT | Mod: 24,,,

## 2023-10-28 PROCEDURE — 97161 PT EVAL LOW COMPLEX 20 MIN: CPT

## 2023-10-28 PROCEDURE — 99233 PR SUBSEQUENT HOSPITAL CARE,LEVL III: ICD-10-PCS | Mod: 24,,,

## 2023-10-28 PROCEDURE — 25000003 PHARM REV CODE 250: Performed by: NURSE PRACTITIONER

## 2023-10-28 PROCEDURE — 83735 ASSAY OF MAGNESIUM: CPT | Performed by: PHYSICIAN ASSISTANT

## 2023-10-28 PROCEDURE — 99291 PR CRITICAL CARE, E/M 30-74 MINUTES: ICD-10-PCS | Mod: ,,, | Performed by: INTERNAL MEDICINE

## 2023-10-28 PROCEDURE — 63600175 PHARM REV CODE 636 W HCPCS

## 2023-10-28 PROCEDURE — 25000003 PHARM REV CODE 250: Performed by: STUDENT IN AN ORGANIZED HEALTH CARE EDUCATION/TRAINING PROGRAM

## 2023-10-28 PROCEDURE — 80197 ASSAY OF TACROLIMUS: CPT | Performed by: PHYSICIAN ASSISTANT

## 2023-10-28 PROCEDURE — 99291 CRITICAL CARE FIRST HOUR: CPT | Mod: ,,, | Performed by: INTERNAL MEDICINE

## 2023-10-28 RX ORDER — INSULIN ASPART 100 [IU]/ML
12 INJECTION, SOLUTION INTRAVENOUS; SUBCUTANEOUS
Status: DISCONTINUED | OUTPATIENT
Start: 2023-10-28 | End: 2023-10-29

## 2023-10-28 RX ORDER — SODIUM,POTASSIUM PHOSPHATES 280-250MG
2 POWDER IN PACKET (EA) ORAL
Status: DISCONTINUED | OUTPATIENT
Start: 2023-10-28 | End: 2023-10-30

## 2023-10-28 RX ORDER — INSULIN ASPART 100 [IU]/ML
0-10 INJECTION, SOLUTION INTRAVENOUS; SUBCUTANEOUS
Status: DISCONTINUED | OUTPATIENT
Start: 2023-10-28 | End: 2023-10-30 | Stop reason: HOSPADM

## 2023-10-28 RX ORDER — LANOLIN ALCOHOL/MO/W.PET/CERES
800 CREAM (GRAM) TOPICAL
Status: DISCONTINUED | OUTPATIENT
Start: 2023-10-28 | End: 2023-10-30

## 2023-10-28 RX ORDER — IBUPROFEN 200 MG
24 TABLET ORAL
Status: DISCONTINUED | OUTPATIENT
Start: 2023-10-28 | End: 2023-10-30 | Stop reason: HOSPADM

## 2023-10-28 RX ORDER — IBUPROFEN 200 MG
16 TABLET ORAL
Status: DISCONTINUED | OUTPATIENT
Start: 2023-10-28 | End: 2023-10-30 | Stop reason: HOSPADM

## 2023-10-28 RX ORDER — NICARDIPINE HYDROCHLORIDE 0.2 MG/ML
0-15 INJECTION INTRAVENOUS CONTINUOUS
Status: DISCONTINUED | OUTPATIENT
Start: 2023-10-28 | End: 2023-10-28

## 2023-10-28 RX ORDER — LABETALOL HCL 20 MG/4 ML
10 SYRINGE (ML) INTRAVENOUS EVERY 4 HOURS PRN
Status: DISCONTINUED | OUTPATIENT
Start: 2023-10-28 | End: 2023-10-30 | Stop reason: HOSPADM

## 2023-10-28 RX ORDER — HYDRALAZINE HYDROCHLORIDE 20 MG/ML
10 INJECTION INTRAMUSCULAR; INTRAVENOUS EVERY 6 HOURS PRN
Status: DISCONTINUED | OUTPATIENT
Start: 2023-10-28 | End: 2023-10-30 | Stop reason: HOSPADM

## 2023-10-28 RX ORDER — GLUCAGON 1 MG
1 KIT INJECTION
Status: DISCONTINUED | OUTPATIENT
Start: 2023-10-28 | End: 2023-10-30 | Stop reason: HOSPADM

## 2023-10-28 RX ADMIN — INSULIN ASPART 12 UNITS: 100 INJECTION, SOLUTION INTRAVENOUS; SUBCUTANEOUS at 04:10

## 2023-10-28 RX ADMIN — HEPARIN SODIUM 5000 UNITS: 5000 INJECTION INTRAVENOUS; SUBCUTANEOUS at 06:10

## 2023-10-28 RX ADMIN — FAMOTIDINE 20 MG: 20 TABLET ORAL at 10:10

## 2023-10-28 RX ADMIN — POTASSIUM BICARBONATE 35 MEQ: 391 TABLET, EFFERVESCENT ORAL at 08:10

## 2023-10-28 RX ADMIN — POTASSIUM & SODIUM PHOSPHATES POWDER PACK 280-160-250 MG 2 PACKET: 280-160-250 PACK at 02:10

## 2023-10-28 RX ADMIN — POTASSIUM BICARBONATE 35 MEQ: 391 TABLET, EFFERVESCENT ORAL at 06:10

## 2023-10-28 RX ADMIN — POTASSIUM & SODIUM PHOSPHATES POWDER PACK 280-160-250 MG 2 PACKET: 280-160-250 PACK at 10:10

## 2023-10-28 RX ADMIN — DEXAMETHASONE 4 MG: 4 TABLET ORAL at 02:10

## 2023-10-28 RX ADMIN — MUPIROCIN: 20 OINTMENT TOPICAL at 10:10

## 2023-10-28 RX ADMIN — HEPARIN SODIUM 5000 UNITS: 5000 INJECTION INTRAVENOUS; SUBCUTANEOUS at 02:10

## 2023-10-28 RX ADMIN — SULFAMETHOXAZOLE AND TRIMETHOPRIM 1 TABLET: 400; 80 TABLET ORAL at 10:10

## 2023-10-28 RX ADMIN — CEFAZOLIN 2 G: 2 INJECTION, POWDER, FOR SOLUTION INTRAMUSCULAR; INTRAVENOUS at 03:10

## 2023-10-28 RX ADMIN — INSULIN ASPART 10 UNITS: 100 INJECTION, SOLUTION INTRAVENOUS; SUBCUTANEOUS at 04:10

## 2023-10-28 RX ADMIN — ACETAMINOPHEN 1000 MG: 500 TABLET ORAL at 09:10

## 2023-10-28 RX ADMIN — INSULIN HUMAN 6.5 UNITS/HR: 1 INJECTION, SOLUTION INTRAVENOUS at 03:10

## 2023-10-28 RX ADMIN — INSULIN DETEMIR 20 UNITS: 100 INJECTION, SOLUTION SUBCUTANEOUS at 10:10

## 2023-10-28 RX ADMIN — ACETAMINOPHEN 1000 MG: 500 TABLET ORAL at 10:10

## 2023-10-28 RX ADMIN — CARVEDILOL 6.25 MG: 6.25 TABLET, FILM COATED ORAL at 08:10

## 2023-10-28 RX ADMIN — DEXAMETHASONE 4 MG: 4 TABLET ORAL at 06:10

## 2023-10-28 RX ADMIN — MUPIROCIN: 20 OINTMENT TOPICAL at 09:10

## 2023-10-28 RX ADMIN — DEXAMETHASONE 4 MG: 4 TABLET ORAL at 09:10

## 2023-10-28 RX ADMIN — INSULIN HUMAN 5.3 UNITS/HR: 1 INJECTION, SOLUTION INTRAVENOUS at 07:10

## 2023-10-28 RX ADMIN — DOCUSATE SODIUM 100 MG: 100 CAPSULE, LIQUID FILLED ORAL at 08:10

## 2023-10-28 RX ADMIN — DOCUSATE SODIUM 100 MG: 100 CAPSULE, LIQUID FILLED ORAL at 10:10

## 2023-10-28 RX ADMIN — LEVETIRACETAM 500 MG: 500 TABLET, FILM COATED ORAL at 10:10

## 2023-10-28 RX ADMIN — NICARDIPINE HYDROCHLORIDE 15 MG/HR: 0.2 INJECTION, SOLUTION INTRAVENOUS at 12:10

## 2023-10-28 RX ADMIN — INSULIN ASPART 4 UNITS: 100 INJECTION, SOLUTION INTRAVENOUS; SUBCUTANEOUS at 09:10

## 2023-10-28 RX ADMIN — ACETAMINOPHEN 1000 MG: 500 TABLET ORAL at 02:10

## 2023-10-28 RX ADMIN — CEFAZOLIN 2 G: 2 INJECTION, POWDER, FOR SOLUTION INTRAMUSCULAR; INTRAVENOUS at 08:10

## 2023-10-28 RX ADMIN — LOSARTAN POTASSIUM 50 MG: 50 TABLET, FILM COATED ORAL at 10:10

## 2023-10-28 RX ADMIN — CARVEDILOL 6.25 MG: 6.25 TABLET, FILM COATED ORAL at 06:10

## 2023-10-28 RX ADMIN — HEPARIN SODIUM 5000 UNITS: 5000 INJECTION INTRAVENOUS; SUBCUTANEOUS at 09:10

## 2023-10-28 RX ADMIN — LEVETIRACETAM 500 MG: 500 TABLET, FILM COATED ORAL at 08:10

## 2023-10-28 RX ADMIN — NICARDIPINE HYDROCHLORIDE 15 MG/HR: 0.2 INJECTION, SOLUTION INTRAVENOUS at 03:10

## 2023-10-28 RX ADMIN — FAMOTIDINE 20 MG: 20 TABLET ORAL at 08:10

## 2023-10-28 RX ADMIN — POTASSIUM & SODIUM PHOSPHATES POWDER PACK 280-160-250 MG 2 PACKET: 280-160-250 PACK at 06:10

## 2023-10-28 RX ADMIN — CEFAZOLIN 2 G: 2 INJECTION, POWDER, FOR SOLUTION INTRAMUSCULAR; INTRAVENOUS at 01:10

## 2023-10-28 RX ADMIN — VALGANCICLOVIR 450 MG: 450 TABLET, FILM COATED ORAL at 10:10

## 2023-10-28 NOTE — PROGRESS NOTES
"[facility]  Neurocritical Care  Progress Note    Admit Date: 10/24/2023  Service Date: 10/28/2023  Length of Stay: 4    Subjective:     Chief Complaint: Left frontal lobe mass    History of Present Illness: Mr. Chávez is a 64 yo M w/ PMHx of DM, dyslipidemia, HTN, L basal ganglia ICH (in 2014, no residual deficits), HCC/EtOH cirrhosis (s/p liver transplant on 8/10/23 complicated by hepatic art stenosis s/p angioplasty/stent on 10/2/23), non-melanoma skin cancer who presents as transfer from OSH for L frontal mass after 2 weeks of confusion. Interview conducted with patient as well as his wife at bedside. Patient unable to give good description of the confusion he has been experiencing - per his wife he has been "in a fog" for the last 2 weeks, often saying "ok" inappropriately in response to questions and exhibiting strange behaviors such as turning the lights on/off. She says his personality is different and he is less excited about things than usual. On 10/23, he had an episode where he had worsened confusion and agitation while out at a store.  He later did not recall going out. The patient and his wife deny any weakness, numbness, speech difficulty, vision changes, or seizure activity.     He had a recent liver transplant and hepatic artery stent, was on ASA already and started plavix after recent stent placement. Last dose 10/24 AM, which patient was able to recall taking. Platelets 86 on admission.  CT and MRI w/wo at OSH show a 3 cm L frontal pole enhancing extraaxial mass with some adjacent intraaxial enhancement, as well as considerable surrounding edema with mass effect and midline shift.  On 10/26, patient had craniotomy with mass excision.      Hospital Course: Patient had craniotomy with left mass excision and came to [facility] on 10/26.  He had a mild constant leak of blood from surgical site which improved after neurosurgery team placed additional staples and dressing.  On 10/27, glucose " has been in 200-300s.  Pain has been better controlled.  10/28/23: d/c insulin drip        Interval History:  D/C insulin drip, aspart 12 units with meals, 20 detemir daily    Review of Systems   Constitutional: Negative.    HENT: Negative.     Eyes: Negative.    Respiratory: Negative.     Cardiovascular: Negative.    Gastrointestinal: Negative.    Endocrine: Negative.    Genitourinary: Negative.    Musculoskeletal: Negative.    Skin: Negative.      2 systems     Vitals:  Temp: 98.3 °F (36.8 °C)  Pulse: 67  BP: 138/80  MAP (mmHg): 103  Resp: 20  SpO2: 96 %    Temp  Min: 97.6 °F (36.4 °C)  Max: 98.3 °F (36.8 °C)  Pulse  Min: 67  Max: 83  BP  Min: 115/60  Max: 161/64  MAP (mmHg)  Min: 80  Max: 104  Resp  Min: 14  Max: 24  SpO2  Min: 93 %  Max: 97 %    10/27 0701 - 10/28 0700  In: 3884.3 [P.O.:1550; I.V.:2242.7]  Out: 2440 [Urine:2430; Drains:10]   Unmeasured Output  Urine Occurrence: 2  Stool Occurrence: 0        Physical Exam  Vitals and nursing note reviewed.   Constitutional:       Appearance: Normal appearance.   HENT:      Head: Normocephalic.      Nose: Nose normal.      Mouth/Throat:      Mouth: Mucous membranes are moist.      Pharynx: Oropharynx is clear.   Eyes:      Pupils: Pupils are equal, round, and reactive to light.   Cardiovascular:      Rate and Rhythm: Normal rate and regular rhythm.      Pulses: Normal pulses.      Heart sounds: Normal heart sounds.   Pulmonary:      Effort: Pulmonary effort is normal.      Breath sounds: Normal breath sounds.   Abdominal:      General: Bowel sounds are normal.      Palpations: Abdomen is soft.   Musculoskeletal:         General: Normal range of motion.   Skin:     General: Skin is warm and dry.      Capillary Refill: Capillary refill takes 2 to 3 seconds.   Neurological:      Mental Status: He is alert.      Comments: GCS 15  AAO X4  PERRL  JAMISON to command and spontaneously  Sensation Intact X4         Unable to test orientation, language, memory, judgment,  insight, fund of knowledge, hearing, shoulder shrug, tongue protrusion, coordination, gait due to level of consciousness.       Medications:  Continuous Scheduledacetaminophen, 1,000 mg, TID  carvediloL, 6.25 mg, BID WM  ceFAZolin (ANCEF) IVPB, 2 g, Q8H  dexAMETHasone, 4 mg, Q8H  docusate sodium, 100 mg, BID  famotidine, 20 mg, BID  heparin (porcine), 5,000 Units, Q8H  insulin aspart U-100, 12 Units, TIDWM  insulin detemir U-100, 20 Units, Daily  levETIRAcetam, 500 mg, Q12H  losartan, 50 mg, Daily  mupirocin, , BID  sulfamethoxazole-trimethoprim 400-80mg, 1 tablet, Daily  valGANciclovir, 450 mg, Daily    PRNdextrose 10%, 12.5 g, PRN  dextrose 10%, 12.5 g, PRN  dextrose 10%, 25 g, PRN  dextrose 10%, 25 g, PRN  glucagon (human recombinant), 1 mg, PRN  glucose, 16 g, PRN  glucose, 24 g, PRN  hydrALAZINE, 10 mg, Q6H PRN  insulin aspart U-100, 0-10 Units, QID (AC + HS) PRN  labetalol, 10 mg, Q4H PRN  magnesium oxide, 800 mg, PRN  magnesium oxide, 800 mg, PRN  morphine, 2 mg, Q4H PRN  oxyCODONE, 5 mg, Q4H PRN  potassium bicarbonate, 35 mEq, PRN  potassium bicarbonate, 50 mEq, PRN  potassium bicarbonate, 60 mEq, PRN  potassium, sodium phosphates, 2 packet, PRN  potassium, sodium phosphates, 2 packet, PRN  potassium, sodium phosphates, 2 packet, PRN      Today I personally reviewed pertinent medications, lines/drains/airways, imaging, cardiology results, laboratory results, microbiology results, notably:    Diet  Diet diabetic Consistent Carbohydrate; 2000 Calorie; Standard Tray  Diet diabetic Consistent Carbohydrate; 2000 Calorie; Standard Tray          Assessment/Plan:     Neuro  * Left frontal lobe mass  Pt is s/p left mass excision.    - SBP <140  - MAP >65  - Start Losartan  - Wean off Cardene  - Neurosurgery recs  - On 10/26, neurosurg placed additional staples and dressing for bleeding control  - Keppra  - Heparin  - Dexamethasone  - Postop abx  - Pain management    Immunology/Multi System  Prophylactic  immunotherapy  Please see long term immunotherapy    Oncology  Brain tumor-resolved as of 10/28/2023  HX of    Endocrine  Type 2 diabetes mellitus with other specified complication, with chronic insulin use  Pt has hx of DM2    - SSI  - Diabetic diet  - D/C insulin drip  - Aspart 12 units with meals  - Detemir 20 units daily    GI  S/P liver transplant  Consulted Liver Transplant Surgery, appreciate recs   - s/p liver 8/10/23 for HCC/etoh   - LFTs and tbili remain stable   - Tacro level 9.5, change dose to 2 mg bid    Palliative Care  Long-term use of immunosuppressant medication  Consulted Liver Transplant Surgery, appreciate recs   - Continue prograf and steroids.   - Continue to monitor prograf levels daily, monitor for toxic side effects, and adjust for therapeutic dose.    - Continue to hold cellcept          The patient is being Prophylaxed for:  Venous Thromboembolism with: Mechanical or Chemical  Stress Ulcer with: H2B  Ventilator Pneumonia with: not applicable    Activity Orders          Diet diabetic Consistent Carbohydrate; 2000 Calorie; Standard Tray: Diabetic starting at 10/28 1007    Turn patient every 2 hours starting at 10/26 1400        Full Code    Liliya Byrne NP  Neurocritical Care  [facility]

## 2023-10-28 NOTE — PROGRESS NOTES
"[facility]  Neurosurgery  Progress Note    Subjective:     History of Present Illness: Mr Kyler is a 63M w/ hx IDDM, HTN, L basal ganglia ICH (in 2014, no residual deficits), HCC/EtOH cirrhosis (s/p liver transplant on 8/10/23 complicated by hepatic art stenosis s/p angioplasty/stent on 10/2/23), non-melanoma skin cancer who presents as transfer from OSH for L frontal mass after 2 weeks of confusion. Interview conducted with patient as well as his wife at bedside. Patient unable to give good description of the confusion he has been experiencing - per his wife he has been "in a fog" for the last 2 weeks, often saying "ok" inappropriately in response to questions and exhibiting strange behaviors such as turning the lights on/off. She says his personality is different and he is less excited about things than usual. On 10/23, he had an episode where he had worsened confusion and agitation while out at a store, he later did not recall going out. The patient and his wife deny any weakness, numbness, speech difficulty, vision changes, or seizure activity.     He has a recent liver transplant and hepatic artery stent, was on ASA already and started plavix after recent stent placement. Last dose 10/24 AM, which patient was able to recall taking. Platelets 86 on admit.     CT and MRI w/wo at OSH show a 3 cm L frontal pole enhancing extraaxial mass with some adjacent intraaxial enhancement, as well as considerable surrounding edema with mass effect and midline shift.       Post-Op Info:  Procedure(s) (LRB):  CRANIOTOMY, WITH NEOPLASM EXCISION USING COMPUTER-ASSISTED NAVIGATION (Left)   2 Days Post-Op     Interval History: POD2 doing well, MRI with GTR of extraaxial mass, improved adjacent intraparenchymal enhancement, persistent edema.     Medications:  Continuous Infusions:  Scheduled Meds:   acetaminophen  1,000 mg Oral TID    carvediloL  6.25 mg Oral BID WM    ceFAZolin (ANCEF) IVPB  2 g Intravenous " Q8H    dexAMETHasone  4 mg Oral Q8H    docusate sodium  100 mg Oral BID    famotidine  20 mg Oral BID    heparin (porcine)  5,000 Units Subcutaneous Q8H    insulin aspart U-100  12 Units Subcutaneous TIDWM    insulin detemir U-100  20 Units Subcutaneous Daily    levETIRAcetam  500 mg Oral Q12H    losartan  50 mg Oral Daily    mupirocin   Nasal BID    sulfamethoxazole-trimethoprim 400-80mg  1 tablet Oral Daily    valGANciclovir  450 mg Oral Daily     PRN Meds:dextrose 10%, dextrose 10%, dextrose 10%, dextrose 10%, glucagon (human recombinant), glucose, glucose, hydrALAZINE, insulin aspart U-100, labetalol, magnesium oxide, magnesium oxide, morphine, oxyCODONE, potassium bicarbonate, potassium bicarbonate, potassium bicarbonate, potassium, sodium phosphates, potassium, sodium phosphates, potassium, sodium phosphates     Review of Systems  Objective:     Weight: 93.2 kg (205 lb 7.5 oz)  Body mass index is 28.66 kg/m².  Vital Signs (Most Recent):  Temp: 98.3 °F (36.8 °C) (10/28/23 1505)  Pulse: 65 (10/28/23 1605)  Resp: 18 (10/28/23 1605)  BP: (!) 158/78 (10/28/23 1605)  SpO2: 96 % (10/28/23 1605) Vital Signs (24h Range):  Temp:  [97.6 °F (36.4 °C)-98.3 °F (36.8 °C)] 98.3 °F (36.8 °C)  Pulse:  [65-83] 65  Resp:  [14-24] 18  SpO2:  [93 %-97 %] 96 %  BP: (115-161)/(59-85) 158/78  Arterial Line BP: (102-149)/(53-67) 134/67                              Closed/Suction Drain 10/26/23 0942 Left Scalp Accordion 10 Fr. (Active)   Site Description Unable to view 10/28/23 0305   Dressing Type Other (Comment) 10/28/23 0305   Dressing Status Clean;Dry;Intact 10/28/23 0305   Dressing Intervention Integrity maintained 10/28/23 0305   Drainage None 10/28/23 0305   Status Other (Comment) 10/28/23 0305   Output (mL) 10 mL 10/27/23 1801          Physical Exam         Neurosurgery Physical Exam    Neurosurgery Physical Exam    General: well developed, well nourished, no distress.   HEENT: normocephalic, atraumatic  CV: regular  "rate   Pulmonary: normal respirations, no signs of respiratory distress  Abdomen: soft, non-distended, not tender to palpation  Skin: Skin is warm, dry and intact  Heme: no bruising    Neuro:   Mental Status: AO x4, no aphasia, no dysarthria   CN: PERRL, EOMI, VF intact to confrontation, sensation intact bilaterally, eyebrow raise and grimace symmetric, tongue midline  Motor: moves all extremities spontaneously, full strength throughout, no pronator drift   Sensory: intact to light touch throughout  Cerebellar: finger to nose intact bilaterally  Reflexes: -Frias's, -Babinski, no clonus. Patellar: 2+ bilaterally   Gait: deferred     Incision: Clean, dry, intact. Skin edges well approximated. HV in place      Significant Labs:  Recent Labs   Lab 10/27/23  0039 10/28/23  0008   * 287*   * 134*   K 4.1 3.4*    104   CO2 21* 18*   BUN 17 18   CREATININE 0.8 0.8   CALCIUM 8.6* 8.4*   MG 1.8 1.8     Recent Labs   Lab 10/27/23  0039 10/28/23  0008   WBC 7.85 8.76   HGB 12.3* 12.3*   HCT 37.5* 37.2*   * 153     No results for input(s): "LABPT", "INR", "APTT" in the last 48 hours.  Microbiology Results (last 7 days)       ** No results found for the last 168 hours. **          All pertinent labs from the last 24 hours have been reviewed.    Significant Diagnostics:  MRI w/wo 10/27  Impression:     Postsurgical change recent left frontal extra-axial mass resection as above, without evidence of residual enhancing lesion.     Persistent large region of vasogenic edema throughout the left frontal lobe, but notably improved parenchymal enhancement subjacent to the surgical site.       Assessment/Plan:     * Left frontal lobe mass  Mr Chávez is a 63M w/ hx IDDM, HTN, L basal ganglia ICH (in 2014, no residual deficits), HCC/EtOH cirrhosis (s/p liver transplant on 8/10/23 complicated by hepatic art stenosis s/p angioplasty/stent on 10/2/23), non-melanoma skin cancer who presents as transfer from OSH for L " frontal mass after 2 weeks of confusion. Last dose ASA, plavix 10/24 AM. Platelets 86 on admit. Neuro intact on initial exam other than higher order confusion.      CT and MRI w/wo at OSH showed a 3 cm L frontal pole enhancing extraaxial mass with some adjacent intraaxial enhancement, as well as considerable surrounding edema with resultant mass effect and midline shift. Ddx includes metastasis vs meningioma, although brain mets from HCC are uncommon and cancer had not previously metastasized as far as pt and family were aware.    S/p L frontal crani for tumor resection 10/26    CTH 10/26: expected post surgical changes  MRI w/wo 10/27: GTR, improved/reduced intraparenchymal enhancement, persistent large vasogenic edema    --Admitted NCC with q1h neurochecks, stable for stepdown  --Transplant service consulted for comanagement of transplant meds and medical assistance  --Endocrine consulted to assist with insulin management  --Dex 4 q8h for cerebral edema  --Keppra 500 BID  --Hold ASA/Plavix for at least 1 week post op -- will discuss restart with transplant team  --f/u HV drain  --f/u OR path  --DVT ppx: SCDs, SQH    Dispo: TTF to [facility] ordered    Discussed with staff Dr. Zane Osuna MD  Neurosurgery  [facility]

## 2023-10-28 NOTE — PROGRESS NOTES
"[facility]  Liver Transplant  Progress Note    Patient Name: Jed Chávez  MRN: [id number]  Admission Date: 10/24/2023  Hospital Length of Stay: 4 days  Code Status: Full Code  Primary Care Provider: Alee Pink MD  Post-Operative Day: 79    ORGAN:   LIVER  Disease Etiology: Primary Liver Malignancy: Hepatoma (HCC) and Cirrhosis  Donor Type:   Donation after Circulatory Death   CDC High Risk:   No  Donor CMV Status:   Donor CMV Status: Positive  Donor HBcAB:   Negative  Donor HCV Status:   Negative  Donor HBV SURENDRA:   Donor HCV SURENDRA: Negative  Whole or Partial: Whole Liver  Biliary Anastomosis: End to End  Arterial Anatomy: Standard  Subjective:     History of Present Illness:   Mr Chávez is a 63M w/ hx IDDM, HTN, L basal ganglia ICH (in 2014, no residual deficits), HCC/EtOH cirrhosis (s/p liver transplant on 8/10/23 complicated by hepatic art stenosis s/p angioplasty/stent on 10/2/23), non-melanoma skin cancer who presents as transfer from OSH for L frontal mass after 2 weeks of confusion. Interview conducted with patient as well as his wife at bedside. Patient unable to give good description of the confusion he has been experiencing - per his wife he has been "in a fog" for the last 2 weeks, often saying "ok" inappropriately in response to questions and exhibiting strange behaviors such as turning the lights on/off. She says his personality is different and he is less excited about things than usual. On 10/23, he had an episode where he had worsened confusion and agitation while out at a store, he later did not recall going out. The patient and his wife deny any weakness, numbness, speech difficulty, vision changes, or seizure activity.      He has a recent liver transplant and hepatic artery stent, was on ASA already and started plavix after recent stent placement. Last dose 10/24 AM, which patient was able to recall taking. Platelets 86 on admit.      CT and MRI w/wo at OSH show a " 3 cm L frontal pole enhancing extraaxial mass with some adjacent intraaxial enhancement, as well as considerable surrounding edema with mass effect and midline shift.       Hospital Course:  Patient is s/p Left parasagittal extra-axial tumor and cerebral edema, prelim meningioma. Pt is currently in NICU and transplant managing IS and LFTs    Interval history:   NAEON. Patient feeling well this morning, requesting to eat.  He is AOx4. LFTs WNL. Tac up to 11 this morning, will hold today. Cont hold MMF, will be removed from medication list POD 90. ASA and Plavix for HAS with metal stent on hold, will default to Neurosurgery when ok to restart. Awaiting pathology results from [facility]. WC following for abdominal lesions.       Scheduled Meds:   acetaminophen  1,000 mg Oral TID    carvediloL  6.25 mg Oral BID WM    ceFAZolin (ANCEF) IVPB  2 g Intravenous Q8H    dexAMETHasone  4 mg Oral Q8H    docusate sodium  100 mg Oral BID    famotidine  20 mg Oral BID    heparin (porcine)  5,000 Units Subcutaneous Q8H    insulin aspart U-100  12 Units Subcutaneous TIDWM    insulin detemir U-100  20 Units Subcutaneous Daily    levETIRAcetam  500 mg Oral Q12H    losartan  50 mg Oral Daily    mupirocin   Nasal BID    sulfamethoxazole-trimethoprim 400-80mg  1 tablet Oral Daily    valGANciclovir  450 mg Oral Daily     Continuous Infusions:  PRN Meds:dextrose 10%, dextrose 10%, dextrose 10%, dextrose 10%, glucagon (human recombinant), glucose, glucose, hydrALAZINE, insulin aspart U-100, labetalol, magnesium oxide, magnesium oxide, morphine, oxyCODONE, potassium bicarbonate, potassium bicarbonate, potassium bicarbonate, potassium, sodium phosphates, potassium, sodium phosphates, potassium, sodium phosphates    Review of Systems   Constitutional:  Negative for appetite change, fatigue and fever.   Respiratory:  Negative for cough and shortness of breath.    Cardiovascular:  Negative for chest pain and leg swelling.    Gastrointestinal:  Negative for abdominal pain, nausea and vomiting.   Genitourinary:  Negative for decreased urine volume, dysuria and frequency.   Skin:  Positive for wound.   Allergic/Immunologic: Positive for immunocompromised state.   Neurological:  Negative for dizziness and weakness.   All other systems reviewed and are negative.    Objective:     Vital Signs (Most Recent):  Temp: 97.6 °F (36.4 °C) (10/28/23 0305)  Pulse: 71 (10/28/23 0405)  Resp: 20 (10/28/23 0405)  BP: 119/63 (10/28/23 0405)  SpO2: 95 % (10/28/23 0405) Vital Signs (24h Range):  Temp:  [97.6 °F (36.4 °C)-97.8 °F (36.6 °C)] 97.6 °F (36.4 °C)  Pulse:  [68-83] 71  Resp:  [13-24] 20  SpO2:  [93 %-98 %] 95 %  BP: (115-161)/(50-85) 119/63  Arterial Line BP: (102-149)/(50-67) 134/67     Weight: 93.2 kg (205 lb 7.5 oz)  Body mass index is 28.66 kg/m².    Intake/Output - Last 3 Shifts         10/26 0700  10/27 0659 10/27 0700  10/28 0659 10/28 0700  10/29 0659    P.O. 180 1550     I.V. (mL/kg) 3431.8 (36.8) 2242.7 (24.1)     Blood 200      IV Piggyback 1698.5 91.7     Total Intake(mL/kg) 5510.3 (59.1) 3884.3 (41.7)     Urine (mL/kg/hr) 3130 (1.4) 2430 (1.1)     Drains 170 10     Stool 0 0     Total Output 3300 2440     Net +2210.3 +1444.3            Stool Occurrence 0 x 0 x              Physical Exam  Vitals and nursing note reviewed.   Constitutional:       General: He is not in acute distress.  HENT:      Head: Normocephalic.      Mouth/Throat:      Mouth: Mucous membranes are moist.   Eyes:      General: No scleral icterus.     Extraocular Movements: Extraocular movements intact.      Conjunctiva/sclera: Conjunctivae normal.   Cardiovascular:      Rate and Rhythm: Normal rate.      Pulses: Normal pulses.   Pulmonary:      Effort: Pulmonary effort is normal. No respiratory distress.   Abdominal:      General: There is no distension.      Palpations: Abdomen is soft.      Tenderness: There is no abdominal tenderness.      Comments: Lesion under  chevron incision.    Musculoskeletal:         General: No swelling. Normal range of motion.      Cervical back: Normal range of motion.   Skin:     General: Skin is warm and dry.   Neurological:      Mental Status: He is alert and oriented to person, place, and time.      Motor: No weakness.   Psychiatric:         Mood and Affect: Mood normal.         Behavior: Behavior normal.          Laboratory:  Immunosuppressants       None          CBC:   Recent Labs   Lab 10/28/23  0008   WBC 8.76   RBC 4.63   HGB 12.3*   HCT 37.2*      MCV 80*   MCH 26.6*   MCHC 33.1     CMP:   Recent Labs   Lab 10/28/23  0008   *   CALCIUM 8.4*   ALBUMIN 3.5   PROT 6.2   *   K 3.4*   CO2 18*      BUN 18   CREATININE 0.8   ALKPHOS 87   ALT 19   AST 14   BILITOT 0.7     Labs within the past 24 hours have been reviewed.    Diagnostic Results:  None    Debility/Functional status: No debility noted.          Assessment/Plan:     * Left frontal lobe mass  - see brain tumor       Brain tumor  - s/p left parasagittal extra-axial tumor  - followed by NICU      Anemia of chronic disease  - h/h stable    Prophylactic immunotherapy  - see Long term immuno      Long-term use of immunosuppressant medication  - Chronic Prophylactic Immunosuppression- cont to check prograf level daily.  Assess for toxicity and adjust level as needed  - cont to hold Cellcept      At risk for opportunistic infections  - cont OI prophylaxis      S/P liver transplant  - s/p liver transplant  - LFTs wnl      Adrenal cortical steroids causing adverse effect in therapeutic use  - endocrine consulted, appreciate assistance       Type 2 diabetes mellitus with other specified complication, with chronic insulin use  - endo consulted and following          VTE Risk Mitigation (From admission, onward)         Ordered     heparin (porcine) injection 5,000 Units  Every 8 hours         10/26/23 0959     IP VTE HIGH RISK PATIENT  Once         10/26/23 0959      Place sequential compression device  Until discontinued         10/26/23 0959     Place sequential compression device  Until discontinued         10/24/23 9780                The patients clinical status was discussed at multidisplinary rounds, involving transplant surgery, transplant medicine, pharmacy, nursing, nutrition, and social work.    Discharge Planning:  Per [facility]     Medical decision making for this encounter includes review of pertinent labs and diagnostic studies, assessment and planning, discussions with consulting providers, discussion with patient/family, and participation in multidisciplinary rounds. Time spent caring for patient: 30 minutes.    Hallie Acosta PA-C  Liver Transplant  [facility]

## 2023-10-28 NOTE — SUBJECTIVE & OBJECTIVE
Interval History:  D/C insulin drip, aspart 12 units with meals, 20 detemir daily    Review of Systems   Constitutional: Negative.    HENT: Negative.     Eyes: Negative.    Respiratory: Negative.     Cardiovascular: Negative.    Gastrointestinal: Negative.    Endocrine: Negative.    Genitourinary: Negative.    Musculoskeletal: Negative.    Skin: Negative.      2 systems     Vitals:  Temp: 98.3 °F (36.8 °C)  Pulse: 67  BP: 138/80  MAP (mmHg): 103  Resp: 20  SpO2: 96 %    Temp  Min: 97.6 °F (36.4 °C)  Max: 98.3 °F (36.8 °C)  Pulse  Min: 67  Max: 83  BP  Min: 115/60  Max: 161/64  MAP (mmHg)  Min: 80  Max: 104  Resp  Min: 14  Max: 24  SpO2  Min: 93 %  Max: 97 %    10/27 0701 - 10/28 0700  In: 3884.3 [P.O.:1550; I.V.:2242.7]  Out: 2440 [Urine:2430; Drains:10]   Unmeasured Output  Urine Occurrence: 2  Stool Occurrence: 0        Physical Exam  Vitals and nursing note reviewed.   Constitutional:       Appearance: Normal appearance.   HENT:      Head: Normocephalic.      Nose: Nose normal.      Mouth/Throat:      Mouth: Mucous membranes are moist.      Pharynx: Oropharynx is clear.   Eyes:      Pupils: Pupils are equal, round, and reactive to light.   Cardiovascular:      Rate and Rhythm: Normal rate and regular rhythm.      Pulses: Normal pulses.      Heart sounds: Normal heart sounds.   Pulmonary:      Effort: Pulmonary effort is normal.      Breath sounds: Normal breath sounds.   Abdominal:      General: Bowel sounds are normal.      Palpations: Abdomen is soft.   Musculoskeletal:         General: Normal range of motion.   Skin:     General: Skin is warm and dry.      Capillary Refill: Capillary refill takes 2 to 3 seconds.   Neurological:      Mental Status: He is alert.      Comments: GCS 15  AAO X4  PERRL  JAMISON to command and spontaneously  Sensation Intact X4         Unable to test orientation, language, memory, judgment, insight, fund of knowledge, hearing, shoulder shrug, tongue protrusion, coordination, gait due to  level of consciousness.       Medications:  Continuous Scheduledacetaminophen, 1,000 mg, TID  carvediloL, 6.25 mg, BID WM  ceFAZolin (ANCEF) IVPB, 2 g, Q8H  dexAMETHasone, 4 mg, Q8H  docusate sodium, 100 mg, BID  famotidine, 20 mg, BID  heparin (porcine), 5,000 Units, Q8H  insulin aspart U-100, 12 Units, TIDWM  insulin detemir U-100, 20 Units, Daily  levETIRAcetam, 500 mg, Q12H  losartan, 50 mg, Daily  mupirocin, , BID  sulfamethoxazole-trimethoprim 400-80mg, 1 tablet, Daily  valGANciclovir, 450 mg, Daily    PRNdextrose 10%, 12.5 g, PRN  dextrose 10%, 12.5 g, PRN  dextrose 10%, 25 g, PRN  dextrose 10%, 25 g, PRN  glucagon (human recombinant), 1 mg, PRN  glucose, 16 g, PRN  glucose, 24 g, PRN  hydrALAZINE, 10 mg, Q6H PRN  insulin aspart U-100, 0-10 Units, QID (AC + HS) PRN  labetalol, 10 mg, Q4H PRN  magnesium oxide, 800 mg, PRN  magnesium oxide, 800 mg, PRN  morphine, 2 mg, Q4H PRN  oxyCODONE, 5 mg, Q4H PRN  potassium bicarbonate, 35 mEq, PRN  potassium bicarbonate, 50 mEq, PRN  potassium bicarbonate, 60 mEq, PRN  potassium, sodium phosphates, 2 packet, PRN  potassium, sodium phosphates, 2 packet, PRN  potassium, sodium phosphates, 2 packet, PRN      Today I personally reviewed pertinent medications, lines/drains/airways, imaging, cardiology results, laboratory results, microbiology results, notably:    Diet  Diet diabetic Consistent Carbohydrate; 2000 Calorie; Standard Tray  Diet diabetic Consistent Carbohydrate; 2000 Calorie; Standard Tray

## 2023-10-28 NOTE — SUBJECTIVE & OBJECTIVE
Interval History: POD2 doing well, MRI with GTR of extraaxial mass, improved adjacent intraparenchymal enhancement, persistent edema.     Medications:  Continuous Infusions:  Scheduled Meds:   acetaminophen  1,000 mg Oral TID    carvediloL  6.25 mg Oral BID WM    ceFAZolin (ANCEF) IVPB  2 g Intravenous Q8H    dexAMETHasone  4 mg Oral Q8H    docusate sodium  100 mg Oral BID    famotidine  20 mg Oral BID    heparin (porcine)  5,000 Units Subcutaneous Q8H    insulin aspart U-100  12 Units Subcutaneous TIDWM    insulin detemir U-100  20 Units Subcutaneous Daily    levETIRAcetam  500 mg Oral Q12H    losartan  50 mg Oral Daily    mupirocin   Nasal BID    sulfamethoxazole-trimethoprim 400-80mg  1 tablet Oral Daily    valGANciclovir  450 mg Oral Daily     PRN Meds:dextrose 10%, dextrose 10%, dextrose 10%, dextrose 10%, glucagon (human recombinant), glucose, glucose, hydrALAZINE, insulin aspart U-100, labetalol, magnesium oxide, magnesium oxide, morphine, oxyCODONE, potassium bicarbonate, potassium bicarbonate, potassium bicarbonate, potassium, sodium phosphates, potassium, sodium phosphates, potassium, sodium phosphates     Review of Systems  Objective:     Weight: 93.2 kg (205 lb 7.5 oz)  Body mass index is 28.66 kg/m².  Vital Signs (Most Recent):  Temp: 98.3 °F (36.8 °C) (10/28/23 1505)  Pulse: 65 (10/28/23 1605)  Resp: 18 (10/28/23 1605)  BP: (!) 158/78 (10/28/23 1605)  SpO2: 96 % (10/28/23 1605) Vital Signs (24h Range):  Temp:  [97.6 °F (36.4 °C)-98.3 °F (36.8 °C)] 98.3 °F (36.8 °C)  Pulse:  [65-83] 65  Resp:  [14-24] 18  SpO2:  [93 %-97 %] 96 %  BP: (115-161)/(59-85) 158/78  Arterial Line BP: (102-149)/(53-67) 134/67                              Closed/Suction Drain 10/26/23 0942 Left Scalp Accordion 10 Fr. (Active)   Site Description Unable to view 10/28/23 0305   Dressing Type Other (Comment) 10/28/23 0305   Dressing Status Clean;Dry;Intact 10/28/23 0305   Dressing Intervention Integrity maintained 10/28/23 0305  "  Drainage None 10/28/23 0305   Status Other (Comment) 10/28/23 0305   Output (mL) 10 mL 10/27/23 1801          Physical Exam         Neurosurgery Physical Exam    Neurosurgery Physical Exam    General: well developed, well nourished, no distress.   HEENT: normocephalic, atraumatic  CV: regular rate   Pulmonary: normal respirations, no signs of respiratory distress  Abdomen: soft, non-distended, not tender to palpation  Skin: Skin is warm, dry and intact  Heme: no bruising    Neuro:   Mental Status: AO x4, no aphasia, no dysarthria   CN: PERRL, EOMI, VF intact to confrontation, sensation intact bilaterally, eyebrow raise and grimace symmetric, tongue midline  Motor: moves all extremities spontaneously, full strength throughout, no pronator drift   Sensory: intact to light touch throughout  Cerebellar: finger to nose intact bilaterally  Reflexes: -Frias's, -Babinski, no clonus. Patellar: 2+ bilaterally   Gait: deferred     Incision: Clean, dry, intact. Skin edges well approximated. HV in place      Significant Labs:  Recent Labs   Lab 10/27/23  0039 10/28/23  0008   * 287*   * 134*   K 4.1 3.4*    104   CO2 21* 18*   BUN 17 18   CREATININE 0.8 0.8   CALCIUM 8.6* 8.4*   MG 1.8 1.8     Recent Labs   Lab 10/27/23  0039 10/28/23  0008   WBC 7.85 8.76   HGB 12.3* 12.3*   HCT 37.5* 37.2*   * 153     No results for input(s): "LABPT", "INR", "APTT" in the last 48 hours.  Microbiology Results (last 7 days)       ** No results found for the last 168 hours. **          All pertinent labs from the last 24 hours have been reviewed.    Significant Diagnostics:  MRI w/wo 10/27  Impression:     Postsurgical change recent left frontal extra-axial mass resection as above, without evidence of residual enhancing lesion.     Persistent large region of vasogenic edema throughout the left frontal lobe, but notably improved parenchymal enhancement subjacent to the surgical site.     "

## 2023-10-28 NOTE — PT/OT/SLP EVAL
"Physical Therapy Evaluation and Treatment    Patient Name:  Jed Chávez   MRN:  [id number]    Recommendations:     Discharge Recommendations: Low Intensity Therapy  Discharge Equipment Recommendations: shower chair   Barriers to discharge: None    Assessment:     Jed Chávez is a 63 y.o. male admitted with a medical diagnosis of Left frontal lobe mass. He presents with the following impairments/functional limitations: weakness, impaired endurance, impaired functional mobility, gait instability, impaired balance, impaired cognition, decreased safety awareness. Pt with excellent tolerance to PT evaluation on this date. Pt highly motivated to progress with OOB mobility and return to PLOF. Pt requiring no physical assist for functional mobility and demonstrates good dynamic postural stability. Ambulation distance limited on this date by medical lines in ICU. Recommend low intensity therapy following discharge once medically stable in order to reduce above impairments, reduce fall risk, reduce caregiver burden, and maximize functional independence. Pt would continue to benefit from skilled acute PT in order to address current deficits and progress functional mobility.     Rehab Prognosis: Good; patient would benefit from acute skilled PT services 3 x/week to address these deficits and reach maximum level of function.  Recent Surgery: Procedure(s) (LRB):  CRANIOTOMY, WITH NEOPLASM EXCISION USING COMPUTER-ASSISTED NAVIGATION (Left) 2 Days Post-Op    Plan:     During this hospitalization, patient to be seen 3 x/week to address the identified rehab impairments via gait training, therapeutic activities, therapeutic exercises, neuromuscular re-education and progress toward the following goals:    Plan of Care Expires:  11/28/23    Subjective     Chief Complaint: Wanting breakfast  Patient/Family Comments/Goals: "I want to get out of this bed and be free from the lines."  Pain/Comfort:  Pain Rating 1: 0/10    Patients " cultural, spiritual, Taoist conflicts given the current situation: no    Living Environment:  Living Environment: Patient lives with their spouse in a single story house with number of outside stair(s): threshold and walk-in shower.  Prior Level of Function: Prior to admission, patient was independent.  Equipment Used at Home: none.  DME owned (not currently used): none  Assistance Upon Discharge: significant other and family    Objective:     Communicated with nursing prior to session. Patient found HOB elevated with blood pressure cuff, pulse ox (continuous), telemetry, arterial line, peripheral IV upon PT entry to room.    General Precautions: Standard, fall  Orthopedic Precautions:N/A    Braces: N/A    Exams:  Cognitive Exam:  Patient is oriented to Person, Place, Time, Situation, follows commands 100% of the time  RLE ROM: WFL  RLE Strength: WFL  LLE ROM: WFL  LLE Strength: WFL  Sensation: Intact light touch to BLEs    Functional Mobility:  Bed Mobility:  Increased time needed for completion  Supine to Sit: stand by assistance  Sit to Supine: stand by assistance  Transfers:    Sit to Stand: stand by assistance with no AD with cues for hand placement  Gait: Patient ambulated 60' in hospital room with no AD and stand by assistance.   Patient demonstrates steady gait, decreased step length, narrow base of support, decreased weight shift, flexed posture, decreased tayler, and decreased arm swing.   Patient required cues for upright posture, gluteal activation, sequencing, increased step size, and increased foot clearance  All lines remained intact throughout ambulation trial.    Therapeutic Activities and Exercises:  Patient educated on role of acute care PT and PT POC, safety while in hospital including calling nurse for mobility, and call light usage  Pt educated on the effects of bed rest and the importance of OOB activity. Pt encouraged to sit UIC majority of day as tolerated and continue daily ambulation  with nursing assist. Pt verbalized understanding.  Answered all questions within PT scope of practice and addressed functional mobility concerns.  Upon PT entry to room, pt's PIV out and running onto bed linens. RN notified to bedside.    AM-PAC 6 CLICK MOBILITY  Total Score:18     Patient left HOB elevated with all lines intact, call button in reach, RN notified, and RN present.    GOALS:   Multidisciplinary Problems       Physical Therapy Goals          Problem: Physical Therapy    Goal Priority Disciplines Outcome Goal Variances Interventions   Physical Therapy Goal     PT, PT/OT Ongoing, Progressing     Description: Goals to be met by: 2023     Patient will increase functional independence with mobility by performin. Supine to sit with [place]  2. Sit to supine with [place]  3. Sit to stand transfer with [place]  4. Gait  x 300 feet with [place] using LRAD.   5. Lower extremity exercise program x15 reps per handout, with independence                         History:     Past Medical History:   Diagnosis Date    Alcoholic cirrhosis     CVA (cerebral vascular accident)     DM (diabetes mellitus)     Dyslipidemia     Hepatocellular carcinoma     HTN (hypertension)     Intracranial hemorrhage     Skin cancer        Past Surgical History:   Procedure Laterality Date    CRANIOTOMY, WITH NEOPLASM EXCISION USING COMPUTER-ASSISTED NAVIGATION Left 10/26/2023    Procedure: CRANIOTOMY, WITH NEOPLASM EXCISION USING COMPUTER-ASSISTED NAVIGATION;  Surgeon: Jose E Degroot DO;  Location: [facility] OR [address] [place] [address];  Service: Neurosurgery;  Laterality: Left;    HERNIA REPAIR N/A     LIVER TRANSPLANT N/A 2023    Procedure: TRANSPLANT, LIVER;  Surgeon: Ameya Suero MD;  Location: [facility] OR [address] [place] [address];  Service: Transplant;  Laterality: N/A;       Time Tracking:     PT Received On: 10/28/23  PT Start Time: 0747     PT Stop Time: 0810  PT Total Time (min): 23 min     Billable Minutes: Evaluation 10  Therapeutic Activity 13      10/28/2023

## 2023-10-28 NOTE — PLAN OF CARE
PT evaluation complete and appropriate goals established.    Problem: Physical Therapy  Goal: Physical Therapy Goal  Description: Goals to be met by: 2023     Patient will increase functional independence with mobility by performin. Supine to sit with [place]  2. Sit to supine with [place]  3. Sit to stand transfer with [place]  4. Gait  x 300 feet with [place] using LRAD.   5. Lower extremity exercise program x15 reps per handout, with independence    Outcome: Ongoing, Progressing     10/28/2023

## 2023-10-28 NOTE — ASSESSMENT & PLAN NOTE
Mr Kyler is a 63M w/ hx IDDM, HTN, L basal ganglia ICH (in 2014, no residual deficits), HCC/EtOH cirrhosis (s/p liver transplant on 8/10/23 complicated by hepatic art stenosis s/p angioplasty/stent on 10/2/23), non-melanoma skin cancer who presents as transfer from OSH for L frontal mass after 2 weeks of confusion. Last dose ASA, plavix 10/24 AM. Platelets 86 on admit. Neuro intact on initial exam other than higher order confusion.      CT and MRI w/wo at OSH showed a 3 cm L frontal pole enhancing extraaxial mass with some adjacent intraaxial enhancement, as well as considerable surrounding edema with resultant mass effect and midline shift. Ddx includes metastasis vs meningioma, although brain mets from HCC are uncommon and cancer had not previously metastasized as far as pt and family were aware.    S/p L frontal crani for tumor resection 10/26    CTH 10/26: expected post surgical changes  MRI w/wo 10/27: GTR, improved/reduced intraparenchymal enhancement, persistent large vasogenic edema    --Admitted NCC with q1h neurochecks, stable for stepdown  --Transplant service consulted for comanagement of transplant meds and medical assistance  --Endocrine consulted to assist with insulin management  --Dex 4 q8h for cerebral edema  --Keppra 500 BID  --Hold ASA/Plavix for at least 1 week post op -- will discuss restart with transplant team  --f/u HV drain  --f/u OR path  --DVT ppx: SCDs, SQH    Dispo: TTF to [facility] ordered    Discussed with staff Dr. Degroot

## 2023-10-28 NOTE — SUBJECTIVE & OBJECTIVE
Scheduled Meds:   acetaminophen  1,000 mg Oral TID    carvediloL  6.25 mg Oral BID WM    ceFAZolin (ANCEF) IVPB  2 g Intravenous Q8H    dexAMETHasone  4 mg Oral Q8H    docusate sodium  100 mg Oral BID    famotidine  20 mg Oral BID    heparin (porcine)  5,000 Units Subcutaneous Q8H    insulin aspart U-100  12 Units Subcutaneous TIDWM    insulin detemir U-100  20 Units Subcutaneous Daily    levETIRAcetam  500 mg Oral Q12H    losartan  50 mg Oral Daily    mupirocin   Nasal BID    sulfamethoxazole-trimethoprim 400-80mg  1 tablet Oral Daily    valGANciclovir  450 mg Oral Daily     Continuous Infusions:  PRN Meds:dextrose 10%, dextrose 10%, dextrose 10%, dextrose 10%, glucagon (human recombinant), glucose, glucose, hydrALAZINE, insulin aspart U-100, labetalol, magnesium oxide, magnesium oxide, morphine, oxyCODONE, potassium bicarbonate, potassium bicarbonate, potassium bicarbonate, potassium, sodium phosphates, potassium, sodium phosphates, potassium, sodium phosphates    Review of Systems   Constitutional:  Negative for appetite change, fatigue and fever.   Respiratory:  Negative for cough and shortness of breath.    Cardiovascular:  Negative for chest pain and leg swelling.   Gastrointestinal:  Negative for abdominal pain, nausea and vomiting.   Genitourinary:  Negative for decreased urine volume, dysuria and frequency.   Skin:  Positive for wound.   Allergic/Immunologic: Positive for immunocompromised state.   Neurological:  Negative for dizziness and weakness.   All other systems reviewed and are negative.    Objective:     Vital Signs (Most Recent):  Temp: 97.6 °F (36.4 °C) (10/28/23 0305)  Pulse: 71 (10/28/23 0405)  Resp: 20 (10/28/23 0405)  BP: 119/63 (10/28/23 0405)  SpO2: 95 % (10/28/23 0405) Vital Signs (24h Range):  Temp:  [97.6 °F (36.4 °C)-97.8 °F (36.6 °C)] 97.6 °F (36.4 °C)  Pulse:  [68-83] 71  Resp:  [13-24] 20  SpO2:  [93 %-98 %] 95 %  BP: (115-161)/(50-85) 119/63  Arterial Line BP: (102-149)/(50-67)  134/67     Weight: 93.2 kg (205 lb 7.5 oz)  Body mass index is 28.66 kg/m².    Intake/Output - Last 3 Shifts         10/26 0700  10/27 0659 10/27 0700  10/28 0659 10/28 0700  10/29 0659    P.O. 180 1550     I.V. (mL/kg) 3431.8 (36.8) 2242.7 (24.1)     Blood 200      IV Piggyback 1698.5 91.7     Total Intake(mL/kg) 5510.3 (59.1) 3884.3 (41.7)     Urine (mL/kg/hr) 3130 (1.4) 2430 (1.1)     Drains 170 10     Stool 0 0     Total Output 3300 2440     Net +2210.3 +1444.3            Stool Occurrence 0 x 0 x              Physical Exam  Vitals and nursing note reviewed.   Constitutional:       General: He is not in acute distress.  HENT:      Head: Normocephalic.      Mouth/Throat:      Mouth: Mucous membranes are moist.   Eyes:      General: No scleral icterus.     Extraocular Movements: Extraocular movements intact.      Conjunctiva/sclera: Conjunctivae normal.   Cardiovascular:      Rate and Rhythm: Normal rate.      Pulses: Normal pulses.   Pulmonary:      Effort: Pulmonary effort is normal. No respiratory distress.   Abdominal:      General: There is no distension.      Palpations: Abdomen is soft.      Tenderness: There is no abdominal tenderness.      Comments: Lesion under chevron incision.    Musculoskeletal:         General: No swelling. Normal range of motion.      Cervical back: Normal range of motion.   Skin:     General: Skin is warm and dry.   Neurological:      Mental Status: He is alert and oriented to person, place, and time.      Motor: No weakness.   Psychiatric:         Mood and Affect: Mood normal.         Behavior: Behavior normal.          Laboratory:  Immunosuppressants       None          CBC:   Recent Labs   Lab 10/28/23  0008   WBC 8.76   RBC 4.63   HGB 12.3*   HCT 37.2*      MCV 80*   MCH 26.6*   MCHC 33.1     CMP:   Recent Labs   Lab 10/28/23  0008   *   CALCIUM 8.4*   ALBUMIN 3.5   PROT 6.2   *   K 3.4*   CO2 18*      BUN 18   CREATININE 0.8   ALKPHOS 87   ALT 19   AST  14   BILITOT 0.7     Labs within the past 24 hours have been reviewed.    Diagnostic Results:  None    Debility/Functional status: No debility noted.

## 2023-10-28 NOTE — ASSESSMENT & PLAN NOTE
Pt has hx of DM2    - SSI  - Diabetic diet  - D/C insulin drip  - Aspart 12 units with meals  - Detemir 20 units daily

## 2023-10-29 LAB
ALBUMIN SERPL BCP-MCNC: 3.3 G/DL (ref 3.5–5.2)
ALP SERPL-CCNC: 76 U/L (ref 55–135)
ALT SERPL W/O P-5'-P-CCNC: 16 U/L (ref 10–44)
ANION GAP SERPL CALC-SCNC: 11 MMOL/L (ref 8–16)
AST SERPL-CCNC: 32 U/L (ref 10–40)
BASOPHILS # BLD AUTO: 0.01 K/UL (ref 0–0.2)
BASOPHILS NFR BLD: 0.2 % (ref 0–1.9)
BILIRUB SERPL-MCNC: 0.5 MG/DL (ref 0.1–1)
BLD PROD TYP BPU: NORMAL
BLD PROD TYP BPU: NORMAL
BLOOD UNIT EXPIRATION DATE: NORMAL
BLOOD UNIT EXPIRATION DATE: NORMAL
BLOOD UNIT TYPE CODE: 7300
BLOOD UNIT TYPE CODE: 7300
BLOOD UNIT TYPE: NORMAL
BLOOD UNIT TYPE: NORMAL
BUN SERPL-MCNC: 15 MG/DL (ref 8–23)
CALCIUM SERPL-MCNC: 8.3 MG/DL (ref 8.7–10.5)
CHLORIDE SERPL-SCNC: 106 MMOL/L (ref 95–110)
CO2 SERPL-SCNC: 18 MMOL/L (ref 23–29)
CODING SYSTEM: NORMAL
CODING SYSTEM: NORMAL
CREAT SERPL-MCNC: 0.9 MG/DL (ref 0.5–1.4)
CROSSMATCH INTERPRETATION: NORMAL
CROSSMATCH INTERPRETATION: NORMAL
DIFFERENTIAL METHOD: ABNORMAL
DISPENSE STATUS: NORMAL
DISPENSE STATUS: NORMAL
EOSINOPHIL # BLD AUTO: 0 K/UL (ref 0–0.5)
EOSINOPHIL NFR BLD: 0 % (ref 0–8)
ERYTHROCYTE [DISTWIDTH] IN BLOOD BY AUTOMATED COUNT: 15.8 % (ref 11.5–14.5)
EST. GFR  (NO RACE VARIABLE): >60 ML/MIN/1.73 M^2
GLUCOSE SERPL-MCNC: 339 MG/DL (ref 70–110)
HCT VFR BLD AUTO: 36.7 % (ref 40–54)
HGB BLD-MCNC: 11.9 G/DL (ref 14–18)
IMM GRANULOCYTES # BLD AUTO: 0.02 K/UL (ref 0–0.04)
IMM GRANULOCYTES NFR BLD AUTO: 0.4 % (ref 0–0.5)
LYMPHOCYTES # BLD AUTO: 0.9 K/UL (ref 1–4.8)
LYMPHOCYTES NFR BLD: 17.2 % (ref 18–48)
MAGNESIUM SERPL-MCNC: 1.9 MG/DL (ref 1.6–2.6)
MCH RBC QN AUTO: 26.7 PG (ref 27–31)
MCHC RBC AUTO-ENTMCNC: 32.4 G/DL (ref 32–36)
MCV RBC AUTO: 83 FL (ref 82–98)
MONOCYTES # BLD AUTO: 0.4 K/UL (ref 0.3–1)
MONOCYTES NFR BLD: 6.5 % (ref 4–15)
NEUTROPHILS # BLD AUTO: 4.1 K/UL (ref 1.8–7.7)
NEUTROPHILS NFR BLD: 75.7 % (ref 38–73)
NRBC BLD-RTO: 0 /100 WBC
NUM UNITS TRANS PACKED RBC: NORMAL
NUM UNITS TRANS PACKED RBC: NORMAL
PHOSPHATE SERPL-MCNC: 2.9 MG/DL (ref 2.7–4.5)
PLATELET # BLD AUTO: 94 K/UL (ref 150–450)
PMV BLD AUTO: 9.9 FL (ref 9.2–12.9)
POCT GLUCOSE: 202 MG/DL (ref 70–110)
POCT GLUCOSE: 251 MG/DL (ref 70–110)
POCT GLUCOSE: 262 MG/DL (ref 70–110)
POCT GLUCOSE: 319 MG/DL (ref 70–110)
POCT GLUCOSE: 338 MG/DL (ref 70–110)
POCT GLUCOSE: 345 MG/DL (ref 70–110)
POCT GLUCOSE: 374 MG/DL (ref 70–110)
POTASSIUM SERPL-SCNC: 4.8 MMOL/L (ref 3.5–5.1)
PROT SERPL-MCNC: 6.3 G/DL (ref 6–8.4)
RBC # BLD AUTO: 4.45 M/UL (ref 4.6–6.2)
SODIUM SERPL-SCNC: 135 MMOL/L (ref 136–145)
TACROLIMUS BLD-MCNC: 4.7 NG/ML (ref 5–15)
WBC # BLD AUTO: 5.4 K/UL (ref 3.9–12.7)

## 2023-10-29 PROCEDURE — 83735 ASSAY OF MAGNESIUM: CPT | Performed by: PHYSICIAN ASSISTANT

## 2023-10-29 PROCEDURE — 84100 ASSAY OF PHOSPHORUS: CPT | Performed by: NEUROLOGICAL SURGERY

## 2023-10-29 PROCEDURE — 25000003 PHARM REV CODE 250

## 2023-10-29 PROCEDURE — 11000001 HC ACUTE MED/SURG PRIVATE ROOM

## 2023-10-29 PROCEDURE — 63600175 PHARM REV CODE 636 W HCPCS: Performed by: STUDENT IN AN ORGANIZED HEALTH CARE EDUCATION/TRAINING PROGRAM

## 2023-10-29 PROCEDURE — 63600175 PHARM REV CODE 636 W HCPCS: Performed by: TRANSPLANT SURGERY

## 2023-10-29 PROCEDURE — 63600175 PHARM REV CODE 636 W HCPCS: Performed by: NURSE PRACTITIONER

## 2023-10-29 PROCEDURE — 85025 COMPLETE CBC W/AUTO DIFF WBC: CPT

## 2023-10-29 PROCEDURE — 94761 N-INVAS EAR/PLS OXIMETRY MLT: CPT

## 2023-10-29 PROCEDURE — 80197 ASSAY OF TACROLIMUS: CPT | Performed by: PHYSICIAN ASSISTANT

## 2023-10-29 PROCEDURE — 25000003 PHARM REV CODE 250: Performed by: NURSE PRACTITIONER

## 2023-10-29 PROCEDURE — 63600175 PHARM REV CODE 636 W HCPCS

## 2023-10-29 PROCEDURE — 25000003 PHARM REV CODE 250: Performed by: STUDENT IN AN ORGANIZED HEALTH CARE EDUCATION/TRAINING PROGRAM

## 2023-10-29 PROCEDURE — 80053 COMPREHEN METABOLIC PANEL: CPT | Performed by: PHYSICIAN ASSISTANT

## 2023-10-29 PROCEDURE — 36415 COLL VENOUS BLD VENIPUNCTURE: CPT | Performed by: PHYSICIAN ASSISTANT

## 2023-10-29 RX ORDER — INSULIN ASPART 100 [IU]/ML
16 INJECTION, SOLUTION INTRAVENOUS; SUBCUTANEOUS
Status: DISCONTINUED | OUTPATIENT
Start: 2023-10-30 | End: 2023-10-30

## 2023-10-29 RX ORDER — TACROLIMUS 1 MG/1
2 CAPSULE ORAL 2 TIMES DAILY
Status: DISCONTINUED | OUTPATIENT
Start: 2023-10-29 | End: 2023-10-30

## 2023-10-29 RX ORDER — PROPOFOL 10 MG/ML
0-50 INJECTION, EMULSION INTRAVENOUS CONTINUOUS
Status: DISCONTINUED | OUTPATIENT
Start: 2023-10-29 | End: 2023-10-29

## 2023-10-29 RX ADMIN — HEPARIN SODIUM 5000 UNITS: 5000 INJECTION INTRAVENOUS; SUBCUTANEOUS at 09:10

## 2023-10-29 RX ADMIN — CARVEDILOL 6.25 MG: 6.25 TABLET, FILM COATED ORAL at 05:10

## 2023-10-29 RX ADMIN — LEVETIRACETAM 500 MG: 500 TABLET, FILM COATED ORAL at 09:10

## 2023-10-29 RX ADMIN — ACETAMINOPHEN 1000 MG: 500 TABLET ORAL at 08:10

## 2023-10-29 RX ADMIN — INSULIN DETEMIR 10 UNITS: 100 INJECTION, SOLUTION SUBCUTANEOUS at 09:10

## 2023-10-29 RX ADMIN — HEPARIN SODIUM 5000 UNITS: 5000 INJECTION INTRAVENOUS; SUBCUTANEOUS at 02:10

## 2023-10-29 RX ADMIN — LOSARTAN POTASSIUM 50 MG: 50 TABLET, FILM COATED ORAL at 08:10

## 2023-10-29 RX ADMIN — INSULIN ASPART 12 UNITS: 100 INJECTION, SOLUTION INTRAVENOUS; SUBCUTANEOUS at 05:10

## 2023-10-29 RX ADMIN — LEVETIRACETAM 500 MG: 500 TABLET, FILM COATED ORAL at 08:10

## 2023-10-29 RX ADMIN — INSULIN ASPART 12 UNITS: 100 INJECTION, SOLUTION INTRAVENOUS; SUBCUTANEOUS at 12:10

## 2023-10-29 RX ADMIN — INSULIN ASPART 12 UNITS: 100 INJECTION, SOLUTION INTRAVENOUS; SUBCUTANEOUS at 09:10

## 2023-10-29 RX ADMIN — TACROLIMUS 2 MG: 1 CAPSULE ORAL at 06:10

## 2023-10-29 RX ADMIN — DEXAMETHASONE 4 MG: 4 TABLET ORAL at 06:10

## 2023-10-29 RX ADMIN — CARVEDILOL 6.25 MG: 6.25 TABLET, FILM COATED ORAL at 08:10

## 2023-10-29 RX ADMIN — MUPIROCIN: 20 OINTMENT TOPICAL at 09:10

## 2023-10-29 RX ADMIN — SULFAMETHOXAZOLE AND TRIMETHOPRIM 1 TABLET: 400; 80 TABLET ORAL at 08:10

## 2023-10-29 RX ADMIN — INSULIN DETEMIR 20 UNITS: 100 INJECTION, SOLUTION SUBCUTANEOUS at 09:10

## 2023-10-29 RX ADMIN — DEXAMETHASONE 4 MG: 4 TABLET ORAL at 02:10

## 2023-10-29 RX ADMIN — ACETAMINOPHEN 1000 MG: 500 TABLET ORAL at 02:10

## 2023-10-29 RX ADMIN — INSULIN ASPART 4 UNITS: 100 INJECTION, SOLUTION INTRAVENOUS; SUBCUTANEOUS at 09:10

## 2023-10-29 RX ADMIN — HEPARIN SODIUM 5000 UNITS: 5000 INJECTION INTRAVENOUS; SUBCUTANEOUS at 06:10

## 2023-10-29 RX ADMIN — INSULIN HUMAN 10 UNITS: 100 INJECTION, SOLUTION PARENTERAL at 01:10

## 2023-10-29 RX ADMIN — VALGANCICLOVIR 450 MG: 450 TABLET, FILM COATED ORAL at 08:10

## 2023-10-29 RX ADMIN — DOCUSATE SODIUM 100 MG: 100 CAPSULE, LIQUID FILLED ORAL at 08:10

## 2023-10-29 RX ADMIN — INSULIN ASPART 8 UNITS: 100 INJECTION, SOLUTION INTRAVENOUS; SUBCUTANEOUS at 05:10

## 2023-10-29 RX ADMIN — CEFAZOLIN 2 G: 2 INJECTION, POWDER, FOR SOLUTION INTRAMUSCULAR; INTRAVENOUS at 04:10

## 2023-10-29 RX ADMIN — INSULIN ASPART 8 UNITS: 100 INJECTION, SOLUTION INTRAVENOUS; SUBCUTANEOUS at 12:10

## 2023-10-29 RX ADMIN — FAMOTIDINE 20 MG: 20 TABLET ORAL at 08:10

## 2023-10-29 RX ADMIN — TACROLIMUS 2 MG: 1 CAPSULE ORAL at 12:10

## 2023-10-29 RX ADMIN — CEFAZOLIN 2 G: 2 INJECTION, POWDER, FOR SOLUTION INTRAMUSCULAR; INTRAVENOUS at 12:10

## 2023-10-29 NOTE — PLAN OF CARE
Problem: Adult Inpatient Plan of Care  Goal: Plan of Care Review  Outcome: Ongoing, Progressing  Goal: Patient-Specific Goal (Individualized)  Outcome: Ongoing, Progressing  Goal: Absence of Hospital-Acquired Illness or Injury  Outcome: Ongoing, Progressing  Goal: Optimal Comfort and Wellbeing  Outcome: Ongoing, Progressing  Goal: Readiness for Transition of Care  Outcome: Ongoing, Progressing     Problem: Diabetes Comorbidity  Goal: Blood Glucose Level Within Targeted Range  Outcome: Ongoing, Progressing     Problem: Fall Injury Risk  Goal: Absence of Fall and Fall-Related Injury  Outcome: Ongoing, Progressing     Problem: Impaired Wound Healing  Goal: Optimal Wound Healing  Outcome: Ongoing, Progressing     Problem: Infection  Goal: Absence of Infection Signs and Symptoms  Outcome: Ongoing, Progressing     Problem: Skin Injury Risk Increased  Goal: Skin Health and Integrity  Outcome: Ongoing, Progressing

## 2023-10-29 NOTE — SUBJECTIVE & OBJECTIVE
Interval History: 10/29: POD3 stepped down to NSGY floor with transplant following, remains neuro intact. Got out of bed yesterday, eating well. SG HV zero output. BG in 300s during day today, dex discontinued.    Medications:  Continuous Infusions:  Scheduled Meds:   acetaminophen  1,000 mg Oral TID    carvediloL  6.25 mg Oral BID WM    ceFAZolin (ANCEF) IVPB  2 g Intravenous Q8H    dexAMETHasone  4 mg Oral Q8H    docusate sodium  100 mg Oral BID    famotidine  20 mg Oral BID    heparin (porcine)  5,000 Units Subcutaneous Q8H    insulin aspart U-100  12 Units Subcutaneous TIDWM    insulin detemir U-100  20 Units Subcutaneous Daily    levETIRAcetam  500 mg Oral Q12H    losartan  50 mg Oral Daily    mupirocin   Nasal BID    sulfamethoxazole-trimethoprim 400-80mg  1 tablet Oral Daily    tacrolimus  2 mg Oral BID    valGANciclovir  450 mg Oral Daily     PRN Meds:dextrose 10%, dextrose 10%, dextrose 10%, dextrose 10%, glucagon (human recombinant), glucose, glucose, hydrALAZINE, insulin aspart U-100, labetalol, magnesium oxide, magnesium oxide, morphine, oxyCODONE, potassium bicarbonate, potassium bicarbonate, potassium bicarbonate, potassium, sodium phosphates, potassium, sodium phosphates, potassium, sodium phosphates     Review of Systems  Objective:     Weight: 93.2 kg (205 lb 7.5 oz)  Body mass index is 28.66 kg/m².  Vital Signs (Most Recent):  Temp: 97.2 °F (36.2 °C) (10/29/23 1610)  Pulse: 60 (10/29/23 1610)  Resp: 18 (10/29/23 1610)  BP: (!) 171/86 (10/29/23 1610)  SpO2: 97 % (10/29/23 1610) Vital Signs (24h Range):  Temp:  [96.9 °F (36.1 °C)-98.1 °F (36.7 °C)] 97.2 °F (36.2 °C)  Pulse:  [52-83] 60  Resp:  [14-22] 18  SpO2:  [95 %-97 %] 97 %  BP: (138-171)/(77-90) 171/86     Date 10/29/23 0700 - 10/30/23 0659   Shift 5135-4007 6306-0580 1085-0523 24 Hour Total   INTAKE   Shift Total(mL/kg)       OUTPUT   Urine(mL/kg/hr)  750  750   Emesis/NG output  0  0   Drains 0 0  0   Stool  0  0   Shift Total(mL/kg) 0(0)  "750(8)  750(8)   Weight (kg) 93.2 93.2 93.2 93.2                            Closed/Suction Drain 10/26/23 0942 Left Scalp Accordion 10 Fr. (Active)   Site Description Healing 10/29/23 1400   Dressing Type Other (Comment) 10/29/23 1400   Dressing Status Other (Comment) 10/29/23 1400   Dressing Intervention Other (Comment) 10/29/23 1400   Drainage None 10/29/23 1400   Status Other (Comment) 10/29/23 1400   Output (mL) 0 mL 10/29/23 1716          Physical Exam         Neurosurgery Physical Exam    Neurosurgery Physical Exam     General: well developed, well nourished, no distress.   HEENT: normocephalic, atraumatic  CV: regular rate   Pulmonary: normal respirations, no signs of respiratory distress  Abdomen: soft, non-distended, not tender to palpation  Skin: Skin is warm, dry and intact  Heme: no bruising     Neuro:   Mental Status: AO x4, no aphasia, no dysarthria   CN: PERRL, EOMI, VF intact to confrontation, sensation intact bilaterally, eyebrow raise and grimace symmetric, tongue midline  Motor: moves all extremities spontaneously, full strength throughout, no pronator drift   Sensory: intact to light touch throughout  Cerebellar: finger to nose intact bilaterally  Reflexes: -Frias's, -Babinski, no clonus. Patellar: 2+ bilaterally   Gait: deferred     Incision: Clean, dry, intact. Skin edges well approximated. HV in place    Significant Labs:  Recent Labs   Lab 10/28/23  0008 10/29/23  0100   * 339*   * 135*   K 3.4* 4.8    106   CO2 18* 18*   BUN 18 15   CREATININE 0.8 0.9   CALCIUM 8.4* 8.3*   MG 1.8 1.9     Recent Labs   Lab 10/28/23  0008 10/29/23  0100   WBC 8.76 5.40   HGB 12.3* 11.9*   HCT 37.2* 36.7*    94*     No results for input(s): "LABPT", "INR", "APTT" in the last 48 hours.  Microbiology Results (last 7 days)       ** No results found for the last 168 hours. **          All pertinent labs from the last 24 hours have been reviewed.    Significant Diagnostics:  CT: No " results found in the last 24 hours.

## 2023-10-29 NOTE — PROGRESS NOTES
"[facility] - Neurosurgery ([facility])  Neurosurgery  Progress Note    Subjective:     History of Present Illness: Mr Kyler is a 63M w/ hx IDDM, HTN, L basal ganglia ICH (in 2014, no residual deficits), HCC/EtOH cirrhosis (s/p liver transplant on 8/10/23 complicated by hepatic art stenosis s/p angioplasty/stent on 10/2/23), non-melanoma skin cancer who presents as transfer from OSH for L frontal mass after 2 weeks of confusion. Interview conducted with patient as well as his wife at bedside. Patient unable to give good description of the confusion he has been experiencing - per his wife he has been "in a fog" for the last 2 weeks, often saying "ok" inappropriately in response to questions and exhibiting strange behaviors such as turning the lights on/off. She says his personality is different and he is less excited about things than usual. On 10/23, he had an episode where he had worsened confusion and agitation while out at a store, he later did not recall going out. The patient and his wife deny any weakness, numbness, speech difficulty, vision changes, or seizure activity.     He has a recent liver transplant and hepatic artery stent, was on ASA already and started plavix after recent stent placement. Last dose 10/24 AM, which patient was able to recall taking. Platelets 86 on admit.     CT and MRI w/wo at OSH show a 3 cm L frontal pole enhancing extraaxial mass with some adjacent intraaxial enhancement, as well as considerable surrounding edema with mass effect and midline shift.       Post-Op Info:  Procedure(s) (LRB):  CRANIOTOMY, WITH NEOPLASM EXCISION USING COMPUTER-ASSISTED NAVIGATION (Left)   3 Days Post-Op     Interval History: 10/29: POD3 stepped down to NSGY floor with transplant following, remains neuro intact. Got out of bed yesterday, eating well. SG HV zero output. BG in 300s during day today, dex discontinued.    Medications:  Continuous Infusions:  Scheduled Meds:   acetaminophen  1,000 mg Oral TID "    carvediloL  6.25 mg Oral BID WM    ceFAZolin (ANCEF) IVPB  2 g Intravenous Q8H    dexAMETHasone  4 mg Oral Q8H    docusate sodium  100 mg Oral BID    famotidine  20 mg Oral BID    heparin (porcine)  5,000 Units Subcutaneous Q8H    insulin aspart U-100  12 Units Subcutaneous TIDWM    insulin detemir U-100  20 Units Subcutaneous Daily    levETIRAcetam  500 mg Oral Q12H    losartan  50 mg Oral Daily    mupirocin   Nasal BID    sulfamethoxazole-trimethoprim 400-80mg  1 tablet Oral Daily    tacrolimus  2 mg Oral BID    valGANciclovir  450 mg Oral Daily     PRN Meds:dextrose 10%, dextrose 10%, dextrose 10%, dextrose 10%, glucagon (human recombinant), glucose, glucose, hydrALAZINE, insulin aspart U-100, labetalol, magnesium oxide, magnesium oxide, morphine, oxyCODONE, potassium bicarbonate, potassium bicarbonate, potassium bicarbonate, potassium, sodium phosphates, potassium, sodium phosphates, potassium, sodium phosphates     Review of Systems  Objective:     Weight: 93.2 kg (205 lb 7.5 oz)  Body mass index is 28.66 kg/m².  Vital Signs (Most Recent):  Temp: 97.2 °F (36.2 °C) (10/29/23 1610)  Pulse: 60 (10/29/23 1610)  Resp: 18 (10/29/23 1610)  BP: (!) 171/86 (10/29/23 1610)  SpO2: 97 % (10/29/23 1610) Vital Signs (24h Range):  Temp:  [96.9 °F (36.1 °C)-98.1 °F (36.7 °C)] 97.2 °F (36.2 °C)  Pulse:  [52-83] 60  Resp:  [14-22] 18  SpO2:  [95 %-97 %] 97 %  BP: (138-171)/(77-90) 171/86     Date 10/29/23 0700 - 10/30/23 0659   Shift 3057-6207 2380-6160 2012-6983 24 Hour Total   INTAKE   Shift Total(mL/kg)       OUTPUT   Urine(mL/kg/hr)  750  750   Emesis/NG output  0  0   Drains 0 0  0   Stool  0  0   Shift Total(mL/kg) 0(0) 750(8)  750(8)   Weight (kg) 93.2 93.2 93.2 93.2                            Closed/Suction Drain 10/26/23 0942 Left Scalp Accordion 10 Fr. (Active)   Site Description Healing 10/29/23 1400   Dressing Type Other (Comment) 10/29/23 1400   Dressing Status Other (Comment) 10/29/23 1400  "  Dressing Intervention Other (Comment) 10/29/23 1400   Drainage None 10/29/23 1400   Status Other (Comment) 10/29/23 1400   Output (mL) 0 mL 10/29/23 1716          Physical Exam         Neurosurgery Physical Exam    Neurosurgery Physical Exam     General: well developed, well nourished, no distress.   HEENT: normocephalic, atraumatic  CV: regular rate   Pulmonary: normal respirations, no signs of respiratory distress  Abdomen: soft, non-distended, not tender to palpation  Skin: Skin is warm, dry and intact  Heme: no bruising     Neuro:   Mental Status: AO x4, no aphasia, no dysarthria   CN: PERRL, EOMI, VF intact to confrontation, sensation intact bilaterally, eyebrow raise and grimace symmetric, tongue midline  Motor: moves all extremities spontaneously, full strength throughout, no pronator drift   Sensory: intact to light touch throughout  Cerebellar: finger to nose intact bilaterally  Reflexes: -Frias's, -Babinski, no clonus. Patellar: 2+ bilaterally   Gait: deferred     Incision: Clean, dry, intact. Skin edges well approximated. HV in place    Significant Labs:  Recent Labs   Lab 10/28/23  0008 10/29/23  0100   * 339*   * 135*   K 3.4* 4.8    106   CO2 18* 18*   BUN 18 15   CREATININE 0.8 0.9   CALCIUM 8.4* 8.3*   MG 1.8 1.9     Recent Labs   Lab 10/28/23  0008 10/29/23  0100   WBC 8.76 5.40   HGB 12.3* 11.9*   HCT 37.2* 36.7*    94*     No results for input(s): "LABPT", "INR", "APTT" in the last 48 hours.  Microbiology Results (last 7 days)       ** No results found for the last 168 hours. **          All pertinent labs from the last 24 hours have been reviewed.    Significant Diagnostics:  CT: No results found in the last 24 hours.    Assessment/Plan:     * Left frontal lobe mass  Mr Chávez is a 63M w/ hx IDDM, HTN, L basal ganglia ICH (in 2014, no residual deficits), HCC/EtOH cirrhosis (s/p liver transplant on 8/10/23 complicated by hepatic art stenosis s/p angioplasty/stent on " 10/2/23), non-melanoma skin cancer who presents as transfer from OSH for L frontal mass after 2 weeks of confusion. Last dose ASA, plavix 10/24 AM. Platelets 86 on admit. Neuro intact on initial exam other than higher order confusion.      CT and MRI w/wo at OSH showed a 3 cm L frontal pole enhancing extraaxial mass with some adjacent intraaxial enhancement, as well as considerable surrounding edema with resultant mass effect and midline shift. Ddx includes metastasis vs meningioma, although brain mets from HCC are uncommon and cancer had not previously metastasized as far as pt and family were aware.    S/p L frontal crani for tumor resection 10/26    CTH 10/26: expected post surgical changes  MRI w/wo 10/27: GTR, improved/reduced intraparenchymal enhancement, persistent large vasogenic edema    --Stepped down to NSGY floor  --q4h neuro checks   --Transplant service following for comanagement of transplant meds and medical assistance  --Dex discontinued 10/29 PM in setting of hyperglycemia to 300s, endocrine reconsulted   --Keppra 500 BID  --Hold ASA/Plavix for at least 1 week post op -- will discuss restart with transplant team  --f/u HV drain - will remove today  --f/u OR path  --PT/OT/OOB  --DVT ppx: SCDs, SQH    Dispo: improved hyperglycemia, PT/OT    Discussed with staff Dr. Zane Osuna MD  Neurosurgery  [facility] - Neurosurgery ([facility])

## 2023-10-29 NOTE — NURSING
Pt up to ambulate to bathroom x6 during first 3 hours of shift, including with antibiotic running; spending more time off cardiac/pulse oximetry monitoring than on.  Educated pt to move to sitting position, and then standing slowly, and to sit back down with any signs of weakness, SOB, or dizziness.  Educated pt on fall risk and risk of prolonged hospital stay in the event of the fall.  Fall risk flag placed outside room; utilizing PCT when available.  WCTM

## 2023-10-29 NOTE — CARE UPDATE
Endocrine consult acknowledged. Full consult not to follow in AM.    BG goal 140-180    Patient with DM, dyslipidemia, HTN, L basal ganglia ICH (in 2014, no residual deficits), HCC/EtOH cirrhosis (s/p liver transplant on 8/10/23 complicated by hepatic art stenosis s/p angioplasty/stent on 10/2/23), non-melanoma skin cancer who presents as transfer from OSH for L frontal mass after 2 weeks of confusion. On 10/26, patient had craniotomy with mass excision. Admitted to [facility] and receiving Dex 4 mg q 8 hrs for cerebral edema.       Home Diabetes Medications:  Humalog 8 units TID with meals and correction scale, Lantus 20 units daily, Mounjaro 2.5 mg weekly      Diet diabetic Consistent Carbohydrate; 2000 Calorie; Standard Tray    BG has been significantly above goal ranges on current insulin regimen. Steroids contributing to significant BG excursions. Recommend the following adjustments -    Plan:  -Increase Levemir to 15 units BID (0.7 u/kg dosing)   -Increase Novolog to 20 units TID with meals (rate increased based on prn insulin requirements with scheduled novolog)   -Continue Moderate Dose Correction Scale  -BG monitoring ac/hs/0200 (to allow for overnight correction)        ** Please call Endocrine for any BG related issues **

## 2023-10-29 NOTE — ASSESSMENT & PLAN NOTE
Mr Kyler is a 63M w/ hx IDDM, HTN, L basal ganglia ICH (in 2014, no residual deficits), HCC/EtOH cirrhosis (s/p liver transplant on 8/10/23 complicated by hepatic art stenosis s/p angioplasty/stent on 10/2/23), non-melanoma skin cancer who presents as transfer from OSH for L frontal mass after 2 weeks of confusion. Last dose ASA, plavix 10/24 AM. Platelets 86 on admit. Neuro intact on initial exam other than higher order confusion.      CT and MRI w/wo at OSH showed a 3 cm L frontal pole enhancing extraaxial mass with some adjacent intraaxial enhancement, as well as considerable surrounding edema with resultant mass effect and midline shift. Ddx includes metastasis vs meningioma, although brain mets from HCC are uncommon and cancer had not previously metastasized as far as pt and family were aware.    S/p L frontal crani for tumor resection 10/26    CTH 10/26: expected post surgical changes  MRI w/wo 10/27: GTR, improved/reduced intraparenchymal enhancement, persistent large vasogenic edema    --Stepped down to NSGY floor  --q4h neuro checks   --Transplant service following for comanagement of transplant meds and medical assistance  --Dex discontinued 10/29 PM in setting of hyperglycemia to 300s, endocrine reconsulted   --Keppra 500 BID  --Hold ASA/Plavix for at least 1 week post op -- will discuss restart with transplant team  --f/u HV drain - will remove today  --f/u OR path  --PT/OT/OOB  --DVT ppx: SCDs, SQH    Dispo: improved hyperglycemia, PT/OT    Discussed with staff Dr. Degroot

## 2023-10-29 NOTE — SUBJECTIVE & OBJECTIVE
Scheduled Meds:   acetaminophen  1,000 mg Oral TID    carvediloL  6.25 mg Oral BID WM    ceFAZolin (ANCEF) IVPB  2 g Intravenous Q8H    dexAMETHasone  4 mg Oral Q8H    docusate sodium  100 mg Oral BID    famotidine  20 mg Oral BID    heparin (porcine)  5,000 Units Subcutaneous Q8H    insulin aspart U-100  12 Units Subcutaneous TIDWM    insulin detemir U-100  20 Units Subcutaneous Daily    levETIRAcetam  500 mg Oral Q12H    losartan  50 mg Oral Daily    mupirocin   Nasal BID    sulfamethoxazole-trimethoprim 400-80mg  1 tablet Oral Daily    valGANciclovir  450 mg Oral Daily     Continuous Infusions:  PRN Meds:dextrose 10%, dextrose 10%, dextrose 10%, dextrose 10%, glucagon (human recombinant), glucose, glucose, hydrALAZINE, insulin aspart U-100, labetalol, magnesium oxide, magnesium oxide, morphine, oxyCODONE, potassium bicarbonate, potassium bicarbonate, potassium bicarbonate, potassium, sodium phosphates, potassium, sodium phosphates, potassium, sodium phosphates    Review of Systems   Constitutional:  Negative for appetite change, fatigue and fever.   Respiratory:  Negative for cough and shortness of breath.    Cardiovascular:  Negative for chest pain and leg swelling.   Gastrointestinal:  Negative for abdominal pain, nausea and vomiting.   Genitourinary:  Negative for decreased urine volume, dysuria and frequency.   Skin:  Positive for wound.   Allergic/Immunologic: Positive for immunocompromised state.   Neurological:  Negative for dizziness and weakness.   All other systems reviewed and are negative.    Objective:     Vital Signs (Most Recent):  Temp: 97.2 °F (36.2 °C) (10/29/23 0742)  Pulse: (!) 52 (10/29/23 0742)  Resp: 18 (10/29/23 0742)  BP: (!) 153/81 (10/29/23 0742)  SpO2: 95 % (10/29/23 0742) Vital Signs (24h Range):  Temp:  [96.9 °F (36.1 °C)-98.3 °F (36.8 °C)] 97.2 °F (36.2 °C)  Pulse:  [52-83] 52  Resp:  [14-23] 18  SpO2:  [95 %-97 %] 95 %  BP: (121-160)/(69-87) 153/81     Weight: 93.2 kg (205 lb 7.5  oz)  Body mass index is 28.66 kg/m².    Intake/Output - Last 3 Shifts         10/27 0700  10/28 0659 10/28 0700  10/29 0659 10/29 0700  10/30 0659    P.O. 1550      I.V. (mL/kg) 2242.7 (24.1) 961.9 (10.3)     Blood       IV Piggyback 91.7 93     Total Intake(mL/kg) 3884.3 (41.7) 1054.9 (11.3)     Urine (mL/kg/hr) 2430 (1.1) 2600 (1.2)     Drains 10 0     Stool 0 0     Total Output 2440 2600     Net +1444.3 -1545.1            Stool Occurrence 0 x 5 x              Physical Exam  Vitals and nursing note reviewed.   Constitutional:       General: He is not in acute distress.  HENT:      Head: Normocephalic.      Mouth/Throat:      Mouth: Mucous membranes are moist.   Eyes:      General: No scleral icterus.     Extraocular Movements: Extraocular movements intact.      Conjunctiva/sclera: Conjunctivae normal.   Cardiovascular:      Rate and Rhythm: Normal rate.      Pulses: Normal pulses.   Pulmonary:      Effort: Pulmonary effort is normal. No respiratory distress.   Abdominal:      General: There is no distension.      Palpations: Abdomen is soft.      Tenderness: There is no abdominal tenderness.      Comments: Lesion under chevron incision.    Musculoskeletal:         General: No swelling. Normal range of motion.      Cervical back: Normal range of motion.   Skin:     General: Skin is warm and dry.   Neurological:      Mental Status: He is alert and oriented to person, place, and time.      Motor: No weakness.   Psychiatric:         Mood and Affect: Mood normal.         Behavior: Behavior normal.          Laboratory:  Immunosuppressants       None          CBC:   Recent Labs   Lab 10/29/23  0100   WBC 5.40   RBC 4.45*   HGB 11.9*   HCT 36.7*   PLT 94*   MCV 83   MCH 26.7*   MCHC 32.4     CMP:   Recent Labs   Lab 10/29/23  0100   *   CALCIUM 8.3*   ALBUMIN 3.3*   PROT 6.3   *   K 4.8   CO2 18*      BUN 15   CREATININE 0.9   ALKPHOS 76   ALT 16   AST 32   BILITOT 0.5     Labs within the past 24 hours  have been reviewed.    Diagnostic Results:  None    Debility/Functional status: No debility noted.

## 2023-10-30 VITALS
OXYGEN SATURATION: 94 % | DIASTOLIC BLOOD PRESSURE: 77 MMHG | TEMPERATURE: 98 F | HEIGHT: 71 IN | BODY MASS INDEX: 28.77 KG/M2 | RESPIRATION RATE: 16 BRPM | WEIGHT: 205.5 LBS | SYSTOLIC BLOOD PRESSURE: 128 MMHG | HEART RATE: 67 BPM

## 2023-10-30 LAB
ALBUMIN SERPL BCP-MCNC: 3.5 G/DL (ref 3.5–5.2)
ALP SERPL-CCNC: 75 U/L (ref 55–135)
ALT SERPL W/O P-5'-P-CCNC: 15 U/L (ref 10–44)
ANION GAP SERPL CALC-SCNC: 10 MMOL/L (ref 8–16)
AST SERPL-CCNC: 15 U/L (ref 10–40)
BASOPHILS # BLD AUTO: 0.02 K/UL (ref 0–0.2)
BASOPHILS NFR BLD: 0.4 % (ref 0–1.9)
BILIRUB SERPL-MCNC: 0.6 MG/DL (ref 0.1–1)
BUN SERPL-MCNC: 16 MG/DL (ref 8–23)
CALCIUM SERPL-MCNC: 9 MG/DL (ref 8.7–10.5)
CHLORIDE SERPL-SCNC: 102 MMOL/L (ref 95–110)
CO2 SERPL-SCNC: 24 MMOL/L (ref 23–29)
CREAT SERPL-MCNC: 0.9 MG/DL (ref 0.5–1.4)
DIFFERENTIAL METHOD: ABNORMAL
EOSINOPHIL # BLD AUTO: 0 K/UL (ref 0–0.5)
EOSINOPHIL NFR BLD: 0 % (ref 0–8)
ERYTHROCYTE [DISTWIDTH] IN BLOOD BY AUTOMATED COUNT: 15.8 % (ref 11.5–14.5)
EST. GFR  (NO RACE VARIABLE): >60 ML/MIN/1.73 M^2
GLUCOSE SERPL-MCNC: 294 MG/DL (ref 70–110)
HCT VFR BLD AUTO: 39.6 % (ref 40–54)
HGB BLD-MCNC: 12.9 G/DL (ref 14–18)
IMM GRANULOCYTES # BLD AUTO: 0.03 K/UL (ref 0–0.04)
IMM GRANULOCYTES NFR BLD AUTO: 0.6 % (ref 0–0.5)
LYMPHOCYTES # BLD AUTO: 1.4 K/UL (ref 1–4.8)
LYMPHOCYTES NFR BLD: 25.9 % (ref 18–48)
MAGNESIUM SERPL-MCNC: 1.9 MG/DL (ref 1.6–2.6)
MCH RBC QN AUTO: 27 PG (ref 27–31)
MCHC RBC AUTO-ENTMCNC: 32.6 G/DL (ref 32–36)
MCV RBC AUTO: 83 FL (ref 82–98)
MONOCYTES # BLD AUTO: 0.5 K/UL (ref 0.3–1)
MONOCYTES NFR BLD: 9.1 % (ref 4–15)
NEUTROPHILS # BLD AUTO: 3.4 K/UL (ref 1.8–7.7)
NEUTROPHILS NFR BLD: 64 % (ref 38–73)
NRBC BLD-RTO: 0 /100 WBC
PLATELET # BLD AUTO: 98 K/UL (ref 150–450)
PMV BLD AUTO: 10.3 FL (ref 9.2–12.9)
POCT GLUCOSE: 179 MG/DL (ref 70–110)
POCT GLUCOSE: 218 MG/DL (ref 70–110)
POCT GLUCOSE: 273 MG/DL (ref 70–110)
POTASSIUM SERPL-SCNC: 4.1 MMOL/L (ref 3.5–5.1)
PROT SERPL-MCNC: 6.3 G/DL (ref 6–8.4)
RBC # BLD AUTO: 4.78 M/UL (ref 4.6–6.2)
SODIUM SERPL-SCNC: 136 MMOL/L (ref 136–145)
TACROLIMUS BLD-MCNC: 4.1 NG/ML (ref 5–15)
WBC # BLD AUTO: 5.25 K/UL (ref 3.9–12.7)

## 2023-10-30 PROCEDURE — 85025 COMPLETE CBC W/AUTO DIFF WBC: CPT

## 2023-10-30 PROCEDURE — 99232 PR SUBSEQUENT HOSPITAL CARE,LEVL II: ICD-10-PCS | Mod: ,,, | Performed by: NURSE PRACTITIONER

## 2023-10-30 PROCEDURE — 25000003 PHARM REV CODE 250

## 2023-10-30 PROCEDURE — 97530 THERAPEUTIC ACTIVITIES: CPT

## 2023-10-30 PROCEDURE — 63600175 PHARM REV CODE 636 W HCPCS: Performed by: NURSE PRACTITIONER

## 2023-10-30 PROCEDURE — 80197 ASSAY OF TACROLIMUS: CPT | Performed by: PHYSICIAN ASSISTANT

## 2023-10-30 PROCEDURE — 36415 COLL VENOUS BLD VENIPUNCTURE: CPT | Performed by: PHYSICIAN ASSISTANT

## 2023-10-30 PROCEDURE — 99232 SBSQ HOSP IP/OBS MODERATE 35: CPT | Mod: ,,, | Performed by: NURSE PRACTITIONER

## 2023-10-30 PROCEDURE — 97535 SELF CARE MNGMENT TRAINING: CPT

## 2023-10-30 PROCEDURE — 99233 SBSQ HOSP IP/OBS HIGH 50: CPT | Mod: 24,,, | Performed by: CLINICAL NURSE SPECIALIST

## 2023-10-30 PROCEDURE — 80053 COMPREHEN METABOLIC PANEL: CPT | Performed by: PHYSICIAN ASSISTANT

## 2023-10-30 PROCEDURE — 63600175 PHARM REV CODE 636 W HCPCS: Performed by: CLINICAL NURSE SPECIALIST

## 2023-10-30 PROCEDURE — 99024 POSTOP FOLLOW-UP VISIT: CPT | Mod: ,,, | Performed by: PHYSICIAN ASSISTANT

## 2023-10-30 PROCEDURE — 99024 PR POST-OP FOLLOW-UP VISIT: ICD-10-PCS | Mod: ,,, | Performed by: PHYSICIAN ASSISTANT

## 2023-10-30 PROCEDURE — 25000003 PHARM REV CODE 250: Performed by: PHYSICIAN ASSISTANT

## 2023-10-30 PROCEDURE — 83735 ASSAY OF MAGNESIUM: CPT | Performed by: PHYSICIAN ASSISTANT

## 2023-10-30 PROCEDURE — 97530 THERAPEUTIC ACTIVITIES: CPT | Mod: CQ

## 2023-10-30 PROCEDURE — 99233 PR SUBSEQUENT HOSPITAL CARE,LEVL III: ICD-10-PCS | Mod: 24,,, | Performed by: CLINICAL NURSE SPECIALIST

## 2023-10-30 PROCEDURE — 25000003 PHARM REV CODE 250: Performed by: NURSE PRACTITIONER

## 2023-10-30 PROCEDURE — 63600175 PHARM REV CODE 636 W HCPCS: Performed by: STUDENT IN AN ORGANIZED HEALTH CARE EDUCATION/TRAINING PROGRAM

## 2023-10-30 PROCEDURE — 25000003 PHARM REV CODE 250: Performed by: STUDENT IN AN ORGANIZED HEALTH CARE EDUCATION/TRAINING PROGRAM

## 2023-10-30 PROCEDURE — 63600175 PHARM REV CODE 636 W HCPCS: Performed by: TRANSPLANT SURGERY

## 2023-10-30 PROCEDURE — 97116 GAIT TRAINING THERAPY: CPT | Mod: CQ

## 2023-10-30 PROCEDURE — 92507 TX SP LANG VOICE COMM INDIV: CPT

## 2023-10-30 RX ORDER — CLOPIDOGREL BISULFATE 75 MG/1
75 TABLET ORAL DAILY
Qty: 30 TABLET | Refills: 2 | Status: SHIPPED | OUTPATIENT
Start: 2023-10-30

## 2023-10-30 RX ORDER — LEVETIRACETAM 500 MG/1
500 TABLET ORAL EVERY 12 HOURS
Qty: 21 TABLET | Refills: 0 | Status: SHIPPED | OUTPATIENT
Start: 2023-10-30

## 2023-10-30 RX ORDER — LOSARTAN POTASSIUM 50 MG/1
50 TABLET ORAL DAILY
Qty: 30 TABLET | Refills: 11 | Status: SHIPPED | OUTPATIENT
Start: 2023-10-31 | End: 2023-12-19 | Stop reason: SDUPTHER

## 2023-10-30 RX ORDER — NAPROXEN SODIUM 220 MG/1
81 TABLET, FILM COATED ORAL DAILY
Qty: 30 TABLET | Refills: 11 | Status: SHIPPED | OUTPATIENT
Start: 2023-10-30

## 2023-10-30 RX ORDER — TIRZEPATIDE 2.5 MG/.5ML
2.5 INJECTION, SOLUTION SUBCUTANEOUS
Qty: 4 PEN | Refills: 0 | Status: SHIPPED | OUTPATIENT
Start: 2023-10-30 | End: 2023-12-05

## 2023-10-30 RX ORDER — TACROLIMUS 1 MG/1
3 CAPSULE ORAL EVERY MORNING
Status: DISCONTINUED | OUTPATIENT
Start: 2023-10-31 | End: 2023-10-30 | Stop reason: HOSPADM

## 2023-10-30 RX ORDER — TACROLIMUS 1 MG/1
1 CAPSULE ORAL ONCE
Status: COMPLETED | OUTPATIENT
Start: 2023-10-30 | End: 2023-10-30

## 2023-10-30 RX ORDER — ACETAMINOPHEN 500 MG
1000 TABLET ORAL EVERY 8 HOURS PRN
Status: DISCONTINUED | OUTPATIENT
Start: 2023-10-30 | End: 2023-10-30 | Stop reason: HOSPADM

## 2023-10-30 RX ORDER — INSULIN ASPART 100 [IU]/ML
8 INJECTION, SOLUTION INTRAVENOUS; SUBCUTANEOUS
Status: DISCONTINUED | OUTPATIENT
Start: 2023-10-30 | End: 2023-10-30 | Stop reason: HOSPADM

## 2023-10-30 RX ORDER — TACROLIMUS 1 MG/1
2 CAPSULE ORAL EVERY EVENING
Status: DISCONTINUED | OUTPATIENT
Start: 2023-10-30 | End: 2023-10-30 | Stop reason: HOSPADM

## 2023-10-30 RX ADMIN — MUPIROCIN: 20 OINTMENT TOPICAL at 09:10

## 2023-10-30 RX ADMIN — TACROLIMUS 1 MG: 1 CAPSULE ORAL at 10:10

## 2023-10-30 RX ADMIN — HEPARIN SODIUM 5000 UNITS: 5000 INJECTION INTRAVENOUS; SUBCUTANEOUS at 06:10

## 2023-10-30 RX ADMIN — SULFAMETHOXAZOLE AND TRIMETHOPRIM 1 TABLET: 400; 80 TABLET ORAL at 08:10

## 2023-10-30 RX ADMIN — INSULIN ASPART 3 UNITS: 100 INJECTION, SOLUTION INTRAVENOUS; SUBCUTANEOUS at 02:10

## 2023-10-30 RX ADMIN — INSULIN DETEMIR 10 UNITS: 100 INJECTION, SOLUTION SUBCUTANEOUS at 08:10

## 2023-10-30 RX ADMIN — CARVEDILOL 6.25 MG: 6.25 TABLET, FILM COATED ORAL at 08:10

## 2023-10-30 RX ADMIN — FAMOTIDINE 20 MG: 20 TABLET ORAL at 08:10

## 2023-10-30 RX ADMIN — TACROLIMUS 2 MG: 1 CAPSULE ORAL at 08:10

## 2023-10-30 RX ADMIN — ACETAMINOPHEN 1000 MG: 500 TABLET ORAL at 08:10

## 2023-10-30 RX ADMIN — INSULIN ASPART 4 UNITS: 100 INJECTION, SOLUTION INTRAVENOUS; SUBCUTANEOUS at 08:10

## 2023-10-30 RX ADMIN — LEVETIRACETAM 500 MG: 500 TABLET, FILM COATED ORAL at 08:10

## 2023-10-30 RX ADMIN — INSULIN ASPART 8 UNITS: 100 INJECTION, SOLUTION INTRAVENOUS; SUBCUTANEOUS at 08:10

## 2023-10-30 RX ADMIN — LOSARTAN POTASSIUM 50 MG: 50 TABLET, FILM COATED ORAL at 08:10

## 2023-10-30 RX ADMIN — VALGANCICLOVIR 450 MG: 450 TABLET, FILM COATED ORAL at 08:10

## 2023-10-30 NOTE — PLAN OF CARE
OT POC reviewed; pt has reached all set OT goals on this date and is no longer in need of skilled acute OT at this time. DC OT  Problem: Occupational Therapy  Goal: Occupational Therapy Goal  Description: Goals to be met by: 11/27/23     Patient will increase functional independence with ADLs by performing:    UE Dressing with Supervision.  LE Dressing with Supervision.  Grooming while standing at sink with Supervision.  Toileting from toilet with Supervision for hygiene and clothing management.   Supine to sit with [place].  Step transfer with Supervision  Toilet transfer to toilet with Supervision.    Outcome: Met

## 2023-10-30 NOTE — PHYSICIAN QUERY
PT Name: Jed Chávez  MR #: [id number]    DOCUMENTATION CLARIFICATION      CDS/: Rosemarie Thakkar RN, CDS              Contact information: [email or web address]     This form is a permanent document in the medical record.      Query Date: October 30, 2023    By submitting this query, we are merely seeking further clarification of documentation. Please utilize your independent clinical judgment when addressing the question(s) below.       Indicators Supporting Clinical Findings Location in Medical Record   x Anemia, Thrombocytopenia, Neutropenia, Pancytopenia documented --Anemia of chronic disease Liver Transplant Note ([facility])   x H&H  10/24/23 22:25 10/25/23 03:32 10/26/23 03:17 10/27/23 00:39 10/28/23 00:08 10/29/23 01:00 10/30/23 02:49   Hemoglobin 12.1 (L) 11.7 (L) 12.5 (L) 12.3 (L) 12.3 (L) 11.9 (L) 12.9 (L)   Hematocrit 38.3 (L) 36.6 (L) 38.8 (L) 37.5 (L) 37.2 (L) 36.7 (L) 39.6 (L)    Labs 10/24-10/30                     x WBC  10/24/23 22:25 10/25/23 03:32 10/26/23 03:17 10/27/23 00:39 10/28/23 00:08 10/29/23 01:00 10/30/23 02:49   WBC 4.02 3.84 (L) 5.44 7.85 8.76 5.40 5.25    Labs 10/24-10/30               x Neutrophils/ Granulocytes/ ANC  10/24/23 22:25 10/25/23 03:32 10/26/23 03:17 10/27/23 00:39 10/28/23 00:08 10/29/23 01:00 10/30/23 02:49   Gran # (ANC) 1.6 (L) 1.5 (L) 3.8 6.0 6.8 4.1 3.4    Labs 10/24-10/30                   x Platelets  10/24/23 22:25 10/25/23 03:32 10/26/23 03:17 10/27/23 00:39 10/28/23 00:08 10/29/23 01:00 10/30/23 02:49   Platelet Count 86 (L) 83 (L) 100 (L) 121 (L) 153 94 (L) 98 (L)    Labs 10/24-10/30                 x Transfusion(s) --Platelets x 2 units  Blood Bank flowsheet     x Treatments: --Cellcept on hold  --ASA and Plavix on hold    -- Platelets and DDAVP prior to OR tomorrow due to DAPT, last taken AM of 10/24. Coags WNL   Liver Transplant Note ([facility])    NeuroSx Note 10/25 ([facility])   x Acute/ Chronic illness -- HCC/EtOH cirrhosis  (s/p liver transplant on 8/10/23 complicated by hepatic art stenosis s/p angioplasty/stent on 10/2/23)  --Long-term use of immunosuppressant medication   Liver Transplant Note ([facility])   x Other: -- was on ASA already and started plavix after recent stent placement  -- LFTs remain wnl.  --Tacro level 5.2   Liver Transplant Note ([facility])     Pancytopenia is defined by:  Hb < 12g/dL (non-pregnant women) or <13g/dL (men) + ANC < 1800/microL + Platelets < 150,000/microL    The clinical guidelines noted are only a system guideline. It does not replace the providers clinical judgment.      Provider, please specify diagnosis or diagnoses associated with above clinical findings.    [   ] Pancytopenia due to other drug, please specify drug: _______   [  x ] Pancytopenia due to liver disease   [   ] Pancytopenia, unspecified   [   ] Other Hematological Diagnosis (please specify): ________   [  ] Clinically Undetermined         Please document in your progress notes daily for the duration of treatment, until resolved, and include in your discharge summary.    Reference:    MARKO Thibodeaux (n.d.). Approach to the adult with pancytopenia. [employer] Retrieved September 7, 2022, from [email or web address]-to-the-adult-with-pancytopenia?search=pancytopenia&source=search_result&selectedTitle=1~150&usage_type=default&display_rank=1    Form No. [id number]

## 2023-10-30 NOTE — SUBJECTIVE & OBJECTIVE
"Interval HPI:   Overnight events: Remains in 903A. BG above goal ranges on current SQ insulin regimen. Dex d/c yesterday. Plans for discharge today. Diet diabetic Consistent Carbohydrate; 2000 Calorie; Standard Tray    Eatin%  Nausea: No  Hypoglycemia and intervention: No  Fever: No  TPN and/or TF: No  If yes, type of TF/TPN and rate: n/a    BP (!) 156/86   Pulse (!) 56   Temp 97.7 °F (36.5 °C) (Oral)   Resp 16   Ht 5' 11" (1.803 m)   Wt 93.2 kg (205 lb 7.5 oz)   SpO2 97%   BMI 28.66 kg/m²     Labs Reviewed and Include    Recent Labs   Lab 10/30/23  0249   *   CALCIUM 9.0   ALBUMIN 3.5   PROT 6.3      K 4.1   CO2 24      BUN 16   CREATININE 0.9   ALKPHOS 75   ALT 15   AST 15   BILITOT 0.6     Lab Results   Component Value Date    WBC 5.25 10/30/2023    HGB 12.9 (L) 10/30/2023    HCT 39.6 (L) 10/30/2023    MCV 83 10/30/2023    PLT 98 (L) 10/30/2023     No results for input(s): "TSH", "FREET4" in the last 168 hours.  Lab Results   Component Value Date    HGBA1C 6.1 (H) 10/24/2023       Nutritional status:   Body mass index is 28.66 kg/m².  Lab Results   Component Value Date    ALBUMIN 3.5 10/30/2023    ALBUMIN 3.3 (L) 10/29/2023    ALBUMIN 3.5 10/28/2023     No results found for: "PREALBUMIN"    Estimated Creatinine Clearance: 99.3 mL/min (based on SCr of 0.9 mg/dL).    Accu-Checks  Recent Labs     10/28/23  2002 10/29/23  0056 10/29/23  0326 10/29/23  0449 10/29/23  0755 10/29/23  1136 10/29/23  1606 10/29/23  2100 10/30/23  0253 10/30/23  0719   POCTGLUCOSE 323* 374* 251* 262* 202* 345* 338* 319* 273* 218*       Current Medications and/or Treatments Impacting Glycemic Control  Immunotherapy:    Immunosuppressants           Stop Route Frequency     tacrolimus capsule 3 mg         -- Oral Every morning     tacrolimus capsule 2 mg         -- Oral Every evening          Steroids:   Hormones (From admission, onward)      None          Pressors:    Autonomic Drugs (From admission, onward) "      None          Hyperglycemia/Diabetes Medications:   Antihyperglycemics (From admission, onward)      Start     Stop Route Frequency Ordered    10/30/23 0900  insulin detemir U-100 (Levemir) pen 10 Units         -- SubQ 2 times daily 10/30/23 0827    10/30/23 0830  insulin aspart U-100 pen 8 Units         -- SubQ 3 times daily with meals 10/30/23 0826    10/28/23 1108  insulin aspart U-100 pen 0-10 Units         -- SubQ Before meals & nightly PRN 10/28/23 1009

## 2023-10-30 NOTE — PLAN OF CARE
Discharge Note     Per Neuro and Transplant teams    Patient is discharging home today with Home Health/PT/OT/labs    [facility] contacted patient's wife for preferences, patient has transportation home ot [facility]    Patient is bedside delivery for Rxs, several new Rxs sent to Pharmacy    No preference given as long as covered by Insurance    [facility]  will staff patient Per Merril and Ellyn    Labs needed on Thursday CMP, CBC, tacrolimus     HH can contact Transplant coordinator for any transplant concerns/labs     Linda Donahue RN ext [phone] ([phone]). If I don't answer, they can call the [phone] for urgent issues.     Neuro f/u appt scheduled for 11/14 1pm 3rd floor [facility]    11/7--Post Transplant Ultrasound scheduled for 1045Am  at [facility]

## 2023-10-30 NOTE — PLAN OF CARE
Goals reviewed and revised as appropriate. Continue POC.    Problem: Physical Therapy  Goal: Physical Therapy Goal  Description: Goals to be met by: 2023     Patient will increase functional independence with mobility by performin. Supine to sit with [place]  2. Sit to supine with [place]  3. Sit to stand transfer with [place]  4. Gait  x 300 feet with [place] using LRAD  5. Ascend/descend 4 inch curb step with [place] using LRAD  6. Lower extremity exercise program x15 reps per handout, with independence    Outcome: Ongoing, Progressing     10/30/2023

## 2023-10-30 NOTE — PT/OT/SLP PROGRESS
Physical Therapy Treatment    Patient Name:  Jed Chávez   MRN:  [id number]    Recommendations:     Discharge Recommendations: Low Intensity Therapy  Discharge Equipment Recommendations: shower chair  Barriers to discharge: None    Assessment:     Jed Chávez is a 63 y.o. male admitted with a medical diagnosis of Left frontal lobe mass.  He presents with the following impairments/functional limitations: weakness, impaired endurance, impaired functional mobility, impaired balance, decreased safety awareness, gait instability, impaired cognition.    Pt tolerates session well with focus on bed mobility, transfers, and gait training. Pt progressing well requiring no assistance for bed mobility or transfers. Pt does requires intermittent assistance with gait d/t unstable balance with dynamic challenges. Pt easily distractible throughout gait trial and when distracted from task demonstrates increased instability. Pt with single severe LOB requiring therapist intervention of Min A. Pt and spouse educated that pt will require intermittent assistance when ambulating in crowded environments of spaces with several obstacles. Both also educated that pt should minimize or eliminate multitasking during ambulation and focus only on his gait/balance. Both verbalize understanding. Pt will continue to benefit from therapy services to address impairments listed above.     Rehab Prognosis: Good; patient would benefit from acute skilled PT services to address these deficits and reach maximum level of function.    Recent Surgery: Procedure(s) (LRB):  CRANIOTOMY, WITH NEOPLASM EXCISION USING COMPUTER-ASSISTED NAVIGATION (Left) 4 Days Post-Op    Plan:     During this hospitalization, patient to be seen 3 x/week to address the identified rehab impairments via gait training, therapeutic activities, therapeutic exercises, neuromuscular re-education and progress toward the following goals:    Plan of Care Expires:  11/28/23    Subjective  "    Chief Complaint: no c/o   Patient/Family Comments/goals: "I'm ready to shower and get out of this place."  Pain/Comfort:  Pain Rating 1: 0/10  Pain Rating Post-Intervention 1: 0/10      Objective:     Communicated with RN prior to session.  Patient found HOB elevated with  no lines attached upon PTA entry to room.     General Precautions: Standard, fall  Orthopedic Precautions: N/A  Braces: N/A  Respiratory Status: Room air     Functional Mobility:  Bed Mobility:     Supine to Sit: modified independence  Sit to Supine: modified independence  Transfers:     Sit to Stand:  independence with no AD  Gait: Pt ambulates greater than 500 ft with no AD and generally SBA with single instance of severe LOB to his R requiring Min A to assist in balance recovery. Distracted from task and with scissoring step at time of LOB.   Stairs:  Pt ascended/descended 1 stair(s) x 5 consecutive trials with No Assistive Device with no handrails with Contact Guard Assistance.       AM-PAC 6 CLICK MOBILITY  Turning over in bed (including adjusting bedclothes, sheets and blankets)?: 3  Sitting down on and standing up from a chair with arms (e.g., wheelchair, bedside commode, etc.): 3  Moving from lying on back to sitting on the side of the bed?: 3  Moving to and from a bed to a chair (including a wheelchair)?: 3  Need to walk in hospital room?: 3  Climbing 3-5 steps with a railing?: 3  Basic Mobility Total Score: 18       Treatment & Education:  Pt and spouse educated on pts assistance needed, functional deficits, and PT POC. Both verbalize understanding.     Patient left HOB elevated with all lines intact, call button in reach, and spouse present..    GOALS:   Multidisciplinary Problems       Physical Therapy Goals          Problem: Physical Therapy    Goal Priority Disciplines Outcome Goal Variances Interventions   Physical Therapy Goal     PT, PT/OT Ongoing, Progressing     Description: Goals to be met by: 11/28/2023     Patient will " increase functional independence with mobility by performin. Supine to sit with [place]  2. Sit to supine with [place]  3. Sit to stand transfer with [place]  4. Gait  x 300 feet with [place] using LRAD  5. Ascend/descend 4 inch curb step with [place] using LRAD  6. Lower extremity exercise program x15 reps per handout, with independence                         Time Tracking:     PT Received On: 10/30/23  PT Start Time: 1015     PT Stop Time: 1031  PT Total Time (min): 16 min     Billable Minutes: Gait Training 16    Treatment Type: Treatment  PT/PTA: PTA     Number of PTA visits since last PT visit: 1     10/30/2023

## 2023-10-30 NOTE — PLAN OF CARE
Discharge Note     Per Neuro and Transplant teams    Patient is discharging home today with Home Health/PT/OT/labs    [facility] contacted patient's wife for preferences, patient has transportation home ot [facility]    Patient is bedside delivery for Rxs, several new Rxs sent to Pharmacy    No preference given as long as covered by Insurance    [facility]  will staff patient Per Alba--starting anthony 10/31    Labs needed on Thursday CMP, CBC, tacrolimus     HH can contact Transplant coordinator for any transplant concerns/labs     Linda Donahue RN ext [phone] ([phone]). If I don't answer, they can call the [phone] for urgent issues.     Neuro f/u appt scheduled for 11/14 1pm 3rd floor [facility]    11/7--Post Transplant Ultrasound scheduled for 1045Am  at [facility]

## 2023-10-30 NOTE — PROGRESS NOTES
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  Head CT and MRI demonstrated a extraaxial mass requiring craniotomy with neoplasm excision on 10/26.  Post surgery, pt initiated on dexamethasone, which resulted in elevated blood sugars.  Dexamethasone discontinued on 10/29 and pt discharged on home insulin regimen. Pt's BP was elevated this admission and treated with a nicardipine gtt and transitioned to losartan (pt instructed to hold for SBP <140).  For immunosuppression regimen - tacrolimus dose adjusted while inpt and on nicardipine gtt.  Tacrolimus level low today, so will increase dose back to home regimen (3mg in the AM and 2mg in the PM).  Will also continue to hold mmf while awaiting pathology, will likely not need to restart as planned duration was only 3 months post txp.  Per neurosurgery - pt to restart plavix on 11/5 and aspirin on 11/9.  Pt to continue keppra prophylaxis until 11/9 (14 days post surgery).      Met with Jed MEDELLIN Kyler at discharge to review discharge medications and to update the blue medication card.           Medication List        START taking these medications      levETIRAcetam 500 MG Tab  Commonly known as: KEPPRA  Take 1 tablet (500 mg total) by mouth every 12 (twelve) hours. STOP 11/9/23     losartan 50 MG tablet  Commonly known as: COZAAR  Take 1 tablet (50 mg total) by mouth once daily. HOLD for SBP <140  Start taking on: October 31, 2023            CHANGE how you take these medications      aspirin 81 MG Chew  Take 1 tablet (81 mg total) by mouth once daily. Restart 11/9/23  What changed: additional instructions     carvediloL 6.25 MG tablet  Commonly known as: COREG  Take 1 tablet (6.25 mg total) by mouth 2 (two) times daily with meals. HOLD SBP <130, HR <60  What changed:   medication  "strength  additional instructions     clopidogreL 75 mg tablet  Commonly known as: PLAVIX  Take 1 tablet (75 mg total) by mouth once daily. Restart 11/5/23  What changed: additional instructions     MOUNJARO 2.5 mg/0.5 mL Pnij  Generic drug: tirzepatide  Inject 2.5 mg into the skin every 7 days.  What changed: additional instructions            CONTINUE taking these medications      BD ULTRA-FINE MERCEDES PEN NEEDLE 32 gauge x 5/32" Ndle  Generic drug: pen needle, diabetic  Use to inject insulin into the skin 4 (four) times daily.     CONTOUR NEXT TEST STRIPS Strp  Generic drug: blood sugar diagnostic  Test blood glucose 3 (three) times daily.     famotidine 20 MG tablet  Commonly known as: PEPCID  Take 1 tablet (20 mg total) by mouth every evening.     FREESTYLE PUNEET 3 SENSOR Neelam  Generic drug: blood-glucose sensor  To change every 14 days     HumaLOG KwikPen Insulin 100 unit/mL pen  Generic drug: insulin lispro  Inject 8 Units into the skin 3 (three) times daily before meals AND 1-5 Units 3 (three) times daily before meals. sliding scale insulin as needed. Max daily dose 75 units daily...     LANTUS SOLOSTAR U-100 INSULIN glargine 100 units/mL SubQ pen  Generic drug: insulin  Inject 20 Units into the skin once daily.     MICROLET LANCET Misc  Generic drug: lancets  Test blood glucose 3 (three) times daily.     sulfamethoxazole-trimethoprim 400-80mg 400-80 mg per tablet  Commonly known as: BACTRIM,SEPTRA  Take 1 tablet by mouth once daily. Stop 2/6/24     tacrolimus 1 MG Cap  Commonly known as: PROGRAF  Take 3 capsules (3 mg total) by mouth every morning AND 2 capsules (2 mg total) every evening.     valGANciclovir 450 mg Tab  Commonly known as: VALCYTE  Take 1 tablet (450 mg total) by mouth once daily. Stop 2/6/24            STOP taking these medications      mycophenolate 250 mg Cap  Commonly known as: CELLCEPT               Where to Get Your Medications        These medications were sent to [facility] [place]  [address] [address]      Hours: Mon-Fri 7a-7p, Sat-Sun 10a-4p Phone: [phone]   carvediloL 6.25 MG tablet  levETIRAcetam 500 MG Tab  losartan 50 MG tablet  MOUNJARO 2.5 mg/0.5 mL Pnij       These medications were sent to [facility] [address] [address]      Hours: Mon-Fri, 8a-5:30p Phone: [phone]   aspirin 81 MG Chew  clopidogreL 75 mg tablet            The following medications have been placed on HOLD and should be restarted in the outpatient setting (when appropriate): none    Jed Chávez verbalized understanding and had the opportunity to ask questions.

## 2023-10-30 NOTE — ANESTHESIA POSTPROCEDURE EVALUATION
Anesthesia Post Evaluation    Patient: Jed Chávez    Procedure(s) Performed: Procedure(s) (LRB):  CRANIOTOMY, WITH NEOPLASM EXCISION USING COMPUTER-ASSISTED NAVIGATION (Left)    Final Anesthesia Type: general      Patient location during evaluation: PACU  Patient participation: Yes- Able to Participate  Level of consciousness: awake and alert and oriented  Pain management: adequate  Airway patency: patent    PONV status at discharge: No PONV  Anesthetic complications: no      Cardiovascular status: blood pressure returned to baseline and hemodynamically stable  Respiratory status: unassisted  Hydration status: euvolemic  Follow-up not needed.          Vitals Value Taken Time   /77 10/30/23 1134   Temp 36.8 °C (98.2 °F) 10/30/23 1134   Pulse 67 10/30/23 1134   Resp 16 10/30/23 0715   SpO2 94 % 10/30/23 1134         No case tracking events are documented in the log.      Pain/Chanell Score: Pain Rating Prior to Med Admin: 0 (10/30/2023  8:38 AM)  Chanell Score: 10 (10/29/2023  8:00 AM)

## 2023-10-30 NOTE — PROGRESS NOTES
"[facility] - Neurosurgery ([facility])  Endocrinology  Progress Note    Admit Date: 10/24/2023     Reason for Consult: Management of T2DM, Hyperglycemia     Diabetes diagnosis year: > 10 years ago     Home Diabetes Medications:  Humalog 8 units TID with meals and correction scale, Lantus 20 units daily, Mounjaro 2.5 mg weekly     How often checking glucose at home? Freestyle Jodie    BG readings on regimen: 100-300  Hypoglycemia on the regimen?  No  Missed doses on regimen?  No     Diabetes Complications include:     Hyperglycemia     Complicating diabetes co morbidities:   ESLD s/p transplant, previous CVA        HPI:   Patient is a 63 y.o. male with a diagnosis of  IDDM, HTN, L basal ganglia ICH (in 2014, no residual deficits), HCC/EtOH cirrhosis (s/p liver transplant on 8/10/23 complicated by hepatic art stenosis s/p angioplasty/stent on 10/2/23), non-melanoma skin cancer who presents as transfer from OSH for L frontal mass after 2 weeks of confusion. Interview conducted with patient as well as his wife at bedside. Patient unable to give good description of the confusion he has been experiencing - per his wife he has been "in a fog" for the last 2 weeks, often saying "ok" inappropriately in response to questions and exhibiting strange behaviors such as turning the lights on/off. She says his personality is different and he is less excited about things than usual. On 10/23, he had an episode where he had worsened confusion and agitation while out at a store, he later did not recall going out. The patient and his wife deny any weakness, numbness, speech difficulty, vision changes, or seizure activity. CT and MRI w/wo at OSH show a 3 cm L frontal pole enhancing extraaxial mass with some adjacent intraaxial enhancement, as well as considerable surrounding edema with mass effect and midline shift. Endocrinology consulted for BG/ DM management.      Lab Results   Component Value Date    HGBA1C 6.1 (H) 10/24/2023 " "              Interval HPI:   Overnight events: Remains in 903A. BG above goal ranges on current SQ insulin regimen. Dex d/c yesterday. Plans for discharge today. Diet diabetic Consistent Carbohydrate; 2000 Calorie; Standard Tray    Eatin%  Nausea: No  Hypoglycemia and intervention: No  Fever: No  TPN and/or TF: No  If yes, type of TF/TPN and rate: n/a    BP (!) 156/86   Pulse (!) 56   Temp 97.7 °F (36.5 °C) (Oral)   Resp 16   Ht 5' 11" (1.803 m)   Wt 93.2 kg (205 lb 7.5 oz)   SpO2 97%   BMI 28.66 kg/m²     Labs Reviewed and Include    Recent Labs   Lab 10/30/23  0249   *   CALCIUM 9.0   ALBUMIN 3.5   PROT 6.3      K 4.1   CO2 24      BUN 16   CREATININE 0.9   ALKPHOS 75   ALT 15   AST 15   BILITOT 0.6     Lab Results   Component Value Date    WBC 5.25 10/30/2023    HGB 12.9 (L) 10/30/2023    HCT 39.6 (L) 10/30/2023    MCV 83 10/30/2023    PLT 98 (L) 10/30/2023     No results for input(s): "TSH", "FREET4" in the last 168 hours.  Lab Results   Component Value Date    HGBA1C 6.1 (H) 10/24/2023       Nutritional status:   Body mass index is 28.66 kg/m².  Lab Results   Component Value Date    ALBUMIN 3.5 10/30/2023    ALBUMIN 3.3 (L) 10/29/2023    ALBUMIN 3.5 10/28/2023     No results found for: "PREALBUMIN"    Estimated Creatinine Clearance: 99.3 mL/min (based on SCr of 0.9 mg/dL).    Accu-Checks  Recent Labs     10/28/23  2002 10/29/23  0056 10/29/23  0326 10/29/23  0449 10/29/23  0755 10/29/23  1136 10/29/23  1606 10/29/23  2100 10/30/23  0253 10/30/23  0719   POCTGLUCOSE 323* 374* 251* 262* 202* 345* 338* 319* 273* 218*       Current Medications and/or Treatments Impacting Glycemic Control  Immunotherapy:    Immunosuppressants           Stop Route Frequency     tacrolimus capsule 3 mg         -- Oral Every morning     tacrolimus capsule 2 mg         -- Oral Every evening          Steroids:   Hormones (From admission, onward)      None          Pressors:    Autonomic Drugs (From " admission, onward)      None          Hyperglycemia/Diabetes Medications:   Antihyperglycemics (From admission, onward)      Start     Stop Route Frequency Ordered    10/30/23 0900  insulin detemir U-100 (Levemir) pen 10 Units         -- SubQ 2 times daily 10/30/23 0827    10/30/23 0830  insulin aspart U-100 pen 8 Units         -- SubQ 3 times daily with meals 10/30/23 0826    10/28/23 1108  insulin aspart U-100 pen 0-10 Units         -- SubQ Before meals & nightly PRN 10/28/23 1009            ASSESSMENT and PLAN    Neuro  * Left frontal lobe mass  Managed per primary.         Endocrine  Type 2 diabetes mellitus with other specified complication, with chronic insulin use  Endocrinology consulted for BG management.   BG goal 140-180    Endocrine signed off on 10/26 per neuro critical care request and re consulted on 10/29. Will d/c consult order and file as progress note.     Dex therapy discontinued on 10/29. Expect BG excursions up to 72 hrs post steroid discontinuation.     -Continue Levemir (Insulin Detemir) 10 units BID  -Continue Novolog (Insulin Aspart) 12 units TIDWM and prn for BG excursions [facility] SSI (150/25)   - BG checks AC/HS  - Hypoglycemia protocol in place  - If blood glucose greater than 300, please ask patient not to eat food or drink anything other than water until correctional insulin has brought it back below 250    ** Please notify Endocrine for any change and/or advance in diet**  ** Please call Endocrine for any BG related issues **    Discharge Planning:   Recommend resuming home regimen and keeping BG logs. Notify [facility] for any BG < 80 or consistently > 200. Submit logs to patient portal message in 3-4 days for review.     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         Other  Adrenal cortical steroids causing adverse effect in therapeutic use  Glucocorticoids markedly increase prandial glucoses. Expect the steroid taper will help glucose control.             Kelly Rose NP  Endocrinology  [facility] - Neurosurgery ([facility])

## 2023-10-30 NOTE — PROGRESS NOTES
Leobardo Hutchinson - Neurosurgery ([facility])  Wound Care    Patient Name:  Jed Chávez   MRN:  [id number]  Date: 10/30/2023  Diagnosis: Left frontal lobe mass    History:     Past Medical History:   Diagnosis Date    Alcoholic cirrhosis     CVA (cerebral vascular accident)     DM (diabetes mellitus)     Dyslipidemia     Hepatocellular carcinoma     HTN (hypertension)     Intracranial hemorrhage     Skin cancer        Social History     Socioeconomic History    Marital status:    Tobacco Use    Smoking status: Never    Smokeless tobacco: Never   Substance and Sexual Activity    Alcohol use: Not Currently    Drug use: Never    Sexual activity: Not Currently     Partners: Female       Precautions:     Allergies as of 10/24/2023    (No Known Allergies)       WO Assessment Details/Treatment     Patient seen for wound care follow up for abd wound.   Reviewed chart for this encounter.   See Flow Sheet for findings.    Pt found lying in bed w/ wife at bedside, agreeable to care at this time. Previous dressing to ABD ML removed for assessment, wound gently cleansed w/ NS and patted dry. Wound looking improved, enluxtra dressing providing moisture balance and supporting autolytic debridement. Periwound less inflamed this assessment. Redressed wound w/ enluxtra and island boarder dressing. Provided wife w/ extra supplies and education for continued care at home, verbalized understanding.    RECOMMENDATIONS:  Continue plan of care as ordered, will adjust dressing mcbride frequency from q2 days to q3 days.    Discussed POC with patient and primary nurse.   See EMR for orders & patient education.    Discussed nutrition and the role of protein in wound healing with the patient. Instructed patient to optimize protein for wound healing.    Nursing to continue care & monitoring.  Nursing to maintain pressure injury prevention interventions.           10/30/23 1030   WOCN Assessment   WOCN Total Time (mins) 15   Visit Date 10/30/23    Visit Time 1030   Consult Type Follow Up   WOCN Speciality Wound   Intervention assessed;changed;applied;chart review;coordination of care;orders   Teaching on-going        Altered Skin Integrity 10/24/23 2208 medial Upper quadrant   Date First Assessed/Time First Assessed: 10/24/23 2208   Altered Skin Integrity Present on Admission - Did Patient arrive to the hospital with altered skin?: yes  Orientation: medial  Location: Upper quadrant   Wound Image    Dressing Appearance Dry;Clean;Intact   Drainage Amount Scant   Drainage Characteristics/Odor Serosanguineous   Appearance Pink;Red;Slough;Eschar;Moist   Periwound Area Intact;[place]   Wound Edges Irregular   Care Cleansed with:;Sterile normal saline   Dressing Changed;Other (comment)  (Enluxtra, island)   Dressing Change Due 11/01/23

## 2023-10-30 NOTE — DISCHARGE SUMMARY
"Leobardo Hutchinson - Neurosurgery ([facility])  Neurosurgery  Discharge Summary      Patient Name: Jed Chávez  MRN: [id number]  Admission Date: 10/24/2023  Hospital Length of Stay: 6 days  Discharge Date and Time: 10/30/2023  1:33 PM  Attending Physician: No att. providers found   Discharging Provider: Evelyne Guo PA-C  Primary Care Provider: Alee Pink MD    HPI:   Mr Chávez is a 63M w/ hx IDDM, HTN, L basal ganglia ICH (in 2014, no residual deficits), HCC/EtOH cirrhosis (s/p liver transplant on 8/10/23 complicated by hepatic art stenosis s/p angioplasty/stent on 10/2/23), non-melanoma skin cancer who presents as transfer from OSH for L frontal mass after 2 weeks of confusion. Interview conducted with patient as well as his wife at bedside. Patient unable to give good description of the confusion he has been experiencing - per his wife he has been "in a fog" for the last 2 weeks, often saying "ok" inappropriately in response to questions and exhibiting strange behaviors such as turning the lights on/off. She says his personality is different and he is less excited about things than usual. On 10/23, he had an episode where he had worsened confusion and agitation while out at a store, he later did not recall going out. The patient and his wife deny any weakness, numbness, speech difficulty, vision changes, or seizure activity.     He has a recent liver transplant and hepatic artery stent, was on ASA already and started plavix after recent stent placement. Last dose 10/24 AM, which patient was able to recall taking. Platelets 86 on admit.     CT and MRI w/wo at OSH show a 3 cm L frontal pole enhancing extraaxial mass with some adjacent intraaxial enhancement, as well as considerable surrounding edema with mass effect and midline shift.       Procedure(s) (LRB):  CRANIOTOMY, WITH NEOPLASM EXCISION USING COMPUTER-ASSISTED NAVIGATION (Left)     Hospital Course: 10/25: Patient's surgery moved to tomorrow AM. " Patient is neuro stable. Ambulating well around the room. Denies headaches, vision changes, focal weakness, nausea or vomiting. Okay for diet at this time. NPO at midnight. Platelets and DDAVP ordered for tomorrow prior to OR. Family at bedside.  10/26: OR today for crani. All labs reviewed. LFTs/tbili stable. Tacro level 9.5. Will switch dose to 2mg bid. Cont holding cellcept until path results. Post op CTH with expected post op changes. MRI brain in AM. Dex 4q6h. Keppra 500 mg BID. Patient with significant oozing from incision. Reinforced and head wrap placed. CTM.   10/27: POD 1 s/p R crani for meningioma. NAEON. AFVSS. Exam stable. Pain controlled. Tolerating PO. Marroquin in place, will d/c. Continue active medical management of significant vasogenic edema with steroids. Wife states that personality improved but still some cognitive issues, requesting SLP in addition to PT/OT. LFTs remain wnl. Prograf level  for left frontal lobe mass, being followed by Neuro surgery. All labs reviewed. LFTs/tbili stable. Tacro level 5.2, cont Prograf 2mg bid. Cellcept on hold, can cont to hold while recovering from surgery.   10/28:POD2 doing well, MRI with GTR of extraaxial mass, improved adjacent intraparenchymal enhancement, persistent edema. LFTs WNL. Tac up to 11 this morning, will hold today. D/C insulin drip, aspart 12 units with meals, 20 detemir daily  10/29: 10/29: POD3 stepped down to NSGY floor with transplant following, remains neuro intact. Got out of bed yesterday, eating well. SG HV zero output. BG in 300s during day today, dex discontinued.  10/30: NAEON. AFVSS. Neurologically stable. Tolerating PO diet and voiding spontaneously. Medically stable to discharge home with HH. Follow up in clinic in 2 weeks. Discharge instructions given verbally to patient. All of his questions were answered. He is encouraged to call the clinic with any questions or concerns prior to follow up appt.  - LFTs WNL. Prograf level low  "today, 4.1. Will increase prograf to 2 mg BID today, then titrate up to 3 mg BID tomorrow 10/31. Patient will be provided updated "transplant blue card" with list of correct medication dosages prior to discharge. Patient's liver coordinator notified of discharge. Will plan for labs Thursday to monitor prograf level.   - Per NSGY, will restart plavix 11/5 and ASA 11/9. Pt has f/u Liver US ordered 11/7 d/t hx HAS w/ metal stent in place.  - Continue to hold Cellcept on discharge.    The above is purely a summary of documentation by other providers. This discharge summary is in no way a substitute for medical documentation that has been provided throughout the stay by care-giving providers. Please reference all documentation in the   Electronic medical record should any questions or concerns arise.      Physical Exam 10/30/23:  General: well developed, well nourished, no distress.   Head: normocephalic  Neurologic: Alert and oriented. Thought content appropriate.  GCS: Motor: 6/Verbal: 5/Eyes: 4 GCS Total: 15  Mental Status: Awake, Alert, Oriented x 4  Language: No aphasia  Speech: No dysarthria  Cranial nerves: face symmetric, tongue midline, CN II-XII grossly intact.   Eyes: pupils equal, round, reactive to light with accommodation, EOMI.   Pulmonary: normal respirations, no signs of respiratory distress  Abdomen: soft, non-distended, not tender to palpation  Skin: Skin is warm, dry and intact.  Sensory: intact to light touch throughout  Motor Strength:Moves all extremities spontaneously with good tone.  Full strength upper and lower extremities. No abnormal movements seen.   Incision: Clean, dry, staples intact. Skin edges well approximated. No surrounding erythema or edema. No drainage or TTP.                       Goals of Care Treatment Preferences:  Code Status: Full Code    Living Will: Yes              Consults:   Consults (From admission, onward)        Status Ordering Provider     Inpatient consult to " Endocrinology  Once        Provider:  (Not yet assigned)    Completed LV AVENDANO     Inpatient consult to Liver Transplant Surgery  Once        Provider:  (Not yet assigned)    Acknowledged LV AVENDANO          Significant Diagnostic Studies:   Labs:   CMP   Recent Labs   Lab 10/29/23  0100 10/30/23  0249   * 136   K 4.8 4.1    102   CO2 18* 24   * 294*   BUN 15 16   CREATININE 0.9 0.9   CALCIUM 8.3* 9.0   PROT 6.3 6.3   ALBUMIN 3.3* 3.5   BILITOT 0.5 0.6   ALKPHOS 76 75   AST 32 15   ALT 16 15   ANIONGAP 11 10   , CBC   Recent Labs   Lab 10/29/23  0100 10/30/23  0249   WBC 5.40 5.25   HGB 11.9* 12.9*   HCT 36.7* 39.6*   PLT 94* 98*    and All labs within the past 24 hours have been reviewed  Radiology: MRI: brain w/wo contrast  CT scan: CT head wo contrast  Cardiac Graphics: Echocardiogram: Transthoracic echo (TTE) complete (Cupid Only): No results found for this or any previous visit.  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 [T86.49, I77.1] 09/29/2023 Yes    S/P liver transplant [Z94.4] 08/12/2023 Not Applicable    Long-term use of immunosuppressant medication [Z79.60] 08/12/2023 Not Applicable    Prophylactic immunotherapy [Z29.89] 08/12/2023 Not Applicable    At risk for opportunistic infections [Z91.89] 08/12/2023 Yes    Adrenal cortical steroids causing adverse effect in therapeutic use [T38.0X5A] 08/10/2023 No    Type 2 diabetes mellitus with other specified complication, with chronic insulin  use [E11.69] 06/06/2023 Yes     Chronic      Problems Resolved During this Admission:    Diagnosis Date Noted Date Resolved POA    Brain tumor [D49.6] 10/27/2023 10/28/2023 Yes      Discharged Condition: stable     Disposition: Home-Health Care Svc    Follow Up:  Future Appointments   Date Time Provider Department Center   11/7/2023 10:15 AM [facility] OIC-US1 MASTER [facility] ULTR [facility]   11/14/2023  1:00 PM Jose E Degroot,  [facility] NEUROSC [place]   12/7/2023  1:00 PM Joleen Carr MD [facility] HEPAT [place]   12/14/2023  9:40 AM Naveed Steiner OD [facility] OPHTHAL [facility]   12/19/2023  1:20 PM EVELIN Pitt MD [facility]         Patient Instructions:      Ambulatory referral/consult to Home Health   Standing Status: Future   Referral Priority: Routine Referral Type: Home Health   Referral Reason: Specialty Services Required   Requested Specialty: Home Health Services   Number of Visits Requested: 1     Notify your health care provider if you experience any of the following:  increased confusion or weakness     Notify your health care provider if you experience any of the following:  persistent dizziness, light-headedness, or visual disturbances     Notify your health care provider if you experience any of the following:  worsening rash     Notify your health care provider if you experience any of the following:  severe persistent headache     Notify your health care provider if you experience any of the following:  difficulty breathing or increased cough     Notify your health care provider if you experience any of the following:  redness, tenderness, or signs of infection (pain, swelling, redness, odor or green/yellow discharge around incision site)     Notify your health care provider if you experience any of the following:  severe uncontrolled pain     Notify your health care provider if you experience any of the following:  persistent nausea and vomiting or diarrhea     Notify your health  "care provider if you experience any of the following:  temperature >100.4     Activity as tolerated     Medications:  Reconciled Home Medications:      Medication List      START taking these medications    levETIRAcetam 500 MG Tab  Commonly known as: KEPPRA  Take 1 tablet (500 mg total) by mouth every 12 (twelve) hours. STOP 11/9/23     losartan 50 MG tablet  Commonly known as: COZAAR  Take 1 tablet (50 mg total) by mouth once daily. HOLD for SBP <140  Start taking on: October 31, 2023        CHANGE how you take these medications    aspirin 81 MG Chew (restart 11/9/23)  Take 1 tablet (81 mg total) by mouth once daily. Restart 11/9/23  What changed: additional instructions     carvediloL 6.25 MG tablet  Commonly known as: COREG  Take 1 tablet (6.25 mg total) by mouth 2 (two) times daily with meals. HOLD SBP <130, HR <60  What changed:   · medication strength  · additional instructions     clopidogreL 75 mg tablet (restart 11/5/23)  Commonly known as: PLAVIX  Take 1 tablet (75 mg total) by mouth once daily. Restart 11/5/23  What changed: additional instructions     MOUNJARO 2.5 mg/0.5 mL Pnij  Generic drug: tirzepatide  Inject 2.5 mg into the skin every 7 days.  What changed: additional instructions        CONTINUE taking these medications    BD ULTRA-FINE MERCEDES PEN NEEDLE 32 gauge x 5/32" Ndle  Generic drug: pen needle, diabetic  Use to inject insulin into the skin 4 (four) times daily.     CONTOUR NEXT TEST STRIPS Strp  Generic drug: blood sugar diagnostic  Test blood glucose 3 (three) times daily.     famotidine 20 MG tablet  Commonly known as: PEPCID  Take 1 tablet (20 mg total) by mouth every evening.     FREESTYLE PUNEET 3 SENSOR Neelam  Generic drug: blood-glucose sensor  To change every 14 days     HumaLOG KwikPen Insulin 100 unit/mL pen  Generic drug: insulin lispro  Inject 8 Units into the skin 3 (three) times daily before meals AND 1-5 Units 3 (three) times daily before meals. sliding scale insulin as needed. " Max daily dose 75 units daily...     LANTUS SOLOSTAR U-100 INSULIN glargine 100 units/mL SubQ pen  Generic drug: insulin  Inject 20 Units into the skin once daily.     MICROLET LANCET Misc  Generic drug: lancets  Test blood glucose 3 (three) times daily.     sulfamethoxazole-trimethoprim 400-80mg 400-80 mg per tablet  Commonly known as: BACTRIM,SEPTRA  Take 1 tablet by mouth once daily. Stop 2/6/24     tacrolimus 1 MG Cap  Commonly known as: PROGRAF  Take 3 capsules (3 mg total) by mouth every morning AND 2 capsules (2 mg total) every evening.     valGANciclovir 450 mg Tab  Commonly known as: VALCYTE  Take 1 tablet (450 mg total) by mouth once daily. Stop 2/6/24        STOP taking these medications    mycophenolate 250 mg Cap  Commonly known as: CELLCEPT            Evelyne Guo PA-C  Neurosurgery  [facility] - Neurosurgery [facility])

## 2023-10-30 NOTE — PT/OT/SLP PROGRESS
Speech Language Pathology Treatment    Patient Name:  Jed Chávez   MRN:  [id number]  Admitting Diagnosis: Left frontal lobe mass    Recommendations:                 General Recommendations:  Cognitive-linguistic therapy  Diet recommendations:  Regular Diet - IDDSI Level 7, Liquid Diet Level: Thin liquids - IDDSI Level 0   Aspiration Precautions: Standard aspiration precautions   General Precautions: Standard, fall  Communication strategies:  none    Assessment:     Jed Chávez is a 63 y.o. male with an SLP diagnosis of Cognitive-Linguistic Impairment.    Subjective     Awake/alert  Wife at bedside    Pain/Comfort:  Pain Rating 1: 0/10  Pain Rating Post-Intervention 1: 0/10    Respiratory Status: Room air    Objective:     Has the patient been evaluated by SLP for swallowing?   No  Keep patient NPO? No     Pt seen for ongoing cognitive therapy. Pt was given a deductive reasoning puzzle which took him approximately 20 minutes to finish. He required mod assistance throughout the task in order to complete the puzzle. SLP provided education regarding role of SLP, cognitive-linguistic therapy, outpatient ST, treatment goals, treatment plan, and POC. Pt and wife demonstrated understanding of education provided and agreed with plan.       Goals:   Multidisciplinary Problems       SLP Goals          Problem: SLP    Goal Priority Disciplines Outcome   SLP Goal     SLP    Description: Speech Language Pathology Goals  Goals expected to be met by 11/3    1. Pt will complete higher level deductive reasoning tasks with 90% accuracy.                        Plan:     Patient to be seen:  3 x/week   Plan of Care expires:  11/27/23  Plan of Care reviewed with:  patient, spouse   SLP Follow-Up:  Yes       Discharge recommendations:        Time Tracking:     SLP Treatment Date:   10/30/23  Speech Start Time:  0941  Speech Stop Time:  1004     Speech Total Time (min):  23 min    Billable Minutes: Speech Therapy Individual 15 and Self  Care/Home Management Training 8    10/30/2023

## 2023-10-30 NOTE — ASSESSMENT & PLAN NOTE
Endocrinology consulted for BG management.   BG goal 140-180    Endocrine signed off on 10/26 per neuro critical care request and re consulted on 10/29. Will d/c consult order and file as progress note.     Dex therapy discontinued on 10/29. Expect BG excursions up to 72 hrs post steroid discontinuation.     -Continue Levemir (Insulin Detemir) 10 units BID  -Continue Novolog (Insulin Aspart) 12 units TIDWM and prn for BG excursions [facility] SSI (150/25)   - BG checks AC/HS  - Hypoglycemia protocol in place  - If blood glucose greater than 300, please ask patient not to eat food or drink anything other than water until correctional insulin has brought it back below 250    ** Please notify Endocrine for any change and/or advance in diet**  ** Please call Endocrine for any BG related issues **    Discharge Planning:   Recommend resuming home regimen and keeping BG logs. Notify [facility] endocrine clinic for any BG < 80 or consistently > 200. Submit logs to patient portal message in 3-4 days for review.     Humalog 8 units TID with meals and correction scale (150/50), Lantus 20 units daily, Mounjaro 2.5 mg weekly

## 2023-10-30 NOTE — PROGRESS NOTES
"Leobardo Hutchinson - Neurosurgery ([facility])  Liver Transplant  Progress Note    Patient Name: Jed Chávez  MRN: [id number]  Admission Date: 10/24/2023  Hospital Length of Stay: 6 days  Code Status: Full Code  Primary Care Provider: Alee Pink MD  Post-Operative Day: 81    ORGAN:   LIVER  Disease Etiology: Primary Liver Malignancy: Hepatoma (HCC) and Cirrhosis  Donor Type:   Donation after Circulatory Death   CDC High Risk:   No  Donor CMV Status:   Donor CMV Status: Positive  Donor HBcAB:   Negative  Donor HCV Status:   Negative  Donor HBV SURENDRA:   Donor HCV SURENDRA: Negative  Whole or Partial: Whole Liver  Biliary Anastomosis: End to End  Arterial Anatomy: Standard  Subjective:     History of Present Illness:   Mr Chávez is a 63M w/ hx IDDM, HTN, L basal ganglia ICH (in 2014, no residual deficits), HCC/EtOH cirrhosis (s/p liver transplant on 8/10/23 complicated by hepatic art stenosis s/p angioplasty/stent on 10/2/23), non-melanoma skin cancer who presents as transfer from OSH for L frontal mass after 2 weeks of confusion. Interview conducted with patient as well as his wife at bedside. Patient unable to give good description of the confusion he has been experiencing - per his wife he has been "in a fog" for the last 2 weeks, often saying "ok" inappropriately in response to questions and exhibiting strange behaviors such as turning the lights on/off. She says his personality is different and he is less excited about things than usual. On 10/23, he had an episode where he had worsened confusion and agitation while out at a store, he later did not recall going out. The patient and his wife deny any weakness, numbness, speech difficulty, vision changes, or seizure activity.      He has a recent liver transplant and hepatic artery stent, was on ASA already and started plavix after recent stent placement. Last dose 10/24 AM, which patient was able to recall taking. Platelets 86 on admit.      CT and MRI w/wo at OSH " "show a 3 cm L frontal pole enhancing extraaxial mass with some adjacent intraaxial enhancement, as well as considerable surrounding edema with mass effect and midline shift.       Hospital Course:  Patient is s/p Left parasagittal extra-axial tumor and cerebral edema, prelim meningioma. Pt is currently in NICU and transplant managing IS and LFTs    Interval history:   - NAEON. Patient is POD4 from craniotomy per NSGY. Progressing well, management per NSGY. Per NSGY, pt to D/c today  - LFTs WNL. Prograf level low today, 4.1. Will increase prograf to 2 mg BID today, then titrate up to 3 mg BID tomorrow 10/31. Patient will be provided updated "transplant blue card" with list of correct medication dosages prior to discharge. Patient's liver coordinator notified of discharge. Will plan for labs Thursday to monitor prograf level.   - Per NSGY, will restart plavix 11/5 and ASA 11/9. Pt has f/u Liver US ordered 11/7 d/t hx HAS w/ metal stent in place.  - Continue to hold Cellcept on discharge.      Scheduled Meds:   acetaminophen  1,000 mg Oral TID    carvediloL  6.25 mg Oral BID WM    ceFAZolin (ANCEF) IVPB  2 g Intravenous Q8H    dexAMETHasone  4 mg Oral Q8H    docusate sodium  100 mg Oral BID    famotidine  20 mg Oral BID    heparin (porcine)  5,000 Units Subcutaneous Q8H    insulin aspart U-100  12 Units Subcutaneous TIDWM    insulin detemir U-100  20 Units Subcutaneous Daily    levETIRAcetam  500 mg Oral Q12H    losartan  50 mg Oral Daily    mupirocin   Nasal BID    sulfamethoxazole-trimethoprim 400-80mg  1 tablet Oral Daily    valGANciclovir  450 mg Oral Daily     Continuous Infusions:  PRN Meds:dextrose 10%, dextrose 10%, dextrose 10%, dextrose 10%, glucagon (human recombinant), glucose, glucose, hydrALAZINE, insulin aspart U-100, labetalol, magnesium oxide, magnesium oxide, morphine, oxyCODONE, potassium bicarbonate, potassium bicarbonate, potassium bicarbonate, potassium, sodium phosphates, potassium, sodium " phosphates, potassium, sodium phosphates    Review of Systems   Constitutional:  Negative for appetite change, fatigue and fever.   Respiratory:  Negative for cough and shortness of breath.    Cardiovascular:  Negative for chest pain and leg swelling.   Gastrointestinal:  Negative for abdominal pain, nausea and vomiting.   Genitourinary:  Negative for decreased urine volume, dysuria and frequency.   Skin:  Positive for wound.   Allergic/Immunologic: Positive for immunocompromised state.   Neurological:  Negative for dizziness and weakness.   All other systems reviewed and are negative.    Objective:     Vital Signs (Most Recent):  Temp: 97.2 °F (36.2 °C) (10/29/23 0742)  Pulse: (!) 52 (10/29/23 0742)  Resp: 18 (10/29/23 0742)  BP: (!) 153/81 (10/29/23 0742)  SpO2: 95 % (10/29/23 0742) Vital Signs (24h Range):  Temp:  [96.9 °F (36.1 °C)-98.3 °F (36.8 °C)] 97.2 °F (36.2 °C)  Pulse:  [52-83] 52  Resp:  [14-23] 18  SpO2:  [95 %-97 %] 95 %  BP: (121-160)/(69-87) 153/81     Weight: 93.2 kg (205 lb 7.5 oz)  Body mass index is 28.66 kg/m².    Intake/Output - Last 3 Shifts         10/27 0700  10/28 0659 10/28 0700  10/29 0659 10/29 0700  10/30 0659    P.O. 1550      I.V. (mL/kg) 2242.7 (24.1) 961.9 (10.3)     Blood       IV Piggyback 91.7 93     Total Intake(mL/kg) 3884.3 (41.7) 1054.9 (11.3)     Urine (mL/kg/hr) 2430 (1.1) 2600 (1.2)     Drains 10 0     Stool 0 0     Total Output 2440 2600     Net +1444.3 -1545.1            Stool Occurrence 0 x 5 x              Physical Exam  Vitals and nursing note reviewed.   Constitutional:       General: He is not in acute distress.  HENT:      Head: Normocephalic.      Mouth/Throat:      Mouth: Mucous membranes are moist.   Eyes:      General: No scleral icterus.     Extraocular Movements: Extraocular movements intact.      Conjunctiva/sclera: Conjunctivae normal.   Cardiovascular:      Rate and Rhythm: Normal rate.      Pulses: Normal pulses.   Pulmonary:      Effort: Pulmonary effort  is normal. No respiratory distress.   Abdominal:      General: There is no distension.      Palpations: Abdomen is soft.      Tenderness: There is no abdominal tenderness.      Comments: Lesion under chevron incision.    Musculoskeletal:         General: No swelling. Normal range of motion.      Cervical back: Normal range of motion.   Skin:     General: Skin is warm and dry.   Neurological:      Mental Status: He is alert and oriented to person, place, and time.      Motor: No weakness.   Psychiatric:         Mood and Affect: Mood normal.         Behavior: Behavior normal.          Laboratory:  Immunosuppressants       None          CBC:   Recent Labs   Lab 10/29/23  0100   WBC 5.40   RBC 4.45*   HGB 11.9*   HCT 36.7*   PLT 94*   MCV 83   MCH 26.7*   MCHC 32.4     CMP:   Recent Labs   Lab 10/29/23  0100   *   CALCIUM 8.3*   ALBUMIN 3.3*   PROT 6.3   *   K 4.8   CO2 18*      BUN 15   CREATININE 0.9   ALKPHOS 76   ALT 16   AST 32   BILITOT 0.5     Labs within the past 24 hours have been reviewed.    Diagnostic Results:  None    Debility/Functional status: No debility noted.          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  Post angioplasty angiogram showed patent hepatic arteries with non-flow limiting retrograde dissection of proper hepatic artery.  4.5 mm x 1.5 mm SE BMS was placed into the proper hepatic artery with post stent angiogram showing successful recanalization with resolution of the previously seen non flow-limiting retrograde dissection.  - Per IR, ASA 81 mg for life, plavix for 3-6 months  - ASA/Plavix on hold d/t recent craniotomy      Prophylactic  immunotherapy  - see Long term immuno      Long-term use of immunosuppressant medication  - Chronic Prophylactic Immunosuppression- cont to check prograf level daily.  Assess for toxicity and adjust level as needed  - cont to hold Cellcept      At risk for opportunistic infections  - cont OI prophylaxis      S/P liver transplant  - s/p liver transplant  - LFTs wnl      Adrenal cortical steroids causing adverse effect in therapeutic use  - endocrine consulted, appreciate assistance       Type 2 diabetes mellitus with other specified complication, with chronic insulin use  - endo consulted and following          VTE Risk Mitigation (From admission, onward)           Ordered     heparin (porcine) injection 5,000 Units  Every 8 hours         10/26/23 0959     IP VTE HIGH RISK PATIENT  Once         10/26/23 0959     Place sequential compression device  Until discontinued         10/26/23 0959                    The patients clinical status was discussed at multidisplinary rounds, involving transplant surgery, transplant medicine, pharmacy, nursing, nutrition, and social work.    Discharge Planning:  D/c today per NSGY. F/u Liver US 11/7. Transplant coordinator updated on discharge    Medical decision making for this encounter includes review of pertinent labs and diagnostic studies, assessment and planning, discussions with consulting providers, discussion with patient/family, and participation in multidisciplinary rounds. Time spent caring for patient: 30 minutes.    Jose F Velez, EDNA  Liver Transplant  Leobardo Hutchinson - Neurosurgery ([facility])

## 2023-10-30 NOTE — PLAN OF CARE
Problem: Adult Inpatient Plan of Care  Goal: Plan of Care Review  Outcome: Ongoing, Progressing  Goal: Absence of Hospital-Acquired Illness or Injury  Outcome: Ongoing, Progressing  Goal: Optimal Comfort and Wellbeing  Outcome: Ongoing, Progressing  Goal: Readiness for Transition of Care  Outcome: Ongoing, Progressing     Problem: Diabetes Comorbidity  Goal: Blood Glucose Level Within Targeted Range  Outcome: Ongoing, Progressing     Problem: Fall Injury Risk  Goal: Absence of Fall and Fall-Related Injury  Outcome: Ongoing, Progressing     Problem: Impaired Wound Healing  Goal: Optimal Wound Healing  Outcome: Ongoing, Progressing     Problem: Infection  Goal: Absence of Infection Signs and Symptoms  Outcome: Ongoing, Progressing     Problem: Skin Injury Risk Increased  Goal: Skin Health and Integrity  Outcome: Ongoing, Progressing    Hemovac drain removed. Crani incision staples intact with dried drainage. Evening blood sugar=319, 0200 LV=499, coverage given. No c/o pain. No neuro changes, no acute events. Telesitter in room. Wife at the bedside. Will continue to monitor.

## 2023-10-30 NOTE — DISCHARGE INSTRUCTIONS
Neurosurgery Patient Information  -Return to work will be determined on an individual basis.  -No driving until released by Dr. Degroot.   -Do not take any OTC products containing acetaminophen at the same time as you take your narcotic pain medication. Medications that may contain acetaminophen include but are not limited to: Excedrin and other headache medications, arthritis medications, cold and sinus medications, etc. Please review the list of active ingredients in any OTC medication prior to taking it.  -Do not take any Aspirin or Aspirin-containing products for 2 weeks after surgery.  -Do not take any Aleve, Naprosyn, Naproxen, Ibuprofen, Advil or any other nonsteroidal anti-inflammatory drug (NSAID) for 2 weeks after surgery.  -Do not take any herbal supplements for 2 weeks after surgery.   -Do not consume any alcoholic beverages until released by your neurosurgeon  -Do not perform any excessive bending over or leaning forward as this is a fall hazard.  -Do not perform any heavy lifting or lifting more than 5-10 lbs from the ground level as this is a fall hazard.  -Slowly increase your ambulation [walking] over the next 2 weeks as tolerated. The goal is to be walking 1-2 miles by the time of your 2 week post op appointment.   -Walk on paved surfaces only. It is okay to walk up and down stairs while holding onto a side rail.      Contact the Neurosurgery Office immediately if:  If you begin to notice any neurologic changes such as:           -Sudden onset of lethargy or sleepiness           -Sudden confusion, trouble speaking, or understanding            -Sudden trouble seeing in one or both eyes            -Sudden trouble walking, dizziness, loss of coordination            -Sudden severe headache with no known cause            -Sudden onset of numbness or weakness     Wound Care:  Keep your incision open to air. Keep incision clean and dry at all times. You may shower on the 2nd day after your surgery. You may  wash your hair with baby shampoo. Do not rub or scrub the incision. Do not allow the force of water to hit the incision. If the incision gets damp, gently pat it dry. You cannot take a bath/swim/submerge the incision until 8 weeks after surgery.    The incision does not need to be cleaned with any water, soap, alcohol, peroxide, or other substance.    Call your doctor or go to the Emergency Room for any signs of infection including: increased redness, drainage, pain or fever (temperature greater than or equal to 101.4).     Miscellaneous:  -Restart your plavix on 11/4/23  -Restart your aspirin 11/9/23  -You were started on a medication to prevent seizures (Keppra) while in the hospital. Please continue to take this medication as instructed.   -Follow up with Dr. Degroot in 2 weeks for a wound check and pathology results. Appointment will be mailed to you.  Future Appointments   Date Time Provider Department Center   11/7/2023 10:15 AM [facility] MASTER [facility] ULTR [facility]   11/14/2023  1:00 PM Jose E Degroot,  [facility] NEUROSC Jose Galaviz   12/7/2023  1:00 PM [facility]   12/14/2023  9:40 AM [facility]   12/19/2023  1:20 PM [facility]: [phone]

## 2023-10-30 NOTE — ASSESSMENT & PLAN NOTE
- S/p IR stent placement 10/2 - Celiac and common hepatic angiogram showed occlusion of proper hepatic artery 3 and 4 mm angioplasty ([place] balloon) of proper and right hepatic artery.  Post angioplasty angiogram showed patent hepatic arteries with non-flow limiting retrograde dissection of proper hepatic artery.  4.5 mm x 1.5 mm SE BMS was placed into the proper hepatic artery with post stent angiogram showing successful recanalization with resolution of the previously seen non flow-limiting retrograde dissection.  - Per IR, ASA 81 mg for life, plavix for 3-6 months  - ASA/Plavix on hold d/t recent craniotomy

## 2023-10-30 NOTE — PT/OT/SLP PROGRESS
"Occupational Therapy   Treatment and Discharge note    Name: Jed Chávez  MRN: [id number]  Admitting Diagnosis:  Left frontal lobe mass  4 Days Post-Op    Recommendations:     Discharge Recommendations: No Therapy Indicated  Discharge Equipment Recommendations:  shower chair  Barriers to discharge:  None    Assessment:     Jed Chávez is a 63 y.o. male with a medical diagnosis of Left frontal lobe mass.  He presents with the following performance deficits affecting function: impaired functional mobility, gait instability, decreased safety awareness. Pt found ambulatory in room, dressed. Pt/wife reported that pt had showered with supervision earlier in AM, and required no assist for upper body or lower body dressing. Pt declined further need for ADLs at this time, agreeable to functional mobility for strength and endurance. Pt completed functional mobility with supervision - pt was slightly distractible, but safe with mobility. Pt and pt's wife educated on minimizing multitasking upon return home, and having someone nearby the first few times that pt is showering upon return home to maximize safety.       Plan:     Patient not in need of further skilled acute OT at this time, DC OT.    Subjective     Chief Complaint: none verbalized  Patient/Family Comments/goals: "I just dressed myself and everything went fine... I have no concerns"  Pain/Comfort:  Pain Rating 1: 0/10    Objective:     Communicated with: RN prior to session.  Patient found ambulatory in room/choudhury with Other (comments) (no active lines) upon OT entry to room.    General Precautions: Standard, fall    Orthopedic Precautions:N/A  Braces: N/A  Respiratory Status: Room air     Occupational Performance:     Bed Mobility:    Not seen during this session 2/2 patient already OOB upon OT arrival and seated EOB upon exit    Functional Mobility/Transfers:  Patient completed Sit <> Stand Transfer with supervision  with  no assistive device   Functional " Mobility: Pt completed functional mobility in room and choudhury ~150' with supervision, no AD.     Activities of Daily Living:  Feeding:  independence per pt/wife report  Grooming: independence in stance at sink per pt/wife report  Bathing: supervision per pt/wife report  Upper Body Dressing: independence per pt/wife report  Lower Body Dressing: independence per pt/wife report  Toileting: independence per pt/wife report      AMPAC 6 Click ADL: 23    Treatment & Education:  Pt educated on the following:  - role of OT and OT POC, including DC from OT  - use of call light to request for assistance with all functional mobility to ensure safety during hospital stay  - importance of continued mobilization  - All pt questions within OT scope of practice addressed, pt verbalized understanding.      Patient left sitting edge of bed with all lines intact, call button in reach, RN notified, and wife present    GOALS:   Multidisciplinary Problems       Occupational Therapy Goals       Not on file              Multidisciplinary Problems (Resolved)          Problem: Occupational Therapy    Goal Priority Disciplines Outcome Interventions   Occupational Therapy Goal   (Resolved)     OT, PT/OT Met    Description: Goals to be met by: 11/27/23     Patient will increase functional independence with ADLs by performing:    UE Dressing with Supervision.  LE Dressing with Supervision.  Grooming while standing at sink with Supervision.  Toileting from toilet with Supervision for hygiene and clothing management.   Supine to sit with [place].  Step transfer with Supervision  Toilet transfer to toilet with Supervision.                         Time Tracking:     OT Date of Treatment: 10/30/23  OT Start Time: 1119  OT Stop Time: 1127  OT Total Time (min): 8 min    Billable Minutes:Therapeutic Activity 8    OT/JEROME: OT          10/30/2023

## 2023-10-31 ENCOUNTER — PATIENT MESSAGE (OUTPATIENT)
Dept: TRANSPLANT | Facility: CLINIC | Age: 63
End: 2023-10-31
Payer: COMMERCIAL

## 2023-10-31 LAB
COMMENT: NORMAL
FINAL PATHOLOGIC DIAGNOSIS: NORMAL
FROZEN SECTION DIAGNOSIS: NORMAL
FROZEN SECTION FOOTNOTE: NORMAL
GROSS: NORMAL
Lab: NORMAL
MICROSCOPIC EXAM: NORMAL

## 2023-10-31 PROCEDURE — G0180 PR HOME HEALTH MD CERTIFICATION: ICD-10-PCS | Mod: ,,, | Performed by: PHYSICIAN ASSISTANT

## 2023-10-31 PROCEDURE — G0180 MD CERTIFICATION HHA PATIENT: HCPCS | Mod: ,,, | Performed by: PHYSICIAN ASSISTANT

## 2023-11-01 ENCOUNTER — PATIENT OUTREACH (OUTPATIENT)
Dept: ADMINISTRATIVE | Facility: CLINIC | Age: 63
End: 2023-11-01
Payer: COMMERCIAL

## 2023-11-02 ENCOUNTER — TELEPHONE (OUTPATIENT)
Dept: NEUROSURGERY | Facility: CLINIC | Age: 63
End: 2023-11-02
Payer: COMMERCIAL

## 2023-11-02 ENCOUNTER — HOSPITAL ENCOUNTER (OUTPATIENT)
Dept: RADIOLOGY | Facility: HOSPITAL | Age: 63
Discharge: HOME | End: 2023-11-02
Attending: NEUROLOGICAL SURGERY
Payer: COMMERCIAL

## 2023-11-02 DIAGNOSIS — Z98.890 S/P CRANIOTOMY: ICD-10-CM

## 2023-11-02 DIAGNOSIS — M79.89 ARM SWELLING: Primary | ICD-10-CM

## 2023-11-02 DIAGNOSIS — M79.89 ARM SWELLING: ICD-10-CM

## 2023-11-02 PROCEDURE — 93971 EXTREMITY STUDY: CPT | Mod: TC,RT

## 2023-11-02 PROCEDURE — 93971 EXTREMITY STUDY: CPT | Mod: 26,RT,, | Performed by: RADIOLOGY

## 2023-11-02 PROCEDURE — 93971 US UPPER EXTREMITY VEINS RIGHT: ICD-10-PCS | Mod: 26,RT,, | Performed by: RADIOLOGY

## 2023-11-02 NOTE — TELEPHONE ENCOUNTER
Returned call to [facility] nurse. Ordered and scheduled ultrasound in [place] and advised to keep warm compresses on the patient's arm.     ----- Message from Bethany Ceballos MA sent at 11/2/2023 10:44 AM CDT -----  Type: Patient Call Back    Who called: Shelly - [facility]     What is the request in detail: been off his Plavix due to surgery.. right arm is swollen/warm & she's worried about blood clots.. would like a ultrasound ordered ..    Can the clinic reply by [facility]?No    Would the patient rather a call back or a response via [facility]? Yes, call    Best call back number: [phone]

## 2023-11-02 NOTE — TELEPHONE ENCOUNTER
Called Pt to scheduled multi-d appts at f/u on 11/14.     Rad onc appt tentatively scheduled for 11/14 at 10 am with Dr. Zane LEMUS appt to follow.     No answer, left VM.

## 2023-11-03 NOTE — TELEPHONE ENCOUNTER
S/W spouse regarding multi-d scheduling. Pt/spouse agreeable to appointment on 11/14 at 10 am. Dr. Degroot to see patient around same time.     Directions to clinic and direct contact info provided.     No further questions/concerns at this time.

## 2023-11-04 PROBLEM — Z79.899 IMMUNOSUPPRESSION DUE TO DRUG THERAPY: Chronic | Status: ACTIVE | Noted: 2023-11-04

## 2023-11-04 PROBLEM — Z85.05 HISTORY OF HEPATOCELLULAR CARCINOMA: Status: ACTIVE | Noted: 2023-04-05

## 2023-11-04 PROBLEM — D84.821 IMMUNOSUPPRESSION DUE TO DRUG THERAPY: Chronic | Status: ACTIVE | Noted: 2023-11-04

## 2023-11-04 PROBLEM — Z85.05 HISTORY OF HEPATOCELLULAR CARCINOMA: Chronic | Status: ACTIVE | Noted: 2023-04-05

## 2023-11-04 PROBLEM — C22.0 HCC (HEPATOCELLULAR CARCINOMA): Chronic | Status: ACTIVE | Noted: 2023-04-05

## 2023-11-04 PROBLEM — Z94.4 S/P LIVER TRANSPLANT: Chronic | Status: ACTIVE | Noted: 2023-08-12

## 2023-11-07 ENCOUNTER — PATIENT MESSAGE (OUTPATIENT)
Dept: NEUROSURGERY | Facility: CLINIC | Age: 63
End: 2023-11-07
Payer: COMMERCIAL

## 2023-11-07 ENCOUNTER — PATIENT MESSAGE (OUTPATIENT)
Dept: TRANSPLANT | Facility: CLINIC | Age: 63
End: 2023-11-07
Payer: COMMERCIAL

## 2023-11-07 DIAGNOSIS — K72.10 END STAGE LIVER DISEASE: ICD-10-CM

## 2023-11-07 DIAGNOSIS — Z85.05 PERSONAL HISTORY OF MALIGNANT NEOPLASM OF LIVER: ICD-10-CM

## 2023-11-07 DIAGNOSIS — Z94.4 STATUS POST LIVER TRANSPLANT: Primary | ICD-10-CM

## 2023-11-07 RX ORDER — TACROLIMUS 1 MG/1
3 CAPSULE ORAL EVERY 12 HOURS
Qty: 180 CAPSULE | Refills: 11 | Status: SHIPPED | OUTPATIENT
Start: 2023-11-07 | End: 2024-01-03 | Stop reason: DRUGHIGH

## 2023-11-07 NOTE — TELEPHONE ENCOUNTER
Pt's spouse advised of dose change and repeat labs needed.  ----- Message from Joleen Carr MD sent at 11/6/2023  7:59 PM CST -----  Increase tacro to 3mg twice a day and repeat labs next week

## 2023-11-13 ENCOUNTER — LAB VISIT (OUTPATIENT)
Dept: LAB | Facility: HOSPITAL | Age: 63
End: 2023-11-13
Attending: INTERNAL MEDICINE
Payer: COMMERCIAL

## 2023-11-13 DIAGNOSIS — Z85.05 PERSONAL HISTORY OF MALIGNANT NEOPLASM OF LIVER: ICD-10-CM

## 2023-11-13 DIAGNOSIS — Z94.4 STATUS POST LIVER TRANSPLANT: ICD-10-CM

## 2023-11-13 DIAGNOSIS — Z94.4 S/P LIVER TRANSPLANT: ICD-10-CM

## 2023-11-13 LAB
AFP SERPL-MCNC: <2 NG/ML (ref 0–8.4)
ALBUMIN SERPL BCP-MCNC: 3.9 G/DL (ref 3.5–5.2)
ALP SERPL-CCNC: 103 U/L (ref 55–135)
ALT SERPL W/O P-5'-P-CCNC: 28 U/L (ref 10–44)
ANION GAP SERPL CALC-SCNC: 10 MMOL/L (ref 8–16)
AST SERPL-CCNC: 24 U/L (ref 10–40)
BASOPHILS # BLD AUTO: 0.03 K/UL (ref 0–0.2)
BASOPHILS NFR BLD: 0.5 % (ref 0–1.9)
BILIRUB SERPL-MCNC: 1.4 MG/DL (ref 0.1–1)
BUN SERPL-MCNC: 14 MG/DL (ref 8–23)
CALCIUM SERPL-MCNC: 9.1 MG/DL (ref 8.7–10.5)
CHLORIDE SERPL-SCNC: 103 MMOL/L (ref 95–110)
CO2 SERPL-SCNC: 25 MMOL/L (ref 23–29)
CREAT SERPL-MCNC: 0.8 MG/DL (ref 0.5–1.4)
DIFFERENTIAL METHOD: ABNORMAL
EOSINOPHIL # BLD AUTO: 0 K/UL (ref 0–0.5)
EOSINOPHIL NFR BLD: 0.7 % (ref 0–8)
ERYTHROCYTE [DISTWIDTH] IN BLOOD BY AUTOMATED COUNT: 15.8 % (ref 11.5–14.5)
EST. GFR  (NO RACE VARIABLE): >60 ML/MIN/1.73 M^2
GLUCOSE SERPL-MCNC: 185 MG/DL (ref 70–110)
HCT VFR BLD AUTO: 44.4 % (ref 40–54)
HGB BLD-MCNC: 14.1 G/DL (ref 14–18)
IMM GRANULOCYTES # BLD AUTO: 0.02 K/UL (ref 0–0.04)
IMM GRANULOCYTES NFR BLD AUTO: 0.3 % (ref 0–0.5)
LYMPHOCYTES # BLD AUTO: 2.8 K/UL (ref 1–4.8)
LYMPHOCYTES NFR BLD: 46.1 % (ref 18–48)
MCH RBC QN AUTO: 26.7 PG (ref 27–31)
MCHC RBC AUTO-ENTMCNC: 31.8 G/DL (ref 32–36)
MCV RBC AUTO: 84 FL (ref 82–98)
MONOCYTES # BLD AUTO: 0.4 K/UL (ref 0.3–1)
MONOCYTES NFR BLD: 6.1 % (ref 4–15)
NEUTROPHILS # BLD AUTO: 2.8 K/UL (ref 1.8–7.7)
NEUTROPHILS NFR BLD: 46.3 % (ref 38–73)
NRBC BLD-RTO: 0 /100 WBC
PLATELET # BLD AUTO: 105 K/UL (ref 150–450)
PMV BLD AUTO: 10 FL (ref 9.2–12.9)
POTASSIUM SERPL-SCNC: 4.2 MMOL/L (ref 3.5–5.1)
PROT SERPL-MCNC: 7 G/DL (ref 6–8.4)
RBC # BLD AUTO: 5.28 M/UL (ref 4.6–6.2)
SODIUM SERPL-SCNC: 138 MMOL/L (ref 136–145)
WBC # BLD AUTO: 6.03 K/UL (ref 3.9–12.7)

## 2023-11-13 PROCEDURE — 80053 COMPREHEN METABOLIC PANEL: CPT | Performed by: INTERNAL MEDICINE

## 2023-11-13 PROCEDURE — 82105 ALPHA-FETOPROTEIN SERUM: CPT | Performed by: INTERNAL MEDICINE

## 2023-11-13 PROCEDURE — 36415 COLL VENOUS BLD VENIPUNCTURE: CPT | Performed by: INTERNAL MEDICINE

## 2023-11-13 PROCEDURE — 85025 COMPLETE CBC W/AUTO DIFF WBC: CPT | Performed by: INTERNAL MEDICINE

## 2023-11-13 PROCEDURE — 80197 ASSAY OF TACROLIMUS: CPT | Performed by: INTERNAL MEDICINE

## 2023-11-14 ENCOUNTER — OFFICE VISIT (OUTPATIENT)
Dept: NEUROSURGERY | Facility: CLINIC | Age: 63
End: 2023-11-14
Payer: COMMERCIAL

## 2023-11-14 ENCOUNTER — OFFICE VISIT (OUTPATIENT)
Dept: RADIATION ONCOLOGY | Facility: CLINIC | Age: 63
End: 2023-11-14
Payer: COMMERCIAL

## 2023-11-14 ENCOUNTER — PATIENT MESSAGE (OUTPATIENT)
Dept: NEUROSURGERY | Facility: CLINIC | Age: 63
End: 2023-11-14

## 2023-11-14 VITALS
WEIGHT: 204.56 LBS | TEMPERATURE: 97 F | BODY MASS INDEX: 28.64 KG/M2 | OXYGEN SATURATION: 98 % | HEART RATE: 73 BPM | DIASTOLIC BLOOD PRESSURE: 98 MMHG | RESPIRATION RATE: 17 BRPM | HEIGHT: 71 IN | SYSTOLIC BLOOD PRESSURE: 171 MMHG

## 2023-11-14 DIAGNOSIS — D42.0 ATYPICAL INTRACRANIAL MENINGIOMA: Primary | ICD-10-CM

## 2023-11-14 DIAGNOSIS — Z98.890 S/P CRANIOTOMY: ICD-10-CM

## 2023-11-14 LAB — TACROLIMUS BLD-MCNC: 8.9 NG/ML (ref 5–15)

## 2023-11-14 PROCEDURE — 1160F RVW MEDS BY RX/DR IN RCRD: CPT | Mod: CPTII,S$GLB,, | Performed by: NEUROLOGICAL SURGERY

## 2023-11-14 PROCEDURE — 99205 PR OFFICE/OUTPT VISIT, NEW, LEVL V, 60-74 MIN: ICD-10-PCS | Mod: S$GLB,,, | Performed by: RADIOLOGY

## 2023-11-14 PROCEDURE — 99024 PR POST-OP FOLLOW-UP VISIT: ICD-10-PCS | Mod: S$GLB,,, | Performed by: NEUROLOGICAL SURGERY

## 2023-11-14 PROCEDURE — 99999 PR PBB SHADOW E&M-EST. PATIENT-LVL II: ICD-10-PCS | Mod: PBBFAC,,, | Performed by: NEUROLOGICAL SURGERY

## 2023-11-14 PROCEDURE — 3044F HG A1C LEVEL LT 7.0%: CPT | Mod: CPTII,S$GLB,, | Performed by: NEUROLOGICAL SURGERY

## 2023-11-14 PROCEDURE — 1159F MED LIST DOCD IN RCRD: CPT | Mod: CPTII,S$GLB,, | Performed by: NEUROLOGICAL SURGERY

## 2023-11-14 PROCEDURE — 3061F PR NEG MICROALBUMINURIA RESULT DOCUMENTED/REVIEW: ICD-10-PCS | Mod: CPTII,S$GLB,, | Performed by: NEUROLOGICAL SURGERY

## 2023-11-14 PROCEDURE — 3044F PR MOST RECENT HEMOGLOBIN A1C LEVEL <7.0%: ICD-10-PCS | Mod: CPTII,S$GLB,, | Performed by: NEUROLOGICAL SURGERY

## 2023-11-14 PROCEDURE — 99999 PR PBB SHADOW E&M-EST. PATIENT-LVL IV: CPT | Mod: PBBFAC,,, | Performed by: RADIOLOGY

## 2023-11-14 PROCEDURE — 4010F PR ACE/ARB THEARPY RXD/TAKEN: ICD-10-PCS | Mod: CPTII,S$GLB,, | Performed by: NEUROLOGICAL SURGERY

## 2023-11-14 PROCEDURE — 1160F PR REVIEW ALL MEDS BY PRESCRIBER/CLIN PHARMACIST DOCUMENTED: ICD-10-PCS | Mod: CPTII,S$GLB,, | Performed by: NEUROLOGICAL SURGERY

## 2023-11-14 PROCEDURE — 99999 PR PBB SHADOW E&M-EST. PATIENT-LVL IV: ICD-10-PCS | Mod: PBBFAC,,, | Performed by: RADIOLOGY

## 2023-11-14 PROCEDURE — 99205 OFFICE O/P NEW HI 60 MIN: CPT | Mod: S$GLB,,, | Performed by: RADIOLOGY

## 2023-11-14 PROCEDURE — 4010F ACE/ARB THERAPY RXD/TAKEN: CPT | Mod: CPTII,S$GLB,, | Performed by: NEUROLOGICAL SURGERY

## 2023-11-14 PROCEDURE — 3066F NEPHROPATHY DOC TX: CPT | Mod: CPTII,S$GLB,, | Performed by: NEUROLOGICAL SURGERY

## 2023-11-14 PROCEDURE — 3066F PR DOCUMENTATION OF TREATMENT FOR NEPHROPATHY: ICD-10-PCS | Mod: CPTII,S$GLB,, | Performed by: NEUROLOGICAL SURGERY

## 2023-11-14 PROCEDURE — 1159F PR MEDICATION LIST DOCUMENTED IN MEDICAL RECORD: ICD-10-PCS | Mod: CPTII,S$GLB,, | Performed by: NEUROLOGICAL SURGERY

## 2023-11-14 PROCEDURE — 99024 POSTOP FOLLOW-UP VISIT: CPT | Mod: S$GLB,,, | Performed by: NEUROLOGICAL SURGERY

## 2023-11-14 PROCEDURE — 3061F NEG MICROALBUMINURIA REV: CPT | Mod: CPTII,S$GLB,, | Performed by: NEUROLOGICAL SURGERY

## 2023-11-14 PROCEDURE — 99999 PR PBB SHADOW E&M-EST. PATIENT-LVL II: CPT | Mod: PBBFAC,,, | Performed by: NEUROLOGICAL SURGERY

## 2023-11-14 NOTE — PROGRESS NOTES
11/14/2023    Radiation Oncology Consultation    Assessment   This is a 63 y.o. male with WHO grade 2 Left frontal atypical meningioma s/p GTR by Dr. Degroot 10/26/23.  History significant for liver transplant 8/10/23 for HCC. He is referred for consideration of adjuvant therapy for meningioma.    I discussed the natural history and treatment options available for atypical meningioma. He would have fallen into the Intermediate Risk group of RTOG 0539 (WHO II atypical with GTR or WHO I recurrent), which found a significant improvement in 3-year progression-free survival for patients undergoing adjuvant RT vs. Observation (94% vs. 70%). I discussed that radiation involves treating the surgical bed with margin to 54 Gy in 30 fractions using IMRT to limit dose to uninvolved brain and organs at risk. Potential short-term and long-term side effects of radiation to the brain were discussed.     At the end of our discussion, he wished to have more time to consider whether to continue with observation after surgery vs proceed with adjuvant radiation therapy. He will notify me if he wants to pursue radiation and will likely want that closer to home in [place].           Plan   1) Patient will reach out if he decides to pursue adjuvant radiation therapy.   2) Follow up with Dr. Degroot in ~3 months with MRI Brain for continued surveillance.          CHIEF COMPLAINT: Atypical meningioma    HPI/Focused ROS: Mr. Chávez is a 62 y/o male with h/o HCC s/p liver transplant 8/10/23. He was [place] to [facility] [place]surgical evaluation on 10/24/23 for 2 week h/o fogginess and acute onset confusion/memory loss. MRI Brain 10/24/23 demonstrated a 4 cm extra-axial enhancing lesion in the Left frontal lobe with vasogenic edema and 1.2 cm midline shift. He underwent resection by Dr. Degroot 10/26/23 with pathology revealing WHO grade 2 atypical meningioma, focal tumor necrosis identified, negative for brain invasion. Post-op MRI 10/27/23  demonstrated no evidence of residual disease. He is referred for consideration of adjuvant therapy.    In clinic today, he is accompanied by his wife. Reports feeling well since surgery and denies focal weakness or numbness, gait imbalance, speech difficulty, or seizures. His confusion resolved after surgery.       Is the patient female between ages 15-55:  no    Does the patient have a CIED:  no    Prior Radiation History: None      Past Medical History:   Diagnosis Date    Alcoholic cirrhosis     CVA (cerebral vascular accident)     DM (diabetes mellitus)     Dyslipidemia     Hepatocellular carcinoma     History of hepatocellular carcinoma, in remission after liver transplant 4/5/2023    Synoptic Report Procedure: Total hepatectomy. Histologic type: Hepatocellular carcinoma. Histologic grade: G1, well-differentiated. Tumor focality: Multifocal. Tumor characteristics: Tumor #1 (blocks 2B-2E): Tumor site: Right lobe. Tumor size: 6.2 cm in greatest dimension grossly. Treatment effect: Complete necrosis (no viable tumor). Tumor #2 (blocks 2F-2H): Tumor site: Right lobe. Tumor size: 4.    HTN (hypertension)     Intracranial hemorrhage     S/P liver transplant 8/12/2023    Skin cancer        Past Surgical History:   Procedure Laterality Date    CRANIOTOMY, WITH NEOPLASM EXCISION USING COMPUTER-ASSISTED NAVIGATION Left 10/26/2023    Procedure: CRANIOTOMY, WITH NEOPLASM EXCISION USING COMPUTER-ASSISTED NAVIGATION;  Surgeon: Jose E Degroot DO;  Location: [facility] OR [address] [place] [address];  Service: Neurosurgery;  Laterality: Left;    HERNIA REPAIR N/A     LIVER TRANSPLANT N/A 8/9/2023    Procedure: TRANSPLANT, LIVER;  Surgeon: Ameya Suero MD;  Location: [facility] OR [address] [place] [address];  Service: Transplant;  Laterality: N/A;       Social History     Tobacco Use    Smoking status: Never    Smokeless tobacco: Never   Substance Use Topics    Alcohol use: Not Currently    Drug use: Never       Cancer-related family history is not on file.    Current  "Outpatient Medications on File Prior to Visit   Medication Sig Dispense Refill    aspirin 81 MG Chew Take 1 tablet (81 mg total) by mouth once daily. Restart 11/9/23 30 tablet 11    blood sugar diagnostic Strp Test blood glucose 3 (three) times daily. 100 strip 11    blood-glucose sensor (FREESTYLE PUNEET 3 SENSOR) Neelam To change every 14 days 2 each 3    carvediloL (COREG) 6.25 MG tablet Take 1 tablet (6.25 mg total) by mouth 2 (two) times daily with meals. HOLD SBP <130, HR <60 60 tablet 1    clopidogreL (PLAVIX) 75 mg tablet Take 1 tablet (75 mg total) by mouth once daily. Restart 11/5/23 30 tablet 2    insulin glargine 100 units/mL SubQ pen Inject 20 Units into the skin once daily. 15 mL 0    insulin lispro (HUMALOG KWIKPEN INSULIN) 100 unit/mL pen Inject 8 Units into the skin 3 (three) times daily before meals AND 1-5 Units 3 (three) times daily before meals. sliding scale insulin as needed. Max daily dose 75 units daily... 15 mL 0    lancets Misc Test blood glucose 3 (three) times daily. 100 each 11    levETIRAcetam (KEPPRA) 500 MG Tab Take 1 tablet (500 mg total) by mouth every 12 (twelve) hours. STOP 11/9/23 21 tablet 0    losartan (COZAAR) 50 MG tablet Take 1 tablet (50 mg total) by mouth once daily. HOLD for SBP <140 30 tablet 11    pen needle, diabetic 32 gauge x 5/32" Ndle Use to inject insulin into the skin 4 (four) times daily. 100 each 11    sulfamethoxazole-trimethoprim 400-80mg (BACTRIM,SEPTRA) 400-80 mg per tablet Take 1 tablet by mouth once daily. Stop 2/6/24 30 tablet 5    tacrolimus (PROGRAF) 1 MG Cap Take 3 capsules (3 mg total) by mouth every 12 (twelve) hours. 180 capsule 11    tirzepatide (MOUNJARO) 2.5 mg/0.5 mL PnIj Inject 2.5 mg into the skin every 7 days. 4 pen 0    valGANciclovir (VALCYTE) 450 mg Tab Take 1 tablet (450 mg total) by mouth once daily. Stop 2/6/24 30 tablet 5     No current facility-administered medications on file prior to visit.       Review of patient's allergies " "indicates:  No Known Allergies      Vital Signs: BP (!) 171/98 (BP Location: Right arm, Patient Position: Sitting)   Pulse 73   Temp 97.4 °F (36.3 °C)   Resp 17   Ht 5' 11" (1.803 m)   Wt 92.8 kg (204 lb 9.4 oz)   SpO2 98%   BMI 28.53 kg/m²     ECOG Performance Status: (0) Fully active, able to carry on all predisease performance without restriction    Physical Exam  Constitutional:       Appearance: Normal appearance.   HENT:      Head: Normocephalic and atraumatic.      Comments: Staples in place along bifrontal incision; no erythema or drainage  Eyes:      General: No scleral icterus.     Extraocular Movements: Extraocular movements intact.   Pulmonary:      Effort: No accessory muscle usage or respiratory distress.   Musculoskeletal:      Cervical back: Normal range of motion.   Neurological:      Mental Status: He is alert and oriented to person, place, and time.   Psychiatric:         Mood and Affect: Mood and affect normal.         Judgment: Judgment normal.          Labs:    Imaging: I have personally reviewed the patient's available images and reports and summarized pertinent findings above in HPI.     Pathology: I have personally reviewed the patient's available pathology and summarized pertinent findings above in HPI.       - Thank you for allowing me to participate in the care of your patient.    Boy Dubon MD, PhD    "

## 2023-11-15 ENCOUNTER — HOSPITAL ENCOUNTER (OUTPATIENT)
Dept: RADIOLOGY | Facility: HOSPITAL | Age: 63
Discharge: HOME | End: 2023-11-15
Attending: INTERNAL MEDICINE
Payer: COMMERCIAL

## 2023-11-15 DIAGNOSIS — Z94.4 S/P LIVER TRANSPLANT: ICD-10-CM

## 2023-11-15 DIAGNOSIS — D42.0 ATYPICAL INTRACRANIAL MENINGIOMA: ICD-10-CM

## 2023-11-15 DIAGNOSIS — Z98.890 S/P CRANIOTOMY: Primary | ICD-10-CM

## 2023-11-15 PROCEDURE — 93976 VASCULAR STUDY: CPT | Mod: 26,,, | Performed by: RADIOLOGY

## 2023-11-15 PROCEDURE — 76705 ECHO EXAM OF ABDOMEN: CPT | Mod: 26,59,, | Performed by: RADIOLOGY

## 2023-11-15 PROCEDURE — 76705 US DOPPLER LIVER TRANSPLANT POST (XPD): ICD-10-PCS | Mod: 26,59,, | Performed by: RADIOLOGY

## 2023-11-15 PROCEDURE — 76705 ECHO EXAM OF ABDOMEN: CPT | Mod: TC

## 2023-11-15 PROCEDURE — 93976 US DOPPLER LIVER TRANSPLANT POST (XPD): ICD-10-PCS | Mod: 26,,, | Performed by: RADIOLOGY

## 2023-11-16 ENCOUNTER — PATIENT MESSAGE (OUTPATIENT)
Dept: RADIATION ONCOLOGY | Facility: CLINIC | Age: 63
End: 2023-11-16
Payer: COMMERCIAL

## 2023-11-17 ENCOUNTER — PATIENT MESSAGE (OUTPATIENT)
Dept: TRANSPLANT | Facility: CLINIC | Age: 63
End: 2023-11-17
Payer: COMMERCIAL

## 2023-11-17 ENCOUNTER — TELEPHONE (OUTPATIENT)
Dept: TRANSPLANT | Facility: CLINIC | Age: 63
End: 2023-11-17
Payer: COMMERCIAL

## 2023-11-17 DIAGNOSIS — Z94.4 STATUS POST LIVER TRANSPLANT: Primary | ICD-10-CM

## 2023-11-17 NOTE — TELEPHONE ENCOUNTER
Pt advised. Repeat labs due 11/27/23 per protocol.  Repeat ultrasound due mid December per protocol. MA to call pt to schedule.  ----- Message from Joleen Carr MD sent at 11/17/2023 11:37 AM CST -----  Reviewed, nothing to do; repeat per routine

## 2023-11-17 NOTE — TELEPHONE ENCOUNTER
----- Message from Joleen Carr MD sent at 11/17/2023 11:37 AM CST -----  Reviewed, nothing to do; repeat per routine

## 2023-11-20 ENCOUNTER — PATIENT MESSAGE (OUTPATIENT)
Dept: ENDOCRINOLOGY | Facility: CLINIC | Age: 63
End: 2023-11-20
Payer: COMMERCIAL

## 2023-11-20 ENCOUNTER — OFFICE VISIT (OUTPATIENT)
Dept: OTOLARYNGOLOGY | Facility: CLINIC | Age: 63
End: 2023-11-20
Payer: COMMERCIAL

## 2023-11-20 ENCOUNTER — TELEPHONE (OUTPATIENT)
Dept: RADIATION ONCOLOGY | Facility: CLINIC | Age: 63
End: 2023-11-20
Payer: COMMERCIAL

## 2023-11-20 VITALS — BODY MASS INDEX: 28.87 KG/M2 | WEIGHT: 207 LBS

## 2023-11-20 DIAGNOSIS — H91.93 SUBJECTIVE HEARING CHANGE OF BOTH EARS: Primary | ICD-10-CM

## 2023-11-20 PROCEDURE — 99999 PR PBB SHADOW E&M-EST. PATIENT-LVL III: ICD-10-PCS | Mod: PBBFAC,,, | Performed by: OTOLARYNGOLOGY

## 2023-11-20 PROCEDURE — 99999 PR PBB SHADOW E&M-EST. PATIENT-LVL III: CPT | Mod: PBBFAC,,, | Performed by: OTOLARYNGOLOGY

## 2023-11-20 PROCEDURE — 99202 PR OFFICE/OUTPT VISIT, NEW, LEVL II, 15-29 MIN: ICD-10-PCS | Mod: S$GLB,,, | Performed by: OTOLARYNGOLOGY

## 2023-11-20 PROCEDURE — 99202 OFFICE O/P NEW SF 15 MIN: CPT | Mod: S$GLB,,, | Performed by: OTOLARYNGOLOGY

## 2023-11-21 ENCOUNTER — TUMOR BOARD CONFERENCE (OUTPATIENT)
Dept: NEUROSURGERY | Facility: CLINIC | Age: 63
End: 2023-11-21
Payer: COMMERCIAL

## 2023-11-21 NOTE — PROGRESS NOTES
[facility]   NEURO-ONCOLOGICAL MULTIDISCIPLINARY TUMOR BOARD  PATIENT REVIEW FORM  ____________________________________________________________  America LEES, attest that this documentation has been prepared under the direction and in the presence of Evelyne Guo PA-C.     PATIENT ID: Jed Chávez is a 63 y.o. male  DATE: 11/21/2023    This patient's relevant clinical data was reviewed before the multidisciplinary tumor board in order to establish an optimum treatment plan in this patient. The patient's clinical data were presented at our Multi-Disciplinary Tumor Board which included representatives of Neurosurgery, Neuro-Radiology, Neuro-Pathology, Radiation Oncology, Medical Oncology, Palliative Medicine.     PRESENTER:   Dr. Degroot    PATIENT SUMMARY:   The patient is a 63 y.o. male  w/ PMH DM, L basal ganglia ICH (2014), HCC/EtOH cirrhosis (s/p liver transplant 8/2023), & non-melanoma skin cancer. [place] [facility] [place] 2 weeks of confusion. Imaging revealed L frontal mass.     8/10/23 - liver transplant   10/24/23 - MRI brain shows extra-axial lesion in L frontal lobe w/ vasogenic edema & MLS   10/26/23 - crani/Dr Degroot    Additionally, we reviewed previous medical and familial history of present illness, and recent lab results along with all available histopathologic and imaging studies.    IMAGING:   MRI Brain W WO Contrast (10/27/2023):  Postsurgical change recent left frontal extra-axial mass resection as above, without evidence of residual enhancing lesion. Persistent large region of vasogenic edema throughout the left frontal lobe, but notably improved parenchymal enhancement subjacent to the surgical site.    PATHOLOGY:  10-26-23 Cerebral meninges, L frontal convexity, crani w/ resection:   - Atypical meningioma, transitional subtype     CNS WHO Grade II   - Focal spontaneous tumor necrosis identified    BOARD RECOMMENDATIONS:   The tumor board considered available  treatment options and made the following recommendations: Pt was recently seen by Myriam Degroot and Jagdish. Plan to follow up in 3 months with close interval follow up. Pt referred to Radiation Oncology in [place] upon request.    National site-specific guidelines were discussed with respect to the case.     Tumor board is a meeting of clinicians from various specialty areas who evaluate and discuss patients for whom a multidisciplinary approach is being considered. Final determinations in the plan of care are those of the provider(s). The responsibility for follow up recommendations given during tumor board is that of the provider.     CONSULT NEEDED:     [] Neurosurgery    [] Hem/Onc    [] Rad/Onc         [] Endocrinology     [] Neuro-ophthalmology    [] Palliative Medicine      []  Other:       PRESENTATION AT CANCER CONFERENCE:         [] Prospective    [x] Retrospective     [] Follow-Up          [] Eligible for clinical trial/Clinical trial status:

## 2023-11-21 NOTE — PROGRESS NOTES
Referring Provider:    Self, Aaareferral  No address on file  Subjective:   Patient: Jed Chávez [id number], :1960   Visit date:2023 9:23 AM    Chief Complaint:  Cerumen Impaction (Pt state that he is coming in today to have his ears cleaned because they are clogged up )    HPI:    Prior notes reviewed by myself.  Clinical documentation obtained by nursing staff reviewed.     63-year-old gentleman presents for evaluation of possible cerumen impaction.  Feels as if his hearing is decreased bilaterally but more on the right side.  He feels as if there is something moving around in his right ear.  He has had several recent acute health changes including a liver transplant and a frontal craniotomy.  He does not have any significant prior otologic history.  No recent audiology.      Objective:     Physical Exam:  Vitals:  Wt 93.9 kg (207 lb 0.2 oz)   BMI 28.87 kg/m²   General appearance:  Well developed, well nourished    Ears:  Otoscopy of external auditory canals and tympanic membranes was normal, clinical speech reception thresholds grossly intact, no mass/lesion of auricle.    Nose:  No masses/lesions of external nose, nasal mucosa, septum, and turbinates were within normal limits.    Mouth:  No mass/lesion of lips, teeth, gums, hard/soft palate, tongue, tonsils, or oropharynx.    Neck & Lymphatics:  No cervical lymphadenopathy, no neck mass/crepitus/ asymmetry, trachea is midline, no thyroid enlargement/tenderness/mass.        [x]  Data Reviewed:    Lab Results   Component Value Date    WBC 6.03 2023    HGB 14.1 2023    HCT 44.4 2023    MCV 84 2023    EOSINOPHIL 0.7 2023                 Assessment & Plan:   Subjective hearing change of both ears        No evidence of cerumen impaction on exam.  Recommend an audiogram at his earliest convenience.  We will assist him in scheduling.

## 2023-11-24 ENCOUNTER — DOCUMENT SCAN (OUTPATIENT)
Dept: HOME HEALTH SERVICES | Facility: HOSPITAL | Age: 63
End: 2023-11-24
Payer: COMMERCIAL

## 2023-11-24 ENCOUNTER — EXTERNAL HOME HEALTH (OUTPATIENT)
Dept: HOME HEALTH SERVICES | Facility: HOSPITAL | Age: 63
End: 2023-11-24
Payer: COMMERCIAL

## 2023-11-26 DIAGNOSIS — Z94.4 STATUS POST LIVER TRANSPLANT: ICD-10-CM

## 2023-11-26 NOTE — TELEPHONE ENCOUNTER
Refill Routing Note   Medication(s) are not appropriate for processing by [facility] for the following reason(s):        Non-participating provider    [facility] action(s):  Route               Appointments  past 12m or future 3m with PCP    Date Provider   Last Visit   9/12/2023 Miriam Almaguer NP   Next Visit   Visit date not found Miriam Almaguer NP   ED visits in past 90 days: 1        Note composed:12:31 PM 11/26/2023

## 2023-11-27 ENCOUNTER — LAB VISIT (OUTPATIENT)
Dept: LAB | Facility: HOSPITAL | Age: 63
End: 2023-11-27
Attending: INTERNAL MEDICINE
Payer: COMMERCIAL

## 2023-11-27 DIAGNOSIS — Z94.4 STATUS POST LIVER TRANSPLANT: ICD-10-CM

## 2023-11-27 LAB
ALBUMIN SERPL BCP-MCNC: 4 G/DL (ref 3.5–5.2)
ALP SERPL-CCNC: 112 U/L (ref 55–135)
ALT SERPL W/O P-5'-P-CCNC: 50 U/L (ref 10–44)
ANION GAP SERPL CALC-SCNC: 7 MMOL/L (ref 8–16)
AST SERPL-CCNC: 38 U/L (ref 10–40)
BASOPHILS # BLD AUTO: 0.02 K/UL (ref 0–0.2)
BASOPHILS NFR BLD: 0.3 % (ref 0–1.9)
BILIRUB SERPL-MCNC: 1.3 MG/DL (ref 0.1–1)
BUN SERPL-MCNC: 15 MG/DL (ref 8–23)
CALCIUM SERPL-MCNC: 9.6 MG/DL (ref 8.7–10.5)
CHLORIDE SERPL-SCNC: 104 MMOL/L (ref 95–110)
CO2 SERPL-SCNC: 30 MMOL/L (ref 23–29)
CREAT SERPL-MCNC: 0.9 MG/DL (ref 0.5–1.4)
DIFFERENTIAL METHOD: ABNORMAL
EOSINOPHIL # BLD AUTO: 0.1 K/UL (ref 0–0.5)
EOSINOPHIL NFR BLD: 1 % (ref 0–8)
ERYTHROCYTE [DISTWIDTH] IN BLOOD BY AUTOMATED COUNT: 15.8 % (ref 11.5–14.5)
EST. GFR  (NO RACE VARIABLE): >60 ML/MIN/1.73 M^2
GLUCOSE SERPL-MCNC: 201 MG/DL (ref 70–110)
HCT VFR BLD AUTO: 44.8 % (ref 40–54)
HGB BLD-MCNC: 14.4 G/DL (ref 14–18)
IMM GRANULOCYTES # BLD AUTO: 0.02 K/UL (ref 0–0.04)
IMM GRANULOCYTES NFR BLD AUTO: 0.3 % (ref 0–0.5)
LYMPHOCYTES # BLD AUTO: 2.6 K/UL (ref 1–4.8)
LYMPHOCYTES NFR BLD: 44.1 % (ref 18–48)
MCH RBC QN AUTO: 26.5 PG (ref 27–31)
MCHC RBC AUTO-ENTMCNC: 32.1 G/DL (ref 32–36)
MCV RBC AUTO: 83 FL (ref 82–98)
MONOCYTES # BLD AUTO: 0.5 K/UL (ref 0.3–1)
MONOCYTES NFR BLD: 7.8 % (ref 4–15)
NEUTROPHILS # BLD AUTO: 2.8 K/UL (ref 1.8–7.7)
NEUTROPHILS NFR BLD: 46.5 % (ref 38–73)
NRBC BLD-RTO: 0 /100 WBC
PLATELET # BLD AUTO: 133 K/UL (ref 150–450)
PMV BLD AUTO: 10.6 FL (ref 9.2–12.9)
POTASSIUM SERPL-SCNC: 4.9 MMOL/L (ref 3.5–5.1)
PROT SERPL-MCNC: 7 G/DL (ref 6–8.4)
RBC # BLD AUTO: 5.43 M/UL (ref 4.6–6.2)
SODIUM SERPL-SCNC: 141 MMOL/L (ref 136–145)
WBC # BLD AUTO: 5.99 K/UL (ref 3.9–12.7)

## 2023-11-27 PROCEDURE — 36415 COLL VENOUS BLD VENIPUNCTURE: CPT | Performed by: INTERNAL MEDICINE

## 2023-11-27 PROCEDURE — 80197 ASSAY OF TACROLIMUS: CPT | Performed by: INTERNAL MEDICINE

## 2023-11-27 PROCEDURE — 85025 COMPLETE CBC W/AUTO DIFF WBC: CPT | Performed by: INTERNAL MEDICINE

## 2023-11-27 PROCEDURE — 80053 COMPREHEN METABOLIC PANEL: CPT | Performed by: INTERNAL MEDICINE

## 2023-11-27 RX ORDER — INSULIN LISPRO 100 [IU]/ML
INJECTION, SOLUTION INTRAVENOUS; SUBCUTANEOUS
Qty: 15 ML | Refills: 0 | Status: SHIPPED | OUTPATIENT
Start: 2023-11-27 | End: 2024-03-24 | Stop reason: SDUPTHER

## 2023-11-28 LAB — TACROLIMUS BLD-MCNC: 7.2 NG/ML (ref 5–15)

## 2023-11-29 ENCOUNTER — OFFICE VISIT (OUTPATIENT)
Dept: RADIATION ONCOLOGY | Facility: CLINIC | Age: 63
End: 2023-11-29
Payer: COMMERCIAL

## 2023-11-29 ENCOUNTER — CLINICAL SUPPORT (OUTPATIENT)
Dept: AUDIOLOGY | Facility: CLINIC | Age: 63
End: 2023-11-29
Payer: COMMERCIAL

## 2023-11-29 VITALS
RESPIRATION RATE: 18 BRPM | SYSTOLIC BLOOD PRESSURE: 135 MMHG | DIASTOLIC BLOOD PRESSURE: 82 MMHG | BODY MASS INDEX: 29.08 KG/M2 | OXYGEN SATURATION: 94 % | TEMPERATURE: 98 F | WEIGHT: 207.69 LBS | HEIGHT: 71 IN | HEART RATE: 81 BPM

## 2023-11-29 DIAGNOSIS — Z94.4 STATUS POST LIVER TRANSPLANT: ICD-10-CM

## 2023-11-29 DIAGNOSIS — D42.0 ATYPICAL INTRACRANIAL MENINGIOMA: Primary | ICD-10-CM

## 2023-11-29 DIAGNOSIS — H90.3 HEARING LOSS, SENSORINEURAL, ASYMMETRICAL: Primary | ICD-10-CM

## 2023-11-29 PROCEDURE — 99999 PR PBB SHADOW E&M-EST. PATIENT-LVL I: ICD-10-PCS | Mod: PBBFAC,,, | Performed by: AUDIOLOGIST

## 2023-11-29 PROCEDURE — 99213 PR OFFICE/OUTPT VISIT, EST, LEVL III, 20-29 MIN: ICD-10-PCS | Mod: S$GLB,,, | Performed by: SPECIALIST

## 2023-11-29 PROCEDURE — 92567 TYMPANOMETRY: CPT | Mod: S$GLB,,, | Performed by: AUDIOLOGIST

## 2023-11-29 PROCEDURE — 92557 COMPREHENSIVE HEARING TEST: CPT | Mod: S$GLB,,, | Performed by: AUDIOLOGIST

## 2023-11-29 PROCEDURE — 99999 PR PBB SHADOW E&M-EST. PATIENT-LVL IV: CPT | Mod: PBBFAC,,, | Performed by: SPECIALIST

## 2023-11-29 PROCEDURE — 99999 PR PBB SHADOW E&M-EST. PATIENT-LVL IV: ICD-10-PCS | Mod: PBBFAC,,, | Performed by: SPECIALIST

## 2023-11-29 PROCEDURE — 99999 PR PBB SHADOW E&M-EST. PATIENT-LVL I: CPT | Mod: PBBFAC,,, | Performed by: AUDIOLOGIST

## 2023-11-29 PROCEDURE — 92557 PR COMPREHENSIVE HEARING TEST: ICD-10-PCS | Mod: S$GLB,,, | Performed by: AUDIOLOGIST

## 2023-11-29 PROCEDURE — 99213 OFFICE O/P EST LOW 20 MIN: CPT | Mod: S$GLB,,, | Performed by: SPECIALIST

## 2023-11-29 PROCEDURE — 92567 PR TYMPA2METRY: ICD-10-PCS | Mod: S$GLB,,, | Performed by: AUDIOLOGIST

## 2023-11-29 NOTE — TELEPHONE ENCOUNTER
Refill Routing Note   Medication(s) are not appropriate for processing by [facility] for the following reason(s):        Non-participating provider    [facility] action(s):  Route               Appointments  past 12m or future 3m with PCP    Date Provider   Last Visit   9/12/2023 Miriam Almaguer NP   Next Visit   Visit date not found Miriam Almaguer NP   ED visits in past 90 days: 1        Note composed:5:10 PM 11/29/2023

## 2023-11-29 NOTE — PROGRESS NOTES
Mr. Chávez is a delightful 63-year-old who has undergone gross total resection October of this year of an atypical meningioma in the left frontal brain.  He was seen by Dr. Dubon with the recommendation for adjuvant radiotherapy delivering 54 Gy in 30 fractions.  His past medical history is significant for diabetes, hypertension, a left basal ganglia bleed in 2014, hepatocellular carcinoma now post liver transplant 08/10/2023.  He developed 2 weeks of confusion and was found to have a left frontal mass compatible with an extra-axial meningioma but had some adjacent intra-axial enhancement with surrounding edema mass effect and midline shift.  He was taken to craniotomy 10/26/2023 by Dr. Varma undergoing a gross total resection.  Pathology from that procedure demonstrated an atypical meningioma with 5 mitoses per 10 high-powered fields as well as focal spontaneous tumor necrosis.  He has recovered from surgery which came on the heels of a liver transplant that he had this year.  He is recovering well with physical therapy.  He has no complaints of headache, visual field loss or other neurologic symptoms.  Physical examination:  He is alert and oriented.  Judgment orientation memory affect and cognition are all intact.  His cranial nerves 2 through 12 are grossly intact although he does have some divergence of the right eye with forward gaze with this diverging slightly medially.  He has good muscle mass strength and tone.  Gait coordination and station are normal.  His craniotomy scar is healing well.  Impression:  I believe he is ready to begin radiotherapy.  I have recommended that he have an MRI for treatment planning.  We have discussed short-term side effects and long-term complications of treatment as well as technical issues and he is willing to proceed.  Plan: He will be scheduled for simulation with plans to treat this resection cavity with margin to a total dose of 5400 cGy in 30 fractions using image  guided intensity modulated radiotherapy.  I certainly appreciate participating in this delightful gentleman's care.

## 2023-11-29 NOTE — PROGRESS NOTES
Jed Chávez was seen 11/29/2023 for an audiological evaluation. Patient complains of bilateral gradual hearing loss with the left ear being the better hearing ear.  He denies tinnitus or dizziness. No noise exposure history reported. He has had several recent acute health changes including a liver transplant and a frontal craniotomy.     Results reveal a mild-to-moderately severe sensorineural hearing loss 9995-8638 Hz for the right ear, and  mild-to-moderate sensorineural hearing loss 0301-7391 Hz for the left ear.   Speech Reception Thresholds were  10 dBHL for the right ear and 10 dBHL for the left ear.   Word recognition scores were excellent for the right ear and excellent for the left ear.  Tympanograms were Type A, normal for the right ear and Type A, normal for the left ear.    Patient was counseled on the above findings.    Recommendations:  Follow-up with ENT  Repeat audiological evaluation in one to two years to monitor hearing, or sooner if needed.  Consider a hearing aid consultation. Check [employer] for HA benefits. Patient provided with clinic hearing aid information packet and a copy of audiogram.   Wear hearing protection devices when around loud noise.

## 2023-11-30 ENCOUNTER — DOCUMENT SCAN (OUTPATIENT)
Dept: HOME HEALTH SERVICES | Facility: HOSPITAL | Age: 63
End: 2023-11-30
Payer: COMMERCIAL

## 2023-11-30 ENCOUNTER — DOCUMENT SCAN (OUTPATIENT)
Dept: HOME HEALTH SERVICES | Facility: HOSPITAL | Age: 63
End: 2023-11-30

## 2023-11-30 RX ORDER — INSULIN GLARGINE 100 [IU]/ML
20 INJECTION, SOLUTION SUBCUTANEOUS DAILY
Qty: 15 ML | Refills: 0 | Status: SHIPPED | OUTPATIENT
Start: 2023-11-30 | End: 2023-12-28 | Stop reason: SDUPTHER

## 2023-12-01 ENCOUNTER — HOSPITAL ENCOUNTER (OUTPATIENT)
Dept: RADIATION THERAPY | Facility: HOSPITAL | Age: 63
Discharge: HOME | End: 2023-12-01
Attending: SPECIALIST
Payer: COMMERCIAL

## 2023-12-01 ENCOUNTER — TELEPHONE (OUTPATIENT)
Dept: TRANSPLANT | Facility: CLINIC | Age: 63
End: 2023-12-01
Payer: COMMERCIAL

## 2023-12-01 ENCOUNTER — PATIENT MESSAGE (OUTPATIENT)
Dept: TRANSPLANT | Facility: CLINIC | Age: 63
End: 2023-12-01
Payer: COMMERCIAL

## 2023-12-01 NOTE — TELEPHONE ENCOUNTER
Pt and spouse advised of labs results, need to follow-up with PCP or endocrine for elevated blood sugars and need to repeat labs Monday 12/4/23.  ----- Message from Joleen Carr MD sent at 11/30/2023  3:36 PM CST -----  ALT is trending up repeat labs next week.  Glucose levels tend to run high.  Patient needs to make sure he is following up with primary care doctor regarding glycemic control.

## 2023-12-03 ENCOUNTER — PATIENT MESSAGE (OUTPATIENT)
Dept: ENDOCRINOLOGY | Facility: CLINIC | Age: 63
End: 2023-12-03
Payer: COMMERCIAL

## 2023-12-04 ENCOUNTER — HOSPITAL ENCOUNTER (OUTPATIENT)
Dept: RADIOLOGY | Facility: HOSPITAL | Age: 63
Discharge: HOME | End: 2023-12-04
Attending: INTERNAL MEDICINE
Payer: COMMERCIAL

## 2023-12-04 ENCOUNTER — HOSPITAL ENCOUNTER (OUTPATIENT)
Dept: RADIATION THERAPY | Facility: HOSPITAL | Age: 63
Discharge: HOME | End: 2023-12-04
Attending: INTERNAL MEDICINE
Payer: COMMERCIAL

## 2023-12-04 PROCEDURE — 77334 RADIATION TREATMENT AID(S): CPT | Mod: TC | Performed by: SPECIALIST

## 2023-12-04 PROCEDURE — 77334 RADIATION TREATMENT AID(S): CPT | Mod: 26,,, | Performed by: SPECIALIST

## 2023-12-04 PROCEDURE — 77334 PR  RADN TREATMENT AID(S) COMPLX: ICD-10-PCS | Mod: 26,,, | Performed by: SPECIALIST

## 2023-12-05 DIAGNOSIS — E11.69 TYPE 2 DIABETES MELLITUS WITH OTHER SPECIFIED COMPLICATION, WITHOUT LONG-TERM CURRENT USE OF INSULIN: Primary | ICD-10-CM

## 2023-12-05 RX ORDER — TIRZEPATIDE 5 MG/.5ML
5 INJECTION, SOLUTION SUBCUTANEOUS
Qty: 4 PEN | Refills: 0 | Status: SHIPPED | OUTPATIENT
Start: 2023-12-05 | End: 2024-01-10 | Stop reason: SDUPTHER

## 2023-12-06 ENCOUNTER — PATIENT MESSAGE (OUTPATIENT)
Dept: HEPATOLOGY | Facility: CLINIC | Age: 63
End: 2023-12-06
Payer: COMMERCIAL

## 2023-12-06 ENCOUNTER — DOCUMENT SCAN (OUTPATIENT)
Dept: HOME HEALTH SERVICES | Facility: HOSPITAL | Age: 63
End: 2023-12-06
Payer: COMMERCIAL

## 2023-12-06 ENCOUNTER — PATIENT MESSAGE (OUTPATIENT)
Dept: TRANSPLANT | Facility: CLINIC | Age: 63
End: 2023-12-06
Payer: COMMERCIAL

## 2023-12-06 DIAGNOSIS — Z94.4 STATUS POST LIVER TRANSPLANT: Primary | ICD-10-CM

## 2023-12-06 PROCEDURE — 77014 HC CT GUIDANCE RADIATION THERAPY FLDS PLACEMENT: CPT | Mod: TC | Performed by: SPECIALIST

## 2023-12-06 PROCEDURE — 77014 PR  CT GUIDANCE PLACEMENT RAD THERAPY FIELDS: CPT | Mod: 26,,, | Performed by: SPECIALIST

## 2023-12-06 PROCEDURE — 77263 PR  RADIATION THERAPY PLAN COMPLEX: ICD-10-PCS | Mod: ,,, | Performed by: SPECIALIST

## 2023-12-06 PROCEDURE — 77263 THER RADIOLOGY TX PLNG CPLX: CPT | Mod: ,,, | Performed by: SPECIALIST

## 2023-12-06 PROCEDURE — 77014 PR  CT GUIDANCE PLACEMENT RAD THERAPY FIELDS: ICD-10-PCS | Mod: 26,,, | Performed by: SPECIALIST

## 2023-12-06 NOTE — TELEPHONE ENCOUNTER
Pt's spouse confirmed that pt has not been off MMF for 6 weeks. Last dose was 500mg BID.    Reviewed with Dr. Carr. Per MD, pt to restart MMF at 500mg BID and repeat labs Monday.    Pt and spouse advised of above.    ----- Message from Joleen Carr MD sent at 12/5/2023  7:45 PM CST -----  ALT is still slightly elevated although tacrolimus level should be adequate.  I do not see CellCept on patient's medication list.  Please confirm that he is taking CellCept and at what dosing.  Continue with weekly labs.

## 2023-12-07 ENCOUNTER — OFFICE VISIT (OUTPATIENT)
Dept: HEPATOLOGY | Facility: CLINIC | Age: 63
End: 2023-12-07
Payer: COMMERCIAL

## 2023-12-07 VITALS — HEIGHT: 71 IN | WEIGHT: 205 LBS | BODY MASS INDEX: 28.7 KG/M2

## 2023-12-07 DIAGNOSIS — I77.1 STENOSIS OF HEPATIC ARTERY OF TRANSPLANTED LIVER: ICD-10-CM

## 2023-12-07 DIAGNOSIS — Z85.05 HISTORY OF LIVER CANCER: ICD-10-CM

## 2023-12-07 DIAGNOSIS — R79.89 ABNORMAL LFTS: Primary | ICD-10-CM

## 2023-12-07 DIAGNOSIS — D84.9 IMMUNOSUPPRESSION: ICD-10-CM

## 2023-12-07 DIAGNOSIS — Z94.4 LIVER TRANSPLANTED: ICD-10-CM

## 2023-12-07 DIAGNOSIS — T86.49 STENOSIS OF HEPATIC ARTERY OF TRANSPLANTED LIVER: ICD-10-CM

## 2023-12-07 PROCEDURE — 99215 PR OFFICE/OUTPT VISIT, EST, LEVL V, 40-54 MIN: ICD-10-PCS | Mod: 95,,, | Performed by: INTERNAL MEDICINE

## 2023-12-07 PROCEDURE — 99215 OFFICE O/P EST HI 40 MIN: CPT | Mod: 95,,, | Performed by: INTERNAL MEDICINE

## 2023-12-07 RX ORDER — MYCOPHENOLATE MOFETIL 250 MG/1
500 CAPSULE ORAL 2 TIMES DAILY
Qty: 120 CAPSULE | Refills: 11 | Status: SHIPPED | OUTPATIENT
Start: 2023-12-07 | End: 2024-12-06

## 2023-12-07 NOTE — PROGRESS NOTES
Transplant Hepatology  Liver Transplant Recipient Follow-up    Transplant Date: 8/10/2023  UNOS Native Liver Dx: Primary Liver Malignancy: Hepatoma (HCC) and Cirrhosis    Jed is here for follow up of his liver transplant.    ORGAN: LIVER  Whole or Partial: whole liver  Donor Type: donation after circulatory death   CDC High Risk: no  Donor CMV Status: Positive  Donor HCV Status: Negative  Donor HBcAb: Negative  Biliary Anastomosis: end to end  Arterial Anatomy: standard  IVC reconstruction: end to end ivc  Portal vein status: patent  .    Subjective:     Interval History:  Currently, he is doing adequately. Current complaints include no major issues currently but since last visit he had altered mental status and was found to have an atypical meningioma in the brain that required craniotomy and surgical resection.  He is now preparing to undergo radiation.  In that time.  His CellCept was discontinued.  He has been on his tacrolimus.  He denies any significant neurologic complaints.  He reports compliance with his medications.  He has a new grand baby that he wants to go see a [place] hopefully soon.    He has noticed occasional muscle twitching seems to be episodic and not frequent but nothing significant.    Review of Systems    Objective:     Physical Exam    WBC   Date Value Ref Range Status   12/04/2023 6.02 3.90 - 12.70 K/uL Final     Hemoglobin   Date Value Ref Range Status   12/04/2023 14.3 14.0 - 18.0 g/dL Final     POC Hematocrit   Date Value Ref Range Status   10/26/2023 31 (L) 36 - 54 %PCV Final     Hematocrit   Date Value Ref Range Status   12/04/2023 43.3 40.0 - 54.0 % Final     Platelets   Date Value Ref Range Status   12/04/2023 111 (L) 150 - 450 K/uL Final     BUN   Date Value Ref Range Status   12/04/2023 14 8 - 23 mg/dL Final     Creatinine   Date Value Ref Range Status   12/04/2023 0.8 0.5 - 1.4 mg/dL Final     Glucose   Date Value Ref Range Status   12/04/2023 159 (H) 70 - 110 mg/dL Final      Calcium   Date Value Ref Range Status   12/04/2023 9.3 8.7 - 10.5 mg/dL Final     Sodium   Date Value Ref Range Status   12/04/2023 140 136 - 145 mmol/L Final     Potassium   Date Value Ref Range Status   12/04/2023 4.3 3.5 - 5.1 mmol/L Final     Chloride   Date Value Ref Range Status   12/04/2023 107 95 - 110 mmol/L Final     Magnesium   Date Value Ref Range Status   10/30/2023 1.9 1.6 - 2.6 mg/dL Final     AST   Date Value Ref Range Status   12/04/2023 38 10 - 40 U/L Final     ALT   Date Value Ref Range Status   12/04/2023 56 (H) 10 - 44 U/L Final     Alkaline Phosphatase   Date Value Ref Range Status   12/04/2023 102 55 - 135 U/L Final     Total Bilirubin   Date Value Ref Range Status   12/04/2023 1.5 (H) 0.1 - 1.0 mg/dL Final     Comment:     For infants and newborns, interpretation of results should be based  on gestational age, weight and in agreement with clinical  observations.    Premature Infant recommended reference ranges:  Up to 24 hours.............<8.0 mg/dL  Up to 48 hours............<12.0 mg/dL  3-5 days..................<15.0 mg/dL  6-29 days.................<15.0 mg/dL       Albumin   Date Value Ref Range Status   12/04/2023 3.7 3.5 - 5.2 g/dL Final     INR   Date Value Ref Range Status   10/24/2023 1.1 0.8 - 1.2 Final     Comment:     Coumadin Therapy:  2.0 - 3.0 for INR for all indicators except mechanical heart valves  and antiphospholipid syndromes which should use 2.5 - 3.5.       Lab Results   Component Value Date    TACROLIMUS 8.9 12/04/2023           Assessment/Plan:     1. Abnormal LFTs    2. Immunosuppression    3. Liver transplanted    4. History of liver cancer      Abnormal LFTs- minimal increase in ALT.  May be related to stopping CellCept.  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      The patient location is: [place]  The chief complaint leading to consultation is: s/p liver transplant    Visit type: audiovisual    Face to Face time with patient: 20 minutes  30 minutes of total time spent on the encounter, which includes face to face time and non-face to face time preparing to see the patient (eg, review of tests), Obtaining and/or reviewing separately obtained history, Documenting clinical information in the electronic or other health record, Independently interpreting results (not separately reported) and communicating results to the patient/family/caregiver, or Care coordination (not separately reported).         Each patient to whom he or she provides medical services by telemedicine is:  (1) informed of the relationship between the physician and patient and the respective role of any other health care provider with respect to management of the patient; and (2) notified that he or she may decline to receive medical services by telemedicine and may withdraw from such care at any time.    Notes:         Joleen Carr MD           [facility] Patient Status  Functional Status: 50% - Requires considerable assistance and frequent medical care  Physical Capacity: No Limitations

## 2023-12-11 ENCOUNTER — LAB VISIT (OUTPATIENT)
Dept: LAB | Facility: HOSPITAL | Age: 63
End: 2023-12-11
Attending: INTERNAL MEDICINE
Payer: COMMERCIAL

## 2023-12-11 DIAGNOSIS — Z94.4 STATUS POST LIVER TRANSPLANT: ICD-10-CM

## 2023-12-11 LAB
ALBUMIN SERPL BCP-MCNC: 3.8 G/DL (ref 3.5–5.2)
ALP SERPL-CCNC: 108 U/L (ref 55–135)
ALT SERPL W/O P-5'-P-CCNC: 46 U/L (ref 10–44)
ANION GAP SERPL CALC-SCNC: 8 MMOL/L (ref 8–16)
AST SERPL-CCNC: 35 U/L (ref 10–40)
BASOPHILS # BLD AUTO: 0.02 K/UL (ref 0–0.2)
BASOPHILS NFR BLD: 0.4 % (ref 0–1.9)
BILIRUB SERPL-MCNC: 1.4 MG/DL (ref 0.1–1)
BUN SERPL-MCNC: 13 MG/DL (ref 8–23)
CALCIUM SERPL-MCNC: 9.2 MG/DL (ref 8.7–10.5)
CHLORIDE SERPL-SCNC: 101 MMOL/L (ref 95–110)
CO2 SERPL-SCNC: 28 MMOL/L (ref 23–29)
CREAT SERPL-MCNC: 0.8 MG/DL (ref 0.5–1.4)
DIFFERENTIAL METHOD: ABNORMAL
EOSINOPHIL # BLD AUTO: 0.1 K/UL (ref 0–0.5)
EOSINOPHIL NFR BLD: 0.9 % (ref 0–8)
ERYTHROCYTE [DISTWIDTH] IN BLOOD BY AUTOMATED COUNT: 15.8 % (ref 11.5–14.5)
EST. GFR  (NO RACE VARIABLE): >60 ML/MIN/1.73 M^2
GLUCOSE SERPL-MCNC: 156 MG/DL (ref 70–110)
HCT VFR BLD AUTO: 44.5 % (ref 40–54)
HGB BLD-MCNC: 14.6 G/DL (ref 14–18)
IMM GRANULOCYTES # BLD AUTO: 0.01 K/UL (ref 0–0.04)
IMM GRANULOCYTES NFR BLD AUTO: 0.2 % (ref 0–0.5)
LYMPHOCYTES # BLD AUTO: 2 K/UL (ref 1–4.8)
LYMPHOCYTES NFR BLD: 37.1 % (ref 18–48)
MCH RBC QN AUTO: 27.3 PG (ref 27–31)
MCHC RBC AUTO-ENTMCNC: 32.8 G/DL (ref 32–36)
MCV RBC AUTO: 83 FL (ref 82–98)
MONOCYTES # BLD AUTO: 0.4 K/UL (ref 0.3–1)
MONOCYTES NFR BLD: 7.3 % (ref 4–15)
NEUTROPHILS # BLD AUTO: 2.9 K/UL (ref 1.8–7.7)
NEUTROPHILS NFR BLD: 54.1 % (ref 38–73)
NRBC BLD-RTO: 0 /100 WBC
PLATELET # BLD AUTO: 139 K/UL (ref 150–450)
PMV BLD AUTO: 9.8 FL (ref 9.2–12.9)
POTASSIUM SERPL-SCNC: 4.2 MMOL/L (ref 3.5–5.1)
PROT SERPL-MCNC: 6.7 G/DL (ref 6–8.4)
RBC # BLD AUTO: 5.35 M/UL (ref 4.6–6.2)
SODIUM SERPL-SCNC: 137 MMOL/L (ref 136–145)
WBC # BLD AUTO: 5.31 K/UL (ref 3.9–12.7)

## 2023-12-11 PROCEDURE — 36415 COLL VENOUS BLD VENIPUNCTURE: CPT | Performed by: INTERNAL MEDICINE

## 2023-12-11 PROCEDURE — 85025 COMPLETE CBC W/AUTO DIFF WBC: CPT | Performed by: INTERNAL MEDICINE

## 2023-12-11 PROCEDURE — 80053 COMPREHEN METABOLIC PANEL: CPT | Performed by: INTERNAL MEDICINE

## 2023-12-11 PROCEDURE — 80197 ASSAY OF TACROLIMUS: CPT | Performed by: INTERNAL MEDICINE

## 2023-12-12 ENCOUNTER — HOSPITAL ENCOUNTER (OUTPATIENT)
Dept: RADIOLOGY | Facility: HOSPITAL | Age: 63
Discharge: HOME | End: 2023-12-12
Attending: INTERNAL MEDICINE
Payer: COMMERCIAL

## 2023-12-12 DIAGNOSIS — Z94.4 S/P LIVER TRANSPLANT: ICD-10-CM

## 2023-12-12 LAB — TACROLIMUS BLD-MCNC: 6.8 NG/ML (ref 5–15)

## 2023-12-12 PROCEDURE — 93976 US DOPPLER LIVER TRANSPLANT POST (XPD): ICD-10-PCS | Mod: 26,,, | Performed by: STUDENT IN AN ORGANIZED HEALTH CARE EDUCATION/TRAINING PROGRAM

## 2023-12-12 PROCEDURE — 76705 ECHO EXAM OF ABDOMEN: CPT | Mod: 26,59,, | Performed by: STUDENT IN AN ORGANIZED HEALTH CARE EDUCATION/TRAINING PROGRAM

## 2023-12-12 PROCEDURE — 76705 ECHO EXAM OF ABDOMEN: CPT | Mod: TC

## 2023-12-12 PROCEDURE — 93976 VASCULAR STUDY: CPT | Mod: 26,,, | Performed by: STUDENT IN AN ORGANIZED HEALTH CARE EDUCATION/TRAINING PROGRAM

## 2023-12-12 PROCEDURE — 93976 VASCULAR STUDY: CPT | Mod: TC

## 2023-12-12 PROCEDURE — 76705 US DOPPLER LIVER TRANSPLANT POST (XPD): ICD-10-PCS | Mod: 26,59,, | Performed by: STUDENT IN AN ORGANIZED HEALTH CARE EDUCATION/TRAINING PROGRAM

## 2023-12-13 ENCOUNTER — TELEPHONE (OUTPATIENT)
Dept: TRANSPLANT | Facility: CLINIC | Age: 63
End: 2023-12-13
Payer: COMMERCIAL

## 2023-12-13 ENCOUNTER — HOSPITAL ENCOUNTER (OUTPATIENT)
Dept: RADIOLOGY | Facility: HOSPITAL | Age: 63
Discharge: HOME | End: 2023-12-13
Attending: SPECIALIST
Payer: COMMERCIAL

## 2023-12-13 ENCOUNTER — PATIENT MESSAGE (OUTPATIENT)
Dept: TRANSPLANT | Facility: CLINIC | Age: 63
End: 2023-12-13
Payer: COMMERCIAL

## 2023-12-13 DIAGNOSIS — D42.0 ATYPICAL INTRACRANIAL MENINGIOMA: ICD-10-CM

## 2023-12-13 PROCEDURE — 70552 MRI BRAIN STEM W/DYE: CPT | Mod: TC

## 2023-12-13 PROCEDURE — 70552 MRI BRAIN STEALTH W/O FIDUCIALS WITH CONTRAST: ICD-10-PCS | Mod: 26,,, | Performed by: RADIOLOGY

## 2023-12-13 PROCEDURE — A9585 GADOBUTROL INJECTION: HCPCS | Performed by: SPECIALIST

## 2023-12-13 PROCEDURE — 70552 MRI BRAIN STEM W/DYE: CPT | Mod: 26,,, | Performed by: RADIOLOGY

## 2023-12-13 PROCEDURE — 25500020 PHARM REV CODE 255: Performed by: SPECIALIST

## 2023-12-13 RX ORDER — GADOBUTROL 604.72 MG/ML
10 INJECTION INTRAVENOUS
Status: COMPLETED | OUTPATIENT
Start: 2023-12-13 | End: 2023-12-13

## 2023-12-13 RX ADMIN — GADOBUTROL 9 ML: 604.72 INJECTION INTRAVENOUS at 07:12

## 2023-12-13 NOTE — TELEPHONE ENCOUNTER
Pt advised of improved labs and need to repeat labs 12/18/23.  Pt also advised of abnormal liver ultrasound and need to repeat week of 1/8/24 in [place].  ----- Message from Joleen Carr MD sent at 12/13/2023 12:58 PM CST -----  ALT improving repeat labs next week

## 2023-12-13 NOTE — TELEPHONE ENCOUNTER
Pt advised  ----- Message from Joleen Carr MD sent at 12/13/2023  1:01 PM CST -----  Difficult to asses US recommend repeat in [place] in 4 weeks

## 2023-12-14 ENCOUNTER — OFFICE VISIT (OUTPATIENT)
Dept: OPHTHALMOLOGY | Facility: CLINIC | Age: 63
End: 2023-12-14
Payer: COMMERCIAL

## 2023-12-14 DIAGNOSIS — E11.9 TYPE 2 DIABETES MELLITUS WITHOUT RETINOPATHY: Primary | ICD-10-CM

## 2023-12-14 DIAGNOSIS — H52.4 HYPEROPIA WITH ASTIGMATISM AND PRESBYOPIA, BILATERAL: ICD-10-CM

## 2023-12-14 DIAGNOSIS — E11.69 TYPE 2 DIABETES MELLITUS WITH OTHER SPECIFIED COMPLICATION, WITH LONG-TERM CURRENT USE OF INSULIN: Chronic | ICD-10-CM

## 2023-12-14 DIAGNOSIS — H52.03 HYPEROPIA WITH ASTIGMATISM AND PRESBYOPIA, BILATERAL: ICD-10-CM

## 2023-12-14 DIAGNOSIS — H52.203 HYPEROPIA WITH ASTIGMATISM AND PRESBYOPIA, BILATERAL: ICD-10-CM

## 2023-12-14 DIAGNOSIS — H25.043 POSTERIOR SUBCAPSULAR AGE-RELATED CATARACT OF BOTH EYES: ICD-10-CM

## 2023-12-14 DIAGNOSIS — E11.36 DIABETIC CATARACT OF BOTH EYES: ICD-10-CM

## 2023-12-14 DIAGNOSIS — H40.013 OPEN ANGLE WITH BORDERLINE FINDINGS OF BOTH EYES: ICD-10-CM

## 2023-12-14 DIAGNOSIS — E11.9 DIABETIC EYE EXAM: ICD-10-CM

## 2023-12-14 DIAGNOSIS — H25.013 CORTICAL AGE-RELATED CATARACT OF BOTH EYES: ICD-10-CM

## 2023-12-14 DIAGNOSIS — H25.13 NUCLEAR SCLEROSIS, BILATERAL: ICD-10-CM

## 2023-12-14 DIAGNOSIS — Z79.4 TYPE 2 DIABETES MELLITUS WITH OTHER SPECIFIED COMPLICATION, WITH LONG-TERM CURRENT USE OF INSULIN: Chronic | ICD-10-CM

## 2023-12-14 DIAGNOSIS — Z01.00 DIABETIC EYE EXAM: ICD-10-CM

## 2023-12-14 PROCEDURE — 99999 PR PBB SHADOW E&M-EST. PATIENT-LVL II: ICD-10-PCS | Mod: PBBFAC,,, | Performed by: OPTOMETRIST

## 2023-12-14 PROCEDURE — 92133 POSTERIOR SEGMENT OCT OPTIC NERVE(OCULAR COHERENCE TOMOGRAPHY) - OU - BOTH EYES: ICD-10-PCS | Mod: S$GLB,,, | Performed by: OPTOMETRIST

## 2023-12-14 PROCEDURE — 3061F PR NEG MICROALBUMINURIA RESULT DOCUMENTED/REVIEW: ICD-10-PCS | Mod: CPTII,S$GLB,, | Performed by: OPTOMETRIST

## 2023-12-14 PROCEDURE — 1160F RVW MEDS BY RX/DR IN RCRD: CPT | Mod: CPTII,S$GLB,, | Performed by: OPTOMETRIST

## 2023-12-14 PROCEDURE — 92004 PR EYE EXAM, NEW PATIENT,COMPREHESV: ICD-10-PCS | Mod: S$GLB,,, | Performed by: OPTOMETRIST

## 2023-12-14 PROCEDURE — 3044F HG A1C LEVEL LT 7.0%: CPT | Mod: CPTII,S$GLB,, | Performed by: OPTOMETRIST

## 2023-12-14 PROCEDURE — 92133 CPTRZD OPH DX IMG PST SGM ON: CPT | Mod: S$GLB,,, | Performed by: OPTOMETRIST

## 2023-12-14 PROCEDURE — 4010F PR ACE/ARB THEARPY RXD/TAKEN: ICD-10-PCS | Mod: CPTII,S$GLB,, | Performed by: OPTOMETRIST

## 2023-12-14 PROCEDURE — 2023F PR DILATED RETINAL EXAM W/O EVID OF RETINOPATHY: ICD-10-PCS | Mod: CPTII,S$GLB,, | Performed by: OPTOMETRIST

## 2023-12-14 PROCEDURE — 3044F PR MOST RECENT HEMOGLOBIN A1C LEVEL <7.0%: ICD-10-PCS | Mod: CPTII,S$GLB,, | Performed by: OPTOMETRIST

## 2023-12-14 PROCEDURE — 99999 PR PBB SHADOW E&M-EST. PATIENT-LVL II: CPT | Mod: PBBFAC,,, | Performed by: OPTOMETRIST

## 2023-12-14 PROCEDURE — 1159F PR MEDICATION LIST DOCUMENTED IN MEDICAL RECORD: ICD-10-PCS | Mod: CPTII,S$GLB,, | Performed by: OPTOMETRIST

## 2023-12-14 PROCEDURE — 2023F DILAT RTA XM W/O RTNOPTHY: CPT | Mod: CPTII,S$GLB,, | Performed by: OPTOMETRIST

## 2023-12-14 PROCEDURE — 92015 DETERMINE REFRACTIVE STATE: CPT | Mod: S$GLB,,, | Performed by: OPTOMETRIST

## 2023-12-14 PROCEDURE — 3061F NEG MICROALBUMINURIA REV: CPT | Mod: CPTII,S$GLB,, | Performed by: OPTOMETRIST

## 2023-12-14 PROCEDURE — 1160F PR REVIEW ALL MEDS BY PRESCRIBER/CLIN PHARMACIST DOCUMENTED: ICD-10-PCS | Mod: CPTII,S$GLB,, | Performed by: OPTOMETRIST

## 2023-12-14 PROCEDURE — 1159F MED LIST DOCD IN RCRD: CPT | Mod: CPTII,S$GLB,, | Performed by: OPTOMETRIST

## 2023-12-14 PROCEDURE — 3066F NEPHROPATHY DOC TX: CPT | Mod: CPTII,S$GLB,, | Performed by: OPTOMETRIST

## 2023-12-14 PROCEDURE — 92004 COMPRE OPH EXAM NEW PT 1/>: CPT | Mod: S$GLB,,, | Performed by: OPTOMETRIST

## 2023-12-14 PROCEDURE — 92015 PR REFRACTION: ICD-10-PCS | Mod: S$GLB,,, | Performed by: OPTOMETRIST

## 2023-12-14 PROCEDURE — 3066F PR DOCUMENTATION OF TREATMENT FOR NEPHROPATHY: ICD-10-PCS | Mod: CPTII,S$GLB,, | Performed by: OPTOMETRIST

## 2023-12-14 PROCEDURE — 4010F ACE/ARB THERAPY RXD/TAKEN: CPT | Mod: CPTII,S$GLB,, | Performed by: OPTOMETRIST

## 2023-12-14 NOTE — PROGRESS NOTES
HPI     Diabetic Eye Exam            Comments: NP          Comments    Vision changes since last eye exam?: states that he feels like his near   vision is a little worse since his last game. States that his bsl have   been high recently over 160.    Any eye pain today: no    Other ocular symptoms: no    Interested in contact lens fitting today? No    Lab Results       Component                Value               Date                       HGBA1C                   6.1 (H)             10/24/2023                                Last edited by Yolette Joseph on 12/14/2023  9:37 AM.            Assessment /Plan     For exam results, see Encounter Report.    Type 2 diabetes mellitus without retinopathy  There was no diabetic retinopathy present in either eye today.   Recommended that pt continue care with PCP and/or specialists regarding diabetes.  Follow-up dilated eye exam recommended in 12 months, sooner with any vision changes or new concerns.    Nuclear sclerosis, bilateral  Cortical age-related cataract of both eyes  Posterior subcapsular age-related cataract of both eyes  Diabetic cataract of both eyes  Cataract accounts for vision change. New Rx for glasses/contacts will not improve vision.   Refer to ophthalmologist for cataract evaluation.     Open angle with borderline findings of both eyes  -     Posterior Segment OCT Optic Nerve- Both eyes  Suspect based on ONH asymmetry  Normal NFL scans OU  Asymmetric GCL with superior thinning OS  Will get VF after cataract surgery  Continue to monitor        Hyperopia with astigmatism and presbyopia, bilateral  Hold spec Rx      RTC 3 months with [facility] for cataract evaluation or PRN if any problems.   Discussed above and answered questions.

## 2023-12-14 NOTE — PROGRESS NOTES
HPI     Diabetic Eye Exam            Comments: NP          Comments    Vision changes since last eye exam?: states that he feels like his near   vision is a little worse since his last game. States that his bsl have   been high recently over 160.    Any eye pain today: no    Other ocular symptoms: no    Interested in contact lens fitting today? No    Lab Results       Component                Value               Date                       HGBA1C                   6.1 (H)             10/24/2023                                Last edited by Yolette Joseph on 12/14/2023  9:37 AM.            Assessment /Plan     For exam results, see Encounter Report.    Type 2 diabetes mellitus without retinopathy  There was no diabetic retinopathy present in either eye today.   Recommended that pt continue care with PCP and/or specialists regarding diabetes.  Follow-up dilated eye exam recommended in 12 months, sooner with any vision changes or new concerns.    Nuclear sclerosis, bilateral  Cortical age-related cataract of both eyes  Posterior subcapsular age-related cataract of both eyes  Diabetic cataract of both eyes  Cataract accounts for vision change. New Rx for glasses/contacts will not improve vision.   Refer to ophthalmologist for cataract evaluation.     Hyperopia with astigmatism and presbyopia, bilateral  Eyeglass Final Rx       Eyeglass Final Rx         Sphere Cylinder Axis Add    Right +1.50 +0.50 087 +2.25    Left +2.00   +2.25      Type: PAL    Expiration Date: 12/14/2024                    RTC 3 months with [facility] for cataract evaluation or PRN if any problems.   Discussed above and answered questions.

## 2023-12-15 PROCEDURE — 77301 RADIOTHERAPY DOSE PLAN IMRT: CPT | Mod: 26,,, | Performed by: SPECIALIST

## 2023-12-15 PROCEDURE — 77301 PR  INTEN MOD RADIOTHER PLAN W/DOSE VOL HIST: ICD-10-PCS | Mod: 26,,, | Performed by: SPECIALIST

## 2023-12-15 PROCEDURE — 77301 RADIOTHERAPY DOSE PLAN IMRT: CPT | Mod: TC | Performed by: SPECIALIST

## 2023-12-18 ENCOUNTER — LAB VISIT (OUTPATIENT)
Dept: LAB | Facility: HOSPITAL | Age: 63
End: 2023-12-18
Attending: INTERNAL MEDICINE
Payer: COMMERCIAL

## 2023-12-18 ENCOUNTER — DOCUMENTATION ONLY (OUTPATIENT)
Dept: RADIATION ONCOLOGY | Facility: CLINIC | Age: 63
End: 2023-12-18
Payer: COMMERCIAL

## 2023-12-18 DIAGNOSIS — Z94.4 STATUS POST LIVER TRANSPLANT: ICD-10-CM

## 2023-12-18 LAB
ALBUMIN SERPL BCP-MCNC: 4 G/DL (ref 3.5–5.2)
ALP SERPL-CCNC: 119 U/L (ref 55–135)
ALT SERPL W/O P-5'-P-CCNC: 42 U/L (ref 10–44)
ANION GAP SERPL CALC-SCNC: 7 MMOL/L (ref 8–16)
AST SERPL-CCNC: 33 U/L (ref 10–40)
BASOPHILS # BLD AUTO: 0.04 K/UL (ref 0–0.2)
BASOPHILS NFR BLD: 0.6 % (ref 0–1.9)
BILIRUB SERPL-MCNC: 1.7 MG/DL (ref 0.1–1)
BUN SERPL-MCNC: 13 MG/DL (ref 8–23)
CALCIUM SERPL-MCNC: 9.4 MG/DL (ref 8.7–10.5)
CHLORIDE SERPL-SCNC: 106 MMOL/L (ref 95–110)
CO2 SERPL-SCNC: 28 MMOL/L (ref 23–29)
CREAT SERPL-MCNC: 0.9 MG/DL (ref 0.5–1.4)
DIFFERENTIAL METHOD: ABNORMAL
EOSINOPHIL # BLD AUTO: 0.1 K/UL (ref 0–0.5)
EOSINOPHIL NFR BLD: 0.8 % (ref 0–8)
ERYTHROCYTE [DISTWIDTH] IN BLOOD BY AUTOMATED COUNT: 16.3 % (ref 11.5–14.5)
EST. GFR  (NO RACE VARIABLE): >60 ML/MIN/1.73 M^2
GLUCOSE SERPL-MCNC: 135 MG/DL (ref 70–110)
HCT VFR BLD AUTO: 46.3 % (ref 40–54)
HGB BLD-MCNC: 15.1 G/DL (ref 14–18)
IMM GRANULOCYTES # BLD AUTO: 0.01 K/UL (ref 0–0.04)
IMM GRANULOCYTES NFR BLD AUTO: 0.2 % (ref 0–0.5)
LYMPHOCYTES # BLD AUTO: 2.5 K/UL (ref 1–4.8)
LYMPHOCYTES NFR BLD: 39.8 % (ref 18–48)
MCH RBC QN AUTO: 26.3 PG (ref 27–31)
MCHC RBC AUTO-ENTMCNC: 32.6 G/DL (ref 32–36)
MCV RBC AUTO: 81 FL (ref 82–98)
MONOCYTES # BLD AUTO: 0.5 K/UL (ref 0.3–1)
MONOCYTES NFR BLD: 7.7 % (ref 4–15)
NEUTROPHILS # BLD AUTO: 3.2 K/UL (ref 1.8–7.7)
NEUTROPHILS NFR BLD: 50.9 % (ref 38–73)
NRBC BLD-RTO: 0 /100 WBC
PLATELET # BLD AUTO: 138 K/UL (ref 150–450)
PMV BLD AUTO: 9.4 FL (ref 9.2–12.9)
POTASSIUM SERPL-SCNC: 4.3 MMOL/L (ref 3.5–5.1)
PROT SERPL-MCNC: 6.9 G/DL (ref 6–8.4)
RBC # BLD AUTO: 5.75 M/UL (ref 4.6–6.2)
SODIUM SERPL-SCNC: 141 MMOL/L (ref 136–145)
WBC # BLD AUTO: 6.25 K/UL (ref 3.9–12.7)

## 2023-12-18 PROCEDURE — 77280 THER RAD SIMULAJ FIELD SMPL: CPT | Mod: TC | Performed by: SPECIALIST

## 2023-12-18 PROCEDURE — 77280 THER RAD SIMULAJ FIELD SMPL: CPT | Mod: 26,,, | Performed by: SPECIALIST

## 2023-12-18 PROCEDURE — 85025 COMPLETE CBC W/AUTO DIFF WBC: CPT | Performed by: INTERNAL MEDICINE

## 2023-12-18 PROCEDURE — 80197 ASSAY OF TACROLIMUS: CPT | Performed by: INTERNAL MEDICINE

## 2023-12-18 PROCEDURE — 77300 PR RADIATION THERAPY,DOSIMETRY PLAN: ICD-10-PCS | Mod: 26,,, | Performed by: SPECIALIST

## 2023-12-18 PROCEDURE — 77338 DESIGN MLC DEVICE FOR IMRT: CPT | Mod: TC | Performed by: SPECIALIST

## 2023-12-18 PROCEDURE — 36415 COLL VENOUS BLD VENIPUNCTURE: CPT | Performed by: INTERNAL MEDICINE

## 2023-12-18 PROCEDURE — 77280 PR  SET RADN THERAPY FIELD SIMPLE: ICD-10-PCS | Mod: 26,,, | Performed by: SPECIALIST

## 2023-12-18 PROCEDURE — 77338 DESIGN MLC DEVICE FOR IMRT: CPT | Mod: 26,,, | Performed by: SPECIALIST

## 2023-12-18 PROCEDURE — 77338 PR  MLC IMRT DESIGN & CONSTRUCTION PER IMRT PLAN: ICD-10-PCS | Mod: 26,,, | Performed by: SPECIALIST

## 2023-12-18 PROCEDURE — 77300 RADIATION THERAPY DOSE PLAN: CPT | Mod: 26,,, | Performed by: SPECIALIST

## 2023-12-18 PROCEDURE — 80053 COMPREHEN METABOLIC PANEL: CPT | Performed by: INTERNAL MEDICINE

## 2023-12-18 PROCEDURE — 77300 RADIATION THERAPY DOSE PLAN: CPT | Mod: TC | Performed by: SPECIALIST

## 2023-12-18 NOTE — PROGRESS NOTES
Patient here to start radiation treatments to the brain but was unable to start today due to machine issues. Brain handout & verbal instructions were given to the patient. Skin care & side effects were reviewed. Contact info was provided. Patient verbalized understanding.

## 2023-12-19 ENCOUNTER — TELEPHONE (OUTPATIENT)
Dept: INTERNAL MEDICINE | Facility: CLINIC | Age: 63
End: 2023-12-19
Payer: COMMERCIAL

## 2023-12-19 ENCOUNTER — TELEPHONE (OUTPATIENT)
Dept: TRANSPLANT | Facility: CLINIC | Age: 63
End: 2023-12-19
Payer: COMMERCIAL

## 2023-12-19 ENCOUNTER — OFFICE VISIT (OUTPATIENT)
Dept: INTERNAL MEDICINE | Facility: CLINIC | Age: 63
End: 2023-12-19
Payer: COMMERCIAL

## 2023-12-19 ENCOUNTER — PATIENT MESSAGE (OUTPATIENT)
Dept: TRANSPLANT | Facility: CLINIC | Age: 63
End: 2023-12-19
Payer: COMMERCIAL

## 2023-12-19 VITALS — SYSTOLIC BLOOD PRESSURE: 126 MMHG | DIASTOLIC BLOOD PRESSURE: 88 MMHG

## 2023-12-19 DIAGNOSIS — Z79.4 TYPE 2 DIABETES MELLITUS WITH OTHER SPECIFIED COMPLICATION, WITH LONG-TERM CURRENT USE OF INSULIN: Chronic | ICD-10-CM

## 2023-12-19 DIAGNOSIS — E11.69 TYPE 2 DIABETES MELLITUS WITH OTHER SPECIFIED COMPLICATION, WITH LONG-TERM CURRENT USE OF INSULIN: Chronic | ICD-10-CM

## 2023-12-19 DIAGNOSIS — Z29.11 NEED FOR RSV IMMUNIZATION: ICD-10-CM

## 2023-12-19 DIAGNOSIS — I15.2 HYPERTENSION COMPLICATING DIABETES: Primary | Chronic | ICD-10-CM

## 2023-12-19 DIAGNOSIS — E11.59 HYPERTENSION COMPLICATING DIABETES: Primary | Chronic | ICD-10-CM

## 2023-12-19 DIAGNOSIS — Z94.4 STATUS POST LIVER TRANSPLANT: ICD-10-CM

## 2023-12-19 LAB — TACROLIMUS BLD-MCNC: 10.6 NG/ML (ref 5–15)

## 2023-12-19 PROCEDURE — 77300 RADIATION THERAPY DOSE PLAN: CPT | Mod: 26,,, | Performed by: SPECIALIST

## 2023-12-19 PROCEDURE — 77338 DESIGN MLC DEVICE FOR IMRT: CPT | Mod: TC | Performed by: SPECIALIST

## 2023-12-19 PROCEDURE — 77300 RADIATION THERAPY DOSE PLAN: CPT | Mod: TC | Performed by: SPECIALIST

## 2023-12-19 PROCEDURE — 77300 PR RADIATION THERAPY,DOSIMETRY PLAN: ICD-10-PCS | Mod: 26,,, | Performed by: SPECIALIST

## 2023-12-19 PROCEDURE — 77338 DESIGN MLC DEVICE FOR IMRT: CPT | Mod: 26,,, | Performed by: SPECIALIST

## 2023-12-19 PROCEDURE — 77338 PR  MLC IMRT DESIGN & CONSTRUCTION PER IMRT PLAN: ICD-10-PCS | Mod: 26,,, | Performed by: SPECIALIST

## 2023-12-19 PROCEDURE — 99214 PR OFFICE/OUTPT VISIT, EST, LEVL IV, 30-39 MIN: ICD-10-PCS | Mod: 95,,, | Performed by: FAMILY MEDICINE

## 2023-12-19 PROCEDURE — 99214 OFFICE O/P EST MOD 30 MIN: CPT | Mod: 95,,, | Performed by: FAMILY MEDICINE

## 2023-12-19 RX ORDER — LOSARTAN POTASSIUM 100 MG/1
100 TABLET ORAL DAILY
Qty: 90 TABLET | Refills: 3 | Status: SHIPPED | OUTPATIENT
Start: 2023-12-19

## 2023-12-19 RX ORDER — LOSARTAN POTASSIUM 100 MG/1
100 TABLET ORAL DAILY
Qty: 90 TABLET | Refills: 3 | Status: SHIPPED | OUTPATIENT
Start: 2023-12-19 | End: 2023-12-19 | Stop reason: SDUPTHER

## 2023-12-19 RX ORDER — CARVEDILOL 6.25 MG/1
6.25 TABLET ORAL 2 TIMES DAILY WITH MEALS
Qty: 180 TABLET | Refills: 3 | Status: SHIPPED | OUTPATIENT
Start: 2023-12-19

## 2023-12-19 NOTE — ASSESSMENT & PLAN NOTE
"Diabetes Management Status    Statin: Not taking  ACE/ARB: Taking    Screening or Prevention Patient's value Goal Complete/Controlled?   HgA1C Testing and Control   Lab Results   Component Value Date    HGBA1C 6.1 (H) 10/24/2023      Annually/Less than 8% Yes   Lipid profile : 10/24/2023 Annually Yes   LDL control Lab Results   Component Value Date    LDLCALC 91.8 10/24/2023    Annually/Less than 100 mg/dl  Yes   Nephropathy screening Lab Results   Component Value Date    LABMICR 36.0 10/24/2023     Lab Results   Component Value Date    PROTEINUA Negative 10/24/2023    Annually Yes   Blood pressure BP Readings from Last 1 Encounters:   12/19/23 126/88    Less than 140/90 Yes   Dilated retinal exam : 12/14/2023 Annually Yes   Foot exam   : 10/19/2023 Annually Yes     Lab Results   Component Value Date    HGBA1C 6.1 (H) 10/24/2023    HGBA1C 6.3 (H) 09/13/2023    HGBA1C 7.5 (H) 04/20/2023    EGFRNORACEVR >60.0 12/18/2023    MICALBCREAT 22.5 10/24/2023    LDLCALC 91.8 10/24/2023     No results found for: "GLUTAMICACID", "CPEPTIDE"   Last 5 Patient Entered Readings                                          Most Recent A1c:            No data to display               HEALTH MAINTENANCE: Diabetic health maintenance interventions reviewed and are up to date except for:  There are no preventive care reminders to display for this patient.   "

## 2023-12-19 NOTE — LETTER
ATTN: CARE COORDINATION     PATIENT IDENTIFYING INFORMATION:  RODRIGUE MEDELLIN KYLER   [address]  [place] [address] YOB: 1960  OUR MRN: [id number]     RECORDS  REQUESTED FROM: [facility] [place]  [address] [place] [address]    ADDRESS / PHONE:        REQUESTED DELIVERY METHOD FOR RECORDS: Fax ([phone]) or secure Email  to [email or web address]   DATES OF SERVICE: Specified below.     AUTHORIZATION:   For the purpose of continuity of care, I, Rodrigue MEDELLIN Kyler, hereby authorize the hospital, physician, or entity named above to release my medical records for all dates of service from 10 years prior to date signed through the present date to: EVELIN Pitt MD; [facility]; [address] [facility]; [place] [address]; PH: [phone] SPECIFIC RECORDS REQUESTED:    [x] colonoscopy & path reports or any other colorectal cancer screening tests (last 10 years)       In authorizing the release of the confidential information identified above, I hereby waive all restrictions or privileges imposed by law and release [facility] and Its affiliates and their staff from any restriction or privilege Imposed by law in connection with the disclosure or release of any professional record, observation or communication. I do understand that the information that is being released may be subject to re-disclosure by the recipient and may no longer be protected. I understand that my treatment, payment, enrollment or eligibility for benefits may not be conditioned on signing this authorization.  This authorization may be revoked in writing at any time, except to the extent that [facility] and its affiliates have already taken action in reliance on it. Letters to revoke this authorization should be addressed to [facility], Release of Information Department, [address] [place] [id number].     If not  previously revoked in writing, this authorization will terminate or    26   months after the date signed below.                   Signature of Patient    Date Signed

## 2023-12-19 NOTE — PROGRESS NOTES
TELEMEDICINE VIRTUAL VIDEO VISIT  12/19/23  1:20 PM CST    Visit Type: Audiovisual    Patient's Location: Jed represents that they are located within the [facility].    CHIEF COMPLAINT: Follow-up, Refills    Chronic conditions are represented as and appear to be compensated/controlled and stable.  TAMMY completed to obtain report of colonoscopy done outside [facility].        1. Hypertension complicating diabetes  Assessment & Plan:  Today's home BP recorded. Great majority of home SBP > 140 and DBP > 90.    Orders:  -     Discontinue: losartan (COZAAR) 100 MG tablet; Take 1 tablet (100 mg total) by mouth once daily.  Dispense: 90 tablet; Refill: 3  -     carvediloL (COREG) 6.25 MG tablet; Take 1 tablet (6.25 mg total) by mouth 2 (two) times daily with meals. HOLD if SBP < 125 or pulse < 60.  Dispense: 180 tablet; Refill: 3  -     losartan (COZAAR) 100 MG tablet; Take 1 tablet (100 mg total) by mouth once daily. HOLD if SBP < 120  Dispense: 90 tablet; Refill: 3    2. Status post liver transplant  -     carvediloL (COREG) 6.25 MG tablet; Take 1 tablet (6.25 mg total) by mouth 2 (two) times daily with meals. HOLD if SBP < 125 or pulse < 60.  Dispense: 180 tablet; Refill: 3    3. Type 2 diabetes mellitus with other specified complication, with long-term current use of insulin  Assessment & Plan:  Diabetes Management Status    Statin: Not taking  ACE/ARB: Taking    Screening or Prevention Patient's value Goal Complete/Controlled?   HgA1C Testing and Control   Lab Results   Component Value Date    HGBA1C 6.1 (H) 10/24/2023      Annually/Less than 8% Yes   Lipid profile : 10/24/2023 Annually Yes   LDL control Lab Results   Component Value Date    LDLCALC 91.8 10/24/2023    Annually/Less than 100 mg/dl  Yes   Nephropathy screening Lab Results   Component Value Date    LABMICR 36.0 10/24/2023     Lab Results   Component Value Date    PROTEINUA Negative 10/24/2023    Annually Yes   Blood pressure BP Readings from Last 1  "Encounters:   12/19/23 126/88    Less than 140/90 Yes   Dilated retinal exam : 12/14/2023 Annually Yes   Foot exam   : 10/19/2023 Annually Yes     Lab Results   Component Value Date    HGBA1C 6.1 (H) 10/24/2023    HGBA1C 6.3 (H) 09/13/2023    HGBA1C 7.5 (H) 04/20/2023    EGFRNORACEVR >60.0 12/18/2023    MICALBCREAT 22.5 10/24/2023    LDLCALC 91.8 10/24/2023     No results found for: "GLUTAMICACID", "CPEPTIDE"   Last 5 Patient Entered Readings                                          Most Recent A1c:            No data to display               HEALTH MAINTENANCE: Diabetic health maintenance interventions reviewed and are up to date except for:  There are no preventive care reminders to display for this patient.     Orders:  -     Ambulatory referral/consult to Diabetes Education; Future; Expected date: 12/26/2023    4. Need for RSV immunization    Unless noted herein, any chronic conditions are represented as and appear stable, and no other significant complaints or concerns were reported.    Review of Systems   Constitutional:  Negative for unexpected weight change.   HENT:  Negative for rhinorrhea and trouble swallowing.    Eyes:  Negative for discharge and visual disturbance.   Respiratory:  Negative for chest tightness and wheezing.    Cardiovascular:  Negative for chest pain and palpitations.   Gastrointestinal:  Negative for blood in stool, constipation, diarrhea and vomiting.   Endocrine: Negative for polydipsia and polyuria.   Genitourinary:  Negative for difficulty urinating, hematuria and urgency.   Musculoskeletal:  Negative for arthralgias, joint swelling and neck pain.   Neurological:  Negative for weakness and headaches.   Psychiatric/Behavioral:  Negative for confusion and dysphoric mood.        Physical Exam  Constitutional:       General: He is not in acute distress.     Appearance: Normal appearance. He is not ill-appearing.   Pulmonary:      Effort: Pulmonary effort is normal. No respiratory " "distress.   Skin:     Coloration: Skin is not jaundiced.   Neurological:      Mental Status: He is alert. Mental status is at baseline.   Psychiatric:         Mood and Affect: Mood normal.         Behavior: Behavior normal.         Thought Content: Thought content normal.       Follow up in about 4 months (around 4/19/2024) for virtual visit, re-evaluation.     Documentation entered by me for this encounter may have been done in part using speech-recognition technology. Although I have made an effort to ensure accuracy, "sound like" errors may exist and should be interpreted in context.   TOTAL TIME evaluating and managing this patient for this encounter was greater than or equal to 30 minutes. This time was exclusive of any separately billable procedures for this patient and exclusive of time spent treating any other patient.    Documentation entered by me for this encounter may have been done in part using speech-recognition technology. Although I have made an effort to ensure accuracy, "sound like" errors may exist and should be interpreted in context.    Each patient to whom medical services are provided by telemedicine is: (1) informed of the relationship between the physician and patient and the respective role of any other health care provider with respect to management of the patient; and (2) notified that he or she may decline to receive medical services by telemedicine and may withdraw from such care at any time.  "

## 2023-12-19 NOTE — TELEPHONE ENCOUNTER
Pt notified via portal of stable labs and that no medication changes are needed. Repeat labs due 1/2/24 per protocol.   ----- Message from Joleen Carr MD sent at 12/19/2023  4:32 PM CST -----  Reviewed, nothing to do; repeat per routine

## 2023-12-20 PROCEDURE — 77427 RADIATION TX MANAGEMENT X5: CPT | Mod: ,,, | Performed by: SPECIALIST

## 2023-12-20 PROCEDURE — 77014 PR  CT GUIDANCE PLACEMENT RAD THERAPY FIELDS: ICD-10-PCS | Mod: 26,,, | Performed by: SPECIALIST

## 2023-12-20 PROCEDURE — 77386 HC IMRT, COMPLEX: CPT | Performed by: SPECIALIST

## 2023-12-20 PROCEDURE — 77427 PR CHG RADIATION,MANGEMENT,5 TX'S: ICD-10-PCS | Mod: ,,, | Performed by: SPECIALIST

## 2023-12-20 PROCEDURE — 77014 PR  CT GUIDANCE PLACEMENT RAD THERAPY FIELDS: CPT | Mod: 26,,, | Performed by: SPECIALIST

## 2023-12-21 ENCOUNTER — TELEPHONE (OUTPATIENT)
Dept: INTERNAL MEDICINE | Facility: CLINIC | Age: 63
End: 2023-12-21
Payer: COMMERCIAL

## 2023-12-21 PROCEDURE — 77014 PR  CT GUIDANCE PLACEMENT RAD THERAPY FIELDS: ICD-10-PCS | Mod: 26,,, | Performed by: SPECIALIST

## 2023-12-21 PROCEDURE — 77014 PR  CT GUIDANCE PLACEMENT RAD THERAPY FIELDS: CPT | Mod: 26,,, | Performed by: SPECIALIST

## 2023-12-21 PROCEDURE — 77386 HC IMRT, COMPLEX: CPT | Performed by: SPECIALIST

## 2023-12-21 NOTE — TELEPHONE ENCOUNTER
Spoke with pt's wife. States insurance will not pay for vaccine to be given in the pharmacy. Informed pt vaccine is not given in a clinical setting. Per wife pt will use health savings card to receive injection.

## 2023-12-21 NOTE — TELEPHONE ENCOUNTER
----- Message from Hallie Samuel sent at 12/21/2023  9:44 AM CST -----  Contact: pt's wife/Leida Casarez is calling in regard to needing a nurse visit for the pt to get the RSV shot.  Please call Leida at [phone]  thanks/mpd

## 2023-12-22 PROCEDURE — 77014 PR  CT GUIDANCE PLACEMENT RAD THERAPY FIELDS: CPT | Mod: 26,,, | Performed by: SPECIALIST

## 2023-12-22 PROCEDURE — 77386 HC IMRT, COMPLEX: CPT | Performed by: SPECIALIST

## 2023-12-22 PROCEDURE — 77014 PR  CT GUIDANCE PLACEMENT RAD THERAPY FIELDS: ICD-10-PCS | Mod: 26,,, | Performed by: SPECIALIST

## 2023-12-26 ENCOUNTER — DOCUMENTATION ONLY (OUTPATIENT)
Dept: RADIATION ONCOLOGY | Facility: CLINIC | Age: 63
End: 2023-12-26
Payer: COMMERCIAL

## 2023-12-26 PROCEDURE — 77386 HC IMRT, COMPLEX: CPT | Performed by: SPECIALIST

## 2023-12-26 PROCEDURE — 77014 PR  CT GUIDANCE PLACEMENT RAD THERAPY FIELDS: ICD-10-PCS | Mod: 26,,, | Performed by: SPECIALIST

## 2023-12-26 PROCEDURE — 77336 RADIATION PHYSICS CONSULT: CPT | Performed by: SPECIALIST

## 2023-12-26 PROCEDURE — 77014 PR  CT GUIDANCE PLACEMENT RAD THERAPY FIELDS: CPT | Mod: 26,,, | Performed by: SPECIALIST

## 2023-12-26 NOTE — PROGRESS NOTES
Day 5 outpatient xrt to the brain. Tolerating treatment without change or complaint. Will continue to monitor.

## 2023-12-27 PROCEDURE — 77386 HC IMRT, COMPLEX: CPT | Performed by: SPECIALIST

## 2023-12-27 PROCEDURE — 77014 PR  CT GUIDANCE PLACEMENT RAD THERAPY FIELDS: ICD-10-PCS | Mod: 26,,, | Performed by: SPECIALIST

## 2023-12-27 PROCEDURE — 77014 PR  CT GUIDANCE PLACEMENT RAD THERAPY FIELDS: CPT | Mod: 26,,, | Performed by: SPECIALIST

## 2023-12-28 DIAGNOSIS — Z94.4 STATUS POST LIVER TRANSPLANT: ICD-10-CM

## 2023-12-28 PROCEDURE — 77014 PR  CT GUIDANCE PLACEMENT RAD THERAPY FIELDS: ICD-10-PCS | Mod: 26,,, | Performed by: SPECIALIST

## 2023-12-28 PROCEDURE — 77427 PR CHG RADIATION,MANGEMENT,5 TX'S: ICD-10-PCS | Mod: ,,, | Performed by: SPECIALIST

## 2023-12-28 PROCEDURE — 77014 PR  CT GUIDANCE PLACEMENT RAD THERAPY FIELDS: CPT | Mod: 26,,, | Performed by: SPECIALIST

## 2023-12-28 PROCEDURE — 77386 HC IMRT, COMPLEX: CPT | Performed by: SPECIALIST

## 2023-12-28 PROCEDURE — 77427 RADIATION TX MANAGEMENT X5: CPT | Mod: ,,, | Performed by: SPECIALIST

## 2023-12-28 RX ORDER — INSULIN GLARGINE 100 [IU]/ML
20 INJECTION, SOLUTION SUBCUTANEOUS DAILY
Qty: 15 ML | Refills: 0 | Status: SHIPPED | OUTPATIENT
Start: 2023-12-28 | End: 2024-02-07 | Stop reason: SDUPTHER

## 2023-12-29 PROCEDURE — 77386 HC IMRT, COMPLEX: CPT | Performed by: SPECIALIST

## 2023-12-29 PROCEDURE — 77014 PR  CT GUIDANCE PLACEMENT RAD THERAPY FIELDS: CPT | Mod: 26,,, | Performed by: SPECIALIST

## 2023-12-29 PROCEDURE — 77014 PR  CT GUIDANCE PLACEMENT RAD THERAPY FIELDS: ICD-10-PCS | Mod: 26,,, | Performed by: SPECIALIST

## 2024-01-02 ENCOUNTER — DOCUMENTATION ONLY (OUTPATIENT)
Dept: RADIATION ONCOLOGY | Facility: CLINIC | Age: 64
End: 2024-01-02
Payer: COMMERCIAL

## 2024-01-02 ENCOUNTER — HOSPITAL ENCOUNTER (OUTPATIENT)
Dept: RADIATION THERAPY | Facility: HOSPITAL | Age: 64
Discharge: HOME | End: 2024-01-02
Attending: SPECIALIST
Payer: COMMERCIAL

## 2024-01-02 ENCOUNTER — LAB VISIT (OUTPATIENT)
Dept: LAB | Facility: HOSPITAL | Age: 64
End: 2024-01-02
Attending: INTERNAL MEDICINE
Payer: COMMERCIAL

## 2024-01-02 DIAGNOSIS — Z94.4 STATUS POST LIVER TRANSPLANT: ICD-10-CM

## 2024-01-02 LAB
ALBUMIN SERPL BCP-MCNC: 3.9 G/DL (ref 3.5–5.2)
ALP SERPL-CCNC: 93 U/L (ref 55–135)
ALT SERPL W/O P-5'-P-CCNC: 32 U/L (ref 10–44)
ANION GAP SERPL CALC-SCNC: 10 MMOL/L (ref 8–16)
AST SERPL-CCNC: 27 U/L (ref 10–40)
BASOPHILS # BLD AUTO: 0.02 K/UL (ref 0–0.2)
BASOPHILS NFR BLD: 0.4 % (ref 0–1.9)
BILIRUB SERPL-MCNC: 1.6 MG/DL (ref 0.1–1)
BUN SERPL-MCNC: 13 MG/DL (ref 8–23)
CALCIUM SERPL-MCNC: 9 MG/DL (ref 8.7–10.5)
CHLORIDE SERPL-SCNC: 104 MMOL/L (ref 95–110)
CO2 SERPL-SCNC: 29 MMOL/L (ref 23–29)
CREAT SERPL-MCNC: 0.9 MG/DL (ref 0.5–1.4)
DIFFERENTIAL METHOD BLD: ABNORMAL
EOSINOPHIL # BLD AUTO: 0.1 K/UL (ref 0–0.5)
EOSINOPHIL NFR BLD: 1.2 % (ref 0–8)
ERYTHROCYTE [DISTWIDTH] IN BLOOD BY AUTOMATED COUNT: 16.4 % (ref 11.5–14.5)
EST. GFR  (NO RACE VARIABLE): >60 ML/MIN/1.73 M^2
GLUCOSE SERPL-MCNC: 169 MG/DL (ref 70–110)
HCT VFR BLD AUTO: 44.8 % (ref 40–54)
HGB BLD-MCNC: 14.3 G/DL (ref 14–18)
IMM GRANULOCYTES # BLD AUTO: 0.01 K/UL (ref 0–0.04)
IMM GRANULOCYTES NFR BLD AUTO: 0.2 % (ref 0–0.5)
LYMPHOCYTES # BLD AUTO: 1.9 K/UL (ref 1–4.8)
LYMPHOCYTES NFR BLD: 37.4 % (ref 18–48)
MCH RBC QN AUTO: 26.9 PG (ref 27–31)
MCHC RBC AUTO-ENTMCNC: 31.9 G/DL (ref 32–36)
MCV RBC AUTO: 84 FL (ref 82–98)
MONOCYTES # BLD AUTO: 0.4 K/UL (ref 0.3–1)
MONOCYTES NFR BLD: 8.3 % (ref 4–15)
NEUTROPHILS # BLD AUTO: 2.7 K/UL (ref 1.8–7.7)
NEUTROPHILS NFR BLD: 52.5 % (ref 38–73)
NRBC BLD-RTO: 0 /100 WBC
PLATELET # BLD AUTO: 122 K/UL (ref 150–450)
PMV BLD AUTO: 9.7 FL (ref 9.2–12.9)
POTASSIUM SERPL-SCNC: 4.7 MMOL/L (ref 3.5–5.1)
PROT SERPL-MCNC: 6.7 G/DL (ref 6–8.4)
RBC # BLD AUTO: 5.32 M/UL (ref 4.6–6.2)
SODIUM SERPL-SCNC: 143 MMOL/L (ref 136–145)
WBC # BLD AUTO: 5.08 K/UL (ref 3.9–12.7)

## 2024-01-02 PROCEDURE — 77386 HC IMRT, COMPLEX: CPT | Performed by: SPECIALIST

## 2024-01-02 PROCEDURE — 77014 PR  CT GUIDANCE PLACEMENT RAD THERAPY FIELDS: CPT | Mod: 26,,, | Performed by: SPECIALIST

## 2024-01-02 PROCEDURE — 80197 ASSAY OF TACROLIMUS: CPT | Performed by: INTERNAL MEDICINE

## 2024-01-02 PROCEDURE — 36415 COLL VENOUS BLD VENIPUNCTURE: CPT | Performed by: INTERNAL MEDICINE

## 2024-01-02 PROCEDURE — 80053 COMPREHEN METABOLIC PANEL: CPT | Performed by: INTERNAL MEDICINE

## 2024-01-02 PROCEDURE — 85025 COMPLETE CBC W/AUTO DIFF WBC: CPT | Performed by: INTERNAL MEDICINE

## 2024-01-02 NOTE — PROGRESS NOTES
Day 9 of outpatient xrt to the brain. Mr. Chávez is without complaints. Will continue to monitor.

## 2024-01-03 ENCOUNTER — PATIENT MESSAGE (OUTPATIENT)
Dept: TRANSPLANT | Facility: CLINIC | Age: 64
End: 2024-01-03
Payer: COMMERCIAL

## 2024-01-03 DIAGNOSIS — K72.10 END STAGE LIVER DISEASE: ICD-10-CM

## 2024-01-03 LAB — TACROLIMUS BLD-MCNC: 15 NG/ML (ref 5–15)

## 2024-01-03 PROCEDURE — 77386 HC IMRT, COMPLEX: CPT | Performed by: SPECIALIST

## 2024-01-03 PROCEDURE — 77336 RADIATION PHYSICS CONSULT: CPT | Performed by: SPECIALIST

## 2024-01-03 PROCEDURE — 77014 PR  CT GUIDANCE PLACEMENT RAD THERAPY FIELDS: CPT | Mod: 26,,, | Performed by: SPECIALIST

## 2024-01-03 NOTE — TELEPHONE ENCOUNTER
----- Message from Joleen Carr MD sent at 1/2/2024  6:18 PM CST -----  Labs ok awaiting immunosuppression level

## 2024-01-03 NOTE — TELEPHONE ENCOUNTER
Pt advised of dose change and reminded him of the proper instructions for fasting tacrolimus trough levels. Repeat labs requested 1/8/24.  ----- Message from Joleen Carr MD sent at 1/3/2024  8:26 AM CST -----  Tacrolimus level is too high decrease to 3 mg in the morning and 2 mg in the evening.  Repeat labs next week.  Please make sure these are true troughs.

## 2024-01-04 DIAGNOSIS — Z00.6 RESEARCH STUDY PATIENT: Primary | ICD-10-CM

## 2024-01-04 PROCEDURE — 77386 HC IMRT, COMPLEX: CPT | Performed by: SPECIALIST

## 2024-01-04 PROCEDURE — 77014 PR  CT GUIDANCE PLACEMENT RAD THERAPY FIELDS: CPT | Mod: 26,,, | Performed by: SPECIALIST

## 2024-01-04 RX ORDER — TACROLIMUS 1 MG/1
CAPSULE ORAL
Qty: 150 CAPSULE | Refills: 11 | Status: SHIPPED | OUTPATIENT
Start: 2024-01-04 | End: 2024-02-21

## 2024-01-05 PROCEDURE — 77386 HC IMRT, COMPLEX: CPT | Performed by: SPECIALIST

## 2024-01-05 PROCEDURE — 77427 RADIATION TX MANAGEMENT X5: CPT | Mod: ,,, | Performed by: SPECIALIST

## 2024-01-05 PROCEDURE — 77014 PR  CT GUIDANCE PLACEMENT RAD THERAPY FIELDS: CPT | Mod: 26,,, | Performed by: SPECIALIST

## 2024-01-08 ENCOUNTER — TELEPHONE (OUTPATIENT)
Dept: TRANSPLANT | Facility: CLINIC | Age: 64
End: 2024-01-08
Payer: COMMERCIAL

## 2024-01-08 ENCOUNTER — HOSPITAL ENCOUNTER (OUTPATIENT)
Dept: RADIOLOGY | Facility: HOSPITAL | Age: 64
Discharge: HOME | End: 2024-01-08
Attending: INTERNAL MEDICINE
Payer: COMMERCIAL

## 2024-01-08 DIAGNOSIS — I77.1 STENOSIS OF HEPATIC ARTERY OF TRANSPLANTED LIVER: ICD-10-CM

## 2024-01-08 DIAGNOSIS — Z94.4 STATUS POST LIVER TRANSPLANT: Primary | ICD-10-CM

## 2024-01-08 DIAGNOSIS — Z94.4 S/P LIVER TRANSPLANT: ICD-10-CM

## 2024-01-08 DIAGNOSIS — T86.49 STENOSIS OF HEPATIC ARTERY OF TRANSPLANTED LIVER: ICD-10-CM

## 2024-01-08 PROCEDURE — 77014 PR  CT GUIDANCE PLACEMENT RAD THERAPY FIELDS: CPT | Mod: 26,,, | Performed by: SPECIALIST

## 2024-01-08 PROCEDURE — 76705 ECHO EXAM OF ABDOMEN: CPT | Mod: TC

## 2024-01-08 PROCEDURE — 76705 ECHO EXAM OF ABDOMEN: CPT | Mod: 26,59,, | Performed by: RADIOLOGY

## 2024-01-08 PROCEDURE — 77386 HC IMRT, COMPLEX: CPT | Performed by: SPECIALIST

## 2024-01-08 PROCEDURE — 93976 VASCULAR STUDY: CPT | Mod: 26,,, | Performed by: RADIOLOGY

## 2024-01-08 NOTE — TELEPHONE ENCOUNTER
Reviewed ultrasound with Dr. Carr and Dr. Gong. Per Dr. 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    Ultrasound and plan for CTA reviewed with pt and spouse. All questions answered. CTA scheduled 1/10/24 at 6:45pm per pt and spouse's request.     Of note, pt has brain radiation daily at 1100 through the first week of February.

## 2024-01-09 ENCOUNTER — DOCUMENTATION ONLY (OUTPATIENT)
Dept: RADIATION ONCOLOGY | Facility: CLINIC | Age: 64
End: 2024-01-09
Payer: COMMERCIAL

## 2024-01-09 PROCEDURE — 77014 PR  CT GUIDANCE PLACEMENT RAD THERAPY FIELDS: CPT | Mod: 26,,, | Performed by: SPECIALIST

## 2024-01-09 PROCEDURE — 77386 HC IMRT, COMPLEX: CPT | Performed by: SPECIALIST

## 2024-01-09 PROCEDURE — 77336 RADIATION PHYSICS CONSULT: CPT | Performed by: SPECIALIST

## 2024-01-09 NOTE — PROGRESS NOTES
Day 14 outpatient xrt to the brain. Mr. Chávez had fatigue on his 1 b.i.d. day but otherwise has done well; unchanged other than brisk erythema without desquamation across his forehead. Will continue to monitor.

## 2024-01-10 ENCOUNTER — TELEPHONE (OUTPATIENT)
Dept: TRANSPLANT | Facility: CLINIC | Age: 64
End: 2024-01-10
Payer: COMMERCIAL

## 2024-01-10 ENCOUNTER — HOSPITAL ENCOUNTER (OUTPATIENT)
Dept: RADIOLOGY | Facility: HOSPITAL | Age: 64
Discharge: HOME | End: 2024-01-10
Attending: INTERNAL MEDICINE
Payer: COMMERCIAL

## 2024-01-10 DIAGNOSIS — T86.49 STENOSIS OF HEPATIC ARTERY OF TRANSPLANTED LIVER: ICD-10-CM

## 2024-01-10 DIAGNOSIS — E11.69 TYPE 2 DIABETES MELLITUS WITH OTHER SPECIFIED COMPLICATION, WITHOUT LONG-TERM CURRENT USE OF INSULIN: ICD-10-CM

## 2024-01-10 DIAGNOSIS — Z94.4 S/P LIVER TRANSPLANT: Primary | ICD-10-CM

## 2024-01-10 DIAGNOSIS — I77.1 STENOSIS OF HEPATIC ARTERY OF TRANSPLANTED LIVER: ICD-10-CM

## 2024-01-10 DIAGNOSIS — Z85.05 PERSONAL HISTORY OF MALIGNANT NEOPLASM OF LIVER: ICD-10-CM

## 2024-01-10 DIAGNOSIS — Z94.4 STATUS POST LIVER TRANSPLANT: ICD-10-CM

## 2024-01-10 PROCEDURE — 77386 HC IMRT, COMPLEX: CPT | Performed by: SPECIALIST

## 2024-01-10 PROCEDURE — 77014 PR  CT GUIDANCE PLACEMENT RAD THERAPY FIELDS: CPT | Mod: 26,,, | Performed by: SPECIALIST

## 2024-01-10 PROCEDURE — 74175 CTA ABDOMEN W/CONTRAST: CPT | Mod: TC

## 2024-01-10 PROCEDURE — 74175 CTA ABDOMEN W/CONTRAST: CPT | Mod: 26,,, | Performed by: RADIOLOGY

## 2024-01-10 PROCEDURE — 25500020 PHARM REV CODE 255: Performed by: INTERNAL MEDICINE

## 2024-01-10 RX ADMIN — IOHEXOL 100 ML: 350 INJECTION, SOLUTION INTRAVENOUS at 05:01

## 2024-01-10 NOTE — TELEPHONE ENCOUNTER
CTA done and sent to Dr. Jean, Dr. Gong and Dr. Carr for review.    Called pt. No answer. LVM requesting return call asap.  Called pt's spouse. No answer. LVM requesting a return call or return [facility] message to confirm pt is still on both aspirin and plavix.    Sent my chart message.  ----- Message from Joleen Carr MD sent at 1/9/2024 10:42   PM CST -----  Discussed with coordinator who discussed with IR. Planning for CTA

## 2024-01-10 NOTE — TELEPHONE ENCOUNTER
Repeat labs requested 1/22/24 per protocol.     Repeat ultrasound to be scheduled next week and reviewed by Dr. Suero, Dr. Gong and Dr. Jean to determine next steps. Unable to reach pt or spouse by phone. [employer] message sent and requested they contact the office to discuss.    ----- Message from Joleen Carr MD sent at 1/9/2024 10:44 PM CST -----  Reviewed, nothing to do; repeat per routine

## 2024-01-11 ENCOUNTER — PATIENT MESSAGE (OUTPATIENT)
Dept: TRANSPLANT | Facility: CLINIC | Age: 64
End: 2024-01-11
Payer: COMMERCIAL

## 2024-01-11 ENCOUNTER — TELEPHONE (OUTPATIENT)
Dept: TRANSPLANT | Facility: CLINIC | Age: 64
End: 2024-01-11
Payer: COMMERCIAL

## 2024-01-11 PROCEDURE — 77386 HC IMRT, COMPLEX: CPT | Performed by: SPECIALIST

## 2024-01-11 PROCEDURE — 77427 RADIATION TX MANAGEMENT X5: CPT | Mod: ,,, | Performed by: SPECIALIST

## 2024-01-11 PROCEDURE — 77014 PR  CT GUIDANCE PLACEMENT RAD THERAPY FIELDS: CPT | Mod: 26,,, | Performed by: SPECIALIST

## 2024-01-11 RX ORDER — TIRZEPATIDE 5 MG/.5ML
5 INJECTION, SOLUTION SUBCUTANEOUS
Qty: 4 PEN | Refills: 0 | Status: SHIPPED | OUTPATIENT
Start: 2024-01-11

## 2024-01-11 NOTE — TELEPHONE ENCOUNTER
Returned call. Spoke with Leida. Reviewed CTA and plan from Dr. Suero, Dr. Gong and Dr. Snider for pt to remain on long tem Plavix and aspirin therapy until deemed not needed by radiology team. Discussed that we need to continue close watch on the liver and that pt will need a repeat ultrasound here next week. All questions answered. She agreed with plan. She will discuss with pt and call me back if they have any other questions or concerns.  ----- Message from Meera Woodson sent at 1/11/2024 12:55 PM CST -----  Regarding: wife returning call per req  Contact: PT wife [phone] Leida  The patient wife  called returning call to Nurse Linda, wife states yes he has been back on the Plavix & Aspirin PT has been in radiation if you can get him she can talk after 3:15     No further information provided    Patient can be contacted @#  [phone] Leida

## 2024-01-11 NOTE — TELEPHONE ENCOUNTER
----- Message from Valencia Monsalve sent at 1/11/2024 10:22 AM CST -----  Regarding: return call  Contact: Jed Keenan:Jed        Returning call to: Linda Keenan can be reached @: [phone] (home)           Note : Yes, taken both the plavix and aspirin.

## 2024-01-16 ENCOUNTER — DOCUMENTATION ONLY (OUTPATIENT)
Dept: RADIATION ONCOLOGY | Facility: CLINIC | Age: 64
End: 2024-01-16
Payer: COMMERCIAL

## 2024-01-16 PROCEDURE — 77014 PR  CT GUIDANCE PLACEMENT RAD THERAPY FIELDS: CPT | Mod: 26,,, | Performed by: SPECIALIST

## 2024-01-16 PROCEDURE — 77386 HC IMRT, COMPLEX: CPT | Performed by: SPECIALIST

## 2024-01-16 NOTE — PROGRESS NOTES
Day 17 of outpatient xrt to the Brain. Unchanged other than brisk erythema without desquamation across his forehead. Will continue to monitor.

## 2024-01-17 PROCEDURE — 77014 PR  CT GUIDANCE PLACEMENT RAD THERAPY FIELDS: CPT | Mod: 26,,, | Performed by: SPECIALIST

## 2024-01-17 PROCEDURE — 77386 HC IMRT, COMPLEX: CPT | Performed by: SPECIALIST

## 2024-01-18 ENCOUNTER — TELEPHONE (OUTPATIENT)
Dept: TRANSPLANT | Facility: CLINIC | Age: 64
End: 2024-01-18
Payer: COMMERCIAL

## 2024-01-18 ENCOUNTER — HOSPITAL ENCOUNTER (OUTPATIENT)
Dept: RADIOLOGY | Facility: HOSPITAL | Age: 64
Discharge: HOME | End: 2024-01-18
Attending: SURGERY
Payer: COMMERCIAL

## 2024-01-18 DIAGNOSIS — T86.49 STENOSIS OF HEPATIC ARTERY OF TRANSPLANTED LIVER: ICD-10-CM

## 2024-01-18 DIAGNOSIS — I77.1 STENOSIS OF HEPATIC ARTERY OF TRANSPLANTED LIVER: ICD-10-CM

## 2024-01-18 DIAGNOSIS — Z94.4 S/P LIVER TRANSPLANT: Primary | ICD-10-CM

## 2024-01-18 DIAGNOSIS — Z94.4 S/P LIVER TRANSPLANT: ICD-10-CM

## 2024-01-18 PROCEDURE — 77014 PR  CT GUIDANCE PLACEMENT RAD THERAPY FIELDS: CPT | Mod: 26,,, | Performed by: SPECIALIST

## 2024-01-18 PROCEDURE — 77336 RADIATION PHYSICS CONSULT: CPT | Performed by: SPECIALIST

## 2024-01-18 PROCEDURE — 76705 ECHO EXAM OF ABDOMEN: CPT | Mod: TC

## 2024-01-18 PROCEDURE — 77386 HC IMRT, COMPLEX: CPT | Performed by: SPECIALIST

## 2024-01-18 PROCEDURE — 93976 VASCULAR STUDY: CPT | Mod: 26,,, | Performed by: RADIOLOGY

## 2024-01-18 PROCEDURE — 76705 ECHO EXAM OF ABDOMEN: CPT | Mod: 26,59,, | Performed by: RADIOLOGY

## 2024-01-18 NOTE — TELEPHONE ENCOUNTER
Reviewed pt's liver ultrasound with [place] and [facility]. Per MDs ultrasound stable. Repeat monthly.    Called pt. Spoke with pt. Reviewed ultrasound results. He agreed to monthly liver ultrasounds in [place]. Next to be scheduled week of 2/12/24.

## 2024-01-19 DIAGNOSIS — Z00.6 RESEARCH STUDY PATIENT: Primary | ICD-10-CM

## 2024-01-19 PROCEDURE — 77014 PR  CT GUIDANCE PLACEMENT RAD THERAPY FIELDS: CPT | Mod: 26,,, | Performed by: SPECIALIST

## 2024-01-19 PROCEDURE — 77386 HC IMRT, COMPLEX: CPT | Performed by: SPECIALIST

## 2024-01-22 ENCOUNTER — TELEPHONE (OUTPATIENT)
Dept: NEUROSURGERY | Facility: CLINIC | Age: 64
End: 2024-01-22
Payer: COMMERCIAL

## 2024-01-22 ENCOUNTER — RESEARCH ENCOUNTER (OUTPATIENT)
Dept: RESEARCH | Facility: HOSPITAL | Age: 64
End: 2024-01-22
Payer: COMMERCIAL

## 2024-01-22 PROCEDURE — 77386 HC IMRT, COMPLEX: CPT | Performed by: SPECIALIST

## 2024-01-22 PROCEDURE — 77014 PR  CT GUIDANCE PLACEMENT RAD THERAPY FIELDS: CPT | Mod: 26,,, | Performed by: SPECIALIST

## 2024-01-22 PROCEDURE — 77427 RADIATION TX MANAGEMENT X5: CPT | Mod: ,,, | Performed by: SPECIALIST

## 2024-01-22 NOTE — PROGRESS NOTES
RESEARCH STUDY SPECIMEN COLLECTION ENCOUNTER  ORGAN TRANSPLANT  [facility]    Study Title: Role of Tumor-Induced Immune Tolerance in the Patient Response to Locoregional Therapy: Implications in Assessment Risk of Hepatocellular Carcinoma Recurrence Following Liver Transplantation    IRB #: [id number]    IRB Approval Date: 6/8/2016    : Ameya Suero MD  Sub-investigator: Cecil Reilly, PhD    Patient Number: [id number]    In accordance with the study protocol, Research Lab orders were placed and follow-up specimens were collected on (date: 1/22/2024) in:  [facility] VNP: YES/NO: No  [facility]TX: YES/NO: No  [facility] IM: YES/NO: No  Other [facility] location: YES/NO: Yes   Location: [facility]    A  was used to transport blood specimens to [facility] for processing: YES/NO: Yes  Blood specimens were transported to [facility] for processing: YES/NO: Yes    Rob Tee  Admin Research- Liver Transplant

## 2024-01-22 NOTE — TELEPHONE ENCOUNTER
Pt called back and said he would prefer to get him MRI done at the [facility] and do a virtual appointment if possible. Attempted to call [facility] for scheduling but got no answer. Will try again in the morning. Scheduled virtual f/u for 2/20.

## 2024-01-22 NOTE — TELEPHONE ENCOUNTER
Left message for Mr. Chávez to get MRI set up prior to seeing Dr. Degroot in Neurosurgery. Callback number given for when he is available.     ----- Message from Darius Coronel sent at 1/22/2024 12:06 PM CST -----  Regarding: Appt  Contact: Pt  [phone]  Missed Callback         Pt returning callback from missed call. Requesting to speak with somebody in   office regarding rearranging appts.. Please call  [phone].

## 2024-01-23 ENCOUNTER — DOCUMENTATION ONLY (OUTPATIENT)
Dept: RADIATION ONCOLOGY | Facility: CLINIC | Age: 64
End: 2024-01-23
Payer: COMMERCIAL

## 2024-01-23 ENCOUNTER — CLINICAL SUPPORT (OUTPATIENT)
Dept: DIABETES | Facility: CLINIC | Age: 64
End: 2024-01-23
Payer: COMMERCIAL

## 2024-01-23 VITALS — WEIGHT: 219.81 LBS | BODY MASS INDEX: 30.66 KG/M2

## 2024-01-23 DIAGNOSIS — E11.69 TYPE 2 DIABETES MELLITUS WITH OTHER SPECIFIED COMPLICATION, WITH LONG-TERM CURRENT USE OF INSULIN: Chronic | ICD-10-CM

## 2024-01-23 DIAGNOSIS — Z79.4 TYPE 2 DIABETES MELLITUS WITH OTHER SPECIFIED COMPLICATION, WITH LONG-TERM CURRENT USE OF INSULIN: Chronic | ICD-10-CM

## 2024-01-23 PROCEDURE — 77386 HC IMRT, COMPLEX: CPT | Performed by: SPECIALIST

## 2024-01-23 PROCEDURE — 99999 PR PBB SHADOW E&M-EST. PATIENT-LVL III: CPT | Mod: PBBFAC,,,

## 2024-01-23 PROCEDURE — G0108 DIAB MANAGE TRN  PER INDIV: HCPCS | Mod: S$GLB,,, | Performed by: PEDIATRICS

## 2024-01-23 PROCEDURE — 77014 PR  CT GUIDANCE PLACEMENT RAD THERAPY FIELDS: CPT | Mod: 26,,, | Performed by: SPECIALIST

## 2024-01-23 NOTE — PROGRESS NOTES
Diabetes Care Specialist Progress Note  Author: Cassia Huston RN  Date: 1/23/2024    Program Intake  Reason for Diabetes Program Visit:: Initial Diabetes Assessment  Current diabetes risk level:: high  In the last 12 months, have you:: none  Permission to speak with others about care:: no    Lab Results   Component Value Date    HGBA1C 6.1 (H) 10/24/2023     CURRENT MEDS  Mounjaro 5 mg weekly   Lantus 20 units daily   Humalog 8 units TID AC +SS    Clinical    Weight: 99.7 kg (219 lb 12.8 oz)       Body mass index is 30.66 kg/m².    Patient Health Rating  Compared to other people your age, how would you rate your health?: Fair    Problem Review  Reviewed Problem List with Patient: yes  Active comorbidities affecting diabetes self-care.: yes  Comorbidities: Cancer, Organ Transplant  Reviewed health maintenance: yes    Clinical Assessment  Current Diabetes Treatment: Insulin, Injectable  Have you ever experienced hypoglycemia (low blood sugar)?: yes  Are you able to tell when your blood sugar is low?: Yes  What symptoms do you experience?: Shaky, Sweaty, Anxious/nervous  Have you ever been hospitalized because your blood sugar was too low?: no  How do you treat hypoglycemia (low blood sugar)?: 4 glucose tablets, 5-6 pieces of hard candy  Have you ever experienced hyperglycemia (high blood sugar)?: yes  In the last month, how often have you experienced high blood sugar?: once a week  Are you able to tell when your blood sugar is high?: No (comment)  Have you ever been hospitalized because your blood sugar was high?: no    Medication Information  How do you obtain your medications?: Patient drives  How many days a week do you miss your medications?: Never  Do you use a pill box or medication chart to help you manage your medications?: Other (see comment) (Dedicated medication drawer)  Do you sometimes have difficulty refilling your medications?: No  Medication adherence impacting ability to self-manage diabetes?:  No    Labs  Do you have regular lab work to monitor your medications?: Yes  Type of Regular Lab Work: A1c, Cholesterol, Microalbumin, CBC, BMP, Medication levels  Where do you get your labs drawn?: [facility]  Lab Compliance Barriers: Yes    Nutritional Status  Diet: Regular  Meal Plan 24 Hour Recall: Breakfast, Lunch, Dinner, Snack  Meal Plan 24 Hour Recall - Breakfast: [employer] breakfast & everything bagel with cream cheese  Meal Plan 24 Hour Recall - Lunch: 1 piece of fried chicken & 1 egg roll  Meal Plan 24 Hour Recall - Dinner: Chicken pot pie  Change in appetite?: Yes (Decreased with Mounjaro)  Dentation:: Intact  Recent Changes in Weight: No Recent Weight Change  Current nutritional status an area of need that is impacting patient's ability to self-manage diabetes?: No    Additional Social History    Support  Does anyone support you with your diabetes care?: yes  Who supports you?: spouse, self  Who takes you to your medical appointments?: self  Does the current support meet the patient's needs?: Yes  Is Support an area impacting ability to self-manage diabetes?: No    Access to Mass Media & Technology  Does the patient have access to any of the following devices or technologies?: Smart phone  Media or technology needs impacting ability to self-manage diabetes?: No    Cognitive/Behavioral Health  Alert and Oriented: Yes  Difficulty Thinking: No  Requires Prompting: No  Requires assistance for routine expression?: No  Cognitive or behavioral barriers impacting ability to self-manage diabetes?: No    Culture/Zoroastrianism  Culture or Jew beliefs that may impact ability to access healthcare: No    Communication  Language preference: English  Hearing Problems: No  Vision Problems: Yes  Vision problem type:: Decreased Vision  Vision Assistance: Glasses  Communication needs impacting ability to self-manage diabetes?: No    Health Literacy  Preferred Learning Method: Face to Face, Reading Materials  How often do  you need to have someone help you read instructions, pamphlets, or written material from your doctor or pharmacy?: Never  Health literacy needs impacting ability to self-manage diabetes?: No      Diabetes Self-Management Skills Assessment    Diabetes Disease Process/Treatment Options  Patient/caregiver able to state what happens when someone has diabetes.: somewhat  Patient/caregiver knows what type of diabetes they have.: yes  Diabetes Type : Type II  Patient/caregiver able to identify at least three signs and symptoms of diabetes.: no  Patient able to identify at least three risk factors for diabetes.: no  Diabetes Disease Process/Treatment Options: Skills Assessment Completed: Yes  Assessment indicates:: Adequate understanding  Area of need?: No    Nutrition/Healthy Eating  Challenges to healthy eating:: eating out, going to parties, portion control  Method of carbohydrate measurement:: no method  Patient can identify foods that impact blood sugar.: yes  Patient-identified foods:: starchy vegetables (corn, peas, beans), starches (bread, pasta, rice, cereal), fruit/fruit juice, milk, sweets  Nutrition/Healthy Eating Skills Assessment Completed:: Yes  Assessment indicates:: Knowledge deficit, Instruction Needed  Area of need?: Yes    Physical Activity/Exercise  Physical Activity/Exercise Skills Assessment Completed: : No  Deffered due to:: Time  Area of need?: Deferred    Medications  Patient is able to describe current diabetes management routine.: yes  Diabetes management routine:: injectable medications, insulin  Patient is able to identify current diabetes medications, dosages, and appropriate timing of medications.: yes  Patient understands the purpose of the medications taken for diabetes.: yes  Patient reports problems or concerns with current medication regimen.: no  Medication Skills Assessment Completed:: Yes  Assessment indicates:: Adequate understanding  Area of need?: No    Home Blood Glucose  Monitoring  Patient states that blood sugar is checked at home daily.: yes  Monitoring Method:: home glucometer  How often do you check your blood sugar?: 3 times a day  When do you check your blood sugar?: Before breakfast, Before lunch, Before dinner  When you check what is your typical blood sugar range? : Fastins-120s  Blood glucose logs:: no  Blood glucose logs reviewed today?: no  Home Blood Glucose Monitoring Skills Assessment Completed: : Yes  Assessment indicates:: Knowledge deficit  Area of need?: Yes    Acute Complications  Patient is able to identify types of acute complications: Yes  Patient Identified:: Hypoglycemia  Patient is able to state the basic meaning of hypoglycemia?: Yes  Able to state the blood sugar range for hypoglycemia?: yes  Patient can identify general symptoms of hypoglycemia: yes  Patient identified:: anxiety, shakiness, sweating  Able to state proper treatment of hypoglycemia?: yes  Patient identified:: 4 glucose tablets, 5-6 pieces of hard candy  Acute Complications Skills Assessment Completed: : Yes  Assessment indicates:: Adequate understanding  Area of need?: No    Chronic Complications  Chronic Complications Skills Assessment Completed: : No  Deferred due to:: Time  Area of need?: Deferred    Psychosocial/Coping  Patient can identify ways of coping with chronic disease.: yes  Patient-stated ways of coping with chronic disease:: support from loved ones  Psychosocial/Coping Skills Assessment Completed: : Yes  Assessment indicates:: Adequate understanding  Area of need?: No      Assessment Summary and Plan    Based on today's diabetes care assessment, the following areas of need were identified:          2024    12:01 AM   Social   Support No   Access to Mass Media/Tech No   Cognitive/Behavioral Health No   Culture/Mu-ism No   Communication No   Health Literacy No            2024    12:01 AM   Clinical   Medication Adherence No   Lab Compliance Yes   Nutritional  Status No            2024    12:01 AM   Diabetes Self-Management Skills   Diabetes Disease Process/Treatment Options No-Dx with DM prior to stroke. Had some DM education in the past, & has family friend who is a nurse and has DM who helps him.      Nutrition/Healthy Eating Yes-See care plan.     Physical Activity/Exercise Deferred.     Medication No-Uses insulin & SS appropriately.      Home Blood Glucose Monitoring Yes-Checks BG 3x/day. He has a Freestyle 3 at home. Plans to start if not ; will reach out to clinic if he needs assistance or at the least, for the sharing code.     Acute Complications No.      Chronic Complications Deferred     Psychosocial/Coping No-Wife is a great support system for him. He has been through a lot of medical situations including stroke, brain tumor & surgery, liver cancer & organ transplant. Seems to be in good spirits.             Today's interventions were provided through individual discussion, instruction, and written materials were provided.      Patient verbalized understanding of instruction and written materials.  Pt was able to return back demonstration of instructions today. Patient understood key points, needs reinforcement and further instruction.     Diabetes Self-Management Care Plan:    Today's Diabetes Self-Management Care Plan was developed with Jed's input. Jed has agreed to work toward the following goal(s) to improve his/her overall diabetes control.      Care Plan: Diabetes Management   Updates made since 2023 12:00 AM        Problem: Healthy Eating         Goal: Eat 3 meals daily with 45-60g of carbohydrates per meal and ~15g per snack.    Start Date: 2024   Expected End Date: 2025   Priority: High   Barriers: No Barriers Identified   Note:    Mr. Chávez states him and his wife make a good team when it comes to planning and preparing meals.   Educated on the recommended amount of carbs per meal, how to read a food label, and how to  "use measuring cups to portion foods.   Admits to decreased appetite with Mounjaro but will eat smaller portions because he knows he "has to" for diabetes.       Task: Reviewed the sources and role of Carbohydrate, Protein, and Fat and how each nutrient impacts blood sugar. Completed 1/23/2024        Task: Provided visual examples using dry measuring cups, food models, and other familiar objects such as computer mouse, deck or cards, tennis ball etc. to help with visualization of portions. Completed 1/23/2024        Task: Explained how to count carbohydrates using the food label and the use of dry measuring cups for accurate carb counting. Completed 1/23/2024        Task: Review the importance of balancing carbohydrates with each meal using portion control techniques to count servings of carbohydrate and label reading to identify serving size and amount of total carbs per serving. Completed 1/23/2024        Task: Provided Sample plate method and reviewed the use of the plate to estimate amounts of carbohydrate per meal. Completed 1/23/2024          Follow Up Plan     Follow up in about 3 weeks (around 2/13/2024) for completion of assessment or sooner for Freestyle Jodie 3 training.     Today's care plan and follow up schedule was discussed with patient.  Jed verbalized understanding of the care plan, goals, and agrees to follow up plan.        The patient was encouraged to communicate with his/her health care provider/physician and care team regarding his/her condition(s) and treatment.  I provided the patient with my contact information today and encouraged to contact me via phone or [facility] Patient Portal as needed.     Length of Visit   Total Time: 60 Minutes     "

## 2024-01-23 NOTE — PROGRESS NOTES
Day 23 of outpatient xrt to the brain. He has brisk erythema without desquamation. Will continue to monitor.

## 2024-01-24 ENCOUNTER — PATIENT MESSAGE (OUTPATIENT)
Dept: TRANSPLANT | Facility: CLINIC | Age: 64
End: 2024-01-24
Payer: COMMERCIAL

## 2024-01-24 ENCOUNTER — TELEPHONE (OUTPATIENT)
Dept: TRANSPLANT | Facility: CLINIC | Age: 64
End: 2024-01-24
Payer: COMMERCIAL

## 2024-01-24 DIAGNOSIS — Z85.05 PERSONAL HISTORY OF MALIGNANT NEOPLASM OF LIVER: ICD-10-CM

## 2024-01-24 DIAGNOSIS — Z94.4 S/P LIVER TRANSPLANT: Primary | ICD-10-CM

## 2024-01-24 PROCEDURE — 77014 PR  CT GUIDANCE PLACEMENT RAD THERAPY FIELDS: CPT | Mod: 26,,, | Performed by: SPECIALIST

## 2024-01-24 PROCEDURE — 77386 HC IMRT, COMPLEX: CPT | Performed by: SPECIALIST

## 2024-01-24 NOTE — TELEPHONE ENCOUNTER
Pt notified via portal of stable labs and that no medication changes are needed. Repeat labs due 2/5/24 per protocol.   ----- Message from Joleen Carr MD sent at 1/24/2024  1:03 PM CST -----  Reviewed, nothing to do; repeat per routine

## 2024-01-25 PROCEDURE — 77386 HC IMRT, COMPLEX: CPT | Performed by: SPECIALIST

## 2024-01-25 PROCEDURE — 77014 PR  CT GUIDANCE PLACEMENT RAD THERAPY FIELDS: CPT | Mod: 26,,, | Performed by: SPECIALIST

## 2024-01-25 PROCEDURE — 77336 RADIATION PHYSICS CONSULT: CPT | Performed by: SPECIALIST

## 2024-01-26 PROCEDURE — 77014 PR  CT GUIDANCE PLACEMENT RAD THERAPY FIELDS: CPT | Mod: 26,,, | Performed by: SPECIALIST

## 2024-01-26 PROCEDURE — 77386 HC IMRT, COMPLEX: CPT | Performed by: SPECIALIST

## 2024-01-29 PROCEDURE — 77014 PR  CT GUIDANCE PLACEMENT RAD THERAPY FIELDS: CPT | Mod: 26,,, | Performed by: SPECIALIST

## 2024-01-29 PROCEDURE — 77386 HC IMRT, COMPLEX: CPT | Performed by: SPECIALIST

## 2024-01-29 PROCEDURE — 77427 RADIATION TX MANAGEMENT X5: CPT | Mod: ,,, | Performed by: SPECIALIST

## 2024-01-30 ENCOUNTER — DOCUMENTATION ONLY (OUTPATIENT)
Dept: RADIATION ONCOLOGY | Facility: CLINIC | Age: 64
End: 2024-01-30
Payer: COMMERCIAL

## 2024-01-30 PROCEDURE — 77014 PR  CT GUIDANCE PLACEMENT RAD THERAPY FIELDS: CPT | Mod: 26,,, | Performed by: SPECIALIST

## 2024-01-30 PROCEDURE — 77386 HC IMRT, COMPLEX: CPT | Performed by: SPECIALIST

## 2024-01-31 PROCEDURE — 77386 HC IMRT, COMPLEX: CPT | Performed by: SPECIALIST

## 2024-01-31 PROCEDURE — 77014 PR  CT GUIDANCE PLACEMENT RAD THERAPY FIELDS: CPT | Mod: 26,,, | Performed by: SPECIALIST

## 2024-02-01 ENCOUNTER — HOSPITAL ENCOUNTER (OUTPATIENT)
Dept: RADIATION THERAPY | Facility: HOSPITAL | Age: 64
Discharge: HOME | End: 2024-02-01
Attending: SPECIALIST
Payer: COMMERCIAL

## 2024-02-01 PROCEDURE — 77014 PR  CT GUIDANCE PLACEMENT RAD THERAPY FIELDS: CPT | Mod: 26,,, | Performed by: SPECIALIST

## 2024-02-01 PROCEDURE — 77386 HC IMRT, COMPLEX: CPT | Performed by: SPECIALIST

## 2024-02-02 ENCOUNTER — DOCUMENTATION ONLY (OUTPATIENT)
Dept: RADIATION ONCOLOGY | Facility: CLINIC | Age: 64
End: 2024-02-02
Payer: COMMERCIAL

## 2024-02-02 PROCEDURE — 77386 HC IMRT, COMPLEX: CPT | Performed by: SPECIALIST

## 2024-02-02 PROCEDURE — 77014 PR  CT GUIDANCE PLACEMENT RAD THERAPY FIELDS: CPT | Mod: 26,,, | Performed by: SPECIALIST

## 2024-02-02 PROCEDURE — 77336 RADIATION PHYSICS CONSULT: CPT | Performed by: SPECIALIST

## 2024-02-05 ENCOUNTER — LAB VISIT (OUTPATIENT)
Dept: LAB | Facility: HOSPITAL | Age: 64
End: 2024-02-05
Attending: INTERNAL MEDICINE
Payer: COMMERCIAL

## 2024-02-05 DIAGNOSIS — Z94.4 STATUS POST LIVER TRANSPLANT: ICD-10-CM

## 2024-02-05 DIAGNOSIS — Z94.4 S/P LIVER TRANSPLANT: ICD-10-CM

## 2024-02-05 DIAGNOSIS — Z85.05 PERSONAL HISTORY OF MALIGNANT NEOPLASM OF LIVER: ICD-10-CM

## 2024-02-05 LAB
AFP SERPL-MCNC: <2 NG/ML (ref 0–8.4)
ALBUMIN SERPL BCP-MCNC: 4.2 G/DL (ref 3.5–5.2)
ALP SERPL-CCNC: 84 U/L (ref 55–135)
ALT SERPL W/O P-5'-P-CCNC: 23 U/L (ref 10–44)
ANION GAP SERPL CALC-SCNC: 8 MMOL/L (ref 8–16)
AST SERPL-CCNC: 25 U/L (ref 10–40)
BASOPHILS # BLD AUTO: 0.02 K/UL (ref 0–0.2)
BASOPHILS NFR BLD: 0.4 % (ref 0–1.9)
BILIRUB SERPL-MCNC: 1.9 MG/DL (ref 0.1–1)
BUN SERPL-MCNC: 14 MG/DL (ref 8–23)
CALCIUM SERPL-MCNC: 9.4 MG/DL (ref 8.7–10.5)
CHLORIDE SERPL-SCNC: 106 MMOL/L (ref 95–110)
CO2 SERPL-SCNC: 27 MMOL/L (ref 23–29)
CREAT SERPL-MCNC: 0.9 MG/DL (ref 0.5–1.4)
DIFFERENTIAL METHOD BLD: ABNORMAL
EOSINOPHIL # BLD AUTO: 0 K/UL (ref 0–0.5)
EOSINOPHIL NFR BLD: 0.8 % (ref 0–8)
ERYTHROCYTE [DISTWIDTH] IN BLOOD BY AUTOMATED COUNT: 16.1 % (ref 11.5–14.5)
EST. GFR  (NO RACE VARIABLE): >60 ML/MIN/1.73 M^2
GLUCOSE SERPL-MCNC: 148 MG/DL (ref 70–110)
HCT VFR BLD AUTO: 47 % (ref 40–54)
HGB BLD-MCNC: 15.4 G/DL (ref 14–18)
IMM GRANULOCYTES # BLD AUTO: 0.01 K/UL (ref 0–0.04)
IMM GRANULOCYTES NFR BLD AUTO: 0.2 % (ref 0–0.5)
LYMPHOCYTES # BLD AUTO: 1.5 K/UL (ref 1–4.8)
LYMPHOCYTES NFR BLD: 31.2 % (ref 18–48)
MCH RBC QN AUTO: 27.5 PG (ref 27–31)
MCHC RBC AUTO-ENTMCNC: 32.8 G/DL (ref 32–36)
MCV RBC AUTO: 84 FL (ref 82–98)
MONOCYTES # BLD AUTO: 0.4 K/UL (ref 0.3–1)
MONOCYTES NFR BLD: 9.1 % (ref 4–15)
NEUTROPHILS # BLD AUTO: 2.8 K/UL (ref 1.8–7.7)
NEUTROPHILS NFR BLD: 58.3 % (ref 38–73)
NRBC BLD-RTO: 0 /100 WBC
PLATELET # BLD AUTO: 125 K/UL (ref 150–450)
PMV BLD AUTO: 10 FL (ref 9.2–12.9)
POTASSIUM SERPL-SCNC: 4.4 MMOL/L (ref 3.5–5.1)
PROT SERPL-MCNC: 6.8 G/DL (ref 6–8.4)
RBC # BLD AUTO: 5.59 M/UL (ref 4.6–6.2)
SODIUM SERPL-SCNC: 141 MMOL/L (ref 136–145)
WBC # BLD AUTO: 4.75 K/UL (ref 3.9–12.7)

## 2024-02-05 PROCEDURE — 80197 ASSAY OF TACROLIMUS: CPT | Performed by: INTERNAL MEDICINE

## 2024-02-05 PROCEDURE — 80053 COMPREHEN METABOLIC PANEL: CPT | Performed by: INTERNAL MEDICINE

## 2024-02-05 PROCEDURE — 85025 COMPLETE CBC W/AUTO DIFF WBC: CPT | Performed by: INTERNAL MEDICINE

## 2024-02-05 PROCEDURE — 36415 COLL VENOUS BLD VENIPUNCTURE: CPT | Performed by: INTERNAL MEDICINE

## 2024-02-05 PROCEDURE — 82105 ALPHA-FETOPROTEIN SERUM: CPT | Performed by: INTERNAL MEDICINE

## 2024-02-06 ENCOUNTER — HOSPITAL ENCOUNTER (OUTPATIENT)
Dept: RADIOLOGY | Facility: HOSPITAL | Age: 64
Discharge: HOME | End: 2024-02-06
Attending: INTERNAL MEDICINE
Payer: COMMERCIAL

## 2024-02-06 DIAGNOSIS — Z94.4 S/P LIVER TRANSPLANT: ICD-10-CM

## 2024-02-06 DIAGNOSIS — Z85.05 PERSONAL HISTORY OF MALIGNANT NEOPLASM OF LIVER: ICD-10-CM

## 2024-02-06 LAB — TACROLIMUS BLD-MCNC: 8.7 NG/ML (ref 5–15)

## 2024-02-06 PROCEDURE — 25500020 PHARM REV CODE 255: Performed by: INTERNAL MEDICINE

## 2024-02-06 PROCEDURE — 74170 CT ABD WO CNTRST FLWD CNTRST: CPT | Mod: 26,,, | Performed by: RADIOLOGY

## 2024-02-06 PROCEDURE — 74170 CT ABD WO CNTRST FLWD CNTRST: CPT | Mod: TC

## 2024-02-06 PROCEDURE — 71250 CT THORAX DX C-: CPT | Mod: TC

## 2024-02-06 PROCEDURE — 71250 CT THORAX DX C-: CPT | Mod: 26,,, | Performed by: RADIOLOGY

## 2024-02-06 RX ADMIN — IOHEXOL 100 ML: 350 INJECTION, SOLUTION INTRAVENOUS at 09:02

## 2024-02-07 ENCOUNTER — TELEPHONE (OUTPATIENT)
Dept: TRANSPLANT | Facility: CLINIC | Age: 64
End: 2024-02-07
Payer: COMMERCIAL

## 2024-02-07 ENCOUNTER — PATIENT MESSAGE (OUTPATIENT)
Dept: TRANSPLANT | Facility: CLINIC | Age: 64
End: 2024-02-07
Payer: COMMERCIAL

## 2024-02-07 DIAGNOSIS — Z94.4 STATUS POST LIVER TRANSPLANT: ICD-10-CM

## 2024-02-07 RX ORDER — INSULIN GLARGINE 100 [IU]/ML
20 INJECTION, SOLUTION SUBCUTANEOUS DAILY
Qty: 15 ML | Refills: 0 | Status: SHIPPED | OUTPATIENT
Start: 2024-02-07 | End: 2024-03-07 | Stop reason: SDUPTHER

## 2024-02-07 NOTE — TELEPHONE ENCOUNTER
Pt advised via portal of stable labs and that no medication changes are needed. Repeat labs 2/19/24.  Pt also notified of stable HCC imaging.  ----- Message from Joleen Carr MD sent at 2/7/2024  1:08 PM CST -----  Reviewed, nothing to do; repeat per routine

## 2024-02-07 NOTE — TELEPHONE ENCOUNTER
----- Message from Joleen Carr MD sent at 2/7/2024  1:08 PM CST -----  Reviewed, nothing to do; repeat per routine     She needs a LIA appointment here

## 2024-02-07 NOTE — TELEPHONE ENCOUNTER
----- Message from Joleen Carr MD sent at 2/7/2024  1:08 PM CST -----  Reviewed, nothing to do; repeat per routine

## 2024-02-12 ENCOUNTER — PATIENT MESSAGE (OUTPATIENT)
Dept: TRANSPLANT | Facility: CLINIC | Age: 64
End: 2024-02-12
Payer: COMMERCIAL

## 2024-02-12 ENCOUNTER — TELEPHONE (OUTPATIENT)
Dept: RADIATION ONCOLOGY | Facility: CLINIC | Age: 64
End: 2024-02-12
Payer: COMMERCIAL

## 2024-02-12 NOTE — TELEPHONE ENCOUNTER
Attempted to call to check on patient since completing xrt last week but there was no answer. Left a detailed message along with our direct number if needed to contact us    ----- Message from Eva Diaz RN sent at 2/2/2024 10:36 AM CST -----  Regarding: Make 1 week f/u call

## 2024-02-13 ENCOUNTER — CLINICAL SUPPORT (OUTPATIENT)
Dept: DIABETES | Facility: CLINIC | Age: 64
End: 2024-02-13
Payer: COMMERCIAL

## 2024-02-13 VITALS — BODY MASS INDEX: 30.23 KG/M2 | WEIGHT: 216.69 LBS

## 2024-02-13 DIAGNOSIS — Z79.4 TYPE 2 DIABETES MELLITUS WITH OTHER SPECIFIED COMPLICATION, WITH LONG-TERM CURRENT USE OF INSULIN: Primary | ICD-10-CM

## 2024-02-13 DIAGNOSIS — E11.69 TYPE 2 DIABETES MELLITUS WITH OTHER SPECIFIED COMPLICATION, WITH LONG-TERM CURRENT USE OF INSULIN: Primary | ICD-10-CM

## 2024-02-13 DIAGNOSIS — Z79.4 TYPE 2 DIABETES MELLITUS WITH OTHER SPECIFIED COMPLICATION, WITH LONG-TERM CURRENT USE OF INSULIN: Primary | Chronic | ICD-10-CM

## 2024-02-13 DIAGNOSIS — E11.69 TYPE 2 DIABETES MELLITUS WITH OTHER SPECIFIED COMPLICATION, WITH LONG-TERM CURRENT USE OF INSULIN: Primary | Chronic | ICD-10-CM

## 2024-02-13 PROCEDURE — 99999 PR PBB SHADOW E&M-EST. PATIENT-LVL III: CPT | Mod: PBBFAC,,,

## 2024-02-13 PROCEDURE — G0108 DIAB MANAGE TRN  PER INDIV: HCPCS | Mod: S$GLB,,, | Performed by: PEDIATRICS

## 2024-02-13 RX ORDER — BLOOD-GLUCOSE SENSOR
1 EACH MISCELLANEOUS
Qty: 9 EACH | Refills: 11 | Status: CANCELLED | OUTPATIENT
Start: 2024-02-13 | End: 2025-02-12

## 2024-02-13 NOTE — PROGRESS NOTES
Diabetes Care Specialist Progress Note  Author: Cassia Huston RN  Date: 2/13/2024    Program Intake  Reason for Diabetes Program Visit:: Intervention  Type of Intervention:: Individual  Individual: Education  Education: Self-Management Skill Review  Current diabetes risk level:: high  In the last 12 months, have you:: none  Permission to speak with others about care:: no    Lab Results   Component Value Date    HGBA1C 6.1 (H) 10/24/2023     CURRENT MEDS  Mounjaro 5 mg weekly   Lantus 20 units daily   Humalog 8 units TID AC +SS    Clinical    Weight: 98.3 kg (216 lb 11.4 oz)       Body mass index is 30.23 kg/m².  Weight decrease of 3 lbs since last visit.    Nutritional Status  Diet: Regular (Smaller portions d/t decreased appetite)  Meal Plan 24 Hour Recall: Breakfast, Lunch, Dinner, Snack  Meal Plan 24 Hour Recall - Breakfast: Cereal, scrambled eggs, & irene  Meal Plan 24 Hour Recall - Lunch: Handful of chopped giles salad  Meal Plan 24 Hour Recall - Dinner: BBQ rib, slice of brisket, big salad, & ~1/4c of mac & cheese    Diabetes Self-Management Skills Assessment    Physical Activity/Exercise  Patient's daily activity level:: lightly active  Patient formally exercises outside of work.: yes  Exercise Type: cycling, walking  Intensity: Low  Frequency: three times a week  Duration: 30 min  Patient can identify forms of physical activity.: yes  Stated forms of physical activity:: any movement performed by muscles that uses energy  Patient can identify reasons why exercise/physical activity is important in diabetes management.: no  Physical Activity/Exercise Skills Assessment Completed: : Yes  Assessment indicates:: Knowledge deficit, Instruction Needed  Area of need?: Yes    Assessment Summary and Plan    Based on today's diabetes care assessment, the following areas of need were identified:          1/23/2024    12:01 AM   Social   Support No   Access to Mass Media/Tech No   Cognitive/Behavioral Health No    Culture/Congregational No   Communication No   Health Literacy No            1/23/2024    12:01 AM   Clinical   Medication Adherence No   Lab Compliance Yes   Nutritional Status No            2/13/2024    12:01 AM   Diabetes Self-Management Skills   Physical Activity/Exercise Yes-Discussed physical activity as it relates to insulin resistance and weight loss.   Currently goes to the gym about 3x/week to workout; he walks and rides for ~10-15 min/machine. Plans to increase time spent.      Acute Complications Has been having hypoglycemia as low as 68; feels symptoms and treats appropriately.  Concerned as to why BGs are higher in the AM. Educated that he wakes up at 1:30 am but doesn't check until 4 am, so the getting up and moving around may be affecting his blood sugars. Educated on checking blood sugars right when he wakes up; if those blood sugars are high, encouraged a light bedtime snack.      Medications Interested in pump therapy; may be a good candidate for iLet due to little willingness to count carbohydrates. Will refer.      Monitoring Will ask Dr. Pitt to place orders for Dexcom G7.            Today's interventions were provided through individual discussion, instruction, and written materials were provided.      Patient verbalized understanding of instruction and written materials.  Pt was able to return back demonstration of instructions today. Patient understood key points, needs reinforcement and further instruction.     Diabetes Self-Management Care Plan:    Today's Diabetes Self-Management Care Plan was developed with Jed's input. Jed has agreed to work toward the following goal(s) to improve his/her overall diabetes control.      Care Plan: Diabetes Management   Updates made since 1/14/2024 12:00 AM        Problem: Healthy Eating         Goal: Eat 3 meals daily with 45-60g of carbohydrates per meal and ~15g per snack.    Start Date: 1/23/2024   Expected End Date: 1/23/2025   This Visit's  "Progress: On track   Priority: High   Barriers: No Barriers Identified   Note:    Mr. Chávez states him and his wife make a good team when it comes to planning and preparing meals.   Educated on the recommended amount of carbs per meal, how to read a food label, and how to use measuring cups to portion foods.   Admits to decreased appetite with Mounjaro but will eat smaller portions because he knows he "has to" for diabetes.    2/13/24: Mr. Chávez continues to eat smaller portions. He has done better with pairing carbs with protein and/or healthy fat. He is not measuring food but plans to start. He is interested in pump therapy; will refer to [facility]NA for management.        Task: Reviewed the sources and role of Carbohydrate, Protein, and Fat and how each nutrient impacts blood sugar. Completed 1/23/2024        Task: Provided visual examples using dry measuring cups, food models, and other familiar objects such as computer mouse, deck or cards, tennis ball etc. to help with visualization of portions. Completed 1/23/2024        Task: Explained how to count carbohydrates using the food label and the use of dry measuring cups for accurate carb counting. Completed 1/23/2024        Task: Review the importance of balancing carbohydrates with each meal using portion control techniques to count servings of carbohydrate and label reading to identify serving size and amount of total carbs per serving. Completed 1/23/2024        Task: Provided Sample plate method and reviewed the use of the plate to estimate amounts of carbohydrate per meal. Completed 1/23/2024          Follow Up Plan     Follow up when Dexcom G7 supplies are received.    Today's care plan and follow up schedule was discussed with patient.  Jed verbalized understanding of the care plan, goals, and agrees to follow up plan.        The patient was encouraged to communicate with his/her health care provider/physician and care team regarding his/her condition(s) and " treatment.  I provided the patient with my contact information today and encouraged to contact me via phone or [facility]'s Patient Portal as needed.     Length of Visit   Total Time: 45 Minutes

## 2024-02-13 NOTE — Clinical Note
Dr. Pitt, Mr. Chávez is need of Dexcom G7 since his insurance no longer covers the Freestyle Jodie 3. I have pended orders for the Dexcom; a PA is needed. I have referred him to a DM ANA, but his appt is not until March. Please let me know if there is anything I can do to help. Thank you!  Apoorva, Mr. Chávez would be a good candidate for an insulin pump. His A1C is well-controlled, but he is on MDI. I have referred him to you for assistance with med management plus pump initiation; his appt is in March. I am going to get him started on Dexcom soon, hopefully. Thank you!

## 2024-02-14 ENCOUNTER — HOSPITAL ENCOUNTER (OUTPATIENT)
Dept: RADIOLOGY | Facility: HOSPITAL | Age: 64
Discharge: HOME | End: 2024-02-14
Attending: SURGERY
Payer: COMMERCIAL

## 2024-02-14 ENCOUNTER — TELEPHONE (OUTPATIENT)
Dept: TRANSPLANT | Facility: CLINIC | Age: 64
End: 2024-02-14
Payer: COMMERCIAL

## 2024-02-14 ENCOUNTER — PATIENT MESSAGE (OUTPATIENT)
Dept: TRANSPLANT | Facility: CLINIC | Age: 64
End: 2024-02-14
Payer: COMMERCIAL

## 2024-02-14 DIAGNOSIS — I77.1 STENOSIS OF HEPATIC ARTERY OF TRANSPLANTED LIVER: ICD-10-CM

## 2024-02-14 DIAGNOSIS — Z94.4 S/P LIVER TRANSPLANT: ICD-10-CM

## 2024-02-14 DIAGNOSIS — T86.49 STENOSIS OF HEPATIC ARTERY OF TRANSPLANTED LIVER: ICD-10-CM

## 2024-02-14 PROCEDURE — 93976 VASCULAR STUDY: CPT | Mod: 26,,, | Performed by: RADIOLOGY

## 2024-02-14 PROCEDURE — 76705 ECHO EXAM OF ABDOMEN: CPT | Mod: 59,TC

## 2024-02-14 PROCEDURE — 76705 ECHO EXAM OF ABDOMEN: CPT | Mod: 26,59,, | Performed by: RADIOLOGY

## 2024-02-14 RX ORDER — BLOOD-GLUCOSE SENSOR
1 EACH MISCELLANEOUS
Qty: 9 EACH | Refills: 11 | Status: SHIPPED | OUTPATIENT
Start: 2024-02-14 | End: 2025-02-13

## 2024-02-14 NOTE — TELEPHONE ENCOUNTER
REFILL REQUEST APPROVED.  Requested Prescriptions   Pending Prescriptions Disp Refills    blood-glucose sensor (DEXCOM G7 SENSOR) Neelam 9 each 11     Si each by Misc.(Non-Drug; Combo Route) route every 10 days.

## 2024-02-14 NOTE — TELEPHONE ENCOUNTER
Pt advised of stable ultrasound and need to continue monthly ultrasounds.  ----- Message from Ameya Suero MD sent at 2/14/2024  1:12 PM CST -----  Results ok. No action needed

## 2024-02-19 ENCOUNTER — HOSPITAL ENCOUNTER (OUTPATIENT)
Dept: RADIOLOGY | Facility: HOSPITAL | Age: 64
Discharge: HOME | End: 2024-02-19
Attending: NEUROLOGICAL SURGERY
Payer: COMMERCIAL

## 2024-02-19 DIAGNOSIS — Z98.890 S/P CRANIOTOMY: ICD-10-CM

## 2024-02-19 DIAGNOSIS — D42.0 ATYPICAL INTRACRANIAL MENINGIOMA: ICD-10-CM

## 2024-02-19 PROCEDURE — 70553 MRI BRAIN STEM W/O & W/DYE: CPT | Mod: TC

## 2024-02-19 PROCEDURE — 70553 MRI BRAIN STEM W/O & W/DYE: CPT | Mod: 26,,, | Performed by: RADIOLOGY

## 2024-02-19 PROCEDURE — 25500020 PHARM REV CODE 255: Performed by: NEUROLOGICAL SURGERY

## 2024-02-19 PROCEDURE — A9585 GADOBUTROL INJECTION: HCPCS | Performed by: NEUROLOGICAL SURGERY

## 2024-02-19 RX ORDER — GADOBUTROL 604.72 MG/ML
10 INJECTION INTRAVENOUS
Status: COMPLETED | OUTPATIENT
Start: 2024-02-19 | End: 2024-02-19

## 2024-02-19 RX ADMIN — GADOBUTROL 10 ML: 604.72 INJECTION INTRAVENOUS at 09:02

## 2024-02-20 ENCOUNTER — OFFICE VISIT (OUTPATIENT)
Dept: NEUROSURGERY | Facility: CLINIC | Age: 64
End: 2024-02-20
Payer: COMMERCIAL

## 2024-02-20 DIAGNOSIS — Z98.890 S/P CRANIOTOMY: ICD-10-CM

## 2024-02-20 DIAGNOSIS — D42.0 ATYPICAL INTRACRANIAL MENINGIOMA: Primary | ICD-10-CM

## 2024-02-20 PROCEDURE — 99024 POSTOP FOLLOW-UP VISIT: CPT | Mod: 95,,, | Performed by: NEUROLOGICAL SURGERY

## 2024-02-20 PROCEDURE — 1160F RVW MEDS BY RX/DR IN RCRD: CPT | Mod: CPTII,95,, | Performed by: NEUROLOGICAL SURGERY

## 2024-02-20 PROCEDURE — 3072F LOW RISK FOR RETINOPATHY: CPT | Mod: CPTII,95,, | Performed by: NEUROLOGICAL SURGERY

## 2024-02-20 PROCEDURE — 1159F MED LIST DOCD IN RCRD: CPT | Mod: CPTII,95,, | Performed by: NEUROLOGICAL SURGERY

## 2024-02-20 NOTE — PROGRESS NOTES
The patient location is: home  The chief complaint leading to consultation is: meningioma    Visit type: audiovisual    Face to Face time with patient: 15  20 minutes of total time spent on the encounter, which includes face to face time and non-face to face time preparing to see the patient (eg, review of tests), Obtaining and/or reviewing separately obtained history, Documenting clinical information in the electronic or other health record, Independently interpreting results (not separately reported) and communicating results to the patient/family/caregiver, or Care coordination (not separately reported).         Each patient to whom he or she provides medical services by telemedicine is:  (1) informed of the relationship between the physician and patient and the respective role of any other health care provider with respect to management of the patient; and (2) notified that he or she may decline to receive medical services by telemedicine and may withdraw from such care at any time.    Notes:   CHIEF COMPLAINT:  3m postop f/u    INTERVAL HISTORY (2/20/24):  Now 3m postop.  Doing well, no issues.  New MRI does not show any recurrence.  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    HPI:    Jed Chávez is a 64 y.o.-year-old male who presents today for post-operative follow-up s/p left frontal craniotomy for resection of meningioma on 10/26/23.  Patient is recovering well and reports improvements overall.  He feels like his balance is still off but better.  He has intermittent headaches.  He is eager to return to golf and driving.  His wound is healed well without any obvious signs of infection.    ROS:  As stated in the above HPI    PE:  There were no vitals filed for this visit.    AAOX3  NAD  CN 2-12 intact     Strength:      Deltoids Biceps Triceps Wrist Ext. Wrist Flex. Hand    RUE 5 5 5 5 5 5   LUE 5 5 5 5 5 5    Hip Flex. Knee Flex. Knee  Ext. Dorsi Flex Plantar Flex EHL   RLE 5 5 5 5 5 5   LLE 5 5 5 5 5 5     Sensation:  Intact to light touch (All 4 extremities)    Gait:  Slightly imbalanced, poor tandem    Bicoronal  incision:  Well healed    IMAGING:  All imaging reviewed by me.    Postop MRI:  Gross total resection    ASSESSMENT:   Problem List Items Addressed This Visit          Neuro    S/P craniotomy    Relevant Orders    MRI Brain W WO Contrast       Oncology    Atypical intracranial meningioma - Primary    Relevant Orders    MRI Brain W WO Contrast       S/p left frontal craniotomy for resection of meningioma .  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  He continues to do well w/o any complaints or issues.  3m postop MRI shows some increase nodular enhancement  in area of surgery but radiology read does not believe there is any evidence of recurrence therefore could be scar tissue.  He will be meeting with Dr Casillas (rad/onc) soon to discuss utility of radiation.    The plan will be to repeat his MRI in 3 months and recommend radiation if there is clear evidence of regrowth.    PLAN:   - VV in 3 months with [facility]  - F/u with Radiation Oncology as scheduled    Time spent on this encounter: 40 minutes. This includes face-to-face time and non-face to face time preparing to see the patient (eg, review of tests), obtaining and/or reviewing separately obtained history, documenting clinical information in the electronic or other health record, independently interpreting results and communicating results to the patient/family/caregiver, or care coordinator.

## 2024-02-21 ENCOUNTER — PATIENT MESSAGE (OUTPATIENT)
Dept: TRANSPLANT | Facility: CLINIC | Age: 64
End: 2024-02-21
Payer: COMMERCIAL

## 2024-02-21 DIAGNOSIS — K72.10 END STAGE LIVER DISEASE: ICD-10-CM

## 2024-02-21 RX ORDER — TACROLIMUS 1 MG/1
2 CAPSULE ORAL EVERY 12 HOURS
Qty: 120 CAPSULE | Refills: 11 | Status: SHIPPED | OUTPATIENT
Start: 2024-02-21 | End: 2025-02-20

## 2024-02-21 NOTE — TELEPHONE ENCOUNTER
Pt advised of dose change and that repeat labs needed 2/26/24.  ----- Message from Joleen Carr MD sent at 2/21/2024 12:54 PM CST -----  Tacro level is high.  Decrease to 2 mg twice a day repeat labs next week.

## 2024-02-22 ENCOUNTER — TELEPHONE (OUTPATIENT)
Dept: TRANSPLANT | Facility: CLINIC | Age: 64
End: 2024-02-22
Payer: COMMERCIAL

## 2024-02-22 NOTE — TELEPHONE ENCOUNTER
Patient now showed labs yesterday. No response to mychart message and did not go to rescheduled labs today.    Called patient. No answer. LVM advising him her needs to call [facility] today to reschedule labs for this week.    Called patient's spouse, Leida. She states patient has been forgetting to look at mychart and appointments. She will continue to double check him and will make sure he goes to labs tomorrow.    
See above

## 2024-02-22 NOTE — TELEPHONE ENCOUNTER
Reviewed dental clearance with Dr. Carr. Per MD, pt cleared for dental procedures from a transplant perspective with no restrictions.   Dental clearance letter faxed.

## 2024-02-22 NOTE — TELEPHONE ENCOUNTER
"Letter faxed  ----- Message from Kalia Masterson sent at 2/22/2024  2:05 PM CST -----  Consult/Advisory:      Name Of Caller: Danyelle (Dr. Xiong)      Contact Preference?: [phone]     Fax  [phone]      What is the nature of the call?: Proving fax # for Linda to send medical clearance form      Additional Notes:  "Thank you for all that you do for our patients"         "

## 2024-02-22 NOTE — LETTER
February 22, 2024        To: Consultant Name: Dr. Ramón Xiong  Phone Number: [phone]   Fax Number: [phone]    Patient: Jed Chávez   MR Number: [id number]   YOB: 1960       Dr. Xiong,     Thank you for taking care of my patient Jed Chávez.  We approve all dental procedures from a transplant perspective for Mr. Chávez.    Antibiotic prophylaxis is not needed from a transplant perspective. Please follow ADA guidelines.     Interruption of anticoagulants: Yes - Patient is on aspirin 81mg daily. He may stop 3-5 days before procedure and restart post procedure.    Anesthetic restrictions: No  Is Epinephrine ok:  Yes     If you have any questions or concerns, please do not hesitate to call our office.          Sincerely,           Dr. Joleen Carr MD, MPH - NPI #[id number]  [facility]  [address] [place] [id number]  [phone]

## 2024-02-23 ENCOUNTER — PATIENT MESSAGE (OUTPATIENT)
Dept: TRANSPLANT | Facility: CLINIC | Age: 64
End: 2024-02-23
Payer: COMMERCIAL

## 2024-02-23 ENCOUNTER — NURSE TRIAGE (OUTPATIENT)
Dept: ADMINISTRATIVE | Facility: CLINIC | Age: 64
End: 2024-02-23
Payer: COMMERCIAL

## 2024-02-24 ENCOUNTER — TELEPHONE (OUTPATIENT)
Dept: TRANSPLANT | Facility: CLINIC | Age: 64
End: 2024-02-24
Payer: COMMERCIAL

## 2024-02-24 NOTE — TELEPHONE ENCOUNTER
Pt's wife reports pt has had congestion and body aches, poor appetite for a few days now, current temp is 99.6 orally, did an at home Covid test that looks to be positive, and being that pt is a liver transplant, wanting to know if anything else needs to be done. Call placed to [facility] On Call MD, Dr. Ghotra, who advised pt to hold his Cellcept for a week, but continue his Prograf as usual, he can take any OTC cold/flu meds for his symptoms, but if he has any trouble breathing or chest pain to be seen in the ED. Wife informed and encouraged to call back with any worsening symptoms or questions. She verbalized understanding.      Reason for Disposition   [1] HIGH RISK patient (e.g., weak immune system, age > 64 years, obesity with BMI 30 or higher, pregnant, chronic lung disease or other chronic medical condition) AND [2] COVID symptoms (e.g., cough, fever)  (Exceptions: Already seen by PCP and no new or worsening symptoms.)    Additional Information   Negative: SEVERE difficulty breathing (e.g., struggling for each breath, speaks in single words)   Negative: Difficult to awaken or acting confused (e.g., disoriented, slurred speech)   Negative: Bluish (or gray) lips or face now   Negative: Shock suspected (e.g., cold/pale/clammy skin, too weak to stand, low BP, rapid pulse)   Negative: Sounds like a life-threatening emergency to the triager   Negative: SEVERE or constant chest pain or pressure  (Exception: Mild central chest pain, present only when coughing.)   Negative: MODERATE difficulty breathing (e.g., speaks in phrases, SOB even at rest, pulse 100-120)   Negative: [1] Headache AND [2] stiff neck (can't touch chin to chest)   Negative: Oxygen level (e.g., pulse oximetry) 90 percent or lower   Negative: Chest pain or pressure  (Exception: MILD central chest pain, present only when coughing.)   Negative: [1] Drinking very little AND [2] dehydration suspected (e.g., no urine > 12 hours, very dry  mouth, very lightheaded)   Negative: Patient sounds very sick or weak to the triager   Negative: MILD difficulty breathing (e.g., minimal/no SOB at rest, SOB with walking, pulse <100)   Negative: Fever > 103 F (39.4 C)   Negative: [1] Fever > 101 F (38.3 C) AND [2] age > 60 years   Negative: [1] Fever > 100.0 F (37.8 C) AND [2] bedridden (e.g., CVA, chronic illness, recovering from surgery)   Negative: Oxygen level (e.g., pulse oximetry) 91 to 94 percent    Protocols used: Coronavirus (COVID-19) Diagnosed or [id number]-TN

## 2024-02-24 NOTE — TELEPHONE ENCOUNTER
Call received from nurse on-call stating that patient tested positive for COVID.  Advised to hold CellCept for 1 week and continue tacrolimus.  Will arrange labs next week.

## 2024-02-27 ENCOUNTER — PATIENT MESSAGE (OUTPATIENT)
Dept: DIABETES | Facility: CLINIC | Age: 64
End: 2024-02-27
Payer: COMMERCIAL

## 2024-02-29 ENCOUNTER — TELEPHONE (OUTPATIENT)
Dept: DIABETES | Facility: CLINIC | Age: 64
End: 2024-02-29
Payer: COMMERCIAL

## 2024-02-29 ENCOUNTER — PATIENT MESSAGE (OUTPATIENT)
Dept: TRANSPLANT | Facility: CLINIC | Age: 64
End: 2024-02-29
Payer: COMMERCIAL

## 2024-02-29 NOTE — TELEPHONE ENCOUNTER
----- Message from Randall Dorantes sent at 2/29/2024 12:17 PM CST -----  Contact: Jed Palma is calling in regards to getting appt for tomorrow 3/1 am or Monday 3/4 appt to educate on DEXCOM 7. Gar location. Pt can be reached at [phone].        Thanks  DORIAN

## 2024-03-04 ENCOUNTER — OFFICE VISIT (OUTPATIENT)
Dept: RADIATION ONCOLOGY | Facility: CLINIC | Age: 64
End: 2024-03-04
Payer: COMMERCIAL

## 2024-03-04 ENCOUNTER — LAB VISIT (OUTPATIENT)
Dept: LAB | Facility: HOSPITAL | Age: 64
End: 2024-03-04
Attending: INTERNAL MEDICINE
Payer: COMMERCIAL

## 2024-03-04 VITALS
WEIGHT: 216.5 LBS | DIASTOLIC BLOOD PRESSURE: 96 MMHG | SYSTOLIC BLOOD PRESSURE: 146 MMHG | HEART RATE: 84 BPM | OXYGEN SATURATION: 96 % | BODY MASS INDEX: 30.31 KG/M2 | TEMPERATURE: 98 F | HEIGHT: 71 IN

## 2024-03-04 DIAGNOSIS — D42.0 ATYPICAL INTRACRANIAL MENINGIOMA: Primary | ICD-10-CM

## 2024-03-04 DIAGNOSIS — Z94.4 STATUS POST LIVER TRANSPLANT: ICD-10-CM

## 2024-03-04 LAB
ALBUMIN SERPL BCP-MCNC: 3.9 G/DL (ref 3.5–5.2)
ALP SERPL-CCNC: 97 U/L (ref 55–135)
ALT SERPL W/O P-5'-P-CCNC: 33 U/L (ref 10–44)
ANION GAP SERPL CALC-SCNC: 8 MMOL/L (ref 8–16)
AST SERPL-CCNC: 27 U/L (ref 10–40)
BASOPHILS # BLD AUTO: 0.01 K/UL (ref 0–0.2)
BASOPHILS NFR BLD: 0.2 % (ref 0–1.9)
BILIRUB SERPL-MCNC: 1.6 MG/DL (ref 0.1–1)
BUN SERPL-MCNC: 14 MG/DL (ref 8–23)
CALCIUM SERPL-MCNC: 9.2 MG/DL (ref 8.7–10.5)
CHLORIDE SERPL-SCNC: 102 MMOL/L (ref 95–110)
CO2 SERPL-SCNC: 30 MMOL/L (ref 23–29)
CREAT SERPL-MCNC: 0.9 MG/DL (ref 0.5–1.4)
DIFFERENTIAL METHOD BLD: ABNORMAL
EOSINOPHIL # BLD AUTO: 0.1 K/UL (ref 0–0.5)
EOSINOPHIL NFR BLD: 0.8 % (ref 0–8)
ERYTHROCYTE [DISTWIDTH] IN BLOOD BY AUTOMATED COUNT: 15.2 % (ref 11.5–14.5)
EST. GFR  (NO RACE VARIABLE): >60 ML/MIN/1.73 M^2
GLUCOSE SERPL-MCNC: 193 MG/DL (ref 70–110)
HCT VFR BLD AUTO: 45.9 % (ref 40–54)
HGB BLD-MCNC: 15 G/DL (ref 14–18)
IMM GRANULOCYTES # BLD AUTO: 0.02 K/UL (ref 0–0.04)
IMM GRANULOCYTES NFR BLD AUTO: 0.3 % (ref 0–0.5)
LYMPHOCYTES # BLD AUTO: 2.3 K/UL (ref 1–4.8)
LYMPHOCYTES NFR BLD: 36.3 % (ref 18–48)
MCH RBC QN AUTO: 28.5 PG (ref 27–31)
MCHC RBC AUTO-ENTMCNC: 32.7 G/DL (ref 32–36)
MCV RBC AUTO: 87 FL (ref 82–98)
MONOCYTES # BLD AUTO: 0.5 K/UL (ref 0.3–1)
MONOCYTES NFR BLD: 8.3 % (ref 4–15)
NEUTROPHILS # BLD AUTO: 3.5 K/UL (ref 1.8–7.7)
NEUTROPHILS NFR BLD: 54.1 % (ref 38–73)
NRBC BLD-RTO: 0 /100 WBC
PLATELET # BLD AUTO: 132 K/UL (ref 150–450)
PMV BLD AUTO: 10 FL (ref 9.2–12.9)
POTASSIUM SERPL-SCNC: 4 MMOL/L (ref 3.5–5.1)
PROT SERPL-MCNC: 6.4 G/DL (ref 6–8.4)
RBC # BLD AUTO: 5.26 M/UL (ref 4.6–6.2)
SODIUM SERPL-SCNC: 140 MMOL/L (ref 136–145)
WBC # BLD AUTO: 6.42 K/UL (ref 3.9–12.7)

## 2024-03-04 PROCEDURE — 3077F SYST BP >= 140 MM HG: CPT | Mod: CPTII,S$GLB,, | Performed by: SPECIALIST

## 2024-03-04 PROCEDURE — 80053 COMPREHEN METABOLIC PANEL: CPT | Performed by: INTERNAL MEDICINE

## 2024-03-04 PROCEDURE — 99999 PR PBB SHADOW E&M-EST. PATIENT-LVL IV: CPT | Mod: PBBFAC,,, | Performed by: SPECIALIST

## 2024-03-04 PROCEDURE — 99024 POSTOP FOLLOW-UP VISIT: CPT | Mod: S$GLB,,, | Performed by: SPECIALIST

## 2024-03-04 PROCEDURE — 3008F BODY MASS INDEX DOCD: CPT | Mod: CPTII,S$GLB,, | Performed by: SPECIALIST

## 2024-03-04 PROCEDURE — 3072F LOW RISK FOR RETINOPATHY: CPT | Mod: CPTII,S$GLB,, | Performed by: SPECIALIST

## 2024-03-04 PROCEDURE — 36415 COLL VENOUS BLD VENIPUNCTURE: CPT | Performed by: INTERNAL MEDICINE

## 2024-03-04 PROCEDURE — 80197 ASSAY OF TACROLIMUS: CPT | Performed by: INTERNAL MEDICINE

## 2024-03-04 PROCEDURE — 85025 COMPLETE CBC W/AUTO DIFF WBC: CPT | Performed by: INTERNAL MEDICINE

## 2024-03-04 PROCEDURE — 1159F MED LIST DOCD IN RCRD: CPT | Mod: CPTII,S$GLB,, | Performed by: SPECIALIST

## 2024-03-04 PROCEDURE — 3080F DIAST BP >= 90 MM HG: CPT | Mod: CPTII,S$GLB,, | Performed by: SPECIALIST

## 2024-03-04 NOTE — PROGRESS NOTES
Mr. Chávez returns for follow-up after undergoing adjuvant radiotherapy for an atypical meningioma.  Still has some fatigue but it continues to resolved.  He is beginning to work on his golf game.  He has no complaints of headache or focal neurologic findings.  Physical examination: His skin is nearly completely healed.  He is alert and oriented with intact cranial nerves and intact neurologic function.  Impression:  He has done well with therapy.  Plan:  I will see him back in 6 months after an MRI of the brain.  I certainly appreciate participating in this delightful gentleman's care.

## 2024-03-05 ENCOUNTER — CLINICAL SUPPORT (OUTPATIENT)
Dept: DIABETES | Facility: CLINIC | Age: 64
End: 2024-03-05
Payer: COMMERCIAL

## 2024-03-05 DIAGNOSIS — Z79.4 TYPE 2 DIABETES MELLITUS WITH OTHER SPECIFIED COMPLICATION, WITH LONG-TERM CURRENT USE OF INSULIN: Primary | Chronic | ICD-10-CM

## 2024-03-05 DIAGNOSIS — E11.69 TYPE 2 DIABETES MELLITUS WITH OTHER SPECIFIED COMPLICATION, WITH LONG-TERM CURRENT USE OF INSULIN: Primary | Chronic | ICD-10-CM

## 2024-03-05 LAB — TACROLIMUS BLD-MCNC: 10.3 NG/ML (ref 5–15)

## 2024-03-05 PROCEDURE — 99999 PR PBB SHADOW E&M-EST. PATIENT-LVL III: CPT | Mod: PBBFAC,,,

## 2024-03-05 PROCEDURE — G0108 DIAB MANAGE TRN  PER INDIV: HCPCS | Mod: S$GLB,,, | Performed by: PEDIATRICS

## 2024-03-05 NOTE — PROGRESS NOTES
Diabetes Care Specialist Follow-up Note  Author: Cassia Huston RN  Date: 3/5/2024    Program Intake  Reason for Diabetes Program Visit:: Intervention  Type of Intervention:: Individual  Individual: Device Training  Device Training: Personal CGM  Current diabetes risk level:: high  In the last 12 months, have you:: none  Permission to speak with others about care:: yes  Continuous Glucose Monitoring  Patient has CGM: No    Lab Results   Component Value Date    HGBA1C 6.1 (H) 10/24/2023     A1c Pre Diabetes Care Specialist Intervention:  6.1%    Clinical    Physical activity/Exercise:   Not addressed.    SMBG: Started Dexcom G7 today in clinic.     During today's follow-up visit,  the following areas required further assessment and content was provided/reviewed.    Based on today's diabetes care assessment, the following areas of need were identified:          1/23/2024    12:01 AM   Social   Support No   Access to Mass Media/Tech No   Cognitive/Behavioral Health No   Culture/Evangelical No   Communication No   Health Literacy No            1/23/2024    12:01 AM   Clinical   Medication Adherence No   Lab Compliance Yes   Nutritional Status No            2/13/2024    12:01 AM   Diabetes Self-Management Skills   Monitoring See care plan for Dexcom G7 training.           Today's interventions were provided through individual discussion, instruction, and written materials were provided.    Patient verbalized understanding of instruction and written materials.  Pt was able to return back demonstration of instructions today. Patient understood key points, needs reinforcement and further instruction.     Diabetes Self-Management Care Plan Review and Evaluation of Progress:    During today's follow-up Jed's Diabetes Self-Management Care Plan progress was reviewed and progress was evaluated including his/her input. Jed has agreed to continue his/her journey to improve/maintain overall diabetes control by continuing to set  "health goals. See care plan progress below.      Care Plan: Diabetes Management   Updates made since 2/4/2024 12:00 AM        Problem: Healthy Eating         Goal: Eat 3 meals daily with 45-60g of carbohydrates per meal and ~15g per snack.    Start Date: 1/23/2024   Expected End Date: 1/23/2025   This Visit's Progress: On track   Priority: High   Barriers: No Barriers Identified   Note:    Mr. Chávez states him and his wife make a good team when it comes to planning and preparing meals.   Educated on the recommended amount of carbs per meal, how to read a food label, and how to use measuring cups to portion foods.   Admits to decreased appetite with Mounjaro but will eat smaller portions because he knows he "has to" for diabetes.    2/13/24: Mr. Chávez continues to eat smaller portions. He has done better with pairing carbs with protein and/or healthy fat. He is not measuring food but plans to start. He is interested in pump therapy; will refer to [facility] for management.        Problem: Blood Glucose Self-Monitoring         Goal: Patient agrees to monitor blood glucose using personal Dexcom G7    Start Date: 3/5/2024   Expected End Date: 3/5/2025   Priority: Medium   Barriers: No Barriers Identified   Note:    Method: discussion, demonstration, and instruction  Level of Comprehension: demonstrates understanding/competency - verbalizes/demonstrates    DEXCOM G7 CGM TRAINING  Dexcom G7 mobile apps downloaded to phone. Education was provided using "Quick Start Guide" and demo device per Dexcom protocol. [facility] data share set-up.    Patient will use Dexcom G7 on his iPhone to receive continuous glucose data.        Overview:  5 minute glucose reading updates, trending arrows, BG graph screens, reports screen,  connectivity and functions.   Menus: Trend graph, start sensor, enter BG, events, alerts, settings, replace sensor, stop sensor, and shutdown  Dexcom G7 mobile kaveh/ Settings:                    "       * Urgent Low: 55 mg/dL                          * Urgent Low Soon: On                          * Low Alert: 70 mg/dL                          * High Alert: 250 mg/dL                          * Fall Rate: On                          * Signal Loss: On                          * Brief Sensor Issue: On     Reviewed additional setting options.  Patient paired sensor using 4-digit code listed on sensor cap..    Reviewed where to find sensor insertion time and expiration date.   Reviewed appropriate calibration techniques.  Reviewed sensor site selection. Patient selected and prepped site using aseptic technique, inserted sensor, applied overlay tape and started session.   Reviewed sensor removal from site. Discussed times to remove sensor per  guidelines include MRI or diatherapy.   Patient able to demonstrate without difficulty. Encouraged to review manual and/or training videos prior to starting another sensor.   Reviewed problem solving aspects of sensor transmission/variables that can disrupt RF transmission.  range 20 feet, but the first 3 hrs keep within 3 feet of transmitter.  Patient instructed on lag time of interstitial fluid from CBG and was advised to treat hypoglycemia and dose insulin based on SMBG values.  Dexcom technical support contact number given and examples of when to contact them discussed.       Warm-up completed: 136 with upward arrow         Task: Reviewed advantages of having a personal CGM monitor. Completed 3/5/2024        Task: Discussed misconceptions of CGM monitors. Completed 3/5/2024        Task: Instructed on how often to change sensor. Completed 3/5/2024   Note:    Change sensor every 10 days.       Task: Discussed proper rotation of sensor sites. Completed 3/5/2024        Task: Discussed need for calibration if necessary. Completed 3/5/2024        Task: Directed to use directional arrows to make more informed decisions on managing glucose. Completed 3/5/2024         Task: Encouraged patient to bring their device to clinic visits. Completed 3/5/2024        Task: Discussed guidelines for preventing, detecting and treating hypoglycemia and hyperglycemia and reviewed the importance of meal and medication timing with diabetes mediations for prevention of hypoglycemia and maximum drug benefit. Completed 3/5/2024          Follow Up Plan     Follow up in about 2 weeks (around 3/19/2024) for review.    Today's care plan and follow up schedule was discussed with patient.  Jed verbalized understanding of the care plan, goals, and agrees to follow up plan.        The patient was encouraged to communicate with his/her health care provider/physician and care team regarding his/her condition(s) and treatment.  I provided the patient with my contact information today and encouraged to contact me via phone or [facility] Portal as needed.     Length of Visit   Total Time: 60 Minutes

## 2024-03-07 ENCOUNTER — PATIENT MESSAGE (OUTPATIENT)
Dept: TRANSPLANT | Facility: CLINIC | Age: 64
End: 2024-03-07
Payer: COMMERCIAL

## 2024-03-07 ENCOUNTER — TELEPHONE (OUTPATIENT)
Dept: TRANSPLANT | Facility: CLINIC | Age: 64
End: 2024-03-07
Payer: COMMERCIAL

## 2024-03-07 DIAGNOSIS — Z94.4 STATUS POST LIVER TRANSPLANT: ICD-10-CM

## 2024-03-07 RX ORDER — INSULIN GLARGINE 100 [IU]/ML
28 INJECTION, SOLUTION SUBCUTANEOUS DAILY
Qty: 15 ML | Refills: 3 | Status: SHIPPED | OUTPATIENT
Start: 2024-03-07

## 2024-03-07 NOTE — TELEPHONE ENCOUNTER
Pt notified via portal of stable labs and that no medication changes are needed. Repeat labs due 4/8/24 per protocol.   ----- Message from Joleen Carr MD sent at 3/7/2024  8:54 AM CST -----  Reviewed, nothing to do; repeat per routine

## 2024-03-11 ENCOUNTER — HOSPITAL ENCOUNTER (OUTPATIENT)
Dept: RADIOLOGY | Facility: HOSPITAL | Age: 64
Discharge: HOME | End: 2024-03-11
Attending: SURGERY
Payer: COMMERCIAL

## 2024-03-11 DIAGNOSIS — T86.49 STENOSIS OF HEPATIC ARTERY OF TRANSPLANTED LIVER: ICD-10-CM

## 2024-03-11 DIAGNOSIS — I77.1 STENOSIS OF HEPATIC ARTERY OF TRANSPLANTED LIVER: ICD-10-CM

## 2024-03-11 DIAGNOSIS — Z94.4 S/P LIVER TRANSPLANT: ICD-10-CM

## 2024-03-11 PROCEDURE — 93976 VASCULAR STUDY: CPT | Mod: 26,,, | Performed by: STUDENT IN AN ORGANIZED HEALTH CARE EDUCATION/TRAINING PROGRAM

## 2024-03-11 PROCEDURE — 76705 ECHO EXAM OF ABDOMEN: CPT | Mod: 26,59,, | Performed by: STUDENT IN AN ORGANIZED HEALTH CARE EDUCATION/TRAINING PROGRAM

## 2024-03-11 PROCEDURE — 76705 ECHO EXAM OF ABDOMEN: CPT | Mod: TC,59

## 2024-03-13 ENCOUNTER — OFFICE VISIT (OUTPATIENT)
Dept: OPHTHALMOLOGY | Facility: CLINIC | Age: 64
End: 2024-03-13
Payer: COMMERCIAL

## 2024-03-13 DIAGNOSIS — H25.12 NUCLEAR SCLEROSIS OF LEFT EYE: ICD-10-CM

## 2024-03-13 DIAGNOSIS — E11.69 TYPE 2 DIABETES MELLITUS WITH OTHER SPECIFIED COMPLICATION, WITH LONG-TERM CURRENT USE OF INSULIN: ICD-10-CM

## 2024-03-13 DIAGNOSIS — Z79.4 TYPE 2 DIABETES MELLITUS WITH OTHER SPECIFIED COMPLICATION, WITH LONG-TERM CURRENT USE OF INSULIN: ICD-10-CM

## 2024-03-13 DIAGNOSIS — H25.11 NUCLEAR SCLEROSIS OF RIGHT EYE: Primary | ICD-10-CM

## 2024-03-13 DIAGNOSIS — H40.013 OPEN ANGLE WITH BORDERLINE FINDINGS OF BOTH EYES: ICD-10-CM

## 2024-03-13 PROCEDURE — 99999 PR PBB SHADOW E&M-EST. PATIENT-LVL IV: CPT | Mod: PBBFAC,,, | Performed by: OPHTHALMOLOGY

## 2024-03-13 PROCEDURE — 99204 OFFICE O/P NEW MOD 45 MIN: CPT | Mod: S$GLB,,, | Performed by: OPHTHALMOLOGY

## 2024-03-13 PROCEDURE — 92136 OPHTHALMIC BIOMETRY: CPT | Mod: RT,S$GLB,, | Performed by: OPHTHALMOLOGY

## 2024-03-13 RX ORDER — PREDNISOLONE ACETATE 10 MG/ML
1 SUSPENSION/ DROPS OPHTHALMIC 4 TIMES DAILY
Qty: 5 ML | Refills: 1 | Status: SHIPPED | OUTPATIENT
Start: 2024-03-13 | End: 2024-05-24 | Stop reason: SDUPTHER

## 2024-03-13 NOTE — PROGRESS NOTES
HPI     Cataract            Comments: Cataract eval    Patient states VA has decreased at all ranges in OD>OS over the last   several months images appear dull and cloudy along with glare sensitivity   at night while driving.          Comments    Diabetic since 2015  Liver transplant 08/23  Craniotomy 10/23  Cataracts OU  COAG suspect             Last edited by Cruz Orourke MD on 3/13/2024  8:46 AM.            Assessment /Plan     For exam results, see Encounter Report.      ICD-10-CM ICD-9-CM    1. Nuclear sclerosis of right eye  H25.11 366.16 Visually Significant Cataract OD  Patient reports decreased vision in the fellow eye consistent with the clinical amount of lenticular opacity, which reaches the level of visual significance and affects activities of daily living including reading and glare. Risks, benefits, and alternatives to cataract surgery were discussed and patient desired to schedule cataract surgery. Patient was consented and the biometry and lens options were reviewed.    Discussed IOL options and refractive outcomes for this patient.    Topography Reviewed: Yes  History of Refractive Surgery: No     Phaco right eye, +21.5 SY60WF  shugarcaine  Topical  Monofocal - Distance  Prescriptions sent for preoperative medications  Prednisolone Acetate- 4xs daily   Anticoagulant status:   Yes - ASA + Plavix  Will need to d/c prior to surgery, will send message to Dr Pitt  ASA- 14 days  Plavix- 5 days    Explained that patient may need glasses after surgery.  Discussed that vision may be limited by: Astigmatism/near    **defers toric or MF implant at this time, will confer with his wife and contact Doug to confirm decision**    Referral to [facility] for [facility]hthalmic [facility]      Schedule post op visit #2 with MGM      2. Nuclear sclerosis of left eye  H25.12 366.16 Will monitor, standard implant       3. Type 2 diabetes mellitus with other specified complication, with long-term  current use of insulin  E11.69 250.80 Diabetes with no diabetic retinopathy on dilated exam.   Reviewed diabetic eye precautions including excellent blood sugar control, and importance of regular follow up.     Z79.4 V58.67       4. Open angle with borderline findings of both eyes  H40.013 365.01 Glaucoma risk level is unchanged at this time and patient will continued to be followed.

## 2024-03-13 NOTE — Clinical Note
Dr Pitt,  We are planning on proceeding with cataract surgery in the upcoming weeks with Mr Chávez, I was hoping to get you approval for him to stop his aspirin and plavix prior to the surgery.  Thank you Stephen Orourke no

## 2024-03-15 ENCOUNTER — PATIENT MESSAGE (OUTPATIENT)
Dept: TRANSPLANT | Facility: CLINIC | Age: 64
End: 2024-03-15
Payer: COMMERCIAL

## 2024-03-15 ENCOUNTER — TELEPHONE (OUTPATIENT)
Dept: TRANSPLANT | Facility: CLINIC | Age: 64
End: 2024-03-15
Payer: COMMERCIAL

## 2024-03-15 NOTE — TELEPHONE ENCOUNTER
Reviewed ultrasound with Dr. Suero. Per MD, ultrasound stable. Continue with monthly ultrasounds.    Pt advised.

## 2024-03-19 ENCOUNTER — CLINICAL SUPPORT (OUTPATIENT)
Dept: DIABETES | Facility: CLINIC | Age: 64
End: 2024-03-19
Payer: COMMERCIAL

## 2024-03-19 DIAGNOSIS — Z79.4 TYPE 2 DIABETES MELLITUS WITH HYPERGLYCEMIA, WITH LONG-TERM CURRENT USE OF INSULIN: Primary | ICD-10-CM

## 2024-03-19 DIAGNOSIS — E11.65 TYPE 2 DIABETES MELLITUS WITH HYPERGLYCEMIA, WITH LONG-TERM CURRENT USE OF INSULIN: Primary | ICD-10-CM

## 2024-03-19 PROCEDURE — 99999 PR PBB SHADOW E&M-EST. PATIENT-LVL III: CPT | Mod: PBBFAC,,,

## 2024-03-19 PROCEDURE — G0108 DIAB MANAGE TRN  PER INDIV: HCPCS | Mod: S$GLB,,, | Performed by: PEDIATRICS

## 2024-03-19 NOTE — PROGRESS NOTES
Diabetes Care Specialist Follow-up Note  Author: Cassia Huston RN  Date: 3/19/2024    Program Intake  Reason for Diabetes Program Visit:: Intervention  Type of Intervention:: Individual  Individual: Education  Education: Self-Management Skill Review  Current diabetes risk level:: high  In the last 12 months, have you:: none  Permission to speak with others about care:: yes  Continuous Glucose Monitoring  Patient has CGM: Yes  Personal CGM type:: Dexcom G7  GMI Date: 03/19/24  GMI Value:  (N/A)    Lab Results   Component Value Date    HGBA1C 6.1 (H) 10/24/2023     A1c Pre Diabetes Care Specialist Intervention:  6.1%    CURRENT DM MEDICATIONS:   Mounjaro 5 mg weekly (Tuesdays)  Lantus 20 units daily   Humalog 8 units TID AC +SS     Clinical    Nutritional Status  Diet: Regular  Meal Plan 24 Hour Recall: Breakfast, Lunch, Dinner, Snack  Meal Plan 24 Hour Recall - Breakfast: Egg & turkey sausage and bagel  Meal Plan 24 Hour Recall - Lunch: Boiled shrimp, blackened catfish  Meal Plan 24 Hour Recall - Dinner: Stuffed pork chop    Physical activity/Exercise:   Stretch Zone (Just restarted. Plans on going 3x/week for 30 minutes)  Plans to work with daughter who is an AT for 30 minutes 1x/week  Plans to practice golfing again    SMBG: Dexcom G7; Report in media.       Diabetes Self-Management Skills Assessment     Home Blood Glucose Monitoring  Personal CGM type:: Dexcom G7    During today's follow-up visit,  the following areas required further assessment and content was provided/reviewed.    Based on today's diabetes care assessment, the following areas of need were identified:          1/23/2024    12:01 AM   Social   Support No   Access to Mass Media/Tech No   Cognitive/Behavioral Health No   Culture/Judaism No   Communication No   Health Literacy No            1/23/2024    12:01 AM   Clinical   Medication Adherence No   Lab Compliance Yes   Nutritional Status No            2/13/2024    12:01 AM   Diabetes  "Self-Management Skills   Healthy Eating See care plan for update.     Monitoring See care plan for update.      Physical Activity/Exercise Yes-See above.          Today's interventions were provided through individual discussion, instruction, and written materials were provided.    Patient verbalized understanding of instruction and written materials.  Pt was able to return back demonstration of instructions today. Patient understood key points, needs reinforcement and further instruction.     Diabetes Self-Management Care Plan Review and Evaluation of Progress:    During today's follow-up Jed's Diabetes Self-Management Care Plan progress was reviewed and progress was evaluated including his/her input. Jed has agreed to continue his/her journey to improve/maintain overall diabetes control by continuing to set health goals. See care plan progress below.      Care Plan: Diabetes Management   Updates made since 2/18/2024 12:00 AM        Problem: Healthy Eating         Goal: Eat 3 meals daily with 45-60g of carbohydrates per meal and ~15g per snack.    Start Date: 1/23/2024   Expected End Date: 1/23/2025   This Visit's Progress: On track   Recent Progress: On track   Priority: High   Barriers: No Barriers Identified   Note:    Mr. Chávez states him and his wife make a good team when it comes to planning and preparing meals.   Educated on the recommended amount of carbs per meal, how to read a food label, and how to use measuring cups to portion foods.   Admits to decreased appetite with Mounjaro but will eat smaller portions because he knows he "has to" for diabetes.    2/13/24: Mr. Chávez continues to eat smaller portions. He has done better with pairing carbs with protein and/or healthy fat. He is not measuring food but plans to start. He is interested in pump therapy; will refer to [facility] for management.        Problem: Blood Glucose Self-Monitoring         Goal: Patient agrees to monitor blood glucose using personal " "Dexcom G7    Start Date: 3/5/2024   Expected End Date: 3/5/2025   This Visit's Progress: On track   Priority: Medium   Barriers: No Barriers Identified   Note:    Method: discussion, demonstration, and instruction  Level of Comprehension: demonstrates understanding/competency - verbalizes/demonstrates    DEXCOM G7 CGM TRAINING  Dexcom G7 mobile apps downloaded to phone. Education was provided using "Quick Start Guide" and demo device per Dexcom protocol. [facility] data share set-up.    Patient will use Dexcom G7 on his iPhone to receive continuous glucose data.        Overview:  5 minute glucose reading updates, trending arrows, BG graph screens, reports screen,  connectivity and functions.   Menus: Trend graph, start sensor, enter BG, events, alerts, settings, replace sensor, stop sensor, and shutdown  Dexcom G7 mobile kaveh/ Settings:                          * Urgent Low: 55 mg/dL                          * Urgent Low Soon: On                          * Low Alert: 70 mg/dL                          * High Alert: 250 mg/dL                          * Fall Rate: On                          * Signal Loss: On                          * Brief Sensor Issue: On     Reviewed additional setting options.  Patient paired sensor using 4-digit code listed on sensor cap..    Reviewed where to find sensor insertion time and expiration date.   Reviewed appropriate calibration techniques.  Reviewed sensor site selection. Patient selected and prepped site using aseptic technique, inserted sensor, applied overlay tape and started session.   Reviewed sensor removal from site. Discussed times to remove sensor per  guidelines include MRI or diatherapy.   Patient able to demonstrate without difficulty. Encouraged to review manual and/or training videos prior to starting another sensor.   Reviewed problem solving aspects of sensor transmission/variables that can disrupt RF transmission.  range 20 " feet, but the first 3 hrs keep within 3 feet of transmitter.  Patient instructed on lag time of interstitial fluid from CBG and was advised to treat hypoglycemia and dose insulin based on SMBG values.  Dexcom technical support contact number given and examples of when to contact them discussed.       Warm-up completed: 136 with upward arrow    3/19/24: Helped place new sensor; warm up completed-- >. Last sensor placed bled; helped with ordering new one through [facility].          Task: Reviewed advantages of having a personal CGM monitor. Completed 3/5/2024        Task: Discussed misconceptions of CGM monitors. Completed 3/5/2024        Task: Instructed on how often to change sensor. Completed 3/5/2024   Note:    Change sensor every 10 days.       Task: Discussed proper rotation of sensor sites. Completed 3/5/2024        Task: Discussed need for calibration if necessary. Completed 3/5/2024        Task: Directed to use directional arrows to make more informed decisions on managing glucose. Completed 3/5/2024        Task: Encouraged patient to bring their device to clinic visits. Completed 3/5/2024        Task: Discussed guidelines for preventing, detecting and treating hypoglycemia and hyperglycemia and reviewed the importance of meal and medication timing with diabetes mediations for prevention of hypoglycemia and maximum drug benefit. Completed 3/5/2024          Follow Up Plan     Follow up in about 4 weeks (around 4/16/2024) for review.    Today's care plan and follow up schedule was discussed with patient.  Jed verbalized understanding of the care plan, goals, and agrees to follow up plan.        The patient was encouraged to communicate with his/her health care provider/physician and care team regarding his/her condition(s) and treatment.  I provided the patient with my contact information today and encouraged to contact me via phone or [facility] Patient Portal as needed.     Length of Visit   Total Time: 45  Minutes

## 2024-03-24 DIAGNOSIS — Z94.4 STATUS POST LIVER TRANSPLANT: ICD-10-CM

## 2024-03-25 ENCOUNTER — PATIENT MESSAGE (OUTPATIENT)
Dept: TRANSPLANT | Facility: CLINIC | Age: 64
End: 2024-03-25
Payer: COMMERCIAL

## 2024-03-25 RX ORDER — INSULIN LISPRO 100 [IU]/ML
INJECTION, SOLUTION INTRAVENOUS; SUBCUTANEOUS
Qty: 15 ML | Refills: 0 | Status: SHIPPED | OUTPATIENT
Start: 2024-03-25 | End: 2024-04-26 | Stop reason: SDUPTHER

## 2024-04-08 DIAGNOSIS — T86.49 STENOSIS OF HEPATIC ARTERY OF TRANSPLANTED LIVER: ICD-10-CM

## 2024-04-08 DIAGNOSIS — I77.1 STENOSIS OF HEPATIC ARTERY OF TRANSPLANTED LIVER: ICD-10-CM

## 2024-04-09 ENCOUNTER — OFFICE VISIT (OUTPATIENT)
Dept: DIABETES | Facility: CLINIC | Age: 64
End: 2024-04-09
Payer: COMMERCIAL

## 2024-04-09 DIAGNOSIS — E11.69 TYPE 2 DIABETES MELLITUS WITH OTHER SPECIFIED COMPLICATION, WITHOUT LONG-TERM CURRENT USE OF INSULIN: ICD-10-CM

## 2024-04-09 DIAGNOSIS — Z85.05 HISTORY OF HEPATOCELLULAR CARCINOMA: Chronic | ICD-10-CM

## 2024-04-09 DIAGNOSIS — E11.59 HYPERTENSION COMPLICATING DIABETES: Chronic | ICD-10-CM

## 2024-04-09 DIAGNOSIS — Z94.4 STATUS POST LIVER TRANSPLANT: ICD-10-CM

## 2024-04-09 DIAGNOSIS — I15.2 HYPERTENSION COMPLICATING DIABETES: Chronic | ICD-10-CM

## 2024-04-09 DIAGNOSIS — I61.8 OTHER LEFT-SIDED NONTRAUMATIC INTRACEREBRAL HEMORRHAGE: Primary | ICD-10-CM

## 2024-04-09 DIAGNOSIS — Z79.4 TYPE 2 DIABETES MELLITUS WITH OTHER SPECIFIED COMPLICATION, WITH LONG-TERM CURRENT USE OF INSULIN: Chronic | ICD-10-CM

## 2024-04-09 DIAGNOSIS — E11.69 TYPE 2 DIABETES MELLITUS WITH OTHER SPECIFIED COMPLICATION, WITH LONG-TERM CURRENT USE OF INSULIN: Chronic | ICD-10-CM

## 2024-04-09 PROCEDURE — 99215 OFFICE O/P EST HI 40 MIN: CPT | Mod: 95,,, | Performed by: NURSE PRACTITIONER

## 2024-04-09 PROCEDURE — 95251 CONT GLUC MNTR ANALYSIS I&R: CPT | Mod: NDTC,S$GLB,, | Performed by: NURSE PRACTITIONER

## 2024-04-09 RX ORDER — TIRZEPATIDE 5 MG/.5ML
5 INJECTION, SOLUTION SUBCUTANEOUS
Qty: 4 PEN | Refills: 4 | Status: SHIPPED | OUTPATIENT
Start: 2024-04-09 | End: 2024-05-21 | Stop reason: DRUGHIGH

## 2024-04-09 RX ORDER — CLOPIDOGREL BISULFATE 75 MG/1
75 TABLET ORAL DAILY
Qty: 30 TABLET | Refills: 2 | Status: SHIPPED | OUTPATIENT
Start: 2024-04-09

## 2024-04-09 NOTE — PATIENT INSTRUCTIONS
PATIENT INSTRUCTIONS    Increase Lantus to 22 units daily.     Continue Humalog 8 units three times daily before meals, plus correction if needed.     If  - 250, ADD 2 units of Humalog  If  - 300, ADD 3 units of Humalog   If  - 350, ADD 4 units of Humalog  If  - 400, ADD 5 units of Humalog  If +, ADD 6 units of Humalog     OK to use correction scale every 4 hours as needed OUTSIDE OF MEALTIME    If  - 250, may take 2 units of Humalog  If  - 300, may take 3 units of Humalog   If  - 350, may take 4 units of Humalog  If  - 400, may take 5 units of Humalog  If +, may take 6 units of Humalog     Restart Mounjaro 5 mg subcutaneously every 7 days.     Continue Dexcom G7 CGM  Blood Sugar Goals:       Fastin-130.       1-2 hours after a meal: Less than 180.

## 2024-04-09 NOTE — PROGRESS NOTES
Telemedicine Visit    The patient location is home  The chief complaint leading to consultation is: Diabetes    Visit type: audiovisual    Face to Face time with patient: 30  60 minutes of total time spent on the encounter, which includes face to face time and non-face to face time preparing to see the patient (eg, review of tests), Obtaining and/or reviewing separately obtained history, Documenting clinical information in the electronic or other health record, Independently interpreting results (not separately reported) and communicating results to the patient/family/caregiver, or Care coordination (not separately reported).  Each patient to whom he or she provides medical services by telemedicine is:  (1) informed of the relationship between the physician and patient and the respective role of any other health care provider with respect to management of the patient; and (2) notified that he or she may decline to receive medical services by telemedicine and may withdraw from such care at any time.  Notes:         Jed Chávez is a 64 y.o. male who  has a past medical history of Alcoholic cirrhosis, CVA (cerebral vascular accident), DM (diabetes mellitus), Dyslipidemia, Hepatocellular carcinoma, History of hepatocellular carcinoma, in remission after liver transplant (4/5/2023), HTN (hypertension), Intracranial hemorrhage, S/P liver transplant (8/12/2023), and Skin cancer., who present for an initial evaluation of Type 2 diabetes mellitus.     CHIEF COMPLAINT: Diabetes Consultation    PCP: EVELIN Pitt MD     The patient was initially diagnosed with diabetes in 2016.   S/p liver transplant in August 2023.   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     Previous failed treatments include:  Metformin - d/c after transplant - hold until 1 year post transplant.   Jardiance - d/c after transplant - hold until 1 year post transplant.    Social Documentation:  Patient lives in [place].   Occupation: self  "employed, [employer] service.   Exercise: daily. Plays golf.     Current monitoring regimen:  G7 CGM        Current Diabetes related symptoms/problems include hyperglycemia, hypoglycemia , and polydipsia and have been unchanged.     Diabetes related complications:   cerebrovascular disease.   denies Pancreatitis  denies Gastroparesis  denies DKA  denies Hx/family Hx of MEN2/MTC  denies Frequent UTIs/yeast infections    Cardiovascular Risk Factors: advanced age (>55 for men, >65 for women) and male gender.    Any episodes of hypoglycemia?  Yes. Hypoglycemia treatment reviewed with patient and education to be provided in AVS.   Hypoglycemia Unawareness? No  Severe hypoglycemia requiring 3rd party? No    Last visit with Diabetes Education: 3/19/2024    Diabetes Medications               insulin (LANTUS SOLOSTAR U-100 INSULIN) glargine 100 units/mL SubQ pen Inject 28 Units into the skin once daily. - TAKING 20 UNITS DAILY.    insulin lispro (HUMALOG KWIKPEN INSULIN) 100 unit/mL pen Inject 8 Units into the skin 3 (three) times daily before meals AND 1-5 Units 3 (three) times daily before meals. sliding scale insulin as needed. Max daily dose 75 units daily.        tirzepatide (MOUNJARO) 5 mg/0.5 mL PnIj Inject 5 mg into the skin every 7 days. - HAD NOT TAKEN IN 1 MONTH     DIABETES DISEASE MANAGEMENT STATUS  Statin: Not taking  ACE/ARB: Taking  Screening or Prevention Patient's value Goal Complete/Controlled?   HgA1C Testing and Control   Lab Results   Component Value Date    HGBA1C 6.1 (H) 10/24/2023      Annually/Less than 8% Yes   Lipid profile : 10/24/2023 Annually Yes   LDL control Lab Results   Component Value Date    LDLCALC 91.8 10/24/2023    Annually/Less than 100 mg/dl  Yes   Nephropathy screening Lab Results   Component Value Date    LABMICR 36.0 10/24/2023     Lab Results   Component Value Date    PROTEINUA Negative 10/24/2023     No results found for: "UTPCR"   Annually Yes   Blood pressure BP Readings from " "Last 1 Encounters:   03/04/24 (!) 146/96    Less than 140/90 No   Dilated retinal exam : 03/13/2024 Annually Yes   Foot exam   : 10/19/2023 Annually Yes   Patient's medications, allergies, past medical, surgical, social and family histories were reviewed and updated as appropriate.     Review of Systems   Constitutional:  Negative for weight loss.   Eyes:  Negative for blurred vision and double vision.   Cardiovascular:  Negative for chest pain.   Gastrointestinal:  Negative for nausea and vomiting.   Genitourinary:  Negative for frequency.   Musculoskeletal:  Negative for falls.   Neurological:  Negative for dizziness and weakness.   Endo/Heme/Allergies:  Negative for polydipsia.   Psychiatric/Behavioral:  Negative for depression.    All other systems reviewed and are negative.            Physical Exam  Constitutional:       General: He is not in acute distress.     Appearance: Normal appearance.   Pulmonary:      Effort: No respiratory distress.   Neurological:      Mental Status: He is alert and oriented to person, place, and time.   Psychiatric:         Mood and Affect: Mood normal.         Behavior: Behavior normal.        There were no vitals taken for this visit.  Wt Readings from Last 3 Encounters:   03/04/24 98.2 kg (216 lb 7.9 oz)   02/13/24 98.3 kg (216 lb 11.4 oz)   01/23/24 99.7 kg (219 lb 12.8 oz)       LAB REVIEW  Lab Results   Component Value Date     03/04/2024    K 4.0 03/04/2024     03/04/2024    CO2 30 (H) 03/04/2024    BUN 14 03/04/2024    CREATININE 0.9 03/04/2024    CALCIUM 9.2 03/04/2024    ANIONGAP 8 03/04/2024    EGFRNORACEVR >60.0 03/04/2024     No results found for: "CPEPTIDE", "GLUTAMICACID", "INSLNABS"  Hemoglobin A1C   Date Value Ref Range Status   10/24/2023 6.1 (H) 4.0 - 5.6 % Final     Comment:     ADA Screening Guidelines:  5.7-6.4%  Consistent with prediabetes  >or=6.5%  Consistent with diabetes    High levels of fetal hemoglobin interfere with the HbA1C  assay. " Heterozygous hemoglobin variants (HbS, HgC, etc)do  not significantly interfere with this assay.   However, presence of multiple variants may affect accuracy.     09/13/2023 6.3 (H) 4.0 - 5.6 % Final     Comment:     ADA Screening Guidelines:  5.7-6.4%  Consistent with prediabetes  >or=6.5%  Consistent with diabetes    High levels of fetal hemoglobin interfere with the HbA1C  assay. Heterozygous hemoglobin variants (HbS, HgC, etc)do  not significantly interfere with this assay.   However, presence of multiple variants may affect accuracy.     04/20/2023 7.5 (H) 4.2 - 5.8 % Final   10/19/2022 7.1 (H) 4.2 - 5.8 % Final   01/27/2022 7.4 (H) 4.2 - 5.8 % Final       ASSESSMENT    ICD-10-CM ICD-9-CM   1. Other left-sided nontraumatic intracerebral hemorrhage  I61.8 431   2. Hypertension complicating diabetes  E11.59 250.80    I15.2 401.9   3. History of hepatocellular carcinoma, in remission after liver transplant  Z85.05 V10.07   4. Type 2 diabetes mellitus with other specified complication, with long-term current use of insulin  E11.69 250.80    Z79.4 V58.67   5. Status post liver transplant  Z94.4 V42.7   6. Type 2 diabetes mellitus with other specified complication, without long-term current use of insulin  E11.69 250.80        PLAN  Diagnoses and all orders for this visit:    Other left-sided nontraumatic intracerebral hemorrhage    Hypertension complicating diabetes    History of hepatocellular carcinoma, in remission after liver transplant    Type 2 diabetes mellitus with other specified complication, with long-term current use of insulin  -     Insulin Antibody; Future  -     Hemoglobin A1C; Future  -     Glutamic Acid Decarboxylase; Future  -     C-Peptide; Future    Status post liver transplant    Type 2 diabetes mellitus with other specified complication, without long-term current use of insulin  -     tirzepatide (MOUNJARO) 5 mg/0.5 mL PnIj; Inject 5 mg into the skin every 7 days.        Reviewed  pathophysiology of diabetes, complications related to the disease, importance of annual dilated eye exam and daily foot examination. Explained MOA, SE, dosage of medications. Written instructions given and reviewed with patient and patient verbalizes understanding.     PATIENT INSTRUCTIONS    Increase Lantus to 22 units daily.     Continue Humalog 8 units three times daily before meals, plus correction if needed.     If  - 250, ADD 2 units of Humalog  If  - 300, ADD 3 units of Humalog   If  - 350, ADD 4 units of Humalog  If  - 400, ADD 5 units of Humalog  If +, ADD 6 units of Humalog     OK to use correction scale every 4 hours as needed OUTSIDE OF MEALTIME    If  - 250, may take 2 units of Humalog  If  - 300, may take 3 units of Humalog   If  - 350, may take 4 units of Humalog  If  - 400, may take 5 units of Humalog  If +, may take 6 units of Humalog     Restart Mounjaro 5 mg subcutaneously every 7 days.     Continue Dexcom G7 CGM  Blood Sugar Goals:       Fastin-130.       1-2 hours after a meal: Less than 180.     Follow up in about 6 weeks (around 2024) for Dexcom, Schedule fasting labs.    Portions of this note were prepared with [employer] Speaking voice recognition transcription software. Grammatical errors, including garbled syntax, mangle pronouns, and other bizarre constructions may be attributed to that software system.

## 2024-04-11 ENCOUNTER — HOSPITAL ENCOUNTER (OUTPATIENT)
Dept: RADIOLOGY | Facility: HOSPITAL | Age: 64
Discharge: HOME | End: 2024-04-11
Attending: SURGERY
Payer: COMMERCIAL

## 2024-04-11 DIAGNOSIS — I77.1 STENOSIS OF HEPATIC ARTERY OF TRANSPLANTED LIVER: ICD-10-CM

## 2024-04-11 DIAGNOSIS — T86.49 STENOSIS OF HEPATIC ARTERY OF TRANSPLANTED LIVER: ICD-10-CM

## 2024-04-11 DIAGNOSIS — Z94.4 S/P LIVER TRANSPLANT: ICD-10-CM

## 2024-04-11 PROCEDURE — 76705 ECHO EXAM OF ABDOMEN: CPT | Mod: TC

## 2024-04-11 PROCEDURE — 93976 VASCULAR STUDY: CPT | Mod: TC

## 2024-04-11 PROCEDURE — 76705 ECHO EXAM OF ABDOMEN: CPT | Mod: 26,59,, | Performed by: RADIOLOGY

## 2024-04-11 PROCEDURE — 93976 VASCULAR STUDY: CPT | Mod: 26,,, | Performed by: RADIOLOGY

## 2024-04-15 ENCOUNTER — PATIENT MESSAGE (OUTPATIENT)
Dept: TRANSPLANT | Facility: CLINIC | Age: 64
End: 2024-04-15
Payer: COMMERCIAL

## 2024-04-15 ENCOUNTER — TELEPHONE (OUTPATIENT)
Dept: TRANSPLANT | Facility: CLINIC | Age: 64
End: 2024-04-15
Payer: COMMERCIAL

## 2024-04-15 DIAGNOSIS — Z85.05 PERSONAL HISTORY OF MALIGNANT NEOPLASM OF LIVER: ICD-10-CM

## 2024-04-15 DIAGNOSIS — Z94.4 S/P LIVER TRANSPLANT: Primary | ICD-10-CM

## 2024-04-15 NOTE — TELEPHONE ENCOUNTER
----- Message from Joleen Carr MD sent at 4/11/2024  3:20 PM CDT -----  Labs ok awaiting immunosuppression level

## 2024-04-15 NOTE — TELEPHONE ENCOUNTER
Pt informed of stable u/s. Repeat due in 1 month.  ----- Message from Nasreen Acosta MD sent at 4/11/2024  3:06 PM CDT -----  Results ok. No action needed.

## 2024-04-15 NOTE — TELEPHONE ENCOUNTER
Pt notified via portal of stable labs and that no medication changes are needed. Repeat labs due 5/6/24 per protocol.   ----- Message from Joleen Carr MD sent at 4/15/2024  1:08 PM CDT -----  Reviewed, nothing to do; repeat per routine

## 2024-04-16 ENCOUNTER — CLINICAL SUPPORT (OUTPATIENT)
Dept: DIABETES | Facility: CLINIC | Age: 64
End: 2024-04-16
Payer: COMMERCIAL

## 2024-04-16 ENCOUNTER — PATIENT MESSAGE (OUTPATIENT)
Dept: DIABETES | Facility: CLINIC | Age: 64
End: 2024-04-16

## 2024-04-16 VITALS — BODY MASS INDEX: 30.41 KG/M2 | WEIGHT: 218.06 LBS

## 2024-04-16 DIAGNOSIS — Z79.4 TYPE 2 DIABETES MELLITUS WITH HYPERGLYCEMIA, WITH LONG-TERM CURRENT USE OF INSULIN: Primary | ICD-10-CM

## 2024-04-16 DIAGNOSIS — E11.65 TYPE 2 DIABETES MELLITUS WITH HYPERGLYCEMIA, WITH LONG-TERM CURRENT USE OF INSULIN: Primary | ICD-10-CM

## 2024-04-16 PROCEDURE — 99999 PR PBB SHADOW E&M-EST. PATIENT-LVL III: CPT | Mod: PBBFAC,,,

## 2024-04-16 NOTE — PROGRESS NOTES
Diabetes Care Specialist Follow-up Note  Author: Cassia Huston RN  Date: 4/16/2024    Program Intake  Reason for Diabetes Program Visit:: Intervention  Type of Intervention:: Individual  Individual: Education  Education: Self-Management Skill Review  Current diabetes risk level:: high  In the last 12 months, have you:: none  Permission to speak with others about care:: yes  Continuous Glucose Monitoring  Patient has CGM: Yes  Personal CGM type:: Dexcom G7  GMI Date: 04/16/24  GMI Value: 8.6 %    Lab Results   Component Value Date    HGBA1C 6.3 (H) 04/11/2024     A1c Pre Diabetes Care Specialist Intervention:  6.3%    CURRENT DM MEDICATIONS:   Mounjaro 5 mg weekly   Lantus 22 units daily (Taking 28 units)  Humalog 8 units AC +SS (Can use same SS Q4H outside of eating)  If  - 250, ADD 2 units of Humalog  If  - 300, ADD 3 units of Humalog   If  - 350, ADD 4 units of Humalog  If  - 400, ADD 5 units of Humalog  If +, ADD 6 units of Humalog     Clinical    Weight: 98.9 kg (218 lb 0.6 oz)       Body mass index is 30.41 kg/m².    Nutritional Status  Diet: Regular  Meal Plan 24 Hour Recall: Breakfast, Lunch, Dinner  Meal Plan 24 Hour Recall - Breakfast: Cheerios with milk and bagel with cream cheese  Meal Plan 24 Hour Recall - Lunch: Can't remember  Meal Plan 24 Hour Recall - Dinner: Air fried chicken fried steak, fresh green beans, and mashed potatoes with white gravy; Water    Physical activity/Exercise:   Continues to go to [employer] in the AM.   Plays golf.  Works in the yard/garden.     SMBG: Dexcom G7; Report in media.         Diabetes Self-Management Skills Assessment     Home Blood Glucose Monitoring  Personal CGM type:: Dexcom G7    During today's follow-up visit,  the following areas required further assessment and content was provided/reviewed.    Based on today's diabetes care assessment, the following areas of need were identified:          1/23/2024    12:01 AM   Social    Support No   Access to [employer]/Tech No   Cognitive/Behavioral Health No   Culture/Taoism No   Communication No   Health Literacy No            1/23/2024    12:01 AM   Clinical   Medication Adherence No   Lab Compliance Yes   Nutritional Status No            2/13/2024    12:01 AM   Diabetes Self-Management Skills   Healthy Eating See care plan for update.      Monitoring See care plan for update.      Medications Mr. Chávez is taking 28 units of Lantus, even though he is prescribed 22 units. Despite the 28 units, his blood sugars are still elevated. He is also taking his Humalog as prescribed with the SS, but his blood sugar remain high after eating.   Will suggest to [facility] a 10% increase in both Lantus and Humalog.  Still think he is a good candidate for insulin pump therapy; will suggest to [facility] Guerin.           Today's interventions were provided through individual discussion, instruction, and written materials were provided.    Patient verbalized understanding of instruction and written materials.  Pt was able to return back demonstration of instructions today. Patient understood key points, needs reinforcement and further instruction.     Diabetes Self-Management Care Plan Review and Evaluation of Progress:    During today's follow-up Jed's Diabetes Self-Management Care Plan progress was reviewed and progress was evaluated including his/her input. Jed has agreed to continue his/her journey to improve/maintain overall diabetes control by continuing to set health goals. See care plan progress below.      Care Plan: Diabetes Management   Updates made since 3/17/2024 12:00 AM        Problem: Healthy Eating         Goal: Eat 3 meals daily with 45-60g of carbohydrates per meal and ~15g per snack.    Start Date: 1/23/2024   Expected End Date: 1/23/2025   This Visit's Progress: On track   Recent Progress: On track   Priority: High   Barriers: No Barriers Identified   Note:    Mr. Chávez states him and  "his wife make a good team when it comes to planning and preparing meals.   Educated on the recommended amount of carbs per meal, how to read a food label, and how to use measuring cups to portion foods.   Admits to decreased appetite with Mounjaro but will eat smaller portions because he knows he "has to" for diabetes.    2/13/24: Mr. Chávez continues to eat smaller portions. He has done better with pairing carbs with protein and/or healthy fat. He is not measuring food but plans to start. He is interested in pump therapy; will refer to [facility] for management.     4/16/24: Continues to do well with healthy eatings. He is eating smaller portions and eating balanced meals.       Problem: Blood Glucose Self-Monitoring         Goal: Patient agrees to monitor blood glucose using personal Dexcom G7    Start Date: 3/5/2024   Expected End Date: 3/5/2025   This Visit's Progress: On track   Recent Progress: On track   Priority: Medium   Barriers: No Barriers Identified   Note:    Method: discussion, demonstration, and instruction  Level of Comprehension: demonstrates understanding/competency - verbalizes/demonstrates    DEXCOM G7 CGM TRAINING  Dexcom SalesPortal[employer] mobile apps downloaded to phone. Education was provided using "Quick Start Guide" and demo device per Dexcom protocol. [facility] data share set-up.    Patient will use Dexcom G7 on his iPhone to receive continuous glucose data.        Overview:  5 minute glucose reading updates, trending arrows, BG graph screens, reports screen,  connectivity and functions.   Menus: Trend graph, start sensor, enter BG, events, alerts, settings, replace sensor, stop sensor, and shutdown  Dexcom G7 mobile [facility]/ Settings:                          * Urgent Low: 55 mg/dL                          * Urgent Low Soon: On                          * Low Alert: 70 mg/dL                          * High Alert: 250 mg/dL                          * Fall Rate: On                          * " Signal Loss: On                          * Brief Sensor Issue: On     Reviewed additional setting options.  Patient paired sensor using 4-digit code listed on sensor cap..    Reviewed where to find sensor insertion time and expiration date.   Reviewed appropriate calibration techniques.  Reviewed sensor site selection. Patient selected and prepped site using aseptic technique, inserted sensor, applied overlay tape and started session.   Reviewed sensor removal from site. Discussed times to remove sensor per  guidelines include MRI or diatherapy.   Patient able to demonstrate without difficulty. Encouraged to review manual and/or training videos prior to starting another sensor.   Reviewed problem solving aspects of sensor transmission/variables that can disrupt RF transmission.  range 20 feet, but the first 3 hrs keep within 3 feet of transmitter.  Patient instructed on lag time of interstitial fluid from CBG and was advised to treat hypoglycemia and dose insulin based on SMBG values.  Dexcom technical support contact number given and examples of when to contact them discussed.       Warm-up completed: 136 with upward arrow    3/19/24: Helped place new sensor; warm up completed-- >. Last sensor placed bled; helped with ordering new one through [facility].     4/16/24: Helped with ordering replacement sensor. Blood sugars remain above goal; will reach out to [facility] about increasing long-acting insulin and short-acting insulin.   Educated on how to use History tab to document Events. He was able to log his AM insulin doses from today.            Follow Up Plan     Follow up if pre-pump assessment is needed.    Today's care plan and follow up schedule was discussed with patient.  Jed verbalized understanding of the care plan, goals, and agrees to follow up plan.        The patient was encouraged to communicate with his/her health care provider/physician and care team regarding his/her  condition(s) and treatment.  I provided the patient with my contact information today and encouraged to contact me via phone or [facility] Patient Portal as needed.     Length of Visit   Total Time: 30 Minutes

## 2024-04-23 ENCOUNTER — PATIENT MESSAGE (OUTPATIENT)
Dept: ADMINISTRATIVE | Facility: HOSPITAL | Age: 64
End: 2024-04-23
Payer: COMMERCIAL

## 2024-04-24 ENCOUNTER — PATIENT OUTREACH (OUTPATIENT)
Dept: ADMINISTRATIVE | Facility: HOSPITAL | Age: 64
End: 2024-04-24
Payer: COMMERCIAL

## 2024-04-24 ENCOUNTER — DOCUMENTATION ONLY (OUTPATIENT)
Dept: OPHTHALMOLOGY | Facility: CLINIC | Age: 64
End: 2024-04-24
Payer: COMMERCIAL

## 2024-04-24 NOTE — LETTER
AUTHORIZATION FOR RELEASE OF   CONFIDENTIAL INFORMATION    [id number]    We are seeing Jed Chávez, date of birth 1960, in the clinic at [facility]. EVELIN Pitt MD is the patient's PCP. Jed Chávez has an outstanding lab/procedure at the time we reviewed his chart. In order to help keep his health information updated, he has authorized us to request the following medical record(s):       ( X )   COLONOSCOPY with PATHOLOGY      ( X )   COLON RECALL DATE                                                  Please fax records to [facility], [place], L Lee, MD, [phone]    [facility]  Care Coordination Department  [facility]  [phone]     Patient Name: Jed Chávez  : 1960  Patient Phone #: [phone]

## 2024-04-24 NOTE — PROGRESS NOTES
Short Stay Record    CC: Blurry Vision     Impression: Visually significant cataract with reasonable expectation for visual improvement Right Eye      Base Eye Exam    Visual Acuity (Snellen - Linear)     Right Left   Dist cc 20/80 20/30   Correction: Glasses     Tonometry (ICARE, 8:35 AM)     Right Left   Pressure 13 11    Pupils     Pupils Dark Light Shape React APD   Right PERRL 3.5 3 Round Minimal None   Left PERRL 3.5 3 Round Minimal None     Visual Fields (Counting fingers)     Right Left    Full Full     Extraocular Movement     Right Left    Full Full     Neuro/Psych    Oriented x3: Yes   Mood/Affect: Normal     Dilation    Both eyes: 1% Mydriacyl, 2.5% Phenylephrine @ 8:35 AM      Edited by: Rebecca Rose, Patient Care Assistant        Additional Tests      Glare Testing (Room Lighting)     Medium   Right 20/LP   Left 20/80   Edited by: Rebecca Rose, Patient Care Assistant        Slit Lamp and Fundus Exam      External Exam     Right Left   External Dermatochalasis Dermatochalasis     Slit Lamp Exam     Right Left   Lids/Lashes 1+ Blepharitis 1+ Blepharitis   Conjunctiva/Sclera Nasal Temporal Pinguecula Nasal Temporal Pinguecula   Cornea Clear Clear   Anterior Chamber Deep and quiet Deep and quiet   Iris Round and reactive Round and reactive   Lens 1+ Nuclear sclerosis, 2+ Posterior subcapsular cataract centrally 1+ Nuclear sclerosis, Trace Cortical spokes, 1+ Posterior subcapsular cataract   Vitreous Normal Normal     Fundus Exam     Right Left   Disc Uniform rim Sloped temp   C/D Ratio 0.4 0.5   Macula No Diabetic Retinopathy No Diabetic Retinopathy   Vessels Normal Normal   Periphery Normal, Flat & intact 360°, no holes, tears, RD at 1 o'clock, no diabetic retinopathy Normal, Flat & intact 360°, no holes, tears, RD, No Diabetic Retinopathy   Edited by: Cruz Orourke MD        Refraction      Wearing Rx     Sphere Cylinder Axis Add   Right +1.50 +0.50 087 +2.25   Left +2.00 Sphere  +2.25   Age:  1yr   Type: PAL     Manifest Refraction     Sphere Cylinder [place] Dist VA   Right +1.00 +2.25 010 20/50   Left +2.00 Sphere  20/25-2   Edited by: Rebecca Rose, Patient Care Assistant; Cruz Orourke MD       Planned Procedure:   Topography Reviewed: Yes  History of Refractive Surgery: No      Phaco right eye, +21.5 SY60WF  shugarcaine  Topical  Monofocal - Distance  Prescriptions sent for preoperative medications  Prednisolone Acetate- 4xs daily   Anticoagulant status:   Yes - ASA + Plavix  Will need to d/c prior to surgery, will send message to Dr Pitt  ASA- 14 days  Plavix- 5 days          Lens Selection OD verified _____           Previous IOL OS N/A    Patient cleared for ophthalmic surgery.     Discharge Summary:    Admitting Diagnosis: Nuclear Sclerosis OD    Discharge Diagnosis: Pseudophakia OD    Procedure: Phacoemulsification of cataract with intraocular lens implant OD    Complications: None  Discharge Condition: Stable    Discharge instructions: Follow post-operative discharge instruction sheet given by provider.    Follow-up: Return to clinic next day or as scheduled.       HPI:  Jed Chávez is a 64 y.o. male who presents for evaluation prior to ophthalmic surgery, left eye. Patient reports of blurred vision at distance and near with and without correction. Patient reports of glare at night reducing function and safety, and complains of needed additional light to read.     Past Medical History:   Diagnosis Date    Alcoholic cirrhosis     CVA (cerebral vascular accident)     DM (diabetes mellitus)     Dyslipidemia     Hepatocellular carcinoma     History of hepatocellular carcinoma, in remission after liver transplant 4/5/2023    Synoptic Report Procedure: Total hepatectomy. Histologic type: Hepatocellular carcinoma. Histologic grade: G1, well-differentiated. Tumor focality: Multifocal. Tumor characteristics: Tumor #1 (blocks 2B-2E): Tumor site: Right lobe. Tumor size: 6.2 cm in greatest  dimension grossly. Treatment effect: Complete necrosis (no viable tumor). Tumor #2 (blocks 2F-2H): Tumor site: Right lobe. Tumor size: 4.    HTN (hypertension)     Intracranial hemorrhage     S/P liver transplant 8/12/2023    Skin cancer      Past Surgical History:   Procedure Laterality Date    CRANIOTOMY, WITH NEOPLASM EXCISION USING COMPUTER-ASSISTED NAVIGATION Left 10/26/2023    Procedure: CRANIOTOMY, WITH NEOPLASM EXCISION USING COMPUTER-ASSISTED NAVIGATION;  Surgeon: Jose E Degroot DO;  Location: [facility] OR 2ND FLR;  Service: Neurosurgery;  Laterality: Left;    HERNIA REPAIR N/A     LIVER TRANSPLANT N/A 8/9/2023    Procedure: TRANSPLANT, LIVER;  Surgeon: Ameya Suero MD;  Location: [facility] OR 2ND FLR;  Service: Transplant;  Laterality: N/A;     Review of patient's allergies indicates:  No Known Allergies    Current Outpatient Medications:     aspirin 81 MG Chew, Take 1 tablet (81 mg total) by mouth once daily. Restart 11/9/23, Disp: 30 tablet, Rfl: 11    blood sugar diagnostic Strp, Test blood glucose 3 (three) times daily., Disp: 100 strip, Rfl: 11    blood-glucose sensor (DEXCOM G7 SENSOR) Neelam, 1 each by Misc.(Non-Drug; Combo Route) route every 10 days., Disp: 9 each, Rfl: 11    carvediloL (COREG) 6.25 MG tablet, Take 1 tablet (6.25 mg total) by mouth 2 (two) times daily with meals. HOLD if SBP < 125 or pulse < 60., Disp: 180 tablet, Rfl: 3    clopidogreL (PLAVIX) 75 mg tablet, Take 1 tablet (75 mg total) by mouth once daily. Restart 11/5/23, Disp: 30 tablet, Rfl: 2    insulin (LANTUS SOLOSTAR U-100 INSULIN) glargine 100 units/mL SubQ pen, Inject 28 Units into the skin once daily., Disp: 15 mL, Rfl: 3    insulin lispro (HUMALOG KWIKPEN INSULIN) 100 unit/mL pen, Inject 8 Units into the skin 3 (three) times daily before meals AND 1-5 Units 3 (three) times daily before meals. sliding scale insulin as needed. Max daily dose 75 units daily..., Disp: 15 mL, Rfl: 0    lancets Misc, Test blood glucose 3 (three) times  "daily., Disp: 100 each, Rfl: 11    levETIRAcetam (KEPPRA) 500 MG Tab, Take 1 tablet (500 mg total) by mouth every 12 (twelve) hours. STOP 11/9/23, Disp: 21 tablet, Rfl: 0    losartan (COZAAR) 100 MG tablet, Take 1 tablet (100 mg total) by mouth once daily. HOLD if SBP < 120, Disp: 90 tablet, Rfl: 3    mycophenolate (CELLCEPT) 250 mg Cap, Take 2 capsules (500 mg total) by mouth 2 (two) times daily., Disp: 120 capsule, Rfl: 11    OPW TEST CLAIM - DO NOT FILL, Inject into the muscle. OPW test claim. Do not fill., Disp: 1 each, Rfl: 0    pen needle, diabetic 32 gauge x 5/32" Ndle, Use to inject insulin into the skin 4 (four) times daily., Disp: 100 each, Rfl: 11    prednisoLONE acetate (PRED FORTE) 1 % DrpS, Place 1 drop into the right eye 4 (four) times daily., Disp: 5 mL, Rfl: 1    tacrolimus (PROGRAF) 1 MG Cap, Take 2 capsules (2 mg total) by mouth every 12 (twelve) hours., Disp: 120 capsule, Rfl: 11    tirzepatide (MOUNJARO) 5 mg/0.5 mL PnIj, Inject 5 mg into the skin every 7 days., Disp: 4 Pen, Rfl: 4    valGANciclovir (VALCYTE) 450 mg Tab, Take 1 tablet (450 mg total) by mouth once daily. Stop 2/6/24, Disp: 30 tablet, Rfl: 5    Review of Systems:  A comprehensive review of systems was negative.    Physical Exam:  General Appearance:    A&Ox3, no distress, appears stated age   Head:    Normocephalic, without obvious abnormality, atraumatic   Eyes:    PERRL, EOM's intact   Back:     Symmetric, no curvature   Lungs:     Respirations unlabored   Chest Wall:    No tenderness or deformity    Heart:  Abdomen:  Extremities:  Skin:    S1 and S2 present    Soft, non-tender    Extremities normal, atraumatic    Skin color, texture, turgor normal         "

## 2024-04-24 NOTE — LETTER
AUTHORIZATION FOR RELEASE OF   CONFIDENTIAL INFORMATION    [id number]    We are seeing Jed Chávez, date of birth 1960, in the clinic at [facility]. EVELIN Pitt MD is the patient's PCP. Jed Chávez has an outstanding lab/procedure at the time we reviewed his chart. In order to help keep his health information updated, he has authorized us to request the following medical record(s):       ( X )   COLONOSCOPY with PATHOLOGY      ( X )   COLON RECALL DATE                                                  Please fax records to [facility], [place], L Lee, MD, [phone]    [facility]  Care Coordination Department  [facility]  [phone]     Patient Name: Jed Chávez  : 1960  Patient Phone #: [phone]

## 2024-04-24 NOTE — PROGRESS NOTES
Replying to Campaign Questionnaire for Overdue HM:  Colon Screening    Per patient, colonoscopy completed with Dr. Germain in 2021. TAMMY faxed to [facility] for record  Reminder Set

## 2024-04-26 DIAGNOSIS — Z94.4 STATUS POST LIVER TRANSPLANT: ICD-10-CM

## 2024-04-29 ENCOUNTER — TELEPHONE (OUTPATIENT)
Dept: TRANSPLANT | Facility: CLINIC | Age: 64
End: 2024-04-29
Payer: COMMERCIAL

## 2024-04-29 ENCOUNTER — PATIENT MESSAGE (OUTPATIENT)
Dept: TRANSPLANT | Facility: CLINIC | Age: 64
End: 2024-04-29
Payer: COMMERCIAL

## 2024-04-29 RX ORDER — INSULIN LISPRO 100 [IU]/ML
INJECTION, SOLUTION INTRAVENOUS; SUBCUTANEOUS
Qty: 15 ML | Refills: 0 | Status: SHIPPED | OUTPATIENT
Start: 2024-04-29 | End: 2025-04-29

## 2024-04-29 NOTE — TELEPHONE ENCOUNTER
Message sent to pt and spouse that they need to contact billing directly as transplant would not handle radiation billing.  ----- Message from Lakshmi Carr MA sent at 4/29/2024  4:09 PM CDT -----  Regarding: FW: Billing Issue    ----- Message -----  From: Mackenzie Estrada  Sent: 4/29/2024   3:42 PM CDT  To: [facility]-Liver Transplant Non-Clinical  Subject: Billing Issue                                        Name Of Caller:   Leida (Pt's Wife)      Contact Preference:   [phone]      Nature of call:   Pt's Wife was informed by the [facility] Billing dept to contact transplant about her husbands bill for radiation treatments. She has some questions. If she's unreachable please leave a voicemail.

## 2024-04-30 ENCOUNTER — CLINICAL SUPPORT (OUTPATIENT)
Dept: DIABETES | Facility: CLINIC | Age: 64
End: 2024-04-30
Payer: COMMERCIAL

## 2024-04-30 DIAGNOSIS — E11.65 TYPE 2 DIABETES MELLITUS WITH HYPERGLYCEMIA, WITH LONG-TERM CURRENT USE OF INSULIN: Primary | ICD-10-CM

## 2024-04-30 DIAGNOSIS — Z79.4 TYPE 2 DIABETES MELLITUS WITH HYPERGLYCEMIA, WITH LONG-TERM CURRENT USE OF INSULIN: Primary | ICD-10-CM

## 2024-04-30 PROCEDURE — G0108 DIAB MANAGE TRN  PER INDIV: HCPCS | Mod: S$GLB,,, | Performed by: FAMILY MEDICINE

## 2024-04-30 PROCEDURE — 99999 PR PBB SHADOW E&M-EST. PATIENT-LVL I: CPT | Mod: PBBFAC,,,

## 2024-04-30 NOTE — PROGRESS NOTES
Diabetes Care Specialist Follow-up Note  Author: Cassia Huston RN  Date: 4/30/2024    Program Intake  Reason for Diabetes Program Visit:: Intervention  Type of Intervention:: Individual  Individual: Education  Education: Insulin Pump Evaluation  Current diabetes risk level:: high  In the last 12 months, have you:: none  Permission to speak with others about care:: yes  Continuous Glucose Monitoring  Patient has CGM: Yes  Personal CGM type:: Dexcom G7  GMI Date: 04/30/24  GMI Value: 8.3 %    Lab Results   Component Value Date    HGBA1C 6.3 (H) 04/11/2024     A1c Pre Diabetes Care Specialist Intervention:  6.3%    CURRENT DM MEDICATIONS:   Mounjaro 5 mg weekly   Lantus 30 units daily   Humalog 9 units AC +SS (Can use same SS Q4H outside of eating)  If  - 250, ADD 2 units of Humalog  If  - 300, ADD 3 units of Humalog   If  - 350, ADD 4 units of Humalog  If  - 400, ADD 5 units of Humalog  If +, ADD 6 units of Humalog     SMBG: Dexcom G7; Clarity report in media.       Diabetes Self-Management Skills Assessment     Home Blood Glucose Monitoring  Personal CGM type:: Dexcom G7    During today's follow-up visit,  the following areas required further assessment and content was provided/reviewed.    Based on today's diabetes care assessment, the following areas of need were identified:          1/23/2024    12:01 AM   Social   Support No   Access to Mass Media/Tech No   Cognitive/Behavioral Health No   Culture/Uatsdin No   Communication No   Health Literacy No            1/23/2024    12:01 AM   Clinical   Medication Adherence No   Lab Compliance Yes   Nutritional Status No            2/13/2024    12:01 AM   Diabetes Self-Management Skills   Healthy Eating See care plan for update.     Monitoring See care plan for update.          Today's interventions were provided through individual discussion, instruction, and written materials were provided.    Patient verbalized understanding of  "instruction and written materials.  Pt was able to return back demonstration of instructions today. Patient understood key points, needs reinforcement and further instruction.     Diabetes Self-Management Care Plan Review and Evaluation of Progress:    During today's follow-up Jed's Diabetes Self-Management Care Plan progress was reviewed and progress was evaluated including his/her input. Jed has agreed to continue his/her journey to improve/maintain overall diabetes control by continuing to set health goals. See care plan progress below.      Care Plan: Diabetes Management   Updates made since 3/31/2024 12:00 AM        Problem: Healthy Eating         Goal: Eat 3 meals daily with 45-60g of carbohydrates per meal and ~15g per snack.    Start Date: 1/23/2024   Expected End Date: 1/23/2025   This Visit's Progress: On track   Recent Progress: On track   Priority: High   Barriers: No Barriers Identified   Note:    Mr. Chávez states him and his wife make a good team when it comes to planning and preparing meals.   Educated on the recommended amount of carbs per meal, how to read a food label, and how to use measuring cups to portion foods.   Admits to decreased appetite with Mounjaro but will eat smaller portions because he knows he "has to" for diabetes.    2/13/24: Mr. Chávez continues to eat smaller portions. He has done better with pairing carbs with protein and/or healthy fat. He is not measuring food but plans to start. He is interested in pump therapy; will refer to [facility] for management.     4/16/24: Continues to do well with healthy eatings. He is eating smaller portions and eating balanced meals.    4/30/24: Discussed pre-pump topics:   Pump options w/ compatible CGM: Medtronic using Guardian, OmniPod and Tandem using Dexcom G6  Common terms: basal/bolus insulin, IC, ISF, TBS, IOB  Pros/cons pump therapy, supplies needed, frequency of changing infusion sets and cartridges/pods, and backup pump plan   Allowed pt " "to see demo pumps and example infusion set    Provided carb counting training using food lists, food label from Diabetes Management Guide. Also, encouraged pt to download and use [employer] mobile kaveh for additional support. Pt able demonstrate understanding using examples in clinic.     Educated Mr. Chávez to keep 1 week of food logs with carbs and bring back to clinic on Monday, May 6th. Will review  and get back to him afterwards.           Problem: Blood Glucose Self-Monitoring         Goal: Patient agrees to monitor blood glucose using personal Dexcom G7    Start Date: 3/5/2024   Expected End Date: 3/5/2025   This Visit's Progress: On track   Recent Progress: On track   Priority: Medium   Barriers: No Barriers Identified   Note:    Method: discussion, demonstration, and instruction  Level of Comprehension: demonstrates understanding/competency - verbalizes/demonstrates    DEXCOM G7 CGM TRAINING  Dexcom G7 mobile apps downloaded to phone. Education was provided using "Quick Start Guide" and demo device per Dexcom protocol. [facility] clinic data share set-up.    Patient will use Dexcom G7 on his iPhone to receive continuous glucose data.        Overview:  5 minute glucose reading updates, trending arrows, BG graph screens, reports screen,  connectivity and functions.   Menus: Trend graph, start sensor, enter BG, events, alerts, settings, replace sensor, stop sensor, and shutdown  Dexcom G7 mobile kaveh/ Settings:                          * Urgent Low: 55 mg/dL                          * Urgent Low Soon: On                          * Low Alert: 70 mg/dL                          * High Alert: 250 mg/dL                          * Fall Rate: On                          * Signal Loss: On                          * Brief Sensor Issue: On     Reviewed additional setting options.  Patient paired sensor using 4-digit code listed on sensor cap..    Reviewed where to find sensor insertion time and expiration " date.   Reviewed appropriate calibration techniques.  Reviewed sensor site selection. Patient selected and prepped site using aseptic technique, inserted sensor, applied overlay tape and started session.   Reviewed sensor removal from site. Discussed times to remove sensor per  guidelines include MRI or diatherapy.   Patient able to demonstrate without difficulty. Encouraged to review manual and/or training videos prior to starting another sensor.   Reviewed problem solving aspects of sensor transmission/variables that can disrupt RF transmission.  range 20 feet, but the first 3 hrs keep within 3 feet of transmitter.  Patient instructed on lag time of interstitial fluid from CBG and was advised to treat hypoglycemia and dose insulin based on SMBG values.  Dexcom technical support contact number given and examples of when to contact them discussed.       Warm-up completed: 136 with upward arrow    3/19/24: Helped place new sensor; warm up completed-- >. Last sensor placed bled; helped with ordering new one through kaveh.     4/16/24: Helped with ordering replacement sensor. Blood sugars remain above goal; will reach out to [facility] about increasing long-acting insulin and short-acting insulin.   Educated on how to use History tab to document Events. He was able to log his AM insulin doses from today.     4/30/24: Mr. Chávez is interested in the Omnipod 5. Educated he will have to use the Dexcom G6. Showed him how G6 works.            Follow Up Plan     Follow up in about 1 week (around 5/7/2024) for drop off of food logs.    Today's care plan and follow up schedule was discussed with patient.  Jed verbalized understanding of the care plan, goals, and agrees to follow up plan.        The patient was encouraged to communicate with his/her health care provider/physician and care team regarding his/her condition(s) and treatment.  I provided the patient with my contact information today and  encouraged to contact me via phone or [facility] Patient Portal as needed.     Length of Visit   Total Time: 60 Minutes

## 2024-05-06 ENCOUNTER — HOSPITAL ENCOUNTER (OUTPATIENT)
Dept: RADIOLOGY | Facility: HOSPITAL | Age: 64
Discharge: HOME | End: 2024-05-06
Attending: SURGERY
Payer: COMMERCIAL

## 2024-05-06 DIAGNOSIS — T86.49 STENOSIS OF HEPATIC ARTERY OF TRANSPLANTED LIVER: ICD-10-CM

## 2024-05-06 DIAGNOSIS — I77.1 STENOSIS OF HEPATIC ARTERY OF TRANSPLANTED LIVER: ICD-10-CM

## 2024-05-06 DIAGNOSIS — Z94.4 S/P LIVER TRANSPLANT: ICD-10-CM

## 2024-05-06 PROCEDURE — 93976 VASCULAR STUDY: CPT | Mod: TC

## 2024-05-06 PROCEDURE — 93976 VASCULAR STUDY: CPT | Mod: 26,,, | Performed by: RADIOLOGY

## 2024-05-06 PROCEDURE — 76705 ECHO EXAM OF ABDOMEN: CPT | Mod: 26,59,, | Performed by: RADIOLOGY

## 2024-05-08 ENCOUNTER — TELEPHONE (OUTPATIENT)
Dept: TRANSPLANT | Facility: CLINIC | Age: 64
End: 2024-05-08
Payer: COMMERCIAL

## 2024-05-08 ENCOUNTER — PATIENT MESSAGE (OUTPATIENT)
Dept: TRANSPLANT | Facility: CLINIC | Age: 64
End: 2024-05-08
Payer: COMMERCIAL

## 2024-05-08 NOTE — PROGRESS NOTES
3rd Req: Called [facility] to f/u on request. Was advised facility had issue with fax. Staff requested thet TAMMY be axed to alt #. Reminder set    TAMMY faxed to [facility] alt # for Colonoscopy  F:[phone]

## 2024-05-08 NOTE — TELEPHONE ENCOUNTER
Pt notified via portal of stable labs and that no medication changes are needed. Repeat labs due 6/3/24 per protocol.   ----- Message from Joleen Carr MD sent at 5/7/2024  3:17 PM CDT -----  Reviewed, nothing to do; repeat per routine

## 2024-05-10 ENCOUNTER — PATIENT MESSAGE (OUTPATIENT)
Dept: DIABETES | Facility: CLINIC | Age: 64
End: 2024-05-10
Payer: COMMERCIAL

## 2024-05-16 ENCOUNTER — OUTSIDE PLACE OF SERVICE (OUTPATIENT)
Dept: OPHTHALMOLOGY | Facility: CLINIC | Age: 64
End: 2024-05-16
Payer: COMMERCIAL

## 2024-05-17 ENCOUNTER — OFFICE VISIT (OUTPATIENT)
Dept: OPHTHALMOLOGY | Facility: CLINIC | Age: 64
End: 2024-05-17
Payer: COMMERCIAL

## 2024-05-17 DIAGNOSIS — Z98.41 CATARACT EXTRACTION STATUS, RIGHT: ICD-10-CM

## 2024-05-17 DIAGNOSIS — Z98.890 POST-OPERATIVE STATE: Primary | ICD-10-CM

## 2024-05-17 PROCEDURE — 3044F HG A1C LEVEL LT 7.0%: CPT | Mod: CPTII,S$GLB,, | Performed by: OPHTHALMOLOGY

## 2024-05-17 PROCEDURE — 99024 POSTOP FOLLOW-UP VISIT: CPT | Mod: S$GLB,,, | Performed by: OPHTHALMOLOGY

## 2024-05-17 PROCEDURE — 4010F ACE/ARB THERAPY RXD/TAKEN: CPT | Mod: CPTII,S$GLB,, | Performed by: OPHTHALMOLOGY

## 2024-05-17 PROCEDURE — 99999 PR PBB SHADOW E&M-EST. PATIENT-LVL III: CPT | Mod: PBBFAC,,, | Performed by: OPHTHALMOLOGY

## 2024-05-17 PROCEDURE — 1159F MED LIST DOCD IN RCRD: CPT | Mod: CPTII,S$GLB,, | Performed by: OPHTHALMOLOGY

## 2024-05-17 PROCEDURE — 1160F RVW MEDS BY RX/DR IN RCRD: CPT | Mod: CPTII,S$GLB,, | Performed by: OPHTHALMOLOGY

## 2024-05-17 NOTE — PROGRESS NOTES
HPI     Post-op Evaluation            Comments: 1 Day S/P PC IOL OD: Pt states vision is improved since   surgery. No pain or irritation. Using drops per schedule.          Comments    DNL Patient    Diabetic since 2015  Liver transplant 08/23  Craniotomy 10/23  PCIOL OD 5/16/24 +21.5 SY60WF/CDE: 10.52  COAG suspect    Pred QID OD             Last edited by Rosa Baca on 5/17/2024  7:48 AM.            Assessment /Plan     For exam results, see Encounter Report.      ICD-10-CM ICD-9-CM    1. Post-operative state  Z98.890 V45.89       2. Cataract extraction status, right  Z98.41 V45.61           PO Day 1 S/P Phaco/IOL right eye  Doing well.    Use Prednisolone Acetate- 4xs daily      Reinstructed in importance of absolute compliance with Post-OP instructions including medications, shield at bedtime, and limitation of activities. Follow up appointments in approximately one and six weeks or call immediately for increased pain, redness or vision loss.

## 2024-05-21 ENCOUNTER — OFFICE VISIT (OUTPATIENT)
Dept: DIABETES | Facility: CLINIC | Age: 64
End: 2024-05-21
Payer: COMMERCIAL

## 2024-05-21 DIAGNOSIS — E11.69 TYPE 2 DIABETES MELLITUS WITH OTHER SPECIFIED COMPLICATION, WITH LONG-TERM CURRENT USE OF INSULIN: Primary | Chronic | ICD-10-CM

## 2024-05-21 DIAGNOSIS — Z79.4 TYPE 2 DIABETES MELLITUS WITH OTHER SPECIFIED COMPLICATION, WITH LONG-TERM CURRENT USE OF INSULIN: Primary | Chronic | ICD-10-CM

## 2024-05-21 PROCEDURE — 4010F ACE/ARB THERAPY RXD/TAKEN: CPT | Mod: CPTII,95,, | Performed by: NURSE PRACTITIONER

## 2024-05-21 PROCEDURE — 3044F HG A1C LEVEL LT 7.0%: CPT | Mod: CPTII,95,, | Performed by: NURSE PRACTITIONER

## 2024-05-21 PROCEDURE — 95251 CONT GLUC MNTR ANALYSIS I&R: CPT | Mod: NDTC,,, | Performed by: NURSE PRACTITIONER

## 2024-05-21 PROCEDURE — 99214 OFFICE O/P EST MOD 30 MIN: CPT | Mod: 95,,, | Performed by: NURSE PRACTITIONER

## 2024-05-21 NOTE — PATIENT INSTRUCTIONS
PATIENT INSTRUCTIONS     Continue Lantus 30 units daily.      Continue Humalog 9 units three times daily before meals, plus correction if needed.      If  - 250, ADD 2 units of Humalog  If  - 300, ADD 3 units of Humalog   If  - 350, ADD 4 units of Humalog  If  - 400, ADD 5 units of Humalog  If +, ADD 6 units of Humalog      OK to use correction scale every 4 hours as needed OUTSIDE OF MEALTIME     If  - 250, may take 2 units of Humalog  If  - 300, may take 3 units of Humalog   If  - 350, may take 4 units of Humalog  If  - 400, may take 5 units of Humalog  If +, may take 6 units of Humalog      Increase Mounjaro 7.5 mg subcutaneously every 7 days.      Continue Dexcom G7 CGM  Blood Sugar Goals:       Fastin-130.       1-2 hours after a meal: Less than 180.

## 2024-05-21 NOTE — PROGRESS NOTES
The patient location is: Home  The chief complaint leading to consultation is: Diabetes    Visit type: audiovisual    Face to Face time with patient: 16  30 minutes of total time spent on the encounter, which includes face to face time and non-face to face time preparing to see the patient (eg, review of tests), Obtaining and/or reviewing separately obtained history, Documenting clinical information in the electronic or other health record, Independently interpreting results (not separately reported) and communicating results to the patient/family/caregiver, or Care coordination (not separately reported).  Each patient to whom he or she provides medical services by telemedicine is:  (1) informed of the relationship between the physician and patient and the respective role of any other health care provider with respect to management of the patient; and (2) notified that he or she may decline to receive medical services by telemedicine and may withdraw from such care at any time.    Notes:    Jed Chávez is a 64 y.o. male who presents for a follow up evaluation of Type 2 diabetes mellitus.     CHIEF COMPLAINT: Diabetes Consultation    PCP: EVELIN Pitt MD      Initial visit with me - 4/9/2024    The patient was initially diagnosed with diabetes in 2016.   S/p liver transplant in August 2023.   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      Previous failed treatments include:  Metformin - d/c after transplant - hold until 1 year post transplant.   Jardiance - d/c after transplant - hold until 1 year post transplant.     Social Documentation:  Patient lives in [place].   Occupation: self employed, janitorial service.   Exercise: daily. Plays golf.      Diabetes related complications:   cerebrovascular disease.   denies Pancreatitis  denies Gastroparesis  denies DKA  denies Hx/family Hx of MEN2/MTC  denies Frequent UTIs/yeast infections     Cardiovascular Risk Factors: advanced age (>55 for men, >65 for  "women) and male gender.    Diabetes Medications               insulin glargine U-100, Lantus, (LANTUS SOLOSTAR U-100 INSULIN) 100 unit/mL (3 mL) InPn pen Inject 28 Units into the skin once daily.    insulin lispro (HUMALOG KWIKPEN INSULIN) 100 unit/mL pen Inject 8 Units into the skin 3 (three) times daily before meals AND 1-5 Units 3 (three) times daily before meals. sliding scale insulin as needed. Max daily dose 75 units daily...    tirzepatide (MOUNJARO) 5 mg/0.5 mL PnIj Inject 5 mg into the skin every 7 days.     Current monitoring regimen:  Dexcom G7    The patient's Dexcom CGM was downloaded and reviewed. For the past 14 days, patient average glucose was 180 mg/dL. He was above range 48% of the time, in range 52% of the time, and below range 0% of the time. The target range for this patient was 70 - 180 mg/dL. Overall, there was a pattern of post prandial hyperglycemia, but overall improvement.        Patient currently taking insulin or sulfonylurea?  Yes. Emergency Glucagon Prescription current? no  Recent hypoglycemic episodes: No.     Patient compliant with glucose checks and medication administration? Yes    DIABETES MANAGEMENT STATUS  Statin: Not taking  ACE/ARB: Taking  Screening or Prevention Patient's value Goal Complete/Controlled?   HgA1C Testing and Control   Lab Results   Component Value Date    HGBA1C 6.3 (H) 04/11/2024      Annually/Less than 8% Yes   Lipid profile : 10/24/2023 Annually Yes   LDL control Lab Results   Component Value Date    LDLCALC 91.8 10/24/2023    Annually/Less than 100 mg/dl  Yes   Nephropathy screening Lab Results   Component Value Date    LABMICR 36.0 10/24/2023     Lab Results   Component Value Date    PROTEINUA Negative 10/24/2023     No results found for: "UTPCR"   Annually Yes   Blood pressure BP Readings from Last 1 Encounters:   03/04/24 (!) 146/96    Less than 140/90 No   Dilated retinal exam : 03/13/2024 Annually Yes   Foot exam   : 10/19/2023 Annually Yes "   Patient's medications, allergies, surgical, social and family histories were reviewed and updated as appropriate.     Review of Systems   Constitutional:  Negative for weight loss.   Eyes:  Negative for blurred vision and double vision.   Cardiovascular:  Negative for chest pain.   Gastrointestinal:  Negative for nausea and vomiting.   Genitourinary:  Negative for frequency.   Musculoskeletal:  Negative for falls.   Neurological:  Negative for dizziness and weakness.   Endo/Heme/Allergies:  Negative for polydipsia.   Psychiatric/Behavioral:  Negative for depression.    All other systems reviewed and are negative.       Physical Exam  Constitutional:       General: He is not in acute distress.     Appearance: Normal appearance.   Pulmonary:      Effort: No respiratory distress.   Neurological:      Mental Status: He is alert and oriented to person, place, and time.   Psychiatric:         Mood and Affect: Mood normal.         Behavior: Behavior normal.        There were no vitals taken for this visit.  Wt Readings from Last 3 Encounters:   04/16/24 98.9 kg (218 lb 0.6 oz)   03/04/24 98.2 kg (216 lb 7.9 oz)   02/13/24 98.3 kg (216 lb 11.4 oz)       LAB REVIEW  Lab Results   Component Value Date     05/06/2024    K 4.4 05/06/2024     05/06/2024    CO2 29 05/06/2024    BUN 13 05/06/2024    CREATININE 0.8 05/06/2024    CALCIUM 9.4 05/06/2024    ANIONGAP 10 05/06/2024    EGFRNORACEVR >60.0 05/06/2024     Lab Results   Component Value Date    CPEPTIDE 1.78 04/11/2024    GLUTAMICACID 0.00 04/11/2024     Hemoglobin A1C   Date Value Ref Range Status   04/11/2024 6.3 (H) 4.0 - 5.6 % Final     Comment:     ADA Screening Guidelines:  5.7-6.4%  Consistent with prediabetes  >or=6.5%  Consistent with diabetes    High levels of fetal hemoglobin interfere with the HbA1C  assay. Heterozygous hemoglobin variants (HbS, HgC, etc)do  not significantly interfere with this assay.   However, presence of multiple variants may  affect accuracy.     10/24/2023 6.1 (H) 4.0 - 5.6 % Final     Comment:     ADA Screening Guidelines:  5.7-6.4%  Consistent with prediabetes  >or=6.5%  Consistent with diabetes    High levels of fetal hemoglobin interfere with the HbA1C  assay. Heterozygous hemoglobin variants (HbS, HgC, etc)do  not significantly interfere with this assay.   However, presence of multiple variants may affect accuracy.     09/13/2023 6.3 (H) 4.0 - 5.6 % Final     Comment:     ADA Screening Guidelines:  5.7-6.4%  Consistent with prediabetes  >or=6.5%  Consistent with diabetes    High levels of fetal hemoglobin interfere with the HbA1C  assay. Heterozygous hemoglobin variants (HbS, HgC, etc)do  not significantly interfere with this assay.   However, presence of multiple variants may affect accuracy.          ASSESSMENT    ICD-10-CM ICD-9-CM   1. Type 2 diabetes mellitus with other specified complication, with long-term current use of insulin  E11.69 250.80    Z79.4 V58.67       PLAN  Diagnoses and all orders for this visit:    Type 2 diabetes mellitus with other specified complication, with long-term current use of insulin  -     tirzepatide 7.5 mg/0.5 mL PnIj; Inject 7.5 mg into the skin every 7 days.        Reviewed pathophysiology of diabetes, complications related to the disease, importance of annual dilated eye exam and daily foot examination. Explained MOA, SE, dosage of medications. Written instructions given and reviewed with patient and patient verbalizes understanding.     PATIENT INSTRUCTIONS     Continue Lantus 30 units daily.      Continue Humalog 9 units three times daily before meals, plus correction if needed.      If  - 250, ADD 2 units of Humalog  If  - 300, ADD 3 units of Humalog   If  - 350, ADD 4 units of Humalog  If  - 400, ADD 5 units of Humalog  If +, ADD 6 units of Humalog      OK to use correction scale every 4 hours as needed OUTSIDE OF MEALTIME     If  - 250, may take 2  units of Humalog  If  - 300, may take 3 units of Humalog   If  - 350, may take 4 units of Humalog  If  - 400, may take 5 units of Humalog  If +, may take 6 units of Humalog      Increase Mounjaro 7.5 mg subcutaneously every 7 days.      Continue Dexcom G7 CGM  Blood Sugar Goals:       Fastin-130.       1-2 hours after a meal: Less than 180.       Follow up in about 4 weeks (around 2024) for Virtual, Dexcom.    Portions of this note were prepared with [email or web address] Naturally Speaking voice recognition transcription software. Grammatical errors, including garbled syntax, mangle pronouns, and other bizarre constructions may be attributed to that software system.

## 2024-05-24 ENCOUNTER — DOCUMENTATION ONLY (OUTPATIENT)
Dept: OPHTHALMOLOGY | Facility: CLINIC | Age: 64
End: 2024-05-24

## 2024-05-24 ENCOUNTER — OFFICE VISIT (OUTPATIENT)
Dept: OPHTHALMOLOGY | Facility: CLINIC | Age: 64
End: 2024-05-24
Payer: COMMERCIAL

## 2024-05-24 DIAGNOSIS — H25.12 NUCLEAR SCLEROSIS OF LEFT EYE: ICD-10-CM

## 2024-05-24 DIAGNOSIS — Z98.41 CATARACT EXTRACTION STATUS, RIGHT: ICD-10-CM

## 2024-05-24 DIAGNOSIS — Z98.890 POST-OPERATIVE STATE: Primary | ICD-10-CM

## 2024-05-24 PROCEDURE — 99999 PR PBB SHADOW E&M-EST. PATIENT-LVL III: CPT | Mod: PBBFAC,,, | Performed by: OPHTHALMOLOGY

## 2024-05-24 PROCEDURE — 1159F MED LIST DOCD IN RCRD: CPT | Mod: CPTII,S$GLB,, | Performed by: OPHTHALMOLOGY

## 2024-05-24 PROCEDURE — 1160F RVW MEDS BY RX/DR IN RCRD: CPT | Mod: CPTII,S$GLB,, | Performed by: OPHTHALMOLOGY

## 2024-05-24 PROCEDURE — 99024 POSTOP FOLLOW-UP VISIT: CPT | Mod: S$GLB,,, | Performed by: OPHTHALMOLOGY

## 2024-05-24 PROCEDURE — 92136 OPHTHALMIC BIOMETRY: CPT | Mod: LT,S$GLB,, | Performed by: OPHTHALMOLOGY

## 2024-05-24 PROCEDURE — 4010F ACE/ARB THERAPY RXD/TAKEN: CPT | Mod: CPTII,S$GLB,, | Performed by: OPHTHALMOLOGY

## 2024-05-24 PROCEDURE — 3044F HG A1C LEVEL LT 7.0%: CPT | Mod: CPTII,S$GLB,, | Performed by: OPHTHALMOLOGY

## 2024-05-24 RX ORDER — PREDNISOLONE ACETATE 10 MG/ML
1 SUSPENSION/ DROPS OPHTHALMIC 4 TIMES DAILY
Qty: 5 ML | Refills: 1 | Status: SHIPPED | OUTPATIENT
Start: 2024-05-24

## 2024-05-24 NOTE — PROGRESS NOTES
Short Stay Record    CC: Blurry Vision     Impression: Visually significant cataract with reasonable expectation for visual improvement Left Eye      Base Eye Exam    Visual Acuity (Snellen - Linear)     Right Left   Dist sc 20/40    Dist cc  20/30     Tonometry (Applanation, 8:42 AM)     Right Left   Pressure 14 11    Neuro/Psych    Oriented x3: Yes   Mood/Affect: Normal      Edited by: Cruz Srinivasan; Cruz Orourke MD        Additional Tests      Glare Testing (room lights)     Medium   Right    Left 20/80   Edited by: Cruz Orourke MD        Slit Lamp and Fundus Exam      External Exam     Right Left   External Dermatochalasis Dermatochalasis     Slit Lamp Exam     Right Left   Lids/Lashes 1+ Blepharitis 1+ Blepharitis   Conjunctiva/Sclera Nasal Temporal Pinguecula Nasal Temporal Pinguecula   Cornea Clear Clear   Anterior Chamber Trace Cell, Trace Flare Deep and quiet   Iris Round and reactive Round and reactive   Lens Posterior chamber intraocular lens 1+ Nuclear sclerosis, Trace Cortical spokes, 1+ Posterior subcapsular cataract   Vitreous Normal Normal   Edited by: Cruz Orourke MD      Fundus Exam     Right Left   Disc Uniform rim Sloped temp   C/D Ratio 0.4 0.5   Macula No Diabetic Retinopathy No Diabetic Retinopathy   Vessels Normal Normal   Periphery Normal, Flat & intact 360°, no holes, tears, RD at 1 o'clock, no diabetic retinopathy Normal, Flat & intact 360°, no holes, tears, RD, No Diabetic Retinopathy   Edited by: Cruz Orourke MD     Refraction      Manifest Refraction     Sphere Cylinder Axis Dist VA   Right -0.25 +0.50 005 20/25   Left +2.00   20/25-     Edited by: Cruz Srinivasan; Cruz Orourke MD          Planned Procedure:   Topography Reviewed: Yes  History of Refractive Surgery: No      Phaco left +22.0 SY60WF  Topical   Requests a Monofocal - Distance IOL.  Patient given prescriptions for Prednisolone Acetate- 4xs daily   Anticoagulant status Yes - ASA +  Plavix          Lens Selection OS verified _____             Previous IOL OD  +21.5 SY60WF    Patient cleared for ophthalmic surgery.     Discharge Summary:    Admitting Diagnosis: Nuclear Sclerosis OS    Discharge Diagnosis: Pseudophakia OS    Procedure: Phacoemulsification of cataract with intraocular lens implant OS    Complications: None  Discharge Condition: Stable    Discharge instructions: Follow post-operative discharge instruction sheet given by provider.    Follow-up: Return to clinic next day or as scheduled.       HPI:  Jed Chávez is a 64 y.o. male who presents for evaluation prior to ophthalmic surgery, left eye. Patient reports of blurred vision at distance and near with and without correction. Patient reports of glare at night reducing function and safety, and complains of needed additional light to read.     Past Medical History:   Diagnosis Date    Alcoholic cirrhosis     CVA (cerebral vascular accident)     DM (diabetes mellitus)     Dyslipidemia     Hepatocellular carcinoma     History of hepatocellular carcinoma, in remission after liver transplant 4/5/2023    Synoptic Report Procedure: Total hepatectomy. Histologic type: Hepatocellular carcinoma. Histologic grade: G1, well-differentiated. Tumor focality: Multifocal. Tumor characteristics: Tumor #1 (blocks 2B-2E): Tumor site: Right lobe. Tumor size: 6.2 cm in greatest dimension grossly. Treatment effect: Complete necrosis (no viable tumor). Tumor #2 (blocks 2F-2H): Tumor site: Right lobe. Tumor size: 4.    HTN (hypertension)     Intracranial hemorrhage     S/P liver transplant 8/12/2023    Skin cancer      Past Surgical History:   Procedure Laterality Date    CRANIOTOMY, WITH NEOPLASM EXCISION USING COMPUTER-ASSISTED NAVIGATION Left 10/26/2023    Procedure: CRANIOTOMY, WITH NEOPLASM EXCISION USING COMPUTER-ASSISTED NAVIGATION;  Surgeon: Jose E Degroot DO;  Location: [facility] OR [address] [place] [address];  Service: Neurosurgery;  Laterality: Left;    HERNIA REPAIR  N/A     LIVER TRANSPLANT N/A 8/9/2023    Procedure: TRANSPLANT, LIVER;  Surgeon: Ameya Suero MD;  Location: [facility] OR [address] [place] [address];  Service: Transplant;  Laterality: N/A;     Review of patient's allergies indicates:  No Known Allergies    Current Outpatient Medications:     aspirin 81 MG Chew, Take 1 tablet (81 mg total) by mouth once daily. Restart 11/9/23, Disp: 30 tablet, Rfl: 11    blood sugar diagnostic Strp, Test blood glucose 3 (three) times daily., Disp: 100 strip, Rfl: 11    blood-glucose sensor (DEXCOM G7 SENSOR) Neelam, 1 each by Misc.(Non-Drug; Combo Route) route every 10 days., Disp: 9 each, Rfl: 11    carvediloL (COREG) 6.25 MG tablet, Take 1 tablet (6.25 mg total) by mouth 2 (two) times daily with meals. HOLD if SBP < 125 or pulse < 60., Disp: 180 tablet, Rfl: 3    clopidogreL (PLAVIX) 75 mg tablet, Take 1 tablet (75 mg total) by mouth once daily. Restart 11/5/23, Disp: 30 tablet, Rfl: 2    insulin glargine U-100, Lantus, (LANTUS SOLOSTAR U-100 INSULIN) 100 unit/mL (3 mL) InPn pen, Inject 28 Units into the skin once daily., Disp: 15 mL, Rfl: 3    insulin lispro (HUMALOG KWIKPEN INSULIN) 100 unit/mL pen, Inject 8 Units into the skin 3 (three) times daily before meals AND 1-5 Units 3 (three) times daily before meals. sliding scale insulin as needed. Max daily dose 75 units daily..., Disp: 15 mL, Rfl: 0    lancets Misc, Test blood glucose 3 (three) times daily., Disp: 100 each, Rfl: 11    levETIRAcetam (KEPPRA) 500 MG Tab, Take 1 tablet (500 mg total) by mouth every 12 (twelve) hours. STOP 11/9/23, Disp: 21 tablet, Rfl: 0    losartan (COZAAR) 100 MG tablet, Take 1 tablet (100 mg total) by mouth once daily. HOLD if SBP < 120, Disp: 90 tablet, Rfl: 3    mycophenolate (CELLCEPT) 250 mg Cap, Take 2 capsules (500 mg total) by mouth 2 (two) times daily., Disp: 120 capsule, Rfl: 11    OPW TEST CLAIM - DO NOT FILL, Inject into the muscle. OPW test claim. Do not fill., Disp: 1 each, Rfl: 0    pen needle, diabetic 32  "gauge x 5/32" Ndle, Use to inject insulin into the skin 4 (four) times daily., Disp: 100 each, Rfl: 11    prednisoLONE acetate (PRED FORTE) 1 % DrpS, Place 1 drop into the left eye 4 (four) times daily., Disp: 5 mL, Rfl: 1    tacrolimus (PROGRAF) 1 MG Cap, Take 2 capsules (2 mg total) by mouth every 12 (twelve) hours., Disp: 120 capsule, Rfl: 11    tirzepatide 7.5 mg/0.5 mL PnIj, Inject 7.5 mg into the skin every 7 days., Disp: 2 mL, Rfl: 11    valGANciclovir (VALCYTE) 450 mg Tab, Take 1 tablet (450 mg total) by mouth once daily. Stop 2/6/24, Disp: 30 tablet, Rfl: 5    Review of Systems:  A comprehensive review of systems was negative.    Physical Exam:  General Appearance:    A&Ox3, no distress, appears stated age   Head:    Normocephalic, without obvious abnormality, atraumatic   Eyes:    PERRL, EOM's intact   Back:     Symmetric, no curvature   Lungs:     Respirations unlabored   Chest Wall:    No tenderness or deformity    Heart:  Abdomen:  Extremities:  Skin:    S1 and S2 present    Soft, non-tender    Extremities normal, atraumatic    Skin color, texture, turgor normal                   "

## 2024-05-24 NOTE — PROGRESS NOTES
HPI     Post-op Evaluation            Comments: Pt reports for 1 week PCIOL OD. Denies any pain or irritation.   Va stable. 100% compliant with gtts.           Comments    Diabetic since 2015  Liver transplant 08/23  Craniotomy 10/23  PCIOL OD 5/16/24 +21.5 SY60WF/CDE: 10.52  COAG suspect    Pred QID OD             Last edited by Cruz Srinivasan on 5/24/2024  8:30 AM.            Assessment /Plan     For exam results, see Encounter Report.      ICD-10-CM ICD-9-CM    1. Post-operative state  Z98.890 V45.89 PO Week 1 S/P Phaco/ IOL right eye:     Patient is doing well with no evidence of inflammation. Taper drops every 10 days as follows: 3xs daily x 10 days, 2xs daily x 10 days, 1x daily for 10 days- then discontinue    Resume normal activitites and discontinue eye shield at night  OTC reading glasses can be used until evaluated for final MRx      2. Cataract extraction status, right  Z98.41 V45.61 See above       3. Nuclear sclerosis of left eye  H25.12 366.16 Ambulatory Referral to External Surgery      IOL Master - OS - Left Eye      prednisoLONE acetate (PRED FORTE) 1 % DrpS    Patient reports decreased vision in the fellow eye consistent with the clinical amount of lenticular opacity, which reaches the level of visual significance and affects activities of daily living including reading and glare. Risks, benefits, and alternatives to cataract surgery were discussed and patient desired to schedule cataract surgery. Patient was consented and the biometry and lens options were reviewed.    Topography Reviewed: Yes  History of Refractive Surgery: No     Phaco left +22.0 SY60WF  Topical   Requests a Monofocal - Distance IOL.  Patient given prescriptions for Prednisolone Acetate- 4xs daily   Anticoagulant status Yes - ASA + Plavix    Explained that patient may need glasses after surgery.  Discussed that vision may be limited by: None       Schedule post op visit #2 with DNL

## 2024-06-03 ENCOUNTER — LAB VISIT (OUTPATIENT)
Dept: LAB | Facility: HOSPITAL | Age: 64
End: 2024-06-03
Attending: INTERNAL MEDICINE
Payer: COMMERCIAL

## 2024-06-03 DIAGNOSIS — Z94.4 STATUS POST LIVER TRANSPLANT: ICD-10-CM

## 2024-06-03 LAB
ALBUMIN SERPL BCP-MCNC: 3.8 G/DL (ref 3.5–5.2)
ALP SERPL-CCNC: 84 U/L (ref 55–135)
ALT SERPL W/O P-5'-P-CCNC: 19 U/L (ref 10–44)
ANION GAP SERPL CALC-SCNC: 9 MMOL/L (ref 8–16)
AST SERPL-CCNC: 21 U/L (ref 10–40)
BASOPHILS # BLD AUTO: 0.02 K/UL (ref 0–0.2)
BASOPHILS NFR BLD: 0.3 % (ref 0–1.9)
BILIRUB SERPL-MCNC: 1.9 MG/DL (ref 0.1–1)
BUN SERPL-MCNC: 11 MG/DL (ref 8–23)
CALCIUM SERPL-MCNC: 8.9 MG/DL (ref 8.7–10.5)
CHLORIDE SERPL-SCNC: 106 MMOL/L (ref 95–110)
CO2 SERPL-SCNC: 26 MMOL/L (ref 23–29)
CREAT SERPL-MCNC: 0.9 MG/DL (ref 0.5–1.4)
DIFFERENTIAL METHOD BLD: ABNORMAL
EOSINOPHIL # BLD AUTO: 0.1 K/UL (ref 0–0.5)
EOSINOPHIL NFR BLD: 1.1 % (ref 0–8)
ERYTHROCYTE [DISTWIDTH] IN BLOOD BY AUTOMATED COUNT: 14.2 % (ref 11.5–14.5)
EST. GFR  (NO RACE VARIABLE): >60 ML/MIN/1.73 M^2
GLUCOSE SERPL-MCNC: 136 MG/DL (ref 70–110)
HCT VFR BLD AUTO: 46.8 % (ref 40–54)
HGB BLD-MCNC: 15.5 G/DL (ref 14–18)
IMM GRANULOCYTES # BLD AUTO: 0.01 K/UL (ref 0–0.04)
IMM GRANULOCYTES NFR BLD AUTO: 0.2 % (ref 0–0.5)
LYMPHOCYTES # BLD AUTO: 2.5 K/UL (ref 1–4.8)
LYMPHOCYTES NFR BLD: 40.5 % (ref 18–48)
MCH RBC QN AUTO: 28.1 PG (ref 27–31)
MCHC RBC AUTO-ENTMCNC: 33.1 G/DL (ref 32–36)
MCV RBC AUTO: 85 FL (ref 82–98)
MONOCYTES # BLD AUTO: 0.5 K/UL (ref 0.3–1)
MONOCYTES NFR BLD: 7.8 % (ref 4–15)
NEUTROPHILS # BLD AUTO: 3.1 K/UL (ref 1.8–7.7)
NEUTROPHILS NFR BLD: 50.1 % (ref 38–73)
NRBC BLD-RTO: 0 /100 WBC
PLATELET # BLD AUTO: 135 K/UL (ref 150–450)
PMV BLD AUTO: 9.8 FL (ref 9.2–12.9)
POTASSIUM SERPL-SCNC: 3.8 MMOL/L (ref 3.5–5.1)
PROT SERPL-MCNC: 6.7 G/DL (ref 6–8.4)
RBC # BLD AUTO: 5.51 M/UL (ref 4.6–6.2)
SODIUM SERPL-SCNC: 141 MMOL/L (ref 136–145)
WBC # BLD AUTO: 6.12 K/UL (ref 3.9–12.7)

## 2024-06-03 PROCEDURE — 80197 ASSAY OF TACROLIMUS: CPT | Performed by: INTERNAL MEDICINE

## 2024-06-03 PROCEDURE — 36415 COLL VENOUS BLD VENIPUNCTURE: CPT | Performed by: INTERNAL MEDICINE

## 2024-06-03 PROCEDURE — 80053 COMPREHEN METABOLIC PANEL: CPT | Performed by: INTERNAL MEDICINE

## 2024-06-03 PROCEDURE — 85025 COMPLETE CBC W/AUTO DIFF WBC: CPT | Performed by: INTERNAL MEDICINE

## 2024-06-04 ENCOUNTER — TELEPHONE (OUTPATIENT)
Dept: TRANSPLANT | Facility: CLINIC | Age: 64
End: 2024-06-04
Payer: COMMERCIAL

## 2024-06-04 LAB — TACROLIMUS BLD-MCNC: 8.1 NG/ML (ref 5–15)

## 2024-06-04 NOTE — TELEPHONE ENCOUNTER
Pt notified via portal of stable labs and that no medication changes are needed. Repeat labs due 6/24/24 per protocol.   ----- Message from Joleen Carr MD sent at 6/4/2024 12:06 PM CDT -----  Reviewed, nothing to do; repeat per routine

## 2024-06-10 ENCOUNTER — HOSPITAL ENCOUNTER (OUTPATIENT)
Dept: RADIOLOGY | Facility: HOSPITAL | Age: 64
Discharge: HOME | End: 2024-06-10
Attending: SURGERY
Payer: COMMERCIAL

## 2024-06-10 DIAGNOSIS — I77.1 STENOSIS OF HEPATIC ARTERY OF TRANSPLANTED LIVER: ICD-10-CM

## 2024-06-10 DIAGNOSIS — Z94.4 S/P LIVER TRANSPLANT: ICD-10-CM

## 2024-06-10 DIAGNOSIS — T86.49 STENOSIS OF HEPATIC ARTERY OF TRANSPLANTED LIVER: ICD-10-CM

## 2024-06-10 PROCEDURE — 93976 VASCULAR STUDY: CPT | Mod: 26,,, | Performed by: RADIOLOGY

## 2024-06-10 PROCEDURE — 76705 ECHO EXAM OF ABDOMEN: CPT | Mod: TC

## 2024-06-10 PROCEDURE — 76705 ECHO EXAM OF ABDOMEN: CPT | Mod: 26,59,, | Performed by: RADIOLOGY

## 2024-06-13 ENCOUNTER — TELEPHONE (OUTPATIENT)
Dept: TRANSPLANT | Facility: CLINIC | Age: 64
End: 2024-06-13
Payer: COMMERCIAL

## 2024-06-13 ENCOUNTER — OUTSIDE PLACE OF SERVICE (OUTPATIENT)
Dept: OPHTHALMOLOGY | Facility: CLINIC | Age: 64
End: 2024-06-13
Payer: COMMERCIAL

## 2024-06-13 NOTE — TELEPHONE ENCOUNTER
Ultrasound reviewed with Dr. Suero. Per MD, ultrasound stable. Repeat in 1 month.    Pt advised via portal.

## 2024-06-14 ENCOUNTER — OFFICE VISIT (OUTPATIENT)
Dept: OPHTHALMOLOGY | Facility: CLINIC | Age: 64
End: 2024-06-14
Payer: COMMERCIAL

## 2024-06-14 DIAGNOSIS — Z98.42 CATARACT EXTRACTION STATUS, LEFT: ICD-10-CM

## 2024-06-14 DIAGNOSIS — Z98.890 POST-OPERATIVE STATE: Primary | ICD-10-CM

## 2024-06-14 PROCEDURE — 99024 POSTOP FOLLOW-UP VISIT: CPT | Mod: S$GLB,,, | Performed by: OPHTHALMOLOGY

## 2024-06-14 PROCEDURE — 1159F MED LIST DOCD IN RCRD: CPT | Mod: CPTII,S$GLB,, | Performed by: OPHTHALMOLOGY

## 2024-06-14 PROCEDURE — 99999 PR PBB SHADOW E&M-EST. PATIENT-LVL III: CPT | Mod: PBBFAC,,, | Performed by: OPHTHALMOLOGY

## 2024-06-14 PROCEDURE — 3044F HG A1C LEVEL LT 7.0%: CPT | Mod: CPTII,S$GLB,, | Performed by: OPHTHALMOLOGY

## 2024-06-14 PROCEDURE — 1160F RVW MEDS BY RX/DR IN RCRD: CPT | Mod: CPTII,S$GLB,, | Performed by: OPHTHALMOLOGY

## 2024-06-14 PROCEDURE — 4010F ACE/ARB THERAPY RXD/TAKEN: CPT | Mod: CPTII,S$GLB,, | Performed by: OPHTHALMOLOGY

## 2024-06-14 NOTE — PROGRESS NOTES
HPI     Post-op Evaluation            Comments: PCIOL OS 6/13/24 +22.0 SY60WF/CDE: 7.26    Patient states no pain or irritation after surgery.          Comments    Diabetic since 2015  Liver transplant 08/23  Craniotomy 10/23  PCIOL OD 5/16/24 +21.5 SY60WF/CDE: 10.52  PCIOL OS 6/13/24 +22.0 SY60WF/CDE: 7.26  COAG suspect      Pred QID OS             Last edited by Rebecca Rose, Patient Care Assistant on 6/14/2024  7:45   AM.            Assessment /Plan     For exam results, see Encounter Report.      ICD-10-CM ICD-9-CM    1. Post-operative state  Z98.890 V45.89       2. Cataract extraction status, left  Z98.42 V45.61           PO Day 1 S/P Phaco/IOL left eye  Doing well.    Use Prednisolone Acetate- 4xs daily      Reinstructed in importance of absolute compliance with Post-OP instructions including medications, shield at bedtime, and limitation of activities. Follow up appointments in approximately one and six weeks or call immediately for increased pain, redness or vision loss.

## 2024-06-21 ENCOUNTER — OFFICE VISIT (OUTPATIENT)
Dept: OPHTHALMOLOGY | Facility: CLINIC | Age: 64
End: 2024-06-21
Payer: COMMERCIAL

## 2024-06-21 DIAGNOSIS — Z00.6 RESEARCH STUDY PATIENT: Primary | ICD-10-CM

## 2024-06-21 DIAGNOSIS — Z98.890 POST-OPERATIVE STATE: Primary | ICD-10-CM

## 2024-06-21 DIAGNOSIS — Z98.42 CATARACT EXTRACTION STATUS, LEFT: ICD-10-CM

## 2024-06-21 PROCEDURE — 99999 PR PBB SHADOW E&M-EST. PATIENT-LVL III: CPT | Mod: PBBFAC,,, | Performed by: OPTOMETRIST

## 2024-06-21 NOTE — PROGRESS NOTES
HPI     Post-op Evaluation            Comments: Pt reports for 1 week post op OS , no pain or irritations. Pt   states compliance with Pred qid OS           Last edited by Danyelle Stuart MA on 6/21/2024  7:32 AM.            Assessment /Plan     For exam results, see Encounter Report.    Post-operative state    Cataract extraction status, left      Impression: 1 week post-op phaco with PCIOL OS : Doing well    Continue all post op drops as directed on post-op instruction sheet  Ok to resume normal activities and discontinue eye shield   RTC as scheduled for next post-op visit or PRN if any pain, redness, vision changes or any other concerns.  Discussed above and answered questions.

## 2024-06-24 ENCOUNTER — OFFICE VISIT (OUTPATIENT)
Dept: DIABETES | Facility: CLINIC | Age: 64
End: 2024-06-24
Payer: COMMERCIAL

## 2024-06-24 ENCOUNTER — TELEPHONE (OUTPATIENT)
Dept: RESEARCH | Facility: HOSPITAL | Age: 64
End: 2024-06-24
Payer: COMMERCIAL

## 2024-06-24 ENCOUNTER — OFFICE VISIT (OUTPATIENT)
Dept: HEPATOLOGY | Facility: CLINIC | Age: 64
End: 2024-06-24
Payer: COMMERCIAL

## 2024-06-24 VITALS
HEART RATE: 72 BPM | WEIGHT: 227.31 LBS | DIASTOLIC BLOOD PRESSURE: 96 MMHG | SYSTOLIC BLOOD PRESSURE: 144 MMHG | HEIGHT: 71 IN | BODY MASS INDEX: 31.82 KG/M2

## 2024-06-24 DIAGNOSIS — Z85.05 HISTORY OF LIVER CANCER: ICD-10-CM

## 2024-06-24 DIAGNOSIS — T86.49 STENOSIS OF HEPATIC ARTERY OF TRANSPLANTED LIVER: ICD-10-CM

## 2024-06-24 DIAGNOSIS — Z94.4 LIVER TRANSPLANTED: ICD-10-CM

## 2024-06-24 DIAGNOSIS — D84.9 IMMUNOSUPPRESSION: Primary | ICD-10-CM

## 2024-06-24 DIAGNOSIS — I77.1 STENOSIS OF HEPATIC ARTERY OF TRANSPLANTED LIVER: ICD-10-CM

## 2024-06-24 DIAGNOSIS — Z79.4 TYPE 2 DIABETES MELLITUS WITH OTHER SPECIFIED COMPLICATION, WITH LONG-TERM CURRENT USE OF INSULIN: Primary | Chronic | ICD-10-CM

## 2024-06-24 DIAGNOSIS — E66.09 CLASS 1 OBESITY DUE TO EXCESS CALORIES WITH BODY MASS INDEX (BMI) OF 31.0 TO 31.9 IN ADULT, UNSPECIFIED WHETHER SERIOUS COMORBIDITY PRESENT: ICD-10-CM

## 2024-06-24 DIAGNOSIS — E11.69 TYPE 2 DIABETES MELLITUS WITH OTHER SPECIFIED COMPLICATION, WITH LONG-TERM CURRENT USE OF INSULIN: Primary | Chronic | ICD-10-CM

## 2024-06-24 PROCEDURE — 1160F RVW MEDS BY RX/DR IN RCRD: CPT | Mod: CPTII,S$GLB,, | Performed by: INTERNAL MEDICINE

## 2024-06-24 PROCEDURE — 99214 OFFICE O/P EST MOD 30 MIN: CPT | Mod: 95,,, | Performed by: NURSE PRACTITIONER

## 2024-06-24 PROCEDURE — 99214 OFFICE O/P EST MOD 30 MIN: CPT | Mod: S$GLB,,, | Performed by: INTERNAL MEDICINE

## 2024-06-24 PROCEDURE — 4010F ACE/ARB THERAPY RXD/TAKEN: CPT | Mod: CPTII,95,, | Performed by: NURSE PRACTITIONER

## 2024-06-24 PROCEDURE — 99999 PR PBB SHADOW E&M-EST. PATIENT-LVL IV: CPT | Mod: PBBFAC,,, | Performed by: INTERNAL MEDICINE

## 2024-06-24 PROCEDURE — 1159F MED LIST DOCD IN RCRD: CPT | Mod: CPTII,S$GLB,, | Performed by: INTERNAL MEDICINE

## 2024-06-24 PROCEDURE — 3008F BODY MASS INDEX DOCD: CPT | Mod: CPTII,S$GLB,, | Performed by: INTERNAL MEDICINE

## 2024-06-24 PROCEDURE — 95251 CONT GLUC MNTR ANALYSIS I&R: CPT | Mod: NDTC,,, | Performed by: NURSE PRACTITIONER

## 2024-06-24 PROCEDURE — 3077F SYST BP >= 140 MM HG: CPT | Mod: CPTII,S$GLB,, | Performed by: INTERNAL MEDICINE

## 2024-06-24 PROCEDURE — 3044F HG A1C LEVEL LT 7.0%: CPT | Mod: CPTII,95,, | Performed by: NURSE PRACTITIONER

## 2024-06-24 PROCEDURE — 3080F DIAST BP >= 90 MM HG: CPT | Mod: CPTII,S$GLB,, | Performed by: INTERNAL MEDICINE

## 2024-06-24 PROCEDURE — 3044F HG A1C LEVEL LT 7.0%: CPT | Mod: CPTII,S$GLB,, | Performed by: INTERNAL MEDICINE

## 2024-06-24 PROCEDURE — 4010F ACE/ARB THERAPY RXD/TAKEN: CPT | Mod: CPTII,S$GLB,, | Performed by: INTERNAL MEDICINE

## 2024-06-24 NOTE — PATIENT INSTRUCTIONS
PATIENT INSTRUCTIONS     iLet insulin pump ordered.   Will send insulin vials to your pharmacy, you will need to bring a vial of insulin and all pump supplies with you to your pump training appointment. Please let us know when you have received your pump so we can get you scheduled.     Increase Lantus to 33 units daily.      Decrease Humalog to 6 units three times daily before meals, plus correction if needed.      If  - 250, ADD 2 units of Humalog  If  - 300, ADD 3 units of Humalog   If  - 350, ADD 4 units of Humalog  If  - 400, ADD 5 units of Humalog  If +, ADD 6 units of Humalog       Increase Mounjaro 10 mg subcutaneously every 7 days.      Continue Dexcom G7 CGM  Blood Sugar Goals:       Fastin-130.       1-2 hours after a meal: Less than 180.

## 2024-06-24 NOTE — PROGRESS NOTES
Transplant Hepatology  Liver Transplant Recipient Follow-up    Transplant Date: 8/10/2023  [facility] Native Liver Dx: Primary Liver Malignancy: Hepatoma (HCC) and Cirrhosis    Jed is here for follow up of his liver transplant.    ORGAN: LIVER  Whole or Partial: whole liver  Donor Type: donation after circulatory death   CDC High Risk: no  Donor CMV Status: Positive  Donor HCV Status: Negative  Donor HBcAb: Negative  Biliary Anastomosis: end to end  Arterial Anatomy: standard  IVC reconstruction: end to end ivc  Portal vein status: patent  .    Subjective:     Interval History:  Currently, he is doing adequately. Current complaints include some memory issues since he would the meningioma removed and he is also completed radiation.  Otherwise he has been gaining weight.  For the most part he otherwise has been doing well.  He has been compliant with medications.  There been no major liver related issues.  Seems he went to get labs today but they were unable to draw because of reported research lab.  He is unclear about these details.    Review of Systems    Objective:     Physical Exam  Vitals reviewed.   Constitutional:       General: He is not in acute distress.     Appearance: He is well-developed.   HENT:      Head: Normocephalic and atraumatic.      Mouth/Throat:      Pharynx: No oropharyngeal exudate.   Eyes:      General: No scleral icterus.        Right eye: No discharge.         Left eye: No discharge.      Conjunctiva/sclera: Conjunctivae normal.      Pupils: Pupils are equal, round, and reactive to light.   Pulmonary:      Effort: Pulmonary effort is normal. No respiratory distress.      Breath sounds: Normal breath sounds. No wheezing.   Abdominal:      General: There is no distension.      Palpations: Abdomen is soft.      Tenderness: There is no abdominal tenderness.   Musculoskeletal:      Right lower leg: No edema.      Left lower leg: No edema.   Neurological:      Mental Status: He is alert and oriented  to person, place, and time.   Psychiatric:         Behavior: Behavior normal.         WBC   Date Value Ref Range Status   06/03/2024 6.12 3.90 - 12.70 K/uL Final     Hemoglobin   Date Value Ref Range Status   06/03/2024 15.5 14.0 - 18.0 g/dL Final     POC Hematocrit   Date Value Ref Range Status   10/26/2023 31 (L) 36 - 54 %PCV Final     Hematocrit   Date Value Ref Range Status   06/03/2024 46.8 40.0 - 54.0 % Final     Platelets   Date Value Ref Range Status   06/03/2024 135 (L) 150 - 450 K/uL Final     BUN   Date Value Ref Range Status   06/03/2024 11 8 - 23 mg/dL Final     Creatinine   Date Value Ref Range Status   06/03/2024 0.9 0.5 - 1.4 mg/dL Final     Glucose   Date Value Ref Range Status   06/03/2024 136 (H) 70 - 110 mg/dL Final     Calcium   Date Value Ref Range Status   06/03/2024 8.9 8.7 - 10.5 mg/dL Final     Sodium   Date Value Ref Range Status   06/03/2024 141 136 - 145 mmol/L Final     Potassium   Date Value Ref Range Status   06/03/2024 3.8 3.5 - 5.1 mmol/L Final     Chloride   Date Value Ref Range Status   06/03/2024 106 95 - 110 mmol/L Final     Magnesium   Date Value Ref Range Status   10/30/2023 1.9 1.6 - 2.6 mg/dL Final     AST   Date Value Ref Range Status   06/03/2024 21 10 - 40 U/L Final     ALT   Date Value Ref Range Status   06/03/2024 19 10 - 44 U/L Final     Alkaline Phosphatase   Date Value Ref Range Status   06/03/2024 84 55 - 135 U/L Final     Total Bilirubin   Date Value Ref Range Status   06/03/2024 1.9 (H) 0.1 - 1.0 mg/dL Final     Comment:     For infants and newborns, interpretation of results should be based  on gestational age, weight and in agreement with clinical  observations.    Premature Infant recommended reference ranges:  Up to 24 hours.............<8.0 mg/dL  Up to 48 hours............<12.0 mg/dL  3-5 days..................<15.0 mg/dL  6-29 days.................<15.0 mg/dL       Albumin   Date Value Ref Range Status   06/03/2024 3.8 3.5 - 5.2 g/dL Final     INR   Date  Value Ref Range Status   10/24/2023 1.1 0.8 - 1.2 Final     Comment:     Coumadin Therapy:  2.0 - 3.0 for INR for all indicators except mechanical heart valves  and antiphospholipid syndromes which should use 2.5 - 3.5.       Lab Results   Component Value Date    TACROLIMUS 8.1 06/03/2024           Assessment/Plan:     1. Immunosuppression    2. Liver transplanted    3. 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  Looks like there was some type of research lab put in by Dr. Suero.  Will ask coordinator to clarify and follow up with patient.    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      Joleen Carr MD           [facility] Patient Status  Functional Status: 70% - Cares for self: unable to carry on normal activity or active work  Physical Capacity: No Limitations

## 2024-06-24 NOTE — PROGRESS NOTES
The patient location is: Home  The chief complaint leading to consultation is: Diabetes    Visit type: audiovisual    Face to Face time with patient: 16  30 minutes of total time spent on the encounter, which includes face to face time and non-face to face time preparing to see the patient (eg, review of tests), Obtaining and/or reviewing separately obtained history, Documenting clinical information in the electronic or other health record, Independently interpreting results (not separately reported) and communicating results to the patient/family/caregiver, or Care coordination (not separately reported).  Each patient to whom he or she provides medical services by telemedicine is:  (1) informed of the relationship between the physician and patient and the respective role of any other health care provider with respect to management of the patient; and (2) notified that he or she may decline to receive medical services by telemedicine and may withdraw from such care at any time.    Notes:    Jed Chávez is a 64 y.o. male who presents for a follow up evaluation of Type 2 diabetes mellitus.     CHIEF COMPLAINT: Diabetes Consultation    PCP: EVELIN Pitt MD      Initial visit with me - 4/9/2024    The patient was initially diagnosed with diabetes in 2016.   S/p liver transplant in August 2023.   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      Previous failed treatments include:  Metformin - d/c after transplant - hold until 1 year post transplant.   Jardiance - d/c after transplant - hold until 1 year post transplant.     Social Documentation:  Patient lives in [place].   Occupation: self employed, janitorial service.   Exercise: daily. Plays golf.      Diabetes related complications:   cerebrovascular disease.   denies Pancreatitis  denies Gastroparesis  denies DKA  denies Hx/family Hx of MEN2/MTC  denies Frequent UTIs/yeast infections     Cardiovascular Risk Factors: advanced age (>55 for men, >65 for  "women) and male gender.    Diabetes Medications               insulin glargine U-100, Lantus, (LANTUS SOLOSTAR U-100 INSULIN) 100 unit/mL (3 mL) InPn pen Inject 28 Units into the skin once daily.    insulin lispro (HUMALOG KWIKPEN INSULIN) 100 unit/mL pen Inject 8 Units into the skin 3 (three) times daily before meals AND 1-5 Units 3 (three) times daily before meals. sliding scale insulin as needed. Max daily dose 75 units daily...    tirzepatide 7.5 mg/0.5 mL PnIj Inject 7.5 mg into the skin every 7 days.     Current monitoring regimen:  Dexcom G7    The patient's Dexcom CGM was downloaded and reviewed. For the past 14 days, patient average glucose was 172 mg/dL. He was above range 40% of the time, in range 60% of the time, and below range 0% of the time. The target range for this patient was 70 - 180 mg/dL. Overall, there was a pattern of post prandial hyperglycemia, but overall improvement.        Patient currently taking insulin or sulfonylurea?  Yes. Emergency Glucagon Prescription current? no  Recent hypoglycemic episodes: No.     Patient compliant with glucose checks and medication administration? Yes    DIABETES MANAGEMENT STATUS  Statin: Not taking  ACE/ARB: Taking  Screening or Prevention Patient's value Goal Complete/Controlled?   HgA1C Testing and Control   Lab Results   Component Value Date    HGBA1C 6.3 (H) 04/11/2024      Annually/Less than 8% Yes   Lipid profile : 10/24/2023 Annually Yes   LDL control Lab Results   Component Value Date    LDLCALC 91.8 10/24/2023    Annually/Less than 100 mg/dl  Yes   Nephropathy screening Lab Results   Component Value Date    LABMICR 36.0 10/24/2023     Lab Results   Component Value Date    PROTEINUA Negative 10/24/2023     No results found for: "UTPCR"   Annually Yes   Blood pressure BP Readings from Last 1 Encounters:   06/24/24 (!) 144/96    Less than 140/90 No   Dilated retinal exam : 03/13/2024 Annually Yes   Foot exam   : 10/19/2023 Annually Yes   Patient's " medications, allergies, surgical, social and family histories were reviewed and updated as appropriate.     Review of Systems   Constitutional:  Negative for weight loss.   Eyes:  Negative for blurred vision and double vision.   Cardiovascular:  Negative for chest pain.   Gastrointestinal:  Negative for nausea and vomiting.   Genitourinary:  Negative for frequency.   Musculoskeletal:  Negative for falls.   Neurological:  Negative for dizziness and weakness.   Endo/Heme/Allergies:  Negative for polydipsia.   Psychiatric/Behavioral:  Negative for depression.    All other systems reviewed and are negative.       Physical Exam  Constitutional:       General: He is not in acute distress.     Appearance: Normal appearance.   Pulmonary:      Effort: No respiratory distress.   Neurological:      Mental Status: He is alert and oriented to person, place, and time.   Psychiatric:         Mood and Affect: Mood normal.         Behavior: Behavior normal.        There were no vitals taken for this visit.  Wt Readings from Last 3 Encounters:   06/24/24 103.1 kg (227 lb 4.7 oz)   04/16/24 98.9 kg (218 lb 0.6 oz)   03/04/24 98.2 kg (216 lb 7.9 oz)       LAB REVIEW  Lab Results   Component Value Date     06/03/2024    K 3.8 06/03/2024     06/03/2024    CO2 26 06/03/2024    BUN 11 06/03/2024    CREATININE 0.9 06/03/2024    CALCIUM 8.9 06/03/2024    ANIONGAP 9 06/03/2024    EGFRNORACEVR >60.0 06/03/2024     Lab Results   Component Value Date    CPEPTIDE 1.78 04/11/2024    GLUTAMICACID 0.00 04/11/2024     Hemoglobin A1C   Date Value Ref Range Status   04/11/2024 6.3 (H) 4.0 - 5.6 % Final     Comment:     ADA Screening Guidelines:  5.7-6.4%  Consistent with prediabetes  >or=6.5%  Consistent with diabetes    High levels of fetal hemoglobin interfere with the HbA1C  assay. Heterozygous hemoglobin variants (HbS, HgC, etc)do  not significantly interfere with this assay.   However, presence of multiple variants may affect  "accuracy.     10/24/2023 6.1 (H) 4.0 - 5.6 % Final     Comment:     ADA Screening Guidelines:  5.7-6.4%  Consistent with prediabetes  >or=6.5%  Consistent with diabetes    High levels of fetal hemoglobin interfere with the HbA1C  assay. Heterozygous hemoglobin variants (HbS, HgC, etc)do  not significantly interfere with this assay.   However, presence of multiple variants may affect accuracy.     09/13/2023 6.3 (H) 4.0 - 5.6 % Final     Comment:     ADA Screening Guidelines:  5.7-6.4%  Consistent with prediabetes  >or=6.5%  Consistent with diabetes    High levels of fetal hemoglobin interfere with the HbA1C  assay. Heterozygous hemoglobin variants (HbS, HgC, etc)do  not significantly interfere with this assay.   However, presence of multiple variants may affect accuracy.          ASSESSMENT    ICD-10-CM ICD-9-CM   1. Type 2 diabetes mellitus with other specified complication, with long-term current use of insulin  E11.69 250.80    Z79.4 V58.67       PLAN  Diagnoses and all orders for this visit:    Type 2 diabetes mellitus with other specified complication, with long-term current use of insulin  -     tirzepatide 10 mg/0.5 mL PnIj; Inject 10 mg into the skin every 7 days.        Reviewed pathophysiology of diabetes, complications related to the disease, importance of annual dilated eye exam and daily foot examination. Explained MOA, SE, dosage of medications. Written instructions given and reviewed with patient and patient verbalizes understanding.     6/24/2024 - pump evaluation done on 4/30/24, at that time patient had decided on OP5, but states he really does not think he will do well with carb counting and is preferring a more "hands off" approach. Discussed iLet pump and patient would like to move forward with that. Overall glucoses stablizing, still with some hyperglycemia. Will increase Lantus to 33 units,  decrease humalog dose to 6 and increase Mounjaro to 10 mg with next refill.     PATIENT INSTRUCTIONS   "   iLet insulin pump ordered.   Will send insulin vials to your pharmacy, you will need to bring a vial of insulin and all pump supplies with you to your pump training appointment. Please let us know when you have received your pump so we can get you scheduled.     Increase Lantus to 33 units daily.      Decrease Humalog to 6 units three times daily before meals, plus correction if needed.      If  - 250, ADD 2 units of Humalog  If  - 300, ADD 3 units of Humalog   If  - 350, ADD 4 units of Humalog  If  - 400, ADD 5 units of Humalog  If +, ADD 6 units of Humalog       Increase Mounjaro 10 mg subcutaneously every 7 days.      Continue Dexcom G7 CGM  Blood Sugar Goals:       Fastin-130.       1-2 hours after a meal: Less than 180.       Follow up in about 6 weeks (around 2024) for Virtual, Dexcom, iLet, [place] order for CGM.    Portions of this note were prepared with [employer] voice recognition transcription software. Grammatical errors, including garbled syntax, mangle pronouns, and other bizarre constructions may be attributed to that software system.

## 2024-06-24 NOTE — TELEPHONE ENCOUNTER
RESEARCH STUDY TELEPHONE ENCOUNTER  ORGAN TRANSPLANT  [facility]    Study Title: Role of Tumor-Induced Immune Tolerance in the Patient Response to Locoregional Therapy: Implications in Assessment Risk of Hepatocellular Carcinoma Recurrence Following Liver Transplantation    IRB #: [id number]    IRB Approval Date: 6/8/2016    : Ameya Suero MD  Sub-investigator: Cecil Reilly, PhD    Patient Number: [id number]    Telephone contact was initiated on (date: 06/24/2024) by Rob Tee    Present for discussion: Patient's wife, Leida Chávez    Matters Discussed:  There was confusion about the labs scheduled today by care provider and research labs linked to the appointment. I was asked by nurse on care team to contact patient. I called the patient, but he was not available, so patient's wife offered to take a message.    I Iet her know that in the future, they do not have to worry about research labs for us even if they are missed, as they have the right to refuse to have specimens collected at any time and we do not want to impact/delay any care they may receive. The kits should be at the labs where their appointments are scheduled already, so they do not need to bring anything with them to appointments for our study either.      Rob Tee  Admin Research- Liver Transplant

## 2024-06-28 ENCOUNTER — PATIENT MESSAGE (OUTPATIENT)
Dept: TRANSPLANT | Facility: CLINIC | Age: 64
End: 2024-06-28
Payer: COMMERCIAL

## 2024-07-03 ENCOUNTER — PATIENT MESSAGE (OUTPATIENT)
Dept: TRANSPLANT | Facility: CLINIC | Age: 64
End: 2024-07-03
Payer: COMMERCIAL

## 2024-07-03 ENCOUNTER — TELEPHONE (OUTPATIENT)
Dept: TRANSPLANT | Facility: CLINIC | Age: 64
End: 2024-07-03
Payer: COMMERCIAL

## 2024-07-03 DIAGNOSIS — Z94.4 STATUS POST LIVER TRANSPLANT: ICD-10-CM

## 2024-07-03 RX ORDER — PEN NEEDLE, DIABETIC 30 GX3/16"
1 NEEDLE, DISPOSABLE MISCELLANEOUS 4 TIMES DAILY
Qty: 100 EACH | Refills: 11 | Status: SHIPPED | OUTPATIENT
Start: 2024-07-03

## 2024-07-03 RX ORDER — PEN NEEDLE, DIABETIC 30 GX3/16"
1 NEEDLE, DISPOSABLE MISCELLANEOUS 4 TIMES DAILY
Qty: 100 EACH | Refills: 11 | Status: CANCELLED | OUTPATIENT
Start: 2024-07-03

## 2024-07-03 NOTE — TELEPHONE ENCOUNTER
Pt checked in for lab 6/24 but no lab results available. No response from patient despite VM and [email or web address] message.    Called pt. No answer. Left another VM requesting labs 7/8/24 and call or message back to the office.    Called pt's spouse. No answer. LVM requesting call to the office.

## 2024-07-07 DIAGNOSIS — I77.1 STENOSIS OF HEPATIC ARTERY OF TRANSPLANTED LIVER: ICD-10-CM

## 2024-07-07 DIAGNOSIS — T86.49 STENOSIS OF HEPATIC ARTERY OF TRANSPLANTED LIVER: ICD-10-CM

## 2024-07-08 DIAGNOSIS — Z00.6 RESEARCH STUDY PATIENT: Primary | ICD-10-CM

## 2024-07-08 RX ORDER — CLOPIDOGREL BISULFATE 75 MG/1
75 TABLET ORAL DAILY
Qty: 30 TABLET | Refills: 2 | Status: SHIPPED | OUTPATIENT
Start: 2024-07-08

## 2024-07-11 ENCOUNTER — TELEPHONE (OUTPATIENT)
Dept: RESEARCH | Facility: HOSPITAL | Age: 64
End: 2024-07-11
Payer: COMMERCIAL

## 2024-07-11 NOTE — TELEPHONE ENCOUNTER
RESEARCH STUDY SPECIMEN COLLECTION ENCOUNTER  ORGAN TRANSPLANT  [facility]    Study Title: Role of Tumor-Induced Immune Tolerance in the Patient Response to Locoregional Therapy: Implications in Assessment Risk of Hepatocellular Carcinoma Recurrence Following Liver Transplantation    IRB #: [id number]    IRB Approval Date: 6/8/2016    : Ameya Suero MD  Sub-investigator: Cecil Reilly, PhD    Patient Number: [id number]    I called the patient today to speak to him about an upcoming lab appointment to confirm the location they planned to go to get drawn for labs as there are several locations in [place]. I could not reach the patient and left a voicemail; however, I was able to reach his wife, Leida, and was able to receive a confirmation from her.    Rob Tee  Admin Research- Liver Transplant

## 2024-07-17 ENCOUNTER — LAB VISIT (OUTPATIENT)
Dept: LAB | Facility: HOSPITAL | Age: 64
End: 2024-07-17
Attending: INTERNAL MEDICINE
Payer: COMMERCIAL

## 2024-07-17 ENCOUNTER — RESEARCH ENCOUNTER (OUTPATIENT)
Dept: RESEARCH | Facility: HOSPITAL | Age: 64
End: 2024-07-17
Payer: COMMERCIAL

## 2024-07-17 DIAGNOSIS — Z00.6 RESEARCH STUDY PATIENT: ICD-10-CM

## 2024-07-17 DIAGNOSIS — Z94.4 STATUS POST LIVER TRANSPLANT: ICD-10-CM

## 2024-07-17 LAB
ALBUMIN SERPL BCP-MCNC: 3.8 G/DL (ref 3.5–5.2)
ALP SERPL-CCNC: 81 U/L (ref 55–135)
ALT SERPL W/O P-5'-P-CCNC: 19 U/L (ref 10–44)
ANION GAP SERPL CALC-SCNC: 10 MMOL/L (ref 8–16)
AST SERPL-CCNC: 21 U/L (ref 10–40)
BASOPHILS # BLD AUTO: 0.02 K/UL (ref 0–0.2)
BASOPHILS NFR BLD: 0.3 % (ref 0–1.9)
BILIRUB SERPL-MCNC: 1.8 MG/DL (ref 0.1–1)
BUN SERPL-MCNC: 12 MG/DL (ref 8–23)
CALCIUM SERPL-MCNC: 9.3 MG/DL (ref 8.7–10.5)
CHLORIDE SERPL-SCNC: 106 MMOL/L (ref 95–110)
CO2 SERPL-SCNC: 25 MMOL/L (ref 23–29)
CREAT SERPL-MCNC: 0.9 MG/DL (ref 0.5–1.4)
DIFFERENTIAL METHOD BLD: ABNORMAL
EOSINOPHIL # BLD AUTO: 0.1 K/UL (ref 0–0.5)
EOSINOPHIL NFR BLD: 0.8 % (ref 0–8)
ERYTHROCYTE [DISTWIDTH] IN BLOOD BY AUTOMATED COUNT: 14.6 % (ref 11.5–14.5)
EST. GFR  (NO RACE VARIABLE): >60 ML/MIN/1.73 M^2
GLUCOSE SERPL-MCNC: 138 MG/DL (ref 70–110)
HCT VFR BLD AUTO: 47.8 % (ref 40–54)
HGB BLD-MCNC: 15.7 G/DL (ref 14–18)
IMM GRANULOCYTES # BLD AUTO: 0.01 K/UL (ref 0–0.04)
IMM GRANULOCYTES NFR BLD AUTO: 0.2 % (ref 0–0.5)
LYMPHOCYTES # BLD AUTO: 2.7 K/UL (ref 1–4.8)
LYMPHOCYTES NFR BLD: 43.5 % (ref 18–48)
MCH RBC QN AUTO: 28.7 PG (ref 27–31)
MCHC RBC AUTO-ENTMCNC: 32.8 G/DL (ref 32–36)
MCV RBC AUTO: 87 FL (ref 82–98)
MONOCYTES # BLD AUTO: 0.6 K/UL (ref 0.3–1)
MONOCYTES NFR BLD: 8.8 % (ref 4–15)
NEUTROPHILS # BLD AUTO: 2.9 K/UL (ref 1.8–7.7)
NEUTROPHILS NFR BLD: 46.4 % (ref 38–73)
NRBC BLD-RTO: 0 /100 WBC
PLATELET # BLD AUTO: 135 K/UL (ref 150–450)
PMV BLD AUTO: 10.1 FL (ref 9.2–12.9)
POTASSIUM SERPL-SCNC: 4 MMOL/L (ref 3.5–5.1)
PROT SERPL-MCNC: 6.8 G/DL (ref 6–8.4)
RBC # BLD AUTO: 5.47 M/UL (ref 4.6–6.2)
RESEARCH LAB: NORMAL
SODIUM SERPL-SCNC: 141 MMOL/L (ref 136–145)
WBC # BLD AUTO: 6.23 K/UL (ref 3.9–12.7)

## 2024-07-17 PROCEDURE — 80053 COMPREHEN METABOLIC PANEL: CPT | Performed by: INTERNAL MEDICINE

## 2024-07-17 PROCEDURE — 85025 COMPLETE CBC W/AUTO DIFF WBC: CPT | Performed by: INTERNAL MEDICINE

## 2024-07-17 PROCEDURE — 80197 ASSAY OF TACROLIMUS: CPT | Performed by: INTERNAL MEDICINE

## 2024-07-17 PROCEDURE — 36415 COLL VENOUS BLD VENIPUNCTURE: CPT | Performed by: INTERNAL MEDICINE

## 2024-07-17 NOTE — PROGRESS NOTES
RESEARCH STUDY SPECIMEN COLLECTION ENCOUNTER  ORGAN TRANSPLANT  [facility]    Study Title: Role of Tumor-Induced Immune Tolerance in the Patient Response to Locoregional Therapy: Implications in Assessment Risk of Hepatocellular Carcinoma Recurrence Following Liver Transplantation    IRB #: [id number]    IRB Approval Date: 6/8/2016    : Ameya Suero MD  Sub-investigator: Cecil Reilly, PhD    Patient Number: [id number]     In accordance with the study protocol, Research Lab orders were placed and follow-up specimens were collected on (date: 7/17/2024) in:  [facility] LAB VNP: YES/NO: No  [facility] LABTX: YES/NO: No  [facility] LAB IM: YES/NO: No  Other [facility] location: YES/NO: Yes   Location: [facility]     A  was used to transport blood specimens to [facility] for processing: YES/NO: Yes  Blood specimens were transported to [facility] for processing: YES/NO: Yes    Marvel Herrmann  Admin Research- Liver Transplant

## 2024-07-18 LAB — TACROLIMUS BLD-MCNC: 6.2 NG/ML (ref 5–15)

## 2024-07-19 ENCOUNTER — TELEPHONE (OUTPATIENT)
Dept: TRANSPLANT | Facility: CLINIC | Age: 64
End: 2024-07-19
Payer: COMMERCIAL

## 2024-07-19 ENCOUNTER — HOSPITAL ENCOUNTER (OUTPATIENT)
Dept: RADIOLOGY | Facility: HOSPITAL | Age: 64
Discharge: HOME | End: 2024-07-19
Attending: SURGERY
Payer: COMMERCIAL

## 2024-07-19 DIAGNOSIS — T86.49 STENOSIS OF HEPATIC ARTERY OF TRANSPLANTED LIVER: ICD-10-CM

## 2024-07-19 DIAGNOSIS — Z94.4 S/P LIVER TRANSPLANT: ICD-10-CM

## 2024-07-19 DIAGNOSIS — Z94.4 STATUS POST LIVER TRANSPLANT: Primary | ICD-10-CM

## 2024-07-19 DIAGNOSIS — Z85.05 PERSONAL HISTORY OF MALIGNANT NEOPLASM OF LIVER: ICD-10-CM

## 2024-07-19 DIAGNOSIS — I77.1 STENOSIS OF HEPATIC ARTERY OF TRANSPLANTED LIVER: ICD-10-CM

## 2024-07-19 PROCEDURE — 93976 VASCULAR STUDY: CPT | Mod: TC

## 2024-07-19 PROCEDURE — 76705 ECHO EXAM OF ABDOMEN: CPT | Mod: TC

## 2024-07-19 NOTE — TELEPHONE ENCOUNTER
Pt advised  ----- Message from Ameya Suero MD sent at 7/19/2024  1:41 PM CDT -----  Results ok. No action needed

## 2024-07-19 NOTE — TELEPHONE ENCOUNTER
Letter sent, labs stable and no medication changes are needed. Repeat labs due 8/12/24 per protocol.  ----- Message from Joleen Carr MD sent at 7/19/2024  7:27 AM CDT -----  Reviewed, nothing to do; repeat per routine

## 2024-07-24 ENCOUNTER — PATIENT MESSAGE (OUTPATIENT)
Dept: HEPATOLOGY | Facility: CLINIC | Age: 64
End: 2024-07-24
Payer: COMMERCIAL

## 2024-07-24 ENCOUNTER — PATIENT MESSAGE (OUTPATIENT)
Dept: DIABETES | Facility: CLINIC | Age: 64
End: 2024-07-24
Payer: COMMERCIAL

## 2024-07-24 ENCOUNTER — TELEPHONE (OUTPATIENT)
Dept: DIABETES | Facility: CLINIC | Age: 64
End: 2024-07-24
Payer: COMMERCIAL

## 2024-07-24 DIAGNOSIS — E11.69 TYPE 2 DIABETES MELLITUS WITH OTHER SPECIFIED COMPLICATION, WITH LONG-TERM CURRENT USE OF INSULIN: Chronic | ICD-10-CM

## 2024-07-24 DIAGNOSIS — Z79.4 TYPE 2 DIABETES MELLITUS WITH OTHER SPECIFIED COMPLICATION, WITH LONG-TERM CURRENT USE OF INSULIN: Chronic | ICD-10-CM

## 2024-07-24 NOTE — TELEPHONE ENCOUNTER
Scheduled for August 13th at 8am per pt preference.     ----- Message from Keely Conrad MA sent at 7/23/2024  5:12 PM CDT -----  Please assist patient with scheduling pump training.    Thank you  ----- Message -----  From: Anselmo Herrera  Sent: 7/23/2024   5:01 PM CDT  To: [facility] Staff    Type: General Call Back         Name of Caller:pt  Would the patient rather a call back or a response via [facility]? Call or [facility] [place]  Best Call Back Number: [phone]   Additional Information: Pt needs to reschedule insulin pump training form 08/05 to Wed 08/17/2024. Please call pt with further info and assistance. Thank you.

## 2024-08-02 ENCOUNTER — OFFICE VISIT (OUTPATIENT)
Dept: OPHTHALMOLOGY | Facility: CLINIC | Age: 64
End: 2024-08-02
Payer: COMMERCIAL

## 2024-08-02 DIAGNOSIS — H40.013 OPEN ANGLE WITH BORDERLINE FINDINGS OF BOTH EYES: ICD-10-CM

## 2024-08-02 DIAGNOSIS — H52.4 PRESBYOPIA: ICD-10-CM

## 2024-08-02 DIAGNOSIS — Z98.890 POST-OPERATIVE STATE: Primary | ICD-10-CM

## 2024-08-02 DIAGNOSIS — Z96.1 PSEUDOPHAKIA OF BOTH EYES: ICD-10-CM

## 2024-08-02 DIAGNOSIS — H52.221 REGULAR ASTIGMATISM OF RIGHT EYE: ICD-10-CM

## 2024-08-02 PROCEDURE — 99999 PR PBB SHADOW E&M-EST. PATIENT-LVL III: CPT | Mod: PBBFAC,,, | Performed by: OPTOMETRIST

## 2024-08-02 NOTE — PROGRESS NOTES
HPI     Post-op Evaluation            Comments: Pt reports for 1 month post op , no pain or irritations. Pt   states he finished all post op gtts            Last edited by Danyelle Stuart MA on 8/2/2024  7:35 AM.            Assessment /Plan     For exam results, see Encounter Report.    Post-operative state  Pseudophakia of both eyes  Doing well OU  Monitor 6 months    Open angle with borderline findings of both eyes    Regular astigmatism of right eye  Presbyopia  Eyeglass Final Rx       Eyeglass Final Rx         Sphere Cylinder Axis Add    Right -0.75 +1.25 010 +2.50    Left [place]   +2.50      Expiration Date: 8/2/2025                  Spec Rx is optional, ok to continue with OTC readers      RTC 6 months for 24-2VF, gOCT and dilated eye exam or PRN if any problems.   Discussed above and answered questions.

## 2024-08-12 ENCOUNTER — OFFICE VISIT (OUTPATIENT)
Dept: DIABETES | Facility: CLINIC | Age: 64
End: 2024-08-12
Payer: COMMERCIAL

## 2024-08-12 ENCOUNTER — LAB VISIT (OUTPATIENT)
Dept: LAB | Facility: HOSPITAL | Age: 64
End: 2024-08-12
Attending: INTERNAL MEDICINE
Payer: COMMERCIAL

## 2024-08-12 ENCOUNTER — TELEPHONE (OUTPATIENT)
Dept: DIABETES | Facility: CLINIC | Age: 64
End: 2024-08-12
Payer: COMMERCIAL

## 2024-08-12 DIAGNOSIS — Z94.4 STATUS POST LIVER TRANSPLANT: ICD-10-CM

## 2024-08-12 DIAGNOSIS — Z79.4 TYPE 2 DIABETES MELLITUS WITH OTHER SPECIFIED COMPLICATION, WITH LONG-TERM CURRENT USE OF INSULIN: Primary | Chronic | ICD-10-CM

## 2024-08-12 DIAGNOSIS — E11.69 TYPE 2 DIABETES MELLITUS WITH OTHER SPECIFIED COMPLICATION, WITH LONG-TERM CURRENT USE OF INSULIN: Primary | Chronic | ICD-10-CM

## 2024-08-12 LAB
AFP SERPL-MCNC: <2 NG/ML (ref 0–8.4)
ALBUMIN SERPL BCP-MCNC: 3.8 G/DL (ref 3.5–5.2)
ALP SERPL-CCNC: 72 U/L (ref 55–135)
ALT SERPL W/O P-5'-P-CCNC: 18 U/L (ref 10–44)
ANION GAP SERPL CALC-SCNC: 6 MMOL/L (ref 8–16)
AST SERPL-CCNC: 21 U/L (ref 10–40)
BASOPHILS # BLD AUTO: 0.02 K/UL (ref 0–0.2)
BASOPHILS NFR BLD: 0.3 % (ref 0–1.9)
BILIRUB SERPL-MCNC: 1.8 MG/DL (ref 0.1–1)
BUN SERPL-MCNC: 12 MG/DL (ref 8–23)
CALCIUM SERPL-MCNC: 8.6 MG/DL (ref 8.7–10.5)
CHLORIDE SERPL-SCNC: 107 MMOL/L (ref 95–110)
CO2 SERPL-SCNC: 26 MMOL/L (ref 23–29)
CREAT SERPL-MCNC: 0.9 MG/DL (ref 0.5–1.4)
DIFFERENTIAL METHOD BLD: ABNORMAL
EOSINOPHIL # BLD AUTO: 0.1 K/UL (ref 0–0.5)
EOSINOPHIL NFR BLD: 0.9 % (ref 0–8)
ERYTHROCYTE [DISTWIDTH] IN BLOOD BY AUTOMATED COUNT: 14.4 % (ref 11.5–14.5)
EST. GFR  (NO RACE VARIABLE): >60 ML/MIN/1.73 M^2
GLUCOSE SERPL-MCNC: 125 MG/DL (ref 70–110)
HCT VFR BLD AUTO: 48.3 % (ref 40–54)
HGB BLD-MCNC: 15.3 G/DL (ref 14–18)
IMM GRANULOCYTES # BLD AUTO: 0.02 K/UL (ref 0–0.04)
IMM GRANULOCYTES NFR BLD AUTO: 0.3 % (ref 0–0.5)
LYMPHOCYTES # BLD AUTO: 2.5 K/UL (ref 1–4.8)
LYMPHOCYTES NFR BLD: 40.1 % (ref 18–48)
MCH RBC QN AUTO: 27.7 PG (ref 27–31)
MCHC RBC AUTO-ENTMCNC: 31.7 G/DL (ref 32–36)
MCV RBC AUTO: 88 FL (ref 82–98)
MONOCYTES # BLD AUTO: 0.5 K/UL (ref 0.3–1)
MONOCYTES NFR BLD: 7.3 % (ref 4–15)
NEUTROPHILS # BLD AUTO: 3.2 K/UL (ref 1.8–7.7)
NEUTROPHILS NFR BLD: 51.1 % (ref 38–73)
NRBC BLD-RTO: 0 /100 WBC
PLATELET # BLD AUTO: 146 K/UL (ref 150–450)
PMV BLD AUTO: 10.3 FL (ref 9.2–12.9)
POTASSIUM SERPL-SCNC: 3.8 MMOL/L (ref 3.5–5.1)
PROT SERPL-MCNC: 6.7 G/DL (ref 6–8.4)
RBC # BLD AUTO: 5.52 M/UL (ref 4.6–6.2)
SODIUM SERPL-SCNC: 139 MMOL/L (ref 136–145)
WBC # BLD AUTO: 6.33 K/UL (ref 3.9–12.7)

## 2024-08-12 PROCEDURE — 82105 ALPHA-FETOPROTEIN SERUM: CPT | Performed by: INTERNAL MEDICINE

## 2024-08-12 PROCEDURE — 36415 COLL VENOUS BLD VENIPUNCTURE: CPT | Performed by: INTERNAL MEDICINE

## 2024-08-12 PROCEDURE — 99214 OFFICE O/P EST MOD 30 MIN: CPT | Mod: 95,,, | Performed by: NURSE PRACTITIONER

## 2024-08-12 PROCEDURE — 87517 HEPATITIS B DNA QUANT: CPT | Performed by: INTERNAL MEDICINE

## 2024-08-12 PROCEDURE — G2211 COMPLEX E/M VISIT ADD ON: HCPCS | Mod: 95,,, | Performed by: NURSE PRACTITIONER

## 2024-08-12 PROCEDURE — 4010F ACE/ARB THERAPY RXD/TAKEN: CPT | Mod: CPTII,95,, | Performed by: NURSE PRACTITIONER

## 2024-08-12 PROCEDURE — 95251 CONT GLUC MNTR ANALYSIS I&R: CPT | Mod: NDTC,,, | Performed by: NURSE PRACTITIONER

## 2024-08-12 PROCEDURE — 85025 COMPLETE CBC W/AUTO DIFF WBC: CPT | Performed by: INTERNAL MEDICINE

## 2024-08-12 PROCEDURE — 80197 ASSAY OF TACROLIMUS: CPT | Performed by: INTERNAL MEDICINE

## 2024-08-12 PROCEDURE — 80053 COMPREHEN METABOLIC PANEL: CPT | Performed by: INTERNAL MEDICINE

## 2024-08-12 PROCEDURE — 3044F HG A1C LEVEL LT 7.0%: CPT | Mod: CPTII,95,, | Performed by: NURSE PRACTITIONER

## 2024-08-12 RX ORDER — INSULIN LISPRO 100 [IU]/ML
180 INJECTION, SOLUTION INTRAVENOUS; SUBCUTANEOUS
Qty: 20 ML | Refills: 11 | Status: SHIPPED | OUTPATIENT
Start: 2024-08-12 | End: 2024-08-13

## 2024-08-12 RX ORDER — INSULIN GLARGINE 100 [IU]/ML
33 INJECTION, SOLUTION SUBCUTANEOUS DAILY
Qty: 15 ML | Refills: 11 | Status: SHIPPED | OUTPATIENT
Start: 2024-08-12 | End: 2025-08-12

## 2024-08-12 RX ORDER — INSULIN LISPRO 100 [IU]/ML
INJECTION, SOLUTION INTRAVENOUS; SUBCUTANEOUS
Qty: 15 ML | Refills: 0 | Status: SHIPPED | OUTPATIENT
Start: 2024-08-12 | End: 2024-08-13

## 2024-08-12 NOTE — TELEPHONE ENCOUNTER
Attempted to call back, no answer, left VM. Will send message through chart.     ----- Message from Dacia Oquendo RD, CDE sent at 8/12/2024  7:49 AM CDT -----    ----- Message -----  From: Rosy Watson  Sent: 8/9/2024  12:26 PM CDT  To: #    Type:  Needs Medical Advice    Who Called: Pt   Symptoms (please be specific): received a call today that he was not connected to Dexcom 7    Would the patient rather a call back or a response via [facility]? Call back   Best Call Back Number: [phone]  Additional Information: Please be advised, pt states that he would like to speak to [facility] in order to reconnect [employer]

## 2024-08-12 NOTE — PROGRESS NOTES
The patient location is: Home  The chief complaint leading to consultation is: Diabetes    Visit type: audiovisual    Face to Face time with patient: 16  30 minutes of total time spent on the encounter, which includes face to face time and non-face to face time preparing to see the patient (eg, review of tests), Obtaining and/or reviewing separately obtained history, Documenting clinical information in the electronic or other health record, Independently interpreting results (not separately reported) and communicating results to the patient/family/caregiver, or Care coordination (not separately reported).  Each patient to whom he or she provides medical services by telemedicine is:  (1) informed of the relationship between the physician and patient and the respective role of any other health care provider with respect to management of the patient; and (2) notified that he or she may decline to receive medical services by telemedicine and may withdraw from such care at any time.    Notes:    Jed Chávez is a 64 y.o. male who presents for a follow up evaluation of Type 2 diabetes mellitus.     CHIEF COMPLAINT: Diabetes Consultation    PCP: EVELIN Pitt MD      Initial visit with me - 4/9/2024    The patient was initially diagnosed with diabetes in 2016.   S/p liver transplant in August 2023.   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      Previous failed treatments include:  Metformin - d/c after transplant - hold until 1 year post transplant.   Jardiance - d/c after transplant - hold until 1 year post transplant.     Social Documentation:  Patient lives in [place].   Occupation: self employed, janitorial service.   Exercise: daily. Plays golf.      Diabetes related complications:   cerebrovascular disease.   denies Pancreatitis  denies Gastroparesis  denies DKA  denies Hx/family Hx of MEN2/MTC  denies Frequent UTIs/yeast infections     Cardiovascular Risk Factors: advanced age (>55 for men, >65 for  "women) and male gender.    Diabetes Medications               insulin glargine U-100, Lantus, (LANTUS SOLOSTAR U-100 INSULIN) 100 unit/mL (3 mL) InPn pen Inject 28 Units into the skin once daily.    insulin lispro (HUMALOG KWIKPEN INSULIN) 100 unit/mL pen Inject 8 Units into the skin 3 (three) times daily before meals AND 1-5 Units 3 (three) times daily before meals. sliding scale insulin as needed. Max daily dose 75 units daily...         tirzepatide 10 mg/0.5 mL PnIj Inject 10 mg (one pen) into the skin every 7 days.     Current monitoring regimen:  Dexcom G7    The patient's Dexcom CGM was downloaded and reviewed. For the past 14 days, patient average glucose was 172 mg/dL. He was above range 40% of the time, in range 60% of the time, and below range 0% of the time. The target range for this patient was 70 - 180 mg/dL. Overall, there was a pattern of post prandial hyperglycemia, but overall improvement.        Patient currently taking insulin or sulfonylurea?  Yes. Emergency Glucagon Prescription current? no  Recent hypoglycemic episodes: No.     Patient compliant with glucose checks and medication administration? Yes    DIABETES MANAGEMENT STATUS  Statin: Not taking  ACE/ARB: Taking  Screening or Prevention Patient's value Goal Complete/Controlled?   HgA1C Testing and Control   Lab Results   Component Value Date    HGBA1C 6.3 (H) 04/11/2024      Annually/Less than 8% Yes   Lipid profile : 10/24/2023 Annually Yes   LDL control Lab Results   Component Value Date    LDLCALC 91.8 10/24/2023    Annually/Less than 100 mg/dl  Yes   Nephropathy screening Lab Results   Component Value Date    LABMICR 36.0 10/24/2023     Lab Results   Component Value Date    PROTEINUA Negative 10/24/2023     No results found for: "UTPCR"   Annually Yes   Blood pressure BP Readings from Last 1 Encounters:   06/24/24 (!) 144/96    Less than 140/90 No   Dilated retinal exam : 08/02/2024 Annually Yes   Foot exam   : 10/19/2023 Annually Yes "   Patient's medications, allergies, surgical, social and family histories were reviewed and updated as appropriate.     Review of Systems   Constitutional:  Negative for weight loss.   Eyes:  Negative for blurred vision and double vision.   Cardiovascular:  Negative for chest pain.   Gastrointestinal:  Negative for nausea and vomiting.   Genitourinary:  Negative for frequency.   Musculoskeletal:  Negative for falls.   Neurological:  Negative for dizziness and weakness.   Endo/Heme/Allergies:  Negative for polydipsia.   Psychiatric/Behavioral:  Negative for depression.    All other systems reviewed and are negative.       Physical Exam  Constitutional:       General: He is not in acute distress.     Appearance: Normal appearance.   Pulmonary:      Effort: No respiratory distress.   Neurological:      Mental Status: He is alert and oriented to person, place, and time.   Psychiatric:         Mood and Affect: Mood normal.         Behavior: Behavior normal.        There were no vitals taken for this visit.  Wt Readings from Last 3 Encounters:   06/24/24 103.1 kg (227 lb 4.7 oz)   04/16/24 98.9 kg (218 lb 0.6 oz)   03/04/24 98.2 kg (216 lb 7.9 oz)       LAB REVIEW  Lab Results   Component Value Date     07/17/2024    K 4.0 07/17/2024     07/17/2024    CO2 25 07/17/2024    BUN 12 07/17/2024    CREATININE 0.9 07/17/2024    CALCIUM 9.3 07/17/2024    ANIONGAP 10 07/17/2024    EGFRNORACEVR >60.0 07/17/2024     Lab Results   Component Value Date    CPEPTIDE 1.78 04/11/2024    GLUTAMICACID 0.00 04/11/2024     Hemoglobin A1C   Date Value Ref Range Status   04/11/2024 6.3 (H) 4.0 - 5.6 % Final     Comment:     ADA Screening Guidelines:  5.7-6.4%  Consistent with prediabetes  >or=6.5%  Consistent with diabetes    High levels of fetal hemoglobin interfere with the HbA1C  assay. Heterozygous hemoglobin variants (HbS, HgC, etc)do  not significantly interfere with this assay.   However, presence of multiple variants may  affect accuracy.     10/24/2023 6.1 (H) 4.0 - 5.6 % Final     Comment:     ADA Screening Guidelines:  5.7-6.4%  Consistent with prediabetes  >or=6.5%  Consistent with diabetes    High levels of fetal hemoglobin interfere with the HbA1C  assay. Heterozygous hemoglobin variants (HbS, HgC, etc)do  not significantly interfere with this assay.   However, presence of multiple variants may affect accuracy.     09/13/2023 6.3 (H) 4.0 - 5.6 % Final     Comment:     ADA Screening Guidelines:  5.7-6.4%  Consistent with prediabetes  >or=6.5%  Consistent with diabetes    High levels of fetal hemoglobin interfere with the HbA1C  assay. Heterozygous hemoglobin variants (HbS, HgC, etc)do  not significantly interfere with this assay.   However, presence of multiple variants may affect accuracy.          ASSESSMENT    ICD-10-CM ICD-9-CM   1. Type 2 diabetes mellitus with other specified complication, with long-term current use of insulin  E11.69 250.80    Z79.4 V58.67   2. Status post liver transplant  Z94.4 V42.7       PLAN  Diagnoses and all orders for this visit:    Type 2 diabetes mellitus with other specified complication, with long-term current use of insulin  -     insulin lispro 100 unit/mL injection; Inject 180 Units into the skin Every 3 (three) days. TO BE USED WITH ILET INSULIN PUMP  -     insulin glargine U-100, Lantus, (LANTUS SOLOSTAR U-100 INSULIN) 100 unit/mL (3 mL) InPn pen; Inject 33 Units into the skin once daily.    Status post liver transplant  -     insulin glargine U-100, Lantus, (LANTUS SOLOSTAR U-100 INSULIN) 100 unit/mL (3 mL) InPn pen; Inject 33 Units into the skin once daily.        Reviewed pathophysiology of diabetes, complications related to the disease, importance of annual dilated eye exam and daily foot examination. Explained MOA, SE, dosage of medications. Written instructions given and reviewed with patient and patient verbalizes understanding.     6/24/2024 - pump evaluation done on 4/30/24, at  "that time patient had decided on OP5, but states he really does not think he will do well with carb counting and is preferring a more "hands off" approach. Discussed iLet pump and patient would like to move forward with that. Overall glucoses stablizing, still with some hyperglycemia. Will increase Lantus to 33 units,  decrease humalog dose to 6 and increase Mounjaro to 10 mg with next refill.     2024 - iLet pump training tomorrow.    PATIENT INSTRUCTIONS     Ilet pump training tomorrow.     Continue Lantus to 33 units daily.      Continue Humalog to 6 units three times daily before meals, plus correction if needed.      If  - 250, ADD 2 units of Humalog  If  - 300, ADD 3 units of Humalog   If  - 350, ADD 4 units of Humalog  If  - 400, ADD 5 units of Humalog  If +, ADD 6 units of Humalog       Continue Mounjaro 10 mg subcutaneously every 7 days.      Continue Dexcom G7 CGM  Blood Sugar Goals:       Fastin-130.       1-2 hours after a meal: Less than 180.       Follow up in about 4 weeks (around 2024) for Virtual, Dexcom, iLet.    Portions of this note were prepared with [employer] Naturally Speaking voice recognition transcription software. Grammatical errors, including garbled syntax, mangle pronouns, and other bizarre constructions may be attributed to that software system.           "

## 2024-08-12 NOTE — PATIENT INSTRUCTIONS
PATIENT INSTRUCTIONS     Ilet pump training tomorrow.     Continue Lantus to 33 units daily.      Continue Humalog to 6 units three times daily before meals, plus correction if needed.      If  - 250, ADD 2 units of Humalog  If  - 300, ADD 3 units of Humalog   If  - 350, ADD 4 units of Humalog  If  - 400, ADD 5 units of Humalog  If +, ADD 6 units of Humalog       Continue Mounjaro 10 mg subcutaneously every 7 days.      Continue Dexcom G7 CGM  Blood Sugar Goals:       Fastin-130.       1-2 hours after a meal: Less than 180.

## 2024-08-13 ENCOUNTER — PATIENT OUTREACH (OUTPATIENT)
Dept: DIABETES | Facility: CLINIC | Age: 64
End: 2024-08-13
Payer: COMMERCIAL

## 2024-08-13 LAB
HEPATITIS B VIRUS DNA: NORMAL
HEPATITIS B VIRUS PCR, QUANT: NOT DETECTED IU/ML
TACROLIMUS BLD-MCNC: 9.7 NG/ML (ref 5–15)

## 2024-08-13 RX ORDER — INSULIN LISPRO 100 [IU]/ML
180 INJECTION, SOLUTION INTRAVENOUS; SUBCUTANEOUS
Qty: 20 ML | Refills: 11 | Status: SHIPPED | OUTPATIENT
Start: 2024-08-13 | End: 2025-08-13

## 2024-08-15 ENCOUNTER — TELEPHONE (OUTPATIENT)
Dept: TRANSPLANT | Facility: CLINIC | Age: 64
End: 2024-08-15
Payer: COMMERCIAL

## 2024-08-15 NOTE — TELEPHONE ENCOUNTER
----- Message from Joleen Carr MD sent at 8/12/2024  4:17 PM CDT -----  Labs ok awaiting immunosuppression level

## 2024-08-16 ENCOUNTER — TELEPHONE (OUTPATIENT)
Dept: TRANSPLANT | Facility: CLINIC | Age: 64
End: 2024-08-16
Payer: COMMERCIAL

## 2024-08-16 ENCOUNTER — PATIENT MESSAGE (OUTPATIENT)
Dept: DIABETES | Facility: CLINIC | Age: 64
End: 2024-08-16
Payer: COMMERCIAL

## 2024-08-16 NOTE — TELEPHONE ENCOUNTER
Pt notified via portal of stable labs and that no medication changes are needed. Repeat labs due 9/9/24 per protocol.   ----- Message from Joleen Carr MD sent at 8/16/2024 11:57 AM CDT -----  Reviewed, nothing to do; repeat per routine

## 2024-08-17 ENCOUNTER — PATIENT MESSAGE (OUTPATIENT)
Dept: TRANSPLANT | Facility: CLINIC | Age: 64
End: 2024-08-17
Payer: COMMERCIAL

## 2024-08-20 ENCOUNTER — PATIENT MESSAGE (OUTPATIENT)
Dept: DIABETES | Facility: CLINIC | Age: 64
End: 2024-08-20
Payer: COMMERCIAL

## 2024-08-20 DIAGNOSIS — E11.69 TYPE 2 DIABETES MELLITUS WITH OTHER SPECIFIED COMPLICATION, WITH LONG-TERM CURRENT USE OF INSULIN: Primary | ICD-10-CM

## 2024-08-20 DIAGNOSIS — Z79.4 TYPE 2 DIABETES MELLITUS WITH OTHER SPECIFIED COMPLICATION, WITH LONG-TERM CURRENT USE OF INSULIN: Primary | ICD-10-CM

## 2024-08-21 RX ORDER — INSULIN LISPRO 100 [IU]/ML
INJECTION, SOLUTION INTRAVENOUS; SUBCUTANEOUS
Qty: 15 ML | Refills: 11 | Status: SHIPPED | OUTPATIENT
Start: 2024-08-21

## 2024-08-22 ENCOUNTER — TELEPHONE (OUTPATIENT)
Dept: DIABETES | Facility: CLINIC | Age: 64
End: 2024-08-22
Payer: COMMERCIAL

## 2024-08-22 NOTE — TELEPHONE ENCOUNTER
Checked in with Mr. Chávez after starting iLet.   Denies any extreme highs or lows.   Concerned about how quickly cartridge is running out of insulin.   Upon further assessment, he noted his canula was bent.   Changed infusion set, cartridge, and tubing and no other issues occurred.   Successful call.

## 2024-08-22 NOTE — PROGRESS NOTES
iLet INSULIN PUMP START  Ellyn Fung present.    Patient is here today for insulin pump training and will be starting on the iLet Insulin pump.    Instructed to stop current regime once insulin pump is started.    Currently using Dexcom G6/Dexcom G7. Patient is new to insulin pump therapy.   Details of pump therapy were covered with pt to include basic features of the iLet pump:  The Home Screen, Status Bar and Menu Screen, time/date set up.  Insulin Cartridge, Notifications, Glucose Status, Meal Announcement  How the pump will adjust insulin delivery based on Dexcom CGM  Advised to review user guide enclosed in shipment.  Set up with the iLet Mobile kaveh, account set up with username and password.  Pairing the iLet and CGM sensor and syncing with the iLet Mobile kaveh  Connecting the iLet to a CGM sensor session  Advised that the CGM sensor session will need to be initiated on the pump itself, can continue using the phone kaveh only.  Following the same procedure as the phone kaveh. Will be able to use the phone kaveh and will need to connect after initiating with pump. Will not be able to use the Dexcom .   BG run mode explained in the event of no Dexcom sensor.  Advised that if you are out of Dexcom sensors and if it is less than 7 days will stay in BG-run mode for 48 hours  If iLet system has been used for 7 days or more, BG-run mode can be used for 72 hours.  After that time, will need to come off pump and resume pre pump regime.     Instructed how to fill cartridge, attach iLet connect and attaching to infusion set.  Practice steps with patient.  Demonstrated how to insert infusion site after priming set.  Instructed how to insert inset per user instructions.  Current weight in pounds entered pump.    Meal Announcements:  Patient was instructed to announce meals into the pump using usual for me for Breakfast, Lunch and Dinner. Patient was instructed only to use meal announcement when consuming food.  Do not  use to correct elevated glucose.     Troubleshooting of infusion site, pump, alerts discussed and what to do.  Back up plan discussed in the event of pump malfunction or Dexcom supply issue. Advised to call Dexcom and [employer] customer support number 24/7.  Numbers provided.     Patient scheduled for f/u visit in 1-2 weeks and instructed to reach out for additional questions.

## 2024-08-23 ENCOUNTER — PATIENT OUTREACH (OUTPATIENT)
Dept: ADMINISTRATIVE | Facility: HOSPITAL | Age: 64
End: 2024-08-23
Payer: COMMERCIAL

## 2024-08-23 NOTE — PROGRESS NOTES
CHIEF COMPLAINT:  Post-operative wound check    HPI:    Jed Chávez is a 63 y.o.-year-old male who presents today for post-operative follow-up s/p left frontal craniotomy for resection of meningioma on 10/26/23.  Patient is recovering well and reports improvements overall.  He feels like his balance is still off but better.  He has intermittent headaches.  He is eager to return to golf and driving.  His wound is healed well without any obvious signs of infection.    ROS:  As stated in the above HPI    PE:  There were no vitals filed for this visit.    AAOX3  NAD  CN 2-12 intact     Strength:      Deltoids Biceps Triceps Wrist Ext. Wrist Flex. Hand    RUE 5 5 5 5 5 5   LUE 5 5 5 5 5 5    Hip Flex. Knee Flex. Knee Ext. Dorsi Flex Plantar Flex EHL   RLE 5 5 5 5 5 5   LLE 5 5 5 5 5 5     Sensation:  Intact to light touch (All 4 extremities)    Gait:  Slightly imbalanced, poor tandem    Bicoronal  incision:  Wound edges well-approximated  Staples intact, removed without difficulty  Healing well with areas of scabbing.  Scabs removed with alcohol scrub    IMAGING:  All imaging reviewed by me.    Postop MRI:  Gross total resection    ASSESSMENT:   Problem List Items Addressed This Visit          Oncology    Atypical intracranial meningioma - Primary    Relevant Orders    MRI Brain W WO Contrast     Other Visit Diagnoses       S/P craniotomy        Relevant Orders    MRI Brain W WO Contrast            S/p left frontal craniotomy for resection of meningioma .  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  He is recovering well and is neurologically intact.  His wound is healed well without any obvious signs of infection.  He has been cleared to wash his hair and gently scrub the incision to clean off any scabbing.  He is also been cleared to drive and can resume golfing as tolerated.  Given that his final pathology returned as an atypical grade 2 meningioma, we will need to monitor the surgical area  Head, normocephalic, atraumatic, Face, Face within normal limits, Ears, External ears within normal limits, Nose/Nasopharynx, External nose  normal appearance, nares patent, no nasal discharge, Mouth and Throat, Oral cavity appearance normal, Breath odor normal, Lips, Appearance normal carefully and closely for any recurrence.  He is a candidate for radiation either upfront or when it recurs.  Given given that he has much to deal with regarding his liver transplant, he would like to wait on radiation at this point.  The plan will be to repeat his MRI in 3 months and can referred to Radiation Oncology in [place] if there is clear evidence of regrowth.    PLAN:   - referral for neuro PT (instructed to find a place in [place])  - return to clinic in 3 months with BMRI  - will refer to Radiation Oncology if three-month MRI shows evidence of recurrence    Time spent on this encounter: 40 minutes. This includes face-to-face time and non-face to face time preparing to see the patient (eg, review of tests), obtaining and/or reviewing separately obtained history, documenting clinical information in the electronic or other health record, independently interpreting results and communicating results to the patient/family/caregiver, or care coordinator.

## 2024-08-23 NOTE — LETTER
AUTHORIZATION FOR RELEASE OF   CONFIDENTIAL INFORMATION    Dear Dr Germain STAFF,    We are seeing Jed Chávez, date of birth 1960, in the clinic at [facility]. EVELIN Pitt MD is the patient's PCP. Jed Chávez has an outstanding COLONOSCOPY AND PATH at the time we reviewed his chart. In order to help keep his health information updated, he has authorized us to request the following medical record(s):        (  )  MAMMOGRAM                                      ( x )  COLONOSCOPY      (  )  PAP SMEAR                                          (  )  OUTSIDE LAB RESULTS     (  )  DEXA SCAN                                          (  )  EYE EXAM            (  )  FOOT EXAM                                          (  )  ENTIRE RECORD     (  )  OUTSIDE IMMUNIZATIONS                 (  )  _______________      Please FAX pt most recent COLONOSCOPY/PATH REPORT,  pt reported he had procedure with Dr Germain unsure when he is DUE, We will inform pt to schedule if he is due for a repeat.     Please FAX records to [facility], [place], L Lee, MD, [phone]     If you have any questions, please contact Ya CROCKETT LPN Phone [phone].           Patient Name: Jed Chávez  : 1960  Patient Phone #: [phone]

## 2024-08-23 NOTE — PROGRESS NOTES
VBHM Score: 2     Colon Cancer Screening  Uncontrolled BP                       Health Maintenance Topic(s) Outreach Outcomes & Actions Taken:    Colorectal Cancer Screening - Outreach Outcomes & Actions Taken  : Any colon cancer screen recommended.    Blood Pressure - Outreach Outcomes & Actions Taken  : Need follow up         Additional Notes:  Attempted to contact pt no ans, lvm, sent my chart message.               Care Management, Digital Medicine, and/or Education Referrals    OPCM Risk Score: 45.7         Next Steps - Referral Actions: Not high risk, no ans

## 2024-08-23 NOTE — PROGRESS NOTES
Pt returned call reported he has seen Dr Abdulkadir Germain at [facility] and he has done colonoscopy in the last 5yrs but does not remember exactly when, I requested record from [facility] fax [phone], fax number In epic is incorrect in regards to fax number.

## 2024-08-27 ENCOUNTER — TELEPHONE (OUTPATIENT)
Dept: DIABETES | Facility: CLINIC | Age: 64
End: 2024-08-27
Payer: COMMERCIAL

## 2024-08-27 ENCOUNTER — CLINICAL SUPPORT (OUTPATIENT)
Dept: DIABETES | Facility: CLINIC | Age: 64
End: 2024-08-27
Payer: COMMERCIAL

## 2024-08-27 DIAGNOSIS — Z79.4 TYPE 2 DIABETES MELLITUS WITH OTHER SPECIFIED COMPLICATION, WITH LONG-TERM CURRENT USE OF INSULIN: Chronic | ICD-10-CM

## 2024-08-27 DIAGNOSIS — E11.69 TYPE 2 DIABETES MELLITUS WITH OTHER SPECIFIED COMPLICATION, WITH LONG-TERM CURRENT USE OF INSULIN: Chronic | ICD-10-CM

## 2024-08-27 DIAGNOSIS — E11.65 TYPE 2 DIABETES MELLITUS WITH HYPERGLYCEMIA, WITH LONG-TERM CURRENT USE OF INSULIN: Primary | ICD-10-CM

## 2024-08-27 DIAGNOSIS — Z79.4 TYPE 2 DIABETES MELLITUS WITH HYPERGLYCEMIA, WITH LONG-TERM CURRENT USE OF INSULIN: Primary | ICD-10-CM

## 2024-08-27 PROCEDURE — 99999 PR PBB SHADOW E&M-EST. PATIENT-LVL III: CPT | Mod: PBBFAC,,,

## 2024-08-27 RX ORDER — INSULIN LISPRO 100 [IU]/ML
180 INJECTION, SOLUTION INTRAVENOUS; SUBCUTANEOUS EVERY OTHER DAY
Qty: 30 ML | Refills: 11 | Status: SHIPPED | OUTPATIENT
Start: 2024-08-27 | End: 2025-08-27

## 2024-08-27 NOTE — TELEPHONE ENCOUNTER
----- Message from Cassia Huston RN sent at 8/27/2024  1:15 PM CDT -----  Hi, I met with Mr. Chávez today. He is using iLet and using ~105 units of insulin/day. Can you please change his script for him to change Q2D instead of Q3D? Thank you! KELLY Antony

## 2024-08-27 NOTE — Clinical Note
Hi, I met with Mr. Chávez today. He is using iLet and using ~105 units of insulin/day. Can you please change his script for him to change Q2D instead of Q3D? Thank you! KELLY Antony

## 2024-08-27 NOTE — PROGRESS NOTES
Diabetes Care Specialist Follow-up Note  Author: Cassia Huston RN  Date: 8/27/2024    Intake    Program Intake  Reason for Diabetes Program Visit:: Intervention  Type of Intervention:: Individual  Individual: Education  Education: Self-Management Skill Review  Current diabetes risk level:: high  In the last 12 months, have you:: none  Permission to speak with others about care:: yes    Current Diabetes Treatment: Insulin  Method of insulin delivery?: Insulin Pump  Type of Pump: iLet  Does patient have back-up plan?: Yes  Any problems obtaining supplies?: No    Continuous Glucose Monitoring  Patient has CGM: Yes  Personal CGM type:: Dexcom G7  GMI Date: 08/27/24  GMI Value: 7.4 %    Lab Results   Component Value Date    HGBA1C 6.3 (H) 04/11/2024     A1c Pre Diabetes Care Specialist Intervention:  6.1%            Physical activity/Exercise:   Actively participating in PT.    SMBG: Dexcom G7.     Diabetes Self-Management Skills Assessment    Nutrition/Healthy Eating  Meal Plan 24 Hour Recall - Breakfast: 2 Premier Protein shakes.  Meal Plan 24 Hour Recall - Lunch: Can't remember  Meal Plan 24 Hour Recall - Dinner: Grilled chicken, bowtie pasta    Home Blood Glucose Monitoring  Personal CGM type:: Dexcom G7    During today's follow-up visit,  the following areas required further assessment and content was provided/reviewed.    Based on today's diabetes care assessment, the following areas of need were identified:          2/13/2024    12:01 AM   Areas of Need   Healthy Eating See care plan for update.     Medications See care plan.          Today's interventions were provided through individual discussion, instruction, and written materials were provided.    Patient verbalized understanding of instruction and written materials.  Pt was able to return back demonstration of instructions today. Patient understood key points, needs reinforcement and further instruction.     Diabetes Self-Management Care Plan Review and  "Evaluation of Progress:    During today's follow-up Jed's Diabetes Self-Management Care Plan progress was reviewed and progress was evaluated including his/her input. Jed has agreed to continue his/her journey to improve/maintain overall diabetes control by continuing to set health goals. See care plan progress below.      Care Plan: Diabetes Management   Updates made since 7/28/2024 12:00 AM        Problem: Healthy Eating         Goal: Eat 3 meals daily with 45-60g of carbohydrates per meal and ~15g per snack.    Start Date: 1/23/2024   Expected End Date: 1/23/2025   This Visit's Progress: On track   Recent Progress: On track   Priority: High   Barriers: No Barriers Identified   Note:    Mr. Chávez states him and his wife make a good team when it comes to planning and preparing meals.   Educated on the recommended amount of carbs per meal, how to read a food label, and how to use measuring cups to portion foods.   Admits to decreased appetite with Mounjaro but will eat smaller portions because he knows he "has to" for diabetes.    2/13/24: Mr. Chávez continues to eat smaller portions. He has done better with pairing carbs with protein and/or healthy fat. He is not measuring food but plans to start. He is interested in pump therapy; will refer to [facility] for management.     4/16/24: Continues to do well with healthy eatings. He is eating smaller portions and eating balanced meals.    4/30/24: Discussed pre-pump topics:   Pump options w/ compatible CGM: Medtronic using Guardian, OmniPod and Tandem using Dexcom G6  Common terms: basal/bolus insulin, IC, ISF, TBS, IOB  Pros/cons pump therapy, supplies needed, frequency of changing infusion sets and cartridges/pods, and backup pump plan   Allowed pt to see demo pumps and example infusion set    Provided carb counting training using food lists, food label from Diabetes Management Guide. Also, encouraged pt to download and use [employer] mobile kaveh for additional support. " "Pt able demonstrate understanding using examples in clinic.     Educated Mr. Chávez to keep 1 week of food logs with carbs and bring back to clinic on Monday, May 6th. Will review  and get back to him afterwards.      8/27/24: Doing well with healthy eating by making healthier choices and choosing lower carb snacks. Has started drinking Premier Protein (does not announce since only 8 grams).          Problem: Blood Glucose Self-Monitoring         Goal: Patient agrees to monitor blood glucose using personal Dexcom G7 Completed 8/27/2024   Start Date: 3/5/2024   Expected End Date: 3/5/2025   This Visit's Progress: On track   Recent Progress: On track   Priority: Medium   Barriers: No Barriers Identified   Note:    Method: discussion, demonstration, and instruction  Level of Comprehension: demonstrates understanding/competency - verbalizes/demonstrates    DEXCOM G7 CGM TRAINING  Dexcom G7 mobile apps downloaded to phone. Education was provided using "Quick Start Guide" and demo device per Dexcom protocol. [facility] data share set-up.    Patient will use Dexcom G7 on his iPhone to receive continuous glucose data.        Overview:  5 minute glucose reading updates, trending arrows, BG graph screens, reports screen,  connectivity and functions.   Menus: Trend graph, start sensor, enter BG, events, alerts, settings, replace sensor, stop sensor, and shutdown  Dexcom G7 mobile kaveh/ Settings:                          * Urgent Low: 55 mg/dL                          * Urgent Low Soon: On                          * Low Alert: 70 mg/dL                          * High Alert: 250 mg/dL                          * Fall Rate: On                          * Signal Loss: On                          * Brief Sensor Issue: On     Reviewed additional setting options.  Patient paired sensor using 4-digit code listed on sensor cap..    Reviewed where to find sensor insertion time and expiration date.   Reviewed appropriate " calibration techniques.  Reviewed sensor site selection. Patient selected and prepped site using aseptic technique, inserted sensor, applied overlay tape and started session.   Reviewed sensor removal from site. Discussed times to remove sensor per  guidelines include MRI or diatherapy.   Patient able to demonstrate without difficulty. Encouraged to review manual and/or training videos prior to starting another sensor.   Reviewed problem solving aspects of sensor transmission/variables that can disrupt RF transmission.  range 20 feet, but the first 3 hrs keep within 3 feet of transmitter.  Patient instructed on lag time of interstitial fluid from CBG and was advised to treat hypoglycemia and dose insulin based on SMBG values.  Dexcom technical support contact number given and examples of when to contact them discussed.       Warm-up completed: 136 with upward arrow    3/19/24: Helped place new sensor; warm up completed-- >. Last sensor placed bled; helped with ordering new one through kaveh.     4/16/24: Helped with ordering replacement sensor. Blood sugars remain above goal; will reach out to [facility] about increasing long-acting insulin and short-acting insulin.   Educated on how to use History tab to document Events. He was able to log his AM insulin doses from today.     4/30/24: Mr. Chávez is interested in the Omnipod 5. Educated he will have to use the Dexcom G6. Showed him how G6 works.     8/27/24: No issues with Dexcom G7; goal met.         Problem: Medications         Goal: Patient agrees to use iLet as prescribed.    Start Date: 8/27/2024   Expected End Date: 1/23/2025   Priority: Medium   Barriers: No Barriers Identified   Note:    Reviewed iLet report together; Mr. Chávez is doing well announcing his meals appropriately.  Using ~106 units of insulin/day; will ask [facility] about adjusting his prescription to be able to change Q2D.        Task: Discussed guidelines for  preventing, detecting and treating hypoglycemia and hyperglycemia and reviewed the importance of meal and medication timing with diabetes mediations for prevention of hypoglycemia and maximum drug benefit. Completed 8/27/2024          Follow Up Plan     Follow up in about 3 months (around 11/27/2024) for review.    Today's care plan and follow up schedule was discussed with patient.  Jed verbalized understanding of the care plan, goals, and agrees to follow up plan.        The patient was encouraged to communicate with his/her health care provider/physician and care team regarding his/her condition(s) and treatment.  I provided the patient with my contact information today and encouraged to contact me via phone or [facility] Patient Portal as needed.     Length of Visit   Total Time: 40 Minutes

## 2024-08-29 ENCOUNTER — TELEPHONE (OUTPATIENT)
Dept: TRANSPLANT | Facility: CLINIC | Age: 64
End: 2024-08-29
Payer: COMMERCIAL

## 2024-08-29 ENCOUNTER — HOSPITAL ENCOUNTER (OUTPATIENT)
Dept: RADIOLOGY | Facility: HOSPITAL | Age: 64
Discharge: HOME | End: 2024-08-29
Attending: INTERNAL MEDICINE
Payer: COMMERCIAL

## 2024-08-29 DIAGNOSIS — Z94.4 STATUS POST LIVER TRANSPLANT: ICD-10-CM

## 2024-08-29 DIAGNOSIS — Z85.05 PERSONAL HISTORY OF MALIGNANT NEOPLASM OF LIVER: ICD-10-CM

## 2024-08-29 PROCEDURE — 93976 VASCULAR STUDY: CPT | Mod: TC

## 2024-08-29 PROCEDURE — 74160 CT ABDOMEN W/CONTRAST: CPT | Mod: 26,,, | Performed by: RADIOLOGY

## 2024-08-29 PROCEDURE — 74160 CT ABDOMEN W/CONTRAST: CPT | Mod: TC

## 2024-08-29 PROCEDURE — 76705 ECHO EXAM OF ABDOMEN: CPT | Mod: TC

## 2024-08-29 PROCEDURE — 25500020 PHARM REV CODE 255: Performed by: INTERNAL MEDICINE

## 2024-08-29 PROCEDURE — 71260 CT THORAX DX C+: CPT | Mod: TC

## 2024-08-29 PROCEDURE — 71260 CT THORAX DX C+: CPT | Mod: 26,,, | Performed by: RADIOLOGY

## 2024-08-29 RX ADMIN — IOHEXOL 100 ML: 350 INJECTION, SOLUTION INTRAVENOUS at 08:08

## 2024-08-29 NOTE — TELEPHONE ENCOUNTER
Pt advised via portal of stable imaging. No HCC noted. Next scans due 2/2025 per protocol.   ----- Message from Joleen Carr MD sent at 8/29/2024 11:17 AM CDT -----  Reviewed, nothing to do; repeat per routine

## 2024-08-29 NOTE — TELEPHONE ENCOUNTER
Pt advised of results.  Will also review with Dr. Suero to determine frequency of ultrasounds moving forward. Pt informed.  ----- Message from Joleen Carr MD sent at 8/29/2024 11:18 AM CDT -----  Patient with known stent occulsion repeat per routine

## 2024-09-04 ENCOUNTER — HOSPITAL ENCOUNTER (OUTPATIENT)
Dept: RADIOLOGY | Facility: HOSPITAL | Age: 64
Discharge: HOME | End: 2024-09-04
Attending: SPECIALIST
Payer: COMMERCIAL

## 2024-09-04 DIAGNOSIS — D42.0 ATYPICAL INTRACRANIAL MENINGIOMA: ICD-10-CM

## 2024-09-04 PROCEDURE — 70553 MRI BRAIN STEM W/O & W/DYE: CPT | Mod: TC

## 2024-09-04 PROCEDURE — A9585 GADOBUTROL INJECTION: HCPCS | Performed by: SPECIALIST

## 2024-09-04 PROCEDURE — 25500020 PHARM REV CODE 255: Performed by: SPECIALIST

## 2024-09-04 RX ORDER — GADOBUTROL 604.72 MG/ML
10 INJECTION INTRAVENOUS
Status: COMPLETED | OUTPATIENT
Start: 2024-09-04 | End: 2024-09-04

## 2024-09-04 RX ADMIN — GADOBUTROL 10 ML: 604.72 INJECTION INTRAVENOUS at 10:09

## 2024-09-05 ENCOUNTER — PATIENT OUTREACH (OUTPATIENT)
Dept: ADMINISTRATIVE | Facility: HOSPITAL | Age: 64
End: 2024-09-05
Payer: COMMERCIAL

## 2024-09-06 ENCOUNTER — OFFICE VISIT (OUTPATIENT)
Dept: RADIATION ONCOLOGY | Facility: CLINIC | Age: 64
End: 2024-09-06
Payer: COMMERCIAL

## 2024-09-06 VITALS
SYSTOLIC BLOOD PRESSURE: 156 MMHG | HEIGHT: 71 IN | WEIGHT: 230.38 LBS | RESPIRATION RATE: 18 BRPM | DIASTOLIC BLOOD PRESSURE: 99 MMHG | OXYGEN SATURATION: 95 % | HEART RATE: 84 BPM | TEMPERATURE: 98 F | BODY MASS INDEX: 32.25 KG/M2

## 2024-09-06 DIAGNOSIS — D42.0 ATYPICAL INTRACRANIAL MENINGIOMA: Primary | ICD-10-CM

## 2024-09-06 PROCEDURE — 99999 PR PBB SHADOW E&M-EST. PATIENT-LVL IV: CPT | Mod: PBBFAC,,, | Performed by: SPECIALIST

## 2024-09-06 NOTE — PROGRESS NOTES
Mr. Chávez returns for follow-up after receiving adjuvant radiotherapy for an atypical meningioma.  He has had no interval difficulty.  His recent MRI looks good.  Physical examination: Judgment orientation memory affect and cognition are intact.  Impression: He is doing well.  Plan: I will see him back in 6 months with a repeat MRI.  I certainly appreciate participating in this delightful gentleman's care.

## 2024-09-09 ENCOUNTER — OFFICE VISIT (OUTPATIENT)
Dept: DIABETES | Facility: CLINIC | Age: 64
End: 2024-09-09
Payer: COMMERCIAL

## 2024-09-09 ENCOUNTER — LAB VISIT (OUTPATIENT)
Dept: LAB | Facility: HOSPITAL | Age: 64
End: 2024-09-09
Attending: INTERNAL MEDICINE
Payer: COMMERCIAL

## 2024-09-09 DIAGNOSIS — Z94.4 STATUS POST LIVER TRANSPLANT: ICD-10-CM

## 2024-09-09 DIAGNOSIS — Z79.4 TYPE 2 DIABETES MELLITUS WITH OTHER SPECIFIED COMPLICATION, WITH LONG-TERM CURRENT USE OF INSULIN: Primary | Chronic | ICD-10-CM

## 2024-09-09 DIAGNOSIS — E11.69 TYPE 2 DIABETES MELLITUS WITH OTHER SPECIFIED COMPLICATION, WITH LONG-TERM CURRENT USE OF INSULIN: Primary | Chronic | ICD-10-CM

## 2024-09-09 LAB
ALBUMIN SERPL BCP-MCNC: 3.9 G/DL (ref 3.5–5.2)
ALP SERPL-CCNC: 80 U/L (ref 55–135)
ALT SERPL W/O P-5'-P-CCNC: 18 U/L (ref 10–44)
ANION GAP SERPL CALC-SCNC: 8 MMOL/L (ref 8–16)
AST SERPL-CCNC: 18 U/L (ref 10–40)
BASOPHILS # BLD AUTO: 0.03 K/UL (ref 0–0.2)
BASOPHILS NFR BLD: 0.5 % (ref 0–1.9)
BILIRUB SERPL-MCNC: 1.9 MG/DL (ref 0.1–1)
BUN SERPL-MCNC: 17 MG/DL (ref 8–23)
CALCIUM SERPL-MCNC: 9.4 MG/DL (ref 8.7–10.5)
CHLORIDE SERPL-SCNC: 105 MMOL/L (ref 95–110)
CO2 SERPL-SCNC: 26 MMOL/L (ref 23–29)
CREAT SERPL-MCNC: 1.1 MG/DL (ref 0.5–1.4)
DIFFERENTIAL METHOD BLD: ABNORMAL
EOSINOPHIL # BLD AUTO: 0.1 K/UL (ref 0–0.5)
EOSINOPHIL NFR BLD: 1.8 % (ref 0–8)
ERYTHROCYTE [DISTWIDTH] IN BLOOD BY AUTOMATED COUNT: 14 % (ref 11.5–14.5)
EST. GFR  (NO RACE VARIABLE): >60 ML/MIN/1.73 M^2
GLUCOSE SERPL-MCNC: 167 MG/DL (ref 70–110)
HCT VFR BLD AUTO: 48.4 % (ref 40–54)
HGB BLD-MCNC: 15.4 G/DL (ref 14–18)
IMM GRANULOCYTES # BLD AUTO: 0.02 K/UL (ref 0–0.04)
IMM GRANULOCYTES NFR BLD AUTO: 0.3 % (ref 0–0.5)
LYMPHOCYTES # BLD AUTO: 2.7 K/UL (ref 1–4.8)
LYMPHOCYTES NFR BLD: 40.7 % (ref 18–48)
MCH RBC QN AUTO: 27.8 PG (ref 27–31)
MCHC RBC AUTO-ENTMCNC: 31.8 G/DL (ref 32–36)
MCV RBC AUTO: 87 FL (ref 82–98)
MONOCYTES # BLD AUTO: 0.5 K/UL (ref 0.3–1)
MONOCYTES NFR BLD: 6.9 % (ref 4–15)
NEUTROPHILS # BLD AUTO: 3.3 K/UL (ref 1.8–7.7)
NEUTROPHILS NFR BLD: 49.8 % (ref 38–73)
NRBC BLD-RTO: 0 /100 WBC
PLATELET # BLD AUTO: 157 K/UL (ref 150–450)
PMV BLD AUTO: 10.6 FL (ref 9.2–12.9)
POTASSIUM SERPL-SCNC: 4 MMOL/L (ref 3.5–5.1)
PROT SERPL-MCNC: 7 G/DL (ref 6–8.4)
RBC # BLD AUTO: 5.54 M/UL (ref 4.6–6.2)
SODIUM SERPL-SCNC: 139 MMOL/L (ref 136–145)
WBC # BLD AUTO: 6.54 K/UL (ref 3.9–12.7)

## 2024-09-09 PROCEDURE — 85025 COMPLETE CBC W/AUTO DIFF WBC: CPT | Performed by: INTERNAL MEDICINE

## 2024-09-09 PROCEDURE — 80053 COMPREHEN METABOLIC PANEL: CPT | Performed by: INTERNAL MEDICINE

## 2024-09-09 PROCEDURE — 3044F HG A1C LEVEL LT 7.0%: CPT | Mod: CPTII,95,, | Performed by: NURSE PRACTITIONER

## 2024-09-09 PROCEDURE — 99214 OFFICE O/P EST MOD 30 MIN: CPT | Mod: 95,,, | Performed by: NURSE PRACTITIONER

## 2024-09-09 PROCEDURE — 80197 ASSAY OF TACROLIMUS: CPT | Performed by: INTERNAL MEDICINE

## 2024-09-09 PROCEDURE — 36415 COLL VENOUS BLD VENIPUNCTURE: CPT | Performed by: INTERNAL MEDICINE

## 2024-09-09 PROCEDURE — G2211 COMPLEX E/M VISIT ADD ON: HCPCS | Mod: 95,,, | Performed by: NURSE PRACTITIONER

## 2024-09-09 PROCEDURE — 95251 CONT GLUC MNTR ANALYSIS I&R: CPT | Mod: NDTC,,, | Performed by: NURSE PRACTITIONER

## 2024-09-09 PROCEDURE — 4010F ACE/ARB THERAPY RXD/TAKEN: CPT | Mod: CPTII,95,, | Performed by: NURSE PRACTITIONER

## 2024-09-09 RX ORDER — INSULIN LISPRO 100 [IU]/ML
INJECTION, SOLUTION INTRAVENOUS; SUBCUTANEOUS
Qty: 50 ML | Refills: 11 | Status: SHIPPED | OUTPATIENT
Start: 2024-09-09

## 2024-09-09 NOTE — PROGRESS NOTES
The patient location is: Home  The chief complaint leading to consultation is: Diabetes    Visit type: audiovisual    Face to Face time with patient: 16  30 minutes of total time spent on the encounter, which includes face to face time and non-face to face time preparing to see the patient (eg, review of tests), Obtaining and/or reviewing separately obtained history, Documenting clinical information in the electronic or other health record, Independently interpreting results (not separately reported) and communicating results to the patient/family/caregiver, or Care coordination (not separately reported).  Each patient to whom he or she provides medical services by telemedicine is:  (1) informed of the relationship between the physician and patient and the respective role of any other health care provider with respect to management of the patient; and (2) notified that he or she may decline to receive medical services by telemedicine and may withdraw from such care at any time.    Notes:    Jed Chávez is a 64 y.o. male who presents for a follow up evaluation of Type 2 diabetes mellitus.     CHIEF COMPLAINT: Diabetes Consultation    PCP: EVELIN Pitt MD      Initial visit with me - 4/9/2024    The patient was initially diagnosed with diabetes in 2016.   S/p liver transplant in August 2023.   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      Previous failed treatments include:  Metformin - d/c after transplant - hold until 1 year post transplant.   Jardiance - d/c after transplant - hold until 1 year post transplant.     Social Documentation:  Patient lives in [place].   Occupation: self employed, janitorial service.   Exercise: daily. Plays golf.      Diabetes related complications:   cerebrovascular disease.   denies Pancreatitis  denies Gastroparesis  denies DKA  denies Hx/family Hx of MEN2/MTC  denies Frequent UTIs/yeast infections     Cardiovascular Risk Factors: advanced age (>55 for men, >65 for  "women) and male gender.    Diabetes Medications               insulin glargine U-100, Lantus, (LANTUS SOLOSTAR U-100 INSULIN) 100 unit/mL (3 mL) InPn pen Inject 33 Units into the skin once daily.    insulin lispro (HUMALOG KWIKPEN INSULIN) 100 unit/mL pen UP TO 50 UNITS DAILY USING CORRECTION DOSING IN CASE OF PUMP FAILURE.    insulin lispro 100 unit/mL injection Inject 180 Units into the skin every other day. VIA ILET INSULIN PUMP    tirzepatide 10 mg/0.5 mL PnIj Inject 10 mg (one pen) into the skin every 7 days.     Current monitoring regimen:  Dexcom G7    The patient's Dexcom CGM was downloaded and reviewed. For 14 days, patient average glucose was 167 mg/dL. He was above range 29.4% of the time, in range 70.6% of the time, and below range 0% of the time. The target range for this patient was 70 - 180 mg/dL. Overall, there was a pattern of post prandial hyperglycemia.        Patient currently taking insulin or sulfonylurea?  Yes. Emergency Glucagon Prescription current? no  Recent hypoglycemic episodes: No.     Patient compliant with glucose checks and medication administration? Yes    DIABETES MANAGEMENT STATUS  Statin: Not taking  ACE/ARB: Taking  Screening or Prevention Patient's value Goal Complete/Controlled?   HgA1C Testing and Control   Lab Results   Component Value Date    HGBA1C 6.3 (H) 04/11/2024      Annually/Less than 8% Yes   Lipid profile : 10/24/2023 Annually Yes   LDL control Lab Results   Component Value Date    LDLCALC 91.8 10/24/2023    Annually/Less than 100 mg/dl  Yes   Nephropathy screening Lab Results   Component Value Date    LABMICR 36.0 10/24/2023     Lab Results   Component Value Date    PROTEINUA Negative 10/24/2023     No results found for: "UTPCR"   Annually Yes   Blood pressure BP Readings from Last 1 Encounters:   09/06/24 (!) 156/99    Less than 140/90 No   Dilated retinal exam : 08/02/2024 Annually Yes   Foot exam   : 10/19/2023 Annually Yes   Patient's medications, allergies, " surgical, social and family histories were reviewed and updated as appropriate.     Review of Systems   Constitutional:  Negative for weight loss.   Eyes:  Negative for blurred vision and double vision.   Cardiovascular:  Negative for chest pain.   Gastrointestinal:  Negative for nausea and vomiting.   Genitourinary:  Negative for frequency.   Musculoskeletal:  Negative for falls.   Neurological:  Negative for dizziness and weakness.   Endo/Heme/Allergies:  Negative for polydipsia.   Psychiatric/Behavioral:  Negative for depression.    All other systems reviewed and are negative.       Physical Exam  Constitutional:       General: He is not in acute distress.     Appearance: Normal appearance.   Pulmonary:      Effort: No respiratory distress.   Neurological:      Mental Status: He is alert and oriented to person, place, and time.   Psychiatric:         Mood and Affect: Mood normal.         Behavior: Behavior normal.        There were no vitals taken for this visit.  Wt Readings from Last 3 Encounters:   09/06/24 104.5 kg (230 lb 6.1 oz)   06/24/24 103.1 kg (227 lb 4.7 oz)   04/16/24 98.9 kg (218 lb 0.6 oz)       LAB REVIEW  Lab Results   Component Value Date     08/12/2024    K 3.8 08/12/2024     08/12/2024    CO2 26 08/12/2024    BUN 12 08/12/2024    CREATININE 0.9 08/12/2024    CALCIUM 8.6 (L) 08/12/2024    ANIONGAP 6 (L) 08/12/2024    EGFRNORACEVR >60.0 08/12/2024     Lab Results   Component Value Date    CPEPTIDE 1.78 04/11/2024    GLUTAMICACID 0.00 04/11/2024     Hemoglobin A1C   Date Value Ref Range Status   04/11/2024 6.3 (H) 4.0 - 5.6 % Final     Comment:     ADA Screening Guidelines:  5.7-6.4%  Consistent with prediabetes  >or=6.5%  Consistent with diabetes    High levels of fetal hemoglobin interfere with the HbA1C  assay. Heterozygous hemoglobin variants (HbS, HgC, etc)do  not significantly interfere with this assay.   However, presence of multiple variants may affect accuracy.     10/24/2023  "6.1 (H) 4.0 - 5.6 % Final     Comment:     ADA Screening Guidelines:  5.7-6.4%  Consistent with prediabetes  >or=6.5%  Consistent with diabetes    High levels of fetal hemoglobin interfere with the HbA1C  assay. Heterozygous hemoglobin variants (HbS, HgC, etc)do  not significantly interfere with this assay.   However, presence of multiple variants may affect accuracy.     09/13/2023 6.3 (H) 4.0 - 5.6 % Final     Comment:     ADA Screening Guidelines:  5.7-6.4%  Consistent with prediabetes  >or=6.5%  Consistent with diabetes    High levels of fetal hemoglobin interfere with the HbA1C  assay. Heterozygous hemoglobin variants (HbS, HgC, etc)do  not significantly interfere with this assay.   However, presence of multiple variants may affect accuracy.          ASSESSMENT    ICD-10-CM ICD-9-CM   1. Type 2 diabetes mellitus with other specified complication, with long-term current use of insulin  E11.69 250.80    Z79.4 V58.67       PLAN  Diagnoses and all orders for this visit:    Type 2 diabetes mellitus with other specified complication, with long-term current use of insulin  -     Hemoglobin A1C; Future  -     insulin lispro 100 unit/mL injection; VIA ILET INSULIN PUMP, MAX .        Reviewed pathophysiology of diabetes, complications related to the disease, importance of annual dilated eye exam and daily foot examination. Explained MOINDIRA, SE, dosage of medications. Written instructions given and reviewed with patient and patient verbalizes understanding.     6/24/2024 - pump evaluation done on 4/30/24, at that time patient had decided on OP5, but states he really does not think he will do well with carb counting and is preferring a more "hands off" approach. Discussed iLet pump and patient would like to move forward with that. Overall glucoses stablizing, still with some hyperglycemia. Will increase Lantus to 33 units,  decrease humalog dose to 6 and increase Mounjaro to 10 mg with next refill.     8/12/2024 - iLet " pump training tomorrow.    9/9/2024 - Doing well on iLet. Has occasional late meal announcement, discussed skipping announcement if over 30 minutes after start of meal.     PATIENT INSTRUCTIONS     Labs ordered and will be scheduled by [facility] staff.      Continue iLet insulin pump with Dexcom G7 CGM.   Continue Mounjaro 10 mg subcutaneously every 7 days.       U100 Pump Failure Plan:   We have decided to develop a plan in the event that your pump breaks or is nonfunctioning. After 4 hours without pump use, you should begin to consider putting your Pump Failure Back Up Plan into action especially if you foresee that you may not be able to use your pump for more than 20 hours. Since you are currently using short acting insulin (Humalog/Novolog/Admelog/Novolin R) in your pump, you will need access to your short acting insulin vial, syringes, and a back up long acting insulin in the event of a pump failure. I have sent a prescription for a long acting insulin to your pharmacy for use during your emergency pump failure plan. It is important that you keep both types of insulin/syringes with you so that you may have access to it at least 3 times daily if needed. This medication and syringes should be brought with you on overnight trips in the case of an emergency pump failure. This means making a plan to keeping your insulin and syringes at school, work, or other places you frequent. If needed, please reach out to my office about any letters you may need for permission to keep these items for emergency purposes. We will outline your plan for pump failure emergencies below, but please remember to contact the insulin pump company (toll free number is printed on the label on the back of the insulin pump) and our office if you have a pump failure. When the insulin pump is restarted, do not restart basal rates until at least 22 hours after the last long acting insulin injection. You can set a 0% temporary  basal setting that will last until this time and use your pump to bolus for meals and correction. If you need help with these feature, please call your insulin pump company tech support line or ask them in anticipation of this action.     Lantus 33 units daily.      Humalog to 6 units three times daily before meals, plus correction if needed.      If  - 250, ADD 2 units of Humalog  If  - 300, ADD 3 units of Humalog   If  - 350, ADD 4 units of Humalog  If  - 400, ADD 5 units of Humalog  If +, ADD 6 units of Humalog          Follow up in about 3 months (around 12/9/2024) for Dexcom, iLet, Schedule fasting labs.    Portions of this note were prepared with [employer] voice recognition transcription software. Grammatical errors, including garbled syntax, mangle pronouns, and other bizarre constructions may be attributed to that software system.

## 2024-09-09 NOTE — PATIENT INSTRUCTIONS
PATIENT INSTRUCTIONS     Labs ordered and will be scheduled by [facility] staff.      Continue iLet insulin pump with Dexcom G7 CGM.   Continue Mounjaro 10 mg subcutaneously every 7 days.       U100 Pump Failure Plan:   We have decided to develop a plan in the event that your pump breaks or is nonfunctioning. After 4 hours without pump use, you should begin to consider putting your Pump Failure Back Up Plan into action especially if you foresee that you may not be able to use your pump for more than 20 hours. Since you are currently using short acting insulin (Humalog/Novolog/Admelog/Novolin R) in your pump, you will need access to your short acting insulin vial, syringes, and a back up long acting insulin in the event of a pump failure. I have sent a prescription for a long acting insulin to your pharmacy for use during your emergency pump failure plan. It is important that you keep both types of insulin/syringes with you so that you may have access to it at least 3 times daily if needed. This medication and syringes should be brought with you on overnight trips in the case of an emergency pump failure. This means making a plan to keeping your insulin and syringes at school, work, or other places you frequent. If needed, please reach out to my office about any letters you may need for permission to keep these items for emergency purposes. We will outline your plan for pump failure emergencies below, but please remember to contact the insulin pump company (toll free number is printed on the label on the back of the insulin pump) and our office if you have a pump failure. When the insulin pump is restarted, do not restart basal rates until at least 22 hours after the last long acting insulin injection. You can set a 0% temporary basal setting that will last until this time and use your pump to bolus for meals and correction. If you need help with these feature, please call your insulin pump company  tech support line or ask them in anticipation of this action.     Lantus 33 units daily.      Humalog to 6 units three times daily before meals, plus correction if needed.      If  - 250, ADD 2 units of Humalog  If  - 300, ADD 3 units of Humalog   If  - 350, ADD 4 units of Humalog  If  - 400, ADD 5 units of Humalog  If +, ADD 6 units of Humalog

## 2024-09-10 ENCOUNTER — TELEPHONE (OUTPATIENT)
Dept: TRANSPLANT | Facility: CLINIC | Age: 64
End: 2024-09-10
Payer: COMMERCIAL

## 2024-09-10 LAB — TACROLIMUS BLD-MCNC: 9 NG/ML (ref 5–15)

## 2024-09-10 NOTE — TELEPHONE ENCOUNTER
----- Message from Jaime Stafford MD sent at 9/9/2024  7:58 PM CDT -----  Liver transplant blood tests reviewed.  Labs stable,   no change in immunosuppression. Levels awaited  Please continue to monitor liver tests per transplant protocol.

## 2024-09-13 ENCOUNTER — PATIENT MESSAGE (OUTPATIENT)
Dept: TRANSPLANT | Facility: CLINIC | Age: 64
End: 2024-09-13
Payer: COMMERCIAL

## 2024-09-16 DIAGNOSIS — Z94.4 STATUS POST LIVER TRANSPLANT: Primary | ICD-10-CM

## 2024-10-10 ENCOUNTER — PATIENT MESSAGE (OUTPATIENT)
Dept: TRANSPLANT | Facility: CLINIC | Age: 64
End: 2024-10-10
Payer: COMMERCIAL

## 2024-10-10 ENCOUNTER — LAB VISIT (OUTPATIENT)
Dept: LAB | Facility: HOSPITAL | Age: 64
End: 2024-10-10
Attending: NURSE PRACTITIONER
Payer: COMMERCIAL

## 2024-10-10 DIAGNOSIS — Z79.4 TYPE 2 DIABETES MELLITUS WITH OTHER SPECIFIED COMPLICATION, WITH LONG-TERM CURRENT USE OF INSULIN: Chronic | ICD-10-CM

## 2024-10-10 DIAGNOSIS — T86.49 STENOSIS OF HEPATIC ARTERY OF TRANSPLANTED LIVER: ICD-10-CM

## 2024-10-10 DIAGNOSIS — E11.69 TYPE 2 DIABETES MELLITUS WITH OTHER SPECIFIED COMPLICATION, WITH LONG-TERM CURRENT USE OF INSULIN: Chronic | ICD-10-CM

## 2024-10-10 DIAGNOSIS — I77.1 STENOSIS OF HEPATIC ARTERY OF TRANSPLANTED LIVER: ICD-10-CM

## 2024-10-10 PROCEDURE — 36415 COLL VENOUS BLD VENIPUNCTURE: CPT | Performed by: NURSE PRACTITIONER

## 2024-10-10 PROCEDURE — 83036 HEMOGLOBIN GLYCOSYLATED A1C: CPT | Performed by: NURSE PRACTITIONER

## 2024-10-10 RX ORDER — CLOPIDOGREL BISULFATE 75 MG/1
75 TABLET ORAL DAILY
Qty: 30 TABLET | Refills: 11 | Status: SHIPPED | OUTPATIENT
Start: 2024-10-10 | End: 2025-10-10

## 2024-10-10 RX ORDER — CLOPIDOGREL BISULFATE 75 MG/1
75 TABLET ORAL DAILY
Qty: 30 TABLET | Refills: 2 | Status: CANCELLED | OUTPATIENT
Start: 2024-10-10

## 2024-10-10 RX ORDER — CLOPIDOGREL BISULFATE 75 MG/1
75 TABLET ORAL DAILY
Qty: 30 TABLET | Refills: 2 | OUTPATIENT
Start: 2024-10-10

## 2024-10-10 NOTE — TELEPHONE ENCOUNTER
Reviewed need for Plavix post transplant with Dr. Suero. Per MD, pt should continue Plavix.     Prescription sent.

## 2024-10-11 LAB
ESTIMATED AVG GLUCOSE: 123 MG/DL (ref 68–131)
HBA1C MFR BLD: 5.9 % (ref 4–5.6)

## 2024-10-21 ENCOUNTER — TELEPHONE (OUTPATIENT)
Dept: TRANSPLANT | Facility: CLINIC | Age: 64
End: 2024-10-21
Payer: COMMERCIAL

## 2024-10-21 ENCOUNTER — PATIENT MESSAGE (OUTPATIENT)
Dept: TRANSPLANT | Facility: CLINIC | Age: 64
End: 2024-10-21
Payer: COMMERCIAL

## 2024-10-23 ENCOUNTER — LAB VISIT (OUTPATIENT)
Dept: LAB | Facility: HOSPITAL | Age: 64
End: 2024-10-23
Attending: INTERNAL MEDICINE
Payer: COMMERCIAL

## 2024-10-23 DIAGNOSIS — Z94.4 STATUS POST LIVER TRANSPLANT: ICD-10-CM

## 2024-10-23 LAB
ALBUMIN SERPL BCP-MCNC: 3.9 G/DL (ref 3.5–5.2)
ALP SERPL-CCNC: 79 U/L (ref 40–150)
ALT SERPL W/O P-5'-P-CCNC: 21 U/L (ref 10–44)
ANION GAP SERPL CALC-SCNC: 5 MMOL/L (ref 8–16)
AST SERPL-CCNC: 20 U/L (ref 10–40)
BASOPHILS # BLD AUTO: 0.01 K/UL (ref 0–0.2)
BASOPHILS NFR BLD: 0.2 % (ref 0–1.9)
BILIRUB SERPL-MCNC: 1.7 MG/DL (ref 0.1–1)
BUN SERPL-MCNC: 15 MG/DL (ref 8–23)
CALCIUM SERPL-MCNC: 9.2 MG/DL (ref 8.7–10.5)
CHLORIDE SERPL-SCNC: 105 MMOL/L (ref 95–110)
CO2 SERPL-SCNC: 30 MMOL/L (ref 23–29)
CREAT SERPL-MCNC: 0.9 MG/DL (ref 0.5–1.4)
DIFFERENTIAL METHOD BLD: ABNORMAL
EOSINOPHIL # BLD AUTO: 0.1 K/UL (ref 0–0.5)
EOSINOPHIL NFR BLD: 1 % (ref 0–8)
ERYTHROCYTE [DISTWIDTH] IN BLOOD BY AUTOMATED COUNT: 15.1 % (ref 11.5–14.5)
EST. GFR  (NO RACE VARIABLE): >60 ML/MIN/1.73 M^2
GLUCOSE SERPL-MCNC: 209 MG/DL (ref 70–110)
HCT VFR BLD AUTO: 47.4 % (ref 40–54)
HGB BLD-MCNC: 14.7 G/DL (ref 14–18)
IMM GRANULOCYTES # BLD AUTO: 0.01 K/UL (ref 0–0.04)
IMM GRANULOCYTES NFR BLD AUTO: 0.2 % (ref 0–0.5)
LYMPHOCYTES # BLD AUTO: 2.4 K/UL (ref 1–4.8)
LYMPHOCYTES NFR BLD: 40.8 % (ref 18–48)
MCH RBC QN AUTO: 27.8 PG (ref 27–31)
MCHC RBC AUTO-ENTMCNC: 31 G/DL (ref 32–36)
MCV RBC AUTO: 90 FL (ref 82–98)
MONOCYTES # BLD AUTO: 0.4 K/UL (ref 0.3–1)
MONOCYTES NFR BLD: 7.4 % (ref 4–15)
NEUTROPHILS # BLD AUTO: 3 K/UL (ref 1.8–7.7)
NEUTROPHILS NFR BLD: 50.4 % (ref 38–73)
NRBC BLD-RTO: 0 /100 WBC
PLATELET # BLD AUTO: 123 K/UL (ref 150–450)
PMV BLD AUTO: 10.5 FL (ref 9.2–12.9)
POTASSIUM SERPL-SCNC: 4.7 MMOL/L (ref 3.5–5.1)
PROT SERPL-MCNC: 7 G/DL (ref 6–8.4)
RBC # BLD AUTO: 5.28 M/UL (ref 4.6–6.2)
SODIUM SERPL-SCNC: 140 MMOL/L (ref 136–145)
WBC # BLD AUTO: 5.84 K/UL (ref 3.9–12.7)

## 2024-10-23 PROCEDURE — 36415 COLL VENOUS BLD VENIPUNCTURE: CPT | Performed by: INTERNAL MEDICINE

## 2024-10-23 PROCEDURE — 85025 COMPLETE CBC W/AUTO DIFF WBC: CPT | Performed by: INTERNAL MEDICINE

## 2024-10-23 PROCEDURE — 80197 ASSAY OF TACROLIMUS: CPT | Performed by: INTERNAL MEDICINE

## 2024-10-23 PROCEDURE — 80053 COMPREHEN METABOLIC PANEL: CPT | Performed by: INTERNAL MEDICINE

## 2024-10-24 ENCOUNTER — PATIENT MESSAGE (OUTPATIENT)
Dept: TRANSPLANT | Facility: CLINIC | Age: 64
End: 2024-10-24
Payer: COMMERCIAL

## 2024-10-24 LAB — TACROLIMUS BLD-MCNC: 19.8 NG/ML (ref 5–15)

## 2024-10-25 ENCOUNTER — TELEPHONE (OUTPATIENT)
Dept: TRANSPLANT | Facility: CLINIC | Age: 64
End: 2024-10-25
Payer: COMMERCIAL

## 2024-10-25 DIAGNOSIS — Z94.4 STATUS POST LIVER TRANSPLANT: Primary | ICD-10-CM

## 2024-10-25 DIAGNOSIS — Z85.05 PERSONAL HISTORY OF MALIGNANT NEOPLASM OF LIVER: ICD-10-CM

## 2024-10-25 NOTE — TELEPHONE ENCOUNTER
"Returned call. No answer. LVM requesting return call at his convenience.  ----- Message from George sent at 10/25/2024 10:53 AM CDT -----  Regarding: call back  Consult/Advisory:        Name Of Caller: Self     Contact Preference?:[phone]     What is the nature of the call?: Calling to speak w/  Linda        Additional Notes:  "Thank you for all that you do for our patients"  "

## 2024-10-25 NOTE — TELEPHONE ENCOUNTER
Spoke with pt. He did take tac prior to lab draw. He will repeat labs 11/4/24.  ----- Message from Joleen Carr MD sent at 10/24/2024  9:46 AM CDT -----  Liver tests ok, tacro is high. Did he take med before labs?

## 2024-10-29 ENCOUNTER — OFFICE VISIT (OUTPATIENT)
Dept: DERMATOLOGY | Facility: CLINIC | Age: 64
End: 2024-10-29
Payer: COMMERCIAL

## 2024-10-29 VITALS — BODY MASS INDEX: 32.25 KG/M2 | HEIGHT: 71 IN | WEIGHT: 230.38 LBS

## 2024-10-29 DIAGNOSIS — D23.9 DERMATOFIBROMA: ICD-10-CM

## 2024-10-29 DIAGNOSIS — L57.0 ACTINIC KERATOSIS: Primary | ICD-10-CM

## 2024-10-29 DIAGNOSIS — L82.1 SEBORRHEIC KERATOSES: ICD-10-CM

## 2024-10-29 DIAGNOSIS — Z12.83 SCREENING, MALIGNANT NEOPLASM, SKIN: ICD-10-CM

## 2024-10-29 PROCEDURE — 3044F HG A1C LEVEL LT 7.0%: CPT | Mod: CPTII,S$GLB,, | Performed by: STUDENT IN AN ORGANIZED HEALTH CARE EDUCATION/TRAINING PROGRAM

## 2024-10-29 PROCEDURE — 99203 OFFICE O/P NEW LOW 30 MIN: CPT | Mod: 25,S$GLB,, | Performed by: STUDENT IN AN ORGANIZED HEALTH CARE EDUCATION/TRAINING PROGRAM

## 2024-10-29 PROCEDURE — 17003 DESTRUCT PREMALG LES 2-14: CPT | Mod: S$GLB,,, | Performed by: STUDENT IN AN ORGANIZED HEALTH CARE EDUCATION/TRAINING PROGRAM

## 2024-10-29 PROCEDURE — 17000 DESTRUCT PREMALG LESION: CPT | Mod: S$GLB,,, | Performed by: STUDENT IN AN ORGANIZED HEALTH CARE EDUCATION/TRAINING PROGRAM

## 2024-10-29 PROCEDURE — 3008F BODY MASS INDEX DOCD: CPT | Mod: CPTII,S$GLB,, | Performed by: STUDENT IN AN ORGANIZED HEALTH CARE EDUCATION/TRAINING PROGRAM

## 2024-10-29 PROCEDURE — 99999 PR PBB SHADOW E&M-EST. PATIENT-LVL III: CPT | Mod: PBBFAC,,, | Performed by: STUDENT IN AN ORGANIZED HEALTH CARE EDUCATION/TRAINING PROGRAM

## 2024-10-29 PROCEDURE — 4010F ACE/ARB THERAPY RXD/TAKEN: CPT | Mod: CPTII,S$GLB,, | Performed by: STUDENT IN AN ORGANIZED HEALTH CARE EDUCATION/TRAINING PROGRAM

## 2024-10-29 PROCEDURE — 1159F MED LIST DOCD IN RCRD: CPT | Mod: CPTII,S$GLB,, | Performed by: STUDENT IN AN ORGANIZED HEALTH CARE EDUCATION/TRAINING PROGRAM

## 2024-10-29 PROCEDURE — 1160F RVW MEDS BY RX/DR IN RCRD: CPT | Mod: CPTII,S$GLB,, | Performed by: STUDENT IN AN ORGANIZED HEALTH CARE EDUCATION/TRAINING PROGRAM

## 2024-11-04 ENCOUNTER — LAB VISIT (OUTPATIENT)
Dept: LAB | Facility: HOSPITAL | Age: 64
End: 2024-11-04
Attending: INTERNAL MEDICINE
Payer: COMMERCIAL

## 2024-11-04 DIAGNOSIS — Z85.05 PERSONAL HISTORY OF MALIGNANT NEOPLASM OF LIVER: ICD-10-CM

## 2024-11-04 DIAGNOSIS — Z94.4 STATUS POST LIVER TRANSPLANT: ICD-10-CM

## 2024-11-04 LAB
AFP SERPL-MCNC: <2 NG/ML (ref 0–8.4)
ALBUMIN SERPL BCP-MCNC: 4 G/DL (ref 3.5–5.2)
ALP SERPL-CCNC: 85 U/L (ref 40–150)
ALT SERPL W/O P-5'-P-CCNC: 23 U/L (ref 10–44)
ANION GAP SERPL CALC-SCNC: 9 MMOL/L (ref 8–16)
AST SERPL-CCNC: 21 U/L (ref 10–40)
BASOPHILS # BLD AUTO: 0.02 K/UL (ref 0–0.2)
BASOPHILS NFR BLD: 0.3 % (ref 0–1.9)
BILIRUB SERPL-MCNC: 1.7 MG/DL (ref 0.1–1)
BUN SERPL-MCNC: 13 MG/DL (ref 8–23)
CALCIUM SERPL-MCNC: 9.3 MG/DL (ref 8.7–10.5)
CHLORIDE SERPL-SCNC: 105 MMOL/L (ref 95–110)
CO2 SERPL-SCNC: 28 MMOL/L (ref 23–29)
CREAT SERPL-MCNC: 1 MG/DL (ref 0.5–1.4)
DIFFERENTIAL METHOD BLD: ABNORMAL
EOSINOPHIL # BLD AUTO: 0.1 K/UL (ref 0–0.5)
EOSINOPHIL NFR BLD: 1.1 % (ref 0–8)
ERYTHROCYTE [DISTWIDTH] IN BLOOD BY AUTOMATED COUNT: 15.2 % (ref 11.5–14.5)
EST. GFR  (NO RACE VARIABLE): >60 ML/MIN/1.73 M^2
GLUCOSE SERPL-MCNC: 154 MG/DL (ref 70–110)
HCT VFR BLD AUTO: 45.6 % (ref 40–54)
HGB BLD-MCNC: 15 G/DL (ref 14–18)
IMM GRANULOCYTES # BLD AUTO: 0.01 K/UL (ref 0–0.04)
IMM GRANULOCYTES NFR BLD AUTO: 0.2 % (ref 0–0.5)
LYMPHOCYTES # BLD AUTO: 2.6 K/UL (ref 1–4.8)
LYMPHOCYTES NFR BLD: 39.2 % (ref 18–48)
MCH RBC QN AUTO: 28.6 PG (ref 27–31)
MCHC RBC AUTO-ENTMCNC: 32.9 G/DL (ref 32–36)
MCV RBC AUTO: 87 FL (ref 82–98)
MONOCYTES # BLD AUTO: 0.5 K/UL (ref 0.3–1)
MONOCYTES NFR BLD: 7.5 % (ref 4–15)
NEUTROPHILS # BLD AUTO: 3.4 K/UL (ref 1.8–7.7)
NEUTROPHILS NFR BLD: 51.7 % (ref 38–73)
NRBC BLD-RTO: 0 /100 WBC
PLATELET # BLD AUTO: 143 K/UL (ref 150–450)
PMV BLD AUTO: 10.5 FL (ref 9.2–12.9)
POTASSIUM SERPL-SCNC: 4.4 MMOL/L (ref 3.5–5.1)
PROT SERPL-MCNC: 7.3 G/DL (ref 6–8.4)
RBC # BLD AUTO: 5.24 M/UL (ref 4.6–6.2)
SODIUM SERPL-SCNC: 142 MMOL/L (ref 136–145)
WBC # BLD AUTO: 6.64 K/UL (ref 3.9–12.7)

## 2024-11-04 PROCEDURE — 82105 ALPHA-FETOPROTEIN SERUM: CPT | Performed by: INTERNAL MEDICINE

## 2024-11-04 PROCEDURE — 85025 COMPLETE CBC W/AUTO DIFF WBC: CPT | Performed by: INTERNAL MEDICINE

## 2024-11-04 PROCEDURE — 80053 COMPREHEN METABOLIC PANEL: CPT | Performed by: INTERNAL MEDICINE

## 2024-11-04 PROCEDURE — 80197 ASSAY OF TACROLIMUS: CPT | Performed by: INTERNAL MEDICINE

## 2024-11-04 PROCEDURE — 36415 COLL VENOUS BLD VENIPUNCTURE: CPT | Performed by: INTERNAL MEDICINE

## 2024-11-05 LAB — TACROLIMUS BLD-MCNC: 8.9 NG/ML (ref 5–15)

## 2024-11-06 ENCOUNTER — CLINICAL SUPPORT (OUTPATIENT)
Dept: DIABETES | Facility: CLINIC | Age: 64
End: 2024-11-06
Payer: COMMERCIAL

## 2024-11-06 DIAGNOSIS — E11.65 TYPE 2 DIABETES MELLITUS WITH HYPERGLYCEMIA, WITH LONG-TERM CURRENT USE OF INSULIN: Primary | ICD-10-CM

## 2024-11-06 DIAGNOSIS — Z79.4 TYPE 2 DIABETES MELLITUS WITH HYPERGLYCEMIA, WITH LONG-TERM CURRENT USE OF INSULIN: Primary | ICD-10-CM

## 2024-11-06 PROCEDURE — 99999 PR PBB SHADOW E&M-EST. PATIENT-LVL III: CPT | Mod: PBBFAC,,,

## 2024-11-06 NOTE — PROGRESS NOTES
Diabetes Care Specialist Follow-up Note  Author: Cassia Huston RN  Date: 11/6/2024    Intake    Program Intake  Reason for Diabetes Program Visit:: Intervention  Type of Intervention:: Individual  Individual: Education  Education: Self-Management Skill Review  Current diabetes risk level:: high  In the last month, have you used the ER or been admitted to the hospital: No  Permission to speak with others about care:: yes    Current Diabetes Treatment: Insulin  Method of insulin delivery?: Insulin Pump  Type of Pump: iLet  Does patient have back-up plan?: Yes  Any problems obtaining supplies?: No    Continuous Glucose Monitoring  Patient has CGM: Yes  Personal CGM type:: Dexcom G7  GMI Date: 11/06/24  GMI Value: 8.3 %    Lab Results   Component Value Date    HGBA1C 5.9 (H) 10/10/2024     SMBG: Dexcom G7. Clarity report in media.       Diabetes Self-Management Skills Assessment    Home Blood Glucose Monitoring  Personal CGM type:: Dexcom G7    During today's follow-up visit,  the following areas required further assessment and content was provided/reviewed.    Based on today's diabetes care assessment, the following areas of need were identified:      Identified Areas of Need      Medication/Current Diabetes Treatment: See care plan.       Today's interventions were provided through individual discussion, instruction, and written materials were provided.    Patient verbalized understanding of instruction and written materials.  Pt was able to return back demonstration of instructions today. Patient understood key points, needs reinforcement and further instruction.     Diabetes Self-Management Care Plan Review and Evaluation of Progress:    During today's follow-up Jed's Diabetes Self-Management Care Plan progress was reviewed and progress was evaluated including his/her input. Jed has agreed to continue his/her journey to improve/maintain overall diabetes control by continuing to set health goals. See care plan  "progress below.      Care Plan: Diabetes Management   Updates made since 11/7/2023 12:00 AM        Problem: Healthy Eating         Goal: Eat 3 meals daily with 45-60g of carbohydrates per meal and ~15g per snack.    Start Date: 1/23/2024   Expected End Date: 1/23/2025   This Visit's Progress: Deferred   Recent Progress: On track   Priority: High   Barriers: No Barriers Identified   Note:    Mr. Chávez states him and his wife make a good team when it comes to planning and preparing meals.   Educated on the recommended amount of carbs per meal, how to read a food label, and how to use measuring cups to portion foods.   Admits to decreased appetite with Mounjaro but will eat smaller portions because he knows he "has to" for diabetes.    2/13/24: Mr. Chávez continues to eat smaller portions. He has done better with pairing carbs with protein and/or healthy fat. He is not measuring food but plans to start. He is interested in pump therapy; will refer to [facility] for management.     4/16/24: Continues to do well with healthy eatings. He is eating smaller portions and eating balanced meals.    4/30/24: Discussed pre-pump topics:   Pump options w/ compatible CGM: Medtronic using Guardian, OmniPod and Tandem using Dexcom G6  Common terms: basal/bolus insulin, IC, ISF, TBS, IOB  Pros/cons pump therapy, supplies needed, frequency of changing infusion sets and cartridges/pods, and backup pump plan   Allowed pt to see demo pumps and example infusion set    Provided carb counting training using food lists, food label from Diabetes Management Guide. Also, encouraged pt to download and use [employer] mobile kaveh for additional support. Pt able demonstrate understanding using examples in clinic.     Educated Mr. Chávez to keep 1 week of food logs with carbs and bring back to clinic on Monday, May 6th. Will review  and get back to him afterwards.      8/27/24: Doing well with healthy eating by making healthier choices and choosing lower " "carb snacks. Has started drinking Premier Protein (does not announce since only 8 grams).          Task: Reviewed the sources and role of Carbohydrate, Protein, and Fat and how each nutrient impacts blood sugar. Completed 1/23/2024        Task: Provided visual examples using dry measuring cups, food models, and other familiar objects such as computer mouse, deck or cards, tennis ball etc. to help with visualization of portions. Completed 1/23/2024        Task: Explained how to count carbohydrates using the food label and the use of dry measuring cups for accurate carb counting. Completed 1/23/2024        Task: Review the importance of balancing carbohydrates with each meal using portion control techniques to count servings of carbohydrate and label reading to identify serving size and amount of total carbs per serving. Completed 1/23/2024        Task: Provided Sample plate method and reviewed the use of the plate to estimate amounts of carbohydrate per meal. Completed 1/23/2024        Problem: Blood Glucose Self-Monitoring         Goal: Patient agrees to monitor blood glucose using personal Dexcom G7 Completed 8/27/2024   Start Date: 3/5/2024   Expected End Date: 3/5/2025   This Visit's Progress: On track   Recent Progress: On track   Priority: Medium   Barriers: No Barriers Identified   Note:    Method: discussion, demonstration, and instruction  Level of Comprehension: demonstrates understanding/competency - verbalizes/demonstrates    DEXCOM G7 CGM TRAINING  Dexcom G7 mobile apps downloaded to phone. Education was provided using "Quick Start Guide" and demo device per Dexcom protocol. [facility] clinic data share set-up.    Patient will use Dexcom G7 on his iPhone to receive continuous glucose data.        Overview:  5 minute glucose reading updates, trending arrows, BG graph screens, reports screen,  connectivity and functions.   Menus: Trend graph, start sensor, enter BG, events, alerts, settings, replace " sensor, stop sensor, and shutdown  Dexcom G7 mobile kaveh/ Settings:                          * Urgent Low: 55 mg/dL                          * Urgent Low Soon: On                          * Low Alert: 70 mg/dL                          * High Alert: 250 mg/dL                          * Fall Rate: On                          * Signal Loss: On                          * Brief Sensor Issue: On     Reviewed additional setting options.  Patient paired sensor using 4-digit code listed on sensor cap..    Reviewed where to find sensor insertion time and expiration date.   Reviewed appropriate calibration techniques.  Reviewed sensor site selection. Patient selected and prepped site using aseptic technique, inserted sensor, applied overlay tape and started session.   Reviewed sensor removal from site. Discussed times to remove sensor per  guidelines include MRI or diatherapy.   Patient able to demonstrate without difficulty. Encouraged to review manual and/or training videos prior to starting another sensor.   Reviewed problem solving aspects of sensor transmission/variables that can disrupt RF transmission.  range 20 feet, but the first 3 hrs keep within 3 feet of transmitter.  Patient instructed on lag time of interstitial fluid from CBG and was advised to treat hypoglycemia and dose insulin based on SMBG values.  Dexcom technical support contact number given and examples of when to contact them discussed.       Warm-up completed: 136 with upward arrow    3/19/24: Helped place new sensor; warm up completed-- >. Last sensor placed bled; helped with ordering new one through [facility].     4/16/24: Helped with ordering replacement sensor. Blood sugars remain above goal; will reach out to [facility] Guerin about increasing long-acting insulin and short-acting insulin.   Educated on how to use History tab to document Events. He was able to log his AM insulin doses from today.     4/30/24: Mr. Chávez is  interested in the Omnipod 5. Educated he will have to use the Dexcom G6. Showed him how G6 works.     8/27/24: No issues with Dexcom G7; goal met.         Task: Reviewed advantages of having a personal CGM monitor. Completed 3/5/2024        Task: Discussed misconceptions of CGM monitors. Completed 3/5/2024        Task: Instructed on how often to change sensor. Completed 3/5/2024   Note:    Change sensor every 10 days.       Task: Discussed proper rotation of sensor sites. Completed 3/5/2024        Task: Discussed need for calibration if necessary. Completed 3/5/2024        Task: Directed to use directional arrows to make more informed decisions on managing glucose. Completed 3/5/2024        Task: Encouraged patient to bring their device to clinic visits. Completed 3/5/2024        Task: Discussed guidelines for preventing, detecting and treating hypoglycemia and hyperglycemia and reviewed the importance of meal and medication timing with diabetes mediations for prevention of hypoglycemia and maximum drug benefit. Completed 3/5/2024        Problem: Medications         Goal: Patient agrees to use iLet as prescribed. Completed 11/6/2024   Start Date: 8/27/2024   Expected End Date: 1/23/2025   This Visit's Progress: Met   Priority: Medium   Barriers: No Barriers Identified   Note:    Reviewed iLet report together; Mr. Chávez is doing well announcing his meals appropriately.  Using ~106 units of insulin/day; will ask [facility] Guerin about adjusting his prescription to be able to change Q2D.        Task: Discussed guidelines for preventing, detecting and treating hypoglycemia and hyperglycemia and reviewed the importance of meal and medication timing with diabetes mediations for prevention of hypoglycemia and maximum drug benefit. Completed 8/27/2024        Problem: Medications         Goal: Patient agrees to use back-up plan until new iLet is received.    Start Date: 11/6/2024   Expected End Date: 11/20/2024   Barriers: No  Barriers Identified   Note:    Mr. Chávez's iLet stopped delivering insulin ~62 hours ago. Did not know how to use BG Run Mode.   Called customer service to help with troubleshooting.  Most likely will be getting a new pump.  Until new pump is received he will use his pump back up plan.   Back up plan printed and given to pt.     Pump Back-up Plan:    Lantus 33 units daily.      Humalog to 6 units three times daily before meals, plus correction if needed.      If  - 250, ADD 2 units of Humalog  If  - 300, ADD 3 units of Humalog   If  - 350, ADD 4 units of Humalog  If  - 400, ADD 5 units of Humalog  If +, ADD 6 units of Humalog       Task: Reviewed with patient all current diabetes medications and provided basic review of the purpose, dosage, frequency, side effects, and storage of both oral and injectable diabetes medications. Completed 11/6/2024          Follow Up Plan     Follow up in about 2 weeks (around 11/20/2024) for iLet follow-up.    Today's care plan and follow up schedule was discussed with patient.  Jed verbalized understanding of the care plan, goals, and agrees to follow up plan.        The patient was encouraged to communicate with his/her health care provider/physician and care team regarding his/her condition(s) and treatment.  I provided the patient with my contact information today and encouraged to contact me via phone or [facility] Patient Portal as needed.     Length of Visit   Total Time: 90 Minutes

## 2024-11-06 NOTE — PATIENT INSTRUCTIONS
Pump Back-up Plan:    Lantus 33 units daily.      Humalog to 6 units three times daily before meals, plus correction if needed.      If  - 250, ADD 2 units of Humalog  If  - 300, ADD 3 units of Humalog   If  - 350, ADD 4 units of Humalog  If  - 400, ADD 5 units of Humalog  If +, ADD 6 units of Humalog

## 2024-11-06 NOTE — Clinical Note
Good morning I met with Mr. Chávez today. He is having issues with his iLet pump. We called customer service, and they will be replacing his pump. I printed his pump back-up plan until he receives his new pump. Just wanted to let you know. Thank you, KELLY Antony

## 2024-11-08 ENCOUNTER — PATIENT OUTREACH (OUTPATIENT)
Dept: ADMINISTRATIVE | Facility: HOSPITAL | Age: 64
End: 2024-11-08
Payer: COMMERCIAL

## 2024-11-08 NOTE — PROGRESS NOTES
[facility] OUTREACH: per chart review pt is overdue for the following:  attempted to contact pt no ans, lvm, sent portal message.

## 2024-11-11 ENCOUNTER — TELEPHONE (OUTPATIENT)
Dept: TRANSPLANT | Facility: CLINIC | Age: 64
End: 2024-11-11
Payer: COMMERCIAL

## 2024-11-11 NOTE — LETTER
November 11, 2024    Jed Chávez  [address]  [facility] [address]          Dear Jed Chávez:  MRN: [id number]    This is a follow up to your recent labs, your lab results were stable.  There are no medicine changes.  Please have your labs drawn again on 12/16/24.      If you cannot have your labs drawn on the scheduled date, it is your responsibility to call the transplant department to reschedule.  Please call [phone] and ask to speak to Joseline James Medical Assistant for all scheduling requests.     Happy Thanksgiving to you and your family!    Linda  [facility] Transplant Coordinator    [facility]  [facility] [place] [address]  [phone]

## 2024-11-11 NOTE — TELEPHONE ENCOUNTER
Letter sent, labs stable and no medication changes are needed. Repeat labs due 12/16/24 per protocol.  ----- Message from Ochoa Quesada MD sent at 11/8/2024 12:52 AM CST -----  Results reviewed

## 2024-11-18 DIAGNOSIS — Z94.4 STATUS POST LIVER TRANSPLANT: ICD-10-CM

## 2024-11-19 ENCOUNTER — PATIENT OUTREACH (OUTPATIENT)
Dept: ADMINISTRATIVE | Facility: HOSPITAL | Age: 64
End: 2024-11-19
Payer: COMMERCIAL

## 2024-11-19 NOTE — PROGRESS NOTES
DM LABS: per chart review pt is overdue for Ha1c/LIPID/MICROALBUMIN appt 11.20.24 canceled, lab appt scheduled for 12.16.24, I linked labs needed.

## 2024-11-20 ENCOUNTER — CLINICAL SUPPORT (OUTPATIENT)
Dept: DIABETES | Facility: CLINIC | Age: 64
End: 2024-11-20
Payer: COMMERCIAL

## 2024-11-20 DIAGNOSIS — E11.65 TYPE 2 DIABETES MELLITUS WITH HYPERGLYCEMIA, WITH LONG-TERM CURRENT USE OF INSULIN: Primary | ICD-10-CM

## 2024-11-20 DIAGNOSIS — Z79.4 TYPE 2 DIABETES MELLITUS WITH HYPERGLYCEMIA, WITH LONG-TERM CURRENT USE OF INSULIN: Primary | ICD-10-CM

## 2024-11-20 PROCEDURE — G0108 DIAB MANAGE TRN  PER INDIV: HCPCS | Mod: S$GLB,,, | Performed by: FAMILY MEDICINE

## 2024-11-20 PROCEDURE — 99999 PR PBB SHADOW E&M-EST. PATIENT-LVL III: CPT | Mod: PBBFAC,,,

## 2024-11-20 NOTE — PROGRESS NOTES
Diabetes Care Specialist Follow-up Note  Author: Cassia Huston RN  Date: 11/20/2024    Intake    Program Intake  Reason for Diabetes Program Visit:: Intervention  Type of Intervention:: Individual  Individual: Education  Education: Self-Management Skill Review  Current diabetes risk level:: high  In the last month, have you used the ER or been admitted to the hospital: No  Permission to speak with others about care:: yes    Current Diabetes Treatment: Insulin  Method of insulin delivery?: Insulin Pump  Type of Pump: iLet  Does patient have back-up plan?: Yes  Any problems obtaining supplies?: No    Continuous Glucose Monitoring  Patient has CGM: Yes  Personal CGM type:: Dexcom G7  GMI Date: 11/20/24  GMI Value:  (N/A.)    Lab Results   Component Value Date    HGBA1C 5.9 (H) 10/10/2024     A1c Pre Diabetes Care Specialist Intervention:  6.1%    Physical activity/Exercise:   See care plan.    SMBG: Dexcom G7 using iLet.       Diabetes Self-Management Skills Assessment    Nutrition/Healthy Eating  Meal Plan 24 Hour Recall - Breakfast: 2 bagels with cream cheese.  Meal Plan 24 Hour Recall - Lunch: 1 piece of fried chicken.  Meal Plan 24 Hour Recall - Dinner: 1 piece of fried chicken.    Home Blood Glucose Monitoring  Personal CGM type:: Dexcom G7    During today's follow-up visit,  the following areas required further assessment and content was provided/reviewed.    Based on today's diabetes care assessment, the following areas of need were identified:      Identified Areas of Need      Medication/Current Diabetes Treatment: See care plan for new goal.      Nutrition/Healthy Eating: See care plan for new goal.       Physical Activity/Exercise: See care plan.         Today's interventions were provided through individual discussion, instruction, and written materials were provided.    Patient verbalized understanding of instruction and written materials.  Pt was able to return back demonstration of instructions  "today. Patient understood key points, needs reinforcement and further instruction.     Diabetes Self-Management Care Plan Review and Evaluation of Progress:    During today's follow-up Jed's Diabetes Self-Management Care Plan progress was reviewed and progress was evaluated including his/her input. Jed has agreed to continue his/her journey to improve/maintain overall diabetes control by continuing to set health goals. See care plan progress below.      Care Plan: Diabetes Management   Updates made since 11/21/2023 12:00 AM        Problem: Healthy Eating         Goal: Eat 3 meals daily with 45-60g of carbohydrates per meal and ~15g per snack. Completed 11/20/2024   Start Date: 1/23/2024   Expected End Date: 1/23/2025   This Visit's Progress: Not met   Recent Progress: Deferred   Priority: High   Barriers: No Barriers Identified   Note:    Mr. Chávez states him and his wife make a good team when it comes to planning and preparing meals.   Educated on the recommended amount of carbs per meal, how to read a food label, and how to use measuring cups to portion foods.   Admits to decreased appetite with Mounjaro but will eat smaller portions because he knows he "has to" for diabetes.    2/13/24: Mr. Chávez continues to eat smaller portions. He has done better with pairing carbs with protein and/or healthy fat. He is not measuring food but plans to start. He is interested in pump therapy; will refer to [facility] for management.     4/16/24: Continues to do well with healthy eatings. He is eating smaller portions and eating balanced meals.    4/30/24: Discussed pre-pump topics:   Pump options w/ compatible CGM: Ntractive using Guardian, OmniPod and Tandem using Dexcom G6  Common terms: basal/bolus insulin, IC, ISF, TBS, IOB  Pros/cons pump therapy, supplies needed, frequency of changing infusion sets and cartridges/pods, and backup pump plan   Allowed pt to see demo pumps and example infusion set    Provided carb counting " training using food lists, food label from Diabetes Management Guide. Also, encouraged pt to download and use [employer] mobile kaveh for additional support. Pt able demonstrate understanding using examples in clinic.     Educated Mr. Chávez to keep 1 week of food logs with carbs and bring back to clinic on Monday, May 6th. Will review  and get back to him afterwards.      8/27/24: Doing well with healthy eating by making healthier choices and choosing lower carb snacks. Has started drinking Premier Protein (does not announce since only 8 grams).     11/20/24: Wants to try to lose weight but is unmotivated to count carbohydrates.          Task: Reviewed the sources and role of Carbohydrate, Protein, and Fat and how each nutrient impacts blood sugar. Completed 1/23/2024        Task: Provided visual examples using dry measuring cups, food models, and other familiar objects such as computer mouse, deck or cards, tennis ball etc. to help with visualization of portions. Completed 1/23/2024        Task: Explained how to count carbohydrates using the food label and the use of dry measuring cups for accurate carb counting. Completed 1/23/2024        Task: Review the importance of balancing carbohydrates with each meal using portion control techniques to count servings of carbohydrate and label reading to identify serving size and amount of total carbs per serving. Completed 1/23/2024        Task: Provided Sample plate method and reviewed the use of the plate to estimate amounts of carbohydrate per meal. Completed 1/23/2024        Problem: Blood Glucose Self-Monitoring         Goal: Patient agrees to monitor blood glucose using personal Dexcom G7 Completed 8/27/2024   Start Date: 3/5/2024   Expected End Date: 3/5/2025   This Visit's Progress: On track   Recent Progress: On track   Priority: Medium   Barriers: No Barriers Identified   Note:    Method: discussion, demonstration, and instruction  Level of Comprehension:  "demonstrates understanding/competency - verbalizes/demonstrates    DEXCOM G7 CGM TRAINING  Dexcom Aurality7 mobile apps downloaded to phone. Education was provided using "Quick Start Guide" and demo device per Dexcom protocol. [facility] data share set-up.    Patient will use Dexcom G7 on his iPhone to receive continuous glucose data.        Overview:  5 minute glucose reading updates, trending arrows, BG graph screens, reports screen,  connectivity and functions.   Menus: Trend graph, start sensor, enter BG, events, alerts, settings, replace sensor, stop sensor, and shutdown  Dexcom G7 mobile kaveh/ Settings:                          * Urgent Low: 55 mg/dL                          * Urgent Low Soon: On                          * Low Alert: 70 mg/dL                          * High Alert: 250 mg/dL                          * Fall Rate: On                          * Signal Loss: On                          * Brief Sensor Issue: On     Reviewed additional setting options.  Patient paired sensor using 4-digit code listed on sensor cap..    Reviewed where to find sensor insertion time and expiration date.   Reviewed appropriate calibration techniques.  Reviewed sensor site selection. Patient selected and prepped site using aseptic technique, inserted sensor, applied overlay tape and started session.   Reviewed sensor removal from site. Discussed times to remove sensor per  guidelines include MRI or diatherapy.   Patient able to demonstrate without difficulty. Encouraged to review manual and/or training videos prior to starting another sensor.   Reviewed problem solving aspects of sensor transmission/variables that can disrupt RF transmission.  range 20 feet, but the first 3 hrs keep within 3 feet of transmitter.  Patient instructed on lag time of interstitial fluid from CBG and was advised to treat hypoglycemia and dose insulin based on SMBG values.  Dexcom technical support contact number " given and examples of when to contact them discussed.       Warm-up completed: 136 with upward arrow    3/19/24: Helped place new sensor; warm up completed-- >. Last sensor placed bled; helped with ordering new one through kaveh.     4/16/24: Helped with ordering replacement sensor. Blood sugars remain above goal; will reach out to [facility] Guerin about increasing long-acting insulin and short-acting insulin.   Educated on how to use History tab to document Events. He was able to log his AM insulin doses from today.     4/30/24: Mr. Chávez is interested in the Omnipod 5. Educated he will have to use the Dexcom G6. Showed him how G6 works.     8/27/24: No issues with Dexcom G7; goal met.         Task: Reviewed advantages of having a personal CGM monitor. Completed 3/5/2024        Task: Discussed misconceptions of CGM monitors. Completed 3/5/2024        Task: Instructed on how often to change sensor. Completed 3/5/2024   Note:    Change sensor every 10 days.       Task: Discussed proper rotation of sensor sites. Completed 3/5/2024        Task: Discussed need for calibration if necessary. Completed 3/5/2024        Task: Directed to use directional arrows to make more informed decisions on managing glucose. Completed 3/5/2024        Task: Encouraged patient to bring their device to clinic visits. Completed 3/5/2024        Task: Discussed guidelines for preventing, detecting and treating hypoglycemia and hyperglycemia and reviewed the importance of meal and medication timing with diabetes mediations for prevention of hypoglycemia and maximum drug benefit. Completed 3/5/2024        Problem: Medications         Goal: Patient agrees to use iLet as prescribed. Completed 11/6/2024   Start Date: 8/27/2024   Expected End Date: 1/23/2025   This Visit's Progress: Met   Priority: Medium   Barriers: No Barriers Identified   Note:    Reviewed iLet report together; Mr. Chávez is doing well announcing his meals appropriately.  Using  ~106 units of insulin/day; will ask [facility] Guerin about adjusting his prescription to be able to change Q2D.        Task: Discussed guidelines for preventing, detecting and treating hypoglycemia and hyperglycemia and reviewed the importance of meal and medication timing with diabetes mediations for prevention of hypoglycemia and maximum drug benefit. Completed 8/27/2024        Problem: Medications         Goal: Patient agrees to use back-up plan until new iLet is received. Completed 11/20/2024   Start Date: 11/6/2024   Expected End Date: 11/20/2024   This Visit's Progress: Met   Barriers: No Barriers Identified   Note:    Mr. Chávez's iLet stopped delivering insulin ~62 hours ago. Did not know how to use BG Run Mode.   Called customer service to help with troubleshooting.  Most likely will be getting a new pump.  Until new pump is received he will use his pump back up plan.   Back up plan printed and given to pt.     Pump Back-up Plan:    Lantus 33 units daily.      Humalog to 6 units three times daily before meals, plus correction if needed.      If  - 250, ADD 2 units of Humalog  If  - 300, ADD 3 units of Humalog   If  - 350, ADD 4 units of Humalog  If  - 400, ADD 5 units of Humalog  If +, ADD 6 units of Humalog       Task: Reviewed with patient all current diabetes medications and provided basic review of the purpose, dosage, frequency, side effects, and storage of both oral and injectable diabetes medications. Completed 11/6/2024        Problem: Healthy Eating         Goal: Patient agrees to eat 3 meals daily using the My Plate method.    Start Date: 11/20/2024   Expected End Date: 12/19/2024   Note:    Educated on the importance of balanced meals and consistent meal intake to help with weight loss.        Task: Reviewed the sources and role of Carbohydrate, Protein, and Fat and how each nutrient impacts blood sugar. Completed 11/20/2024        Task: Provided Sample plate method and  reviewed the use of the plate to estimate amounts of carbohydrate per meal. Completed 11/20/2024        Problem: Medications         Goal: Patient agrees to announce all meals 10 minutes before eating.    Start Date: 11/20/2024   Expected End Date: 12/19/2025   Note:    Educated on the importance of announcing for all meals and announcing ~10 minutes before meals.   May be a candidate for Fiasp if unable to announce before meals.          Task: Discussed guidelines for preventing, detecting and treating hypoglycemia and hyperglycemia and reviewed the importance of meal and medication timing with diabetes mediations for prevention of hypoglycemia and maximum drug benefit. Completed 11/20/2024        Problem: Physical Activity and Exercise         Goal: Patient agrees to increase physical activity to a goal of 5-7 times per week for 45-60 minutes.    Start Date: 11/20/2024   Expected End Date: 12/19/2024   Note:    Currently walking about 3 miles/day.  Bought bike yesterday and plans to incorporate into exercise routine.       Task: Discussed role of physical activity on reducing insulin resistance and improvement in overall glycemic control. Completed 11/20/2024        Task: Discussed role of physical activity as it relates to weight loss Completed 11/20/2024        Task: Offered suggestions on how patient could increase their regular physical activity Completed 11/20/2024        Task: Reviewed blood glucose monitoring before, during and after exercise/activity Completed 11/20/2024          Follow Up Plan     Follow up in about 4 weeks (around 12/18/2024) for final visit.    Today's care plan and follow up schedule was discussed with patient.  Jed verbalized understanding of the care plan, goals, and agrees to follow up plan.        The patient was encouraged to communicate with his/her health care provider/physician and care team regarding his/her condition(s) and treatment.  I provided the patient with my contact  information today and encouraged to contact me via phone or [facility] Patient Portal as needed.     Length of Visit   Total Time: 60 Minutes

## 2024-11-22 RX ORDER — NAPROXEN SODIUM 220 MG/1
81 TABLET, FILM COATED ORAL DAILY
Qty: 30 TABLET | Refills: 11 | Status: SHIPPED | OUTPATIENT
Start: 2024-11-22

## 2024-11-25 ENCOUNTER — HOSPITAL ENCOUNTER (OUTPATIENT)
Dept: RADIOLOGY | Facility: HOSPITAL | Age: 64
Discharge: HOME | End: 2024-11-25
Attending: INTERNAL MEDICINE
Payer: COMMERCIAL

## 2024-11-25 DIAGNOSIS — Z94.4 STATUS POST LIVER TRANSPLANT: ICD-10-CM

## 2024-11-25 PROCEDURE — 76705 ECHO EXAM OF ABDOMEN: CPT | Mod: TC

## 2024-11-27 ENCOUNTER — TELEPHONE (OUTPATIENT)
Dept: TRANSPLANT | Facility: CLINIC | Age: 64
End: 2024-11-27
Payer: COMMERCIAL

## 2024-11-27 NOTE — TELEPHONE ENCOUNTER
Pt advised of stable ultrasound.  ----- Message from Ochoa Quesada MD sent at 11/26/2024  4:16 PM CST -----  Results reviewed

## 2024-12-02 DIAGNOSIS — E11.9 HYPERTENSION COMPLICATING DIABETES: Chronic | ICD-10-CM

## 2024-12-02 DIAGNOSIS — Z94.4 STATUS POST LIVER TRANSPLANT: ICD-10-CM

## 2024-12-02 DIAGNOSIS — I10 HYPERTENSION COMPLICATING DIABETES: Chronic | ICD-10-CM

## 2024-12-02 NOTE — TELEPHONE ENCOUNTER
No care due was identified.  [facility] Embedded Care Due Messages. Reference number: [id number].   12/02/2024 1:47:00 PM CST

## 2024-12-03 NOTE — TELEPHONE ENCOUNTER
Refill Routing Note   Medication(s) are not appropriate for processing by [facility] for the following reason(s):        Required vitals abnormal    [facility] action(s):  Defer               Appointments  past 12m or future 3m with PCP    Date Provider   Last Visit   12/19/2023 EVELIN Pitt MD   Next Visit   Visit date not found EVELIN Pitt MD   ED visits in past 90 days: 0        Note composed:9:41 AM 12/03/2024

## 2024-12-06 ENCOUNTER — TELEPHONE (OUTPATIENT)
Dept: DIABETES | Facility: CLINIC | Age: 64
End: 2024-12-06
Payer: COMMERCIAL

## 2024-12-06 NOTE — TELEPHONE ENCOUNTER
Spoke with patient to let him know that a dexcom G7 sensor would be left at the registration desk for him. Patient voiced understanding

## 2024-12-06 NOTE — TELEPHONE ENCOUNTER
----- Message from Jayce sent at 12/6/2024  3:14 PM CST -----  Contact: self   .Type: Patient Call Back        Who called:      Patient   What is the request in detail:    Called in concerning getting a new dexcom 7 . Patient states that he need a new one . Please call back   Can the clinic reply by [facility]?           Would the patient rather a call back or a response via [facility]?      call   Best call back number:  [phone]

## 2024-12-08 ENCOUNTER — PATIENT MESSAGE (OUTPATIENT)
Dept: INTERNAL MEDICINE | Facility: CLINIC | Age: 64
End: 2024-12-08
Payer: COMMERCIAL

## 2024-12-08 RX ORDER — CARVEDILOL 6.25 MG/1
6.25 TABLET ORAL 2 TIMES DAILY WITH MEALS
Qty: 180 TABLET | Refills: 0 | Status: SHIPPED | OUTPATIENT
Start: 2024-12-08

## 2024-12-08 RX ORDER — LOSARTAN POTASSIUM 100 MG/1
100 TABLET ORAL DAILY
Qty: 90 TABLET | Refills: 0 | Status: SHIPPED | OUTPATIENT
Start: 2024-12-08

## 2024-12-08 NOTE — TELEPHONE ENCOUNTER
LIMITED REFILL APPROVED. Appointment required for more refills.  Schedule OV with BELIA Gonzales before he will need additional refills.

## 2024-12-09 ENCOUNTER — OFFICE VISIT (OUTPATIENT)
Dept: DIABETES | Facility: CLINIC | Age: 64
End: 2024-12-09
Payer: COMMERCIAL

## 2024-12-09 DIAGNOSIS — Z79.4 TYPE 2 DIABETES MELLITUS WITH OTHER SPECIFIED COMPLICATION, WITH LONG-TERM CURRENT USE OF INSULIN: ICD-10-CM

## 2024-12-09 DIAGNOSIS — E11.69 TYPE 2 DIABETES MELLITUS WITH OTHER SPECIFIED COMPLICATION, WITH LONG-TERM CURRENT USE OF INSULIN: ICD-10-CM

## 2024-12-09 PROCEDURE — G2211 COMPLEX E/M VISIT ADD ON: HCPCS | Mod: 95,,, | Performed by: NURSE PRACTITIONER

## 2024-12-09 PROCEDURE — 4010F ACE/ARB THERAPY RXD/TAKEN: CPT | Mod: CPTII,95,, | Performed by: NURSE PRACTITIONER

## 2024-12-09 PROCEDURE — 99214 OFFICE O/P EST MOD 30 MIN: CPT | Mod: 95,,, | Performed by: NURSE PRACTITIONER

## 2024-12-09 PROCEDURE — 95251 CONT GLUC MNTR ANALYSIS I&R: CPT | Mod: NDTC,,, | Performed by: NURSE PRACTITIONER

## 2024-12-09 PROCEDURE — 3044F HG A1C LEVEL LT 7.0%: CPT | Mod: CPTII,95,, | Performed by: NURSE PRACTITIONER

## 2024-12-09 RX ORDER — BLOOD-GLUCOSE SENSOR
1 EACH MISCELLANEOUS
Qty: 3 EACH | Refills: 11 | Status: SHIPPED | OUTPATIENT
Start: 2024-12-09 | End: 2025-12-09

## 2024-12-09 RX ORDER — INSULIN LISPRO 100 [IU]/ML
INJECTION, SOLUTION INTRAVENOUS; SUBCUTANEOUS
Qty: 50 ML | Refills: 11 | Status: SHIPPED | OUTPATIENT
Start: 2024-12-09

## 2024-12-09 NOTE — PROGRESS NOTES
Nasim Wicho is here for evaluation of scalp laceration. States he was fixing a window and a drill fell and struck him on the top of the head. Laceration is difficult to visualize due to copious amount of dry blood to his scalp. No LOC.   Denies specific com The patient location is: Home  The chief complaint leading to consultation is: Diabetes    Visit type: audiovisual    Face to Face time with patient: 16  30 minutes of total time spent on the encounter, which includes face to face time and non-face to face time preparing to see the patient (eg, review of tests), Obtaining and/or reviewing separately obtained history, Documenting clinical information in the electronic or other health record, Independently interpreting results (not separately reported) and communicating results to the patient/family/caregiver, or Care coordination (not separately reported).  Each patient to whom he or she provides medical services by telemedicine is:  (1) informed of the relationship between the physician and patient and the respective role of any other health care provider with respect to management of the patient; and (2) notified that he or she may decline to receive medical services by telemedicine and may withdraw from such care at any time.    Notes:    Jed Chávez is a 64 y.o. male who presents for a follow up evaluation of Type 2 diabetes mellitus.     CHIEF COMPLAINT: Diabetes Consultation    PCP: EVELIN Pitt MD      Initial visit with me - 4/9/2024    The patient was initially diagnosed with diabetes in 2016.   S/p liver transplant in August 2023.   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      Previous failed treatments include:  Metformin - d/c after transplant - hold until 1 year post transplant.   Jardiance - d/c after transplant - hold until 1 year post transplant.     Social Documentation:  Patient lives in [place].   Occupation: self employed, janitorial service.   Exercise: daily. Plays golf.      Diabetes related complications:   cerebrovascular disease.   denies Pancreatitis  denies Gastroparesis  denies DKA  denies Hx/family Hx of MEN2/MTC  denies Frequent UTIs/yeast infections     Diabetes Medications               insulin glargine U-100, Lantus,  "(LANTUS SOLOSTAR U-100 INSULIN) 100 unit/mL (3 mL) InPn pen Inject 33 Units into the skin once daily.    insulin lispro (HUMALOG KWIKPEN INSULIN) 100 unit/mL pen UP TO 50 UNITS DAILY USING CORRECTION DOSING IN CASE OF PUMP FAILURE.    insulin lispro 100 unit/mL injection VIA ILET INSULIN PUMP, MAX .    tirzepatide 10 mg/0.5 mL PnIj Inject 10 mg (one pen) into the skin every 7 days.     Current monitoring regimen:  Dexcom G7 with iLet Insulin Pump    The patient's Dexcom CGM was downloaded and reviewed. For 14 days, patient average glucose was 177 mg/dL. He was above range 40.3% of the time, in range 59.7% of the time, and below range 0% of the time. The target range for this patient was 70 - 180 mg/dL. Overall, there was a pattern of occasional post prandial hyperglycemia.          Patient currently taking insulin or sulfonylurea?  Yes. Emergency Glucagon Prescription current? no  Recent hypoglycemic episodes: No.     Patient compliant with glucose checks and medication administration? Yes    DIABETES MANAGEMENT STATUS  Statin: Not taking  ACE/ARB: Taking  Screening or Prevention Patient's value Goal Complete/Controlled?   HgA1C Testing and Control   Lab Results   Component Value Date    HGBA1C 5.9 (H) 10/10/2024      Annually/Less than 8% Yes   Lipid profile : 10/24/2023 Annually Yes   LDL control Lab Results   Component Value Date    LDLCALC 91.8 10/24/2023    Annually/Less than 100 mg/dl  Yes   Nephropathy screening Lab Results   Component Value Date    LABMICR 36.0 10/24/2023     Lab Results   Component Value Date    PROTEINUA Negative 10/24/2023     No results found for: "UTPCR"   Annually Yes   Blood pressure BP Readings from Last 1 Encounters:   09/06/24 (!) 156/99    Less than 140/90 No   Dilated retinal exam : 08/02/2024 Annually Yes   Foot exam   : 10/19/2023 Annually Yes   Patient's medications, allergies, surgical, social and family histories were reviewed and updated as appropriate.     Review of " Systems   Constitutional:  Negative for weight loss.   Eyes:  Negative for blurred vision and double vision.   Cardiovascular:  Negative for chest pain.   Gastrointestinal:  Negative for nausea and vomiting.   Genitourinary:  Negative for frequency.   Musculoskeletal:  Negative for falls.   Neurological:  Negative for dizziness and weakness.   Endo/Heme/Allergies:  Negative for polydipsia.   Psychiatric/Behavioral:  Negative for depression.    All other systems reviewed and are negative.       Physical Exam  Constitutional:       General: He is not in acute distress.     Appearance: Normal appearance.   Pulmonary:      Effort: No respiratory distress.   Neurological:      Mental Status: He is alert and oriented to person, place, and time.   Psychiatric:         Mood and Affect: Mood normal.         Behavior: Behavior normal.        There were no vitals taken for this visit.  Wt Readings from Last 3 Encounters:   10/29/24 104.5 kg (230 lb 6.1 oz)   09/06/24 104.5 kg (230 lb 6.1 oz)   06/24/24 103.1 kg (227 lb 4.7 oz)       LAB REVIEW  Lab Results   Component Value Date     11/04/2024    K 4.4 11/04/2024     11/04/2024    CO2 28 11/04/2024    BUN 13 11/04/2024    CREATININE 1.0 11/04/2024    CALCIUM 9.3 11/04/2024    ANIONGAP 9 11/04/2024    EGFRNORACEVR >60.0 11/04/2024     Lab Results   Component Value Date    CPEPTIDE 1.78 04/11/2024    GLUTAMICACID 0.00 04/11/2024     Hemoglobin A1C   Date Value Ref Range Status   10/10/2024 5.9 (H) 4.0 - 5.6 % Final     Comment:     ADA Screening Guidelines:  5.7-6.4%  Consistent with prediabetes  >or=6.5%  Consistent with diabetes    High levels of fetal hemoglobin interfere with the HbA1C  assay. Heterozygous hemoglobin variants (HbS, HgC, etc)do  not significantly interfere with this assay.   However, presence of multiple variants may affect accuracy.     04/11/2024 6.3 (H) 4.0 - 5.6 % Final     Comment:     ADA Screening Guidelines:  5.7-6.4%  Consistent with  "prediabetes  >or=6.5%  Consistent with diabetes    High levels of fetal hemoglobin interfere with the HbA1C  assay. Heterozygous hemoglobin variants (HbS, HgC, etc)do  not significantly interfere with this assay.   However, presence of multiple variants may affect accuracy.     10/24/2023 6.1 (H) 4.0 - 5.6 % Final     Comment:     ADA Screening Guidelines:  5.7-6.4%  Consistent with prediabetes  >or=6.5%  Consistent with diabetes    High levels of fetal hemoglobin interfere with the HbA1C  assay. Heterozygous hemoglobin variants (HbS, HgC, etc)do  not significantly interfere with this assay.   However, presence of multiple variants may affect accuracy.          ASSESSMENT    ICD-10-CM ICD-9-CM   1. Type 2 diabetes mellitus with other specified complication, with long-term current use of insulin  E11.69 250.80    Z79.4 V58.67         PLAN  Diagnoses and all orders for this visit:    Type 2 diabetes mellitus with other specified complication, with long-term current use of insulin  -     blood-glucose sensor (DEXCOM G7 SENSOR) [place]am; 1 each by Misc.(Non-Drug; Combo Route) route every 10 days.  -     tirzepatide 10 mg/0.5 mL PnIj; Inject 10 mg (one pen) into the skin every 7 days.  -     insulin lispro 100 unit/mL injection; VIA ILET INSULIN PUMP, MAX .          Reviewed pathophysiology of diabetes, complications related to the disease, importance of annual dilated eye exam and daily foot examination. Explained MOA, SE, dosage of medications. Written instructions given and reviewed with patient and patient verbalizes understanding.     6/24/2024 - pump evaluation done on 4/30/24, at that time patient had decided on OP5, but states he really does not think he will do well with carb counting and is preferring a more "hands off" approach. Discussed iLet pump and patient would like to move forward with that. Overall glucoses stablizing, still with some hyperglycemia. Will increase Lantus to 33 units,  decrease humalog " dose to 6 and increase Mounjaro to 10 mg with next refill.     8/12/2024 - iLet pump training tomorrow.    9/9/2024 - Doing well on iLet. Has occasional late meal announcement, discussed skipping announcement if over 30 minutes after start of meal.     12/4/2024 - A1c remains at goal.     PATIENT INSTRUCTIONS     Continue iLet insulin pump with Dexcom G7 CGM.   Continue Mounjaro 10 mg subcutaneously every 7 days.       U100 Pump Failure Plan:   We have decided to develop a plan in the event that your pump breaks or is nonfunctioning. After 4 hours without pump use, you should begin to consider putting your Pump Failure Back Up Plan into action especially if you foresee that you may not be able to use your pump for more than 20 hours. Since you are currently using short acting insulin (Humalog/Novolog/Admelog/Novolin R) in your pump, you will need access to your short acting insulin vial, syringes, and a back up long acting insulin in the event of a pump failure. I have sent a prescription for a long acting insulin to your pharmacy for use during your emergency pump failure plan. It is important that you keep both types of insulin/syringes with you so that you may have access to it at least 3 times daily if needed. This medication and syringes should be brought with you on overnight trips in the case of an emergency pump failure. This means making a plan to keeping your insulin and syringes at school, work, or other places you frequent. If needed, please reach out to my office about any letters you may need for permission to keep these items for emergency purposes. We will outline your plan for pump failure emergencies below, but please remember to contact the insulin pump company (toll free number is printed on the label on the back of the insulin pump) and our office if you have a pump failure. When the insulin pump is restarted, do not restart basal rates until at least 22 hours after the last long acting insulin  injection. You can set a 0% temporary basal setting that will last until this time and use your pump to bolus for meals and correction. If you need help with these feature, please call your insulin pump company tech support line or ask them in anticipation of this action.     Lantus 33 units daily.      Humalog to 6 units three times daily before meals, plus correction if needed.      If  - 250, ADD 2 units of Humalog  If  - 300, ADD 3 units of Humalog   If  - 350, ADD 4 units of Humalog  If  - 400, ADD 5 units of Humalog  If +, ADD 6 units of Humalog          Follow up in about 3 months (around 3/9/2025) for iLet.    Portions of this note were prepared with [employer] Naturally Speaking voice recognition transcription software. Grammatical errors, including garbled syntax, mangle pronouns, and other bizarre constructions may be attributed to that software system.

## 2024-12-09 NOTE — PATIENT INSTRUCTIONS
PATIENT INSTRUCTIONS     Continue iLet insulin pump with Dexcom G7 CGM.   Continue Mounjaro 10 mg subcutaneously every 7 days.       U100 Pump Failure Plan:   We have decided to develop a plan in the event that your pump breaks or is nonfunctioning. After 4 hours without pump use, you should begin to consider putting your Pump Failure Back Up Plan into action especially if you foresee that you may not be able to use your pump for more than 20 hours. Since you are currently using short acting insulin (Humalog/Novolog/Admelog/Novolin R) in your pump, you will need access to your short acting insulin vial, syringes, and a back up long acting insulin in the event of a pump failure. I have sent a prescription for a long acting insulin to your pharmacy for use during your emergency pump failure plan. It is important that you keep both types of insulin/syringes with you so that you may have access to it at least 3 times daily if needed. This medication and syringes should be brought with you on overnight trips in the case of an emergency pump failure. This means making a plan to keeping your insulin and syringes at school, work, or other places you frequent. If needed, please reach out to my office about any letters you may need for permission to keep these items for emergency purposes. We will outline your plan for pump failure emergencies below, but please remember to contact the insulin pump company (toll free number is printed on the label on the back of the insulin pump) and our office if you have a pump failure. When the insulin pump is restarted, do not restart basal rates until at least 22 hours after the last long acting insulin injection. You can set a 0% temporary basal setting that will last until this time and use your pump to bolus for meals and correction. If you need help with these feature, please call your insulin pump company tech support line or ask them in anticipation of this action.     Lantus 33  units daily.      Humalog to 6 units three times daily before meals, plus correction if needed.      If  - 250, ADD 2 units of Humalog  If  - 300, ADD 3 units of Humalog   If  - 350, ADD 4 units of Humalog  If  - 400, ADD 5 units of Humalog  If +, ADD 6 units of Humalog

## 2024-12-10 ENCOUNTER — PATIENT OUTREACH (OUTPATIENT)
Dept: ADMINISTRATIVE | Facility: HOSPITAL | Age: 64
End: 2024-12-10
Payer: COMMERCIAL

## 2024-12-12 DIAGNOSIS — Z94.4 STATUS POST LIVER TRANSPLANT: Primary | ICD-10-CM

## 2024-12-16 ENCOUNTER — LAB VISIT (OUTPATIENT)
Dept: LAB | Facility: HOSPITAL | Age: 64
End: 2024-12-16
Attending: INTERNAL MEDICINE
Payer: COMMERCIAL

## 2024-12-16 DIAGNOSIS — Z94.4 STATUS POST LIVER TRANSPLANT: ICD-10-CM

## 2024-12-16 DIAGNOSIS — Z12.5 SCREENING PSA (PROSTATE SPECIFIC ANTIGEN): ICD-10-CM

## 2024-12-16 DIAGNOSIS — Z79.4 TYPE 2 DIABETES MELLITUS WITH OTHER SPECIFIED COMPLICATION, WITH LONG-TERM CURRENT USE OF INSULIN: Chronic | ICD-10-CM

## 2024-12-16 DIAGNOSIS — E11.69 TYPE 2 DIABETES MELLITUS WITH OTHER SPECIFIED COMPLICATION, WITH LONG-TERM CURRENT USE OF INSULIN: Chronic | ICD-10-CM

## 2024-12-16 LAB
ALBUMIN SERPL BCP-MCNC: 3.9 G/DL (ref 3.5–5.2)
ALBUMIN/CREAT UR: 13.3 UG/MG (ref 0–30)
ALP SERPL-CCNC: 74 U/L (ref 40–150)
ALT SERPL W/O P-5'-P-CCNC: 17 U/L (ref 10–44)
ANION GAP SERPL CALC-SCNC: 7 MMOL/L (ref 8–16)
AST SERPL-CCNC: 20 U/L (ref 10–40)
BASOPHILS # BLD AUTO: 0.02 K/UL (ref 0–0.2)
BASOPHILS NFR BLD: 0.3 % (ref 0–1.9)
BILIRUB SERPL-MCNC: 1.7 MG/DL (ref 0.1–1)
BUN SERPL-MCNC: 15 MG/DL (ref 8–23)
CALCIUM SERPL-MCNC: 8.8 MG/DL (ref 8.7–10.5)
CHLORIDE SERPL-SCNC: 109 MMOL/L (ref 95–110)
CHOLEST SERPL-MCNC: 163 MG/DL (ref 120–199)
CHOLEST/HDLC SERPL: 4.8 {RATIO} (ref 2–5)
CO2 SERPL-SCNC: 23 MMOL/L (ref 23–29)
COMPLEXED PSA SERPL-MCNC: 0.49 NG/ML (ref 0–4)
CREAT SERPL-MCNC: 0.9 MG/DL (ref 0.5–1.4)
CREAT UR-MCNC: 270 MG/DL (ref 23–375)
DIFFERENTIAL METHOD BLD: ABNORMAL
EOSINOPHIL # BLD AUTO: 0.1 K/UL (ref 0–0.5)
EOSINOPHIL NFR BLD: 0.9 % (ref 0–8)
ERYTHROCYTE [DISTWIDTH] IN BLOOD BY AUTOMATED COUNT: 14.3 % (ref 11.5–14.5)
EST. GFR  (NO RACE VARIABLE): >60 ML/MIN/1.73 M^2
ESTIMATED AVG GLUCOSE: 134 MG/DL (ref 68–131)
GLUCOSE SERPL-MCNC: 175 MG/DL (ref 70–110)
HBA1C MFR BLD: 6.3 % (ref 4–5.6)
HCT VFR BLD AUTO: 44.4 % (ref 40–54)
HDLC SERPL-MCNC: 34 MG/DL (ref 40–75)
HDLC SERPL: 20.9 % (ref 20–50)
HGB BLD-MCNC: 14.9 G/DL (ref 14–18)
IMM GRANULOCYTES # BLD AUTO: 0.01 K/UL (ref 0–0.04)
IMM GRANULOCYTES NFR BLD AUTO: 0.2 % (ref 0–0.5)
LDLC SERPL CALC-MCNC: 99 MG/DL (ref 63–159)
LYMPHOCYTES # BLD AUTO: 2.4 K/UL (ref 1–4.8)
LYMPHOCYTES NFR BLD: 40.6 % (ref 18–48)
MCH RBC QN AUTO: 28.5 PG (ref 27–31)
MCHC RBC AUTO-ENTMCNC: 33.6 G/DL (ref 32–36)
MCV RBC AUTO: 85 FL (ref 82–98)
MICROALBUMIN UR DL<=1MG/L-MCNC: 36 UG/ML
MONOCYTES # BLD AUTO: 0.4 K/UL (ref 0.3–1)
MONOCYTES NFR BLD: 7.2 % (ref 4–15)
NEUTROPHILS # BLD AUTO: 3 K/UL (ref 1.8–7.7)
NEUTROPHILS NFR BLD: 50.8 % (ref 38–73)
NONHDLC SERPL-MCNC: 129 MG/DL
NRBC BLD-RTO: 0 /100 WBC
PLATELET # BLD AUTO: 129 K/UL (ref 150–450)
PMV BLD AUTO: 10.4 FL (ref 9.2–12.9)
POTASSIUM SERPL-SCNC: 3.7 MMOL/L (ref 3.5–5.1)
PROT SERPL-MCNC: 7.1 G/DL (ref 6–8.4)
RBC # BLD AUTO: 5.23 M/UL (ref 4.6–6.2)
SODIUM SERPL-SCNC: 139 MMOL/L (ref 136–145)
TRIGL SERPL-MCNC: 150 MG/DL (ref 30–150)
WBC # BLD AUTO: 5.84 K/UL (ref 3.9–12.7)

## 2024-12-16 PROCEDURE — 80053 COMPREHEN METABOLIC PANEL: CPT | Performed by: INTERNAL MEDICINE

## 2024-12-16 PROCEDURE — 36415 COLL VENOUS BLD VENIPUNCTURE: CPT | Performed by: FAMILY MEDICINE

## 2024-12-16 PROCEDURE — 80197 ASSAY OF TACROLIMUS: CPT | Performed by: INTERNAL MEDICINE

## 2024-12-16 PROCEDURE — 83036 HEMOGLOBIN GLYCOSYLATED A1C: CPT | Performed by: FAMILY MEDICINE

## 2024-12-16 PROCEDURE — 85025 COMPLETE CBC W/AUTO DIFF WBC: CPT | Performed by: INTERNAL MEDICINE

## 2024-12-16 PROCEDURE — 84153 ASSAY OF PSA TOTAL: CPT | Performed by: FAMILY MEDICINE

## 2024-12-16 PROCEDURE — 82570 ASSAY OF URINE CREATININE: CPT | Performed by: FAMILY MEDICINE

## 2024-12-16 PROCEDURE — 80061 LIPID PANEL: CPT | Performed by: FAMILY MEDICINE

## 2024-12-17 LAB — TACROLIMUS BLD-MCNC: 28.9 NG/ML (ref 5–15)

## 2024-12-18 ENCOUNTER — TELEPHONE (OUTPATIENT)
Dept: TRANSPLANT | Facility: CLINIC | Age: 64
End: 2024-12-18
Payer: COMMERCIAL

## 2024-12-18 ENCOUNTER — CLINICAL SUPPORT (OUTPATIENT)
Dept: DIABETES | Facility: CLINIC | Age: 64
End: 2024-12-18
Payer: COMMERCIAL

## 2024-12-18 VITALS — BODY MASS INDEX: 31.3 KG/M2 | WEIGHT: 224.44 LBS

## 2024-12-18 DIAGNOSIS — E11.65 TYPE 2 DIABETES MELLITUS WITH HYPERGLYCEMIA, WITH LONG-TERM CURRENT USE OF INSULIN: Primary | ICD-10-CM

## 2024-12-18 DIAGNOSIS — Z79.4 TYPE 2 DIABETES MELLITUS WITH HYPERGLYCEMIA, WITH LONG-TERM CURRENT USE OF INSULIN: Primary | ICD-10-CM

## 2024-12-18 PROCEDURE — 99999 PR PBB SHADOW E&M-EST. PATIENT-LVL III: CPT | Mod: PBBFAC,,,

## 2024-12-18 NOTE — TELEPHONE ENCOUNTER
Called pt x 2 12/18/24. No answer and no VM.  Sent my chart message 12/18/24 - not read, no response.     12/19/24  Called pt. No answer. No VM.  Called pt's spouse. No answer. LVM - labs need to be repeated and unable to reach pt. Requested she have him call me or message me via [employer] if he can repeat labs tomorrow or Monday. Reminded her that labs need to be fasting - no medications prior.  ----- Message from Ochoa Quesada MD sent at 12/18/2024  9:35 AM CST -----  Can we repeat this?  Results reviewed

## 2024-12-18 NOTE — PROGRESS NOTES
Diabetes Care Specialist Follow-up Note  Author: Cassia Huston RN  Date: 12/18/2024    Intake    Program Intake  Reason for Diabetes Program Visit:: Post Program Follow-Up  Type of Intervention:: Individual  Individual: Education  Education: Self-Management Skill Review  Type of Follow-Up: Other (1 Month)  Current diabetes risk level:: moderate  In the last month, have you used the ER or been admitted to the hospital: No  Permission to speak with others about care:: yes    Current Diabetes Treatment: Insulin  Method of insulin delivery?: Insulin Pump  Type of Pump: iLet  Does patient have back-up plan?: Yes  Any problems obtaining supplies?: No    Continuous Glucose Monitoring  Patient has CGM: Yes  Personal CGM type:: Dexcom G7  GMI Date: 12/18/24  GMI Value: 8.5 %    Lab Results   Component Value Date    HGBA1C 6.3 (H) 12/16/2024     A1c Pre Diabetes Care Specialist Intervention:  5.9%      Weight: 101.8 kg (224 lb 6.9 oz)   Body mass index is 31.3 kg/m².      Physical activity/Exercise:   See care  plan.    SMBG: Dexcom G7 with iLet        Diabetes Self-Management Skills Assessment    Nutrition/Healthy Eating  Meal Plan 24 Hour Recall - Breakfast: 2 English muffins with cream cheese.  Meal Plan 24 Hour Recall - Lunch: Leftover pizza x2 slices.  Meal Plan 24 Hour Recall - Dinner: 1/2 Sirloin steak and baby potatoes.  Meal Plan 24 Hour Recall - Snack: None.  Meal Plan 24 Hour Recall - Beverage: SF tea.    Home Blood Glucose Monitoring  Personal CGM type:: Dexcom G7      During today's follow-up visit,  the following areas required further assessment and content was provided/reviewed.    Based on today's diabetes care assessment, the following areas of need were identified:      Identified Areas of Need      Medication/Current Diabetes Treatment: Goal not met.      Nutrition/Healthy Eating: Goal met.      Physical Activity/Exercise: Goal met.      Home Blood Glucose Monitoring: Reinforced the importance of TIR  "of 70%.          Today's interventions were provided through individual discussion, instruction, and written materials were provided.    Patient verbalized understanding of instruction and written materials.  Pt was able to return back demonstration of instructions today. Patient understood key points, needs reinforcement and further instruction.     Diabetes Self-Management Care Plan Review and Evaluation of Progress:    During today's follow-up Jed's Diabetes Self-Management Care Plan progress was reviewed and progress was evaluated including his/her input. Jed has agreed to continue his/her journey to improve/maintain overall diabetes control by continuing to set health goals. See care plan progress below.      Care Plan: Diabetes Management   Updates made since 12/19/2023 12:00 AM        Problem: Healthy Eating         Goal: Eat 3 meals daily with 45-60g of carbohydrates per meal and ~15g per snack. Completed 11/20/2024   Start Date: 1/23/2024   Expected End Date: 1/23/2025   This Visit's Progress: Not met   Recent Progress: Deferred   Priority: High   Barriers: No Barriers Identified   Note:    Mr. Chávez states him and his wife make a good team when it comes to planning and preparing meals.   Educated on the recommended amount of carbs per meal, how to read a food label, and how to use measuring cups to portion foods.   Admits to decreased appetite with Mounjaro but will eat smaller portions because he knows he "has to" for diabetes.    2/13/24: Mr. Chávez continues to eat smaller portions. He has done better with pairing carbs with protein and/or healthy fat. He is not measuring food but plans to start. He is interested in pump therapy; will refer to [facility] for management.     4/16/24: Continues to do well with healthy eatings. He is eating smaller portions and eating balanced meals.    4/30/24: Discussed pre-pump topics:   Pump options w/ compatible CGM: Medtronic using Guardian, OmniPod and Tandem using " Dexcom G6  Common terms: basal/bolus insulin, IC, ISF, TBS, IOB  Pros/cons pump therapy, supplies needed, frequency of changing infusion sets and cartridges/pods, and backup pump plan   Allowed pt to see demo pumps and example infusion set    Provided carb counting training using food lists, food label from Diabetes Management Guide. Also, encouraged pt to download and use [employer] mobile kaveh for additional support. Pt able demonstrate understanding using examples in clinic.     Educated Mr. Chávez to keep 1 week of food logs with carbs and bring back to clinic on Monday, May 6th. Will review  and get back to him afterwards.      8/27/24: Doing well with healthy eating by making healthier choices and choosing lower carb snacks. Has started drinking Premier Protein (does not announce since only 8 grams).     11/20/24: Wants to try to lose weight but is unmotivated to count carbohydrates.          Task: Reviewed the sources and role of Carbohydrate, Protein, and Fat and how each nutrient impacts blood sugar. Completed 1/23/2024        Task: Provided visual examples using dry measuring cups, food models, and other familiar objects such as computer mouse, deck or cards, tennis ball etc. to help with visualization of portions. Completed 1/23/2024        Task: Explained how to count carbohydrates using the food label and the use of dry measuring cups for accurate carb counting. Completed 1/23/2024        Task: Review the importance of balancing carbohydrates with each meal using portion control techniques to count servings of carbohydrate and label reading to identify serving size and amount of total carbs per serving. Completed 1/23/2024        Task: Provided Sample plate method and reviewed the use of the plate to estimate amounts of carbohydrate per meal. Completed 1/23/2024        Problem: Blood Glucose Self-Monitoring         Goal: Patient agrees to monitor blood glucose using personal Dexcom G7 Completed  "8/27/2024   Start Date: 3/5/2024   Expected End Date: 3/5/2025   This Visit's Progress: On track   Recent Progress: On track   Priority: Medium   Barriers: No Barriers Identified   Note:    Method: discussion, demonstration, and instruction  Level of Comprehension: demonstrates understanding/competency - verbalizes/demonstrates    DEXCOM G7 CGM TRAINING  Dexcom G7 mobile apps downloaded to phone. Education was provided using "Quick Start Guide" and demo device per Dexcom protocol. [facility] data share set-up.    Patient will use Dexcom G7 on his iPhone to receive continuous glucose data.        Overview:  5 minute glucose reading updates, trending arrows, BG graph screens, reports screen,  connectivity and functions.   Menus: Trend graph, start sensor, enter BG, events, alerts, settings, replace sensor, stop sensor, and shutdown  Dexcom G7 mobile kaveh/ Settings:                          * Urgent Low: 55 mg/dL                          * Urgent Low Soon: On                          * Low Alert: 70 mg/dL                          * High Alert: 250 mg/dL                          * Fall Rate: On                          * Signal Loss: On                          * Brief Sensor Issue: On     Reviewed additional setting options.  Patient paired sensor using 4-digit code listed on sensor cap..    Reviewed where to find sensor insertion time and expiration date.   Reviewed appropriate calibration techniques.  Reviewed sensor site selection. Patient selected and prepped site using aseptic technique, inserted sensor, applied overlay tape and started session.   Reviewed sensor removal from site. Discussed times to remove sensor per  guidelines include MRI or diatherapy.   Patient able to demonstrate without difficulty. Encouraged to review manual and/or training videos prior to starting another sensor.   Reviewed problem solving aspects of sensor transmission/variables that can disrupt RF " transmission.  range 20 feet, but the first 3 hrs keep within 3 feet of transmitter.  Patient instructed on lag time of interstitial fluid from CBG and was advised to treat hypoglycemia and dose insulin based on SMBG values.  Dexcom technical support contact number given and examples of when to contact them discussed.       Warm-up completed: 136 with upward arrow    3/19/24: Helped place new sensor; warm up completed-- >. Last sensor placed bled; helped with ordering new one through kaveh.     4/16/24: Helped with ordering replacement sensor. Blood sugars remain above goal; will reach out to EDNA Guerin about increasing long-acting insulin and short-acting insulin.   Educated on how to use History tab to document Events. He was able to log his AM insulin doses from today.     4/30/24: Mr. Chávez is interested in the Omnipod 5. Educated he will have to use the Dexcom G6. Showed him how G6 works.     8/27/24: No issues with Dexcom G7; goal met.         Task: Reviewed advantages of having a personal CGM monitor. Completed 3/5/2024        Task: Discussed misconceptions of CGM monitors. Completed 3/5/2024        Task: Instructed on how often to change sensor. Completed 3/5/2024   Note:    Change sensor every 10 days.       Task: Discussed proper rotation of sensor sites. Completed 3/5/2024        Task: Discussed need for calibration if necessary. Completed 3/5/2024        Task: Directed to use directional arrows to make more informed decisions on managing glucose. Completed 3/5/2024        Task: Encouraged patient to bring their device to clinic visits. Completed 3/5/2024        Task: Discussed guidelines for preventing, detecting and treating hypoglycemia and hyperglycemia and reviewed the importance of meal and medication timing with diabetes mediations for prevention of hypoglycemia and maximum drug benefit. Completed 3/5/2024        Problem: Medications         Goal: Patient agrees to use iLet as  prescribed. Completed 11/6/2024   Start Date: 8/27/2024   Expected End Date: 1/23/2025   This Visit's Progress: Met   Priority: Medium   Barriers: No Barriers Identified   Note:    Reviewed iLet report together; Mr. Chávez is doing well announcing his meals appropriately.  Using ~106 units of insulin/day; will ask [facility] Guerin about adjusting his prescription to be able to change Q2D.        Task: Discussed guidelines for preventing, detecting and treating hypoglycemia and hyperglycemia and reviewed the importance of meal and medication timing with diabetes mediations for prevention of hypoglycemia and maximum drug benefit. Completed 8/27/2024        Problem: Medications         Goal: Patient agrees to use back-up plan until new iLet is received. Completed 11/20/2024   Start Date: 11/6/2024   Expected End Date: 11/20/2024   This Visit's Progress: Met   Barriers: No Barriers Identified   Note:    Mr. Chávez's iLet stopped delivering insulin ~62 hours ago. Did not know how to use BG Run Mode.   Called customer service to help with troubleshooting.  Most likely will be getting a new pump.  Until new pump is received he will use his pump back up plan.   Back up plan printed and given to pt.     Pump Back-up Plan:    Lantus 33 units daily.      Humalog to 6 units three times daily before meals, plus correction if needed.      If  - 250, ADD 2 units of Humalog  If  - 300, ADD 3 units of Humalog   If  - 350, ADD 4 units of Humalog  If  - 400, ADD 5 units of Humalog  If +, ADD 6 units of Humalog       Task: Reviewed with patient all current diabetes medications and provided basic review of the purpose, dosage, frequency, side effects, and storage of both oral and injectable diabetes medications. Completed 11/6/2024        Problem: Healthy Eating         Goal: Patient agrees to eat 3 meals daily using the My Plate method. Completed 12/18/2024   Start Date: 11/20/2024   Expected End Date:  12/19/2024   This Visit's Progress: Met   Note:    Educated on the importance of balanced meals and consistent meal intake to help with weight loss.     12/18/24: Watching portions and using the My Plate method.        Task: Reviewed the sources and role of Carbohydrate, Protein, and Fat and how each nutrient impacts blood sugar. Completed 11/20/2024        Task: Provided Sample plate method and reviewed the use of the plate to estimate amounts of carbohydrate per meal. Completed 11/20/2024        Problem: Medications         Goal: Patient agrees to announce all meals 10 minutes before eating. Completed 12/18/2024   Start Date: 11/20/2024   Expected End Date: 12/19/2025   This Visit's Progress: Not met   Note:    Educated on the importance of announcing for all meals and announcing ~10 minutes before meals.   May be a candidate for Fiasp if unable to announce before meals.     12/18/24: Not announcing consistently. Re-educated on the importance of announcing meals and announcing before meals. Will continue to work towards this goal.          Task: Discussed guidelines for preventing, detecting and treating hypoglycemia and hyperglycemia and reviewed the importance of meal and medication timing with diabetes mediations for prevention of hypoglycemia and maximum drug benefit. Completed 11/20/2024        Problem: Physical Activity and Exercise         Goal: Patient agrees to increase physical activity to a goal of 5-7 times per week for 45-60 minutes. Completed 12/18/2024   Start Date: 11/20/2024   Expected End Date: 12/19/2024   This Visit's Progress: Met   Note:    Currently walking about 3 miles/day.  Bought bike yesterday and plans to incorporate into exercise routine.    12/18/24: Riding bike, yard work, walking, etc.        Task: Discussed role of physical activity on reducing insulin resistance and improvement in overall glycemic control. Completed 11/20/2024        Task: Discussed role of physical activity as  it relates to weight loss Completed 11/20/2024        Task: Offered suggestions on how patient could increase their regular physical activity Completed 11/20/2024        Task: Reviewed blood glucose monitoring before, during and after exercise/activity Completed 11/20/2024          Follow Up Plan     Follow up if symptoms worsen or fail to improve.    Today's care plan and follow up schedule was discussed with patient.  Jed verbalized understanding of the care plan, goals, and agrees to follow up plan.        The patient was encouraged to communicate with his/her health care provider/physician and care team regarding his/her condition(s) and treatment.  I provided the patient with my contact information today and encouraged to contact me via phone or [facility] Patient Portal as needed.     Length of Visit   Total Time: 60 Minutes

## 2024-12-19 ENCOUNTER — TELEPHONE (OUTPATIENT)
Dept: TRANSPLANT | Facility: CLINIC | Age: 64
End: 2024-12-19
Payer: COMMERCIAL

## 2024-12-19 NOTE — TELEPHONE ENCOUNTER
Spoke with pt. He will repeat labs tomorrow.  ----- Message from Meera sent at 12/19/2024 12:02 PM CST -----  Regarding: discuss labs/Nurse Mckeon  Contact: Patient can be contacted @# ][phone]  PT called asking to speak to Nurse Mckeon. Having missed a call earlier. Please reach out at earliest convenience    Patient can be contacted @# ][phone]

## 2024-12-20 ENCOUNTER — OFFICE VISIT (OUTPATIENT)
Dept: UROLOGY | Facility: CLINIC | Age: 64
End: 2024-12-20
Payer: COMMERCIAL

## 2024-12-20 ENCOUNTER — LAB VISIT (OUTPATIENT)
Dept: LAB | Facility: HOSPITAL | Age: 64
End: 2024-12-20
Attending: INTERNAL MEDICINE
Payer: COMMERCIAL

## 2024-12-20 DIAGNOSIS — Z94.4 STATUS POST LIVER TRANSPLANT: ICD-10-CM

## 2024-12-20 DIAGNOSIS — N52.9 ERECTILE DYSFUNCTION, UNSPECIFIED ERECTILE DYSFUNCTION TYPE: Primary | ICD-10-CM

## 2024-12-20 LAB
ALBUMIN SERPL BCP-MCNC: 4.1 G/DL (ref 3.5–5.2)
ALP SERPL-CCNC: 75 U/L (ref 40–150)
ALT SERPL W/O P-5'-P-CCNC: 20 U/L (ref 10–44)
ANION GAP SERPL CALC-SCNC: 6 MMOL/L (ref 8–16)
AST SERPL-CCNC: 20 U/L (ref 10–40)
BASOPHILS # BLD AUTO: 0.01 K/UL (ref 0–0.2)
BASOPHILS NFR BLD: 0.2 % (ref 0–1.9)
BILIRUB SERPL-MCNC: 1.6 MG/DL (ref 0.1–1)
BUN SERPL-MCNC: 15 MG/DL (ref 8–23)
CALCIUM SERPL-MCNC: 8.8 MG/DL (ref 8.7–10.5)
CHLORIDE SERPL-SCNC: 106 MMOL/L (ref 95–110)
CO2 SERPL-SCNC: 27 MMOL/L (ref 23–29)
CREAT SERPL-MCNC: 0.9 MG/DL (ref 0.5–1.4)
DIFFERENTIAL METHOD BLD: ABNORMAL
EOSINOPHIL # BLD AUTO: 0.1 K/UL (ref 0–0.5)
EOSINOPHIL NFR BLD: 1.1 % (ref 0–8)
ERYTHROCYTE [DISTWIDTH] IN BLOOD BY AUTOMATED COUNT: 14.1 % (ref 11.5–14.5)
EST. GFR  (NO RACE VARIABLE): >60 ML/MIN/1.73 M^2
GLUCOSE SERPL-MCNC: 175 MG/DL (ref 70–110)
HCT VFR BLD AUTO: 47 % (ref 40–54)
HGB BLD-MCNC: 15.4 G/DL (ref 14–18)
IMM GRANULOCYTES # BLD AUTO: 0.02 K/UL (ref 0–0.04)
IMM GRANULOCYTES NFR BLD AUTO: 0.3 % (ref 0–0.5)
LYMPHOCYTES # BLD AUTO: 2.5 K/UL (ref 1–4.8)
LYMPHOCYTES NFR BLD: 39.5 % (ref 18–48)
MCH RBC QN AUTO: 28.2 PG (ref 27–31)
MCHC RBC AUTO-ENTMCNC: 32.8 G/DL (ref 32–36)
MCV RBC AUTO: 86 FL (ref 82–98)
MONOCYTES # BLD AUTO: 0.5 K/UL (ref 0.3–1)
MONOCYTES NFR BLD: 7.4 % (ref 4–15)
NEUTROPHILS # BLD AUTO: 3.2 K/UL (ref 1.8–7.7)
NEUTROPHILS NFR BLD: 51.5 % (ref 38–73)
NRBC BLD-RTO: 0 /100 WBC
PLATELET # BLD AUTO: 128 K/UL (ref 150–450)
PMV BLD AUTO: 10.7 FL (ref 9.2–12.9)
POTASSIUM SERPL-SCNC: 4.4 MMOL/L (ref 3.5–5.1)
PROT SERPL-MCNC: 7.2 G/DL (ref 6–8.4)
RBC # BLD AUTO: 5.47 M/UL (ref 4.6–6.2)
SODIUM SERPL-SCNC: 139 MMOL/L (ref 136–145)
WBC # BLD AUTO: 6.2 K/UL (ref 3.9–12.7)

## 2024-12-20 PROCEDURE — 36415 COLL VENOUS BLD VENIPUNCTURE: CPT | Performed by: INTERNAL MEDICINE

## 2024-12-20 PROCEDURE — 99999 PR PBB SHADOW E&M-EST. PATIENT-LVL III: CPT | Mod: PBBFAC,,, | Performed by: UROLOGY

## 2024-12-20 PROCEDURE — 80197 ASSAY OF TACROLIMUS: CPT | Performed by: INTERNAL MEDICINE

## 2024-12-20 PROCEDURE — 80053 COMPREHEN METABOLIC PANEL: CPT | Performed by: INTERNAL MEDICINE

## 2024-12-20 PROCEDURE — 85025 COMPLETE CBC W/AUTO DIFF WBC: CPT | Performed by: INTERNAL MEDICINE

## 2024-12-20 NOTE — PROGRESS NOTES
Chief Complaint:  ED    HPI:   Jed Chávez is a 64 y.o. male that presents today for evaluation of ED.  Patient notes that he underwent a liver transplant about 18 months ago and has had issues since that time.  Notes mostly issues maintaining an erection.  Is able to achieve and it is rigid enough for penetration but just does not last.  Has tried sildenafil/tadalafil gummies from an online supplier and these work well for him.  He denies any side effects.  He notes decent libido.  No voiding issues, has a good stream and feels like he empties.  Denies family history of  cancers.  Denies prior urologic procedures.  RAPHAEL - 3/2/2/3/2 = 12(mild-mod ED)    PMH:  Past Medical History:   Diagnosis Date    Alcoholic cirrhosis     CVA (cerebral vascular accident)     DM (diabetes mellitus)     Dyslipidemia     Hepatocellular carcinoma     History of hepatocellular carcinoma, in remission after liver transplant 4/5/2023    Synoptic Report Procedure: Total hepatectomy. Histologic type: Hepatocellular carcinoma. Histologic grade: G1, well-differentiated. Tumor focality: Multifocal. Tumor characteristics: Tumor #1 (blocks 2B-2E): Tumor site: Right lobe. Tumor size: 6.2 cm in greatest dimension grossly. Treatment effect: Complete necrosis (no viable tumor). Tumor #2 (blocks 2F-2H): Tumor site: Right lobe. Tumor size: 4.    HTN (hypertension)     Intracranial hemorrhage     S/P liver transplant 8/12/2023    Skin cancer        PSH:  Past Surgical History:   Procedure Laterality Date    CRANIOTOMY, WITH NEOPLASM EXCISION USING COMPUTER-ASSISTED NAVIGATION Left 10/26/2023    Procedure: CRANIOTOMY, WITH NEOPLASM EXCISION USING COMPUTER-ASSISTED NAVIGATION;  Surgeon: Jose E Degroot DO;  Location: [facility] OR [address] [place] [address];  Service: Neurosurgery;  Laterality: Left;    HERNIA REPAIR N/A     LIVER TRANSPLANT N/A 8/9/2023    Procedure: TRANSPLANT, LIVER;  Surgeon: Ameya Suero MD;  Location: [facility] OR [address] [place] [address];  Service: Transplant;   "Laterality: N/A;       Family History:  No family history on file.    Social History:  Social History     Tobacco Use    Smoking status: Never    Smokeless tobacco: Never   Substance Use Topics    Alcohol use: Not Currently    Drug use: Never        Review of Systems:  General: No fever, chills  Skin: No rashes  Chest:  Denies cough and sputum production  Heart: Denies chest pain  Resp: Denies dyspnea  Abdomen: Denies diarrhea, abdominal pain, hematemesis, or blood in stool.  Musculoskeletal: No joint stiffness or swelling. Denies back pain.  : see HPI  Neuro: no dizziness or weakness    Allergies:  Patient has no known allergies.    Medications:    Current Outpatient Medications:     aspirin 81 MG Chew, Take 1 tablet (81 mg total) by mouth once daily. Restart 11/9/23, Disp: 30 tablet, Rfl: 11    blood sugar diagnostic Strp, Test blood glucose 3 (three) times daily., Disp: 100 strip, Rfl: 11    blood-glucose sensor (DEXCOM G7 SENSOR) Neelam, 1 each by Misc.(Non-Drug; Combo Route) route every 10 days., Disp: 3 each, Rfl: 11    carvediloL (COREG) 6.25 MG tablet, Take 1 tablet (6.25 mg total) by mouth 2 (two) times daily with meals. HOLD if SBP < 125 or pulse < 60., Disp: 180 tablet, Rfl: 0    clopidogreL (PLAVIX) 75 mg tablet, Take 1 tablet (75 mg total) by mouth once daily., Disp: 30 tablet, Rfl: 11    insulin glargine U-100, Lantus, (LANTUS SOLOSTAR U-100 INSULIN) 100 unit/mL (3 mL) InPn pen, Inject 33 Units into the skin once daily., Disp: 15 mL, Rfl: 11    insulin lispro 100 unit/mL injection, VIA ILET INSULIN PUMP, MAX ., Disp: 50 mL, Rfl: 11    levETIRAcetam (KEPPRA) 500 MG Tab, Take 1 tablet (500 mg total) by mouth every 12 (twelve) hours. STOP 11/9/23, Disp: 21 tablet, Rfl: 0    losartan (COZAAR) 100 MG tablet, Take 1 tablet (100 mg total) by mouth once daily. HOLD if SBP < 120, Disp: 90 tablet, Rfl: 0    pen needle, diabetic (BD ULTRA-FINE MERCEDES PEN NEEDLE) 32 gauge x 5/32" Ndle, Use to inject insulin " into the skin 4 (four) times daily., Disp: 100 each, Rfl: 11    prednisoLONE acetate (PRED FORTE) 1 % DrpS, Place 1 drop into the left eye 4 (four) times daily., Disp: 5 mL, Rfl: 1    tacrolimus (PROGRAF) 1 MG Cap, Take 2 capsules (2 mg total) by mouth every 12 (twelve) hours., Disp: 120 capsule, Rfl: 11    tirzepatide 10 mg/0.5 mL PnIj, Inject 10 mg (one pen) into the skin every 7 days., Disp: 2 mL, Rfl: 11    Physical Exam:  There were no vitals filed for this visit.  There is no height or weight on file to calculate BMI.  General: awake, alert, cooperative  Head: NC/AT  Ears: external ears normal  Eyes: sclera normal  Lungs: normal inspiration, NAD  Heart: well-perfused  Abdomen: Soft, NT, ND  : Normal circ'd phallus, meatus normal in size and position, BL testicles palpable, no masses, nontender, no abnormalities of epididymi  MATEUSZ: Normal rectal tone, no hemorrhoids. Prostate smooth and normal, no nodules 40 gm SV not palpable. Perineum and anus normal.  Lymphatic: groin nodes negative  Skin: The skin is warm and dry  Ext: No c/c/e.  Neuro: grossly intact, AOx3    RADIOLOGY:  No recent relevant imaging available for review.    LABS:  I personally reviewed the following lab values:  Lab Results   Component Value Date    WBC 5.84 12/16/2024    HGB 14.9 12/16/2024    HCT 44.4 12/16/2024     (L) 12/16/2024     12/16/2024    K 3.7 12/16/2024     12/16/2024    CREATININE 0.9 12/16/2024    BUN 15 12/16/2024    CO2 23 12/16/2024    TSH 2.096 06/15/2023    PSA 0.49 12/16/2024    INR 1.1 10/24/2023    HGBA1C 6.3 (H) 12/16/2024    CHOL 163 12/16/2024    TRIG 150 12/16/2024    HDL 34 (L) 12/16/2024    ALT 17 12/16/2024    AST 20 12/16/2024     Assessment/Plan:   Jed Chávez is a 64 y.o. male with:    ED - I reviewed the etiology and management of ED.  Management options include oral medications, vacuum erectile devices, MUSE urethral suppositories, intracavernosal injections, and surgical placement of a  penile prosthesis.  I reviewed the risks and benefits of each.  For medications, I noted that they are safe and effective, but noted that side effects include flushing of the face, headaches, color vision change, blurry vision, and congestion/runny nose. They should not be used by anyone currently taking nitrates for chest pain, and I reviewed the patient's current medications in the chart and verbally with the patient and he is not. For vacuum erectile devices, I noted that they also are safe but that they require the use of a restrictive ring at the base of the penis in order to prevent a detumescence, which can be uncomfortable for some patients. They also have the added benefits of being able to be combined with medical therapy for added effect.  For MUSE, I noted that these are reasonably effective, but that patients usually experience urethral burning, which can also be experienced by the partner, often requiring the use of condoms.  For intracavernosal injections I explained that this is usually the most efficacious medication to achieve a natural erection, but requires injecting the penis, which is not a suitable option for everybody.  For surgical options I reviewed a that there is the surgical risks which include pain, bleeding, and infection.  I noted that an infection of the device would require it to be removed, which can make replacement at a later date very difficult. Obtain T and E2 levels, will message with this result. Continue gummies, f/u 6 months.    Jed Yu MD  Urology

## 2024-12-21 LAB — TACROLIMUS BLD-MCNC: 6.4 NG/ML (ref 5–15)

## 2024-12-23 ENCOUNTER — TELEPHONE (OUTPATIENT)
Dept: TRANSPLANT | Facility: CLINIC | Age: 64
End: 2024-12-23
Payer: COMMERCIAL

## 2024-12-23 NOTE — TELEPHONE ENCOUNTER
Patient informed labs reviewed, no medication changes, labs to be repeated on 1/27/2025 and encouraged to call for questions.       ----- Message from Ochoa Quesada MD sent at 12/23/2024  8:54 AM CST -----  Results reviewed

## 2025-01-13 NOTE — PLAN OF CARE
[facility] Care Plan    POC reviewed with Jed MEDELLIN Kyler and family at 0300. Pt verbalized understanding. Questions and concerns addressed. No acute events overnight. Pt progressing toward goals. Will continue to monitor. See below and flowsheets for full assessment and VS info.       -Cardene titrated   -Insulin gtt titrated  -potassium and phos replaced  -SG drain to full suction      Is this a stroke patient? no    Neuro:  Denver Coma Scale  Best Eye Response: 4-->(E4) spontaneous  Best Motor Response: 6-->(M6) obeys commands  Best Verbal Response: 5-->(V5) oriented  Jann Coma Scale Score: 15  Assessment Qualifiers: patient not sedated/intubated  Pupil PERRLA: yes     24hr Temp:  [97.6 °F (36.4 °C)-97.8 °F (36.6 °C)]     CV:   Rhythm: normal sinus rhythm  BP goals:   SBP < 140  MAP > 65    Resp:           Plan: N/A    GI/:     Diet/Nutrition Received: consistent carb/diabetic diet  Last Bowel Movement: 10/28/23  Voiding Characteristics: urethral catheter (bladder)    Intake/Output Summary (Last 24 hours) at 10/28/2023 0408  Last data filed at 10/28/2023 0205  Gross per 24 hour   Intake 4225.97 ml   Output 3050 ml   Net 1175.97 ml     Unmeasured Output  Urine Occurrence: 2  Stool Occurrence: 0    Labs/Accuchecks:  Recent Labs   Lab 10/28/23  0008   WBC 8.76   RBC 4.63   HGB 12.3*   HCT 37.2*         Recent Labs   Lab 10/28/23  0008   *   K 3.4*   CO2 18*      BUN 18   CREATININE 0.8   ALKPHOS 87   ALT 19   AST 14   BILITOT 0.7      Recent Labs   Lab 10/24/23  2225   INR 1.1   APTT 27.6      Recent Labs   Lab 10/24/23  1503   TROPONINI 0.010       Electrolytes: Electrolytes replaced  Accuchecks: Q6H    Gtts:   insulin regular 1 units/mL infusion orderable (DKA) 6.5 Units/hr (10/28/23 0341)    nicardipine 15 mg/hr (10/28/23 9709)       LDA/Wounds:  Lines/Drains/Airways       Drain  Duration                  Closed/Suction Drain 10/26/23 0942 Left Scalp Accordion 10 Fr. 1 day              Arterial  Patient Name: Clare Turner  MRN: [id number]   :  1985     This provider is located at her home office through the [facility] (through [facility]), [address] [place], [address] using a secure [employer] Video Visit through [employer]. Patient is being seen remotely via telehealth at their home address in [place], and stated they are in a secure environment for this session. The patient's condition being diagnosed/treated is appropriate for telemedicine. The provider identified herself as well as her credentials.   The patient, and/or patients guardian, consent to be seen remotely, and when consent is given they understand that the consent allows for patient identifiable information to be sent to a third party as needed.   They may refuse to be seen remotely at any time. The electronic data is encrypted and password protected, and the patient and/or guardian has been advised of the potential risks to privacy not withstanding such measures.    You have chosen to receive care through a telehealth visit.  Do you consent to use a video/audio connection for your medical care today? Yes    Chief Complaint:      ICD-10-CM ICD-9-CM   1. Bipolar 1 disorder, depressed, partial remission  F31.75 296.55   2. Generalized anxiety disorder  F41.1 300.02   3. Attention deficit hyperactivity disorder, combined type  F90.2 314.01   4. Psychophysiological insomnia  F51.04 307.42       History of Present Illness: Clare Turner is a 39 y.o. female is here today for medication management follow up.  Patient states medications are great.  Had gastric sleeve on .  Has lost 40 pounds so far.  Has an increase in energy.    The following portions of the patient's history were reviewed and updated as appropriate: allergies, current medications, past family history, past medical history, past social history, past surgical history, and problem list.    Review of  Line  Duration             Arterial Line 10/26/23 0753 Left Radial 1 day              Peripheral Intravenous Line  Duration                  Peripheral IV - Single Lumen 10/24/23 1800 18 G 3 days         Peripheral IV - Single Lumen 10/26/23 1215 20 G Posterior;Right Hand 1 day                  Wounds: Yes  Wound care consulted: Yes    Systems;;  Review of Systems   Constitutional:  Negative for activity change, appetite change, fatigue, unexpected weight gain and unexpected weight loss.   Respiratory:  Negative for shortness of breath and wheezing.    Gastrointestinal:  Negative for constipation, diarrhea, nausea and vomiting.   Musculoskeletal:  Negative for gait problem.   Skin:  Negative for dry skin and rash.   Neurological:  Negative for dizziness, speech difficulty, weakness, light-headedness, headache, memory problem and confusion.   Psychiatric/Behavioral:  Negative for agitation, behavioral problems, decreased concentration, dysphoric mood, hallucinations, self-injury, sleep disturbance, suicidal ideas, negative for hyperactivity, depressed mood and stress. The patient is not nervous/anxious.        Physical Exam;;  Physical Exam  Constitutional:       General: She is not in acute distress.     Appearance: She is well-developed. She is not diaphoretic.   HENT:      Head: Normocephalic and atraumatic.   Eyes:      Conjunctiva/sclera: Conjunctivae normal.   Pulmonary:      Effort: Pulmonary effort is normal. No respiratory distress.   Musculoskeletal:         General: Normal range of motion.      Cervical back: Full passive range of motion without pain and normal range of motion.   Neurological:      Mental Status: She is alert and oriented to person, place, and time.   Psychiatric:         Mood and Affect: Mood is not anxious or depressed. Affect is not labile, blunt, angry or inappropriate.         Speech: Speech is not rapid and pressured or tangential.         Behavior: Behavior normal. Behavior is not agitated, slowed, aggressive, withdrawn, hyperactive or combative. Behavior is cooperative.         Thought Content: Thought content normal. Thought content is not paranoid or delusional. Thought content does not include homicidal or suicidal ideation. Thought content does not include homicidal or suicidal plan.         Judgment:  Judgment normal.       not currently breastfeeding.  There is no height or weight on file to calculate BMI. Video appt unable to obtain.     Current Medications;;    Current Outpatient Medications:     amphetamine-dextroamphetamine (Adderall) 20 MG tablet, Take 20 mg orally every afternoon., Disp: 30 tablet, Rfl: 0    amphetamine-dextroamphetamine XR (Adderall XR) 20 MG 24 hr capsule, Take 2 capsules by mouth Every Morning, Disp: 60 capsule, Rfl: 0    eszopiclone (Lunesta) 3 MG tablet, Take 1 tablet by mouth At Night As Needed for Sleep. Take immediately before bedtime, Disp: 30 tablet, Rfl: 2    FLUoxetine (PROzac) 40 MG capsule, Take 2 capsules by mouth Daily., Disp: 60 capsule, Rfl: 6    lamoTRIgine (LaMICtal) 100 MG tablet, Take 1 tablet by mouth Daily., Disp: 30 tablet, Rfl: 2    albuterol (PROVENTIL) (2.5 MG/3ML) 0.083% nebulizer solution, Take 2.5 mg by nebulization 4 (Four) Times a Day As Needed for Wheezing., Disp: 120 each, Rfl: 5    albuterol sulfate  (90 Base) MCG/ACT inhaler, Inhale 2 puffs 2 (Two) Times a Day., Disp: 18 g, Rfl: 5    budesonide (Pulmicort) 0.5 MG/2ML nebulizer solution, Take 2 mL by nebulization 2 (Two) Times a Day. (Patient taking differently: Take 2 mL by nebulization 2 (Two) Times a Day As Needed.), Disp: 120 each, Rfl: 11    buPROPion XL (Wellbutrin XL) 150 MG 24 hr tablet, Take 1 tablet by mouth Every Morning., Disp: 30 tablet, Rfl: 2    Calcium Carbonate-Vit D-Min (Calcium 1200) 9590-7515 MG-UNIT chewable tablet, Chew 1,200 mg Daily., Disp: 90 each, Rfl: 2    Cariprazine HCl (Vraylar) 4.5 MG capsule capsule, Take 1 capsule by mouth Daily., Disp: 30 capsule, Rfl: 6    Cyanocobalamin (Vitamin B-12) 1000 MCG sublingual tablet, Place 1 tablet under the tongue Daily., Disp: 90 each, Rfl: 2    doxepin (SINEquan) 10 MG capsule, Take 1 capsule by mouth Every Night., Disp: 30 capsule, Rfl: 6    Dupilumab 300 MG/2ML solution pen-injector, Inject 2 mL under the skin into the  appropriate area as directed Every 14 (Fourteen) Days. (Patient not taking: Reported on 12/17/2024), Disp: 2 mL, Rfl: 11    EPINEPHrine (EPIPEN) 0.3 MG/0.3ML solution auto-injector injection, Inject 1 pen in the muscle as directed PRN allergic reaction, Disp: 2 each, Rfl: 1    Fluticasone Furoate-Vilanterol (BREO ELLIPTA) 200-25 MCG/ACT inhaler, Inhale 1 puff Daily. 1 inhalation once a day, Disp: 60 each, Rfl: 5    hydrOXYzine pamoate (VISTARIL) 25 MG capsule, Take 1 capsule by mouth 3 (Three) Times a Day As Needed for Anxiety., Disp: 90 capsule, Rfl: 6    Iron-Vitamin C (Vitron-C)  MG tablet, Take 1 tablet by mouth Daily., Disp: 90 tablet, Rfl: 2    multivitamin tablet tablet, Take 1 tablet by mouth Daily., Disp: 90 tablet, Rfl: 2    omeprazole (priLOSEC) 40 MG capsule, Take 1 capsule by mouth Daily for 60 days., Disp: 60 capsule, Rfl: 0    ondansetron ODT (ZOFRAN-ODT) 4 MG disintegrating tablet, Place 1 tablet on the tongue Every 8 (Eight) Hours As Needed for Nausea or Vomiting., Disp: 10 tablet, Rfl: 0    thiamine (VITAMIN B-1) 100 MG tablet tablet, , Disp: , Rfl:     thiamine (VITAMIN B-1) 100 MG tablet, Take 1 tablet by mouth Daily., Disp: 30 tablet, Rfl: 2    vitamin D3 125 MCG (5000 UT) capsule capsule, Take 1 capsule by mouth Daily., Disp: 90 capsule, Rfl: 2    Lab Results:   Admission on 12/11/2024, Discharged on 12/12/2024   Component Date Value Ref Range Status    HCG, Urine, QL 12/11/2024 Negative  Negative Final    Lot Number 12/11/2024 #[id number]   Final    Internal Positive Control 12/11/2024 Passed  Positive, Passed Final    Internal Negative Control 12/11/2024 Passed  Negative, Passed Final    Expiration Date 12/11/2024 2026-04-22   Final    Reference Lab Report 12/11/2024    Final                    Value:[facility]  [address] [place] [address]  Phone: [phone] or [phone]  Fax: [phone]  Dave Fontanez M.D., Medical Director    PATIENT  "NAME                           LABORATORY [address].  [id number]  ORACIO DELONG.                  [id number]  [id number]                         AGE              SEX  N           CLIENT REF #  [facility]            39      1985  F    [id number]   [id number]    [address] [place] [address] BY-PASS                REQUESTING M.D.     ATTENDING MAVERY.     COPY TO.  [address]                        DERRICK TAVERA BJORN  [place] [id number]                 DATE COLLECTED      DATE RECEIVED      DATE REPORTED  2024    DIAGNOSIS:  STOMACH, PARTIAL GASTRECTOMY:  Stomach with reactive oxyntic mucosa  No Helicobacter pylori-like organisms seen  Negative for dysplasia or malignancy    GJK    CLINICAL HISTORY:  Body mass index (BMI) of 50-59.9 in adult,                           hiatal hernia with gastroesophageal  reflux    SPECIMENS RECEIVED:  STOMACH, PARTIAL GASTRECTOMY    MICROSCOPIC DESCRIPTION:  Tissue blocks are prepared and slides are examined microscopically on all  specimens. See diagnosis for details.    Professional interpretation rendered by Derrick Ma M.D., F.C.A.P. at [address], [place], [address] [place] [address].    GROSS DESCRIPTION:  Received in formalin labeled \"subtotal gastrectomy\" is a 24 x 4.2 x 3.2 cm  intact, unopened portion of stomach with tan-purple, mildly congested serosa  and minimal attached perigastric fat.  The lumen contains an abundant amount of  viscous blood and the mucosa is red and mildly congested with normal rugal  folds.  No polyps, ulcers or areas of mucosal thickening are identified.  Representative sections are submitted in blocks A1-A3.  HDM    REVIEWED, DIAGNOSED AND ELECTRONICALLY  SIGNED BY:    Derrick Ma M.D., F.C.A.P.  CPT CODES:  47648      Glucose 2024 104 (H)  65 - 99 mg/dL Final    BUN 2024 8  6 - 20 mg/dL Final    Creatinine 2024 0.78  0.57 - 1.00 mg/dL Final    Sodium 2024 " 135 (L)  136 - 145 mmol/L Final    Potassium 12/12/2024 3.8  3.5 - 5.2 mmol/L Final    Chloride 12/12/2024 102  98 - 107 mmol/L Final    CO2 12/12/2024 21.5 (L)  22.0 - 29.0 mmol/L Final    Calcium 12/12/2024 8.1 (L)  8.6 - 10.5 mg/dL Final    Total Protein 12/12/2024 6.9  6.0 - 8.5 g/dL Final    Albumin 12/12/2024 3.7  3.5 - 5.2 g/dL Final    ALT (SGPT) 12/12/2024 42 (H)  1 - 33 U/L Final    AST (SGOT) 12/12/2024 38 (H)  1 - 32 U/L Final    Alkaline Phosphatase 12/12/2024 103  39 - 117 U/L Final    Total Bilirubin 12/12/2024 0.3  0.0 - 1.2 mg/dL Final    Globulin 12/12/2024 3.2  gm/dL Final    A/G Ratio 12/12/2024 1.2  g/dL Final    BUN/Creatinine Ratio 12/12/2024 10.3  7.0 - 25.0 Final    Anion Gap 12/12/2024 11.5  5.0 - 15.0 mmol/L Final    eGFR 12/12/2024 99.8  >60.0 mL/min/1.73 Final    Iron 12/12/2024 61  37 - 145 mcg/dL Final    WBC 12/12/2024 18.17 (H)  3.40 - 10.80 10*3/mm3 Final    RBC 12/12/2024 4.40  3.77 - 5.28 10*6/mm3 Final    Hemoglobin 12/12/2024 13.1  12.0 - 15.9 g/dL Final    Hematocrit 12/12/2024 39.7  34.0 - 46.6 % Final    MCV 12/12/2024 90.2  79.0 - 97.0 fL Final    MCH 12/12/2024 29.8  26.6 - 33.0 pg Final    MCHC 12/12/2024 33.0  31.5 - 35.7 g/dL Final    RDW 12/12/2024 13.9  12.3 - 15.4 % Final    RDW-SD 12/12/2024 46.2  37.0 - 54.0 fl Final    MPV 12/12/2024 10.1  6.0 - 12.0 fL Final    Platelets 12/12/2024 424  140 - 450 10*3/mm3 Final    Neutrophil % 12/12/2024 77.3 (H)  42.7 - 76.0 % Final    Lymphocyte % 12/12/2024 13.6 (L)  19.6 - 45.3 % Final    Monocyte % 12/12/2024 8.3  5.0 - 12.0 % Final    Eosinophil % 12/12/2024 0.2 (L)  0.3 - 6.2 % Final    Basophil % 12/12/2024 0.2  0.0 - 1.5 % Final    Immature Grans % 12/12/2024 0.4  0.0 - 0.5 % Final    Neutrophils, Absolute 12/12/2024 14.05 (H)  1.70 - 7.00 10*3/mm3 Final    Lymphocytes, Absolute 12/12/2024 2.48  0.70 - 3.10 10*3/mm3 Final    Monocytes, Absolute 12/12/2024 1.51 (H)  0.10 - 0.90 10*3/mm3 Final    Eosinophils, Absolute  12/12/2024 0.03  0.00 - 0.40 10*3/mm3 Final    Basophils, Absolute 12/12/2024 0.03  0.00 - 0.20 10*3/mm3 Final    Immature Grans, Absolute 12/12/2024 0.07 (H)  0.00 - 0.05 10*3/mm3 Final    nRBC 12/12/2024 0.0  0.0 - 0.2 /100 WBC Final   Pre-Admission Testing on 12/04/2024   Component Date Value Ref Range Status    Glucose 12/04/2024 88  65 - 99 mg/dL Final    BUN 12/04/2024 10  6 - 20 mg/dL Final    Creatinine 12/04/2024 0.80  0.57 - 1.00 mg/dL Final    Sodium 12/04/2024 135 (L)  136 - 145 mmol/L Final    Potassium 12/04/2024 4.6  3.5 - 5.2 mmol/L Final    Slight hemolysis detected by analyzer. Result may be falsely elevated.    Chloride 12/04/2024 101  98 - 107 mmol/L Final    CO2 12/04/2024 24.6  22.0 - 29.0 mmol/L Final    Calcium 12/04/2024 9.3  8.6 - 10.5 mg/dL Final    BUN/Creatinine Ratio 12/04/2024 12.5  7.0 - 25.0 Final    Anion Gap 12/04/2024 9.4  5.0 - 15.0 mmol/L Final    eGFR 12/04/2024 96.9  >60.0 mL/min/1.73 Final    MRSA Screen Cx 12/04/2024 No growth   Final    ABO Type 12/04/2024 O   Final    RH type 12/04/2024 Positive   Final    WBC 12/04/2024 11.92 (H)  3.40 - 10.80 10*3/mm3 Final    RBC 12/04/2024 4.79  3.77 - 5.28 10*6/mm3 Final    Hemoglobin 12/04/2024 14.4  12.0 - 15.9 g/dL Final    Hematocrit 12/04/2024 42.7  34.0 - 46.6 % Final    MCV 12/04/2024 89.1  79.0 - 97.0 fL Final    MCH 12/04/2024 30.1  26.6 - 33.0 pg Final    MCHC 12/04/2024 33.7  31.5 - 35.7 g/dL Final    RDW 12/04/2024 13.8  12.3 - 15.4 % Final    RDW-SD 12/04/2024 45.2  37.0 - 54.0 fl Final    MPV 12/04/2024 10.3  6.0 - 12.0 fL Final    Platelets 12/04/2024 476 (H)  140 - 450 10*3/mm3 Final    Neutrophil % 12/04/2024 63.2  42.7 - 76.0 % Final    Lymphocyte % 12/04/2024 25.1  19.6 - 45.3 % Final    Monocyte % 12/04/2024 9.1  5.0 - 12.0 % Final    Eosinophil % 12/04/2024 2.0  0.3 - 6.2 % Final    Basophil % 12/04/2024 0.3  0.0 - 1.5 % Final    Immature Grans % 12/04/2024 0.3  0.0 - 0.5 % Final    Neutrophils, Absolute  12/04/2024 7.52 (H)  1.70 - 7.00 10*3/mm3 Final    Lymphocytes, Absolute 12/04/2024 2.99  0.70 - 3.10 10*3/mm3 Final    Monocytes, Absolute 12/04/2024 1.09 (H)  0.10 - 0.90 10*3/mm3 Final    Eosinophils, Absolute 12/04/2024 0.24  0.00 - 0.40 10*3/mm3 Final    Basophils, Absolute 12/04/2024 0.04  0.00 - 0.20 10*3/mm3 Final    Immature Grans, Absolute 12/04/2024 0.04  0.00 - 0.05 10*3/mm3 Final    nRBC 12/04/2024 0.0  0.0 - 0.2 /100 WBC Final   Admission on 11/06/2024, Discharged on 11/06/2024   Component Date Value Ref Range Status    SARS Antigen 11/06/2024 Not Detected  Not Detected, Presumptive Negative Final    Influenza A Antigen IZABEL 11/06/2024 Not Detected  Not Detected Final    Influenza B Antigen IZABEL 11/06/2024 Not Detected  Not Detected Final    Internal Control 11/06/2024 Passed  Passed Final    Lot Number 11/06/2024 [id number]   Final    Expiration Date 11/06/2024 11/14/25   Final    Rapid Strep A Screen 11/06/2024 Negative   Final    Internal Control 11/06/2024 Passed   Final    Lot Number 11/06/2024 [id number]   Final    Expiration Date 11/06/2024 01/03/27   Final   Lab on 11/04/2024   Component Date Value Ref Range Status    WBC 11/04/2024 16.34 (H)  3.40 - 10.80 10*3/mm3 Final    RBC 11/04/2024 4.61  3.77 - 5.28 10*6/mm3 Final    Hemoglobin 11/04/2024 13.7  12.0 - 15.9 g/dL Final    Hematocrit 11/04/2024 40.5  34.0 - 46.6 % Final    MCV 11/04/2024 87.9  79.0 - 97.0 fL Final    MCH 11/04/2024 29.7  26.6 - 33.0 pg Final    MCHC 11/04/2024 33.8  31.5 - 35.7 g/dL Final    RDW 11/04/2024 12.8  12.3 - 15.4 % Final    RDW-SD 11/04/2024 40.8  37.0 - 54.0 fl Final    MPV 11/04/2024 10.5  6.0 - 12.0 fL Final    Platelets 11/04/2024 464 (H)  140 - 450 10*3/mm3 Final    Glucose 11/04/2024 87  65 - 99 mg/dL Final    BUN 11/04/2024 8  6 - 20 mg/dL Final    Creatinine 11/04/2024 0.79  0.57 - 1.00 mg/dL Final    Sodium 11/04/2024 136  136 - 145 mmol/L Final    Potassium 11/04/2024 4.1  3.5 - 5.2 mmol/L Final    Chloride  11/04/2024 103  98 - 107 mmol/L Final    CO2 11/04/2024 22.6  22.0 - 29.0 mmol/L Final    Calcium 11/04/2024 9.4  8.6 - 10.5 mg/dL Final    Total Protein 11/04/2024 7.2  6.0 - 8.5 g/dL Final    Albumin 11/04/2024 4.0  3.5 - 5.2 g/dL Final    ALT (SGPT) 11/04/2024 61 (H)  1 - 33 U/L Final    AST (SGOT) 11/04/2024 34 (H)  1 - 32 U/L Final    Alkaline Phosphatase 11/04/2024 116  39 - 117 U/L Final    Total Bilirubin 11/04/2024 <0.2  0.0 - 1.2 mg/dL Final    Globulin 11/04/2024 3.2  gm/dL Final    A/G Ratio 11/04/2024 1.3  g/dL Final    BUN/Creatinine Ratio 11/04/2024 10.1  7.0 - 25.0 Final    Anion Gap 11/04/2024 10.4  5.0 - 15.0 mmol/L Final    eGFR 11/04/2024 98.3  >60.0 mL/min/1.73 Final       Mental Status Exam:   Hygiene:   good  Cooperation:  Cooperative  Eye Contact:  Good  Psychomotor Behavior:  Appropriate  Mood:within normal limits  Affect:  Appropriate  Hopelessness: Denies  Speech:  Normal  Thought Process:  Goal directed  Thought Content:  Normal  Suicidal:  None  Homicidal:  None  Hallucinations:  None  Delusion:  None  Memory:  Intact  Orientation:  Person, Place, Time, and Situation  Reliability:  good  Insight:  Good  Judgement:  Good  Impulse Control:  Good    PHQ-9 Depression Screening  Little interest or pleasure in doing things? (Patient-Rptd) Not at all   Feeling down, depressed, or hopeless? (Patient-Rptd) Not at all   PHQ-2 Total Score (Patient-Rptd) 0   Trouble falling or staying asleep, or sleeping too much? (Patient-Rptd) Not at all   Feeling tired or having little energy? (Patient-Rptd) Several days   Poor appetite or overeating? (Patient-Rptd) Not at all   Feeling bad about yourself - or that you are a failure or have let yourself or your family down? (Patient-Rptd) Several days   Trouble concentrating on things, such as reading the newspaper or watching television? (Patient-Rptd) Several days   Moving or speaking so slowly that other people could have noticed? Or the opposite - being so  fidgety or restless that you have been moving around a lot more than usual? (Patient-Rptd) Not at all   Thoughts that you would be better off dead, or of hurting yourself in some way? (Patient-Rptd) Not at all   PHQ-9 Total Score (Patient-Rptd) 3   If you checked off any problems, how difficult have these problems made it for you to do your work, take care of things at home, or get along with other people? (Patient-Rptd) Somewhat difficult         Assessment/Plan:  Diagnoses and all orders for this visit:    1. Bipolar 1 disorder, depressed, partial remission (Primary)  -     buPROPion XL (Wellbutrin XL) 150 MG 24 hr tablet; Take 1 tablet by mouth Every Morning.  Dispense: 30 tablet; Refill: 2  -     FLUoxetine (PROzac) 40 MG capsule; Take 2 capsules by mouth Daily.  Dispense: 60 capsule; Refill: 6  -     lamoTRIgine (LaMICtal) 100 MG tablet; Take 1 tablet by mouth Daily.  Dispense: 30 tablet; Refill: 2    2. Generalized anxiety disorder    3. Attention deficit hyperactivity disorder, combined type  -     amphetamine-dextroamphetamine (Adderall) 20 MG tablet; Take 20 mg orally every afternoon.  Dispense: 30 tablet; Refill: 0  -     amphetamine-dextroamphetamine XR (Adderall XR) 20 MG 24 hr capsule; Take 2 capsules by mouth Every Morning  Dispense: 60 capsule; Refill: 0    4. Psychophysiological insomnia  -     eszopiclone (Lunesta) 3 MG tablet; Take 1 tablet by mouth At Night As Needed for Sleep. Take immediately before bedtime  Dispense: 30 tablet; Refill: 2      Patient doing exceptionally well since her gastric sleeve.  Feels mood and focus are stable.  We will continue medication as ordered and follow-up in 2 months.    A psychological evaluation was conducted in order to assess past and current level of functioning. Areas assessed included, but were not limited to: perception of social support, perception of ability to face and deal with challenges in life (positive functioning), anxiety symptoms, depressive  symptoms, perspective on beliefs/belief system, coping skills for stress, intelligence level,  Therapeutic rapport was established. Interventions conducted today were geared towards incorporating medication management along with support for continued therapy. Education was also provided as to the med management with this provider and what to expect in subsequent sessions.    We discussed risks, benefits,goals and side effects of the above medication and the patient was agreeable with the plan.Patient was educated on the importance of compliance with treatment and follow-up appointments. Patient is aware to contact the [facility] [phone] with any worsening of symptoms. To call for questions or concerns and return early if necessary. Patent is agreeable to go to the Emergency Department or call 911 should they begin SI/HI.     Part of this note may be an electronic transcription/translation of spoken language to printed text using the Dragon Dictation System.    Return in about 2 months (around 3/13/2025) for Follow Up 30 min, Video visit.    DALLAS Hoover    This note has been transcribed by Leslie CHAMBERS, RN [facility] NP student. I have reviewed the note and concur with the transcription.

## 2025-01-27 ENCOUNTER — LAB VISIT (OUTPATIENT)
Dept: LAB | Facility: HOSPITAL | Age: 65
End: 2025-01-27
Attending: INTERNAL MEDICINE
Payer: COMMERCIAL

## 2025-01-27 DIAGNOSIS — Z94.4 STATUS POST LIVER TRANSPLANT: ICD-10-CM

## 2025-01-27 LAB
ALBUMIN SERPL BCP-MCNC: 3.8 G/DL (ref 3.5–5.2)
ALP SERPL-CCNC: 208 U/L (ref 40–150)
ALT SERPL W/O P-5'-P-CCNC: 62 U/L (ref 10–44)
ANION GAP SERPL CALC-SCNC: 8 MMOL/L (ref 8–16)
AST SERPL-CCNC: 20 U/L (ref 10–40)
BASOPHILS # BLD AUTO: 0.02 K/UL (ref 0–0.2)
BASOPHILS NFR BLD: 0.3 % (ref 0–1.9)
BILIRUB SERPL-MCNC: 1.6 MG/DL (ref 0.1–1)
BUN SERPL-MCNC: 15 MG/DL (ref 8–23)
CALCIUM SERPL-MCNC: 9.4 MG/DL (ref 8.7–10.5)
CHLORIDE SERPL-SCNC: 104 MMOL/L (ref 95–110)
CO2 SERPL-SCNC: 27 MMOL/L (ref 23–29)
CREAT SERPL-MCNC: 0.9 MG/DL (ref 0.5–1.4)
DIFFERENTIAL METHOD BLD: NORMAL
EOSINOPHIL # BLD AUTO: 0.1 K/UL (ref 0–0.5)
EOSINOPHIL NFR BLD: 1.3 % (ref 0–8)
ERYTHROCYTE [DISTWIDTH] IN BLOOD BY AUTOMATED COUNT: 13.4 % (ref 11.5–14.5)
EST. GFR  (NO RACE VARIABLE): >60 ML/MIN/1.73 M^2
GLUCOSE SERPL-MCNC: 197 MG/DL (ref 70–110)
HCT VFR BLD AUTO: 47.2 % (ref 40–54)
HGB BLD-MCNC: 15.1 G/DL (ref 14–18)
IMM GRANULOCYTES # BLD AUTO: 0.01 K/UL (ref 0–0.04)
IMM GRANULOCYTES NFR BLD AUTO: 0.2 % (ref 0–0.5)
LYMPHOCYTES # BLD AUTO: 2.7 K/UL (ref 1–4.8)
LYMPHOCYTES NFR BLD: 44.4 % (ref 18–48)
MCH RBC QN AUTO: 28.1 PG (ref 27–31)
MCHC RBC AUTO-ENTMCNC: 32 G/DL (ref 32–36)
MCV RBC AUTO: 88 FL (ref 82–98)
MONOCYTES # BLD AUTO: 0.4 K/UL (ref 0.3–1)
MONOCYTES NFR BLD: 7.3 % (ref 4–15)
NEUTROPHILS # BLD AUTO: 2.8 K/UL (ref 1.8–7.7)
NEUTROPHILS NFR BLD: 46.5 % (ref 38–73)
NRBC BLD-RTO: 0 /100 WBC
PLATELET # BLD AUTO: 150 K/UL (ref 150–450)
PMV BLD AUTO: 10.5 FL (ref 9.2–12.9)
POTASSIUM SERPL-SCNC: 4 MMOL/L (ref 3.5–5.1)
PROT SERPL-MCNC: 7.3 G/DL (ref 6–8.4)
RBC # BLD AUTO: 5.37 M/UL (ref 4.6–6.2)
SODIUM SERPL-SCNC: 139 MMOL/L (ref 136–145)
WBC # BLD AUTO: 5.99 K/UL (ref 3.9–12.7)

## 2025-01-27 PROCEDURE — 85025 COMPLETE CBC W/AUTO DIFF WBC: CPT | Performed by: INTERNAL MEDICINE

## 2025-01-27 PROCEDURE — 36415 COLL VENOUS BLD VENIPUNCTURE: CPT | Performed by: INTERNAL MEDICINE

## 2025-01-27 PROCEDURE — 80197 ASSAY OF TACROLIMUS: CPT | Performed by: INTERNAL MEDICINE

## 2025-01-27 PROCEDURE — 80053 COMPREHEN METABOLIC PANEL: CPT | Performed by: INTERNAL MEDICINE

## 2025-01-28 ENCOUNTER — TELEPHONE (OUTPATIENT)
Dept: TRANSPLANT | Facility: CLINIC | Age: 65
End: 2025-01-28
Payer: COMMERCIAL

## 2025-01-28 LAB — TACROLIMUS BLD-MCNC: 23.6 NG/ML (ref 5–15)

## 2025-01-28 NOTE — TELEPHONE ENCOUNTER
Spoke with pt. Reviewed lab results. He states he has been sick with an URI. He feels like he is recovering. He does not remember if he took his meds before labs 1/27/25. He will repeat labs tomorrow. He has a liver ultrasound scheduled 2/5/25 and he agreed to a biopsy if needed.  ----- Message from Ochoa Quesada MD sent at 1/28/2025  2:33 PM CST -----  Needs repeat labs and US  LFTs are high and tac level is also high    Results reviewed

## 2025-01-29 ENCOUNTER — PATIENT MESSAGE (OUTPATIENT)
Dept: TRANSPLANT | Facility: CLINIC | Age: 65
End: 2025-01-29
Payer: COMMERCIAL

## 2025-01-30 ENCOUNTER — PATIENT OUTREACH (OUTPATIENT)
Dept: ADMINISTRATIVE | Facility: HOSPITAL | Age: 65
End: 2025-01-30
Payer: COMMERCIAL

## 2025-01-30 ENCOUNTER — LAB VISIT (OUTPATIENT)
Dept: LAB | Facility: HOSPITAL | Age: 65
End: 2025-01-30
Attending: INTERNAL MEDICINE
Payer: COMMERCIAL

## 2025-01-30 DIAGNOSIS — Z94.4 STATUS POST LIVER TRANSPLANT: ICD-10-CM

## 2025-01-30 LAB
ALBUMIN SERPL BCP-MCNC: 3.8 G/DL (ref 3.5–5.2)
ALP SERPL-CCNC: 291 U/L (ref 40–150)
ALT SERPL W/O P-5'-P-CCNC: 238 U/L (ref 10–44)
ANION GAP SERPL CALC-SCNC: 10 MMOL/L (ref 8–16)
AST SERPL-CCNC: 205 U/L (ref 10–40)
BASOPHILS # BLD AUTO: 0.02 K/UL (ref 0–0.2)
BASOPHILS NFR BLD: 0.3 % (ref 0–1.9)
BILIRUB SERPL-MCNC: 4.9 MG/DL (ref 0.1–1)
BUN SERPL-MCNC: 14 MG/DL (ref 8–23)
CALCIUM SERPL-MCNC: 9.4 MG/DL (ref 8.7–10.5)
CHLORIDE SERPL-SCNC: 107 MMOL/L (ref 95–110)
CO2 SERPL-SCNC: 25 MMOL/L (ref 23–29)
CREAT SERPL-MCNC: 1 MG/DL (ref 0.5–1.4)
DIFFERENTIAL METHOD BLD: ABNORMAL
EOSINOPHIL # BLD AUTO: 0.1 K/UL (ref 0–0.5)
EOSINOPHIL NFR BLD: 1 % (ref 0–8)
ERYTHROCYTE [DISTWIDTH] IN BLOOD BY AUTOMATED COUNT: 13.3 % (ref 11.5–14.5)
EST. GFR  (NO RACE VARIABLE): >60 ML/MIN/1.73 M^2
GLUCOSE SERPL-MCNC: 161 MG/DL (ref 70–110)
HCT VFR BLD AUTO: 46 % (ref 40–54)
HGB BLD-MCNC: 14.9 G/DL (ref 14–18)
IMM GRANULOCYTES # BLD AUTO: 0.01 K/UL (ref 0–0.04)
IMM GRANULOCYTES NFR BLD AUTO: 0.2 % (ref 0–0.5)
LYMPHOCYTES # BLD AUTO: 2.6 K/UL (ref 1–4.8)
LYMPHOCYTES NFR BLD: 42.7 % (ref 18–48)
MCH RBC QN AUTO: 28.2 PG (ref 27–31)
MCHC RBC AUTO-ENTMCNC: 32.4 G/DL (ref 32–36)
MCV RBC AUTO: 87 FL (ref 82–98)
MONOCYTES # BLD AUTO: 0.4 K/UL (ref 0.3–1)
MONOCYTES NFR BLD: 7.4 % (ref 4–15)
NEUTROPHILS # BLD AUTO: 2.9 K/UL (ref 1.8–7.7)
NEUTROPHILS NFR BLD: 48.4 % (ref 38–73)
NRBC BLD-RTO: 0 /100 WBC
PLATELET # BLD AUTO: 136 K/UL (ref 150–450)
PMV BLD AUTO: 10.7 FL (ref 9.2–12.9)
POTASSIUM SERPL-SCNC: 4.3 MMOL/L (ref 3.5–5.1)
PROT SERPL-MCNC: 7.3 G/DL (ref 6–8.4)
RBC # BLD AUTO: 5.28 M/UL (ref 4.6–6.2)
SODIUM SERPL-SCNC: 142 MMOL/L (ref 136–145)
WBC # BLD AUTO: 5.97 K/UL (ref 3.9–12.7)

## 2025-01-30 PROCEDURE — 80197 ASSAY OF TACROLIMUS: CPT | Performed by: INTERNAL MEDICINE

## 2025-01-30 PROCEDURE — 36415 COLL VENOUS BLD VENIPUNCTURE: CPT | Performed by: INTERNAL MEDICINE

## 2025-01-30 PROCEDURE — 80053 COMPREHEN METABOLIC PANEL: CPT | Performed by: INTERNAL MEDICINE

## 2025-01-30 PROCEDURE — 85025 COMPLETE CBC W/AUTO DIFF WBC: CPT | Performed by: INTERNAL MEDICINE

## 2025-01-31 ENCOUNTER — TELEPHONE (OUTPATIENT)
Dept: INTERVENTIONAL RADIOLOGY/VASCULAR | Facility: CLINIC | Age: 65
End: 2025-01-31
Payer: COMMERCIAL

## 2025-01-31 ENCOUNTER — TELEPHONE (OUTPATIENT)
Dept: TRANSPLANT | Facility: CLINIC | Age: 65
End: 2025-01-31
Payer: COMMERCIAL

## 2025-01-31 DIAGNOSIS — Z94.4 STATUS POST LIVER TRANSPLANT: Primary | ICD-10-CM

## 2025-01-31 DIAGNOSIS — R79.89 ELEVATED LFTS: ICD-10-CM

## 2025-01-31 LAB — TACROLIMUS BLD-MCNC: 14.4 NG/ML (ref 5–15)

## 2025-01-31 NOTE — TELEPHONE ENCOUNTER
See previous note  ----- Message from Ochoa Quesada MD sent at 1/31/2025  9:40 AM CST -----  Repeat labs on Monday as well. We may have to admit him if his bilirubin rises more  Results reviewed

## 2025-01-31 NOTE — TELEPHONE ENCOUNTER
Spoke with pt's spouse, Leida. Reviewed lab results and plan. All questions answered.   ----- Message from Meera sent at 1/31/2025  4:13 PM CST -----  Regarding: discuss results  Contact: Patient wife can be contacted @#  [phone] ([facility])  PT wife calling to discuss with Nurse francesca - Pts test results. Please call at earliest opportunity    Patient wife can be contacted @# [phone] ([facility])

## 2025-01-31 NOTE — TELEPHONE ENCOUNTER
Labs reviewed with Dr. Quesada via secure chat. Per MD, pt needs ultrasound and liver biopsy next week to r/o rejection.    Orders placed for biopsy. Ultrasound scheduled.    Spoke with pt. Reviewed plan for stat labs Monday with possible admission if LFTs and bilirubin further elevated. He understands to await call for biopsy scheduling and keep u/s as scheduled. Reviewed s/sx of biliary strictures, cholangitis and when to call the office. All questions answered.

## 2025-02-03 ENCOUNTER — TELEPHONE (OUTPATIENT)
Dept: TRANSPLANT | Facility: CLINIC | Age: 65
End: 2025-02-03
Payer: COMMERCIAL

## 2025-02-03 ENCOUNTER — HOSPITAL ENCOUNTER (INPATIENT)
Facility: HOSPITAL | Age: 65
LOS: 5 days | Discharge: HOME | DRG: 441 | End: 2025-02-08
Attending: STUDENT IN AN ORGANIZED HEALTH CARE EDUCATION/TRAINING PROGRAM | Admitting: STUDENT IN AN ORGANIZED HEALTH CARE EDUCATION/TRAINING PROGRAM
Payer: COMMERCIAL

## 2025-02-03 ENCOUNTER — TELEPHONE (OUTPATIENT)
Dept: INTERVENTIONAL RADIOLOGY/VASCULAR | Facility: HOSPITAL | Age: 65
End: 2025-02-03
Payer: COMMERCIAL

## 2025-02-03 ENCOUNTER — LAB VISIT (OUTPATIENT)
Dept: LAB | Facility: HOSPITAL | Age: 65
End: 2025-02-03
Attending: INTERNAL MEDICINE
Payer: COMMERCIAL

## 2025-02-03 ENCOUNTER — OFFICE VISIT (OUTPATIENT)
Dept: OPHTHALMOLOGY | Facility: CLINIC | Age: 65
End: 2025-02-03
Payer: COMMERCIAL

## 2025-02-03 DIAGNOSIS — H40.013 OPEN ANGLE WITH BORDERLINE FINDINGS OF BOTH EYES: ICD-10-CM

## 2025-02-03 DIAGNOSIS — Z94.4 STATUS POST LIVER TRANSPLANT: ICD-10-CM

## 2025-02-03 DIAGNOSIS — R07.9 CHEST PAIN: ICD-10-CM

## 2025-02-03 DIAGNOSIS — T86.40 LIVER TRANSPLANT DISORDER: ICD-10-CM

## 2025-02-03 DIAGNOSIS — I10 HYPERTENSION COMPLICATING DIABETES: Chronic | ICD-10-CM

## 2025-02-03 DIAGNOSIS — Z96.1 PSEUDOPHAKIA OF BOTH EYES: ICD-10-CM

## 2025-02-03 DIAGNOSIS — E11.9 HYPERTENSION COMPLICATING DIABETES: Chronic | ICD-10-CM

## 2025-02-03 DIAGNOSIS — E11.9 TYPE 2 DIABETES MELLITUS WITHOUT RETINOPATHY: Primary | ICD-10-CM

## 2025-02-03 DIAGNOSIS — R79.89 ELEVATED LFTS: ICD-10-CM

## 2025-02-03 DIAGNOSIS — R74.01 TRANSAMINITIS: ICD-10-CM

## 2025-02-03 DIAGNOSIS — K83.8 COMMON BILE DUCT DILATATION: Primary | ICD-10-CM

## 2025-02-03 PROBLEM — I61.9 LEFT-SIDED NONTRAUMATIC INTRACEREBRAL HEMORRHAGE: Status: RESOLVED | Noted: 2023-06-06 | Resolved: 2025-02-03

## 2025-02-03 PROBLEM — D62 ACUTE BLOOD LOSS ANEMIA: Status: RESOLVED | Noted: 2023-08-12 | Resolved: 2025-02-03

## 2025-02-03 PROBLEM — K46.9 ABDOMINAL HERNIA: Status: RESOLVED | Noted: 2025-02-03 | Resolved: 2025-02-03

## 2025-02-03 LAB
ALBUMIN SERPL BCP-MCNC: 3.9 G/DL (ref 3.5–5.2)
ALP SERPL-CCNC: 341 U/L (ref 40–150)
ALT SERPL W/O P-5'-P-CCNC: 305 U/L (ref 10–44)
ANION GAP SERPL CALC-SCNC: 11 MMOL/L (ref 8–16)
AST SERPL-CCNC: 153 U/L (ref 10–40)
BASOPHILS # BLD AUTO: 0.02 K/UL (ref 0–0.2)
BASOPHILS NFR BLD: 0.3 % (ref 0–1.9)
BILIRUB SERPL-MCNC: 5.7 MG/DL (ref 0.1–1)
BUN SERPL-MCNC: 11 MG/DL (ref 8–23)
CALCIUM SERPL-MCNC: 9.4 MG/DL (ref 8.7–10.5)
CHLORIDE SERPL-SCNC: 105 MMOL/L (ref 95–110)
CO2 SERPL-SCNC: 24 MMOL/L (ref 23–29)
CREAT SERPL-MCNC: 1.2 MG/DL (ref 0.5–1.4)
DIFFERENTIAL METHOD BLD: NORMAL
EOSINOPHIL # BLD AUTO: 0.1 K/UL (ref 0–0.5)
EOSINOPHIL NFR BLD: 1 % (ref 0–8)
ERYTHROCYTE [DISTWIDTH] IN BLOOD BY AUTOMATED COUNT: 13.8 % (ref 11.5–14.5)
EST. GFR  (NO RACE VARIABLE): >60 ML/MIN/1.73 M^2
GLUCOSE SERPL-MCNC: 253 MG/DL (ref 70–110)
HCT VFR BLD AUTO: 50 % (ref 40–54)
HGB BLD-MCNC: 16.4 G/DL (ref 14–18)
IMM GRANULOCYTES # BLD AUTO: 0.01 K/UL (ref 0–0.04)
IMM GRANULOCYTES NFR BLD AUTO: 0.2 % (ref 0–0.5)
LYMPHOCYTES # BLD AUTO: 2.6 K/UL (ref 1–4.8)
LYMPHOCYTES NFR BLD: 41.8 % (ref 18–48)
MCH RBC QN AUTO: 28.2 PG (ref 27–31)
MCHC RBC AUTO-ENTMCNC: 32.8 G/DL (ref 32–36)
MCV RBC AUTO: 86 FL (ref 82–98)
MONOCYTES # BLD AUTO: 0.4 K/UL (ref 0.3–1)
MONOCYTES NFR BLD: 6.2 % (ref 4–15)
NEUTROPHILS # BLD AUTO: 3.1 K/UL (ref 1.8–7.7)
NEUTROPHILS NFR BLD: 50.5 % (ref 38–73)
NRBC BLD-RTO: 0 /100 WBC
PLATELET # BLD AUTO: 157 K/UL (ref 150–450)
PMV BLD AUTO: 10 FL (ref 9.2–12.9)
POTASSIUM SERPL-SCNC: 4.1 MMOL/L (ref 3.5–5.1)
PROT SERPL-MCNC: 7.7 G/DL (ref 6–8.4)
RBC # BLD AUTO: 5.81 M/UL (ref 4.6–6.2)
SODIUM SERPL-SCNC: 140 MMOL/L (ref 136–145)
WBC # BLD AUTO: 6.17 K/UL (ref 3.9–12.7)

## 2025-02-03 PROCEDURE — 80197 ASSAY OF TACROLIMUS: CPT | Performed by: INTERNAL MEDICINE

## 2025-02-03 PROCEDURE — 25000003 PHARM REV CODE 250: Performed by: HOSPITALIST

## 2025-02-03 PROCEDURE — 92014 COMPRE OPH EXAM EST PT 1/>: CPT | Mod: S$GLB,,, | Performed by: OPTOMETRIST

## 2025-02-03 PROCEDURE — 1159F MED LIST DOCD IN RCRD: CPT | Mod: CPTII,S$GLB,, | Performed by: OPTOMETRIST

## 2025-02-03 PROCEDURE — 85025 COMPLETE CBC W/AUTO DIFF WBC: CPT | Performed by: INTERNAL MEDICINE

## 2025-02-03 PROCEDURE — 20600001 HC STEP DOWN PRIVATE ROOM

## 2025-02-03 PROCEDURE — 1160F RVW MEDS BY RX/DR IN RCRD: CPT | Mod: CPTII,S$GLB,, | Performed by: OPTOMETRIST

## 2025-02-03 PROCEDURE — 99999 PR PBB SHADOW E&M-EST. PATIENT-LVL III: CPT | Mod: PBBFAC,,, | Performed by: OPTOMETRIST

## 2025-02-03 PROCEDURE — 92083 EXTENDED VISUAL FIELD XM: CPT | Mod: S$GLB,,, | Performed by: OPTOMETRIST

## 2025-02-03 PROCEDURE — 36415 COLL VENOUS BLD VENIPUNCTURE: CPT | Performed by: INTERNAL MEDICINE

## 2025-02-03 PROCEDURE — 80053 COMPREHEN METABOLIC PANEL: CPT | Performed by: INTERNAL MEDICINE

## 2025-02-03 PROCEDURE — 4010F ACE/ARB THERAPY RXD/TAKEN: CPT | Mod: CPTII,S$GLB,, | Performed by: OPTOMETRIST

## 2025-02-03 PROCEDURE — 92133 CPTRZD OPH DX IMG PST SGM ON: CPT | Mod: S$GLB,,, | Performed by: OPTOMETRIST

## 2025-02-03 PROCEDURE — 63600175 PHARM REV CODE 636 W HCPCS: Performed by: HOSPITALIST

## 2025-02-03 RX ORDER — GLUCAGON 1 MG
1 KIT INJECTION
Status: DISCONTINUED | OUTPATIENT
Start: 2025-02-03 | End: 2025-02-08 | Stop reason: HOSPADM

## 2025-02-03 RX ORDER — IBUPROFEN 200 MG
16 TABLET ORAL
Status: DISCONTINUED | OUTPATIENT
Start: 2025-02-03 | End: 2025-02-08 | Stop reason: HOSPADM

## 2025-02-03 RX ORDER — INSULIN GLARGINE 100 [IU]/ML
14 INJECTION, SOLUTION SUBCUTANEOUS 2 TIMES DAILY
Status: DISCONTINUED | OUTPATIENT
Start: 2025-02-03 | End: 2025-02-08 | Stop reason: HOSPADM

## 2025-02-03 RX ORDER — SODIUM CHLORIDE 0.9 % (FLUSH) 0.9 %
10 SYRINGE (ML) INJECTION EVERY 6 HOURS PRN
Status: DISCONTINUED | OUTPATIENT
Start: 2025-02-03 | End: 2025-02-08 | Stop reason: HOSPADM

## 2025-02-03 RX ORDER — NAPROXEN SODIUM 220 MG/1
81 TABLET, FILM COATED ORAL DAILY
Status: DISCONTINUED | OUTPATIENT
Start: 2025-02-04 | End: 2025-02-08 | Stop reason: HOSPADM

## 2025-02-03 RX ORDER — TALC
6 POWDER (GRAM) TOPICAL NIGHTLY PRN
Status: DISCONTINUED | OUTPATIENT
Start: 2025-02-03 | End: 2025-02-08 | Stop reason: HOSPADM

## 2025-02-03 RX ORDER — TACROLIMUS 1 MG/1
2 CAPSULE ORAL 2 TIMES DAILY
Status: DISCONTINUED | OUTPATIENT
Start: 2025-02-03 | End: 2025-02-08 | Stop reason: HOSPADM

## 2025-02-03 RX ORDER — NALOXONE HCL 0.4 MG/ML
0.02 VIAL (ML) INJECTION
Status: DISCONTINUED | OUTPATIENT
Start: 2025-02-03 | End: 2025-02-08 | Stop reason: HOSPADM

## 2025-02-03 RX ORDER — AMOXICILLIN 250 MG
1 CAPSULE ORAL 2 TIMES DAILY PRN
Status: DISCONTINUED | OUTPATIENT
Start: 2025-02-03 | End: 2025-02-08 | Stop reason: HOSPADM

## 2025-02-03 RX ORDER — IBUPROFEN 200 MG
24 TABLET ORAL
Status: DISCONTINUED | OUTPATIENT
Start: 2025-02-03 | End: 2025-02-08 | Stop reason: HOSPADM

## 2025-02-03 RX ORDER — INSULIN ASPART 100 [IU]/ML
0-10 INJECTION, SOLUTION INTRAVENOUS; SUBCUTANEOUS
Status: DISCONTINUED | OUTPATIENT
Start: 2025-02-03 | End: 2025-02-03

## 2025-02-03 RX ORDER — ACETAMINOPHEN 325 MG/1
650 TABLET ORAL EVERY 8 HOURS PRN
Status: DISCONTINUED | OUTPATIENT
Start: 2025-02-03 | End: 2025-02-08 | Stop reason: HOSPADM

## 2025-02-03 RX ORDER — ONDANSETRON HYDROCHLORIDE 2 MG/ML
4 INJECTION, SOLUTION INTRAVENOUS EVERY 8 HOURS PRN
Status: DISCONTINUED | OUTPATIENT
Start: 2025-02-03 | End: 2025-02-08 | Stop reason: HOSPADM

## 2025-02-03 RX ORDER — LOSARTAN POTASSIUM 50 MG/1
100 TABLET ORAL DAILY
Status: DISCONTINUED | OUTPATIENT
Start: 2025-02-04 | End: 2025-02-08 | Stop reason: HOSPADM

## 2025-02-03 RX ORDER — ALUMINUM HYDROXIDE, MAGNESIUM HYDROXIDE, AND SIMETHICONE 1200; 120; 1200 MG/30ML; MG/30ML; MG/30ML
30 SUSPENSION ORAL 4 TIMES DAILY PRN
Status: DISCONTINUED | OUTPATIENT
Start: 2025-02-03 | End: 2025-02-08 | Stop reason: HOSPADM

## 2025-02-03 RX ORDER — CARVEDILOL 6.25 MG/1
6.25 TABLET ORAL 2 TIMES DAILY WITH MEALS
Status: DISCONTINUED | OUTPATIENT
Start: 2025-02-04 | End: 2025-02-06

## 2025-02-03 RX ORDER — INSULIN ASPART 100 [IU]/ML
0-5 INJECTION, SOLUTION INTRAVENOUS; SUBCUTANEOUS
Status: DISCONTINUED | OUTPATIENT
Start: 2025-02-03 | End: 2025-02-04

## 2025-02-03 RX ORDER — LEVETIRACETAM 500 MG/1
500 TABLET ORAL EVERY 12 HOURS
Status: DISCONTINUED | OUTPATIENT
Start: 2025-02-03 | End: 2025-02-04

## 2025-02-03 RX ORDER — INSULIN ASPART 100 [IU]/ML
5 INJECTION, SOLUTION INTRAVENOUS; SUBCUTANEOUS
Status: DISCONTINUED | OUTPATIENT
Start: 2025-02-04 | End: 2025-02-08 | Stop reason: HOSPADM

## 2025-02-03 RX ORDER — POLYETHYLENE GLYCOL 3350 17 G/17G
17 POWDER, FOR SOLUTION ORAL DAILY PRN
Status: DISCONTINUED | OUTPATIENT
Start: 2025-02-03 | End: 2025-02-08 | Stop reason: HOSPADM

## 2025-02-03 RX ORDER — PROCHLORPERAZINE EDISYLATE 5 MG/ML
5 INJECTION INTRAMUSCULAR; INTRAVENOUS EVERY 6 HOURS PRN
Status: DISCONTINUED | OUTPATIENT
Start: 2025-02-03 | End: 2025-02-08 | Stop reason: HOSPADM

## 2025-02-03 RX ADMIN — TACROLIMUS 2 MG: 1 CAPSULE ORAL at 08:02

## 2025-02-03 RX ADMIN — LEVETIRACETAM 500 MG: 500 TABLET, FILM COATED ORAL at 08:02

## 2025-02-03 RX ADMIN — INSULIN GLARGINE 14 UNITS: 100 INJECTION, SOLUTION SUBCUTANEOUS at 11:02

## 2025-02-03 RX ADMIN — INSULIN ASPART 1 UNITS: 100 INJECTION, SOLUTION INTRAVENOUS; SUBCUTANEOUS at 11:02

## 2025-02-03 NOTE — TELEPHONE ENCOUNTER
Called spouse and she stated April was able to answer all of her questions.  ----- Message from Lizabeth sent at 2/3/2025  4:28 PM CST -----  Regarding: Appt  Contact: Pt  [phone]        Name of Caller:  Leida peña's spouse     Contact Preference:[phone]    Nature of Call:  Requesting a call back states she have questions about up coming appts

## 2025-02-03 NOTE — TELEPHONE ENCOUNTER
Spoke with pt's spouse. Advised that his medications will be given while he is in the hospital.  ----- Message from Meera sent at 2/3/2025 11:58 AM CST -----  Regarding: do PT need to bring meds w/them today - NUrse Mckeon  Contact:  [phone]  PT wife called asking if need to bring medicaitons when coming in today for admit    Patient can be contacted @# [phone]

## 2025-02-03 NOTE — PROGRESS NOTES
HPI     Follow-up            Comments: Pt reports for RTC 6 months for 24-2VF, gOCT and dilated eye   exam. No pain or irritation. VA stable.           Comments    1. DM x 2015  2. OAG suspect  3. Liver transplant 08/23  4. Craniotomy 10/23  5. PC IOL OD 5/16/24 +21.5 SY60WF/CDE: 10.52  6. PC IOL OS 6/13/24 +22.0 SY60WF/CDE: 7.26             Last edited by Mena Almendarez on 2/3/2025  8:14 AM.            Assessment /Plan     For exam results, see Encounter Report.    Open angle with borderline findings of both eyes  -     Posterior Segment OCT Optic Nerve- Both eyes  Normal/ stable OCT today: GCL: 32/29    -     Lerma Visual Field - OU - Extended - Both Eyes  Baseline HVF 24-2: No OAG defects     No evidence of glaucoma at this time but based on risk factors recommend to continue monitoring.       Type 2 diabetes mellitus without retinopathy  There was no diabetic retinopathy present in either eye today.   Recommended that pt continue care with PCP and/or specialists regarding diabetes.  Follow-up dilated eye exam recommended in 12 months, sooner with any vision changes or new concerns.      Pseudophakia of both eyes  Condition stable, no therapeutic change required.   Monitoring routinely.         RTC 1 yr for dilated eye exam/ HVF 24-2/ gOCT or PRN if any problems.   Discussed above and answered questions.

## 2025-02-03 NOTE — NURSING
Called patient to review pre-procedure instructions for Liver biopsy on 2/4/25.  Patient states he took plavix this morning.  Per guidelines plavix must be held 5 days prior to procedure.  Spoke with Dr. Finley, per Dr. Finley patient to be rescheduled and plavix to be held 5 days prior to procedure. Patient aware and message sent to IR  Kristina and coordinator Linda.

## 2025-02-03 NOTE — TELEPHONE ENCOUNTER
Contacted admit. They state that pt is in the admit office but they are waiting bed placement.  Sebastian states they have spoken to Dr. Quesada and have admit orders in place.     Spoke with pt and spouse. Advised that we are still waiting on a bed. Reviewed the plan per Dr. Archuleta again that he needs imaging to r/o hepatic artery issues or bile duct issue. He did take plavix this morning so has concerns about not having a biopsy. Discussed that the inpatient team will best decide how to handle his needs once his imaging is completed and read. He states he understands.

## 2025-02-03 NOTE — TELEPHONE ENCOUNTER
Reviewed today's labs with Dr. Quesada. Per MD, pt to be admitted for imaging and work-up of elevated LFTs. MD arranged with hospital medicine.    Contact pt. No answer.  Contacted pt's spouse, Leida. Reviewed results and plan for admission. She agreed with plan and states they will leave in about 1 hour to head to the admission office.

## 2025-02-04 PROBLEM — D69.6 THROMBOCYTOPENIA: Status: ACTIVE | Noted: 2025-02-04

## 2025-02-04 PROBLEM — E83.42 HYPOMAGNESEMIA: Status: ACTIVE | Noted: 2025-02-04

## 2025-02-04 LAB
ABO + RH BLD: NORMAL
ALBUMIN SERPL BCP-MCNC: 3.3 G/DL (ref 3.5–5.2)
ALP SERPL-CCNC: 298 U/L (ref 40–150)
ALT SERPL W/O P-5'-P-CCNC: 272 U/L (ref 10–44)
ANION GAP SERPL CALC-SCNC: 7 MMOL/L (ref 8–16)
APTT PPP: 26.7 SEC (ref 21–32)
AST SERPL-CCNC: 139 U/L (ref 10–40)
BASOPHILS # BLD AUTO: 0.02 K/UL (ref 0–0.2)
BASOPHILS NFR BLD: 0.5 % (ref 0–1.9)
BILIRUB SERPL-MCNC: 4.7 MG/DL (ref 0.1–1)
BLD GP AB SCN CELLS X3 SERPL QL: NORMAL
BUN SERPL-MCNC: 13 MG/DL (ref 8–23)
CALCIUM SERPL-MCNC: 8.8 MG/DL (ref 8.7–10.5)
CHLORIDE SERPL-SCNC: 108 MMOL/L (ref 95–110)
CO2 SERPL-SCNC: 23 MMOL/L (ref 23–29)
CREAT SERPL-MCNC: 0.8 MG/DL (ref 0.5–1.4)
DIFFERENTIAL METHOD BLD: ABNORMAL
EOSINOPHIL # BLD AUTO: 0.1 K/UL (ref 0–0.5)
EOSINOPHIL NFR BLD: 1.1 % (ref 0–8)
ERYTHROCYTE [DISTWIDTH] IN BLOOD BY AUTOMATED COUNT: 13.8 % (ref 11.5–14.5)
EST. GFR  (NO RACE VARIABLE): >60 ML/MIN/1.73 M^2
GLUCOSE SERPL-MCNC: 208 MG/DL (ref 70–110)
HCT VFR BLD AUTO: 43.2 % (ref 40–54)
HGB BLD-MCNC: 14.5 G/DL (ref 14–18)
IMM GRANULOCYTES # BLD AUTO: 0.01 K/UL (ref 0–0.04)
IMM GRANULOCYTES NFR BLD AUTO: 0.2 % (ref 0–0.5)
INR PPP: 1 (ref 0.8–1.2)
LYMPHOCYTES # BLD AUTO: 1.8 K/UL (ref 1–4.8)
LYMPHOCYTES NFR BLD: 40.6 % (ref 18–48)
MAGNESIUM SERPL-MCNC: 1.5 MG/DL (ref 1.6–2.6)
MCH RBC QN AUTO: 28.5 PG (ref 27–31)
MCHC RBC AUTO-ENTMCNC: 33.6 G/DL (ref 32–36)
MCV RBC AUTO: 85 FL (ref 82–98)
MONOCYTES # BLD AUTO: 0.4 K/UL (ref 0.3–1)
MONOCYTES NFR BLD: 8 % (ref 4–15)
NEUTROPHILS # BLD AUTO: 2.2 K/UL (ref 1.8–7.7)
NEUTROPHILS NFR BLD: 49.6 % (ref 38–73)
NRBC BLD-RTO: 0 /100 WBC
PHOSPHATE SERPL-MCNC: 3 MG/DL (ref 2.7–4.5)
PLATELET # BLD AUTO: 113 K/UL (ref 150–450)
PMV BLD AUTO: 10.3 FL (ref 9.2–12.9)
POCT GLUCOSE: 136 MG/DL (ref 70–110)
POCT GLUCOSE: 157 MG/DL (ref 70–110)
POCT GLUCOSE: 170 MG/DL (ref 70–110)
POCT GLUCOSE: 213 MG/DL (ref 70–110)
POCT GLUCOSE: 223 MG/DL (ref 70–110)
POTASSIUM SERPL-SCNC: 3.5 MMOL/L (ref 3.5–5.1)
PROT SERPL-MCNC: 6.7 G/DL (ref 6–8.4)
PROTHROMBIN TIME: 10.8 SEC (ref 9–12.5)
RBC # BLD AUTO: 5.09 M/UL (ref 4.6–6.2)
SODIUM SERPL-SCNC: 138 MMOL/L (ref 136–145)
SPECIMEN OUTDATE: NORMAL
TACROLIMUS BLD-MCNC: 8.6 NG/ML (ref 5–15)
TACROLIMUS BLD-MCNC: 9.4 NG/ML (ref 5–15)
WBC # BLD AUTO: 4.36 K/UL (ref 3.9–12.7)

## 2025-02-04 PROCEDURE — 80053 COMPREHEN METABOLIC PANEL: CPT | Performed by: HOSPITALIST

## 2025-02-04 PROCEDURE — 20600001 HC STEP DOWN PRIVATE ROOM

## 2025-02-04 PROCEDURE — 25500020 PHARM REV CODE 255: Performed by: STUDENT IN AN ORGANIZED HEALTH CARE EDUCATION/TRAINING PROGRAM

## 2025-02-04 PROCEDURE — 84100 ASSAY OF PHOSPHORUS: CPT | Performed by: HOSPITALIST

## 2025-02-04 PROCEDURE — 85025 COMPLETE CBC W/AUTO DIFF WBC: CPT | Performed by: HOSPITALIST

## 2025-02-04 PROCEDURE — 63600175 PHARM REV CODE 636 W HCPCS: Performed by: STUDENT IN AN ORGANIZED HEALTH CARE EDUCATION/TRAINING PROGRAM

## 2025-02-04 PROCEDURE — 86850 RBC ANTIBODY SCREEN: CPT | Performed by: HOSPITALIST

## 2025-02-04 PROCEDURE — 99222 1ST HOSP IP/OBS MODERATE 55: CPT | Mod: ,,, | Performed by: INTERNAL MEDICINE

## 2025-02-04 PROCEDURE — 25000003 PHARM REV CODE 250: Performed by: HOSPITALIST

## 2025-02-04 PROCEDURE — 85730 THROMBOPLASTIN TIME PARTIAL: CPT | Performed by: HOSPITALIST

## 2025-02-04 PROCEDURE — 80197 ASSAY OF TACROLIMUS: CPT | Performed by: HOSPITALIST

## 2025-02-04 PROCEDURE — 83735 ASSAY OF MAGNESIUM: CPT | Performed by: HOSPITALIST

## 2025-02-04 PROCEDURE — 85610 PROTHROMBIN TIME: CPT | Performed by: HOSPITALIST

## 2025-02-04 PROCEDURE — 63600175 PHARM REV CODE 636 W HCPCS: Performed by: HOSPITALIST

## 2025-02-04 RX ORDER — ENOXAPARIN SODIUM 100 MG/ML
40 INJECTION SUBCUTANEOUS EVERY 24 HOURS
Status: DISCONTINUED | OUTPATIENT
Start: 2025-02-04 | End: 2025-02-06

## 2025-02-04 RX ORDER — MAGNESIUM SULFATE HEPTAHYDRATE 40 MG/ML
2 INJECTION, SOLUTION INTRAVENOUS ONCE
Status: COMPLETED | OUTPATIENT
Start: 2025-02-04 | End: 2025-02-04

## 2025-02-04 RX ORDER — INSULIN ASPART 100 [IU]/ML
0-5 INJECTION, SOLUTION INTRAVENOUS; SUBCUTANEOUS EVERY 6 HOURS PRN
Status: DISCONTINUED | OUTPATIENT
Start: 2025-02-04 | End: 2025-02-08 | Stop reason: HOSPADM

## 2025-02-04 RX ADMIN — ENOXAPARIN SODIUM 40 MG: 40 INJECTION SUBCUTANEOUS at 04:02

## 2025-02-04 RX ADMIN — CARVEDILOL 6.25 MG: 6.25 TABLET, FILM COATED ORAL at 08:02

## 2025-02-04 RX ADMIN — TACROLIMUS 2 MG: 1 CAPSULE ORAL at 05:02

## 2025-02-04 RX ADMIN — LOSARTAN POTASSIUM 100 MG: 50 TABLET, FILM COATED ORAL at 08:02

## 2025-02-04 RX ADMIN — LEVETIRACETAM 500 MG: 500 TABLET, FILM COATED ORAL at 08:02

## 2025-02-04 RX ADMIN — ASPIRIN 81 MG CHEWABLE TABLET 81 MG: 81 TABLET CHEWABLE at 08:02

## 2025-02-04 RX ADMIN — INSULIN ASPART 2 UNITS: 100 INJECTION, SOLUTION INTRAVENOUS; SUBCUTANEOUS at 08:02

## 2025-02-04 RX ADMIN — INSULIN GLARGINE 14 UNITS: 100 INJECTION, SOLUTION SUBCUTANEOUS at 08:02

## 2025-02-04 RX ADMIN — CARVEDILOL 6.25 MG: 6.25 TABLET, FILM COATED ORAL at 04:02

## 2025-02-04 RX ADMIN — INSULIN ASPART 2 UNITS: 100 INJECTION, SOLUTION INTRAVENOUS; SUBCUTANEOUS at 05:02

## 2025-02-04 RX ADMIN — INSULIN ASPART 5 UNITS: 100 INJECTION, SOLUTION INTRAVENOUS; SUBCUTANEOUS at 01:02

## 2025-02-04 RX ADMIN — MAGNESIUM SULFATE HEPTAHYDRATE 2 G: 40 INJECTION, SOLUTION INTRAVENOUS at 01:02

## 2025-02-04 RX ADMIN — IOHEXOL 100 ML: 350 INJECTION, SOLUTION INTRAVENOUS at 12:02

## 2025-02-04 RX ADMIN — INSULIN ASPART 5 UNITS: 100 INJECTION, SOLUTION INTRAVENOUS; SUBCUTANEOUS at 05:02

## 2025-02-04 RX ADMIN — TACROLIMUS 2 MG: 1 CAPSULE ORAL at 08:02

## 2025-02-04 NOTE — HPI
64 y/o WM w/ Type 2 Dm, hx of HCC liver cirrhosis s/p Liver transplant (08/2023), HTN, hx of Meningioma s/p Left crani/resection presents to [facility] as direct admit for abnormal lab findings.      He recently has had rising values on hepatic panel with elevated transaminases and cholestatic markers. His primary hepatologist Dr. Schneider requested admission for workup of this lab abnormality.  Patient seen at bedside tonight, pts wife Leida Chávez accompanies him.   He denies any acute symptoms today or in recent weeks.   He states 2 weeks ago he had URI/Sinusitis, only meds he took at that time was OTC mucinex, no new Rx or recent antibiotic use.      He has no fevers/chills, chest pain,dyspnea, n/v or abdominal pain today.      Patient uses insulin pump as outpatient for management of DM2, does [facility]      Hepatology requested urgent liver doppler to r/o hepatic arterial stenosis/thrombosis or venous thrombosis as a source of worsening hepatic function.  He was planned for ambulatory biopsy of liver transplant graft, but reports this morning had taken home Clopidogrel, pt is on aspirin also.  He is not on any oral anticoagulation as an outpatient.

## 2025-02-04 NOTE — PLAN OF CARE
[facility]  Initial Discharge Assessment       Primary Care Provider: EVELIN Pitt MD    Admission Diagnosis: Transaminitis [R74.01]    Admission Date: 2/3/2025  Expected Discharge Date: 2/6/2025         Payor: [employer] / Plan: [employer] ([employer]) / Product Type: Commercial /     Extended Emergency Contact Information  Primary Emergency Contact: ml pinto  Address: [address] [place] [address]  Mobile Phone: [phone]  Relation: Spouse    Discharge Plan A: Home with family  Discharge Plan B: Home Health      [facility] [address] [address]  Phone: [phone] Fax: [phone]    [facility]  [address]  [facility] [address]  Phone: [phone] Fax: [phone]    [id number] - [place], [place] - [address] AT [place] [address] [address]  [facility] [address]  Phone: [phone] Fax: [phone]      Initial Assessment (most recent)       Adult Discharge Assessment - 02/04/25 1141          Discharge Assessment    Assessment Type Discharge Planning Assessment     Confirmed/corrected address, phone number and insurance Yes     Confirmed Demographics Correct on Facesheet     Source of Information patient;family     Communicated CRISELDA with patient/caregiver Date not available/Unable to determine     Reason For Admission Transaminitis     People in Home spouse     Facility Arrived From: Home     Do you expect to return to your current living situation? Yes     Do you have help at home or someone to help you manage your care at home? Yes     Who are your caregiver(s) and their phone number(s)? Spouse Ml Pinto [phone]     Prior to hospitilization cognitive status: Alert/Oriented     Current cognitive status: Alert/Oriented     Walking or Climbing Stairs Difficulty no     Dressing/Bathing Difficulty no      Home Layout Able to live on 1st floor     Equipment Currently Used at Home none     Readmission within 30 days? No     Patient currently being followed by outpatient case management? No     Do you currently have service(s) that help you manage your care at home? No     Do you take prescription medications? Yes     Do you have prescription coverage? Yes     Coverage [employer]     Do you have any problems affording any of your prescribed medications? No     Is the patient taking medications as prescribed? yes     Who is going to help you get home at discharge? Spouse Leida     How do you get to doctors appointments? car, drives self     Are you on dialysis? No     Do you take coumadin? No     Discharge Plan A Home with family     Discharge Plan B Home Health        Financial Resource Strain    How hard is it for you to pay for the very basics like food, housing, medical care, and heating? Not hard at all        Housing Stability    In the last 12 months, was there a time when you were not able to pay the mortgage or rent on time? No     At any time in the past 12 months, were you homeless or living in a shelter (including now)? No        Transportation Needs    Has the lack of transportation kept you from medical appointments, meetings, work or from getting things needed for daily living? No        Food Insecurity    Within the past 12 months, you worried that your food would run out before you got the money to buy more. Never true     Within the past 12 months, the food you bought just didn't last and you didn't have money to get more. Never true        OTHER    Name(s) of People in Home Spouse Leida RAMIREZ spoke with patient  and spouse Leida in Room at bedside. All information was verified on facesheet. Patient lives in a 1 story house with spouse, 1 step to get inside with no pets. Patient states no assistance is needed. Patient can ambulate, drive, run errands, and complete ADL's  independently. Patient does not use any DME or any in-home assistive equipment. Patient has not used Home Health previously. Patient is not on dialysis nor use Coumadin as a blood thinner. Patient takes medication as prescribed and has resources for all prescriptive needs. Patient will have help from family member upon discharge. Family will provide transportation home. All Questions and concerns addressed and whiteboard updated with CM contact information. Will continue to follow for course of hospitalization.       Discharge Plan A and Plan B have been determined by review of patient's clinical status, future medical and therapeutic needs, and coverage/benefits for post-acute care in coordination with multidisciplinary team members.     Tuyet Luo RN  Case Management  [phone]

## 2025-02-04 NOTE — PLAN OF CARE
Patient is AAOx4.   Afebrile  RA  AC&HS glucose monitoring  Mag 1.5, replaced with 2g IVPB  Abdominal CT scan performed this shift.   Call light remains within reach.   Instructed to call for assistance.   Bed is in lowest position with wheels locked.   Plan of care is ongoing.    19-Jun-2019 19-Jun-2019 01:37 19-Jun-2019 13:11

## 2025-02-04 NOTE — ASSESSMENT & PLAN NOTE
Patient's blood pressure range in the last 24 hours was: BP  Min: 169/98  Max: 169/98.The patient's inpatient anti-hypertensive regimen is listed below:  Current Antihypertensives  carvediloL tablet 6.25 mg, 2 times daily with meals, Oral  losartan tablet 100 mg, Daily, Oral    Plan  - BP is controlled, no changes needed to their regimen  Resume home regimen with hold parameters per the outpatient orders.

## 2025-02-04 NOTE — ASSESSMENT & PLAN NOTE
"Patient is on an insulin pump as an outpatient, did not have lispro vial to refill pump tonight   -will switch patient to routine intpatient, basal bolus regimen.  Pts backup insulin is Lantus 33units.  Will conservatively dose with plans for NPo and split into BID dosing, prandial insulin and low dose sliding scale.     Last A1c reviewed-   Lab Results   Component Value Date    HGBA1C 6.3 (H) 12/16/2024     Most recent fingerstick glucose reviewed- No results for input(s): "POCTGLUCOSE" in the last 24 hours.  Current correctional scale  Low  Maintain anti-hyperglycemic dose as follows-   Antihyperglycemics (From admission, onward)      Start     Stop Route Frequency Ordered    02/04/25 0715  insulin aspart U-100 pen 5 Units         -- SubQ 3 times daily with meals 02/03/25 2156 02/03/25 2256  insulin aspart U-100 pen 0-5 Units         -- SubQ Before meals & nightly PRN 02/03/25 2156 02/03/25 2200  insulin glargine U-100 (Lantus) pen 14 Units         -- SubQ 2 times daily 02/03/25 2156            "

## 2025-02-04 NOTE — PLAN OF CARE
Pt AAOx4, VSS, afebrile. Liver u/s done o/n. Insulin pump d/c'd, bs monitored ac/hs. No c/o pain. Pt ambulates and voids w/ independence. AM labs ordered.

## 2025-02-04 NOTE — H&P
[facility] - Transplant [facility] Medicine  History & Physical    Patient Name: Jed Chávez  MRN: [id number]  Patient Class: IP- Inpatient  Admission Date: 2/3/2025  Attending Physician: Cindy Samuels MD   Primary Care Provider: EVELIN Pitt MD         Patient information was obtained from patient, spouse/SO, past medical records, and ER records.     Subjective:     Principal Problem:Liver transplant disorder    Chief Complaint:   Chief Complaint   Patient presents with    Abnormal Lab     Direct admit via hepatology        HPI: 66 y/o WM w/ Type 2 Dm, hx of HCC liver cirrhosis s/p Liver transplant (08/2023), HTN, hx of Meningioma s/p Left crani/resection presents to [facility] as direct admit for abnormal lab findings.      He recently has had rising values on hepatic panel with elevated transaminases and cholestatic markers. His primary hepatologist Dr. Schneider requested admission for workup of this lab abnormality.  Patient seen at bedside tonight, pts wife Leida Chávez accompanies him.   He denies any acute symptoms today or in recent weeks.   He states 2 weeks ago he had URI/Sinusitis, only meds he took at that time was OTC mucinex, no new Rx or recent antibiotic use.      He has no fevers/chills, chest pain,dyspnea, n/v or abdominal pain today.      Patient uses insulin pump as outpatient for management of DM2, does not have additional vials of insulin lipro to refill his pump tonight.      Hepatology requested urgent liver doppler to r/o hepatic arterial stenosis/thrombosis or venous thrombosis as a source of worsening hepatic function.  He was planned for ambulatory biopsy of liver transplant graft, but reports this morning had taken home Clopidogrel, pt is on aspirin also.  He is not on any oral anticoagulation as an outpatient.         Past Medical History:   Diagnosis Date    Abdominal hernia 02/03/2025    Hernia of abdominal cavity (Problem)      Acute blood loss anemia 08/12/2023    - H/H stable   - no overt signs of bleed  - continue to monitor with daily CBC      Alcoholic cirrhosis     CVA (cerebral vascular accident)     DM (diabetes mellitus)     Dyslipidemia     Hepatocellular carcinoma     History of hepatocellular carcinoma, in remission after liver transplant 04/05/2023    Synoptic Report Procedure: Total hepatectomy. Histologic type: Hepatocellular carcinoma. Histologic grade: G1, well-differentiated. Tumor focality: Multifocal. Tumor characteristics: Tumor #1 (blocks 2B-2E): Tumor site: Right lobe. Tumor size: 6.2 cm in greatest dimension grossly. Treatment effect: Complete necrosis (no viable tumor). Tumor #2 (blocks 2F-2H): Tumor site: Right lobe. Tumor size: 4.    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    S/P liver transplant 08/12/2023    Skin cancer        Past Surgical History:   Procedure Laterality Date    CRANIOTOMY, WITH NEOPLASM EXCISION USING COMPUTER-ASSISTED NAVIGATION Left 10/26/2023    Procedure: CRANIOTOMY, WITH NEOPLASM EXCISION USING COMPUTER-ASSISTED NAVIGATION;  Surgeon: Jose E Degroot DO;  Location: [facility] OR [address] [place] [address];  Service: Neurosurgery;  Laterality: Left;    HERNIA REPAIR N/A     LIVER TRANSPLANT N/A 8/9/2023    Procedure: TRANSPLANT, LIVER;  Surgeon: Ameya Suero MD;  Location: [facility] OR [address] [place] [address];  Service: Transplant;  Laterality: N/A;       Review of patient's allergies indicates:  No Known Allergies    No current facility-administered medications on file prior to encounter.     Current Outpatient Medications on File Prior to Encounter   Medication Sig    aspirin 81 MG Chew Chew and swallow 1 tablet (81 mg total) by mouth once daily. Restart 11/9/23    blood sugar diagnostic Strp Test blood glucose 3 (three) times daily.    blood-glucose sensor (DEXCOM G7 SENSOR) Neelam 1 each by Misc.(Non-Drug; Combo Route) route every 10 days.    carvediloL (COREG)  "6.25 MG tablet Take 1 tablet (6.25 mg total) by mouth 2 (two) times daily with meals. HOLD if SBP < 125 or pulse < 60.    clopidogreL (PLAVIX) 75 mg tablet Take 1 tablet (75 mg total) by mouth once daily.    insulin glargine U-100, Lantus, (LANTUS SOLOSTAR U-100 INSULIN) 100 unit/mL (3 mL) InPn pen Inject 33 Units into the skin once daily.    insulin lispro 100 unit/mL injection VIA ILET INSULIN PUMP, MAX .    levETIRAcetam (KEPPRA) 500 MG Tab Take 1 tablet (500 mg total) by mouth every 12 (twelve) hours. STOP 11/9/23    losartan (COZAAR) 100 MG tablet Take 1 tablet (100 mg total) by mouth once daily. HOLD if SBP < 120    pen needle, diabetic (BD ULTRA-FINE MERCEDES PEN NEEDLE) 32 gauge x 5/32" Ndle Use to inject insulin into the skin 4 (four) times daily.    prednisoLONE acetate (PRED FORTE) 1 % DrpS Place 1 drop into the left eye 4 (four) times daily.    tacrolimus (PROGRAF) 1 MG Cap Take 2 capsules (2 mg total) by mouth every 12 (twelve) hours.    tirzepatide 10 mg/0.5 mL PnIj Inject 10 mg (one pen) into the skin every 7 days.     Family History    None       Tobacco Use    Smoking status: Never    Smokeless tobacco: Never   Substance and Sexual Activity    Alcohol use: Not Currently    Drug use: Never    Sexual activity: Not Currently     Partners: Female     Review of Systems   Constitutional: Negative.    Genitourinary:         Dark urine   All other systems reviewed and are negative.    Objective:     Vital Signs (Most Recent):  Temp: 98.3 °F (36.8 °C) (02/03/25 1930)  Pulse: 80 (02/03/25 1930)  Resp: 18 (02/03/25 1930)  BP: (!) 169/98 (02/03/25 1930)  SpO2: 97 % (02/03/25 1930) Vital Signs (24h Range):  Temp:  [98.3 °F (36.8 °C)] 98.3 °F (36.8 °C)  Pulse:  [80] 80  Resp:  [18] 18  SpO2:  [97 %] 97 %  BP: (169)/(98) 169/98     Weight: 99.9 kg (220 lb 2.1 oz)  Body mass index is 30.7 kg/m².     Physical Exam  Vitals and nursing note reviewed.   Constitutional:       General: He is not in acute distress.     " "Appearance: He is not ill-appearing.   HENT:      Head: Normocephalic and atraumatic.      Mouth/Throat:      Mouth: Mucous membranes are moist.      Pharynx: Oropharynx is clear. No oropharyngeal exudate or posterior oropharyngeal erythema.      Comments: Icteric palate  Eyes:      General: Scleral icterus (mild) present.      Pupils: Pupils are equal, round, and reactive to light.   Cardiovascular:      Rate and Rhythm: Normal rate and regular rhythm.      Pulses: Normal pulses.      Heart sounds: No murmur heard.  Pulmonary:      Effort: Pulmonary effort is normal. No respiratory distress.      Breath sounds: Normal breath sounds. No wheezing or rales.   Abdominal:      General: Abdomen is protuberant. Bowel sounds are normal. There is no distension.      Palpations: Abdomen is soft.      Tenderness: There is no abdominal tenderness. There is no guarding.      Comments: Insulin pump in place   Musculoskeletal:      Right lower leg: No edema.      Left lower leg: No edema.   Skin:     General: Skin is warm and dry.   Neurological:      General: No focal deficit present.      Mental Status: He is alert and oriented to person, place, and time.   Psychiatric:         Mood and Affect: Mood normal.         Behavior: Behavior normal.              CRANIAL NERVES     CN III, IV, VI   Pupils are equal, round, and reactive to light.       Significant Labs: All pertinent labs within the past 24 hours have been reviewed.  CBC:   Recent Labs   Lab 02/03/25  0900   WBC 6.17   HGB 16.4   HCT 50.0        CMP:   Recent Labs   Lab 02/03/25  0900      K 4.1      CO2 24   *   BUN 11   CREATININE 1.2   CALCIUM 9.4   PROT 7.7   ALBUMIN 3.9   BILITOT 5.7*   ALKPHOS 341*   *   *   ANIONGAP 11     Cardiac Markers: No results for input(s): "CKMB", "MYOGLOBIN", "BNP", "TROPISTAT" in the last 48 hours.  Coagulation:   Recent Labs   Lab 02/03/25  1554   INR 1.0     Lactic Acid: No results for input(s): " ""LACTATE" in the last 48 hours.    Significant Imaging: I have reviewed all pertinent imaging results/findings within the past 24 hours.    Liver transplant doppler is pending  Assessment/Plan:     * Liver transplant disorder  -Elevated liver enzymes of unclear etiology  -obtain Liver transplant doppler tonight   -trend - CMP/INR/CBC daily   -keep NPO @ midnight in case more procedures required  -hepatology consult in AM        Status post liver transplant  Patient on tacrolimus 2mg PO BID as outpatient - continue at this time  -check tacrolimus trough in AM  -hepatology consult      Type 2 diabetes mellitus with other specified complication, with chronic insulin use  Patient is on an insulin pump as an outpatient, did not have lispro vial to refill pump tonight   -will switch patient to routine intpatient, basal bolus regimen.  Pts backup insulin is Lantus 33units.  Will conservatively dose with plans for NPo and split into BID dosing, prandial insulin and low dose sliding scale.     Last A1c reviewed-   Lab Results   Component Value Date    HGBA1C 6.3 (H) 12/16/2024     Most recent fingerstick glucose reviewed- No results for input(s): "POCTGLUCOSE" in the last 24 hours.  Current correctional scale  Low  Maintain anti-hyperglycemic dose as follows-   Antihyperglycemics (From admission, onward)      Start     Stop Route Frequency Ordered    02/04/25 0715  insulin aspart U-100 pen 5 Units         -- SubQ 3 times daily with meals 02/03/25 2156 02/03/25 2256  insulin aspart U-100 pen 0-5 Units         -- SubQ Before meals & nightly PRN 02/03/25 2156 02/03/25 2200  insulin glargine U-100 (Lantus) pen 14 Units         -- SubQ 2 times daily 02/03/25 2156              Hypertension complicating diabetes  Patient's blood pressure range in the last 24 hours was: BP  Min: 169/98  Max: 169/98.The patient's inpatient anti-hypertensive regimen is listed below:  Current Antihypertensives  carvediloL tablet 6.25 mg, 2 times " daily with meals, Oral  losartan tablet 100 mg, Daily, Oral    Plan  - BP is controlled, no changes needed to their regimen  Resume home regimen with hold parameters per the outpatient orders.       VTE Risk Mitigation (From admission, onward)           Ordered     Reason for No Pharmacological VTE Prophylaxis  Once        Comments: May require liver biopsy in next 24-48hrs   Question:  Reasons:  Answer:  Physician Provided (leave comment)    02/03/25 2023     IP VTE HIGH RISK PATIENT  Once         02/03/25 2023     Place sequential compression device  Until discontinued         02/03/25 2023                       Augie Barrientos MD  Department of Hospital Medicine  [facility] - Transplant Stepdown

## 2025-02-04 NOTE — PROGRESS NOTES
Pt arrived as direct admit to room 30304. PIV placed, MD notified of pt's arrival. Admit orders placed. Pt oriented to call light, family at bedside.

## 2025-02-04 NOTE — CONSULTS
[facility]  [facility]  Consult Note    Patient Name: Jed Chávez  MRN: [id number]  Admission Date: 2/3/2025  Hospital Length of Stay: 1 days  Code Status: Full Code   Attending Provider: Cindy Samuels MD   Consulting Provider: Compa Rios MD  Primary Care Physician: EVELIN Pitt MD  Principal Problem:Liver transplant disorder    Inpatient consult to [facility] ([facility])  Consult performed by: Compa Rios MD  Consult ordered by: Cindy Samuels MD        Subjective:     HPI: Jed Chávez is a 65 y.o. male with history of alcohol related cirrhosis with HCC s/p liver transplant in Aug 2023, meningioma s/p resection who presents for elevated LFTs on outpatient labs. Had URI 2 weeks ago for which he took OTC mucinex, otherwise asymptomatic. US shows new biliary ductal dilatation with echogenic debris within the common bile duct. Also has increased peak systoilc velocity within the anastomosis of the main hepatic artery, possible hepatis artery stenosis.     Denies abdominal pain, n/v.    TB 5.7       Took plavix yesterday morning 2/3.    PSH: liver transplant 08/2023    Objective:     Vitals:    02/04/25 0800   BP: (!) 160/95   Pulse: 75   Resp: 16   Temp: 98.1 °F (36.7 °C)         Constitutional:  not in acute distress and well developed  HENT: Head: Normal, normocephalic.  Eyes: conjunctiva clear and sclera nonicteric  GI: soft, non-tender, without masses or organomegaly  Musculoskeletal: no muscular tenderness noted  Skin: normal color  Neurological: alert, oriented x3    Significant Labs:  Reviewed the following pertinent laboratory tests:   TB 5.7       Significant Imaging:  Imaging reviewed: US liver w doppler. Interpretation:    new biliary ductal dilatation with echogenic debris within the common bile duct. Also has increased peak systoilc velocity within the anastomosis of the main hepatic artery, possible hepatis artery  stenosis.     Assessment/Plan:     Jed Chávez is a 65 y.o. male with history of liver transplant in Aug 2023 who presents for elevated LFTs. Asymptomatic. US shows new common bile duct dilatation with echogenic debris within the common duct. Can plan for ERCP to evaluate for biliary obstruction, but took plavix on 2/3. Will plan for ERCP 2/7 at the earliest after plavix washout.      Problem List:  Bile duct dilatation  Elevated LFTs    Recommendations:  - Plan for ERCP on 2/7 at the earliest  - If ERCP is negative for biliary obstruction, will perform EUS with liver biopsy to further workup elevated LFTs    Thank you for involving us in the care of Jed Chávez. Please call with any additional questions, concerns or changes in the patient's clinical status. We will continue to follow.     Compa Rios MD  Gastroenterology Fellow PGY-6  [facility]

## 2025-02-04 NOTE — ASSESSMENT & PLAN NOTE
-Elevated liver enzymes of unclear etiology  -obtain Liver transplant doppler tonight   -trend - CMP/INR/CBC daily   -keep NPO @ midnight in case more procedures required  -hepatology consult in AM

## 2025-02-04 NOTE — SUBJECTIVE & OBJECTIVE
Past Medical History:   Diagnosis Date    Abdominal hernia 02/03/2025    Hernia of abdominal cavity (Problem)      Acute blood loss anemia 08/12/2023    - H/H stable  - no overt signs of bleed  - continue to monitor with daily CBC      Alcoholic cirrhosis     CVA (cerebral vascular accident)     DM (diabetes mellitus)     Dyslipidemia     Hepatocellular carcinoma     History of hepatocellular carcinoma, in remission after liver transplant 04/05/2023    Synoptic Report Procedure: Total hepatectomy. Histologic type: Hepatocellular carcinoma. Histologic grade: G1, well-differentiated. Tumor focality: Multifocal. Tumor characteristics: Tumor #1 (blocks 2B-2E): Tumor site: Right lobe. Tumor size: 6.2 cm in greatest dimension grossly. Treatment effect: Complete necrosis (no viable tumor). Tumor #2 (blocks 2F-2H): Tumor site: Right lobe. Tumor size: 4.    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    S/P liver transplant 08/12/2023    Skin cancer        Past Surgical History:   Procedure Laterality Date    CRANIOTOMY, WITH NEOPLASM EXCISION USING COMPUTER-ASSISTED NAVIGATION Left 10/26/2023    Procedure: CRANIOTOMY, WITH NEOPLASM EXCISION USING COMPUTER-ASSISTED NAVIGATION;  Surgeon: Jose E Degroot DO;  Location: [facility] OR [address] [place] [address];  Service: Neurosurgery;  Laterality: Left;    HERNIA REPAIR N/A     LIVER TRANSPLANT N/A 8/9/2023    Procedure: TRANSPLANT, LIVER;  Surgeon: Ameya Suero MD;  Location: [facility] OR [address] [place] [address];  Service: Transplant;  Laterality: N/A;       Review of patient's allergies indicates:  No Known Allergies    No current facility-administered medications on file prior to encounter.     Current Outpatient Medications on File Prior to Encounter   Medication Sig    aspirin 81 MG Chew Chew and swallow 1 tablet (81 mg total) by mouth once daily. Restart 11/9/23    blood sugar diagnostic Strp Test  "blood glucose 3 (three) times daily.    blood-glucose sensor (DEXCOM G7 SENSOR) Neelam 1 each by Misc.(Non-Drug; Combo Route) route every 10 days.    carvediloL (COREG) 6.25 MG tablet Take 1 tablet (6.25 mg total) by mouth 2 (two) times daily with meals. HOLD if SBP < 125 or pulse < 60.    clopidogreL (PLAVIX) 75 mg tablet Take 1 tablet (75 mg total) by mouth once daily.    insulin glargine U-100, Lantus, (LANTUS SOLOSTAR U-100 INSULIN) 100 unit/mL (3 mL) InPn pen Inject 33 Units into the skin once daily.    insulin lispro 100 unit/mL injection VIA ILET INSULIN PUMP, MAX .    levETIRAcetam (KEPPRA) 500 MG Tab Take 1 tablet (500 mg total) by mouth every 12 (twelve) hours. STOP 11/9/23    losartan (COZAAR) 100 MG tablet Take 1 tablet (100 mg total) by mouth once daily. HOLD if SBP < 120    pen needle, diabetic (BD ULTRA-FINE MERCEDES PEN NEEDLE) 32 gauge x 5/32" Ndle Use to inject insulin into the skin 4 (four) times daily.    prednisoLONE acetate (PRED FORTE) 1 % DrpS Place 1 drop into the left eye 4 (four) times daily.    tacrolimus (PROGRAF) 1 MG Cap Take 2 capsules (2 mg total) by mouth every 12 (twelve) hours.    tirzepatide 10 mg/0.5 mL PnIj Inject 10 mg (one pen) into the skin every 7 days.     Family History    None       Tobacco Use    Smoking status: Never    Smokeless tobacco: Never   Substance and Sexual Activity    Alcohol use: Not Currently    Drug use: Never    Sexual activity: Not Currently     Partners: Female     Review of Systems   Constitutional: Negative.    Genitourinary:         Dark urine   All other systems reviewed and are negative.    Objective:     Vital Signs (Most Recent):  Temp: 98.3 °F (36.8 °C) (02/03/25 1930)  Pulse: 80 (02/03/25 1930)  Resp: 18 (02/03/25 1930)  BP: (!) 169/98 (02/03/25 1930)  SpO2: 97 % (02/03/25 1930) Vital Signs (24h Range):  Temp:  [98.3 °F (36.8 °C)] 98.3 °F (36.8 °C)  Pulse:  [80] 80  Resp:  [18] 18  SpO2:  [97 %] 97 %  BP: (169)/(98) 169/98     Weight: 99.9 kg " (220 lb 2.1 oz)  Body mass index is 30.7 kg/m².     Physical Exam  Vitals and nursing note reviewed.   Constitutional:       General: He is not in acute distress.     Appearance: He is not ill-appearing.   HENT:      Head: Normocephalic and atraumatic.      Mouth/Throat:      Mouth: Mucous membranes are moist.      Pharynx: Oropharynx is clear. No oropharyngeal exudate or posterior oropharyngeal erythema.      Comments: Icteric palate  Eyes:      General: Scleral icterus (mild) present.      Pupils: Pupils are equal, round, and reactive to light.   Cardiovascular:      Rate and Rhythm: Normal rate and regular rhythm.      Pulses: Normal pulses.      Heart sounds: No murmur heard.  Pulmonary:      Effort: Pulmonary effort is normal. No respiratory distress.      Breath sounds: Normal breath sounds. No wheezing or rales.   Abdominal:      General: Abdomen is protuberant. Bowel sounds are normal. There is no distension.      Palpations: Abdomen is soft.      Tenderness: There is no abdominal tenderness. There is no guarding.      Comments: Insulin pump in place   Musculoskeletal:      Right lower leg: No edema.      Left lower leg: No edema.   Skin:     General: Skin is warm and dry.   Neurological:      General: No focal deficit present.      Mental Status: He is alert and oriented to person, place, and time.   Psychiatric:         Mood and Affect: Mood normal.         Behavior: Behavior normal.              CRANIAL NERVES     CN III, IV, VI   Pupils are equal, round, and reactive to light.       Significant Labs: All pertinent labs within the past 24 hours have been reviewed.  CBC:   Recent Labs   Lab 02/03/25  0900   WBC 6.17   HGB 16.4   HCT 50.0        CMP:   Recent Labs   Lab 02/03/25  0900      K 4.1      CO2 24   *   BUN 11   CREATININE 1.2   CALCIUM 9.4   PROT 7.7   ALBUMIN 3.9   BILITOT 5.7*   ALKPHOS 341*   *   *   ANIONGAP 11     Cardiac Markers: No results for  "input(s): "CKMB", "MYOGLOBIN", "BNP", "TROPISTAT" in the last 48 hours.  Coagulation:   Recent Labs   Lab 02/03/25  1554   INR 1.0     Lactic Acid: No results for input(s): "LACTATE" in the last 48 hours.    Significant Imaging: I have reviewed all pertinent imaging results/findings within the past 24 hours.    Liver transplant doppler is pending  "

## 2025-02-04 NOTE — ASSESSMENT & PLAN NOTE
Patient on tacrolimus 2mg PO BID as outpatient - continue at this time  -check tacrolimus trough in AM  -hepatology consult

## 2025-02-05 LAB
ALBUMIN SERPL BCP-MCNC: 3.4 G/DL (ref 3.5–5.2)
ALP SERPL-CCNC: 317 U/L (ref 40–150)
ALT SERPL W/O P-5'-P-CCNC: 286 U/L (ref 10–44)
ANION GAP SERPL CALC-SCNC: 10 MMOL/L (ref 8–16)
APTT PPP: 27.6 SEC (ref 21–32)
AST SERPL-CCNC: 141 U/L (ref 10–40)
BASOPHILS # BLD AUTO: 0.01 K/UL (ref 0–0.2)
BASOPHILS NFR BLD: 0.2 % (ref 0–1.9)
BILIRUB SERPL-MCNC: 4.1 MG/DL (ref 0.1–1)
BUN SERPL-MCNC: 14 MG/DL (ref 8–23)
CALCIUM SERPL-MCNC: 8.8 MG/DL (ref 8.7–10.5)
CHLORIDE SERPL-SCNC: 106 MMOL/L (ref 95–110)
CO2 SERPL-SCNC: 22 MMOL/L (ref 23–29)
CREAT SERPL-MCNC: 0.9 MG/DL (ref 0.5–1.4)
DIFFERENTIAL METHOD BLD: ABNORMAL
EOSINOPHIL # BLD AUTO: 0.1 K/UL (ref 0–0.5)
EOSINOPHIL NFR BLD: 1.2 % (ref 0–8)
ERYTHROCYTE [DISTWIDTH] IN BLOOD BY AUTOMATED COUNT: 14.1 % (ref 11.5–14.5)
EST. GFR  (NO RACE VARIABLE): >60 ML/MIN/1.73 M^2
GLUCOSE SERPL-MCNC: 146 MG/DL (ref 70–110)
HCT VFR BLD AUTO: 46.7 % (ref 40–54)
HGB BLD-MCNC: 14.9 G/DL (ref 14–18)
IMM GRANULOCYTES # BLD AUTO: 0.01 K/UL (ref 0–0.04)
IMM GRANULOCYTES NFR BLD AUTO: 0.2 % (ref 0–0.5)
INR PPP: 1 (ref 0.8–1.2)
LYMPHOCYTES # BLD AUTO: 2 K/UL (ref 1–4.8)
LYMPHOCYTES NFR BLD: 41.1 % (ref 18–48)
MAGNESIUM SERPL-MCNC: 1.9 MG/DL (ref 1.6–2.6)
MCH RBC QN AUTO: 27.4 PG (ref 27–31)
MCHC RBC AUTO-ENTMCNC: 31.9 G/DL (ref 32–36)
MCV RBC AUTO: 86 FL (ref 82–98)
MONOCYTES # BLD AUTO: 0.3 K/UL (ref 0.3–1)
MONOCYTES NFR BLD: 6.5 % (ref 4–15)
NEUTROPHILS # BLD AUTO: 2.5 K/UL (ref 1.8–7.7)
NEUTROPHILS NFR BLD: 50.8 % (ref 38–73)
NRBC BLD-RTO: 0 /100 WBC
PHOSPHATE SERPL-MCNC: 3.1 MG/DL (ref 2.7–4.5)
PLATELET # BLD AUTO: 117 K/UL (ref 150–450)
PMV BLD AUTO: 10.5 FL (ref 9.2–12.9)
POCT GLUCOSE: 116 MG/DL (ref 70–110)
POCT GLUCOSE: 139 MG/DL (ref 70–110)
POCT GLUCOSE: 144 MG/DL (ref 70–110)
POCT GLUCOSE: 161 MG/DL (ref 70–110)
POCT GLUCOSE: 196 MG/DL (ref 70–110)
POTASSIUM SERPL-SCNC: 3.5 MMOL/L (ref 3.5–5.1)
PROT SERPL-MCNC: 6.9 G/DL (ref 6–8.4)
PROTHROMBIN TIME: 10.5 SEC (ref 9–12.5)
RBC # BLD AUTO: 5.44 M/UL (ref 4.6–6.2)
SODIUM SERPL-SCNC: 138 MMOL/L (ref 136–145)
TACROLIMUS BLD-MCNC: 10.7 NG/ML (ref 5–15)
WBC # BLD AUTO: 4.91 K/UL (ref 3.9–12.7)

## 2025-02-05 PROCEDURE — 63600175 PHARM REV CODE 636 W HCPCS: Performed by: STUDENT IN AN ORGANIZED HEALTH CARE EDUCATION/TRAINING PROGRAM

## 2025-02-05 PROCEDURE — 36415 COLL VENOUS BLD VENIPUNCTURE: CPT | Performed by: HOSPITALIST

## 2025-02-05 PROCEDURE — 85730 THROMBOPLASTIN TIME PARTIAL: CPT | Performed by: HOSPITALIST

## 2025-02-05 PROCEDURE — 83735 ASSAY OF MAGNESIUM: CPT | Performed by: HOSPITALIST

## 2025-02-05 PROCEDURE — 80197 ASSAY OF TACROLIMUS: CPT | Performed by: HOSPITALIST

## 2025-02-05 PROCEDURE — 99223 1ST HOSP IP/OBS HIGH 75: CPT | Mod: ,,, | Performed by: INTERNAL MEDICINE

## 2025-02-05 PROCEDURE — 84100 ASSAY OF PHOSPHORUS: CPT | Performed by: HOSPITALIST

## 2025-02-05 PROCEDURE — 25000003 PHARM REV CODE 250: Performed by: HOSPITALIST

## 2025-02-05 PROCEDURE — 85610 PROTHROMBIN TIME: CPT | Performed by: HOSPITALIST

## 2025-02-05 PROCEDURE — 85025 COMPLETE CBC W/AUTO DIFF WBC: CPT | Performed by: HOSPITALIST

## 2025-02-05 PROCEDURE — 63600175 PHARM REV CODE 636 W HCPCS: Performed by: HOSPITALIST

## 2025-02-05 PROCEDURE — 20600001 HC STEP DOWN PRIVATE ROOM

## 2025-02-05 PROCEDURE — 80053 COMPREHEN METABOLIC PANEL: CPT | Performed by: HOSPITALIST

## 2025-02-05 RX ADMIN — CARVEDILOL 6.25 MG: 6.25 TABLET, FILM COATED ORAL at 05:02

## 2025-02-05 RX ADMIN — CARVEDILOL 6.25 MG: 6.25 TABLET, FILM COATED ORAL at 06:02

## 2025-02-05 RX ADMIN — INSULIN ASPART 5 UNITS: 100 INJECTION, SOLUTION INTRAVENOUS; SUBCUTANEOUS at 06:02

## 2025-02-05 RX ADMIN — ASPIRIN 81 MG CHEWABLE TABLET 81 MG: 81 TABLET CHEWABLE at 08:02

## 2025-02-05 RX ADMIN — INSULIN GLARGINE 14 UNITS: 100 INJECTION, SOLUTION SUBCUTANEOUS at 09:02

## 2025-02-05 RX ADMIN — TACROLIMUS 2 MG: 1 CAPSULE ORAL at 06:02

## 2025-02-05 RX ADMIN — ENOXAPARIN SODIUM 40 MG: 40 INJECTION SUBCUTANEOUS at 05:02

## 2025-02-05 RX ADMIN — TACROLIMUS 2 MG: 1 CAPSULE ORAL at 08:02

## 2025-02-05 RX ADMIN — INSULIN ASPART 5 UNITS: 100 INJECTION, SOLUTION INTRAVENOUS; SUBCUTANEOUS at 01:02

## 2025-02-05 RX ADMIN — INSULIN ASPART 5 UNITS: 100 INJECTION, SOLUTION INTRAVENOUS; SUBCUTANEOUS at 08:02

## 2025-02-05 RX ADMIN — LOSARTAN POTASSIUM 100 MG: 50 TABLET, FILM COATED ORAL at 08:02

## 2025-02-05 RX ADMIN — INSULIN GLARGINE 14 UNITS: 100 INJECTION, SOLUTION SUBCUTANEOUS at 08:02

## 2025-02-05 NOTE — PLAN OF CARE
-Patient is [id number]  -Patient admitted for elevated T-Bili and elevated LFT's  -Patient was planned for ambulatory biopsy of liver transplant graft, patient took Clopidogrel at home taking aspirin as well, recommend procedure on 2/7.   -CT of (Pelvic Ab) performed on day shift= enlarged spleen .  -Patient given Mag 1.5, replaced with 2g IVPB  -Patient is AC&HS glucose monitoring 00:00 check was 157 no coverage needed   -Call light remains within reach, Instructed to call for assistance.,Bed is in lowest position with wheels locked.

## 2025-02-05 NOTE — SUBJECTIVE & OBJECTIVE
Interval History:     NAEON. Reports feeling better. Denies any nausea or vomiting No reported abdominal pain.    Review of Systems   Gastrointestinal:  Negative for abdominal pain and nausea.   Genitourinary:         Dark urine   All other systems reviewed and are negative.    Objective:     Vital Signs (Most Recent):  Temp: 98.1 °F (36.7 °C) (02/05/25 0900)  Pulse: 78 (02/05/25 0900)  Resp: 16 (02/05/25 0900)  BP: (!) 150/91 (02/05/25 0900)  SpO2: 98 % (02/05/25 0900) Vital Signs (24h Range):  Temp:  [97.5 °F (36.4 °C)-98.1 °F (36.7 °C)] 98.1 °F (36.7 °C)  Pulse:  [72-86] 78  Resp:  [16-18] 16  SpO2:  [94 %-98 %] 98 %  BP: (148-171)/() 150/91     Weight: 99.9 kg (220 lb 2.1 oz)  Body mass index is 30.7 kg/m².     Physical Exam  Vitals and nursing note reviewed.   Constitutional:       General: He is not in acute distress.     Appearance: He is not ill-appearing.   HENT:      Head: Normocephalic and atraumatic.      Mouth/Throat:      Mouth: Mucous membranes are moist.      Pharynx: No oropharyngeal exudate or posterior oropharyngeal erythema.      Comments: Icteric palate  Eyes:      General: Scleral icterus (mild) present.   Cardiovascular:      Rate and Rhythm: Normal rate and regular rhythm.      Pulses: Normal pulses.      Heart sounds: No murmur heard.  Pulmonary:      Effort: Pulmonary effort is normal. No respiratory distress.      Breath sounds: Normal breath sounds. No wheezing.   Abdominal:      General: Abdomen is protuberant. Bowel sounds are normal. There is no distension.      Palpations: Abdomen is soft.      Tenderness: There is no abdominal tenderness.      Comments: Insulin pump in place   Musculoskeletal:      Right lower leg: No edema.      Left lower leg: No edema.   Skin:     General: Skin is warm and dry.   Neurological:      General: No focal deficit present.      Mental Status: He is alert and oriented to person, place, and time.   Psychiatric:         Mood and Affect: Mood normal.          Behavior: Behavior normal.                Significant Labs: All pertinent labs within the past 24 hours have been reviewed.  CBC:   Recent Labs   Lab 02/04/25  0613 02/05/25  0556   WBC 4.36 4.91   HGB 14.5 14.9   HCT 43.2 46.7   * 117*     CMP:   Recent Labs   Lab 02/04/25  0613 02/05/25  0556    138   K 3.5 3.5    106   CO2 23 22*   * 146*   BUN 13 14   CREATININE 0.8 0.9   CALCIUM 8.8 8.8   PROT 6.7 6.9   ALBUMIN 3.3* 3.4*   BILITOT 4.7* 4.1*   ALKPHOS 298* 317*   * 141*   * 286*   ANIONGAP 7* 10     Significant Imaging: I have reviewed all pertinent imaging results/findings within the past 24 hours.

## 2025-02-05 NOTE — PROGRESS NOTES
[facility] - Transplant [facility] Medicine  Progress Note    Patient Name: Jed Chávez  MRN: [id number]  Patient Class: IP- Inpatient   Admission Date: 2/3/2025  Length of Stay: 1 days  Attending Physician: Cindy Samuels MD  Primary Care Provider: EVELIN Pitt MD        Subjective     Principal Problem:Liver transplant disorder        HPI:  66 y/o WM w/ Type 2 Dm, hx of HCC liver cirrhosis s/p Liver transplant (08/2023), HTN, hx of Meningioma s/p Left crani/resection presents to [facility] [place] [place] as direct admit for abnormal lab findings.      He recently has had rising values on hepatic panel with elevated transaminases and cholestatic markers. His primary hepatologist Dr. Schneider requested admission for workup of this lab abnormality.  Patient seen at bedside tonight, pts wife Leida Chávez accompanies him.   He denies any acute symptoms today or in recent weeks.   He states 2 weeks ago he had URI/Sinusitis, only meds he took at that time was OTC mucinex, no new Rx or recent antibiotic use.      He has no fevers/chills, chest pain,dyspnea, n/v or abdominal pain today.      Patient uses insulin pump as outpatient for management of DM2, does not have additional vials of insulin lipro to refill his pump tonight.      Hepatology requested urgent liver doppler to r/o hepatic arterial stenosis/thrombosis or venous thrombosis as a source of worsening hepatic function.  He was planned for ambulatory biopsy of liver transplant graft, but reports this morning had taken home Clopidogrel, pt is on aspirin also.  He is not on any oral anticoagulation as an outpatient.         Overview/Hospital Course:  66 y/o M w/ Type 2 DM, hx of HCC liver cirrhosis s/p Liver transplant (08/2023), HTN, hx of Meningioma s/p resection  who presented with elevated LFTs. He was referred for admission by hepatology.     Reported use of mucinex with recent URI and no recent antibiotic use. No reported fevers or chills.     Appreciate hepatology  "recommendations. US Doppler recommended and reported. "New biliary ductal dilation with echogenic debris within the common bile duct. Increased peak systolic velocity within the anastomosis of the main hepatic artery compared to prior. Associated mild intrahepatic tardus parvus waveforms. This could reflect hepatic artery stenosis. Similar tardus parvus waveforms within the liver. Mildly increased/improved resistive indices."    CTA abdomen obtained which reported "A stent is noted within the proper hepatic artery without significant internal contrast opacification concerning for critical stenosis or occlusion. Common bile ductal dilatation proximal to the presumed site of anastomosis, similar to recent Doppler ultrasound and increased when compared to prior CT 08/29/2024."    Consult to [facility] to consider EUS/ERCP +/- liver biopsy. Appreciate evaluation, consideration for EUS/ERCP however given patient intake of plavix on 2/3 recommended procedure on 2/7.     Interval History:     NAEON. Reports feeling better. Denies any nausea or vomiting No reported abdominal pain.    Review of Systems   Gastrointestinal:  Negative for abdominal pain and nausea.   Genitourinary:         Dark urine   All other systems reviewed and are negative.    Objective:     Vital Signs (Most Recent):  Temp: 98 °F (36.7 °C) (02/04/25 2000)  Pulse: 79 (02/04/25 2000)  Resp: 17 (02/04/25 2000)  BP: (!) 150/88 (02/04/25 2000)  SpO2: 96 % (02/04/25 2000) Vital Signs (24h Range):  Temp:  [97.5 °F (36.4 °C)-98.1 °F (36.7 °C)] 98 °F (36.7 °C)  Pulse:  [70-79] 79  Resp:  [16-18] 17  SpO2:  [94 %-99 %] 96 %  BP: (148-171)/(88-99) 150/88     Weight: 99.9 kg (220 lb 2.1 oz)  Body mass index is 30.7 kg/m².     Physical Exam  Vitals and nursing note reviewed.   Constitutional:       General: He is not in acute distress.     Appearance: He is not ill-appearing.   HENT:      Head: Normocephalic and atraumatic.      Mouth/Throat:      Mouth: Mucous membranes " "are moist.      Pharynx: No oropharyngeal exudate or posterior oropharyngeal erythema.      Comments: Icteric palate  Eyes:      General: Scleral icterus (mild) present.   Cardiovascular:      Rate and Rhythm: Normal rate and regular rhythm.      Pulses: Normal pulses.      Heart sounds: No murmur heard.  Pulmonary:      Effort: Pulmonary effort is normal. No respiratory distress.      Breath sounds: Normal breath sounds. No wheezing.   Abdominal:      General: Abdomen is protuberant. Bowel sounds are normal. There is no distension.      Palpations: Abdomen is soft.      Tenderness: There is no abdominal tenderness.      Comments: Insulin pump in place   Musculoskeletal:      Right lower leg: No edema.      Left lower leg: No edema.   Skin:     General: Skin is warm and dry.   Neurological:      General: No focal deficit present.      Mental Status: He is alert and oriented to person, place, and time.   Psychiatric:         Mood and Affect: Mood normal.         Behavior: Behavior normal.                Significant Labs: All pertinent labs within the past 24 hours have been reviewed.  CBC:   Recent Labs   Lab 02/03/25  0900 02/04/25  0613   WBC 6.17 4.36   HGB 16.4 14.5   HCT 50.0 43.2    113*     CMP:   Recent Labs   Lab 02/03/25  0900 02/04/25  0613    138   K 4.1 3.5    108   CO2 24 23   * 208*   BUN 11 13   CREATININE 1.2 0.8   CALCIUM 9.4 8.8   PROT 7.7 6.7   ALBUMIN 3.9 3.3*   BILITOT 5.7* 4.7*   ALKPHOS 341* 298*   * 139*   * 272*   ANIONGAP 11 7*     Significant Imaging: I have reviewed all pertinent imaging results/findings within the past 24 hours.      Assessment and Plan     * Liver transplant disorder  Elevated Lfts, recommended admission for evaluation  Appreciate hepatology recommendations.   -US Doppler recommended and reported. "New biliary ductal dilation with echogenic debris within the common bile duct. Increased peak systolic velocity within the anastomosis " "of the main hepatic artery compared to prior. Associated mild intrahepatic tardus parvus waveforms. This could reflect hepatic artery stenosis. Similar tardus parvus waveforms within the liver. Mildly increased/improved resistive indices."    -CTA abdomen obtained which reported "A stent is noted within the proper hepatic artery without significant internal contrast opacification concerning for critical stenosis or occlusion. Common bile ductal dilatation proximal to the presumed site of anastomosis, similar to recent Doppler ultrasound and increased when compared to prior CT 08/29/2024."    Consult to [facility] to consider EUS/ERCP +/- liver biopsy. Appreciate evaluation, consideration for EUS/ERCP however given patient intake of plavix on 2/3 recommended procedure on 2/7.         Thrombocytopenia  The likely etiology of thrombocytopenia is liver disease. The patients 3 most recent labs are listed below.  Recent Labs     02/03/25  0900 02/04/25  0613    113*     Plan  - Will transfuse if platelet count is <50k (if undergoing surgical procedure or have active bleeding).        Hypomagnesemia  Patient has Abnormal Magnesium: hypomagnesemia. Will continue to monitor electrolytes closely. Will replace the affected electrolytes and repeat labs to be done after interventions completed. The patient's magnesium results have been reviewed and are listed below.  Recent Labs   Lab 02/04/25  0613   MG 1.5*        Status post liver transplant  Patient on tacrolimus 2mg PO BID as outpatient   - continue home dose  -check tacrolimus trough daily         Type 2 diabetes mellitus with other specified complication, with chronic insulin use  Patient is on an insulin pump as an outpatient, did not have lispro vial to refill pump tonight   -will switch patient to routine intpatient, basal bolus regimen.  Pts backup insulin is Lantus 33units.  Will conservatively dose with plans for NPo and split into BID dosing, prandial insulin and " low dose sliding scale.     Last A1c reviewed-   Lab Results   Component Value Date    HGBA1C 6.3 (H) 12/16/2024     Most recent fingerstick glucose reviewed-   Recent Labs   Lab 02/04/25  0813 02/04/25  1215 02/04/25  1727 02/04/25  2046   POCTGLUCOSE 213* 170* 223* 136*     Current correctional scale  Low  Maintain anti-hyperglycemic dose as follows-   Antihyperglycemics (From admission, onward)      Start     Stop Route Frequency Ordered    02/04/25 1108  insulin aspart U-100 pen 0-5 Units         -- SubQ Every 6 hours PRN 02/04/25 1008    02/04/25 0715  insulin aspart U-100 pen 5 Units         -- SubQ 3 times daily with meals 02/03/25 2156 02/03/25 2200  insulin glargine U-100 (Lantus) pen 14 Units         -- SubQ 2 times daily 02/03/25 2156              Hypertension complicating diabetes  Patient's blood pressure range in the last 24 hours was: BP  Min: 148/90  Max: 171/98.The patient's inpatient anti-hypertensive regimen is listed below:  Current Antihypertensives  carvediloL tablet 6.25 mg, 2 times daily with meals, Oral  losartan tablet 100 mg, Daily, Oral    Plan  - BP is controlled, no changes needed to their regimen  Resume home regimen with hold parameters per the outpatient orders.       VTE Risk Mitigation (From admission, onward)           Ordered     enoxaparin injection 40 mg  Every 24 hours         02/04/25 1420     Reason for No Pharmacological VTE Prophylaxis  Once        Comments: May require liver biopsy in next 24-48hrs   Question:  Reasons:  Answer:  Physician Provided (leave comment)    02/03/25 2023     IP VTE HIGH RISK PATIENT  Once         02/03/25 2023     Place sequential compression device  Until discontinued         02/03/25 2023                    Discharge Planning   CRISELDA: 2/8/2025     Code Status: Full Code   Medical Readiness for Discharge Date:   Discharge Plan A: Home with family                        Cindy Samuels MD  Department of Hospital Medicine   [facility] - Transplant  Stepdown

## 2025-02-05 NOTE — PROGRESS NOTES
[facility] - Transplant [facility] Medicine  Progress Note    Patient Name: Jed Chávez  MRN: [id number]  Patient Class: IP- Inpatient   Admission Date: 2/3/2025  Length of Stay: 2 days  Attending Physician: Cindy Samuels MD  Primary Care Provider: EVELIN Pitt MD        Subjective     Principal Problem:Liver transplant disorder        HPI:  66 y/o WM w/ Type 2 Dm, hx of HCC liver cirrhosis s/p Liver transplant (08/2023), HTN, hx of Meningioma s/p Left crani/resection presents to [facility] [place] as direct admit for abnormal lab findings.      He recently has had rising values on hepatic panel with elevated transaminases and cholestatic markers. His primary hepatologist Dr. Schneider requested admission for workup of this lab abnormality.  Patient seen at bedside tonight, pts wife Leida Chávez accompanies him.   He denies any acute symptoms today or in recent weeks.   He states 2 weeks ago he had URI/Sinusitis, only meds he took at that time was OTC mucinex, no new Rx or recent antibiotic use.      He has no fevers/chills, chest pain,dyspnea, n/v or abdominal pain today.      Patient uses insulin pump as outpatient for management of DM2, does not have additional vials of insulin lipro to refill his pump tonight.      Hepatology requested urgent liver doppler to r/o hepatic arterial stenosis/thrombosis or venous thrombosis as a source of worsening hepatic function.  He was planned for ambulatory biopsy of liver transplant graft, but reports this morning had taken home Clopidogrel, pt is on aspirin also.  He is not on any oral anticoagulation as an outpatient.         Overview/Hospital Course:  66 y/o M w/ Type 2 DM, hx of HCC liver cirrhosis s/p Liver transplant (08/2023), HTN, hx of Meningioma s/p resection  who presented with elevated LFTs. He was referred for admission by hepatology.     Reported use of mucinex with recent URI and no recent antibiotic use. No reported fevers or chills.     Appreciate hepatology  "recommendations. US Doppler recommended and reported. "New biliary ductal dilation with echogenic debris within the common bile duct. Increased peak systolic velocity within the anastomosis of the main hepatic artery compared to prior. Associated mild intrahepatic tardus parvus waveforms. This could reflect hepatic artery stenosis. Similar tardus parvus waveforms within the liver. Mildly increased/improved resistive indices."    CTA abdomen obtained which reported "A stent is noted within the proper hepatic artery without significant internal contrast opacification concerning for critical stenosis or occlusion. Common bile ductal dilatation proximal to the presumed site of anastomosis, similar to recent Doppler ultrasound and increased when compared to prior CT 08/29/2024."    Consult to [facility] to consider EUS/ERCP +/- liver biopsy. Appreciate evaluation, consideration for EUS/ERCP however given patient intake of plavix on 2/3 recommended procedure on 2/7.     2/4- LFTs remain elevated. EUS/ERCP likely on 2/7    Interval History:     NAEON. Reports feeling better. Denies any nausea or vomiting No reported abdominal pain.    Review of Systems   Gastrointestinal:  Negative for abdominal pain and nausea.   Genitourinary:         Dark urine   All other systems reviewed and are negative.    Objective:     Vital Signs (Most Recent):  Temp: 98.1 °F (36.7 °C) (02/05/25 0900)  Pulse: 78 (02/05/25 0900)  Resp: 16 (02/05/25 0900)  BP: (!) 150/91 (02/05/25 0900)  SpO2: 98 % (02/05/25 0900) Vital Signs (24h Range):  Temp:  [97.5 °F (36.4 °C)-98.1 °F (36.7 °C)] 98.1 °F (36.7 °C)  Pulse:  [72-86] 78  Resp:  [16-18] 16  SpO2:  [94 %-98 %] 98 %  BP: (148-171)/() 150/91     Weight: 99.9 kg (220 lb 2.1 oz)  Body mass index is 30.7 kg/m².     Physical Exam  Vitals and nursing note reviewed.   Constitutional:       General: He is not in acute distress.     Appearance: He is not ill-appearing.   HENT:      Head: Normocephalic and " "atraumatic.      Mouth/Throat:      Mouth: Mucous membranes are moist.      Pharynx: No oropharyngeal exudate or posterior oropharyngeal erythema.      Comments: Icteric palate  Eyes:      General: Scleral icterus (mild) present.   Cardiovascular:      Rate and Rhythm: Normal rate and regular rhythm.      Pulses: Normal pulses.      Heart sounds: No murmur heard.  Pulmonary:      Effort: Pulmonary effort is normal. No respiratory distress.      Breath sounds: Normal breath sounds. No wheezing.   Abdominal:      General: Abdomen is protuberant. Bowel sounds are normal. There is no distension.      Palpations: Abdomen is soft.      Tenderness: There is no abdominal tenderness.      Comments: Insulin pump in place   Musculoskeletal:      Right lower leg: No edema.      Left lower leg: No edema.   Skin:     General: Skin is warm and dry.   Neurological:      General: No focal deficit present.      Mental Status: He is alert and oriented to person, place, and time.   Psychiatric:         Mood and Affect: Mood normal.         Behavior: Behavior normal.                Significant Labs: All pertinent labs within the past 24 hours have been reviewed.  CBC:   Recent Labs   Lab 02/04/25  0613 02/05/25  0556   WBC 4.36 4.91   HGB 14.5 14.9   HCT 43.2 46.7   * 117*     CMP:   Recent Labs   Lab 02/04/25  0613 02/05/25  0556    138   K 3.5 3.5    106   CO2 23 22*   * 146*   BUN 13 14   CREATININE 0.8 0.9   CALCIUM 8.8 8.8   PROT 6.7 6.9   ALBUMIN 3.3* 3.4*   BILITOT 4.7* 4.1*   ALKPHOS 298* 317*   * 141*   * 286*   ANIONGAP 7* 10     Significant Imaging: I have reviewed all pertinent imaging results/findings within the past 24 hours.      Assessment and Plan     * Liver transplant disorder  Elevated Lfts, recommended admission for evaluation  Appreciate hepatology recommendations.   -US Doppler recommended and reported. "New biliary ductal dilation with echogenic debris within the common " "bile duct. Increased peak systolic velocity within the anastomosis of the main hepatic artery compared to prior. Associated mild intrahepatic tardus parvus waveforms. This could reflect hepatic artery stenosis. Similar tardus parvus waveforms within the liver. Mildly increased/improved resistive indices."    -CTA abdomen obtained which reported "A stent is noted within the proper hepatic artery without significant internal contrast opacification concerning for critical stenosis or occlusion. Common bile ductal dilatation proximal to the presumed site of anastomosis, similar to recent Doppler ultrasound and increased when compared to prior CT 08/29/2024."    Consult to [facility] to consider EUS/ERCP +/- liver biopsy. Appreciate evaluation, consideration for EUS/ERCP however given patient intake of plavix on 2/3 recommended procedure on 2/7.         Thrombocytopenia  The likely etiology of thrombocytopenia is liver disease. The patients 3 most recent labs are listed below.  Recent Labs     02/03/25  0900 02/04/25  0613 02/05/25  0556    113* 117*       Plan  - Will transfuse if platelet count is <50k (if undergoing surgical procedure or have active bleeding).        Hypomagnesemia  Patient has Abnormal Magnesium: hypomagnesemia. Will continue to monitor electrolytes closely. Will replace the affected electrolytes and repeat labs to be done after interventions completed. The patient's magnesium results have been reviewed and are listed below.  Recent Labs   Lab 02/04/25  0613   MG 1.5*        At risk for opportunistic infections        Status post liver transplant  Patient on tacrolimus 2mg PO BID as outpatient   - continue home dose  -check tacrolimus trough daily         Type 2 diabetes mellitus with other specified complication, with chronic insulin use  Patient is on an insulin pump as an outpatient, did not have lispro vial to refill pump tonight   -will switch patient to routine intpatient, basal bolus regimen.  " Pts backup insulin is Lantus 33units.  Will conservatively dose with plans for NPo and split into BID dosing, prandial insulin and low dose sliding scale.     Last A1c reviewed-   Lab Results   Component Value Date    HGBA1C 6.3 (H) 12/16/2024     Most recent fingerstick glucose reviewed-   Recent Labs   Lab 02/04/25  2046 02/04/25  2349 02/05/25  0629 02/05/25  0807   POCTGLUCOSE 136* 157* 144* 161*       Current correctional scale  Low  Maintain anti-hyperglycemic dose as follows-   Antihyperglycemics (From admission, onward)      Start     Stop Route Frequency Ordered    02/04/25 1108  insulin aspart U-100 pen 0-5 Units         -- SubQ Every 6 hours PRN 02/04/25 1008    02/04/25 0715  insulin aspart U-100 pen 5 Units         -- SubQ 3 times daily with meals 02/03/25 2156    02/03/25 2200  insulin glargine U-100 (Lantus) pen 14 Units         -- SubQ 2 times daily 02/03/25 2156              Hypertension complicating diabetes  Patient's blood pressure range in the last 24 hours was: BP  Min: 148/90  Max: 171/98.The patient's inpatient anti-hypertensive regimen is listed below:  Current Antihypertensives  carvediloL tablet 6.25 mg, 2 times daily with meals, Oral  losartan tablet 100 mg, Daily, Oral    Plan  - BP is controlled, no changes needed to their regimen  Resume home regimen with hold parameters per the outpatient orders.       VTE Risk Mitigation (From admission, onward)           Ordered     enoxaparin injection 40 mg  Every 24 hours         02/04/25 1420     Reason for No Pharmacological VTE Prophylaxis  Once        Comments: May require liver biopsy in next 24-48hrs   Question:  Reasons:  Answer:  Physician Provided (leave comment)    02/03/25 2023     IP VTE HIGH RISK PATIENT  Once         02/03/25 2023     Place sequential compression device  Until discontinued         02/03/25 2023                    Discharge Planning   CRISELDA: 2/8/2025     Code Status: Full Code   Medical Readiness for Discharge Date:    Discharge Plan A: Home with family                        Cindy Samuels MD  Department of Hospital Medicine   [facility] - Transplant Stepdown

## 2025-02-05 NOTE — PLAN OF CARE
Patient aao Independent with ambulation. Off plavix, awaiting liver biopsy tentative Friday due to elevated Lfts. Vitals stable. BG stable, meal and long acting insulin on board. Patients wife at the bedside attentive to the patient.

## 2025-02-05 NOTE — CONSULTS
[facility] Consult Note    Patient Name: Jed Chávez  MRN: [id number]  Location: [id number]  Attending: Cindy Samuels MD   Admit Date: 2/3/2025  Today's Date: 02/05/2025    SUBJECTIVE:     HPI:  Jed Chávez is a 65 year old male for whom Hepatology is consulted for elevated liver enzymes. He has a history of cirrhosis and HCC s/p liver transplant 8/10/2023 currently on Tacrolimus 2/2 complicated by hepatic artery stenosis s/p stent placement 10/2023 with IR on Plavix, Obesity (BMI 30.70).     He initially presented to [facility] [place] on 2/03 for abnormal labs; T bili 4.9, AST//238(previously T bili 1.6, AST 20, ALT 62 on 1/27). On arrival, he was hypertensive, otehrwise HDS. Labs on admission, T bili 5.7, AST//305, . Repeat labs on 2/04 downtrending LFTs, T bili 4.7, AST//272. US doppler with new biliary ductal dilation with echohenic debris within the CBD, increased peak systolic velocity within the anastomosis of the main hepatic artery compared to prior. CTA, stent noted in the proper hepatic artery without significant internal contrast opacification concerning for critical stenosis or occlusion. AES consulted; plan for ERCP for suspect anastomotic stricture on Friday 2/7 after 5 day plavix washout. On my assessment, he did not have any complaints. Most recent labs from 2/05 notable for T bili 4.1, AST//286.       Review of patient's allergies indicates:  No Known Allergies    Past Medical History:   Diagnosis Date    Abdominal hernia 02/03/2025    Hernia of abdominal cavity (Problem)      Acute blood loss anemia 08/12/2023    - H/H stable  - no overt signs of bleed  - continue to monitor with daily CBC      Alcoholic cirrhosis     CVA (cerebral vascular accident)     DM (diabetes mellitus)     Dyslipidemia     Hepatocellular carcinoma     History of hepatocellular carcinoma, in remission after liver transplant 04/05/2023    Synoptic Report Procedure: Total hepatectomy.  Histologic type: Hepatocellular carcinoma. Histologic grade: G1, well-differentiated. Tumor focality: Multifocal. Tumor characteristics: Tumor #1 (blocks 2B-2E): Tumor site: Right lobe. Tumor size: 6.2 cm in greatest dimension grossly. Treatment effect: Complete necrosis (no viable tumor). Tumor #2 (blocks 2F-2H): Tumor site: Right lobe. Tumor size: 4.    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    S/P liver transplant 08/12/2023    Skin cancer      Past Surgical History:   Procedure Laterality Date    CRANIOTOMY, WITH NEOPLASM EXCISION USING COMPUTER-ASSISTED NAVIGATION Left 10/26/2023    Procedure: CRANIOTOMY, WITH NEOPLASM EXCISION USING COMPUTER-ASSISTED NAVIGATION;  Surgeon: Jose E Degroot DO;  Location: [facility] OR [address] [place] [address];  Service: Neurosurgery;  Laterality: Left;    HERNIA REPAIR N/A     LIVER TRANSPLANT N/A 8/9/2023    Procedure: TRANSPLANT, LIVER;  Surgeon: Ameya Suero MD;  Location: [facility] OR [address] [place] [address];  Service: Transplant;  Laterality: N/A;     No family history on file.  Social History     Tobacco Use    Smoking status: Never    Smokeless tobacco: Never   Substance Use Topics    Alcohol use: Not Currently    Drug use: Never       All home medications reviewed.    Review of Systems   Constitutional:  Negative for chills, fever and weight loss.   Gastrointestinal:  Negative for abdominal pain, nausea and vomiting.       OBJECTIVE:     Vital Signs Trends/History Reviewed  Vitals:    02/04/25 1618 02/04/25 2000 02/04/25 2302 02/05/25 0443   BP: (!) 171/98 (!) 150/88 (!) 151/93 (!) 159/101   BP Location: Left arm Left arm     Patient Position: Sitting Lying     Pulse: 75 79 86 85   Resp:  17 18 18   Temp: 97.5 °F (36.4 °C) 98 °F (36.7 °C) 97.9 °F (36.6 °C) 98 °F (36.7 °C)   TempSrc: Oral Oral Oral Oral   SpO2: 96% 96% (!) 94% 96%   Weight:       Height:             Physical Exam  Vitals  reviewed.   Constitutional:       General: He is not in acute distress.     Appearance: Normal appearance. He is not ill-appearing.   Eyes:      General: Scleral icterus present.   Pulmonary:      Effort: Pulmonary effort is normal.   Abdominal:      General: There is no distension.      Comments: Prior abdominal scar.   Neurological:      Mental Status: He is alert and oriented to person, place, and time. Mental status is at baseline.       Laboratory: All labs within last 24 hours reviewed.     MELD:   MELD 3.0: 13 at 2/5/2025  5:56 AM  MELD-Na: 12 at 2/5/2025  5:56 AM  Calculated from:  Serum Creatinine: 0.9 mg/dL (Using min of 1 mg/dL) at 2/5/2025  5:56 AM  Serum Sodium: 138 mmol/L (Using max of 137 mmol/L) at 2/5/2025  5:56 AM  Total Bilirubin: 4.1 mg/dL at 2/5/2025  5:56 AM  Serum Albumin: 3.4 g/dL at 2/5/2025  5:56 AM  INR(ratio): 1 at 2/5/2025  5:56 AM  Age at listing (hypothetical): 65 years  Sex: Male at 2/5/2025  5:56 AM      Imaging: All imaging within last 24 hours reviewed.       Scheduled Medications:    aspirin  81 mg Oral Daily    carvediloL  6.25 mg Oral BID WM    enoxparin  40 mg Subcutaneous Q24H (prophylaxis, 1700)    insulin aspart U-100  5 Units Subcutaneous TIDWM    insulin glargine U-100  14 Units Subcutaneous BID    losartan  100 mg Oral Daily    tacrolimus  2 mg Oral BID       PRN Medications:     Current Facility-Administered Medications:     acetaminophen, 650 mg, Oral, Q8H PRN    aluminum-magnesium hydroxide-simethicone, 30 mL, Oral, QID PRN    dextrose 50%, 12.5 g, Intravenous, PRN    dextrose 50%, 25 g, Intravenous, PRN    glucagon (human recombinant), 1 mg, Intramuscular, PRN    glucose, 16 g, Oral, PRN    glucose, 24 g, Oral, PRN    insulin aspart U-100, 0-5 Units, Subcutaneous, Q6H PRN    melatonin, 6 mg, Oral, Nightly PRN    naloxone, 0.02 mg, Intravenous, PRN    ondansetron, 4 mg, Intravenous, Q8H PRN    polyethylene glycol, 17 g, Oral, Daily PRN    prochlorperazine, 5 mg,  Intravenous, Q6H PRN    senna-docusate 8.6-50 mg, 1 tablet, Oral, BID PRN    sodium chloride 0.9%, 10 mL, Intravenous, Q6H PRN    ASSESSMENT:    65 year old male with cirrhosis and HCC s/p liver transplant 8/10/2023 currently on Tacrolimus 2/2 complicated by hepatic artery stenosis s/p stent placement 10/2023 with IR on Plavix, Obesity (BMI 30.70) admitted to [facility] on 2/03 with elevated liver enzymes. Hepatology consulted for further management. Labs on admission, T bili 5.7, AST//305, . Repeat labs on 2/04 downtrending LFTs, T bili 4.7, AST//272. US doppler with new biliary ductal dilation with echohenic debris within the CBD, increased peak systolic velocity within the anastomosis of the main hepatic artery compared to prior. CTA, stent noted in the proper hepatic artery without significant internal contrast opacification concerning for critical stenosis or occlusion. [facility] consulted; plan for ERCP for suspect anastomotic stricture on Friday 2/7 after 5 day plavix washout. On my assessment, he did not have any complaints. Most recent labs from 2/05 notable for T bili 4.1, AST//286.     Problem List:  Elevated liver enzymes  HCC s/p transplant  Hepatic artery stenosis (on Plavix)    RECOMMENDATIONS:    -Will discuss with IR the need for any possible intervention for stent occlusion, although this appears chronic.   -AES consulted; plan for ERCP Friday with possible liver bx if no evidence of stricture.   -Continue Tacrolimus 2/2 with daily AM Tacro levels and CMP.      Thank you for allowing us to participate in the care of this patient. Please call with questions.      Brittney Holt MD  Gastroenterology Fellow, PGY-V  [facility]

## 2025-02-05 NOTE — ASSESSMENT & PLAN NOTE
Patient has Abnormal Magnesium: hypomagnesemia. Will continue to monitor electrolytes closely. Will replace the affected electrolytes and repeat labs to be done after interventions completed. The patient's magnesium results have been reviewed and are listed below.  Recent Labs   Lab 02/04/25  0613   MG 1.5*

## 2025-02-05 NOTE — ASSESSMENT & PLAN NOTE
"Elevated Lfts, recommended admission for evaluation  Appreciate hepatology recommendations.   -US Doppler recommended and reported. "New biliary ductal dilation with echogenic debris within the common bile duct. Increased peak systolic velocity within the anastomosis of the main hepatic artery compared to prior. Associated mild intrahepatic tardus parvus waveforms. This could reflect hepatic artery stenosis. Similar tardus parvus waveforms within the liver. Mildly increased/improved resistive indices."    -CTA abdomen obtained which reported "A stent is noted within the proper hepatic artery without significant internal contrast opacification concerning for critical stenosis or occlusion. Common bile ductal dilatation proximal to the presumed site of anastomosis, similar to recent Doppler ultrasound and increased when compared to prior CT 08/29/2024."    Consult to [facility] to consider EUS/ERCP +/- liver biopsy. Appreciate evaluation, consideration for EUS/ERCP however given patient intake of plavix on 2/3 recommended procedure on 2/7.       "

## 2025-02-05 NOTE — ASSESSMENT & PLAN NOTE
Patient is on an insulin pump as an outpatient, did not have lispro vial to refill pump tonight   -will switch patient to routine intpatient, basal bolus regimen.  Pts backup insulin is Lantus 33units.  Will conservatively dose with plans for NPo and split into BID dosing, prandial insulin and low dose sliding scale.     Last A1c reviewed-   Lab Results   Component Value Date    HGBA1C 6.3 (H) 12/16/2024     Most recent fingerstick glucose reviewed-   Recent Labs   Lab 02/04/25  0813 02/04/25  1215 02/04/25  1727 02/04/25  2046   POCTGLUCOSE 213* 170* 223* 136*     Current correctional scale  Low  Maintain anti-hyperglycemic dose as follows-   Antihyperglycemics (From admission, onward)    Start     Stop Route Frequency Ordered    02/04/25 1108  insulin aspart U-100 pen 0-5 Units         -- SubQ Every 6 hours PRN 02/04/25 1008    02/04/25 0715  insulin aspart U-100 pen 5 Units         -- SubQ 3 times daily with meals 02/03/25 2156    02/03/25 2200  insulin glargine U-100 (Lantus) pen 14 Units         -- SubQ 2 times daily 02/03/25 2156

## 2025-02-05 NOTE — ASSESSMENT & PLAN NOTE
The likely etiology of thrombocytopenia is liver disease. The patients 3 most recent labs are listed below.  Recent Labs     02/03/25  0900 02/04/25  0613 02/05/25  0556    113* 117*       Plan  - Will transfuse if platelet count is <50k (if undergoing surgical procedure or have active bleeding).

## 2025-02-05 NOTE — SUBJECTIVE & OBJECTIVE
Interval History:     NAEON. Reports feeling better. Denies any nausea or vomiting No reported abdominal pain.    Review of Systems   Gastrointestinal:  Negative for abdominal pain and nausea.   Genitourinary:         Dark urine   All other systems reviewed and are negative.    Objective:     Vital Signs (Most Recent):  Temp: 98 °F (36.7 °C) (02/04/25 2000)  Pulse: 79 (02/04/25 2000)  Resp: 17 (02/04/25 2000)  BP: (!) 150/88 (02/04/25 2000)  SpO2: 96 % (02/04/25 2000) Vital Signs (24h Range):  Temp:  [97.5 °F (36.4 °C)-98.1 °F (36.7 °C)] 98 °F (36.7 °C)  Pulse:  [70-79] 79  Resp:  [16-18] 17  SpO2:  [94 %-99 %] 96 %  BP: (148-171)/(88-99) 150/88     Weight: 99.9 kg (220 lb 2.1 oz)  Body mass index is 30.7 kg/m².     Physical Exam  Vitals and nursing note reviewed.   Constitutional:       General: He is not in acute distress.     Appearance: He is not ill-appearing.   HENT:      Head: Normocephalic and atraumatic.      Mouth/Throat:      Mouth: Mucous membranes are moist.      Pharynx: No oropharyngeal exudate or posterior oropharyngeal erythema.      Comments: Icteric palate  Eyes:      General: Scleral icterus (mild) present.   Cardiovascular:      Rate and Rhythm: Normal rate and regular rhythm.      Pulses: Normal pulses.      Heart sounds: No murmur heard.  Pulmonary:      Effort: Pulmonary effort is normal. No respiratory distress.      Breath sounds: Normal breath sounds. No wheezing.   Abdominal:      General: Abdomen is protuberant. Bowel sounds are normal. There is no distension.      Palpations: Abdomen is soft.      Tenderness: There is no abdominal tenderness.      Comments: Insulin pump in place   Musculoskeletal:      Right lower leg: No edema.      Left lower leg: No edema.   Skin:     General: Skin is warm and dry.   Neurological:      General: No focal deficit present.      Mental Status: He is alert and oriented to person, place, and time.   Psychiatric:         Mood and Affect: Mood normal.          Behavior: Behavior normal.                Significant Labs: All pertinent labs within the past 24 hours have been reviewed.  CBC:   Recent Labs   Lab 02/03/25  0900 02/04/25  0613   WBC 6.17 4.36   HGB 16.4 14.5   HCT 50.0 43.2    113*     CMP:   Recent Labs   Lab 02/03/25  0900 02/04/25  0613    138   K 4.1 3.5    108   CO2 24 23   * 208*   BUN 11 13   CREATININE 1.2 0.8   CALCIUM 9.4 8.8   PROT 7.7 6.7   ALBUMIN 3.9 3.3*   BILITOT 5.7* 4.7*   ALKPHOS 341* 298*   * 139*   * 272*   ANIONGAP 11 7*     Significant Imaging: I have reviewed all pertinent imaging results/findings within the past 24 hours.

## 2025-02-05 NOTE — ASSESSMENT & PLAN NOTE
The likely etiology of thrombocytopenia is liver disease. The patients 3 most recent labs are listed below.  Recent Labs     02/03/25  0900 02/04/25  0613    113*     Plan  - Will transfuse if platelet count is <50k (if undergoing surgical procedure or have active bleeding).

## 2025-02-05 NOTE — ASSESSMENT & PLAN NOTE
Patient is on an insulin pump as an outpatient, did not have lispro vial to refill pump tonight   -will switch patient to routine intpatient, basal bolus regimen.  Pts backup insulin is Lantus 33units.  Will conservatively dose with plans for NPo and split into BID dosing, prandial insulin and low dose sliding scale.     Last A1c reviewed-   Lab Results   Component Value Date    HGBA1C 6.3 (H) 12/16/2024     Most recent fingerstick glucose reviewed-   Recent Labs   Lab 02/04/25  2046 02/04/25  2349 02/05/25  0629 02/05/25  0807   POCTGLUCOSE 136* 157* 144* 161*       Current correctional scale  Low  Maintain anti-hyperglycemic dose as follows-   Antihyperglycemics (From admission, onward)    Start     Stop Route Frequency Ordered    02/04/25 1108  insulin aspart U-100 pen 0-5 Units         -- SubQ Every 6 hours PRN 02/04/25 1008    02/04/25 0715  insulin aspart U-100 pen 5 Units         -- SubQ 3 times daily with meals 02/03/25 2156    02/03/25 2200  insulin glargine U-100 (Lantus) pen 14 Units         -- SubQ 2 times daily 02/03/25 2156

## 2025-02-05 NOTE — ASSESSMENT & PLAN NOTE
Patient's blood pressure range in the last 24 hours was: BP  Min: 148/90  Max: 171/98.The patient's inpatient anti-hypertensive regimen is listed below:  Current Antihypertensives  carvediloL tablet 6.25 mg, 2 times daily with meals, Oral  losartan tablet 100 mg, Daily, Oral    Plan  - BP is controlled, no changes needed to their regimen  Resume home regimen with hold parameters per the outpatient orders.

## 2025-02-05 NOTE — HOSPITAL COURSE
"64 y/o M w/ Type 2 DM, hx of HCC liver cirrhosis s/p Liver transplant (08/2023), HTN, hx of Meningioma s/p resection  who presented with elevated LFTs. He was referred for admission by hepatology.     Reported use of mucinex with recent URI and no recent antibiotic use. No reported fevers or chills.     Appreciate hepatology recommendations. US Doppler recommended and reported. "New biliary ductal dilation with echogenic debris within the common bile duct. Increased peak systolic velocity within the anastomosis of the main hepatic artery compared to prior. Associated mild intrahepatic tardus parvus waveforms. This could reflect hepatic artery stenosis. Similar tardus parvus waveforms within the liver. Mildly increased/improved resistive indices."    CTA abdomen obtained which reported "A stent is noted within the proper hepatic artery without significant internal contrast opacification concerning for critical stenosis or occlusion. Common bile ductal dilatation proximal to the presumed site of anastomosis, similar to recent Doppler ultrasound and increased when compared to prior CT 08/29/2024."    Consult to [facility] to consider EUS/ERCP +/- liver biopsy. Appreciate evaluation, consideration for EUS/ERCP however given patient intake of plavix on 2/3 recommended procedure on 2/7.     2/4- LFTs remain elevated. EUS/ERCP likely on 2/7 2/06- pt remains afebrile. WBC not elevated.  T. bili trend down to 2.9.  2/07- ERCP/EUS today. Cr remains stable. T bili slight trend up but overall still lower than admit.   2/8- Blood pressure better controlled on 12.5 carvedilol. LFTs down trending and will discharge on ursodiol and 5 days of ciprofloxacin. Recommend GI follow up for repeat ERCP. Recommend outpatient hepatology follow up       Objective:     Vital Signs (Most Recent):  Temp: 97.9 °F (36.6 °C) (02/07/25 1059)  Pulse: 72 (02/07/25 1059)  Resp: 16 (02/07/25 1059)  BP: (!) 151/94 (02/07/25 1059)  SpO2: 97 % (02/07/25 1059) " Vital Signs (24h Range):  Temp:  [97.7 °F (36.5 °C)-98.4 °F (36.9 °C)] 97.9 °F (36.6 °C)  Pulse:  [72-81] 72  Resp:  [16-18] 16  SpO2:  [93 %-97 %] 97 %  BP: (151-187)/() 151/94     Weight: 99.8 kg (220 lb)  Body mass index is 30.68 kg/m².    Intake/Output Summary (Last 24 hours) at 2/7/2025 1225  Last data filed at 2/6/2025 2000  Gross per 24 hour   Intake --   Output 150 ml   Net -150 ml         Physical Exam  Vitals and nursing note reviewed.   Constitutional:       General: He is not in acute distress.  HENT:      Head: Normocephalic and atraumatic.   Eyes:      General: Scleral icterus (mild) present.   Cardiovascular:      Rate and Rhythm: Normal rate and regular rhythm.      Heart sounds: No murmur heard.  Pulmonary:      Effort: Pulmonary effort is normal. No respiratory distress.      Breath sounds: Normal breath sounds. No wheezing.   Abdominal:      General: Abdomen is protuberant. Bowel sounds are normal. There is no distension.      Palpations: Abdomen is soft.      Tenderness: There is no abdominal tenderness.   Musculoskeletal:      Right lower leg: No edema.      Left lower leg: No edema.   Skin:     General: Skin is warm and dry.   Neurological:      General: No focal deficit present.      Mental Status: He is alert and oriented to person, place, and time.

## 2025-02-05 NOTE — ASSESSMENT & PLAN NOTE
Patient on tacrolimus 2mg PO BID as outpatient   - continue home dose  -check tacrolimus trough daily

## 2025-02-06 ENCOUNTER — ANESTHESIA EVENT (OUTPATIENT)
Dept: ENDOSCOPY | Facility: HOSPITAL | Age: 65
DRG: 441 | End: 2025-02-06
Payer: COMMERCIAL

## 2025-02-06 ENCOUNTER — PATIENT OUTREACH (OUTPATIENT)
Dept: ADMINISTRATIVE | Facility: HOSPITAL | Age: 65
End: 2025-02-06
Payer: COMMERCIAL

## 2025-02-06 LAB
ALBUMIN SERPL BCP-MCNC: 3.5 G/DL (ref 3.5–5.2)
ALP SERPL-CCNC: 354 U/L (ref 40–150)
ALT SERPL W/O P-5'-P-CCNC: 279 U/L (ref 10–44)
ANION GAP SERPL CALC-SCNC: 9 MMOL/L (ref 8–16)
APTT PPP: 29.6 SEC (ref 21–32)
AST SERPL-CCNC: 130 U/L (ref 10–40)
BASOPHILS # BLD AUTO: 0.01 K/UL (ref 0–0.2)
BASOPHILS NFR BLD: 0.2 % (ref 0–1.9)
BILIRUB SERPL-MCNC: 2.9 MG/DL (ref 0.1–1)
BUN SERPL-MCNC: 15 MG/DL (ref 8–23)
CALCIUM SERPL-MCNC: 8.9 MG/DL (ref 8.7–10.5)
CHLORIDE SERPL-SCNC: 104 MMOL/L (ref 95–110)
CO2 SERPL-SCNC: 26 MMOL/L (ref 23–29)
CREAT SERPL-MCNC: 0.9 MG/DL (ref 0.5–1.4)
DIFFERENTIAL METHOD BLD: ABNORMAL
EOSINOPHIL # BLD AUTO: 0.1 K/UL (ref 0–0.5)
EOSINOPHIL NFR BLD: 1 % (ref 0–8)
ERYTHROCYTE [DISTWIDTH] IN BLOOD BY AUTOMATED COUNT: 14.1 % (ref 11.5–14.5)
EST. GFR  (NO RACE VARIABLE): >60 ML/MIN/1.73 M^2
GLUCOSE SERPL-MCNC: 142 MG/DL (ref 70–110)
HCT VFR BLD AUTO: 46.3 % (ref 40–54)
HGB BLD-MCNC: 15.2 G/DL (ref 14–18)
IMM GRANULOCYTES # BLD AUTO: 0.01 K/UL (ref 0–0.04)
IMM GRANULOCYTES NFR BLD AUTO: 0.2 % (ref 0–0.5)
INR PPP: 1 (ref 0.8–1.2)
LYMPHOCYTES # BLD AUTO: 2.5 K/UL (ref 1–4.8)
LYMPHOCYTES NFR BLD: 50 % (ref 18–48)
MAGNESIUM SERPL-MCNC: 1.7 MG/DL (ref 1.6–2.6)
MCH RBC QN AUTO: 28.5 PG (ref 27–31)
MCHC RBC AUTO-ENTMCNC: 32.8 G/DL (ref 32–36)
MCV RBC AUTO: 87 FL (ref 82–98)
MONOCYTES # BLD AUTO: 0.3 K/UL (ref 0.3–1)
MONOCYTES NFR BLD: 6.6 % (ref 4–15)
NEUTROPHILS # BLD AUTO: 2.1 K/UL (ref 1.8–7.7)
NEUTROPHILS NFR BLD: 42 % (ref 38–73)
NRBC BLD-RTO: 0 /100 WBC
PHOSPHATE SERPL-MCNC: 3.2 MG/DL (ref 2.7–4.5)
PLATELET # BLD AUTO: 120 K/UL (ref 150–450)
PMV BLD AUTO: 11.1 FL (ref 9.2–12.9)
POCT GLUCOSE: 152 MG/DL (ref 70–110)
POCT GLUCOSE: 193 MG/DL (ref 70–110)
POCT GLUCOSE: 218 MG/DL (ref 70–110)
POTASSIUM SERPL-SCNC: 3.6 MMOL/L (ref 3.5–5.1)
PROT SERPL-MCNC: 7.1 G/DL (ref 6–8.4)
PROTHROMBIN TIME: 11 SEC (ref 9–12.5)
RBC # BLD AUTO: 5.33 M/UL (ref 4.6–6.2)
SODIUM SERPL-SCNC: 139 MMOL/L (ref 136–145)
TACROLIMUS BLD-MCNC: 9.6 NG/ML (ref 5–15)
WBC # BLD AUTO: 5.02 K/UL (ref 3.9–12.7)

## 2025-02-06 PROCEDURE — 80197 ASSAY OF TACROLIMUS: CPT | Performed by: HOSPITALIST

## 2025-02-06 PROCEDURE — 84100 ASSAY OF PHOSPHORUS: CPT | Performed by: HOSPITALIST

## 2025-02-06 PROCEDURE — 25000003 PHARM REV CODE 250: Performed by: STUDENT IN AN ORGANIZED HEALTH CARE EDUCATION/TRAINING PROGRAM

## 2025-02-06 PROCEDURE — 94761 N-INVAS EAR/PLS OXIMETRY MLT: CPT

## 2025-02-06 PROCEDURE — 85025 COMPLETE CBC W/AUTO DIFF WBC: CPT | Performed by: HOSPITALIST

## 2025-02-06 PROCEDURE — 85610 PROTHROMBIN TIME: CPT | Performed by: HOSPITALIST

## 2025-02-06 PROCEDURE — 80053 COMPREHEN METABOLIC PANEL: CPT | Performed by: HOSPITALIST

## 2025-02-06 PROCEDURE — 83735 ASSAY OF MAGNESIUM: CPT | Performed by: HOSPITALIST

## 2025-02-06 PROCEDURE — 99232 SBSQ HOSP IP/OBS MODERATE 35: CPT | Mod: ,,, | Performed by: INTERNAL MEDICINE

## 2025-02-06 PROCEDURE — 63600175 PHARM REV CODE 636 W HCPCS: Performed by: HOSPITALIST

## 2025-02-06 PROCEDURE — 85730 THROMBOPLASTIN TIME PARTIAL: CPT | Performed by: HOSPITALIST

## 2025-02-06 PROCEDURE — 36415 COLL VENOUS BLD VENIPUNCTURE: CPT | Performed by: HOSPITALIST

## 2025-02-06 PROCEDURE — 20600001 HC STEP DOWN PRIVATE ROOM

## 2025-02-06 PROCEDURE — 25000003 PHARM REV CODE 250: Performed by: HOSPITALIST

## 2025-02-06 RX ORDER — POTASSIUM CHLORIDE 750 MG/1
10 CAPSULE, EXTENDED RELEASE ORAL ONCE
Status: COMPLETED | OUTPATIENT
Start: 2025-02-06 | End: 2025-02-06

## 2025-02-06 RX ORDER — LANOLIN ALCOHOL/MO/W.PET/CERES
400 CREAM (GRAM) TOPICAL DAILY
Status: COMPLETED | OUTPATIENT
Start: 2025-02-06 | End: 2025-02-08

## 2025-02-06 RX ORDER — CARVEDILOL 6.25 MG/1
12.5 TABLET ORAL 2 TIMES DAILY WITH MEALS
Status: DISCONTINUED | OUTPATIENT
Start: 2025-02-06 | End: 2025-02-08 | Stop reason: HOSPADM

## 2025-02-06 RX ORDER — ENOXAPARIN SODIUM 100 MG/ML
40 INJECTION SUBCUTANEOUS EVERY 24 HOURS
Status: DISCONTINUED | OUTPATIENT
Start: 2025-02-07 | End: 2025-02-08 | Stop reason: HOSPADM

## 2025-02-06 RX ADMIN — CARVEDILOL 6.25 MG: 6.25 TABLET, FILM COATED ORAL at 07:02

## 2025-02-06 RX ADMIN — INSULIN ASPART 5 UNITS: 100 INJECTION, SOLUTION INTRAVENOUS; SUBCUTANEOUS at 01:02

## 2025-02-06 RX ADMIN — TACROLIMUS 2 MG: 1 CAPSULE ORAL at 05:02

## 2025-02-06 RX ADMIN — TACROLIMUS 2 MG: 1 CAPSULE ORAL at 07:02

## 2025-02-06 RX ADMIN — INSULIN GLARGINE 14 UNITS: 100 INJECTION, SOLUTION SUBCUTANEOUS at 09:02

## 2025-02-06 RX ADMIN — LOSARTAN POTASSIUM 100 MG: 50 TABLET, FILM COATED ORAL at 07:02

## 2025-02-06 RX ADMIN — POTASSIUM CHLORIDE 10 MEQ: 750 CAPSULE, EXTENDED RELEASE ORAL at 08:02

## 2025-02-06 RX ADMIN — INSULIN ASPART 5 UNITS: 100 INJECTION, SOLUTION INTRAVENOUS; SUBCUTANEOUS at 05:02

## 2025-02-06 RX ADMIN — ASPIRIN 81 MG CHEWABLE TABLET 81 MG: 81 TABLET CHEWABLE at 07:02

## 2025-02-06 RX ADMIN — INSULIN ASPART 5 UNITS: 100 INJECTION, SOLUTION INTRAVENOUS; SUBCUTANEOUS at 08:02

## 2025-02-06 RX ADMIN — CARVEDILOL 12.5 MG: 6.25 TABLET, FILM COATED ORAL at 04:02

## 2025-02-06 RX ADMIN — INSULIN ASPART 2 UNITS: 100 INJECTION, SOLUTION INTRAVENOUS; SUBCUTANEOUS at 05:02

## 2025-02-06 RX ADMIN — Medication 400 MG: at 04:02

## 2025-02-06 RX ADMIN — INSULIN GLARGINE 14 UNITS: 100 INJECTION, SOLUTION SUBCUTANEOUS at 08:02

## 2025-02-06 NOTE — ASSESSMENT & PLAN NOTE
"Elevated Lfts. Appreciate hepatology recommendations.   -US Doppler recommended and reported. "New biliary ductal dilation with echogenic debris within the common bile duct. Increased peak systolic velocity within the anastomosis of the main hepatic artery compared to prior. Associated mild intrahepatic tardus parvus waveforms. This could reflect hepatic artery stenosis. Similar tardus parvus waveforms within the liver. Mildly increased/improved resistive indices."    -CTA abdomen obtained which reported "A stent is noted within the proper hepatic artery without significant internal contrast opacification concerning for critical stenosis or occlusion. Common bile ductal dilatation proximal to the presumed site of anastomosis, similar to recent Doppler ultrasound and increased when compared to prior CT 08/29/2024."    Consult to [facility] to consider EUS/ERCP +/- liver biopsy. Appreciate evaluation. Given patient intake of plavix on 2/3 recommended, procedure on 2/7.   "

## 2025-02-06 NOTE — ASSESSMENT & PLAN NOTE
The likely etiology of thrombocytopenia is liver disease. The patients 3 most recent labs are listed below.  Recent Labs     02/04/25  0613 02/05/25  0556 02/06/25  0618   * 117* 120*       Plan  - Will transfuse if platelet count is <50k (if undergoing surgical procedure or have active bleeding).

## 2025-02-06 NOTE — PROGRESS NOTES
[facility] Medicine  Progress Note    Patient Name: Jed Chávez  MRN: [id number]  Patient Class: IP- Inpatient   Admission Date: 2/3/2025  Length of Stay: 3 days  Attending Physician: Chanelle Perez MD  Primary Care Provider: EVELIN Pitt MD        Subjective     Principal Problem:Liver transplant disorder        HPI:  66 y/o WM w/ Type 2 Dm, hx of HCC liver cirrhosis s/p Liver transplant (08/2023), HTN, hx of Meningioma s/p Left crani/resection [place] [facility] [place] for abnormal lab findings.      He recently has had rising values on hepatic panel with elevated transaminases and cholestatic markers. His primary hepatologist Dr. Schneider requested admission for workup of this lab abnormality.  Patient seen at bedside tonight, pts wife Leida Chávez accompanies him.   He denies any acute symptoms today or in recent weeks.   He states 2 weeks ago he had URI/Sinusitis, only meds he took at that time was OTC mucinex, no new Rx or recent antibiotic use.      He has no fevers/chills, chest pain,dyspnea, n/v or abdominal pain today.      Patient uses insulin pump as outpatient for management of DM2, [facility]      Hepatology requested urgent liver doppler to r/o hepatic arterial stenosis/thrombosis or venous thrombosis as a source of worsening hepatic function.  He was planned for ambulatory biopsy of liver transplant graft, but reports this morning had taken home Clopidogrel, pt is on aspirin also.  He is not on any oral anticoagulation as an outpatient.         Overview/Hospital Course:  66 y/o M w/ Type 2 DM, hx of HCC liver cirrhosis s/p Liver transplant (08/2023), HTN, hx of Meningioma s/p resection  who presented with elevated LFTs. He was referred for admission by hepatology.     Reported use of mucinex with recent URI and no recent antibiotic use. No reported fevers or chills.     Appreciate  "hepatology recommendations. US Doppler recommended and reported. "New biliary ductal dilation with echogenic debris within the common bile duct. Increased peak systolic velocity within the anastomosis of the main hepatic artery compared to prior. Associated mild intrahepatic tardus parvus waveforms. This could reflect hepatic artery stenosis. Similar tardus parvus waveforms within the liver. Mildly increased/improved resistive indices."    CTA abdomen obtained which reported "A stent is noted within the proper hepatic artery without significant internal contrast opacification concerning for critical stenosis or occlusion. Common bile ductal dilatation proximal to the presumed site of anastomosis, similar to recent Doppler ultrasound and increased when compared to prior CT 08/29/2024."    Consult to [facility] to consider EUS/ERCP +/- liver biopsy. Appreciate evaluation, consideration for EUS/ERCP however given patient intake of plavix on 2/3 recommended procedure on 2/7.     2/4- LFTs remain elevated. EUS/ERCP likely on 2/7 2/06- pt remains afebrile. WBC not elevated.  T. bili trend down to 2.9.    Interval History:  Patient states he is feeling okay.  No acute pain complaints.  Tolerating diet.  Discussed with wife at bedside.    Review of Systems  A comprehensive review of systems was negative except as noted above.     Objective:     Vital Signs (Most Recent):  Temp: 97.7 °F (36.5 °C) (02/06/25 1205)  Pulse: 75 (02/06/25 1205)  Resp: 18 (02/06/25 1205)  BP: 122/76 (02/06/25 1205)  SpO2: 95 % (02/06/25 1205) Vital Signs (24h Range):  Temp:  [97.6 °F (36.4 °C)-98.1 °F (36.7 °C)] 97.7 °F (36.5 °C)  Pulse:  [72-77] 75  Resp:  [18] 18  SpO2:  [91 %-98 %] 95 %  BP: (122-193)/() 122/76     Weight: 99.9 kg (220 lb 2.1 oz)  Body mass index is 30.7 kg/m².    Intake/Output Summary (Last 24 hours) at 2/6/2025 4376  Last data filed at 2/6/2025 0550  Gross per 24 hour   Intake --   Output 440 ml   Net -440 ml         Physical " Exam  Vitals and nursing note reviewed.   Constitutional:       General: He is not in acute distress.     Appearance: He is not ill-appearing.   HENT:      Head: Normocephalic and atraumatic.      Mouth/Throat:      Mouth: Mucous membranes are moist.      Pharynx: No oropharyngeal exudate or posterior oropharyngeal erythema.      Comments: Icteric palate  Eyes:      General: Scleral icterus (mild) present.   Cardiovascular:      Rate and Rhythm: Normal rate and regular rhythm.      Pulses: Normal pulses.      Heart sounds: No murmur heard.  Pulmonary:      Effort: Pulmonary effort is normal. No respiratory distress.      Breath sounds: Normal breath sounds. No wheezing.   Abdominal:      General: Abdomen is protuberant. Bowel sounds are normal. There is no distension.      Palpations: Abdomen is soft.      Tenderness: There is no abdominal tenderness.      Comments: Insulin pump in place   Musculoskeletal:      Right lower leg: No edema.      Left lower leg: No edema.   Skin:     General: Skin is warm and dry.   Neurological:      General: No focal deficit present.      Mental Status: He is alert and oriented to person, place, and time.   Psychiatric:         Mood and Affect: Mood normal.         Behavior: Behavior normal.         Significant Labs: All pertinent labs within the past 24 hours have been reviewed.  CBC:   Recent Labs   Lab 02/05/25  0556 02/06/25  0618   WBC 4.91 5.02   HGB 14.9 15.2   HCT 46.7 46.3   * 120*     CMP:   Recent Labs   Lab 02/05/25  0556 02/06/25  0618    139   K 3.5 3.6    104   CO2 22* 26   * 142*   BUN 14 15   CREATININE 0.9 0.9   CALCIUM 8.8 8.9   PROT 6.9 7.1   ALBUMIN 3.4* 3.5   BILITOT 4.1* 2.9*   ALKPHOS 317* 354*   * 130*   * 279*   ANIONGAP 10 9       Significant Imaging: I have reviewed all pertinent imaging results/findings within the past 24 hours.    Assessment and Plan     * Liver transplant disorder  Elevated Lfts. Appreciate  "hepatology recommendations.   -US Doppler recommended and reported. "New biliary ductal dilation with echogenic debris within the common bile duct. Increased peak systolic velocity within the anastomosis of the main hepatic artery compared to prior. Associated mild intrahepatic tardus parvus waveforms. This could reflect hepatic artery stenosis. Similar tardus parvus waveforms within the liver. Mildly increased/improved resistive indices."    -CTA abdomen obtained which reported "A stent is noted within the proper hepatic artery without significant internal contrast opacification concerning for critical stenosis or occlusion. Common bile ductal dilatation proximal to the presumed site of anastomosis, similar to recent Doppler ultrasound and increased when compared to prior CT 08/29/2024."    Consult to [facility] to consider EUS/ERCP +/- liver biopsy. Appreciate evaluation. Given patient intake of plavix on 2/3 recommended, procedure on 2/7.     Type 2 diabetes mellitus with other specified complication, with chronic insulin use  Patient is on an insulin pump as an outpatient, did not have lispro vial to refill pump   -basal bolus regimen.  Pts backup insulin is Lantus 33units.  Will conservatively dose with plans for NPo and split into BID dosing, prandial insulin and low dose sliding scale.     Last A1c reviewed-   Lab Results   Component Value Date    HGBA1C 6.3 (H) 12/16/2024     Most recent fingerstick glucose reviewed-   Recent Labs   Lab 02/05/25  1715 02/05/25  2102 02/06/25  1301   POCTGLUCOSE 116* 196* 193*     Current correctional scale  Low  Maintain anti-hyperglycemic dose as follows-   Antihyperglycemics (From admission, onward)      Start     Stop Route Frequency Ordered    02/04/25 1108  insulin aspart U-100 pen 0-5 Units         -- SubQ Every 6 hours PRN 02/04/25 1008    02/04/25 0715  insulin aspart U-100 pen 5 Units         -- SubQ 3 times daily with meals 02/03/25 2156    02/03/25 2200  insulin glargine " U-100 (Lantus) pen 14 Units         -- SubQ 2 times daily 02/03/25 2156            Thrombocytopenia  The likely etiology of thrombocytopenia is liver disease. The patients 3 most recent labs are listed below.  Recent Labs     02/04/25  0613 02/05/25  0556 02/06/25  0618   * 117* 120*       Plan  - Will transfuse if platelet count is <50k (if undergoing surgical procedure or have active bleeding).        Hypomagnesemia  Patient has Abnormal Magnesium: hypomagnesemia. Will continue to monitor electrolytes closely. Will replace the affected electrolytes and repeat labs to be done after interventions completed. 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  Hepatology and IR aware  -continue aspirin.  We will restart Plavix after procedure      At risk for opportunistic infections        Status post liver transplant  Patient on tacrolimus 2mg PO BID as outpatient   - continue home dose  -check tacrolimus trough daily         Hypertension complicating diabetes  Patient's blood pressure range in the last 24 hours was: BP  Min: 122/76  Max: 193/105.The patient's inpatient anti-hypertensive regimen is listed below:  Current Antihypertensives  losartan tablet 100 mg, Daily, Oral  carvediloL tablet 12.5 mg, 2 times daily with meals, Oral    Plan  -BP is intermittently above goal. Increase Coreg dose.   Resume home regimen with hold parameters per the outpatient orders.       VTE Risk Mitigation (From admission, onward)           Ordered     enoxaparin injection 40 mg  Every 24 hours         02/04/25 1420     Reason for No Pharmacological VTE Prophylaxis  Once        Comments: May require liver biopsy in next 24-48hrs   Question:  Reasons:  Answer:  Physician Provided (leave comment)    02/03/25 2023     IP VTE HIGH RISK PATIENT  Once         02/03/25 2023     Place sequential compression device  Until discontinued         02/03/25 2023          "           Discharge Planning   CRISELDA: 2/8/2025     Code Status: Full Code   Medical Readiness for Discharge Date:   Discharge Plan A: Home with family        Continue inpatient for planned ERCP/EUS tomorrow    Chanelle Perez MD  Department of Hospital Medicine  [facility]  02/06/2025    *Portions of this note may have been created with voice recognition software. Occasional "wrong word "or "sound alike" substitutions may have occurred due to the inherent limitations of voice recognition software.  Please excuse errors. Please, read the note carefully and recognize, using context, where substitutions have occurred.    "

## 2025-02-06 NOTE — SUBJECTIVE & OBJECTIVE
Interval History:  Patient states he is feeling okay.  No acute pain complaints.  Tolerating diet.  Discussed with wife at bedside.    Review of Systems  A comprehensive review of systems was negative except as noted above.     Objective:     Vital Signs (Most Recent):  Temp: 97.7 °F (36.5 °C) (02/06/25 1205)  Pulse: 75 (02/06/25 1205)  Resp: 18 (02/06/25 1205)  BP: 122/76 (02/06/25 1205)  SpO2: 95 % (02/06/25 1205) Vital Signs (24h Range):  Temp:  [97.6 °F (36.4 °C)-98.1 °F (36.7 °C)] 97.7 °F (36.5 °C)  Pulse:  [72-77] 75  Resp:  [18] 18  SpO2:  [91 %-98 %] 95 %  BP: (122-193)/() 122/76     Weight: 99.9 kg (220 lb 2.1 oz)  Body mass index is 30.7 kg/m².    Intake/Output Summary (Last 24 hours) at 2/6/2025 1536  Last data filed at 2/6/2025 0550  Gross per 24 hour   Intake --   Output 440 ml   Net -440 ml         Physical Exam  Vitals and nursing note reviewed.   Constitutional:       General: He is not in acute distress.     Appearance: He is not ill-appearing.   HENT:      Head: Normocephalic and atraumatic.      Mouth/Throat:      Mouth: Mucous membranes are moist.      Pharynx: No oropharyngeal exudate or posterior oropharyngeal erythema.      Comments: Icteric palate  Eyes:      General: Scleral icterus (mild) present.   Cardiovascular:      Rate and Rhythm: Normal rate and regular rhythm.      Pulses: Normal pulses.      Heart sounds: No murmur heard.  Pulmonary:      Effort: Pulmonary effort is normal. No respiratory distress.      Breath sounds: Normal breath sounds. No wheezing.   Abdominal:      General: Abdomen is protuberant. Bowel sounds are normal. There is no distension.      Palpations: Abdomen is soft.      Tenderness: There is no abdominal tenderness.      Comments: Insulin pump in place   Musculoskeletal:      Right lower leg: No edema.      Left lower leg: No edema.   Skin:     General: Skin is warm and dry.   Neurological:      General: No focal deficit present.      Mental Status: He is  alert and oriented to person, place, and time.   Psychiatric:         Mood and Affect: Mood normal.         Behavior: Behavior normal.         Significant Labs: All pertinent labs within the past 24 hours have been reviewed.  CBC:   Recent Labs   Lab 02/05/25  0556 02/06/25  0618   WBC 4.91 5.02   HGB 14.9 15.2   HCT 46.7 46.3   * 120*     CMP:   Recent Labs   Lab 02/05/25  0556 02/06/25  0618    139   K 3.5 3.6    104   CO2 22* 26   * 142*   BUN 14 15   CREATININE 0.9 0.9   CALCIUM 8.8 8.9   PROT 6.9 7.1   ALBUMIN 3.4* 3.5   BILITOT 4.1* 2.9*   ALKPHOS 317* 354*   * 130*   * 279*   ANIONGAP 10 9       Significant Imaging: I have reviewed all pertinent imaging results/findings within the past 24 hours.

## 2025-02-06 NOTE — PROGRESS NOTES
[facility] Progress Note      Attending: Chanelle Perez MD   Admit Date: 2/3/2025  Today's Date: 02/06/2025    SUBJECTIVE:     Interval History:   No events overnight. LFTs trending down. Plan for ERCP tomorrow.     Scheduled Medications:    aspirin  81 mg Oral Daily    carvediloL  12.5 mg Oral BID WM    enoxparin  40 mg Subcutaneous Q24H (prophylaxis, 1700)    insulin aspart U-100  5 Units Subcutaneous TIDWM    insulin glargine U-100  14 Units Subcutaneous BID    losartan  100 mg Oral Daily    tacrolimus  2 mg Oral BID       PRN Medications:     Current Facility-Administered Medications:     acetaminophen, 650 mg, Oral, Q8H PRN    aluminum-magnesium hydroxide-simethicone, 30 mL, Oral, QID PRN    dextrose 50%, 12.5 g, Intravenous, PRN    dextrose 50%, 25 g, Intravenous, PRN    glucagon (human recombinant), 1 mg, Intramuscular, PRN    glucose, 16 g, Oral, PRN    glucose, 24 g, Oral, PRN    insulin aspart U-100, 0-5 Units, Subcutaneous, Q6H PRN    melatonin, 6 mg, Oral, Nightly PRN    naloxone, 0.02 mg, Intravenous, PRN    ondansetron, 4 mg, Intravenous, Q8H PRN    polyethylene glycol, 17 g, Oral, Daily PRN    prochlorperazine, 5 mg, Intravenous, Q6H PRN    senna-docusate 8.6-50 mg, 1 tablet, Oral, BID PRN    sodium chloride 0.9%, 10 mL, Intravenous, Q6H PRN      OBJECTIVE:     Vital Signs Trends/Hx Reviewed  Vitals:    02/06/25 0545 02/06/25 0722 02/06/25 0923 02/06/25 1205   BP: (!) 142/82 (!) 160/90  122/76   BP Location: Right arm      Patient Position: Lying Lying     Pulse:  72  75   Resp:  18  18   Temp:    97.7 °F (36.5 °C)   TempSrc:  Oral     SpO2:  96% 98% 95%   Weight:       Height:             Physical Exam  Vitals reviewed.   Constitutional:       General: He is not in acute distress.     Appearance: Normal appearance. He is not ill-appearing.   Eyes:      General: Scleral icterus present.   Pulmonary:      Effort: Pulmonary effort is normal.   Abdominal:      General: There is no  distension.      Comments: Prior abdominal scar.   Neurological:      Mental Status: He is alert and oriented to person, place, and time. Mental status is at baseline.          Laboratory:  Lab results in last 24 hours reviewed.     MELD 3.0: 11 at 2/6/2025  6:18 AM  MELD-Na: 10 at 2/6/2025  6:18 AM  Calculated from:  Serum Creatinine: 0.9 mg/dL (Using min of 1 mg/dL) at 2/6/2025  6:18 AM  Serum Sodium: 139 mmol/L (Using max of 137 mmol/L) at 2/6/2025  6:18 AM  Total Bilirubin: 2.9 mg/dL at 2/6/2025  6:18 AM  Serum Albumin: 3.5 g/dL at 2/6/2025  6:18 AM  INR(ratio): 1 at 2/6/2025  6:18 AM  Age at listing (hypothetical): 65 years  Sex: Male at 2/6/2025  6:18 AM    ASSESSMENT & RECOMMENDATIONS   65 year old male with cirrhosis and HCC s/p liver transplant 8/10/2023 currently on Tacrolimus 2/2 complicated by hepatic artery stenosis s/p stent placement 10/2023 with IR on Plavix, Obesity (BMI 30.70) admitted to [facility] [place] on 2/03 with elevated liver enzymes. Hepatology consulted for further management. Labs on admission, T bili 5.7, AST//305, . Repeat labs on 2/04 downtrending LFTs, T bili 4.7, AST//272. US doppler with new biliary ductal dilation with echohenic debris within the CBD, increased peak systolic velocity within the anastomosis of the main hepatic artery compared to prior. CTA, stent noted in the proper hepatic artery without significant internal contrast opacification concerning for critical stenosis or occlusion. [facility] consulted; plan for ERCP for suspect anastomotic stricture on Friday 2/7 after 5 day plavix washout. LFTs improving. Imaging discussed with IR, no repeat intervention for known hepatic artery stenosis.      Problem List:  Elevated liver enzymes  HCC s/p transplant  Hepatic artery stenosis (on Plavix)    Recommendations:  -[facility] consulted; plan for ERCP Friday with possible liver bx if no evidence of stricture.   -Continue Tacrolimus 2/2 with daily AM Tacro levels and CMP.    Thank  you for allowing us to participate in the care of this patient. Please call with questions.      Brittney Holt MD  Gastroenterology Fellow, PGY-V  [facility]

## 2025-02-06 NOTE — ASSESSMENT & PLAN NOTE
-Chronic issue.  Hepatology and IR aware  -continue aspirin.  We will restart Plavix after procedure

## 2025-02-06 NOTE — ASSESSMENT & PLAN NOTE
Patient has Abnormal Magnesium: hypomagnesemia. Will continue to monitor electrolytes closely. Will replace the affected electrolytes and repeat labs to be done after interventions completed. The patient's magnesium results have been reviewed

## 2025-02-06 NOTE — PLAN OF CARE
Patient is AAOX4. VSS. Afebrile. Spo2 maintained @RA. Accucheck AC HS. HS BG= 196, scheduled dose of Levemir 14 units given as per LANCE Freed on hold since Monday. Plan for possible ERCP or liver biopsy on Friday. LFTs elevated, T good elevated to 4.1. Ambulating independently up to the toilet. Bed in low position, wheels locked, call light within patient's reach. Wife at bedside interacting with the patient. No any s/s of acute distress noted overnight. Slept well.

## 2025-02-06 NOTE — ASSESSMENT & PLAN NOTE
Patient's blood pressure range in the last 24 hours was: BP  Min: 122/76  Max: 193/105.The patient's inpatient anti-hypertensive regimen is listed below:  Current Antihypertensives  losartan tablet 100 mg, Daily, Oral  carvediloL tablet 12.5 mg, 2 times daily with meals, Oral    Plan  -BP is intermittently above goal. Increase Coreg dose.   Resume home regimen with hold parameters per the outpatient orders.

## 2025-02-06 NOTE — ANESTHESIA PREPROCEDURE EVALUATION
[facility]  Anesthesia Pre-Operative Evaluation         Patient Name: Jed Chávez  YOB: 1960  MRN: [id number]    SUBJECTIVE:     Pre-operative evaluation for Procedure(s) (LRB):  ERCP (ENDOSCOPIC RETROGRADE CHOLANGIOPANCREATOGRAPHY) (N/A)  ULTRASOUND, UPPER GI TRACT, ENDOSCOPIC (N/A)     02/06/2025    Jed Chávez is a 65 y.o. male w/ a significant PMHx of HTN, T2DM,, HCC liver cirrhosis s/p Liver transplant (08/2023) c/b hepatic artery stenosis s/p stent placement 10/2023 with IR on Plavix, hx of Meningioma s/p Left crani/resection admitted 2/3/25 per hepatology recs for elevated LFTs. US since admission with CBD dilatation proximal to the presumed site of anastomosis, worse than prior. Last dose of Plavix 2/3.     Pt is on Mounjaro and had last dose 4 days ago.  He denies specific GI symptoms related to this but feels some abdominal fullness today/ does not feel like stomach is totally empty at this time despite being NPO.    Now presenting for the above procedure(s).       2D ECHO:  TTE:  No results found for this or any previous visit.      EDISON:  No results found for this or any previous visit.    Prev airway: 10/2023 easy mask grade IIA view with mac3, anterior larynx and poor neck head extension noted    LDA:        Peripheral IV - Single Lumen 02/03/25 1955 22 G Anterior;Right Forearm (Active)   Site Assessment Clean;Dry;Intact;No redness;No swelling 02/06/25 0741   Line Securement Device Secured with sutureless device 02/05/25 2000   Extremity Assessment Distal to IV No warmth;No swelling;No redness;No abnormal discoloration 02/05/25 2000   Line Status Flushed;Saline locked 02/06/25 0741   Dressing Status Clean;Dry;Intact 02/05/25 2000   Dressing Intervention Integrity maintained 02/05/25 2000   Dressing Change Due 02/07/25 02/05/25 2000   Site Change Due 02/07/25 02/05/25 2000   Reason Not Rotated Not due 02/05/25 2000   Number of days: 2       Drips:       Patient Active  "Problem List   Diagnosis    History of hepatocellular carcinoma, in remission after liver transplant    Hypertension complicating diabetes    Type 2 diabetes mellitus with other specified complication, with chronic insulin use    Adrenal cortical steroids causing adverse effect in therapeutic use    Status post liver transplant    At risk for opportunistic infections    Long-term use of immunosuppressant medication    Prophylactic immunotherapy    Vitamin D deficiency    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    Anemia of chronic disease    Type 2 diabetes mellitus with hyperglycemia    Left frontal lobe mass    Immunosuppression due to drug therapy    Atypical intracranial meningioma    S/P craniotomy    Liver transplant disorder    Hypomagnesemia    Thrombocytopenia       Review of patient's allergies indicates:  No Known Allergies    Current Inpatient Medications:    aspirin  81 mg Oral Daily    carvediloL  12.5 mg Oral BID WM    enoxparin  40 mg Subcutaneous Q24H (prophylaxis, 1700)    insulin aspart U-100  5 Units Subcutaneous TIDWM    insulin glargine U-100  14 Units Subcutaneous BID    losartan  100 mg Oral Daily    tacrolimus  2 mg Oral BID       Current Outpatient Medications   Medication Instructions    aspirin 81 MG Chew Chew and swallow 1 tablet (81 mg total) by mouth once daily. Restart 11/9/23    blood sugar diagnostic Strp Test blood glucose 3 (three) times daily.    blood-glucose sensor (DEXCOM G7 SENSOR) Neelam 1 each, Misc.(Non-Drug; Combo Route), Every 10 days    carvediloL (COREG) 6.25 mg, Oral, 2 times daily with meals, HOLD if SBP < 125 or pulse < 60.    clopidogreL (PLAVIX) 75 mg, Oral, Daily    insulin lispro 100 unit/mL injection VIA ILET INSULIN PUMP, MAX .    LANTUS SOLOSTAR U-100 INSULIN 33 Units, Subcutaneous, Daily    losartan (COZAAR) 100 mg, Oral, Daily, HOLD if SBP < 120    pen needle, diabetic (BD ULTRA-FINE MERCEDES PEN NEEDLE) 32 gauge x 5/32" Ndle Use to inject insulin " into the skin 4 (four) times daily.    tacrolimus (PROGRAF) 2 mg, Oral, Every 12 hours    tirzepatide 10 mg/0.5 mL PnIj Inject 10 mg (one pen) into the skin every 7 days.       Surgical History  Past Surgical History:   Procedure Laterality Date    CRANIOTOMY, WITH NEOPLASM EXCISION USING COMPUTER-ASSISTED NAVIGATION Left 10/26/2023    Procedure: CRANIOTOMY, WITH NEOPLASM EXCISION USING COMPUTER-ASSISTED NAVIGATION;  Surgeon: Jose E Degroot DO;  [facility];  Service: Neurosurgery;  Laterality: Left;    HERNIA REPAIR N/A     LIVER TRANSPLANT N/A 8/9/2023    Procedure: TRANSPLANT, LIVER;  Surgeon: Ameya Suero MD;  [facility];  Service: Transplant;  Laterality: N/A;       Social History:  Tobacco Use: Low Risk  (2/5/2025)    Patient History     Smoking Tobacco Use: Never     Smokeless Tobacco Use: Never     Passive Exposure: Not on file     Alcohol Use: Not At Risk (11/30/2023)    AUDIT-C     Frequency of Alcohol Consumption: Never     Average Number of Drinks: Patient does not drink     Frequency of Binge Drinking: Never         OBJECTIVE:     Vital Signs Range (Last 24H):  Temp:  [36.4 °C (97.6 °F)-36.7 °C (98.1 °F)]   Pulse:  [72-77]   Resp:  [18]   BP: (122-193)/()   SpO2:  [91 %-98 %]       Significant Labs:  Lab Results   Component Value Date    WBC 5.02 02/06/2025    HGB 15.2 02/06/2025    HCT 46.3 02/06/2025     (L) 02/06/2025    INR 1.0 02/06/2025       Lab Results   Component Value Date     02/06/2025    K 3.6 02/06/2025     02/06/2025    CREATININE 0.9 02/06/2025    BUN 15 02/06/2025    CO2 26 02/06/2025       Lab Results   Component Value Date    TSH 2.096 06/15/2023    HGBA1C 6.3 (H) 12/16/2024       EKG:   Results for orders placed or performed during the hospital encounter of 10/24/23   EKG 12-lead    Collection Time: 10/24/23 11:45 AM    Narrative    Test Reason : R07.9,    Vent. Rate : 073 BPM     Atrial Rate : 073 BPM     P-R Int : 186 ms           QRS Dur : 126 ms      QT Int : 424 ms       P-R-T Axes : 030 052 012 degrees     QTc Int : 467 ms    Normal sinus rhythm  Right bundle branch block  Abnormal ECG  When compared with ECG of 09-AUG-2023 17:58,  No significant change was found  Confirmed by SANG MARIE MD ([id number]) on 10/24/2023 10:33:01 PM    Referred By: [email or web address]   SELF           Confirmed By:SANG MARIE MD       ASSESSMENT/PLAN:          Pre-op Assessment    I have reviewed the Patient Summary Reports.     I have reviewed the Nursing Notes. I have reviewed the NPO Status.   I have reviewed the Medications.     Review of Systems  Anesthesia Hx:   History of prior surgery of interest to airway management or planning: liver transplant. Previous anesthesia: General        Denies Family Hx of Anesthesia complications.    Denies Personal Hx of Anesthesia complications.                    Social:  Non-Smoker       Hematology/Oncology:       -- Denies Anemia:                                  Cardiovascular:     Hypertension   Denies MI.     Denies CABG/stent.     Denies CHF.       ECG has been reviewed.                            Pulmonary:    Denies COPD.  Denies Asthma.   Denies Shortness of breath.                  Renal/:   Denies Chronic Renal Disease.                Hepatic/GI:      Denies GERD. Liver Disease,  Liver txp   Hepatic artery stenosis s/p stent on Plavix              Musculoskeletal:         Denies Spine Disorders             Neurological:   CVA    Denies Seizures.                                Endocrine:  Diabetes Denies Hypothyroidism.              Physical Exam  General: Well nourished, Cooperative, Alert and Oriented    Airway:  Mallampati: I   Mouth Opening: Normal  TM Distance: Normal  Tongue: Normal  Neck ROM: Normal ROM    Dental:  Intact    Chest/Lungs:  Normal Respiratory Rate    Heart:  Rate: Normal        Anesthesia Plan  Type of Anesthesia, risks & benefits discussed:    Anesthesia Type: Gen ETT  Intra-op  Monitoring Plan: Standard ASA Monitors  Post Op Pain Control Plan: multimodal analgesia and IV/PO Opioids PRN  Induction:  IV and rapid sequence  Airway Plan: Direct and Video, Post-Induction  Informed Consent: Informed consent signed with the Patient and all parties understand the risks and agree with anesthesia plan.  All questions answered.   ASA Score: 3  Day of Surgery Review of History & Physical: H&P Update referred to the surgeon/provider.    Ready For Surgery From Anesthesia Perspective.     .

## 2025-02-06 NOTE — ASSESSMENT & PLAN NOTE
Patient is on an insulin pump as an outpatient, did not have lispro vial to refill pump   -basal bolus regimen.  Pts backup insulin is Lantus 33units.  Will conservatively dose with plans for NPo and split into BID dosing, prandial insulin and low dose sliding scale.     Last A1c reviewed-   Lab Results   Component Value Date    HGBA1C 6.3 (H) 12/16/2024     Most recent fingerstick glucose reviewed-   Recent Labs   Lab 02/05/25  1715 02/05/25  2102 02/06/25  1301   POCTGLUCOSE 116* 196* 193*     Current correctional scale  Low  Maintain anti-hyperglycemic dose as follows-   Antihyperglycemics (From admission, onward)      Start     Stop Route Frequency Ordered    02/04/25 1108  insulin aspart U-100 pen 0-5 Units         -- SubQ Every 6 hours PRN 02/04/25 1008    02/04/25 0715  insulin aspart U-100 pen 5 Units         -- SubQ 3 times daily with meals 02/03/25 2156    02/03/25 2200  insulin glargine U-100 (Lantus) pen 14 Units         -- SubQ 2 times daily 02/03/25 2156

## 2025-02-07 ENCOUNTER — ANESTHESIA (OUTPATIENT)
Dept: ENDOSCOPY | Facility: HOSPITAL | Age: 65
DRG: 441 | End: 2025-02-07
Payer: COMMERCIAL

## 2025-02-07 ENCOUNTER — PATIENT OUTREACH (OUTPATIENT)
Dept: ADMINISTRATIVE | Facility: HOSPITAL | Age: 65
End: 2025-02-07
Payer: COMMERCIAL

## 2025-02-07 LAB
ALBUMIN SERPL BCP-MCNC: 3.3 G/DL (ref 3.5–5.2)
ALP SERPL-CCNC: 340 U/L (ref 40–150)
ALT SERPL W/O P-5'-P-CCNC: 264 U/L (ref 10–44)
ANION GAP SERPL CALC-SCNC: 8 MMOL/L (ref 8–16)
AST SERPL-CCNC: 142 U/L (ref 10–40)
BILIRUB SERPL-MCNC: 3.1 MG/DL (ref 0.1–1)
BUN SERPL-MCNC: 17 MG/DL (ref 8–23)
CALCIUM SERPL-MCNC: 8.8 MG/DL (ref 8.7–10.5)
CHLORIDE SERPL-SCNC: 107 MMOL/L (ref 95–110)
CO2 SERPL-SCNC: 24 MMOL/L (ref 23–29)
CREAT SERPL-MCNC: 1 MG/DL (ref 0.5–1.4)
ERYTHROCYTE [DISTWIDTH] IN BLOOD BY AUTOMATED COUNT: 14 % (ref 11.5–14.5)
EST. GFR  (NO RACE VARIABLE): >60 ML/MIN/1.73 M^2
GLUCOSE SERPL-MCNC: 214 MG/DL (ref 70–110)
HCT VFR BLD AUTO: 46.7 % (ref 40–54)
HGB BLD-MCNC: 14.5 G/DL (ref 14–18)
MAGNESIUM SERPL-MCNC: 1.7 MG/DL (ref 1.6–2.6)
MCH RBC QN AUTO: 27.3 PG (ref 27–31)
MCHC RBC AUTO-ENTMCNC: 31 G/DL (ref 32–36)
MCV RBC AUTO: 88 FL (ref 82–98)
PLATELET # BLD AUTO: 102 K/UL (ref 150–450)
PMV BLD AUTO: 10.7 FL (ref 9.2–12.9)
POCT GLUCOSE: 150 MG/DL (ref 70–110)
POCT GLUCOSE: 178 MG/DL (ref 70–110)
POCT GLUCOSE: 276 MG/DL (ref 70–110)
POTASSIUM SERPL-SCNC: 3.7 MMOL/L (ref 3.5–5.1)
PROT SERPL-MCNC: 6.7 G/DL (ref 6–8.4)
RBC # BLD AUTO: 5.31 M/UL (ref 4.6–6.2)
SODIUM SERPL-SCNC: 139 MMOL/L (ref 136–145)
TACROLIMUS BLD-MCNC: 8.6 NG/ML (ref 5–15)
WBC # BLD AUTO: 4.68 K/UL (ref 3.9–12.7)

## 2025-02-07 PROCEDURE — 37000008 HC ANESTHESIA 1ST 15 MINUTES: Performed by: INTERNAL MEDICINE

## 2025-02-07 PROCEDURE — 80053 COMPREHEN METABOLIC PANEL: CPT | Performed by: STUDENT IN AN ORGANIZED HEALTH CARE EDUCATION/TRAINING PROGRAM

## 2025-02-07 PROCEDURE — 74328 X-RAY BILE DUCT ENDOSCOPY: CPT | Mod: 26,,, | Performed by: INTERNAL MEDICINE

## 2025-02-07 PROCEDURE — 43274 ERCP DUCT STENT PLACEMENT: CPT | Performed by: INTERNAL MEDICINE

## 2025-02-07 PROCEDURE — 0F798DZ DILATION OF COMMON BILE DUCT WITH INTRALUMINAL DEVICE, VIA NATURAL OR ARTIFICIAL OPENING ENDOSCOPIC: ICD-10-PCS | Performed by: INTERNAL MEDICINE

## 2025-02-07 PROCEDURE — 20600001 HC STEP DOWN PRIVATE ROOM

## 2025-02-07 PROCEDURE — 27202125 HC BALLOON, EXTRACTION (ANY): Performed by: INTERNAL MEDICINE

## 2025-02-07 PROCEDURE — 43274 ERCP DUCT STENT PLACEMENT: CPT | Mod: ,,, | Performed by: INTERNAL MEDICINE

## 2025-02-07 PROCEDURE — 63600175 PHARM REV CODE 636 W HCPCS: Performed by: HOSPITALIST

## 2025-02-07 PROCEDURE — 63600175 PHARM REV CODE 636 W HCPCS: Performed by: STUDENT IN AN ORGANIZED HEALTH CARE EDUCATION/TRAINING PROGRAM

## 2025-02-07 PROCEDURE — 25000003 PHARM REV CODE 250: Performed by: STUDENT IN AN ORGANIZED HEALTH CARE EDUCATION/TRAINING PROGRAM

## 2025-02-07 PROCEDURE — 27201674 HC SPHINCTERTOME: Performed by: INTERNAL MEDICINE

## 2025-02-07 PROCEDURE — 82962 GLUCOSE BLOOD TEST: CPT | Performed by: INTERNAL MEDICINE

## 2025-02-07 PROCEDURE — 25500020 PHARM REV CODE 255: Performed by: INTERNAL MEDICINE

## 2025-02-07 PROCEDURE — 63600175 PHARM REV CODE 636 W HCPCS: Performed by: NURSE ANESTHETIST, CERTIFIED REGISTERED

## 2025-02-07 PROCEDURE — D9220A PRA ANESTHESIA: Mod: CRNA,,, | Performed by: NURSE ANESTHETIST, CERTIFIED REGISTERED

## 2025-02-07 PROCEDURE — 27202127 HC STENT INTRODUCER: Performed by: INTERNAL MEDICINE

## 2025-02-07 PROCEDURE — C2617 STENT, NON-COR, TEM W/O DEL: HCPCS | Performed by: INTERNAL MEDICINE

## 2025-02-07 PROCEDURE — C1769 GUIDE WIRE: HCPCS | Performed by: INTERNAL MEDICINE

## 2025-02-07 PROCEDURE — 25000003 PHARM REV CODE 250: Performed by: HOSPITALIST

## 2025-02-07 PROCEDURE — 74328 X-RAY BILE DUCT ENDOSCOPY: CPT | Mod: TC | Performed by: INTERNAL MEDICINE

## 2025-02-07 PROCEDURE — 36415 COLL VENOUS BLD VENIPUNCTURE: CPT | Performed by: STUDENT IN AN ORGANIZED HEALTH CARE EDUCATION/TRAINING PROGRAM

## 2025-02-07 PROCEDURE — 37000009 HC ANESTHESIA EA ADD 15 MINS: Performed by: INTERNAL MEDICINE

## 2025-02-07 PROCEDURE — 83735 ASSAY OF MAGNESIUM: CPT | Performed by: STUDENT IN AN ORGANIZED HEALTH CARE EDUCATION/TRAINING PROGRAM

## 2025-02-07 PROCEDURE — 80197 ASSAY OF TACROLIMUS: CPT | Performed by: STUDENT IN AN ORGANIZED HEALTH CARE EDUCATION/TRAINING PROGRAM

## 2025-02-07 PROCEDURE — C1726 CATH, BAL DIL, NON-VASCULAR: HCPCS | Performed by: INTERNAL MEDICINE

## 2025-02-07 PROCEDURE — D9220A PRA ANESTHESIA: Mod: ANES,,, | Performed by: ANESTHESIOLOGY

## 2025-02-07 PROCEDURE — 25000003 PHARM REV CODE 250: Performed by: NURSE ANESTHETIST, CERTIFIED REGISTERED

## 2025-02-07 PROCEDURE — 85027 COMPLETE CBC AUTOMATED: CPT | Performed by: STUDENT IN AN ORGANIZED HEALTH CARE EDUCATION/TRAINING PROGRAM

## 2025-02-07 RX ORDER — GLUCAGON 1 MG
1 KIT INJECTION
Status: DISCONTINUED | OUTPATIENT
Start: 2025-02-07 | End: 2025-02-07 | Stop reason: HOSPADM

## 2025-02-07 RX ORDER — ROCURONIUM BROMIDE 10 MG/ML
INJECTION, SOLUTION INTRAVENOUS
Status: DISCONTINUED | OUTPATIENT
Start: 2025-02-07 | End: 2025-02-07

## 2025-02-07 RX ORDER — FENTANYL CITRATE 50 UG/ML
INJECTION, SOLUTION INTRAMUSCULAR; INTRAVENOUS
Status: DISCONTINUED | OUTPATIENT
Start: 2025-02-07 | End: 2025-02-07

## 2025-02-07 RX ORDER — LIDOCAINE HYDROCHLORIDE 20 MG/ML
INJECTION INTRAVENOUS
Status: DISCONTINUED | OUTPATIENT
Start: 2025-02-07 | End: 2025-02-07

## 2025-02-07 RX ORDER — CIPROFLOXACIN 2 MG/ML
INJECTION, SOLUTION INTRAVENOUS
Status: DISCONTINUED | OUTPATIENT
Start: 2025-02-07 | End: 2025-02-07

## 2025-02-07 RX ORDER — PROPOFOL 10 MG/ML
VIAL (ML) INTRAVENOUS
Status: DISCONTINUED | OUTPATIENT
Start: 2025-02-07 | End: 2025-02-07

## 2025-02-07 RX ORDER — SUCCINYLCHOLINE CHLORIDE 20 MG/ML
INJECTION INTRAMUSCULAR; INTRAVENOUS
Status: DISCONTINUED | OUTPATIENT
Start: 2025-02-07 | End: 2025-02-07

## 2025-02-07 RX ADMIN — FENTANYL CITRATE 50 MCG: 50 INJECTION, SOLUTION INTRAMUSCULAR; INTRAVENOUS at 02:02

## 2025-02-07 RX ADMIN — ROCURONIUM BROMIDE 5 MG: 10 INJECTION INTRAVENOUS at 01:02

## 2025-02-07 RX ADMIN — PROPOFOL 40 MG: 10 INJECTION, EMULSION INTRAVENOUS at 02:02

## 2025-02-07 RX ADMIN — ONDANSETRON 4 MG: 2 INJECTION INTRAMUSCULAR; INTRAVENOUS at 02:02

## 2025-02-07 RX ADMIN — SODIUM CHLORIDE: 0.9 INJECTION, SOLUTION INTRAVENOUS at 01:02

## 2025-02-07 RX ADMIN — CIPROFLOXACIN 400 MG: 2 INJECTION, SOLUTION INTRAVENOUS at 02:02

## 2025-02-07 RX ADMIN — Medication 400 MG: at 08:02

## 2025-02-07 RX ADMIN — TACROLIMUS 2 MG: 1 CAPSULE ORAL at 08:02

## 2025-02-07 RX ADMIN — LIDOCAINE HYDROCHLORIDE 80 MG: 20 INJECTION INTRAVENOUS at 01:02

## 2025-02-07 RX ADMIN — CARVEDILOL 12.5 MG: 6.25 TABLET, FILM COATED ORAL at 08:02

## 2025-02-07 RX ADMIN — INSULIN ASPART 1 UNITS: 100 INJECTION, SOLUTION INTRAVENOUS; SUBCUTANEOUS at 09:02

## 2025-02-07 RX ADMIN — LOSARTAN POTASSIUM 100 MG: 50 TABLET, FILM COATED ORAL at 08:02

## 2025-02-07 RX ADMIN — INSULIN GLARGINE 14 UNITS: 100 INJECTION, SOLUTION SUBCUTANEOUS at 09:02

## 2025-02-07 RX ADMIN — TACROLIMUS 2 MG: 1 CAPSULE ORAL at 05:02

## 2025-02-07 RX ADMIN — ENOXAPARIN SODIUM 40 MG: 40 INJECTION SUBCUTANEOUS at 05:02

## 2025-02-07 RX ADMIN — CARVEDILOL 12.5 MG: 6.25 TABLET, FILM COATED ORAL at 05:02

## 2025-02-07 RX ADMIN — INSULIN ASPART 5 UNITS: 100 INJECTION, SOLUTION INTRAVENOUS; SUBCUTANEOUS at 05:02

## 2025-02-07 RX ADMIN — INSULIN GLARGINE 14 UNITS: 100 INJECTION, SOLUTION SUBCUTANEOUS at 08:02

## 2025-02-07 RX ADMIN — PROPOFOL 160 MG: 10 INJECTION, EMULSION INTRAVENOUS at 01:02

## 2025-02-07 RX ADMIN — SUCCINYLCHOLINE CHLORIDE 180 MG: 20 INJECTION, SOLUTION INTRAMUSCULAR; INTRAVENOUS at 01:02

## 2025-02-07 RX ADMIN — FENTANYL CITRATE 50 MCG: 50 INJECTION, SOLUTION INTRAMUSCULAR; INTRAVENOUS at 01:02

## 2025-02-07 NOTE — ANESTHESIA POSTPROCEDURE EVALUATION
Anesthesia Post Evaluation    Patient: Jed Chávez    Procedure(s) Performed: Procedure(s) (LRB):  ERCP (ENDOSCOPIC RETROGRADE CHOLANGIOPANCREATOGRAPHY) (N/A)  ULTRASOUND, UPPER GI TRACT, ENDOSCOPIC (N/A)    Final Anesthesia Type: general      Patient location during evaluation: PACU  Patient participation: Yes- Able to Participate  Level of consciousness: awake and alert  Post-procedure vital signs: reviewed and stable  Pain management: adequate  Airway patency: patent    PONV status at discharge: No PONV  Anesthetic complications: no      Cardiovascular status: blood pressure returned to baseline  Respiratory status: unassisted, room air and spontaneous ventilation  Hydration status: euvolemic  Follow-up not needed.              Vitals Value Taken Time   /94 02/07/25 1608   Temp 36.5 °C (97.7 °F) 02/07/25 1608   Pulse 70 02/07/25 1608   Resp 18 02/07/25 1608   SpO2 92 % 02/07/25 1608         Event Time   Out of Recovery 15:33:00         Pain/Chanell Score: Chanell Score: 10 (2/7/2025  3:00 PM)

## 2025-02-07 NOTE — ASSESSMENT & PLAN NOTE
"Elevated Lfts. Appreciate hepatology recommendations.   -US Doppler recommended and reported. "New biliary ductal dilation with echogenic debris within the common bile duct. Increased peak systolic velocity within the anastomosis of the main hepatic artery compared to prior. Associated mild intrahepatic tardus parvus waveforms. This could reflect hepatic artery stenosis. Similar tardus parvus waveforms within the liver. Mildly increased/improved resistive indices."    -CTA abdomen obtained which reported "A stent is noted within the proper hepatic artery without significant internal contrast opacification concerning for critical stenosis or occlusion. Common bile ductal dilatation proximal to the presumed site of anastomosis, similar to recent Doppler ultrasound and increased when compared to prior CT 08/29/2024."    Consult to [facility] to consider EUS/ERCP +/- liver biopsy. Appreciate evaluation. Given patient intake of plavix on 2/3, procedure delayed to today.   -s/p EUS/ERCP on 2/7, severe biliary stricture treated with sphincterotomy and stent. Recommended ciprofloxacin 500 BID for 5 days. Also recommended ursodiol for biliary prophylaxis. Repeat ERCP in 6 weeks.   "

## 2025-02-07 NOTE — TRANSFER OF CARE
"Anesthesia Transfer of Care Note    Patient: Jed Chávez    Procedure(s) Performed: Procedure(s) (LRB):  ERCP (ENDOSCOPIC RETROGRADE CHOLANGIOPANCREATOGRAPHY) (N/A)  ULTRASOUND, UPPER GI TRACT, ENDOSCOPIC (N/A)    Patient location: PACU    Anesthesia Type: general    Transport from OR: Transported from OR on 6-10 L/min O2 by face mask with adequate spontaneous ventilation    Post pain: adequate analgesia    Post assessment: no apparent anesthetic complications and tolerated procedure well    Post vital signs: stable    Level of consciousness: awake and alert    Nausea/Vomiting: no nausea/vomiting    Complications: none    Transfer of care protocol was followed    Last vitals: Visit Vitals  BP (!) 151/94 (BP Location: Left arm, Patient Position: Lying)   Pulse 72   Temp 36.6 °C (97.9 °F)   Resp 16   Ht 5' 11" (1.803 m)   Wt 99.8 kg (220 lb)   SpO2 97%   BMI 30.68 kg/m²     "

## 2025-02-07 NOTE — ASSESSMENT & PLAN NOTE
Patient's blood pressure range in the last 24 hours was: No data recorded.The patient's inpatient anti-hypertensive regimen is listed below:  Current Antihypertensives  carvedilol (COREG) tablet, 2 times daily with meals, Oral    Plan  Discharged on increased Coreg dose.

## 2025-02-07 NOTE — [employer] PATIENT INSTRUCTIONS
Discharge Summary/Instructions after an Endoscopic Procedure  Patient Name: Jed Chávez  Patient MRN: [id number]  Patient YOB: 1960 Friday, February 7, 2025  Christiano Landaverde MD  Dear patient,  As a result of recent federal legislation (The Federal Cures Act), you may   receive lab or pathology results from your procedure in your [facility]   account before your physician is able to contact you. Your physician or   their representative will relay the results to you with their   recommendations at their soonest availability.  Thank you,  RESTRICTIONS:  During your procedure today, you received medications for sedation.  These   medications may affect your judgment, balance and coordination.  Therefore,   for 24 hours, you have the following restrictions:   - DO NOT drive a car, operate machinery, make legal/financial decisions,   sign important papers or drink alcohol.    ACTIVITY:  Today: no heavy lifting, straining or running due to procedural   sedation/anesthesia.  The following day: return to full activity including work.  DIET:  Eat and drink normally unless instructed otherwise.     TREATMENT FOR COMMON SIDE EFFECTS:  - Mild abdominal pain, nausea, belching, bloating or excessive gas:  rest,   eat lightly and use a heating pad.  - Sore Throat: treat with throat lozenges and/or gargle with warm salt   water.  - Because air was used during the procedure, expelling large amounts of air   from your rectum or belching is normal.  - If a bowel prep was taken, you may not have a bowel movement for 1-3 days.    This is normal.  SYMPTOMS TO WATCH FOR AND REPORT TO YOUR PHYSICIAN:  1. Abdominal pain or bloating, other than gas cramps.  2. Chest pain.  3. Back pain.  4. Signs of infection such as: chills or fever occurring within 24 hours   after the procedure.  5. Rectal bleeding, which would show as bright red, maroon, or black stools.   (A tablespoon of blood from the rectum is not serious, especially if    hemorrhoids are present.)  6. Vomiting.  7. Weakness or dizziness.  GO DIRECTLY TO THE NEAREST EMERGENCY ROOM IF YOU HAVE ANY OF THE FOLLOWING:      Difficulty breathing              Chills and/or fever over 101 F   Persistent vomiting and/or vomiting blood   Severe abdominal pain   Severe chest pain   Black, tarry stools   Bleeding- more than one tablespoon   Any other symptom or condition that you feel may need urgent attention  Your doctor recommends these additional instructions:  If any biopsies were taken, your doctors clinic will contact you in 1 to 2   weeks with any results.  - Return patient to [facility] for ongoing care.   - Resume previous diet.   - Cipro (ciprofloxacin) 500 mg PO BID for 5 days.   - Repeat ERCP in 6 weeks to exchange stent.   - Return to referring physician.   - Watch for pancreatitis, bleeding, perforation, and cholangitis.   - Can resume Plavix in 48hr.  For questions, problems or results please call your physician - Christiano Landaverde MD at Work:  [phone].  [facility], EMERGENCY ROOM PHONE NUMBER: [phone]  IF A COMPLICATION OR EMERGENCY SITUATION ARISES AND YOU ARE UNABLE TO REACH   YOUR PHYSICIAN - GO DIRECTLY TO THE EMERGENCY ROOM.  Christiano Landaverde MD  2/7/2025 2:59:59 PM  This report has been verified and signed electronically.  Dear patient,  As a result of recent federal legislation (The Federal Cures Act), you may   receive lab or pathology results from your procedure in your [facility]   account before your physician is able to contact you. Your physician or   their representative will relay the results to you with their   recommendations at their soonest availability.  Thank you,  [employer]

## 2025-02-07 NOTE — NURSING TRANSFER
Nursing Transfer Note      2/7/2025   3:41 PM    Nurse giving handoff:TESSA Orozco RN  Nurse receiving handoff:TYLER RN    Reason patient is being transferred: recovery care complete    Transfer To: [id number]    Transfer via stretcher    Transported by PCT    Medicines sent: N/A    Any special needs or follow-up needed: routine    Patient belongings transferred with patient:  n/a    Chart send with patient: Yes    Notified: nurse    Patient reassessed at: 02/07/25 4765

## 2025-02-07 NOTE — PLAN OF CARE
Patient is AAOX4. VSS. Afebrile. Spo2 maintained @RA. Accucheck AC HS. HS BG= 152, scheduled dose of Levemir 14 units given as per MAR. NPO since midnight for the ERCP /possible liver biopsy today. LFTs elevated, T good elevated to 2.9. Ambulating independently up to the toilet. Bed in low position, wheels locked, call light within patient's reach. Wife at bedside interacting with the patient. No any s/s of acute distress noted overnight. Slept well.

## 2025-02-07 NOTE — H&P
Short Stay Endoscopy History and Physical    PCP - EVELIN Pitt MD  Referring Physician - Cindy Samuels MD  [address] [place] [address]    Procedure - ERCP possible EUS  ASA - per anesthesia  Mallampati - per anesthesia  History of Anesthesia problems - per anesthesia  Family history Anesthesia problems -  per anesthesia   Plan of anesthesia - per anesthesia    HPI:  This is a 65 y.o. male here for evaluation of: ERCP possible EUS for likely biliary anastomotic stricture; EUS if no stricture seen on ERCP for possible liver biopsy    Reflux - no  Dysphagia - no  Abdominal pain - no  Diarrhea - no    ROS:  Constitutional: No fevers, chills, No weight loss  CV: No chest pain  Pulm: No cough, No shortness of breath  Ophtho: No vision changes  GI: see HPI  Derm: No rash    Medical History:  has a past medical history of Abdominal hernia (02/03/2025), Acute blood loss anemia (08/12/2023), Alcoholic cirrhosis, CVA (cerebral vascular accident), DM (diabetes mellitus), Dyslipidemia, Hepatocellular carcinoma, History of hepatocellular carcinoma, in remission after liver transplant (04/05/2023), History of stroke (09/09/2016), HTN (hypertension), Intracranial hemorrhage, 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    Surgical History:  has a past surgical history that includes Hernia repair (N/A); Liver transplant (N/A, 8/9/2023); and craniotomy, with neoplasm excision using computer-assisted navigation (Left, 10/26/2023).    Family History: family history is not on file..    Social History:  reports that he has never smoked. He has never used smokeless tobacco. He reports that he does not currently use alcohol. He reports that he does not use drugs.    Review of patient's allergies indicates:  No Known Allergies    Medications:   Medications Prior to Admission   Medication Sig Dispense Refill Last Dose/Taking    aspirin 81 MG Chew Chew  "and swallow 1 tablet (81 mg total) by mouth once daily. Restart 11/9/23 30 tablet 11 2/3/2025 Morning    blood sugar diagnostic Strp Test blood glucose 3 (three) times daily. 100 strip 11 Past Month    blood-glucose sensor (DEXCOM G7 SENSOR) Neelam 1 each by Misc.(Non-Drug; Combo Route) route every 10 days. 3 each 11 2/3/2025    carvediloL (COREG) 6.25 MG tablet Take 1 tablet (6.25 mg total) by mouth 2 (two) times daily with meals. HOLD if SBP < 125 or pulse < 60. 180 tablet 0 2/3/2025 Morning    clopidogreL (PLAVIX) 75 mg tablet Take 1 tablet (75 mg total) by mouth once daily. 30 tablet 11 Past Week    insulin lispro 100 unit/mL injection VIA ILET INSULIN PUMP, MAX . 50 mL 11 2/3/2025 Evening    tacrolimus (PROGRAF) 1 MG Cap Take 2 capsules (2 mg total) by mouth every 12 (twelve) hours. 120 capsule 11 2/3/2025 Morning    tirzepatide 10 mg/0.5 mL PnIj Inject 10 mg (one pen) into the skin every 7 days. 2 mL 11 Past Week    insulin glargine U-100, Lantus, (LANTUS SOLOSTAR U-100 INSULIN) 100 unit/mL (3 mL) InPn pen Inject 33 Units into the skin once daily. 15 mL 11     losartan (COZAAR) 100 MG tablet Take 1 tablet (100 mg total) by mouth once daily. HOLD if SBP < 120 90 tablet 0     pen needle, diabetic (BD ULTRA-FINE MERCEDES PEN NEEDLE) 32 gauge x 5/32" Ndle Use to inject insulin into the skin 4 (four) times daily. 100 each 11        Physical Exam:    Vital Signs:   Vitals:    02/07/25 1059   BP: (!) 151/94   Pulse: 72   Resp: 16   Temp: 97.9 °F (36.6 °C)       General Appearance: Well appearing in no acute distress    Labs:  Lab Results   Component Value Date    WBC 4.68 02/07/2025    HGB 14.5 02/07/2025    HCT 46.7 02/07/2025     (L) 02/07/2025    CHOL 163 12/16/2024    TRIG 150 12/16/2024    HDL 34 (L) 12/16/2024     (H) 02/07/2025     (H) 02/07/2025     02/07/2025    K 3.7 02/07/2025     02/07/2025    CREATININE 1.0 02/07/2025    BUN 17 02/07/2025    CO2 24 02/07/2025    TSH " 2.096 06/15/2023    PSA 0.49 12/16/2024    INR 1.0 02/06/2025    HGBA1C 6.3 (H) 12/16/2024       I have explained the risks and benefits of this endoscopic procedure to the patient including but not limited to bleeding, pancreatitis, inflammation, infection, perforation, missing a lesion and death.      Christiano Landaverde MD

## 2025-02-07 NOTE — TREATMENT PLAN
AES Post-Procedure Treatment Plan    ERCP    Impression:            - The major papilla appeared to be small and                          partially hidden underneath a periampullary                          mucosal fold.                          - A single severe biliary stricture was found in                          the post-transplant anastomosis. The stricture was                          post-surgical.                          - The left and right hepatic ducts and all                          intrahepatic branches and post-transplant donor                          duct were severely dilated, secondary to the                          anastomotic stricture.                          - A biliary sphincterotomy was performed.                          - The biliary tree was swept and nothing was found.                          - The post-transplant anastomosis was successfully                          dilated to 4mm.                          - One temporary 10Fr x 12cm plastic biliary stent                          was placed into the common bile duct well across                          the level of the anastomotic stricture.     Recommendation:        - Return patient to [facility] for [facility] care.                          - Resume previous diet.                          - Cipro (ciprofloxacin) 500 mg PO BID for 5 days.                          - Repeat ERCP in 6 weeks to exchange stent.                          - Return to referring physician.                          - Watch for pancreatitis, bleeding, perforation,                          and cholangitis.                          - Can resume Plavix in 48hr.     Compa Rios  Gastroenterology Fellow, PGY-6

## 2025-02-07 NOTE — SUBJECTIVE & OBJECTIVE
Objective:     Vital Signs (Most Recent):  Temp: 97.9 °F (36.6 °C) (02/07/25 1059)  Pulse: 72 (02/07/25 1059)  Resp: 16 (02/07/25 1059)  BP: (!) 151/94 (02/07/25 1059)  SpO2: 97 % (02/07/25 1059) Vital Signs (24h Range):  Temp:  [97.7 °F (36.5 °C)-98.4 °F (36.9 °C)] 97.9 °F (36.6 °C)  Pulse:  [72-81] 72  Resp:  [16-18] 16  SpO2:  [93 %-97 %] 97 %  BP: (151-187)/() 151/94     Weight: 99.8 kg (220 lb)  Body mass index is 30.68 kg/m².    Intake/Output Summary (Last 24 hours) at 2/7/2025 1225  Last data filed at 2/6/2025 2000  Gross per 24 hour   Intake --   Output 150 ml   Net -150 ml         Physical Exam  Vitals and nursing note reviewed.   Constitutional:       General: He is not in acute distress.  HENT:      Head: Normocephalic and atraumatic.   Eyes:      General: Scleral icterus (mild) present.   Cardiovascular:      Rate and Rhythm: Normal rate and regular rhythm.      Heart sounds: No murmur heard.  Pulmonary:      Effort: Pulmonary effort is normal. No respiratory distress.      Breath sounds: Normal breath sounds. No wheezing.   Abdominal:      General: Abdomen is protuberant. Bowel sounds are normal. There is no distension.      Palpations: Abdomen is soft.      Tenderness: There is no abdominal tenderness.   Musculoskeletal:      Right lower leg: No edema.      Left lower leg: No edema.   Skin:     General: Skin is warm and dry.   Neurological:      General: No focal deficit present.      Mental Status: He is alert and oriented to person, place, and time.       Significant Labs: All pertinent labs within the past 24 hours have been reviewed.  CBC:   Recent Labs   Lab 02/06/25 0618 02/07/25  0545   WBC 5.02 4.68   HGB 15.2 14.5   HCT 46.3 46.7   * 102*     CMP:   Recent Labs   Lab 02/06/25  0618 02/07/25  0545    139   K 3.6 3.7    107   CO2 26 24   * 214*   BUN 15 17   CREATININE 0.9 1.0   CALCIUM 8.9 8.8   PROT 7.1 6.7   ALBUMIN 3.5 3.3*   BILITOT 2.9* 3.1*   ALKPHOS  354* 340*   * 142*   * 264*   ANIONGAP 9 8       Significant Imaging: I have reviewed all pertinent imaging results/findings within the past 24 hours.

## 2025-02-07 NOTE — ASSESSMENT & PLAN NOTE
The likely etiology of thrombocytopenia is liver disease. The patients 3 most recent labs are listed below.  Recent Labs     02/07/25  0545 02/08/25  0740   * 115*     Plan  - Will transfuse if platelet count is <50k (if undergoing surgical procedure or have active bleeding).

## 2025-02-07 NOTE — ASSESSMENT & PLAN NOTE
Patient is on an insulin pump as an outpatient    Last A1c reviewed-   Lab Results   Component Value Date    HGBA1C 6.3 (H) 12/16/2024     Resume and continue follow up on discharge

## 2025-02-07 NOTE — PROGRESS NOTES
[facility] - Transplant [facility] Medicine  Progress Note    Patient Name: Jed Chávez  MRN: [id number]  Patient Class: IP- Inpatient   Admission Date: 2/3/2025  Length of Stay: 4 days  Attending Physician: Chanelle Perez MD  Primary Care Provider: EVELIN Pitt MD        Subjective     Principal Problem:Liver transplant disorder        HPI:  66 y/o WM w/ Type 2 Dm, hx of HCC liver cirrhosis s/p Liver transplant (08/2023), HTN, hx of Meningioma s/p Left crani/resection [place] [facility] [place] abnormal lab findings.      He recently has had rising values on hepatic panel with elevated transaminases and cholestatic markers. His primary hepatologist Dr. Schneider requested admission for workup of this lab abnormality.  Patient seen at bedside tonight, pts wife Leida Chávez accompanies him.   He denies any acute symptoms today or in recent weeks.   He states 2 weeks ago he had URI/Sinusitis, only meds he took at that time was OTC mucinex, no new Rx or recent antibiotic use.      He has no fevers/chills, chest pain,dyspnea, n/v or abdominal pain today.      Patient uses insulin pump as outpatient for management of DM2, does not have additional vials of insulin lipro to [facility]ill [facility]      Hepatology requested urgent liver doppler to r/o hepatic arterial stenosis/thrombosis or venous thrombosis as a source of worsening hepatic function.  He was planned for ambulatory biopsy of liver transplant graft, but reports this morning had taken home Clopidogrel, pt is on aspirin also.  He is not on any oral anticoagulation as an outpatient.         Overview/Hospital Course:  66 y/o M w/ Type 2 DM, hx of HCC liver cirrhosis s/p Liver transplant (08/2023), HTN, hx of Meningioma s/p resection  who presented with elevated LFTs. He was referred for admission by hepatology.     Reported use of mucinex with recent URI and no recent antibiotic use. No reported fevers or chills.     Appreciate  "hepatology recommendations. US Doppler recommended and reported. "New biliary ductal dilation with echogenic debris within the common bile duct. Increased peak systolic velocity within the anastomosis of the main hepatic artery compared to prior. Associated mild intrahepatic tardus parvus waveforms. This could reflect hepatic artery stenosis. Similar tardus parvus waveforms within the liver. Mildly increased/improved resistive indices."    CTA abdomen obtained which reported "A stent is noted within the proper hepatic artery without significant internal contrast opacification concerning for critical stenosis or occlusion. Common bile ductal dilatation proximal to the presumed site of anastomosis, similar to recent Doppler ultrasound and increased when compared to prior CT 08/29/2024."    Consult to AES to consider EUS/ERCP +/- liver biopsy. Appreciate evaluation, consideration for EUS/ERCP however given patient intake of plavix on 2/3 recommended procedure on 2/7.     2/4- LFTs remain elevated. EUS/ERCP likely on 2/7 2/06- pt remains afebrile. WBC not elevated.  T. bili trend down to 2.9.  2/07- ERCP/EUS today. Cr remains stable. T bili slight trend up but overall still lower than admit.     Interval History:  Patient states he is feeling okay.  States his abdomen feels "full."  Discussed with wife at bedside. Planning ERCP w/ EUS today.     Review of Systems  A comprehensive review of systems was negative except as noted above.     Objective:     Vital Signs (Most Recent):  Temp: 97.9 °F (36.6 °C) (02/07/25 1059)  Pulse: 72 (02/07/25 1059)  Resp: 16 (02/07/25 1059)  BP: (!) 151/94 (02/07/25 1059)  SpO2: 97 % (02/07/25 1059) Vital Signs (24h Range):  Temp:  [97.7 °F (36.5 °C)-98.4 °F (36.9 °C)] 97.9 °F (36.6 °C)  Pulse:  [72-81] 72  Resp:  [16-18] 16  SpO2:  [93 %-97 %] 97 %  BP: (151-187)/() 151/94     Weight: 99.8 kg (220 lb)  Body mass index is 30.68 kg/m².    Intake/Output Summary (Last 24 hours) at " 2/7/2025 1225  Last data filed at 2/6/2025 2000  Gross per 24 hour   Intake --   Output 150 ml   Net -150 ml         Physical Exam  Vitals and nursing note reviewed.   Constitutional:       General: He is not in acute distress.     Appearance: He is not ill-appearing.   HENT:      Head: Normocephalic and atraumatic.      Mouth/Throat:      Mouth: Mucous membranes are moist.      Pharynx: No oropharyngeal exudate or posterior oropharyngeal erythema.      Comments: Icteric palate  Eyes:      General: Scleral icterus (mild) present.   Cardiovascular:      Rate and Rhythm: Normal rate and regular rhythm.      Pulses: Normal pulses.      Heart sounds: No murmur heard.  Pulmonary:      Effort: Pulmonary effort is normal. No respiratory distress.      Breath sounds: Normal breath sounds. No wheezing.   Abdominal:      General: Abdomen is protuberant. Bowel sounds are normal. There is no distension.      Palpations: Abdomen is soft.      Tenderness: There is no abdominal tenderness.   Musculoskeletal:      Right lower leg: No edema.      Left lower leg: No edema.   Skin:     General: Skin is warm and dry.   Neurological:      General: No focal deficit present.      Mental Status: He is alert and oriented to person, place, and time.   Psychiatric:         Mood and Affect: Mood normal.         Behavior: Behavior normal.         Significant Labs: All pertinent labs within the past 24 hours have been reviewed.  CBC:   Recent Labs   Lab 02/06/25  0618 02/07/25  0545   WBC 5.02 4.68   HGB 15.2 14.5   HCT 46.3 46.7   * 102*     CMP:   Recent Labs   Lab 02/06/25  0618 02/07/25  0545    139   K 3.6 3.7    107   CO2 26 24   * 214*   BUN 15 17   CREATININE 0.9 1.0   CALCIUM 8.9 8.8   PROT 7.1 6.7   ALBUMIN 3.5 3.3*   BILITOT 2.9* 3.1*   ALKPHOS 354* 340*   * 142*   * 264*   ANIONGAP 9 8       Significant Imaging: I have reviewed all pertinent imaging results/findings within the past 24  "hours.    Assessment and Plan     * Liver transplant disorder  Elevated Lfts. Appreciate hepatology recommendations.   -US Doppler recommended and reported. "New biliary ductal dilation with echogenic debris within the common bile duct. Increased peak systolic velocity within the anastomosis of the main hepatic artery compared to prior. Associated mild intrahepatic tardus parvus waveforms. This could reflect hepatic artery stenosis. Similar tardus parvus waveforms within the liver. Mildly increased/improved resistive indices."    -CTA abdomen obtained which reported "A stent is noted within the proper hepatic artery without significant internal contrast opacification concerning for critical stenosis or occlusion. Common bile ductal dilatation proximal to the presumed site of anastomosis, similar to recent Doppler ultrasound and increased when compared to prior CT 08/29/2024."    Consult to AES to consider EUS/ERCP +/- liver biopsy. Appreciate evaluation. Given patient intake of plavix on 2/3, procedure delayed to today.     Type 2 diabetes mellitus with other specified complication, with chronic insulin use  Patient is on an insulin pump as an outpatient, did not have lispro vial to refill pump   -basal bolus regimen.  Pts backup insulin is Lantus 33units.  Will conservatively dose with plans for NPO and split into BID dosing, prandial insulin and low dose sliding scale.     Last A1c reviewed-   Lab Results   Component Value Date    HGBA1C 6.3 (H) 12/16/2024     Most recent fingerstick glucose reviewed-   Recent Labs   Lab 02/06/25  1301 02/06/25  1739 02/06/25  2107 02/07/25  0810   POCTGLUCOSE 193* 218* 152* 178*     Current correctional scale  Low  Maintain anti-hyperglycemic dose as follows-   Antihyperglycemics (From admission, onward)      Start     Stop Route Frequency Ordered    02/04/25 1108  insulin aspart U-100 pen 0-5 Units         -- SubQ Every 6 hours PRN 02/04/25 1008    02/04/25 0715  insulin aspart " U-100 pen 5 Units         -- SubQ 3 times daily with meals 02/03/25 2156 02/03/25 2200  insulin glargine U-100 (Lantus) pen 14 Units         -- SubQ 2 times daily 02/03/25 2156            Thrombocytopenia  The likely etiology of thrombocytopenia is liver disease. The patients 3 most recent labs are listed below.  Recent Labs     02/05/25  0556 02/06/25  0618 02/07/25  0545   * 120* 102*     Plan  - Will transfuse if platelet count is <50k (if undergoing surgical procedure or have active bleeding).        Hypomagnesemia  Patient has Abnormal Magnesium: hypomagnesemia. Will continue to monitor electrolytes closely. Will replace the affected electrolytes and repeat labs to be done after interventions completed. 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  Hepatology and IR aware  -continue aspirin.  We will restart Plavix after procedure      At risk for opportunistic infections        Status post liver transplant  Patient on tacrolimus 2mg PO BID as outpatient   - continue home dose  -check tacrolimus trough daily         Hypertension complicating diabetes  Patient's blood pressure range in the last 24 hours was: BP  Min: 151/94  Max: 187/92.The patient's inpatient anti-hypertensive regimen is listed below:  Current Antihypertensives  losartan tablet 100 mg, Daily, Oral  carvediloL tablet 12.5 mg, 2 times daily with meals, Oral    Plan  -BP is intermittently above goal.  Continue increased Coreg dose.   Resume home regimen with hold parameters per the outpatient orders.       VTE Risk Mitigation (From admission, onward)           Ordered     enoxaparin injection 40 mg  Every 24 hours         02/06/25 1555     Reason for No Pharmacological VTE Prophylaxis  Once        Comments: May require liver biopsy in next 24-48hrs   Question:  Reasons:  Answer:  Physician Provided (leave comment)    02/03/25 2023     IP VTE HIGH RISK PATIENT   "Once         02/03/25 2023     Place sequential compression device  Until discontinued         02/03/25 2023                    Discharge Planning   CRISELDA: 2/8/2025     Code Status: Full Code   Medical Readiness for Discharge Date:   Discharge Plan A: Home with family        Continue inpatient for planned ERCP/EUS today    Chanelle Perez MD  Department of Hospital Medicine  [facility]  02/07/2025    *Portions of this note may have been created with voice recognition software. Occasional "wrong word "or "sound alike" substitutions may have occurred due to the inherent limitations of voice recognition software.  Please excuse errors. Please, read the note carefully and recognize, using context, where substitutions have occurred.    "

## 2025-02-08 VITALS
OXYGEN SATURATION: 92 % | BODY MASS INDEX: 30.72 KG/M2 | DIASTOLIC BLOOD PRESSURE: 91 MMHG | SYSTOLIC BLOOD PRESSURE: 155 MMHG | WEIGHT: 219.44 LBS | RESPIRATION RATE: 19 BRPM | TEMPERATURE: 97 F | HEART RATE: 77 BPM | HEIGHT: 71 IN

## 2025-02-08 LAB
ALBUMIN SERPL BCP-MCNC: 3.5 G/DL (ref 3.5–5.2)
ALP SERPL-CCNC: 352 U/L (ref 40–150)
ALT SERPL W/O P-5'-P-CCNC: 256 U/L (ref 10–44)
ANION GAP SERPL CALC-SCNC: 10 MMOL/L (ref 8–16)
AST SERPL-CCNC: 106 U/L (ref 10–40)
BILIRUB SERPL-MCNC: 2.4 MG/DL (ref 0.1–1)
BUN SERPL-MCNC: 15 MG/DL (ref 8–23)
CALCIUM SERPL-MCNC: 8.6 MG/DL (ref 8.7–10.5)
CHLORIDE SERPL-SCNC: 106 MMOL/L (ref 95–110)
CO2 SERPL-SCNC: 24 MMOL/L (ref 23–29)
CREAT SERPL-MCNC: 1 MG/DL (ref 0.5–1.4)
ERYTHROCYTE [DISTWIDTH] IN BLOOD BY AUTOMATED COUNT: 13.9 % (ref 11.5–14.5)
EST. GFR  (NO RACE VARIABLE): >60 ML/MIN/1.73 M^2
GLUCOSE SERPL-MCNC: 218 MG/DL (ref 70–110)
HCT VFR BLD AUTO: 45.4 % (ref 40–54)
HGB BLD-MCNC: 15 G/DL (ref 14–18)
MAGNESIUM SERPL-MCNC: 1.8 MG/DL (ref 1.6–2.6)
MCH RBC QN AUTO: 29.1 PG (ref 27–31)
MCHC RBC AUTO-ENTMCNC: 33 G/DL (ref 32–36)
MCV RBC AUTO: 88 FL (ref 82–98)
PLATELET # BLD AUTO: 115 K/UL (ref 150–450)
PMV BLD AUTO: 11.2 FL (ref 9.2–12.9)
POCT GLUCOSE: 223 MG/DL (ref 70–110)
POTASSIUM SERPL-SCNC: 3.9 MMOL/L (ref 3.5–5.1)
PROT SERPL-MCNC: 7.1 G/DL (ref 6–8.4)
RBC # BLD AUTO: 5.15 M/UL (ref 4.6–6.2)
SODIUM SERPL-SCNC: 140 MMOL/L (ref 136–145)
TACROLIMUS BLD-MCNC: 9.4 NG/ML (ref 5–15)
WBC # BLD AUTO: 5.91 K/UL (ref 3.9–12.7)

## 2025-02-08 PROCEDURE — 63600175 PHARM REV CODE 636 W HCPCS: Performed by: HOSPITALIST

## 2025-02-08 PROCEDURE — 25000003 PHARM REV CODE 250: Performed by: STUDENT IN AN ORGANIZED HEALTH CARE EDUCATION/TRAINING PROGRAM

## 2025-02-08 PROCEDURE — 83735 ASSAY OF MAGNESIUM: CPT | Performed by: STUDENT IN AN ORGANIZED HEALTH CARE EDUCATION/TRAINING PROGRAM

## 2025-02-08 PROCEDURE — 80053 COMPREHEN METABOLIC PANEL: CPT | Performed by: STUDENT IN AN ORGANIZED HEALTH CARE EDUCATION/TRAINING PROGRAM

## 2025-02-08 PROCEDURE — 36415 COLL VENOUS BLD VENIPUNCTURE: CPT | Performed by: STUDENT IN AN ORGANIZED HEALTH CARE EDUCATION/TRAINING PROGRAM

## 2025-02-08 PROCEDURE — 80197 ASSAY OF TACROLIMUS: CPT | Performed by: STUDENT IN AN ORGANIZED HEALTH CARE EDUCATION/TRAINING PROGRAM

## 2025-02-08 PROCEDURE — 85027 COMPLETE CBC AUTOMATED: CPT | Performed by: STUDENT IN AN ORGANIZED HEALTH CARE EDUCATION/TRAINING PROGRAM

## 2025-02-08 PROCEDURE — 25000003 PHARM REV CODE 250: Performed by: HOSPITALIST

## 2025-02-08 RX ORDER — CIPROFLOXACIN 500 MG/1
500 TABLET ORAL EVERY 12 HOURS
Status: DISCONTINUED | OUTPATIENT
Start: 2025-02-08 | End: 2025-02-08 | Stop reason: HOSPADM

## 2025-02-08 RX ORDER — CIPROFLOXACIN 500 MG/1
500 TABLET ORAL EVERY 12 HOURS
Qty: 10 TABLET | Refills: 0 | Status: SHIPPED | OUTPATIENT
Start: 2025-02-08 | End: 2025-02-14

## 2025-02-08 RX ORDER — CARVEDILOL 12.5 MG/1
12.5 TABLET ORAL 2 TIMES DAILY WITH MEALS
Qty: 180 TABLET | Refills: 0 | Status: SHIPPED | OUTPATIENT
Start: 2025-02-08 | End: 2025-05-09

## 2025-02-08 RX ORDER — URSODIOL 300 MG/1
300 CAPSULE ORAL 2 TIMES DAILY
Status: DISCONTINUED | OUTPATIENT
Start: 2025-02-08 | End: 2025-02-08 | Stop reason: HOSPADM

## 2025-02-08 RX ORDER — URSODIOL 300 MG/1
300 CAPSULE ORAL 2 TIMES DAILY
Qty: 60 CAPSULE | Refills: 2 | Status: SHIPPED | OUTPATIENT
Start: 2025-02-08 | End: 2025-05-09

## 2025-02-08 RX ADMIN — INSULIN ASPART 2 UNITS: 100 INJECTION, SOLUTION INTRAVENOUS; SUBCUTANEOUS at 09:02

## 2025-02-08 RX ADMIN — LOSARTAN POTASSIUM 100 MG: 50 TABLET, FILM COATED ORAL at 07:02

## 2025-02-08 RX ADMIN — TACROLIMUS 2 MG: 1 CAPSULE ORAL at 07:02

## 2025-02-08 RX ADMIN — POLYETHYLENE GLYCOL 3350 17 G: 17 POWDER, FOR SOLUTION ORAL at 07:02

## 2025-02-08 RX ADMIN — CIPROFLOXACIN HYDROCHLORIDE 500 MG: 500 TABLET, FILM COATED ORAL at 11:02

## 2025-02-08 RX ADMIN — URSODIOL 300 MG: 300 CAPSULE ORAL at 11:02

## 2025-02-08 RX ADMIN — ASPIRIN 81 MG CHEWABLE TABLET 81 MG: 81 TABLET CHEWABLE at 07:02

## 2025-02-08 RX ADMIN — INSULIN ASPART 5 UNITS: 100 INJECTION, SOLUTION INTRAVENOUS; SUBCUTANEOUS at 09:02

## 2025-02-08 RX ADMIN — CARVEDILOL 12.5 MG: 6.25 TABLET, FILM COATED ORAL at 07:02

## 2025-02-08 RX ADMIN — INSULIN GLARGINE 14 UNITS: 100 INJECTION, SOLUTION SUBCUTANEOUS at 09:02

## 2025-02-08 RX ADMIN — Medication 400 MG: at 07:02

## 2025-02-08 NOTE — PLAN OF CARE
[facility] - Transplant Stepdown  Discharge Final Note    Primary Care Provider: EVELIN Pitt MD    Expected Discharge Date: 2/8/2025    Patient cleared for discharge from case management standpoint.    Final Discharge Note (most recent)       Final Note - 02/08/25 1120          Final Note    Assessment Type Final Discharge Note     Anticipated Discharge Disposition Home or Self Care     What phone number can be called within the next 1-3 days to see how you are doing after discharge? [phone]     Hospital Resources/Appts/Education Provided Provided patient/caregiver with written discharge plan information        Post-Acute Status    Coverage [facility]     Discharge Delays None known at this time                   Contact Info       EVELIN Pitt MD   Specialty: Family Medicine   Relationship: PCP - General    [address] [place] [id number]   Phone: [phone]       Next Steps: Follow up in 1 week(s)          Future Appointments   Date Time Provider Department Center   2/18/2025 10:00 AM Ochoa Quesada MD [facility]   2/27/2025  9:00 AM [facility] [place]   3/7/2025 10:00 AM Marshall Casillas MD [facility] RAD ONC [facility]   3/10/2025 11:00 AM Apoorva Pat, [facility] DIABETE  Medical [facility]   6/27/2025  9:15 AM Jed Yu MD [facility] UROLOGY [facility]   2/5/2026  7:30 AM FIELDS, VISUAL-ONE [facility] OPHTHAL [facility]   2/5/2026  8:00 AM Naveed Steiner OD [facility] OPHTHAL [facility]       Sophie Penaloza RN CM  [id number]

## 2025-02-08 NOTE — TREATMENT PLAN
AES Treatment Plan     Seen and examined this morning. S/p ERCP yesterday with stent placed. Liver enzymes improving, tolerating po, no abdominal pain.      AES will sign off.     Thank you for involving us in the care of Jed MEDELLIN Kyler. Please call with any additional questions, concerns or changes in the patient's clinical status.       Shelli Victoria MD  Gastroenterology Fellow, PGY IV

## 2025-02-08 NOTE — PLAN OF CARE
AAOx4, independent, ambulated the halls. Bowel movement today after PRN Miralax and coffee. Patient met with [facility]. AVS given and reviewed with patient and wife. They acknowledge medication changes, follow up appointments, sign and symptoms to look for as complications and to follow up with PCP, or 24 hr nurse assistance hotline. Patient to  new medications downstairs. Patient will leave with wife after speaking with Dr. Samuels. PIV removed with catheter intact. No Tele to remove.

## 2025-02-08 NOTE — PLAN OF CARE
Problem: Adult Inpatient Plan of Care  Goal: Plan of Care Review  Outcome: Progressing     Problem: Adult Inpatient Plan of Care  Goal: Patient-Specific Goal (Individualized)  Outcome: Progressing     Problem: Diabetes Comorbidity  Goal: Blood Glucose Level Within Targeted Range  Outcome: Progressing     Problem: Electrolyte Imbalance  Goal: Electrolyte Balance  Outcome: Progressing       AAOX4. BG up to 295; aspart and levemir administered per MAR. No other acute events reported during shift. Wife at bedside. Bed locked and in lowest position; call light within reach. Pt instructed to call for assistance.

## 2025-02-08 NOTE — TREATMENT PLAN
Hepatology Treatment Plan    Jed Chávez is a 65 y.o. male admitted to hospital 2/3/2025 (Hospital Day: 6) due to Liver transplant disorder.     Interval History  S/p ERCP with stent placement on 2/07 without complications. Liver enzymes improving.     Objective  Temp:  [97.2 °F (36.2 °C)-98.3 °F (36.8 °C)] 97.2 °F (36.2 °C) (02/08 0807)  Pulse:  [70-77] 77 (02/08 0807)  BP: (142-204)/() 155/91 (02/08 0807)  Resp:  [16-20] 19 (02/08 0807)  SpO2:  [92 %-98 %] 92 % (02/08 0807)    Laboratory    Lab Results   Component Value Date    WBC 5.91 02/08/2025    HGB 15.0 02/08/2025    HCT 45.4 02/08/2025    MCV 88 02/08/2025     (L) 02/08/2025       Lab Results   Component Value Date     02/08/2025    K 3.9 02/08/2025     02/08/2025    CO2 24 02/08/2025    BUN 15 02/08/2025    CREATININE 1.0 02/08/2025    CALCIUM 8.6 (L) 02/08/2025       Lab Results   Component Value Date    ALBUMIN 3.5 02/08/2025     (H) 02/08/2025     (H) 02/08/2025     (H) 06/15/2023    ALKPHOS 352 (H) 02/08/2025    BILITOT 2.4 (H) 02/08/2025       Lab Results   Component Value Date    INR 1.0 02/06/2025    INR 1.0 02/05/2025    INR 1.0 02/04/2025       MELD 3.0: 10 at 2/8/2025  7:40 AM  MELD-Na: 10 at 2/8/2025  7:40 AM  Calculated from:  Serum Creatinine: 1 mg/dL at 2/8/2025  7:40 AM  Serum Sodium: 140 mmol/L (Using max of 137 mmol/L) at 2/8/2025  7:40 AM  Total Bilirubin: 2.4 mg/dL at 2/8/2025  7:40 AM  Serum Albumin: 3.5 g/dL at 2/8/2025  7:40 AM  INR(ratio): 1 at 2/6/2025  6:18 AM  Age at listing (hypothetical): 65 years  Sex: Male at 2/8/2025  7:40 AM    Assessment  65 year old male with cirrhosis and HCC s/p liver transplant 8/10/2023 currently on Tacrolimus 2/2 complicated by hepatic artery stenosis s/p stent placement 10/2023 with IR on Plavix, Obesity (BMI 30.70) admitted to [facility] on 2/03 with elevated liver enzymes. Hepatology consulted for further management. Labs on admission, T bili 5.7, AST/ALT  153/305, . Repeat labs on 2/04 downtrending LFTs, T bili 4.7, AST//272. US doppler with new biliary ductal dilation with echohenic debris within the CBD, increased peak systolic velocity within the anastomosis of the main hepatic artery compared to prior. CTA, stent noted in the proper hepatic artery without significant internal contrast opacification concerning for critical stenosis or occlusion. AES consulted; plan for ERCP for suspect anastomotic stricture on Friday 2/7 after 5 day plavix washout. LFTs improving. Imaging discussed with IR, no repeat intervention for known hepatic artery stenosis. She is s/p ERCP on 2/07, found to have a severe biliary stricture of the post-transplant anastomosis s/p stent placement. Plan to repeat ERCP in 6 weeks for stent removal.      Problem List:  Elevated liver enzymes  HCC s/p transplant  Hepatic artery stenosis (on Plavix)     Recommendations:  - LFTs downtrending post-ERCP. Start Ursodiol 300 mg BID.   - No changes to IS made inpatient. Continue home dose 2/2.   - Will arrange for outpatient labs Tuesday. He already has a follow up with Dr. Quesada scheduled 2/18.     Thank you for involving us in the care of Jed Chávez. Please call with any additional concerns or questions. We will sign-off.    Brittney Holt MD  Gastroenterology Fellow, PGY-V  [facility]

## 2025-02-09 NOTE — DISCHARGE SUMMARY
[facility] - Transplant [facility] Medicine  Discharge Summary      Patient Name: Jed Chávez  MRN: [id number]  DEDRICK: [id number]  Patient Class: IP- Inpatient  Admission Date: 2/3/2025  Hospital Length of Stay: 5 days  Discharge Date and Time: 2/8/2025  3:23 PM  Attending Physician: No att. providers found   Discharging Provider: Cindy Samuels MD  Primary Care Provider: EVELIN Pitt MD  [facility] Medicine Team: [facility]OS[facility] MED L Cindy aSmuels MD  Primary Care Team: [facility] HOSP MED L    HPI:   64 y/o WM w/ Type 2 Dm, hx of HCC liver cirrhosis s/p Liver transplant (08/2023), HTN, hx of Meningioma s/p Left crani/resection presents to [facility] as direct admit for abnormal lab findings.      He recently has had rising values on hepatic panel with elevated transaminases and cholestatic markers. His primary hepatologist Dr. Schneider requested admission for workup of this lab abnormality.  Patient seen at bedside tonight, pts wife Leida Chávez accompanies him.   He denies any acute symptoms today or in recent weeks.   He states 2 weeks ago he had URI/Sinusitis, only meds he took at that time was OTC mucinex, no new Rx or recent antibiotic use.      He has no fevers/chills, chest pain,dyspnea, n/v or abdominal pain today.      Patient uses insulin pump as outpatient for management of DM2, does not have additional vials of insulin lipro to refill his pump tonight.      Hepatology requested urgent liver doppler to r/o hepatic arterial stenosis/thrombosis or venous thrombosis as a source of worsening hepatic function.  He was planned for ambulatory biopsy of liver transplant graft, but reports this morning had taken home Clopidogrel, pt is on aspirin also.  He is not on any oral anticoagulation as an outpatient.         Procedure(s) (LRB):  ERCP (ENDOSCOPIC RETROGRADE CHOLANGIOPANCREATOGRAPHY) (N/A)  ULTRASOUND, UPPER GI TRACT, ENDOSCOPIC (N/A)      Hospital Course:   64 y/o M w/ Type 2 DM, hx of HCC liver cirrhosis s/p Liver  "transplant (08/2023), HTN, hx of Meningioma s/p resection  who presented with elevated LFTs. He was referred for admission by hepatology.     Reported use of mucinex with recent URI and no recent antibiotic use. No reported fevers or chills.     Appreciate hepatology recommendations. US Doppler recommended and reported. "New biliary ductal dilation with echogenic debris within the common bile duct. Increased peak systolic velocity within the anastomosis of the main hepatic artery compared to prior. Associated mild intrahepatic tardus parvus waveforms. This could reflect hepatic artery stenosis. Similar tardus parvus waveforms within the liver. Mildly increased/improved resistive indices."    CTA abdomen obtained which reported "A stent is noted within the proper hepatic artery without significant internal contrast opacification concerning for critical stenosis or occlusion. Common bile ductal dilatation proximal to the presumed site of anastomosis, similar to recent Doppler ultrasound and increased when compared to prior CT 08/29/2024."    Consult to [facility] to consider EUS/ERCP +/- liver biopsy. Appreciate evaluation, consideration for EUS/ERCP however given patient intake of plavix on 2/3 recommended procedure on 2/7.     2/4- LFTs remain elevated. EUS/ERCP likely on 2/7 2/06- pt remains afebrile. WBC not elevated.  T. bili trend down to 2.9.  2/07- ERCP/EUS today. Cr remains stable. T bili slight trend up but overall still lower than admit.   2/8- Blood pressure better controlled on 12.5 carvedilol. LFTs down trending and will discharge on ursodiol and 5 days of ciprofloxacin. Recommend GI follow up for repeat ERCP. Recommend outpatient hepatology follow up       Objective:     Vital Signs (Most Recent):  Temp: 97.9 °F (36.6 °C) (02/07/25 1059)  Pulse: 72 (02/07/25 1059)  Resp: 16 (02/07/25 1059)  BP: (!) 151/94 (02/07/25 1059)  SpO2: 97 % (02/07/25 1059) Vital Signs (24h Range):  Temp:  [97.7 °F (36.5 °C)-98.4 °F " (36.9 °C)] 97.9 °F (36.6 °C)  Pulse:  [72-81] 72  Resp:  [16-18] 16  SpO2:  [93 %-97 %] 97 %  BP: (151-187)/() 151/94     Weight: 99.8 kg (220 lb)  Body mass index is 30.68 kg/m².    Intake/Output Summary (Last 24 hours) at 2/7/2025 1225  Last data filed at 2/6/2025 2000  Gross per 24 hour   Intake --   Output 150 ml   Net -150 ml         Physical Exam  Vitals and nursing note reviewed.   Constitutional:       General: He is not in acute distress.  HENT:      Head: Normocephalic and atraumatic.   Eyes:      General: Scleral icterus (mild) present.   Cardiovascular:      Rate and Rhythm: Normal rate and regular rhythm.      Heart sounds: No murmur heard.  Pulmonary:      Effort: Pulmonary effort is normal. No respiratory distress.      Breath sounds: Normal breath sounds. No wheezing.   Abdominal:      General: Abdomen is protuberant. Bowel sounds are normal. There is no distension.      Palpations: Abdomen is soft.      Tenderness: There is no abdominal tenderness.   Musculoskeletal:      Right lower leg: No edema.      Left lower leg: No edema.   Skin:     General: Skin is warm and dry.   Neurological:      General: No focal deficit present.      Mental Status: He is alert and oriented to person, place, and time.      Goals of Care Treatment Preferences:  Code Status: Full Code    Living Will: Yes              [facility] Screening:  The patient was screened for utility difficulties, food insecurity, transport difficulties, housing insecurity, and interpersonal safety and there were no concerns identified this admission.     Consults:   Consults (From admission, onward)          Status Ordering Provider     Inpatient consult to [facility]  Once        Provider:  (Not yet assigned)    Completed MARIO, ADIL     Inpatient consult to Hepatology  Once        Provider:  (Not yet assigned)    Completed MARIO, ADIL            * Liver transplant disorder  Elevated Lfts. Appreciate hepatology  "recommendations.   -US Doppler recommended and reported. "New biliary ductal dilation with echogenic debris within the common bile duct. Increased peak systolic velocity within the anastomosis of the main hepatic artery compared to prior. Associated mild intrahepatic tardus parvus waveforms. This could reflect hepatic artery stenosis. Similar tardus parvus waveforms within the liver. Mildly increased/improved resistive indices."    -CTA abdomen obtained which reported "A stent is noted within the proper hepatic artery without significant internal contrast opacification concerning for critical stenosis or occlusion. Common bile ductal dilatation proximal to the presumed site of anastomosis, similar to recent Doppler ultrasound and increased when compared to prior CT 08/29/2024."    Consult to [facility] to consider EUS/ERCP +/- liver biopsy. Appreciate evaluation. Given patient intake of plavix on 2/3, procedure delayed to today.   -s/p EUS/ERCP on 2/7, severe biliary stricture treated with sphincterotomy and stent. Recommended ciprofloxacin 500 BID for 5 days. Also recommended ursodiol for biliary prophylaxis. Repeat ERCP in 6 weeks.     Thrombocytopenia  The likely etiology of thrombocytopenia is liver disease. The patients 3 most recent labs are listed below.  Recent Labs     02/07/25  0545 02/08/25  0740   * 115*     Plan  - Will transfuse if platelet count is <50k (if undergoing surgical procedure or have active bleeding).        Hypomagnesemia  Patient has Abnormal Magnesium: hypomagnesemia. Will continue to monitor electrolytes closely. Will replace the affected electrolytes and repeat labs to be done after interventions completed. 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  Hepatology and IR aware. Imaging reviewed and discussed  -continue aspirin.      -We will restart Plavix on 2/9      At risk for opportunistic " infections        Status post liver transplant  Patient on tacrolimus 2mg PO BID as outpatient   - continue home dose      Type 2 diabetes mellitus with other specified complication, with chronic insulin use  Patient is on an insulin pump as an outpatient    Last A1c reviewed-   Lab Results   Component Value Date    HGBA1C 6.3 (H) 12/16/2024     Resume and continue follow up on discharge     Hypertension complicating diabetes  Patient's blood pressure range in the last 24 hours was: No data recorded.The patient's inpatient anti-hypertensive regimen is listed below:  Current Antihypertensives  carvedilol (COREG) tablet, 2 times daily with meals, Oral    Plan  Discharged on increased Coreg dose.         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    Specialty: Family Medicine  Contact information:  [address]  [place] [address] [phone]                           Patient Instructions:      Diet diabetic     Notify your health care provider if you experience any of the following:  temperature >100.4     Notify your health care provider if you experience any of the following:  persistent nausea and vomiting or diarrhea     Notify  "your health care provider if you experience any of the following:  severe uncontrolled pain     Notify your health care provider if you experience any of the following:  persistent dizziness, light-headedness, or visual disturbances     Notify your health care provider if you experience any of the following:  increased confusion or weakness     Activity as tolerated       Significant Diagnostic Studies: Labs: CMP   Recent Labs   Lab 02/08/25  0740      K 3.9      CO2 24   *   BUN 15   CREATININE 1.0   CALCIUM 8.6*   PROT 7.1   ALBUMIN 3.5   BILITOT 2.4*   ALKPHOS 352*   *   *   ANIONGAP 10    and CBC   Recent Labs   Lab 02/08/25  0740   WBC 5.91   HGB 15.0   HCT 45.4   *       Pending Diagnostic Studies:       None           Medications:  Reconciled Home Medications:      Medication List        START taking these medications      ciprofloxacin HCl 500 MG tablet  Commonly known as: CIPRO  Take 1 tablet (500 mg total) by mouth every 12 (twelve) hours. for 5 days     ursodioL 300 mg capsule  Commonly known as: ACTIGALL  Take 1 capsule (300 mg total) by mouth 2 (two) times daily.            CHANGE how you take these medications      carvediloL 12.5 MG tablet  Commonly known as: COREG  Take 1 tablet (12.5 mg total) by mouth 2 (two) times daily with meals. HOLD if SBP < 125 or pulse < 60.  What changed:   medication strength  how much to take            CONTINUE taking these medications      aspirin 81 MG Chew  Chew and swallow 1 tablet (81 mg total) by mouth once daily. Restart 11/9/23     BD ULTRA-FINE MERCEDES PEN NEEDLE 32 gauge x 5/32" Ndle  Generic drug: pen needle, diabetic  Use to inject insulin into the skin 4 (four) times daily.     clopidogreL 75 mg tablet  Commonly known as: PLAVIX  Take 1 tablet (75 mg total) by mouth once daily.  Notes to patient: Dont take today, RESUME ON 2/9     CONTOUR NEXT TEST STRIPS Strp  Generic drug: blood sugar diagnostic  Test blood glucose 3 " (three) times daily.     DEXCOM G7 SENSOR Neelam  Generic drug: blood-glucose sensor  1 each by Misc.(Non-Drug; Combo Route) route every 10 days.     HumaLOG U-100 Insulin 100 unit/mL injection  Generic drug: insulin lispro  VIA ILET INSULIN PUMP, MAX .     LANTUS SOLOSTAR U-100 INSULIN 100 unit/mL (3 mL) Inpn pen  Generic drug: insulin glargine U-100 (Lantus)  Inject 33 Units into the skin once daily.     losartan 100 MG tablet  Commonly known as: COZAAR  Take 1 tablet (100 mg total) by mouth once daily. HOLD if SBP < 120     MOUNJARO 10 mg/0.5 mL Pnij  Generic drug: tirzepatide  Inject 10 mg (one pen) into the skin every 7 days.     tacrolimus 1 MG Cap  Commonly known as: PROGRAF  Take 2 capsules (2 mg total) by mouth every 12 (twelve) hours.              Indwelling Lines/Drains at time of discharge:   Lines/Drains/Airways       None                   Time spent on the discharge of patient: 60 minutes         Cindy Samuels MD  Department of Hospital Medicine  [facility] - Transplant Stepdown

## 2025-02-09 NOTE — ASSESSMENT & PLAN NOTE
-Chronic issue.  Hepatology and IR aware. Imaging reviewed and discussed  -continue aspirin.      -We will restart Plavix on 2/9

## 2025-02-10 ENCOUNTER — TELEPHONE (OUTPATIENT)
Dept: ENDOSCOPY | Facility: HOSPITAL | Age: 65
End: 2025-02-10
Payer: COMMERCIAL

## 2025-02-10 ENCOUNTER — PATIENT MESSAGE (OUTPATIENT)
Dept: TRANSPLANT | Facility: CLINIC | Age: 65
End: 2025-02-10
Payer: COMMERCIAL

## 2025-02-10 NOTE — TELEPHONE ENCOUNTER
Telephone patient to schedule ERCP with no answer. Direct contact given to call back to schedule procedure.

## 2025-02-11 ENCOUNTER — LAB VISIT (OUTPATIENT)
Dept: LAB | Facility: HOSPITAL | Age: 65
End: 2025-02-11
Attending: INTERNAL MEDICINE
Payer: COMMERCIAL

## 2025-02-11 ENCOUNTER — TELEPHONE (OUTPATIENT)
Dept: ENDOSCOPY | Facility: HOSPITAL | Age: 65
End: 2025-02-11
Payer: COMMERCIAL

## 2025-02-11 ENCOUNTER — PATIENT OUTREACH (OUTPATIENT)
Dept: ADMINISTRATIVE | Facility: CLINIC | Age: 65
End: 2025-02-11
Payer: COMMERCIAL

## 2025-02-11 DIAGNOSIS — Z94.4 STATUS POST LIVER TRANSPLANT: ICD-10-CM

## 2025-02-11 DIAGNOSIS — K83.1 BILIARY STRICTURE: Primary | ICD-10-CM

## 2025-02-11 DIAGNOSIS — R79.89 ELEVATED LFTS: ICD-10-CM

## 2025-02-11 LAB
ALBUMIN SERPL BCP-MCNC: 3.5 G/DL (ref 3.5–5.2)
ALP SERPL-CCNC: 219 U/L (ref 40–150)
ALT SERPL W/O P-5'-P-CCNC: 101 U/L (ref 10–44)
ANION GAP SERPL CALC-SCNC: 8 MMOL/L (ref 8–16)
AST SERPL-CCNC: 26 U/L (ref 10–40)
BASOPHILS # BLD AUTO: 0.02 K/UL (ref 0–0.2)
BASOPHILS NFR BLD: 0.4 % (ref 0–1.9)
BILIRUB SERPL-MCNC: 2 MG/DL (ref 0.1–1)
BUN SERPL-MCNC: 25 MG/DL (ref 8–23)
CALCIUM SERPL-MCNC: 9.2 MG/DL (ref 8.7–10.5)
CHLORIDE SERPL-SCNC: 105 MMOL/L (ref 95–110)
CO2 SERPL-SCNC: 24 MMOL/L (ref 23–29)
CREAT SERPL-MCNC: 1 MG/DL (ref 0.5–1.4)
DIFFERENTIAL METHOD BLD: ABNORMAL
EOSINOPHIL # BLD AUTO: 0.1 K/UL (ref 0–0.5)
EOSINOPHIL NFR BLD: 1.5 % (ref 0–8)
ERYTHROCYTE [DISTWIDTH] IN BLOOD BY AUTOMATED COUNT: 13.3 % (ref 11.5–14.5)
EST. GFR  (NO RACE VARIABLE): >60 ML/MIN/1.73 M^2
GLUCOSE SERPL-MCNC: 224 MG/DL (ref 70–110)
HCT VFR BLD AUTO: 43.3 % (ref 40–54)
HGB BLD-MCNC: 14.4 G/DL (ref 14–18)
IMM GRANULOCYTES # BLD AUTO: 0.01 K/UL (ref 0–0.04)
IMM GRANULOCYTES NFR BLD AUTO: 0.2 % (ref 0–0.5)
LYMPHOCYTES # BLD AUTO: 2.1 K/UL (ref 1–4.8)
LYMPHOCYTES NFR BLD: 39.7 % (ref 18–48)
MCH RBC QN AUTO: 28.6 PG (ref 27–31)
MCHC RBC AUTO-ENTMCNC: 33.3 G/DL (ref 32–36)
MCV RBC AUTO: 86 FL (ref 82–98)
MONOCYTES # BLD AUTO: 0.4 K/UL (ref 0.3–1)
MONOCYTES NFR BLD: 8.4 % (ref 4–15)
NEUTROPHILS # BLD AUTO: 2.6 K/UL (ref 1.8–7.7)
NEUTROPHILS NFR BLD: 49.8 % (ref 38–73)
NRBC BLD-RTO: 0 /100 WBC
PLATELET # BLD AUTO: 123 K/UL (ref 150–450)
PMV BLD AUTO: 10.4 FL (ref 9.2–12.9)
POTASSIUM SERPL-SCNC: 4 MMOL/L (ref 3.5–5.1)
PROT SERPL-MCNC: 7 G/DL (ref 6–8.4)
RBC # BLD AUTO: 5.04 M/UL (ref 4.6–6.2)
SODIUM SERPL-SCNC: 137 MMOL/L (ref 136–145)
WBC # BLD AUTO: 5.24 K/UL (ref 3.9–12.7)

## 2025-02-11 PROCEDURE — 85025 COMPLETE CBC W/AUTO DIFF WBC: CPT | Performed by: INTERNAL MEDICINE

## 2025-02-11 PROCEDURE — 36415 COLL VENOUS BLD VENIPUNCTURE: CPT | Performed by: INTERNAL MEDICINE

## 2025-02-11 PROCEDURE — 80197 ASSAY OF TACROLIMUS: CPT | Performed by: INTERNAL MEDICINE

## 2025-02-11 PROCEDURE — 80053 COMPREHEN METABOLIC PANEL: CPT | Performed by: INTERNAL MEDICINE

## 2025-02-11 NOTE — TELEPHONE ENCOUNTER
Spoke to patient to schedule procedure(s) ERCP        Physician to perform procedure(s) Dr. JENNI Vance  Date of Procedure (s) 3/5/2025  Arrival Time 8:15 AM  Time of Procedure(s) 9:15 AM   Location of Procedure(s) [place] [address] Floor  Type of Rx Prep sent to patient: Other  Instructions provided to patient via [facility]    Patient was informed on the following information and verbalized understanding. Screening questionnaire reviewed with patient and complete. If procedure requires anesthesia, a responsible adult needs to be present to accompany the patient home, patient cannot drive after receiving anesthesia. Appointment details are tentative, especially check-in time. Patient will receive a prep-op call 7 days prior to confirm check-in time for procedure. If applicable the patient should contact their pharmacy to verify Rx for procedure prep is ready for pick-up. Patient was advised to call the scheduling department at [phone] if pharmacy states no Rx is available. Patient was advised to call the endoscopy scheduling department if any questions or concerns arise.      [facility] Scheduling Department

## 2025-02-11 NOTE — TELEPHONE ENCOUNTER
Dear Provider,    Patient has a scheduled procedure ERCP  on 3/5/2025 and is currently taking a blood thinner. In order to ensure patient safety, we would like to confirm that the patient can place their blood thinner medication on hold for the procedure. Can he/she discontinue Plavix (clopidogrel) for a minimum of 5 days prior to the procedure?     Thank you for your prompt reply.    [facility] Endoscopy Scheduling

## 2025-02-12 ENCOUNTER — TELEPHONE (OUTPATIENT)
Dept: TRANSPLANT | Facility: CLINIC | Age: 65
End: 2025-02-12
Payer: COMMERCIAL

## 2025-02-12 DIAGNOSIS — R79.89 ELEVATED LFTS: Primary | ICD-10-CM

## 2025-02-12 DIAGNOSIS — Z94.4 S/P LIVER TRANSPLANT: ICD-10-CM

## 2025-02-12 DIAGNOSIS — Z85.05 PERSONAL HISTORY OF MALIGNANT NEOPLASM OF LIVER: ICD-10-CM

## 2025-02-12 DIAGNOSIS — T86.40 LIVER TRANSPLANT DISORDER: ICD-10-CM

## 2025-02-12 LAB — TACROLIMUS BLD-MCNC: 9.4 NG/ML (ref 5–15)

## 2025-02-12 NOTE — TELEPHONE ENCOUNTER
Pt notified via portal of  improving labs and that no medication changes are needed. Repeat labs due 2/17/25 per protocol.   ----- Message from Ochoa Quesada MD sent at 2/12/2025 11:16 AM CST -----  Results reviewed

## 2025-02-13 ENCOUNTER — PATIENT OUTREACH (OUTPATIENT)
Dept: ADMINISTRATIVE | Facility: HOSPITAL | Age: 65
End: 2025-02-13
Payer: COMMERCIAL

## 2025-02-14 ENCOUNTER — HOSPITAL ENCOUNTER (OUTPATIENT)
Facility: HOSPITAL | Age: 65
Discharge: OTHER INSTITUTION | End: 2025-02-16
Attending: FAMILY MEDICINE | Admitting: STUDENT IN AN ORGANIZED HEALTH CARE EDUCATION/TRAINING PROGRAM
Payer: COMMERCIAL

## 2025-02-14 ENCOUNTER — TELEPHONE (OUTPATIENT)
Dept: INTERNAL MEDICINE | Facility: CLINIC | Age: 65
End: 2025-02-14

## 2025-02-14 ENCOUNTER — TELEPHONE (OUTPATIENT)
Dept: TRANSPLANT | Facility: CLINIC | Age: 65
End: 2025-02-14
Payer: COMMERCIAL

## 2025-02-14 ENCOUNTER — LAB VISIT (OUTPATIENT)
Dept: LAB | Facility: HOSPITAL | Age: 65
End: 2025-02-14
Attending: FAMILY MEDICINE
Payer: COMMERCIAL

## 2025-02-14 ENCOUNTER — OFFICE VISIT (OUTPATIENT)
Dept: INTERNAL MEDICINE | Facility: CLINIC | Age: 65
End: 2025-02-14
Payer: COMMERCIAL

## 2025-02-14 VITALS
SYSTOLIC BLOOD PRESSURE: 124 MMHG | RESPIRATION RATE: 18 BRPM | DIASTOLIC BLOOD PRESSURE: 76 MMHG | BODY MASS INDEX: 31.18 KG/M2 | TEMPERATURE: 97 F | OXYGEN SATURATION: 97 % | HEIGHT: 71 IN | WEIGHT: 222.69 LBS | HEART RATE: 94 BPM

## 2025-02-14 DIAGNOSIS — Z85.05 HISTORY OF HEPATOCELLULAR CARCINOMA: Chronic | ICD-10-CM

## 2025-02-14 DIAGNOSIS — I15.2 HYPERTENSION ASSOCIATED WITH TYPE 2 DIABETES MELLITUS: Chronic | ICD-10-CM

## 2025-02-14 DIAGNOSIS — D42.0 ATYPICAL INTRACRANIAL MENINGIOMA: Chronic | ICD-10-CM

## 2025-02-14 DIAGNOSIS — E11.69 TYPE 2 DIABETES MELLITUS WITH OTHER SPECIFIED COMPLICATION, WITH LONG-TERM CURRENT USE OF INSULIN: Chronic | ICD-10-CM

## 2025-02-14 DIAGNOSIS — Z79.4 TYPE 2 DIABETES MELLITUS WITH OTHER SPECIFIED COMPLICATION, WITH LONG-TERM CURRENT USE OF INSULIN: Chronic | ICD-10-CM

## 2025-02-14 DIAGNOSIS — K92.2 GI BLEED: ICD-10-CM

## 2025-02-14 DIAGNOSIS — K92.1 MELENA: Primary | ICD-10-CM

## 2025-02-14 DIAGNOSIS — K92.1 MELENA: ICD-10-CM

## 2025-02-14 DIAGNOSIS — E11.59 HYPERTENSION ASSOCIATED WITH TYPE 2 DIABETES MELLITUS: Chronic | ICD-10-CM

## 2025-02-14 LAB
ABO + RH BLD: NORMAL
ALBUMIN SERPL BCP-MCNC: 3.4 G/DL (ref 3.5–5.2)
ALP SERPL-CCNC: 175 U/L (ref 40–150)
ALT SERPL W/O P-5'-P-CCNC: 46 U/L (ref 10–44)
ANION GAP SERPL CALC-SCNC: 9 MMOL/L (ref 8–16)
AST SERPL-CCNC: 15 U/L (ref 10–40)
BASOPHILS # BLD AUTO: 0.02 K/UL (ref 0–0.2)
BASOPHILS NFR BLD: 0.3 % (ref 0–1.9)
BILIRUB SERPL-MCNC: 1.7 MG/DL (ref 0.1–1)
BLD GP AB SCN CELLS X3 SERPL QL: NORMAL
BUN SERPL-MCNC: 24 MG/DL (ref 8–23)
CALCIUM SERPL-MCNC: 8.6 MG/DL (ref 8.7–10.5)
CHLORIDE SERPL-SCNC: 105 MMOL/L (ref 95–110)
CO2 SERPL-SCNC: 22 MMOL/L (ref 23–29)
CREAT SERPL-MCNC: 1 MG/DL (ref 0.5–1.4)
DIFFERENTIAL METHOD BLD: ABNORMAL
EOSINOPHIL # BLD AUTO: 0.1 K/UL (ref 0–0.5)
EOSINOPHIL NFR BLD: 1.5 % (ref 0–8)
ERYTHROCYTE [DISTWIDTH] IN BLOOD BY AUTOMATED COUNT: 13.8 % (ref 11.5–14.5)
ERYTHROCYTE [DISTWIDTH] IN BLOOD BY AUTOMATED COUNT: 13.8 % (ref 11.5–14.5)
EST. GFR  (NO RACE VARIABLE): >60 ML/MIN/1.73 M^2
GLUCOSE SERPL-MCNC: 284 MG/DL (ref 70–110)
HCT VFR BLD AUTO: 30.4 % (ref 40–54)
HCT VFR BLD AUTO: 31.2 % (ref 40–54)
HGB BLD-MCNC: 10.2 G/DL (ref 14–18)
HGB BLD-MCNC: 10.6 G/DL (ref 14–18)
IMM GRANULOCYTES # BLD AUTO: 0.02 K/UL (ref 0–0.04)
IMM GRANULOCYTES NFR BLD AUTO: 0.3 % (ref 0–0.5)
LIPASE SERPL-CCNC: 60 U/L (ref 4–60)
LYMPHOCYTES # BLD AUTO: 2.6 K/UL (ref 1–4.8)
LYMPHOCYTES NFR BLD: 43.6 % (ref 18–48)
MAGNESIUM SERPL-MCNC: 1.6 MG/DL (ref 1.6–2.6)
MCH RBC QN AUTO: 28.9 PG (ref 27–31)
MCH RBC QN AUTO: 28.9 PG (ref 27–31)
MCHC RBC AUTO-ENTMCNC: 33.6 G/DL (ref 32–36)
MCHC RBC AUTO-ENTMCNC: 34 G/DL (ref 32–36)
MCV RBC AUTO: 85 FL (ref 82–98)
MCV RBC AUTO: 86 FL (ref 82–98)
MONOCYTES # BLD AUTO: 0.4 K/UL (ref 0.3–1)
MONOCYTES NFR BLD: 7.3 % (ref 4–15)
NEUTROPHILS # BLD AUTO: 2.8 K/UL (ref 1.8–7.7)
NEUTROPHILS NFR BLD: 47 % (ref 38–73)
NRBC BLD-RTO: 0 /100 WBC
OB PNL STL: POSITIVE
OB PNL STL: POSITIVE
PLATELET # BLD AUTO: 153 K/UL (ref 150–450)
PLATELET # BLD AUTO: 163 K/UL (ref 150–450)
PMV BLD AUTO: 9.6 FL (ref 9.2–12.9)
PMV BLD AUTO: 9.9 FL (ref 9.2–12.9)
POTASSIUM SERPL-SCNC: 3.9 MMOL/L (ref 3.5–5.1)
PROT SERPL-MCNC: 6.3 G/DL (ref 6–8.4)
RBC # BLD AUTO: 3.53 M/UL (ref 4.6–6.2)
RBC # BLD AUTO: 3.67 M/UL (ref 4.6–6.2)
SODIUM SERPL-SCNC: 136 MMOL/L (ref 136–145)
SPECIMEN OUTDATE: NORMAL
WBC # BLD AUTO: 6.01 K/UL (ref 3.9–12.7)
WBC # BLD AUTO: 6.69 K/UL (ref 3.9–12.7)

## 2025-02-14 PROCEDURE — 82272 OCCULT BLD FECES 1-3 TESTS: CPT | Mod: 91 | Performed by: FAMILY MEDICINE

## 2025-02-14 PROCEDURE — 36415 COLL VENOUS BLD VENIPUNCTURE: CPT | Performed by: FAMILY MEDICINE

## 2025-02-14 PROCEDURE — 3078F DIAST BP <80 MM HG: CPT | Mod: CPTII,S$GLB,, | Performed by: FAMILY MEDICINE

## 2025-02-14 PROCEDURE — 63600175 PHARM REV CODE 636 W HCPCS: Performed by: FAMILY MEDICINE

## 2025-02-14 PROCEDURE — 3074F SYST BP LT 130 MM HG: CPT | Mod: CPTII,S$GLB,, | Performed by: FAMILY MEDICINE

## 2025-02-14 PROCEDURE — 93005 ELECTROCARDIOGRAM TRACING: CPT

## 2025-02-14 PROCEDURE — G2211 COMPLEX E/M VISIT ADD ON: HCPCS | Mod: S$GLB,,, | Performed by: FAMILY MEDICINE

## 2025-02-14 PROCEDURE — 99285 EMERGENCY DEPT VISIT HI MDM: CPT | Mod: 25

## 2025-02-14 PROCEDURE — 80053 COMPREHEN METABOLIC PANEL: CPT | Performed by: NURSE PRACTITIONER

## 2025-02-14 PROCEDURE — 3008F BODY MASS INDEX DOCD: CPT | Mod: CPTII,S$GLB,, | Performed by: FAMILY MEDICINE

## 2025-02-14 PROCEDURE — 83735 ASSAY OF MAGNESIUM: CPT | Performed by: NURSE PRACTITIONER

## 2025-02-14 PROCEDURE — 96374 THER/PROPH/DIAG INJ IV PUSH: CPT

## 2025-02-14 PROCEDURE — 4010F ACE/ARB THERAPY RXD/TAKEN: CPT | Mod: CPTII,S$GLB,, | Performed by: FAMILY MEDICINE

## 2025-02-14 PROCEDURE — 85027 COMPLETE CBC AUTOMATED: CPT | Performed by: FAMILY MEDICINE

## 2025-02-14 PROCEDURE — 1111F DSCHRG MED/CURRENT MED MERGE: CPT | Mod: CPTII,S$GLB,, | Performed by: FAMILY MEDICINE

## 2025-02-14 PROCEDURE — 83690 ASSAY OF LIPASE: CPT | Performed by: NURSE PRACTITIONER

## 2025-02-14 PROCEDURE — 99999 PR PBB SHADOW E&M-EST. PATIENT-LVL IV: CPT | Mod: PBBFAC,,, | Performed by: FAMILY MEDICINE

## 2025-02-14 PROCEDURE — 99214 OFFICE O/P EST MOD 30 MIN: CPT | Mod: S$GLB,,, | Performed by: FAMILY MEDICINE

## 2025-02-14 PROCEDURE — 86901 BLOOD TYPING SEROLOGIC RH(D): CPT | Performed by: NURSE PRACTITIONER

## 2025-02-14 PROCEDURE — 85025 COMPLETE CBC W/AUTO DIFF WBC: CPT | Performed by: NURSE PRACTITIONER

## 2025-02-14 RX ORDER — PANTOPRAZOLE SODIUM 40 MG/10ML
80 INJECTION, POWDER, LYOPHILIZED, FOR SOLUTION INTRAVENOUS
Status: COMPLETED | OUTPATIENT
Start: 2025-02-14 | End: 2025-02-14

## 2025-02-14 RX ORDER — PANTOPRAZOLE SODIUM 40 MG/10ML
40 INJECTION, POWDER, LYOPHILIZED, FOR SOLUTION INTRAVENOUS EVERY 12 HOURS
Status: DISCONTINUED | OUTPATIENT
Start: 2025-02-15 | End: 2025-02-16 | Stop reason: HOSPADM

## 2025-02-14 RX ORDER — SODIUM CHLORIDE 9 MG/ML
1000 INJECTION, SOLUTION INTRAVENOUS
Status: COMPLETED | OUTPATIENT
Start: 2025-02-15 | End: 2025-02-15

## 2025-02-14 RX ADMIN — PANTOPRAZOLE SODIUM 80 MG: 40 INJECTION, POWDER, LYOPHILIZED, FOR SOLUTION INTRAVENOUS at 08:02

## 2025-02-14 NOTE — PROGRESS NOTES
TELEMEDICINE VIRTUAL VIDEO VISIT  2/14/25  7:20 AM CST    Visit Type: Audiovisual    Patient's Location: Jed represents that they are located within the state [facility].    CHIEF COMPLAINT: Follow-up    Recent hospitalization notes reviewed. He says that his chronic conditions are stable. The only new complaint is dark, tarry stool for the last week. No bright red blood per rectum. No abdominal pain. No hematemesis or vomiting. No fever. We discussed the differential diagnosis.     He agreed to come in today for a STAT CBC and fecal occult blood test.      1. Melena  -     Cancel: Occult blood x 1, stool; Future; Expected date: 02/14/2025  -     CBC Without Differential; Future; Expected date: 02/14/2025  -     POCT Occult Blood Stool    2. History of hepatocellular carcinoma, in remission after liver transplant  Overview:  Synoptic Report  Procedure: Total hepatectomy.  Histologic type: Hepatocellular carcinoma.  Histologic grade: G1, well-differentiated.  Tumor focality: Multifocal.  Tumor characteristics:  Tumor #1 (blocks 2B-2E): Tumor site: Right lobe. Tumor size: 6.2 cm in greatest dimension grossly. Treatment effect: Complete necrosis (no viable tumor).  Tumor #2 (blocks 2F-2H): Tumor site: Right lobe. Tumor size: 4.2 cm in greatest dimension grossly. Treatment effect: Complete necrosis (no viable tumor).  Tumor #3 (blocks 2I-2J): Tumor site: Right lobe. Tumor size: approximately 1.3 cm largest focus measured on glass slide (multifocal reticulin loss arising in a 4 cm nodule). Treatment effect: No known pre-surgical therapy.  Tumor extent: Tumor confined to liver.  Vascular invasion: Not identified.  Margin status: All margins negative for invasive carcinoma. Closest margin to invasive carcinoma: Hilar. Distance from invasive carcinoma to closest margin: 9.2 cm from hilar margin grossly.  Regional lymph node status: Not applicable (no regional lymph nodes submitted or found.  PATHOLOGIC STAGE  CLASSIFICATION: (pTNM, AJCC 8th Edition): y p T1a NX.      No other significant complaints or concerns were reported.  Today's visit involved the intricate management of episodic problem(s) and the ongoing care for the patient's serious or complex condition(s) listed above, reflecting the inherent complexity of providing longitudinal, comprehensive evaluation and management as the central hub for the patient's primary care services.  There were no vitals filed for this visit.  PHYSICAL EXAM:  GENERAL APPEARANCE:  - Alert and grossly oriented.  - No apparent distress, breathing comfortably.     EYES:  - Sclera without icterus.     EARS, NOSE, AND THROAT:  - No visible abnormalities.     RESPIRATORY:  - No respiratory distress.  - No audible wheezing or cough.     PSYCHIATRIC:  - Mood and affect appropriate; behavior cooperative.  Review of Systems   Constitutional:  Negative for activity change and unexpected weight change.   HENT:  Negative for hearing loss, rhinorrhea and trouble swallowing.    Eyes:  Negative for discharge and visual disturbance.   Respiratory:  Negative for chest tightness and wheezing.    Cardiovascular:  Negative for chest pain and palpitations.   Gastrointestinal:  Negative for blood in stool, constipation, diarrhea and vomiting.   Endocrine: Negative for polydipsia and polyuria.   Genitourinary:  Negative for difficulty urinating, hematuria and urgency.   Musculoskeletal:  Negative for arthralgias, joint swelling and neck pain.   Neurological:  Negative for weakness and headaches.   Psychiatric/Behavioral:  Negative for confusion and dysphoric mood.      I spent a total of 15 minutes today evaluating and managing this patient for this encounter.  This includes face to face time and non-face to face time preparing to see the patient (eg, review of tests), obtaining and/or reviewing separately obtained history, documenting clinical information in the electronic or other health record,  "independently interpreting results and communicating results to the patient/family/caregiver, or care coordinator.  This time was exclusive of any separately billable procedures for this patient and exclusive of time spent treating any other patient.    Documentation entered by me for this encounter may have been done in part using speech-recognition technology. Although I have made an effort to ensure accuracy, "sound like" errors may exist and should be interpreted in context.  Each patient to whom medical services are provided by telemedicine is: (1) informed of the relationship between the physician and patient and the respective role of any other health care provider with respect to management of the patient; and (2) notified that he or she may decline to receive medical services by telemedicine and may withdraw from such care at any time.    INSTRUCTIONS TO STAFF: Please overbook him at 1 p.m. today on my schedule as an in-office appointment and instruct him to show up at 12:40 p.m. This is going to be a quick appointment where I do a digital rectal exam to obtain a stool specimen for the fecal occult blood test. Please have fecal occult blood test materials in the room. Instruct him to get his CBC done about 40 minutes before his appointment.      "

## 2025-02-14 NOTE — Clinical Note
Please overbook him at 1 p.m. on my schedule today as an in-office appointment and instruct him to show up at 12:40 p.m. This is going to be a quick appointment where I do a digital rectal exam to obtain a stool specimen for the fecal occult blood test. Please have fecal occult blood test materials in the room. Instruct him to get his CBC done about 40 minutes before his appointment.

## 2025-02-14 NOTE — FIRST PROVIDER EVALUATION
Emergency Department TeleTriage Encounter Note      CHIEF COMPLAINT    Chief Complaint   Patient presents with    Rectal Bleeding     Black stools x 2 days. ERCP done one week ago with stent place in bilary duct. Liver transplant 8/10/2023. Denies weakness, dizziness or SOB.        VITAL SIGNS   Initial Vitals [02/14/25 1603]   BP Pulse Resp Temp SpO2   128/76 91 20 97.5 °F (36.4 °C) 96 %      MAP       --            ALLERGIES    Review of patient's allergies indicates:  No Known Allergies    PROVIDER TRIAGE NOTE  This is a teletriage evaluation of a 65 y.o. male presenting to the ED complaining of black stools for two days. Recent ERCP. Pt on anticoagulants. No hematemesis or abd pain.    Alert, no distress.     Initial orders will be placed and care will be transferred to an alternate provider when patient is roomed for a full evaluation. Any additional orders and the final disposition will be determined by that provider.         ORDERS  Labs Reviewed   CBC W/ AUTO DIFFERENTIAL   COMPREHENSIVE METABOLIC PANEL   TYPE & SCREEN       ED Orders (720h ago, onward)      Start Ordered     Status Ordering Provider    02/14/25 1727 02/14/25 1727  Saline lock IV (18 ga. or larger)  Once         Ordered SCHOTTELKOTTE, ARAM N.    02/14/25 1727 02/14/25 1727  2nd Saline lock IV (18 ga. or larger)  Once         Ordered SCHOTTELKOTTE, ARAM N.    02/14/25 1727 02/14/25 1727  NPO (Ice Chips)  Once         Ordered SCHOTTELKOTTE, ARAM N.    02/14/25 1727 02/14/25 1727  CBC auto differential  STAT         Ordered SCHOTTELKOTTE ARMA N.    02/14/25 1727 02/14/25 1727  Comprehensive metabolic panel  STAT         Ordered SCHOTTELKOTTE, ARAM N.    02/14/25 1727 02/14/25 1727  Type & Screen  STAT         Ordered SCHOTTELKOTTE ARAM N.    02/14/25 1727 02/14/25 1727  Vital signs  Once         Ordered SCHOTTELKOTTE, ARAM N.    02/14/25 1727 02/14/25 1727  Cardiac Monitoring - Adult  Continuous        Comments: Notify  Physician If:    Ordered ARAM CUMMINS N.    02/14/25 1727 02/14/25 1727  Pulse Oximetry Continuous  Continuous         Ordered ARAM CUMMINS N.    02/14/25 1727 02/14/25 1727  EKG 12-lead  Once         Ordered ARAM CUMMINS              Virtual Visit Note: The provider triage portion of this emergency department evaluation and documentation was performed via [employer], a HIPAA-compliant telemedicine application, in concert with a tele-presenter in the room. A face to face patient evaluation with one of my colleagues will occur once the patient is placed in an emergency department room.      DISCLAIMER: This note was prepared with [employer] voice recognition transcription software. Garbled syntax, mangled pronouns, and other bizarre constructions may be attributed to that software system.

## 2025-02-14 NOTE — TELEPHONE ENCOUNTER
See note.  ----- Message from [employer] sent at 2/14/2025  3:07 PM CST -----  Contact: Leida johnston     Consult/Advisory     Name Of Caller:Leida johnston        Contact Preference:[phone]     Nature of call:Patients wife is calling to speak to Linda about husbands stool is now black. Requesting a call back ASAP

## 2025-02-14 NOTE — PROGRESS NOTES
OFFICE VISIT 2/14/25  1:00 PM CST    CHIEF COMPLAINT: Dark tarry stool  Recent hospitalization notes reviewed. He says that his chronic conditions are stable. The only new complaint is dark, tarry stool for the last week. No bright red blood per rectum. No abdominal pain. No hematemesis or vomiting. No fever. We discussed the differential diagnosis.     He agreed to come in today for a STAT CBC and fecal occult blood test.      1. Melena  -     Cancel: Occult blood x 1, stool; Future; Expected date: 02/14/2025  -     Cancel: Occult blood x 1, stool; Future; Expected date: 02/14/2025  -     Occult blood x 1, stool; Future; Expected date: 02/14/2025  -     Occult blood x 1, stool; Future; Expected date: 02/14/2025    2. History of hepatocellular carcinoma, in remission after liver transplant  Overview:  Synoptic Report  Procedure: Total hepatectomy.  Histologic type: Hepatocellular carcinoma.  Histologic grade: G1, well-differentiated.  Tumor focality: Multifocal.  Tumor characteristics:  Tumor #1 (blocks 2B-2E): Tumor site: Right lobe. Tumor size: 6.2 cm in greatest dimension grossly. Treatment effect: Complete necrosis (no viable tumor).  Tumor #2 (blocks 2F-2H): Tumor site: Right lobe. Tumor size: 4.2 cm in greatest dimension grossly. Treatment effect: Complete necrosis (no viable tumor).  Tumor #3 (blocks 2I-2J): Tumor site: Right lobe. Tumor size: approximately 1.3 cm largest focus measured on glass slide (multifocal reticulin loss arising in a 4 cm nodule). Treatment effect: No known pre-surgical therapy.  Tumor extent: Tumor confined to liver.  Vascular invasion: Not identified.  Margin status: All margins negative for invasive carcinoma. Closest margin to invasive carcinoma: Hilar. Distance from invasive carcinoma to closest margin: 9.2 cm from hilar margin grossly.  Regional lymph node status: Not applicable (no regional lymph nodes submitted or found.  PATHOLOGIC STAGE CLASSIFICATION: (pTNM, AJCC 8th  Edition): y p T1a NX.      3. Type 2 diabetes mellitus with other specified complication, with long-term current use of insulin  Assessment & Plan:  Diabetes Management Status    Statin: Not taking  ACE/ARB: Taking    Screening or Prevention Patient's value Goal Complete/Controlled?   HgA1C Testing and Control   Lab Results   Component Value Date    HGBA1C 6.3 (H) 12/16/2024      Annually/Less than 8% Yes   Lipid profile : 12/16/2024 Annually Yes   LDL control Lab Results   Component Value Date    LDLCALC 99.0 12/16/2024    Annually/Less than 100 mg/dl  Yes   Nephropathy screening Lab Results   Component Value Date    LABMICR 36.0 12/16/2024     Lab Results   Component Value Date    PROTEINUA Negative 02/17/2025    Annually Yes   Blood pressure BP Readings from Last 1 Encounters:   02/24/25 138/80    Less than 140/90 Yes   Dilated retinal exam : 02/03/2025 Annually Yes     Lab Results   Component Value Date    HGBA1C 6.3 (H) 12/16/2024    HGBA1C 5.9 (H) 10/10/2024    HGBA1C 6.3 (H) 04/11/2024    EGFRNORACEVR >60 02/24/2025    MICALBCREAT 13.3 12/16/2024    LDLCALC 99.0 12/16/2024     Lab Results   Component Value Date    GLUTAMICACID 0.00 04/11/2024    CPEPTIDE 1.78 04/11/2024      Last 5 Blood Glucose Readings           No data to display               HEALTH MAINTENANCE: Diabetic health maintenance interventions reviewed and are up to date except for:  There are no preventive care reminders to display for this patient.       4. Atypical intracranial meningioma  Overview:  9/4/2024 MRI Brain w/wo Contrast: Stable appearance of the left frontal lobe meningioma resection site with no abnormal postcontrast enhancement to suggest recurrence or significant residual disease.    Assessment & Plan:  Clinically stable.   PLAN: Repeat Brain MRI 2/27/25    Future Appointments   Date Time Provider Department Center   2/27/2025  9:00 AM [facility] MRI1 [facility] MRI Denys Sosa          5. Hypertension associated with type 2 diabetes  "mellitus  Assessment & Plan:  BP Readings from Encounter:   02/14/25 124/76   ASSESSMENT: This is a chronic problem that appears compensated/controlled and stable on carvedilol and losartan.  PLAN: Continue present treatment plan.         PRESCRIPTION DRUG MANAGEMENT  Jed was instructed to continue current prescription medications (carvedilol and losartan). No refills required at this time.   No other significant complaints or concerns were reported.  Unless specified otherwise, chronic conditions are represented as and appear to be compensated/controlled and stable.  Today's visit involved the intricate management of episodic problem(s) and the ongoing care for the patient's serious or complex condition(s) listed above, reflecting the inherent complexity of providing longitudinal, comprehensive evaluation and management as the central hub for the patient's primary care services.    Except as noted herein, ROS is otherwise negative.    Vitals:    02/14/25 1255   BP: 124/76   BP Location: Left arm   Patient Position: Sitting   Pulse: 94   Resp: 18   Temp: 97 °F (36.1 °C)   TempSrc: Tympanic   SpO2: 97%   Weight: 101 kg (222 lb 10.6 oz)   Height: 5' 11" (1.803 m)   Physical Exam  Vitals reviewed.   Constitutional:       General: He is not in acute distress.     Appearance: Normal appearance. He is not ill-appearing, toxic-appearing or diaphoretic.   HENT:      Head: Normocephalic and atraumatic.   Eyes:      General: No scleral icterus.     Conjunctiva/sclera: Conjunctivae normal.   Cardiovascular:      Rate and Rhythm: Normal rate and regular rhythm.   Pulmonary:      Effort: Pulmonary effort is normal.      Breath sounds: Normal breath sounds.   Abdominal:      General: Bowel sounds are normal.      Palpations: Abdomen is soft.      Tenderness: There is no abdominal tenderness.   Skin:     General: Skin is warm and dry.   Neurological:      Mental Status: He is alert and oriented to person, place, and time. Mental " "status is at baseline.   Psychiatric:         Mood and Affect: Mood normal.         Behavior: Behavior normal.         Judgment: Judgment normal.       Documentation entered by me for this encounter may have been done in part using speech-recognition technology. Although I have made an effort to ensure accuracy, "sound like" errors may exist and should be interpreted in context.  "

## 2025-02-14 NOTE — PROGRESS NOTES
TO MY TEAM:  Please contact him to let him know that his stool was positive for blood and that he has lost a significant amount of blood over the last three days. He needs to go to the emergency department without delay. I anticipate that they will admit him to the hospital to identify the source of the bleeding and to monitor him closely so that they can care for him in case the bleeding escalates.    Lab Visit on 02/14/2025   Component Date Value Ref Range Status    Occult Blood 02/14/2025 Positive (A)  Negative Final    Occult Blood 02/14/2025 Positive (A)  Negative Final     Lab Results   Component Value Date    HGB 10.6 (L) 02/14/2025    HGB 14.4 02/11/2025    HGB 15.0 02/08/2025    HGB 14.5 02/07/2025    HCT 31.2 (L) 02/14/2025    HCT 43.3 02/11/2025    HCT 45.4 02/08/2025    HCT 46.7 02/07/2025

## 2025-02-14 NOTE — TELEPHONE ENCOUNTER
Returned call, spoke to pt's wife.  She reports PCP requested pt to go to local ER.  Notified pt's wife that if needed [facility] will contact transplant department/transfer center is pt needs to come to [facility] for treatment.  Pt's wife verbalized understanding.  ----- Message from [employer] sent at 2/14/2025  4:10 PM CST -----  Regarding: Consult/Advisory  Contact: Leida johnston     Consult/Advisory     Name Of Caller:Leida johnston        Contact Preference:[phone]      Nature of call:Patients wife is calling to speak to Linda and let her know there at the ER [facility] and needs call back ASAP.

## 2025-02-14 NOTE — ASSESSMENT & PLAN NOTE
Clinically stable.   PLAN: Repeat Brain MRI 2/27/25    Future Appointments   Date Time Provider Department Center   2/27/2025  9:00 AM [facility] MRI1 [facility] MRI Denys Sosa

## 2025-02-14 NOTE — ASSESSMENT & PLAN NOTE
Diabetes Management Status    Statin: Not taking  ACE/ARB: Taking    Screening or Prevention Patient's value Goal Complete/Controlled?   HgA1C Testing and Control   Lab Results   Component Value Date    HGBA1C 6.3 (H) 12/16/2024      Annually/Less than 8% Yes   Lipid profile : 12/16/2024 Annually Yes   LDL control Lab Results   Component Value Date    LDLCALC 99.0 12/16/2024    Annually/Less than 100 mg/dl  Yes   Nephropathy screening Lab Results   Component Value Date    LABMICR 36.0 12/16/2024     Lab Results   Component Value Date    PROTEINUA Negative 10/24/2023    Annually Yes   Blood pressure BP Readings from Last 1 Encounters:   02/08/25 (!) 155/91    Less than 140/90 No   Dilated retinal exam : 02/03/2025 Annually Yes   Foot exam   : 10/19/2023 Annually No     Lab Results   Component Value Date    HGBA1C 6.3 (H) 12/16/2024    HGBA1C 5.9 (H) 10/10/2024    HGBA1C 6.3 (H) 04/11/2024    EGFRNORACEVR >60 02/11/2025    MICALBCREAT 13.3 12/16/2024    LDLCALC 99.0 12/16/2024     Lab Results   Component Value Date    GLUTAMICACID 0.00 04/11/2024    CPEPTIDE 1.78 04/11/2024      HEALTH MAINTENANCE: Diabetic health maintenance interventions reviewed and are up to date except for:  There are no preventive care reminders to display for this patient.

## 2025-02-15 PROBLEM — K92.1 MELENA: Status: ACTIVE | Noted: 2025-02-15

## 2025-02-15 LAB
ALBUMIN SERPL BCP-MCNC: 3.2 G/DL (ref 3.5–5.2)
ALP SERPL-CCNC: 159 U/L (ref 40–150)
ALT SERPL W/O P-5'-P-CCNC: 40 U/L (ref 10–44)
ANION GAP SERPL CALC-SCNC: 9 MMOL/L (ref 8–16)
APTT PPP: 26.9 SEC (ref 21–32)
AST SERPL-CCNC: 15 U/L (ref 10–40)
BASOPHILS # BLD AUTO: 0.01 K/UL (ref 0–0.2)
BASOPHILS NFR BLD: 0.2 % (ref 0–1.9)
BILIRUB SERPL-MCNC: 2 MG/DL (ref 0.1–1)
BUN SERPL-MCNC: 15 MG/DL (ref 8–23)
CALCIUM SERPL-MCNC: 8.2 MG/DL (ref 8.7–10.5)
CHLORIDE SERPL-SCNC: 108 MMOL/L (ref 95–110)
CO2 SERPL-SCNC: 24 MMOL/L (ref 23–29)
CREAT SERPL-MCNC: 0.8 MG/DL (ref 0.5–1.4)
DIFFERENTIAL METHOD BLD: ABNORMAL
EOSINOPHIL # BLD AUTO: 0.1 K/UL (ref 0–0.5)
EOSINOPHIL NFR BLD: 1.5 % (ref 0–8)
ERYTHROCYTE [DISTWIDTH] IN BLOOD BY AUTOMATED COUNT: 13.8 % (ref 11.5–14.5)
ERYTHROCYTE [DISTWIDTH] IN BLOOD BY AUTOMATED COUNT: 14.1 % (ref 11.5–14.5)
EST. GFR  (NO RACE VARIABLE): >60 ML/MIN/1.73 M^2
GLUCOSE SERPL-MCNC: 171 MG/DL (ref 70–110)
HCT VFR BLD AUTO: 27.7 % (ref 40–54)
HCT VFR BLD AUTO: 30.2 % (ref 40–54)
HGB BLD-MCNC: 10 G/DL (ref 14–18)
HGB BLD-MCNC: 9.1 G/DL (ref 14–18)
IMM GRANULOCYTES # BLD AUTO: 0.02 K/UL (ref 0–0.04)
IMM GRANULOCYTES NFR BLD AUTO: 0.4 % (ref 0–0.5)
INR PPP: 1 (ref 0.8–1.2)
LYMPHOCYTES # BLD AUTO: 2 K/UL (ref 1–4.8)
LYMPHOCYTES NFR BLD: 41.3 % (ref 18–48)
MAGNESIUM SERPL-MCNC: 1.6 MG/DL (ref 1.6–2.6)
MCH RBC QN AUTO: 28.3 PG (ref 27–31)
MCH RBC QN AUTO: 28.7 PG (ref 27–31)
MCHC RBC AUTO-ENTMCNC: 32.9 G/DL (ref 32–36)
MCHC RBC AUTO-ENTMCNC: 33.1 G/DL (ref 32–36)
MCV RBC AUTO: 86 FL (ref 82–98)
MCV RBC AUTO: 87 FL (ref 82–98)
MONOCYTES # BLD AUTO: 0.3 K/UL (ref 0.3–1)
MONOCYTES NFR BLD: 7.1 % (ref 4–15)
NEUTROPHILS # BLD AUTO: 2.4 K/UL (ref 1.8–7.7)
NEUTROPHILS NFR BLD: 49.5 % (ref 38–73)
NRBC BLD-RTO: 0 /100 WBC
PHOSPHATE SERPL-MCNC: 3.1 MG/DL (ref 2.7–4.5)
PLATELET # BLD AUTO: 124 K/UL (ref 150–450)
PLATELET # BLD AUTO: 146 K/UL (ref 150–450)
PMV BLD AUTO: 9.8 FL (ref 9.2–12.9)
PMV BLD AUTO: 9.9 FL (ref 9.2–12.9)
POCT GLUCOSE: 180 MG/DL (ref 70–110)
POTASSIUM SERPL-SCNC: 4 MMOL/L (ref 3.5–5.1)
PROT SERPL-MCNC: 6 G/DL (ref 6–8.4)
PROTHROMBIN TIME: 11 SEC (ref 9–12.5)
RBC # BLD AUTO: 3.21 M/UL (ref 4.6–6.2)
RBC # BLD AUTO: 3.49 M/UL (ref 4.6–6.2)
SODIUM SERPL-SCNC: 141 MMOL/L (ref 136–145)
WBC # BLD AUTO: 4.49 K/UL (ref 3.9–12.7)
WBC # BLD AUTO: 4.82 K/UL (ref 3.9–12.7)

## 2025-02-15 PROCEDURE — 63600175 PHARM REV CODE 636 W HCPCS: Performed by: FAMILY MEDICINE

## 2025-02-15 PROCEDURE — 96376 TX/PRO/DX INJ SAME DRUG ADON: CPT

## 2025-02-15 PROCEDURE — 63600175 PHARM REV CODE 636 W HCPCS: Performed by: STUDENT IN AN ORGANIZED HEALTH CARE EDUCATION/TRAINING PROGRAM

## 2025-02-15 PROCEDURE — 83735 ASSAY OF MAGNESIUM: CPT | Performed by: STUDENT IN AN ORGANIZED HEALTH CARE EDUCATION/TRAINING PROGRAM

## 2025-02-15 PROCEDURE — 84100 ASSAY OF PHOSPHORUS: CPT | Performed by: STUDENT IN AN ORGANIZED HEALTH CARE EDUCATION/TRAINING PROGRAM

## 2025-02-15 PROCEDURE — 85730 THROMBOPLASTIN TIME PARTIAL: CPT | Performed by: FAMILY MEDICINE

## 2025-02-15 PROCEDURE — 85027 COMPLETE CBC AUTOMATED: CPT | Performed by: FAMILY MEDICINE

## 2025-02-15 PROCEDURE — 85610 PROTHROMBIN TIME: CPT | Performed by: FAMILY MEDICINE

## 2025-02-15 PROCEDURE — 80053 COMPREHEN METABOLIC PANEL: CPT | Performed by: STUDENT IN AN ORGANIZED HEALTH CARE EDUCATION/TRAINING PROGRAM

## 2025-02-15 PROCEDURE — 85025 COMPLETE CBC W/AUTO DIFF WBC: CPT | Performed by: STUDENT IN AN ORGANIZED HEALTH CARE EDUCATION/TRAINING PROGRAM

## 2025-02-15 PROCEDURE — 96361 HYDRATE IV INFUSION ADD-ON: CPT

## 2025-02-15 PROCEDURE — 25000003 PHARM REV CODE 250: Performed by: STUDENT IN AN ORGANIZED HEALTH CARE EDUCATION/TRAINING PROGRAM

## 2025-02-15 PROCEDURE — 82962 GLUCOSE BLOOD TEST: CPT

## 2025-02-15 PROCEDURE — 25000003 PHARM REV CODE 250: Performed by: FAMILY MEDICINE

## 2025-02-15 RX ORDER — TALC
6 POWDER (GRAM) TOPICAL NIGHTLY PRN
Status: DISCONTINUED | OUTPATIENT
Start: 2025-02-15 | End: 2025-02-16 | Stop reason: HOSPADM

## 2025-02-15 RX ORDER — NAPROXEN SODIUM 220 MG/1
81 TABLET, FILM COATED ORAL DAILY
Status: DISCONTINUED | OUTPATIENT
Start: 2025-02-16 | End: 2025-02-16 | Stop reason: HOSPADM

## 2025-02-15 RX ORDER — ACETAMINOPHEN 325 MG/1
650 TABLET ORAL EVERY 6 HOURS PRN
Status: DISCONTINUED | OUTPATIENT
Start: 2025-02-15 | End: 2025-02-16 | Stop reason: HOSPADM

## 2025-02-15 RX ORDER — GLUCAGON 1 MG
1 KIT INJECTION
Status: DISCONTINUED | OUTPATIENT
Start: 2025-02-15 | End: 2025-02-16 | Stop reason: HOSPADM

## 2025-02-15 RX ORDER — ONDANSETRON HYDROCHLORIDE 2 MG/ML
4 INJECTION, SOLUTION INTRAVENOUS EVERY 8 HOURS PRN
Status: DISCONTINUED | OUTPATIENT
Start: 2025-02-15 | End: 2025-02-16 | Stop reason: HOSPADM

## 2025-02-15 RX ORDER — IBUPROFEN 200 MG
24 TABLET ORAL
Status: DISCONTINUED | OUTPATIENT
Start: 2025-02-15 | End: 2025-02-16 | Stop reason: HOSPADM

## 2025-02-15 RX ORDER — LOSARTAN POTASSIUM 50 MG/1
100 TABLET ORAL DAILY
Status: DISCONTINUED | OUTPATIENT
Start: 2025-02-15 | End: 2025-02-16 | Stop reason: HOSPADM

## 2025-02-15 RX ORDER — CARVEDILOL 12.5 MG/1
12.5 TABLET ORAL 2 TIMES DAILY WITH MEALS
Status: DISCONTINUED | OUTPATIENT
Start: 2025-02-15 | End: 2025-02-16 | Stop reason: HOSPADM

## 2025-02-15 RX ORDER — TACROLIMUS 1 MG/1
2 CAPSULE ORAL 2 TIMES DAILY
Status: DISCONTINUED | OUTPATIENT
Start: 2025-02-15 | End: 2025-02-16 | Stop reason: HOSPADM

## 2025-02-15 RX ORDER — IBUPROFEN 200 MG
16 TABLET ORAL
Status: DISCONTINUED | OUTPATIENT
Start: 2025-02-15 | End: 2025-02-16 | Stop reason: HOSPADM

## 2025-02-15 RX ORDER — URSODIOL 300 MG/1
300 CAPSULE ORAL 2 TIMES DAILY
Status: DISCONTINUED | OUTPATIENT
Start: 2025-02-15 | End: 2025-02-16 | Stop reason: HOSPADM

## 2025-02-15 RX ORDER — POLYETHYLENE GLYCOL 3350 17 G/17G
17 POWDER, FOR SOLUTION ORAL 2 TIMES DAILY PRN
Status: DISCONTINUED | OUTPATIENT
Start: 2025-02-15 | End: 2025-02-16 | Stop reason: HOSPADM

## 2025-02-15 RX ORDER — HYDRALAZINE HYDROCHLORIDE 20 MG/ML
5 INJECTION INTRAMUSCULAR; INTRAVENOUS EVERY 6 HOURS PRN
Status: DISCONTINUED | OUTPATIENT
Start: 2025-02-15 | End: 2025-02-16 | Stop reason: HOSPADM

## 2025-02-15 RX ORDER — PROCHLORPERAZINE EDISYLATE 5 MG/ML
2.5 INJECTION INTRAMUSCULAR; INTRAVENOUS EVERY 8 HOURS PRN
Status: DISCONTINUED | OUTPATIENT
Start: 2025-02-15 | End: 2025-02-16 | Stop reason: HOSPADM

## 2025-02-15 RX ADMIN — TACROLIMUS 2 MG: 1 CAPSULE ORAL at 01:02

## 2025-02-15 RX ADMIN — SODIUM CHLORIDE 1000 ML: 9 INJECTION, SOLUTION INTRAVENOUS at 12:02

## 2025-02-15 RX ADMIN — URSODIOL 300 MG: 300 CAPSULE ORAL at 10:02

## 2025-02-15 RX ADMIN — URSODIOL 300 MG: 300 CAPSULE ORAL at 02:02

## 2025-02-15 RX ADMIN — LOSARTAN POTASSIUM 100 MG: 50 TABLET, FILM COATED ORAL at 01:02

## 2025-02-15 RX ADMIN — CARVEDILOL 12.5 MG: 12.5 TABLET, FILM COATED ORAL at 01:02

## 2025-02-15 RX ADMIN — PANTOPRAZOLE SODIUM 40 MG: 40 INJECTION, POWDER, FOR SOLUTION INTRAVENOUS at 09:02

## 2025-02-15 NOTE — PROVIDER TRANSFER
"   Outside Transfer Acceptance Note / Regional Referral Center    Referring facility: [facility]  Referring provider: EMILE ALEXANDER  Accepting facility: [facility]  Accepting provider: TAJ ARANDA  Admitting provider: FILIPE ARANDA  Reason for transfer: Established Provider   Transfer diagnosis: Melena  Transfer specialty requested: Gastroenterology  Transfer specialty notified: No  Transfer level: NUMBER 1-5: 2  Bed type requested: MED TELE STEPDOWN  Isolation status: No active isolations   Admission class or status: IP- Inpatient    Narrative     Mr. Chávez is a 64yo man with a past medical history of abdominal hernia, alcoholic cirrhosis, hemorrhagic CVA left, DM2 (Insulin pump), HLD, hepatocellular carcinoma, hx of Meningioma s/p Left crani/resection, and liver XPL.    He was recently admitted to stepdown at [facility] on 2/3/25 to 2/8/25 due to elevated LFT's.  US Doppler: New biliary ductal dilation with echogenic debris within the common bile duct. Increased peak systolic velocity within the anastomosis of the main hepatic artery compared to prior. Associated mild intrahepatic tardus parvus waveforms. This could reflect hepatic artery stenosis. Similar tardus parvus waveforms within the liver. Mildly increased/improved resistive indices."     CTA abdomen 2/4/24 showed "A stent is noted within the proper hepatic artery without significant internal contrast opacification concerning for critical stenosis or occlusion. Common bile ductal dilatation proximal to the presumed site of anastomosis, similar to recent Doppler ultrasound and increased when compared to prior CT 08/29/2024."  AES performed ERCP/EUS on 2/7/25.  They noted, s/p EUS/ERCP on 2/7, severe biliary stricture treated with sphincterotomy and stent. Recommended ciprofloxacin 500 BID for 5 days. Also recommended ursodiol for biliary prophylaxis. Repeat ERCP in 6 weeks.    He now comes to the ED at [facility] with " complaints of melena x 4 days.  Per ED MD, Patient states he was seen at PCP today where he had blood work and a positive occult study and was advised to come to the ED. He notes he takes ASA and plavix and no other blood thinner. He denies any NSAID use.    Per [facility], Discussed case with GI at this facility. GI at our facility recommended transfer back to [facility] in [place].  I asked the sending MD to start Protonix 40mg iv q12 and IVFs.  He feels stable for stepdown telemetry bed, not a brisk bleed.  I also asked that he get PTT and INR.  Will need Hepatology consult and AES/GI.    In the ED his VS were BP (!) 128/76 -> 141/81   Pulse 84   Temp 97.5 °F (36.4 °C) (Oral)   Resp 22 -> 19   Wt 101.2 kg (223 lb)   SpO2 95% RA  BMI 31.10 kg/m².    Labs showed WBC 6, Hg 14.4 (2/11) -> 10.6 (2/14) -> 10.2 (today), , BUN 24, Cr 1, Gluc 284, Ca 8.6, , Alb 3.4, TB 1.7, AST 15, ALT 46, [place] 9.4 (2/11), No UA, Occult blood positive.    No radiographs done in the ED.    In the ED he was treated with:  Medications   pantoprazole injection 80 mg (80 mg Intravenous Given 2/14/25 2031)       Objective     Vitals: Temp:  [97 °F (36.1 °C)-97.5 °F (36.4 °C)] 97.5 °F (36.4 °C)  Pulse:  [82-94] 84  Resp:  [18-22] 19  SpO2:  [95 %-97 %] 95 %  BP: (124-141)/(76-81) 141/81     Recent Labs: All pertinent labs within the past 24 hours have been reviewed.  Recent Results (from the past 24 hours)   CBC Without Differential    Collection Time: 02/14/25 12:25 PM   Result Value Ref Range    WBC 6.69 3.90 - 12.70 K/uL    RBC 3.67 (L) 4.60 - 6.20 M/uL    Hemoglobin 10.6 (L) 14.0 - 18.0 g/dL    Hematocrit 31.2 (L) 40.0 - 54.0 %    MCV 85 82 - 98 fL    MCH 28.9 27.0 - 31.0 pg    MCHC 34.0 32.0 - 36.0 g/dL    RDW 13.8 11.5 - 14.5 %    Platelets 163 150 - 450 K/uL    MPV 9.6 9.2 - 12.9 fL   Occult blood x 1, stool    Collection Time: 02/14/25  1:32 PM    Specimen: Stool   Result Value Ref Range    Occult Blood Positive (A)  Negative   Occult blood x 1, stool    Collection Time: 02/14/25  1:32 PM    Specimen: Stool   Result Value Ref Range    Occult Blood Positive (A) Negative   CBC auto differential    Collection Time: 02/14/25  7:44 PM   Result Value Ref Range    WBC 6.01 3.90 - 12.70 K/uL    RBC 3.53 (L) 4.60 - 6.20 M/uL    Hemoglobin 10.2 (L) 14.0 - 18.0 g/dL    Hematocrit 30.4 (L) 40.0 - 54.0 %    MCV 86 82 - 98 fL    MCH 28.9 27.0 - 31.0 pg    MCHC 33.6 32.0 - 36.0 g/dL    RDW 13.8 11.5 - 14.5 %    Platelets 153 150 - 450 K/uL    MPV 9.9 9.2 - 12.9 fL    Immature Granulocytes 0.3 0.0 - 0.5 %    Gran # (ANC) 2.8 1.8 - 7.7 K/uL    Immature Grans (Abs) 0.02 0.00 - 0.04 K/uL    Lymph # 2.6 1.0 - 4.8 K/uL    Mono # 0.4 0.3 - 1.0 K/uL    Eos # 0.1 0.0 - 0.5 K/uL    Baso # 0.02 0.00 - 0.20 K/uL    nRBC 0 0 /100 WBC    Gran % 47.0 38.0 - 73.0 %    Lymph % 43.6 18.0 - 48.0 %    Mono % 7.3 4.0 - 15.0 %    Eosinophil % 1.5 0.0 - 8.0 %    Basophil % 0.3 0.0 - 1.9 %    Differential Method Automated    Comprehensive metabolic panel    Collection Time: 02/14/25  7:44 PM   Result Value Ref Range    Sodium 136 136 - 145 mmol/L    Potassium 3.9 3.5 - 5.1 mmol/L    Chloride 105 95 - 110 mmol/L    CO2 22 (L) 23 - 29 mmol/L    Glucose 284 (H) 70 - 110 mg/dL    BUN 24 (H) 8 - 23 mg/dL    Creatinine 1.0 0.5 - 1.4 mg/dL    Calcium 8.6 (L) 8.7 - 10.5 mg/dL    Total Protein 6.3 6.0 - 8.4 g/dL    Albumin 3.4 (L) 3.5 - 5.2 g/dL    Total Bilirubin 1.7 (H) 0.1 - 1.0 mg/dL    Alkaline Phosphatase 175 (H) 40 - 150 U/L    AST 15 10 - 40 U/L    ALT 46 (H) 10 - 44 U/L    eGFR >60 >60 mL/min/1.73 m^2    Anion Gap 9 8 - 16 mmol/L   Type & Screen    Collection Time: 02/14/25  7:44 PM   Result Value Ref Range    Group & Rh B POS     Indirect Pavithra NEG     Specimen Outdate 02/17/2025 23:59    Lipase    Collection Time: 02/14/25  7:44 PM   Result Value Ref Range    Lipase 60 4 - 60 U/L   Magnesium    Collection Time: 02/14/25  7:44 PM   Result Value Ref Range     Magnesium 1.6 1.6 - 2.6 mg/dL            IV access:        Peripheral IV - Single Lumen 02/14/25 1943 18 G Anterior;Right Forearm (Active)     Infusions: ivf  Allergies: Review of patient's allergies indicates:  No Known Allergies   NPO: No    Anticoagulation:   Anticoagulants       None             Instructions      Leobardo Hutchinson-  Admit to Hospital Medicine  Upon patient arrival to floor, please send SecureChat to [facility] or call extension [phone] (if no answer, do NOT leave a callback number after the beep, rather please send a SecureChat to [facility]), for Hospital Medicine admit team assignment and for additional admit orders for the patient.  Do not page the attending physician associated with the patient on arrival (this physician may not be on duty at the time of arrival).  Rather, always send a SecureChat to [facility] or call [phone] to reach the triage physician for orders and team assignment.

## 2025-02-15 NOTE — ED PROVIDER NOTES
SCRIBE #1 NOTE: I, Garret Alize, am scribing for, and in the presence of, Cruz Leung MD. I have scribed the entire note.       History     Chief Complaint   Patient presents with    Rectal Bleeding     Black stools x 2 days. ERCP done one week ago with stent place in bilary duct. Liver transplant 8/10/2023. Denies weakness, dizziness or SOB.      Review of patient's allergies indicates:  No Known Allergies      History of Present Illness     HPI    2/14/2025, 8:32 PM  History obtained from the patient       History of Present Illness: Jed Chávez is a 65 y.o. male patient with a PMHx of DM, HTN, hepatocellular carcinoma and s/p liver transplant in 2023, and s/p ERCP with biliary stent placement (02/07/2025) who presents to the Emergency Department for evaluation of dark, tarry stools which onset gradually 4 days ago. Patient states he was seen at PCP today where he had blood work and a positive occult study and was advised to come to the ED. He notes he takes ASA and plavix and no other blood thinner. He denies any NSAID use. Symptoms are constant and moderate in severity. No mitigating or exacerbating factors reported. Patient denies any abdominal pain, fatigue, SOB, CP, weakness, N/V, hematemesis, fever, and all other sxs at this time. No further complaints or concerns at this time.       Arrival mode: Personal vehicle    PCP: EVELIN Pitt MD        Past Medical History:  Past Medical History:   Diagnosis Date    Abdominal hernia 02/03/2025    Hernia of abdominal cavity (Problem)      Acute blood loss anemia 08/12/2023    - H/H stable  - no overt signs of bleed  - continue to monitor with daily CBC      Alcoholic cirrhosis     CVA (cerebral vascular accident)     DM (diabetes mellitus)     Dyslipidemia     Hepatocellular carcinoma     History of hepatocellular carcinoma, in remission after liver transplant 04/05/2023    Synoptic Report Procedure: Total hepatectomy. Histologic type: Hepatocellular  carcinoma. Histologic grade: G1, well-differentiated. Tumor focality: Multifocal. Tumor characteristics: Tumor #1 (blocks 2B-2E): Tumor site: Right lobe. Tumor size: 6.2 cm in greatest dimension grossly. Treatment effect: Complete necrosis (no viable tumor). Tumor #2 (blocks 2F-2H): Tumor site: Right lobe. Tumor size: 4.    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    S/P liver transplant 08/12/2023    Skin cancer        Past Surgical History:  Past Surgical History:   Procedure Laterality Date    COLONOSCOPY N/A 2/18/2025    Procedure: COLONOSCOPY;  Surgeon: Abundio Gibbs MD;  Location: [facility] (2ND FLR);  Service: Endoscopy;  Laterality: N/A;    CRANIOTOMY, WITH NEOPLASM EXCISION USING COMPUTER-ASSISTED NAVIGATION Left 10/26/2023    Procedure: CRANIOTOMY, WITH NEOPLASM EXCISION USING COMPUTER-ASSISTED NAVIGATION;  Surgeon: Jose E Degroot DO;  [facility];  Service: Neurosurgery;  Laterality: Left;    ENDOSCOPIC ULTRASOUND OF UPPER GASTROINTESTINAL TRACT N/A 2/7/2025    Procedure: ULTRASOUND, UPPER GI TRACT, ENDOSCOPIC;  Surgeon: Christiano Landaverde MD;  Location: [facility] (2ND FLR);  Service: Endoscopy;  Laterality: N/A;    ERCP N/A 2/7/2025    Procedure: ERCP (ENDOSCOPIC RETROGRADE CHOLANGIOPANCREATOGRAPHY);  Surgeon: Christiano Landaverde MD;  Location: [facility] ENDO (2ND FLR);  Service: Endoscopy;  Laterality: N/A;    ESOPHAGOGASTRODUODENOSCOPY N/A 2/17/2025    Procedure: EGD (ESOPHAGOGASTRODUODENOSCOPY);  Surgeon: Jed Vance MD;  Location: [facility] ENDO (2ND FLR);  Service: Endoscopy;  Laterality: N/A;    HERNIA REPAIR N/A     LIVER TRANSPLANT N/A 8/9/2023    Procedure: TRANSPLANT, LIVER;  Surgeon: Ameya Suero MD;  [facility];  Service: Transplant;  Laterality: N/A;         Family History:  No family history on file.    Social History:  Social History     Tobacco  Use    Smoking status: Never    Smokeless tobacco: Never   Substance and Sexual Activity    Alcohol use: Not Currently    Drug use: Never    Sexual activity: Not Currently     Partners: Female        Review of Systems     Review of Systems   Constitutional:  Negative for fever.   HENT:  Negative for sore throat.    Respiratory:  Negative for shortness of breath.    Cardiovascular:  Negative for chest pain.   Gastrointestinal:  Positive for blood in stool (dark, tarry). Negative for abdominal pain, diarrhea, nausea and vomiting.   Genitourinary:  Negative for dysuria.   Musculoskeletal:  Negative for back pain.   Skin:  Negative for rash.   Neurological:  Negative for weakness.   Hematological:  Does not bruise/bleed easily.   All other systems reviewed and are negative.       Physical Exam     Initial Vitals [02/14/25 1603]   BP Pulse Resp Temp SpO2   128/76 91 20 97.5 °F (36.4 °C) 96 %      MAP       --          Physical Exam   Nursing Notes and Vital Signs Reviewed.  Constitutional: Patient is in no acute distress. Well-developed and well-nourished.  Head: Atraumatic. Normocephalic.  Eyes: PERRL. EOM intact. Conjunctivae are not pale. No scleral icterus.  ENT: Mucous membranes are moist. Oropharynx is clear and symmetric.    Neck: Supple. Full ROM. No lymphadenopathy.  Cardiovascular: Regular rate. Regular rhythm. No murmurs, rubs, or gallops. Distal pulses are 2+ and symmetric.  Pulmonary/Chest: No respiratory distress. Clear to auscultation bilaterally. No wheezing or rales.  Abdominal: Soft and non-distended.  There is no tenderness.  No rebound, guarding, or rigidity. Good bowel sounds.  Genitourinary: No CVA tenderness  Musculoskeletal: Moves all extremities. No obvious deformities. No edema. No calf tenderness.  Skin: Warm and dry.  Neurological:  Alert, awake, and appropriate.  Normal speech.  No acute focal neurological deficits are appreciated.  Psychiatric: Normal affect. Good eye contact. Appropriate in  content.     ED Course   Procedures  ED Vital Signs:  Vitals:    02/16/25 0500 02/16/25 0530 02/16/25 0600 02/16/25 0700   BP: (!) 153/79 (!) 157/86 (!) 154/83 137/83   Pulse: 79 80 84 76   Resp: 18 19 (!) 23 15   Temp:       TempSrc:       SpO2: 97% 98% 96% 97%   Weight:       Height:        02/16/25 0732 02/16/25 0832 02/16/25 0840 02/16/25 0841   BP: 132/76 (!) 143/82 (!) 143/82 (!) 143/82   Pulse: 76 76     Resp: 15 16     Temp:       TempSrc:       SpO2: 96% 97%     Weight:       Height:        02/16/25 0932 02/16/25 1032 02/16/25 1232 02/16/25 1302   BP: (!) 156/75 (!) 145/88 (!) 168/87 (!) 168/83   Pulse: 92 78 78 78   Resp: 20 17 (!) 22 (!) 25   Temp:       TempSrc:       SpO2: 96% 96% 97% 97%   Weight:       Height:        02/16/25 1414 02/16/25 1720 02/16/25 2001   BP: (!) 156/81 (!) 146/82 138/79   Pulse: 77 87 89   Resp: 16 18 20   Temp: 97.7 °F (36.5 °C) 98.4 °F (36.9 °C) 98.4 °F (36.9 °C)   TempSrc: Oral  Oral   SpO2: 97% 96% 97%   Weight:      Height:          Abnormal Lab Results:  Labs Reviewed   CBC W/ AUTO DIFFERENTIAL - Abnormal       Result Value    WBC 6.01      RBC 3.53 (*)     Hemoglobin 10.2 (*)     Hematocrit 30.4 (*)     MCV 86      MCH 28.9      MCHC 33.6      RDW 13.8      Platelets 153      MPV 9.9      Immature Granulocytes 0.3      Gran # (ANC) 2.8      Immature Grans (Abs) 0.02      Lymph # 2.6      Mono # 0.4      Eos # 0.1      Baso # 0.02      nRBC 0      Gran % 47.0      Lymph % 43.6      Mono % 7.3      Eosinophil % 1.5      Basophil % 0.3      Differential Method Automated     COMPREHENSIVE METABOLIC PANEL - Abnormal    Sodium 136      Potassium 3.9      Chloride 105      CO2 22 (*)     Glucose 284 (*)     BUN 24 (*)     Creatinine 1.0      Calcium 8.6 (*)     Total Protein 6.3      Albumin 3.4 (*)     Total Bilirubin 1.7 (*)     Alkaline Phosphatase 175 (*)     AST 15      ALT 46 (*)     eGFR >60      Anion Gap 9     CBC WITHOUT DIFFERENTIAL - Abnormal    WBC 4.49      RBC  3.21 (*)     Hemoglobin 9.1 (*)     Hematocrit 27.7 (*)     MCV 86      MCH 28.3      MCHC 32.9      RDW 13.8      Platelets 124 (*)     MPV 9.9     COMPREHENSIVE METABOLIC PANEL - Abnormal    Sodium 141      Potassium 4.0      Chloride 108      CO2 24      Glucose 171 (*)     BUN 15      Creatinine 0.8      Calcium 8.2 (*)     Total Protein 6.0      Albumin 3.2 (*)     Total Bilirubin 2.0 (*)     Alkaline Phosphatase 159 (*)     AST 15      ALT 40      eGFR >60      Anion Gap 9     CBC W/ AUTO DIFFERENTIAL - Abnormal    WBC 4.82      RBC 3.49 (*)     Hemoglobin 10.0 (*)     Hematocrit 30.2 (*)     MCV 87      MCH 28.7      MCHC 33.1      RDW 14.1      Platelets 146 (*)     MPV 9.8      Immature Granulocytes 0.4      Gran # (ANC) 2.4      Immature Grans (Abs) 0.02      Lymph # 2.0      Mono # 0.3      Eos # 0.1      Baso # 0.01      nRBC 0      Gran % 49.5      Lymph % 41.3      Mono % 7.1      Eosinophil % 1.5      Basophil % 0.2      Differential Method Automated     COMPREHENSIVE METABOLIC PANEL - Abnormal    Sodium 139      Potassium 3.7      Chloride 108      CO2 21 (*)     Glucose 143 (*)     BUN 11      Creatinine 0.8      Calcium 8.1 (*)     Total Protein 5.6 (*)     Albumin 3.1 (*)     Total Bilirubin 2.3 (*)     Alkaline Phosphatase 156 (*)     AST 17      ALT 34      eGFR >60      Anion Gap 10     CBC W/ AUTO DIFFERENTIAL - Abnormal    WBC 3.88 (*)     RBC 3.25 (*)     Hemoglobin 9.3 (*)     Hematocrit 27.9 (*)     MCV 86      MCH 28.6      MCHC 33.3      RDW 14.2      Platelets 138 (*)     MPV 9.9      Immature Granulocytes 0.3      Gran # (ANC) 2.0      Immature Grans (Abs) 0.01      Lymph # 1.6      Mono # 0.3      Eos # 0.1      Baso # 0.01      nRBC 0      Gran % 51.2      Lymph % 40.5      Mono % 6.4      Eosinophil % 1.3      Basophil % 0.3      Platelet Estimate Clumped (*)     Differential Method Automated     POCT GLUCOSE - Abnormal    POCT Glucose 180 (*)    LIPASE   MAGNESIUM   LIPASE     Lipase 60     MAGNESIUM    Magnesium 1.6     PROTIME-INR    Prothrombin Time 11.0      INR 1.0     APTT    aPTT 26.9     PHOSPHORUS    Phosphorus 3.1     MAGNESIUM    Magnesium 1.6     PHOSPHORUS    Phosphorus 2.9     MAGNESIUM    Magnesium 1.6     PROTIME-INR    Prothrombin Time 12.1      INR 1.1     TYPE & SCREEN    Group & Rh B POS      Indirect Pavithra NEG      Specimen Outdate 02/17/2025 23:59          All Lab Results:  Results for orders placed or performed during the hospital encounter of 02/14/25   EKG 12-lead    Collection Time: 02/14/25  6:08 PM   Result Value Ref Range    QRS Duration 134 ms    [facility] QTC Calculation 493 ms   CBC auto differential    Collection Time: 02/14/25  7:44 PM   Result Value Ref Range    WBC 6.01 3.90 - 12.70 K/uL    RBC 3.53 (L) 4.60 - 6.20 M/uL    Hemoglobin 10.2 (L) 14.0 - 18.0 g/dL    Hematocrit 30.4 (L) 40.0 - 54.0 %    MCV 86 82 - 98 fL    MCH 28.9 27.0 - 31.0 pg    MCHC 33.6 32.0 - 36.0 g/dL    RDW 13.8 11.5 - 14.5 %    Platelets 153 150 - 450 K/uL    MPV 9.9 9.2 - 12.9 fL    Immature Granulocytes 0.3 0.0 - 0.5 %    Gran # (ANC) 2.8 1.8 - 7.7 K/uL    Immature Grans (Abs) 0.02 0.00 - 0.04 K/uL    Lymph # 2.6 1.0 - 4.8 K/uL    Mono # 0.4 0.3 - 1.0 K/uL    Eos # 0.1 0.0 - 0.5 K/uL    Baso # 0.02 0.00 - 0.20 K/uL    nRBC 0 0 /100 WBC    Gran % 47.0 38.0 - 73.0 %    Lymph % 43.6 18.0 - 48.0 %    Mono % 7.3 4.0 - 15.0 %    Eosinophil % 1.5 0.0 - 8.0 %    Basophil % 0.3 0.0 - 1.9 %    Differential Method Automated    Comprehensive metabolic panel    Collection Time: 02/14/25  7:44 PM   Result Value Ref Range    Sodium 136 136 - 145 mmol/L    Potassium 3.9 3.5 - 5.1 mmol/L    Chloride 105 95 - 110 mmol/L    CO2 22 (L) 23 - 29 mmol/L    Glucose 284 (H) 70 - 110 mg/dL    BUN 24 (H) 8 - 23 mg/dL    Creatinine 1.0 0.5 - 1.4 mg/dL    Calcium 8.6 (L) 8.7 - 10.5 mg/dL    Total Protein 6.3 6.0 - 8.4 g/dL    Albumin 3.4 (L) 3.5 - 5.2 g/dL    Total Bilirubin 1.7 (H) 0.1 - 1.0 mg/dL    Alkaline  Phosphatase 175 (H) 40 - 150 U/L    AST 15 10 - 40 U/L    ALT 46 (H) 10 - 44 U/L    eGFR >60 >60 mL/min/1.73 m^2    Anion Gap 9 8 - 16 mmol/L   Lipase    Collection Time: 02/14/25  7:44 PM   Result Value Ref Range    Lipase 60 4 - 60 U/L   Magnesium    Collection Time: 02/14/25  7:44 PM   Result Value Ref Range    Magnesium 1.6 1.6 - 2.6 mg/dL   Type & Screen    Collection Time: 02/14/25  7:44 PM   Result Value Ref Range    Group & Rh B POS     Indirect Pavithra NEG     Specimen Outdate 02/17/2025 23:59    Protime-INR    Collection Time: 02/15/25 12:15 AM   Result Value Ref Range    Prothrombin Time 11.0 9.0 - 12.5 sec    INR 1.0 0.8 - 1.2   APTT    Collection Time: 02/15/25 12:15 AM   Result Value Ref Range    aPTT 26.9 21.0 - 32.0 sec   CBC Without Differential    Collection Time: 02/15/25  3:32 AM   Result Value Ref Range    WBC 4.49 3.90 - 12.70 K/uL    RBC 3.21 (L) 4.60 - 6.20 M/uL    Hemoglobin 9.1 (L) 14.0 - 18.0 g/dL    Hematocrit 27.7 (L) 40.0 - 54.0 %    MCV 86 82 - 98 fL    MCH 28.3 27.0 - 31.0 pg    MCHC 32.9 32.0 - 36.0 g/dL    RDW 13.8 11.5 - 14.5 %    Platelets 124 (L) 150 - 450 K/uL    MPV 9.9 9.2 - 12.9 fL   POCT glucose    Collection Time: 02/15/25 12:41 PM   Result Value Ref Range    POCT Glucose 180 (H) 70 - 110 mg/dL   Phosphorus    Collection Time: 02/15/25 12:51 PM   Result Value Ref Range    Phosphorus 3.1 2.7 - 4.5 mg/dL   Magnesium    Collection Time: 02/15/25 12:51 PM   Result Value Ref Range    Magnesium 1.6 1.6 - 2.6 mg/dL   Comprehensive Metabolic Panel    Collection Time: 02/15/25 12:51 PM   Result Value Ref Range    Sodium 141 136 - 145 mmol/L    Potassium 4.0 3.5 - 5.1 mmol/L    Chloride 108 95 - 110 mmol/L    CO2 24 23 - 29 mmol/L    Glucose 171 (H) 70 - 110 mg/dL    BUN 15 8 - 23 mg/dL    Creatinine 0.8 0.5 - 1.4 mg/dL    Calcium 8.2 (L) 8.7 - 10.5 mg/dL    Total Protein 6.0 6.0 - 8.4 g/dL    Albumin 3.2 (L) 3.5 - 5.2 g/dL    Total Bilirubin 2.0 (H) 0.1 - 1.0 mg/dL    Alkaline  Phosphatase 159 (H) 40 - 150 U/L    AST 15 10 - 40 U/L    ALT 40 10 - 44 U/L    eGFR >60 >60 mL/min/1.73 m^2    Anion Gap 9 8 - 16 mmol/L   CBC Auto Differential    Collection Time: 02/15/25 12:51 PM   Result Value Ref Range    WBC 4.82 3.90 - 12.70 K/uL    RBC 3.49 (L) 4.60 - 6.20 M/uL    Hemoglobin 10.0 (L) 14.0 - 18.0 g/dL    Hematocrit 30.2 (L) 40.0 - 54.0 %    MCV 87 82 - 98 fL    MCH 28.7 27.0 - 31.0 pg    MCHC 33.1 32.0 - 36.0 g/dL    RDW 14.1 11.5 - 14.5 %    Platelets 146 (L) 150 - 450 K/uL    MPV 9.8 9.2 - 12.9 fL    Immature Granulocytes 0.4 0.0 - 0.5 %    Gran # (ANC) 2.4 1.8 - 7.7 K/uL    Immature Grans (Abs) 0.02 0.00 - 0.04 K/uL    Lymph # 2.0 1.0 - 4.8 K/uL    Mono # 0.3 0.3 - 1.0 K/uL    Eos # 0.1 0.0 - 0.5 K/uL    Baso # 0.01 0.00 - 0.20 K/uL    nRBC 0 0 /100 WBC    Gran % 49.5 38.0 - 73.0 %    Lymph % 41.3 18.0 - 48.0 %    Mono % 7.1 4.0 - 15.0 %    Eosinophil % 1.5 0.0 - 8.0 %    Basophil % 0.2 0.0 - 1.9 %    Differential Method Automated    Phosphorus    Collection Time: 02/16/25  4:30 AM   Result Value Ref Range    Phosphorus 2.9 2.7 - 4.5 mg/dL   Magnesium    Collection Time: 02/16/25  4:30 AM   Result Value Ref Range    Magnesium 1.6 1.6 - 2.6 mg/dL   Comprehensive Metabolic Panel    Collection Time: 02/16/25  4:30 AM   Result Value Ref Range    Sodium 139 136 - 145 mmol/L    Potassium 3.7 3.5 - 5.1 mmol/L    Chloride 108 95 - 110 mmol/L    CO2 21 (L) 23 - 29 mmol/L    Glucose 143 (H) 70 - 110 mg/dL    BUN 11 8 - 23 mg/dL    Creatinine 0.8 0.5 - 1.4 mg/dL    Calcium 8.1 (L) 8.7 - 10.5 mg/dL    Total Protein 5.6 (L) 6.0 - 8.4 g/dL    Albumin 3.1 (L) 3.5 - 5.2 g/dL    Total Bilirubin 2.3 (H) 0.1 - 1.0 mg/dL    Alkaline Phosphatase 156 (H) 40 - 150 U/L    AST 17 10 - 40 U/L    ALT 34 10 - 44 U/L    eGFR >60 >60 mL/min/1.73 m^2    Anion Gap 10 8 - 16 mmol/L   CBC Auto Differential    Collection Time: 02/16/25  4:30 AM   Result Value Ref Range    WBC 3.88 (L) 3.90 - 12.70 K/uL    RBC 3.25 (L) 4.60  - 6.20 M/uL    Hemoglobin 9.3 (L) 14.0 - 18.0 g/dL    Hematocrit 27.9 (L) 40.0 - 54.0 %    MCV 86 82 - 98 fL    MCH 28.6 27.0 - 31.0 pg    MCHC 33.3 32.0 - 36.0 g/dL    RDW 14.2 11.5 - 14.5 %    Platelets 138 (L) 150 - 450 K/uL    MPV 9.9 9.2 - 12.9 fL    Immature Granulocytes 0.3 0.0 - 0.5 %    Gran # (ANC) 2.0 1.8 - 7.7 K/uL    Immature Grans (Abs) 0.01 0.00 - 0.04 K/uL    Lymph # 1.6 1.0 - 4.8 K/uL    Mono # 0.3 0.3 - 1.0 K/uL    Eos # 0.1 0.0 - 0.5 K/uL    Baso # 0.01 0.00 - 0.20 K/uL    nRBC 0 0 /100 WBC    Gran % 51.2 38.0 - 73.0 %    Lymph % 40.5 18.0 - 48.0 %    Mono % 6.4 4.0 - 15.0 %    Eosinophil % 1.3 0.0 - 8.0 %    Basophil % 0.3 0.0 - 1.9 %    Platelet Estimate Clumped (A)     Differential Method Automated    Protime-INR    Collection Time: 02/16/25  4:30 AM   Result Value Ref Range    Prothrombin Time 12.1 9.0 - 12.5 sec    INR 1.1 0.8 - 1.2   Tacrolimus Level    Collection Time: 02/16/25  4:30 AM   Result Value Ref Range    Tacrolimus Lvl 3.3 (L) 5.0 - 15.0 ng/mL   POCT glucose    Collection Time: 02/16/25  8:25 PM   Result Value Ref Range    POCT Glucose 169 (H) 70 - 110 mg/dL     *Note: Due to a large number of results and/or encounters for the requested time period, some results have not been displayed. A complete set of results can be found in Results Review.         Imaging Results:  Imaging Results    None          The EKG was ordered, reviewed, and independently interpreted by the ED provider.  Interpretation time: 18:08  Rate: 86 BPM  Rhythm: normal sinus rhythm  Interpretation: RBBB. No STEMI.    The Emergency Provider reviewed the vital signs and test results, which are outlined above.     ED Discussion         ED Course as of 02/19/25 1357   Sat Feb 15, 2025   3065 D/W Dr Laurent () at [facility] in [place] who updated me on bed status and recommended consulting Hospital Medicine for medical management. [NF]   7118 65-year-old male with a history of liver transplant presents with  melena.  H&H is trending downward but not yet at the level requiring transfusion.  Hemodynamically stable.  Liver transplant team has been consulted and he is awaiting transfer to [facility] in [place].  Hospital Medicine has been consulted for medical management. [NF]   1010 Dr. Ford (gastroenterology) we will manage patient in the emergency department while awaiting transfer and bed placement. [NF]      ED Course User Index  [NF] Ashley Jeffries, DO     Medical Decision Making  65-year-old white male with a history of melanotic stools.  Patient had a procedure at [facility] in [place] less than a week ago.  Eighty ERCP and stent placement at that time.  No hematemesis.  Patient has been on his black stools.  Hemoglobin is stable.      Dr. Ford was consulted.  Recommend transfer back to [facility] in [place].  Spoke with Dr. Valentine at [facility].  Recommendations appreciated.    Patient received 80 mg of Protonix stat and 40 q.12.  Clotting factors were unremarkable.  Patient was accepted at [facility] in [place].      Patient was transferred in stable condition.      Amount and/or Complexity of Data Reviewed  External Data Reviewed: labs and notes.     Details: Reviewed patient's visit with PCP earlier today with associated lab results.  02/14/25 12:25  Hemoglobin: 10.6 (L)  Hematocrit: 31.2 (L)  (L): Data is abnormally low  02/14/25 13:32  Occult Blood: Positive !    Positive !  !: Data is abnormal  Labs: ordered. Decision-making details documented in ED Course.  Discussion of management or test interpretation with external provider(s):     8:56 PM: Discussed pt's case with Hospital Medicine team and Gastroenterologist Dr. Ford who recommends transfer to [facility] for potential postprocedural bleeding from ERCP.     11:47 PM: Consult with Dr. Cardenas (Hospital Medicine) at [facility] concerning pt. He recommends Protonix 40 mg Q12h along with PT/PTT/INR. There are no liver  transplant services, which the patient requires, offered at [facility] at this time. Dr. Cardenas expresses understanding and will accept transfer for omid.  Accepting Facility: [facility]  Accepting Physician: Dr. Cardenas    11:48 PM: Re-evaluated pt. Informed pt and family that there are no liver transplant services available at this time. I have discussed test results, shared treatment plan, and the need for transfer with patient and family at bedside. All historical, clinical, radiographic, and laboratory findings were reviewed with the patient/family in detail. Patient will be transferred by [facility] services with care required en route. Patient understands that there could be unforeseen motor vehicle accidents or loss of vital signs that could result in potential death or permanent disability. Pt and family express understanding at this time and agree with all information. All questions answered. Pt and family have no further questions or concerns at this time. Pt is ready for transfer.     Risk  Prescription drug management.  Decision regarding hospitalization.                 ED Medication(s):  Medications   pantoprazole injection 80 mg (80 mg Intravenous Given 2/14/25 2031)   0.9% NaCl infusion (0 mLs Intravenous Stopped 2/15/25 0815)       Discharge Medication List as of 2/16/2025  8:49 PM                  Scribe Attestation:   Scribe #1: I performed the above scribed service and the documentation accurately describes the services I performed. I attest to the accuracy of the note.     Attending:   Physician Attestation Statement for Scribe #1: I, Cruz Leung MD, personally performed the services described in this documentation, as scribed by Garret Herrmann, in my presence, and it is both accurate and complete.           Clinical Impression       ICD-10-CM ICD-9-CM   1. GI bleed  K92.2 578.9       Disposition:   Disposition: Transferred  Condition: Stable       Xochitl  Cruz ALBERT MD  02/19/25 8057

## 2025-02-15 NOTE — ED NOTES
Spoke with KELLY Rodriguez Coordinator at the [facility] who requested a patient update. Update given. She reports that the room status remains unchanged at this time and the patient is still accepted and awaiting a room assignment.

## 2025-02-15 NOTE — SUBJECTIVE & OBJECTIVE
Past Medical History:   Diagnosis Date    Abdominal hernia 02/03/2025    Hernia of abdominal cavity (Problem)      Acute blood loss anemia 08/12/2023    - H/H stable  - no overt signs of bleed  - continue to monitor with daily CBC      Alcoholic cirrhosis     CVA (cerebral vascular accident)     DM (diabetes mellitus)     Dyslipidemia     Hepatocellular carcinoma     History of hepatocellular carcinoma, in remission after liver transplant 04/05/2023    Synoptic Report Procedure: Total hepatectomy. Histologic type: Hepatocellular carcinoma. Histologic grade: G1, well-differentiated. Tumor focality: Multifocal. Tumor characteristics: Tumor #1 (blocks 2B-2E): Tumor site: Right lobe. Tumor size: 6.2 cm in greatest dimension grossly. Treatment effect: Complete necrosis (no viable tumor). Tumor #2 (blocks 2F-2H): Tumor site: Right lobe. Tumor size: 4.    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 OR [address] [place] [address];  Service: Neurosurgery;  Laterality: Left;    ENDOSCOPIC ULTRASOUND OF UPPER GASTROINTESTINAL TRACT N/A 2/7/2025    Procedure: ULTRASOUND, UPPER GI TRACT, ENDOSCOPIC;  Surgeon: Christiano Landaverde MD;  Location: [address]);  Service: Endoscopy;  Laterality: N/A;    ERCP N/A 2/7/2025    Procedure: ERCP (ENDOSCOPIC RETROGRADE CHOLANGIOPANCREATOGRAPHY);  Surgeon: Christiano Landaverde MD;  Location: [facility] ([address] [place] [address]);  Service: Endoscopy;  Laterality: N/A;    HERNIA REPAIR N/A     LIVER TRANSPLANT N/A 8/9/2023    Procedure: TRANSPLANT, LIVER;  Surgeon: Ameya Suero MD;  Location:  "[facility] OR 2ND FLR;  Service: Transplant;  Laterality: N/A;       Review of patient's allergies indicates:  No Known Allergies    No current facility-administered medications on file prior to encounter.     Current Outpatient Medications on File Prior to Encounter   Medication Sig    blood-glucose sensor (DEXCOM G7 SENSOR) Neelam 1 each by Misc.(Non-Drug; Combo Route) route every 10 days.    carvediloL (COREG) 12.5 MG tablet Take 1 tablet (12.5 mg total) by mouth 2 (two) times daily with meals. HOLD if SBP < 125 or pulse < 60.    [] ciprofloxacin HCl (CIPRO) 500 MG tablet Take 1 tablet (500 mg total) by mouth every 12 (twelve) hours. for 5 days    clopidogreL (PLAVIX) 75 mg tablet Take 1 tablet (75 mg total) by mouth once daily.    insulin glargine U-100, Lantus, (LANTUS SOLOSTAR U-100 INSULIN) 100 unit/mL (3 mL) InPn pen Inject 33 Units into the skin once daily.    insulin lispro 100 unit/mL injection VIA ILET INSULIN PUMP, MAX .    losartan (COZAAR) 100 MG tablet Take 1 tablet (100 mg total) by mouth once daily. HOLD if SBP < 120    tacrolimus (PROGRAF) 1 MG Cap Take 2 capsules (2 mg total) by mouth every 12 (twelve) hours.    tirzepatide 10 mg/0.5 mL PnIj Inject 10 mg (one pen) into the skin every 7 days.    ursodioL (ACTIGALL) 300 mg capsule Take 1 capsule (300 mg total) by mouth 2 (two) times daily.    aspirin 81 MG Chew Chew and swallow 1 tablet (81 mg total) by mouth once daily. Restart 23    blood sugar diagnostic Strp Test blood glucose 3 (three) times daily.    pen needle, diabetic (BD ULTRA-FINE MERCEDES PEN NEEDLE) 32 gauge x 5/32" Ndle Use to inject insulin into the skin 4 (four) times daily.     Family History    None       Tobacco Use    Smoking status: Never    Smokeless tobacco: Never   Substance and Sexual Activity    Alcohol use: Not Currently    Drug use: Never    Sexual activity: Not Currently     Partners: Female     Review of Systems   Constitutional:  Positive for fatigue. Negative " for chills and fever.   HENT:  Negative for congestion, rhinorrhea and sore throat.    Respiratory:  Negative for shortness of breath.    Cardiovascular:  Negative for chest pain, palpitations and leg swelling.   Gastrointestinal:  Positive for blood in stool. Negative for abdominal distention, abdominal pain, constipation, diarrhea, nausea and vomiting.   Genitourinary:  Negative for difficulty urinating and dysuria.   Skin:  Negative for wound.   Neurological:  Negative for dizziness and light-headedness.     Objective:     Vital Signs (Most Recent):  Temp: 97.5 °F (36.4 °C) (02/14/25 1603)  Pulse: 79 (02/15/25 1200)  Resp: 20 (02/15/25 1200)  BP: (!) 170/92 (02/15/25 1200)  SpO2: 97 % (02/15/25 1200) Vital Signs (24h Range):  Temp:  [97 °F (36.1 °C)-97.5 °F (36.4 °C)] 97.5 °F (36.4 °C)  Pulse:  [75-94] 79  Resp:  [14-22] 20  SpO2:  [95 %-98 %] 97 %  BP: (124-170)/(70-92) 170/92     Weight: 101.2 kg (223 lb)  Body mass index is 31.1 kg/m².     Physical Exam  Vitals and nursing note reviewed.   Constitutional:       General: He is not in acute distress.     Appearance: He is not ill-appearing, toxic-appearing or diaphoretic.   HENT:      Head: Normocephalic and atraumatic.   Eyes:      General: No scleral icterus.        Right eye: No discharge.         Left eye: No discharge.   Cardiovascular:      Rate and Rhythm: Normal rate and regular rhythm.      Heart sounds: Normal heart sounds.   Pulmonary:      Effort: Pulmonary effort is normal. No respiratory distress.   Abdominal:      General: Bowel sounds are normal.      Tenderness: There is no abdominal tenderness.   Musculoskeletal:      Cervical back: No rigidity.      Right lower leg: No edema.      Left lower leg: No edema.   Skin:     General: Skin is warm and dry.      Coloration: Skin is not jaundiced.   Neurological:      Mental Status: He is alert and oriented to person, place, and time. Mental status is at baseline.   Psychiatric:         Mood and Affect:  Mood normal.         Behavior: Behavior normal.                Significant Labs: All pertinent labs within the past 24 hours have been reviewed.  CBC:   Recent Labs   Lab 02/14/25  1944 02/15/25  0332 02/15/25  1251   WBC 6.01 4.49 4.82   HGB 10.2* 9.1* 10.0*   HCT 30.4* 27.7* 30.2*    124* 146*     CMP:   Recent Labs   Lab 02/14/25  1944 02/15/25  1251    141   K 3.9 4.0    108   CO2 22* 24   * 171*   BUN 24* 15   CREATININE 1.0 0.8   CALCIUM 8.6* 8.2*   PROT 6.3 6.0   ALBUMIN 3.4* 3.2*   BILITOT 1.7* 2.0*   ALKPHOS 175* 159*   AST 15 15   ALT 46* 40   ANIONGAP 9 9     POCT Glucose:   Recent Labs   Lab 02/15/25  1241   POCTGLUCOSE 180*       Significant Imaging: I have reviewed all pertinent imaging results/findings within the past 24 hours.

## 2025-02-16 ENCOUNTER — HOSPITAL ENCOUNTER (INPATIENT)
Facility: HOSPITAL | Age: 65
LOS: 3 days | Discharge: HOME | DRG: 377 | End: 2025-02-19
Attending: INTERNAL MEDICINE | Admitting: INTERNAL MEDICINE
Payer: COMMERCIAL

## 2025-02-16 VITALS
OXYGEN SATURATION: 97 % | SYSTOLIC BLOOD PRESSURE: 138 MMHG | HEART RATE: 89 BPM | HEIGHT: 71 IN | WEIGHT: 223.13 LBS | RESPIRATION RATE: 20 BRPM | TEMPERATURE: 98 F | BODY MASS INDEX: 31.24 KG/M2 | DIASTOLIC BLOOD PRESSURE: 79 MMHG

## 2025-02-16 DIAGNOSIS — E11.9 HYPERTENSION COMPLICATING DIABETES: Primary | Chronic | ICD-10-CM

## 2025-02-16 DIAGNOSIS — R53.81 DEBILITY: ICD-10-CM

## 2025-02-16 DIAGNOSIS — K92.1 MELENA: ICD-10-CM

## 2025-02-16 DIAGNOSIS — I10 HYPERTENSION COMPLICATING DIABETES: Primary | Chronic | ICD-10-CM

## 2025-02-16 DIAGNOSIS — T86.49 STENOSIS OF HEPATIC ARTERY OF TRANSPLANTED LIVER: ICD-10-CM

## 2025-02-16 DIAGNOSIS — K92.2 UPPER GASTROINTESTINAL BLEED: ICD-10-CM

## 2025-02-16 DIAGNOSIS — I77.1 STENOSIS OF HEPATIC ARTERY OF TRANSPLANTED LIVER: ICD-10-CM

## 2025-02-16 DIAGNOSIS — K92.2 UPPER GI BLEED: ICD-10-CM

## 2025-02-16 DIAGNOSIS — Z94.4 STATUS POST LIVER TRANSPLANT: ICD-10-CM

## 2025-02-16 DIAGNOSIS — Z96.41 INSULIN PUMP STATUS: ICD-10-CM

## 2025-02-16 DIAGNOSIS — E11.69 TYPE 2 DIABETES MELLITUS WITH OTHER SPECIFIED COMPLICATION, WITH LONG-TERM CURRENT USE OF INSULIN: Chronic | ICD-10-CM

## 2025-02-16 DIAGNOSIS — Z79.4 TYPE 2 DIABETES MELLITUS WITH OTHER SPECIFIED COMPLICATION, WITH LONG-TERM CURRENT USE OF INSULIN: Chronic | ICD-10-CM

## 2025-02-16 LAB
ALBUMIN SERPL BCP-MCNC: 3.1 G/DL (ref 3.5–5.2)
ALP SERPL-CCNC: 156 U/L (ref 40–150)
ALT SERPL W/O P-5'-P-CCNC: 34 U/L (ref 10–44)
ANION GAP SERPL CALC-SCNC: 10 MMOL/L (ref 8–16)
AST SERPL-CCNC: 17 U/L (ref 10–40)
BASOPHILS # BLD AUTO: 0.01 K/UL (ref 0–0.2)
BASOPHILS NFR BLD: 0.3 % (ref 0–1.9)
BILIRUB SERPL-MCNC: 2.3 MG/DL (ref 0.1–1)
BUN SERPL-MCNC: 11 MG/DL (ref 8–23)
CALCIUM SERPL-MCNC: 8.1 MG/DL (ref 8.7–10.5)
CHLORIDE SERPL-SCNC: 108 MMOL/L (ref 95–110)
CO2 SERPL-SCNC: 21 MMOL/L (ref 23–29)
CREAT SERPL-MCNC: 0.8 MG/DL (ref 0.5–1.4)
DIFFERENTIAL METHOD BLD: ABNORMAL
EOSINOPHIL # BLD AUTO: 0.1 K/UL (ref 0–0.5)
EOSINOPHIL NFR BLD: 1.3 % (ref 0–8)
ERYTHROCYTE [DISTWIDTH] IN BLOOD BY AUTOMATED COUNT: 14.2 % (ref 11.5–14.5)
EST. GFR  (NO RACE VARIABLE): >60 ML/MIN/1.73 M^2
GLUCOSE SERPL-MCNC: 143 MG/DL (ref 70–110)
HCT VFR BLD AUTO: 27.9 % (ref 40–54)
HGB BLD-MCNC: 9.3 G/DL (ref 14–18)
IMM GRANULOCYTES # BLD AUTO: 0.01 K/UL (ref 0–0.04)
IMM GRANULOCYTES NFR BLD AUTO: 0.3 % (ref 0–0.5)
INR PPP: 1.1 (ref 0.8–1.2)
LYMPHOCYTES # BLD AUTO: 1.6 K/UL (ref 1–4.8)
LYMPHOCYTES NFR BLD: 40.5 % (ref 18–48)
MAGNESIUM SERPL-MCNC: 1.6 MG/DL (ref 1.6–2.6)
MCH RBC QN AUTO: 28.6 PG (ref 27–31)
MCHC RBC AUTO-ENTMCNC: 33.3 G/DL (ref 32–36)
MCV RBC AUTO: 86 FL (ref 82–98)
MONOCYTES # BLD AUTO: 0.3 K/UL (ref 0.3–1)
MONOCYTES NFR BLD: 6.4 % (ref 4–15)
NEUTROPHILS # BLD AUTO: 2 K/UL (ref 1.8–7.7)
NEUTROPHILS NFR BLD: 51.2 % (ref 38–73)
NRBC BLD-RTO: 0 /100 WBC
OHS QRS DURATION: 134 MS
OHS QTC CALCULATION: 493 MS
PHOSPHATE SERPL-MCNC: 2.9 MG/DL (ref 2.7–4.5)
PLATELET # BLD AUTO: 138 K/UL (ref 150–450)
PLATELET BLD QL SMEAR: ABNORMAL
PMV BLD AUTO: 9.9 FL (ref 9.2–12.9)
POCT GLUCOSE: 169 MG/DL (ref 70–110)
POTASSIUM SERPL-SCNC: 3.7 MMOL/L (ref 3.5–5.1)
PROT SERPL-MCNC: 5.6 G/DL (ref 6–8.4)
PROTHROMBIN TIME: 12.1 SEC (ref 9–12.5)
RBC # BLD AUTO: 3.25 M/UL (ref 4.6–6.2)
SODIUM SERPL-SCNC: 139 MMOL/L (ref 136–145)
WBC # BLD AUTO: 3.88 K/UL (ref 3.9–12.7)

## 2025-02-16 PROCEDURE — 96376 TX/PRO/DX INJ SAME DRUG ADON: CPT

## 2025-02-16 PROCEDURE — 85025 COMPLETE CBC W/AUTO DIFF WBC: CPT | Performed by: STUDENT IN AN ORGANIZED HEALTH CARE EDUCATION/TRAINING PROGRAM

## 2025-02-16 PROCEDURE — 20600001 HC STEP DOWN PRIVATE ROOM

## 2025-02-16 PROCEDURE — G0378 HOSPITAL OBSERVATION PER HR: HCPCS

## 2025-02-16 PROCEDURE — 25000003 PHARM REV CODE 250: Performed by: STUDENT IN AN ORGANIZED HEALTH CARE EDUCATION/TRAINING PROGRAM

## 2025-02-16 PROCEDURE — 63600175 PHARM REV CODE 636 W HCPCS: Performed by: FAMILY MEDICINE

## 2025-02-16 PROCEDURE — 80053 COMPREHEN METABOLIC PANEL: CPT | Performed by: STUDENT IN AN ORGANIZED HEALTH CARE EDUCATION/TRAINING PROGRAM

## 2025-02-16 PROCEDURE — 80197 ASSAY OF TACROLIMUS: CPT | Performed by: STUDENT IN AN ORGANIZED HEALTH CARE EDUCATION/TRAINING PROGRAM

## 2025-02-16 PROCEDURE — 85610 PROTHROMBIN TIME: CPT | Performed by: STUDENT IN AN ORGANIZED HEALTH CARE EDUCATION/TRAINING PROGRAM

## 2025-02-16 PROCEDURE — 63600175 PHARM REV CODE 636 W HCPCS: Performed by: STUDENT IN AN ORGANIZED HEALTH CARE EDUCATION/TRAINING PROGRAM

## 2025-02-16 PROCEDURE — 83735 ASSAY OF MAGNESIUM: CPT | Performed by: STUDENT IN AN ORGANIZED HEALTH CARE EDUCATION/TRAINING PROGRAM

## 2025-02-16 PROCEDURE — 84100 ASSAY OF PHOSPHORUS: CPT | Performed by: STUDENT IN AN ORGANIZED HEALTH CARE EDUCATION/TRAINING PROGRAM

## 2025-02-16 RX ORDER — INSULIN ASPART 100 [IU]/ML
5 INJECTION, SOLUTION INTRAVENOUS; SUBCUTANEOUS
Status: DISCONTINUED | OUTPATIENT
Start: 2025-02-17 | End: 2025-02-17

## 2025-02-16 RX ORDER — LOSARTAN POTASSIUM 50 MG/1
100 TABLET ORAL DAILY
Status: DISCONTINUED | OUTPATIENT
Start: 2025-02-17 | End: 2025-02-19 | Stop reason: HOSPADM

## 2025-02-16 RX ORDER — INSULIN LISPRO 100 [IU]/ML
6 INJECTION, SOLUTION INTRAVENOUS; SUBCUTANEOUS CONTINUOUS
Status: CANCELLED | OUTPATIENT
Start: 2025-02-17

## 2025-02-16 RX ORDER — INSULIN LISPRO 100 [IU]/ML
INJECTION, SOLUTION INTRAVENOUS; SUBCUTANEOUS
Status: CANCELLED | OUTPATIENT
Start: 2025-02-17

## 2025-02-16 RX ORDER — INSULIN GLARGINE 100 [IU]/ML
14 INJECTION, SOLUTION SUBCUTANEOUS 2 TIMES DAILY
Status: DISCONTINUED | OUTPATIENT
Start: 2025-02-17 | End: 2025-02-17

## 2025-02-16 RX ORDER — ONDANSETRON HYDROCHLORIDE 2 MG/ML
4 INJECTION, SOLUTION INTRAVENOUS EVERY 8 HOURS PRN
Status: DISCONTINUED | OUTPATIENT
Start: 2025-02-17 | End: 2025-02-19 | Stop reason: HOSPADM

## 2025-02-16 RX ORDER — IBUPROFEN 200 MG
24 TABLET ORAL
Status: DISCONTINUED | OUTPATIENT
Start: 2025-02-17 | End: 2025-02-17

## 2025-02-16 RX ORDER — NALOXONE HCL 0.4 MG/ML
0.02 VIAL (ML) INJECTION
Status: DISCONTINUED | OUTPATIENT
Start: 2025-02-17 | End: 2025-02-19 | Stop reason: HOSPADM

## 2025-02-16 RX ORDER — CARVEDILOL 6.25 MG/1
12.5 TABLET ORAL 2 TIMES DAILY WITH MEALS
Status: DISCONTINUED | OUTPATIENT
Start: 2025-02-17 | End: 2025-02-19 | Stop reason: HOSPADM

## 2025-02-16 RX ORDER — NAPROXEN SODIUM 220 MG/1
81 TABLET, FILM COATED ORAL DAILY
Status: DISCONTINUED | OUTPATIENT
Start: 2025-02-17 | End: 2025-02-17

## 2025-02-16 RX ORDER — ACETAMINOPHEN 325 MG/1
650 TABLET ORAL EVERY 4 HOURS PRN
Status: DISCONTINUED | OUTPATIENT
Start: 2025-02-17 | End: 2025-02-16

## 2025-02-16 RX ORDER — IBUPROFEN 200 MG
16 TABLET ORAL
Status: DISCONTINUED | OUTPATIENT
Start: 2025-02-17 | End: 2025-02-17

## 2025-02-16 RX ORDER — ACETAMINOPHEN 325 MG/1
650 TABLET ORAL EVERY 8 HOURS PRN
Status: DISCONTINUED | OUTPATIENT
Start: 2025-02-17 | End: 2025-02-16

## 2025-02-16 RX ORDER — SODIUM CHLORIDE 0.9 % (FLUSH) 0.9 %
10 SYRINGE (ML) INJECTION
Status: DISCONTINUED | OUTPATIENT
Start: 2025-02-17 | End: 2025-02-19 | Stop reason: HOSPADM

## 2025-02-16 RX ORDER — INSULIN ASPART 100 [IU]/ML
0-5 INJECTION, SOLUTION INTRAVENOUS; SUBCUTANEOUS
Status: DISCONTINUED | OUTPATIENT
Start: 2025-02-17 | End: 2025-02-17

## 2025-02-16 RX ORDER — GLUCAGON 1 MG
1 KIT INJECTION
Status: DISCONTINUED | OUTPATIENT
Start: 2025-02-17 | End: 2025-02-19 | Stop reason: HOSPADM

## 2025-02-16 RX ORDER — GLUCAGON 1 MG
1 KIT INJECTION
Status: DISCONTINUED | OUTPATIENT
Start: 2025-02-17 | End: 2025-02-17

## 2025-02-16 RX ORDER — ACETAMINOPHEN 325 MG/1
650 TABLET ORAL EVERY 8 HOURS PRN
Status: DISCONTINUED | OUTPATIENT
Start: 2025-02-17 | End: 2025-02-19 | Stop reason: HOSPADM

## 2025-02-16 RX ORDER — URSODIOL 300 MG/1
300 CAPSULE ORAL 2 TIMES DAILY
Status: DISCONTINUED | OUTPATIENT
Start: 2025-02-17 | End: 2025-02-19 | Stop reason: HOSPADM

## 2025-02-16 RX ORDER — TALC
6 POWDER (GRAM) TOPICAL NIGHTLY PRN
Status: DISCONTINUED | OUTPATIENT
Start: 2025-02-17 | End: 2025-02-19 | Stop reason: HOSPADM

## 2025-02-16 RX ORDER — IBUPROFEN 200 MG
24 TABLET ORAL
Status: DISCONTINUED | OUTPATIENT
Start: 2025-02-17 | End: 2025-02-19 | Stop reason: HOSPADM

## 2025-02-16 RX ORDER — INSULIN ASPART 100 [IU]/ML
0-10 INJECTION, SOLUTION INTRAVENOUS; SUBCUTANEOUS
Status: DISCONTINUED | OUTPATIENT
Start: 2025-02-17 | End: 2025-02-17

## 2025-02-16 RX ORDER — AMOXICILLIN 250 MG
1 CAPSULE ORAL 2 TIMES DAILY
Status: DISCONTINUED | OUTPATIENT
Start: 2025-02-16 | End: 2025-02-19 | Stop reason: HOSPADM

## 2025-02-16 RX ORDER — CLOPIDOGREL BISULFATE 75 MG/1
75 TABLET ORAL DAILY
Status: DISCONTINUED | OUTPATIENT
Start: 2025-02-17 | End: 2025-02-17

## 2025-02-16 RX ORDER — POLYETHYLENE GLYCOL 3350 17 G/17G
17 POWDER, FOR SOLUTION ORAL DAILY
Status: DISCONTINUED | OUTPATIENT
Start: 2025-02-17 | End: 2025-02-19 | Stop reason: HOSPADM

## 2025-02-16 RX ORDER — IBUPROFEN 200 MG
16 TABLET ORAL
Status: DISCONTINUED | OUTPATIENT
Start: 2025-02-17 | End: 2025-02-19 | Stop reason: HOSPADM

## 2025-02-16 RX ORDER — TACROLIMUS 1 MG/1
2 CAPSULE ORAL 2 TIMES DAILY
Status: DISCONTINUED | OUTPATIENT
Start: 2025-02-17 | End: 2025-02-19 | Stop reason: HOSPADM

## 2025-02-16 RX ADMIN — ASPIRIN 81 MG CHEWABLE TABLET 81 MG: 81 TABLET CHEWABLE at 08:02

## 2025-02-16 RX ADMIN — URSODIOL 300 MG: 300 CAPSULE ORAL at 08:02

## 2025-02-16 RX ADMIN — PANTOPRAZOLE SODIUM 40 MG: 40 INJECTION, POWDER, FOR SOLUTION INTRAVENOUS at 08:02

## 2025-02-16 RX ADMIN — CARVEDILOL 12.5 MG: 12.5 TABLET, FILM COATED ORAL at 08:02

## 2025-02-16 RX ADMIN — TACROLIMUS 2 MG: 1 CAPSULE ORAL at 05:02

## 2025-02-16 RX ADMIN — URSODIOL 300 MG: 300 CAPSULE ORAL at 11:02

## 2025-02-16 RX ADMIN — TACROLIMUS 2 MG: 1 CAPSULE ORAL at 08:02

## 2025-02-16 RX ADMIN — CARVEDILOL 12.5 MG: 12.5 TABLET, FILM COATED ORAL at 05:02

## 2025-02-16 RX ADMIN — LOSARTAN POTASSIUM 100 MG: 50 TABLET, FILM COATED ORAL at 08:02

## 2025-02-16 NOTE — SUBJECTIVE & OBJECTIVE
Interval History: No acute events overnight, afebrile, hemodynamically stable.  Still reporting presence of dark stools.  Pending transfer to [facility] for further evaluation.      Objective:     Vital Signs (Most Recent):  Temp: 98.4 °F (36.9 °C) (02/16/25 1720)  Pulse: 87 (02/16/25 1720)  Resp: 18 (02/16/25 1720)  BP: (!) 146/82 (02/16/25 1720)  SpO2: 96 % (02/16/25 1720) Vital Signs (24h Range):  Temp:  [97.7 °F (36.5 °C)-98.4 °F (36.9 °C)] 98.4 °F (36.9 °C)  Pulse:  [74-92] 87  Resp:  [12-25] 18  SpO2:  [95 %-99 %] 96 %  BP: (128-172)/(69-88) 146/82     Weight: 101.2 kg (223 lb 1.7 oz)  Body mass index is 31.12 kg/m².  No intake or output data in the 24 hours ending 02/16/25 1731      Physical Exam  Vitals and nursing note reviewed.   Constitutional:       General: He is not in acute distress.     Appearance: He is not ill-appearing, toxic-appearing or diaphoretic.   HENT:      Head: Normocephalic and atraumatic.   Eyes:      General: No scleral icterus.        Right eye: No discharge.         Left eye: No discharge.   Cardiovascular:      Rate and Rhythm: Normal rate and regular rhythm.      Heart sounds: Normal heart sounds.   Pulmonary:      Effort: Pulmonary effort is normal. No respiratory distress.   Abdominal:      General: Bowel sounds are normal.      Tenderness: There is no abdominal tenderness.   Musculoskeletal:      Cervical back: No rigidity.      Right lower leg: No edema.      Left lower leg: No edema.   Skin:     General: Skin is warm and dry.      Coloration: Skin is not jaundiced.   Neurological:      Mental Status: He is alert and oriented to person, place, and time. Mental status is at baseline.   Psychiatric:         Mood and Affect: Mood normal.         Behavior: Behavior normal.           Significant Labs: All pertinent labs within the past 24 hours have been reviewed.   Recent Labs   Lab 02/14/25  1944 02/15/25  1251 02/16/25  0430    141 139   K 3.9 4.0 3.7    108  108   CO2 22* 24 21*   BUN 24* 15 11   CREATININE 1.0 0.8 0.8   * 171* 143*   ANIONGAP 9 9 10     Recent Labs   Lab 02/14/25  1944 02/15/25  1251 02/16/25  0430   MG 1.6 1.6 1.6   PHOS  --  3.1 2.9     Recent Labs   Lab 02/14/25  1944 02/15/25  1251 02/16/25  0430   AST 15 15 17   ALT 46* 40 34   ALKPHOS 175* 159* 156*   BILITOT 1.7* 2.0* 2.3*   ALBUMIN 3.4* 3.2* 3.1*     POCT Glucose:   Recent Labs   Lab 02/15/25  1241   POCTGLUCOSE 180*    Recent Labs   Lab 02/15/25  0332 02/15/25  1251 02/16/25  0430   WBC 4.49 4.82 3.88*   HGB 9.1* 10.0* 9.3*   HCT 27.7* 30.2* 27.9*   * 146* 138*   GRAN  --  49.5  2.4 51.2  2.0                   Inpatient Medications:  Continuous Infusions:    Scheduled Meds:   aspirin  81 mg Oral Daily    carvediloL  12.5 mg Oral BID WM    losartan  100 mg Oral Daily    pantoprazole  40 mg Intravenous Q12H    tacrolimus  2 mg Oral BID    ursodioL  300 mg Oral BID       PRN Meds:  Current Facility-Administered Medications:     acetaminophen, 650 mg, Oral, Q6H PRN    dextrose 50%, 12.5 g, Intravenous, PRN    dextrose 50%, 25 g, Intravenous, PRN    glucagon (human recombinant), 1 mg, Intramuscular, PRN    glucose, 16 g, Oral, PRN    glucose, 24 g, Oral, PRN    hydrALAZINE, 5 mg, Intravenous, Q6H PRN    melatonin, 6 mg, Oral, Nightly PRN    ondansetron, 4 mg, Intravenous, Q8H PRN    polyethylene glycol, 17 g, Oral, BID PRN    prochlorperazine, 2.5 mg, Intravenous, Q8H PRN

## 2025-02-16 NOTE — HPI
"Mr. Jed Chávez is a 65 y.o. male who  has a medical history of Abdominal hernia,  Alcoholic cirrhosis, CVA (cerebral vascular accident), DM (diabetes mellitus), Dyslipidemia, Hepatocellular carcinoma,  Liver transplant, HTN (hypertension), Intracranial hemorrhage, Left-sided nontraumatic intracerebral hemorrhage, Skin cancer, Meningioma s/p Left crani/resection.    He was recently admitted to [facility] for evaluation of elevated LFTs where after evaluation he ultimately underwent per chart review, "s/p EUS/ERCP on 2/7, severe biliary stricture treated with sphincterotomy and stent. Recommended ciprofloxacin 500 BID for 5 days. Also recommended ursodiol for biliary prophylaxis. Repeat ERCP in 6 weeks.  He presented to the ED for evaluation for concerns of dark stools/melena over the past 4-5 days and evaluation at a PCP visit noted concerns for positive FOBT and downtrending hemoglobin on labs.  Home medications include aspirin, Plavix.      ED course included discussion with GI at [facility] where decision was made to transfer for hepatology and GI/AES evaluation and recommendations to initiate pantoprazole IV.  ED course notable for hemoglobin 9 hemoglobin 9.1.    Hospital medicine consulted for medical management while pending transfer to [facility].      "

## 2025-02-16 NOTE — ASSESSMENT & PLAN NOTE
"Recently admitted to [facility] for evaluation of elevated LFTs where after evaluation he ultimately underwent per chart review, "s/p EUS/ERCP on 2/7, severe biliary stricture treated with sphincterotomy and stent. Recommended ciprofloxacin 500 BID for 5 days. Also recommended ursodiol for biliary prophylaxis. Repeat ERCP in 6 weeks.  He presented to the ED for evaluation for concerns of dark stools/melena over the past 4-5 days and evaluation at a PCP visit noted concerns for positive FOBT and downtrending hemoglobin on labs.  Home medications include aspirin, Plavix.        ED course included discussion with GI at [facility] where decision was made to transfer for hepatology and GI/AES evaluation and recommendations to initiate pantoprazole IV.  ED course notable for hemoglobin 9 hemoglobin 9.1.       Recent Labs     02/14/25  1225 02/14/25  1944 02/15/25  0332 02/15/25  1251   HGB 10.6* 10.2* 9.1* 10.0*       PLAN:  -Continue pantoprazole 40 mg IV b.i.d.  -Pending transfer to [facility] for further evaluation  -Started on clear liquid diet due to improvement in hemoglobin, will make NPO at midnight in anticipation of transfer for potential procedure  -Continuous cardiac monitoring for hemodynamic instability  -Intravascular resuscitation/support with IVFs   - Transfuse pRBC for Hgb < 7, unless otherwise indicated  -Hold all NSAIDs and anticoagulants, unless contraindicated  -Correct any coagulopathy with platelets and FFP to a goal of platelets >50K and INR <2.0  "

## 2025-02-16 NOTE — PLAN OF CARE
[facility] - Telemetry (Hospital)  Discharge Final Note    Primary Care Provider: EVELIN Pitt MD    Expected Discharge Date: 2/16/2025    Final Discharge Note (most recent)       Final Note - 02/16/25 1736          Final Note    Assessment Type Final Discharge Note (P)      Anticipated Discharge Disposition Home or Self Care (P)         Post-Acute Status    Discharge Delays None known at this time (P)                      Important Message from Medicare  Discharge orders are in.  No other orders for either DME or services.  No needs or discharge delays.

## 2025-02-16 NOTE — ASSESSMENT & PLAN NOTE
Hypertensive on admission    Home meds for hyperten  Hypertension Medications              carvediloL (COREG) 12.5 MG tablet Take 1 tablet (12.5 mg total) by mouth 2 (two) times daily with meals. HOLD if SBP < 125 or pulse < 60.    losartan (COZAAR) 100 MG tablet Take 1 tablet (100 mg total) by mouth once daily. HOLD if SBP < 120            Patients blood pressure range in the last 24 hours was: BP  Min: 102/64  Max: 204/106.      PLAN:  -While in the hospital, will manage blood pressure as follows; Continue home antihypertensive regimen  -The patient's inpatient anti-hypertensive regimen is listed below:  Current Antihypertensives  carvediloL tablet 12.5 mg, 2 times daily with meals, Oral  losartan tablet 100 mg, Daily, Oral  hydrALAZINE injection 5 mg, Every 6 hours PRN, Intravenous  -Will utilize p.r.n. blood pressure medication only if patient's blood pressure greater than 180/110 and he develops symptoms such as worsening chest pain or shortness of breath.

## 2025-02-16 NOTE — CONSULTS
"[facility] - Emergency Dept.  Hospital Medicine  Consult Note    Patient Name: Jed Chávez  MRN: [id number]  Admission Date: 2/14/2025  Hospital Length of Stay: 0 days  Attending Physician: Jorge Ford DO   Primary Care Provider: EVELIN Pitt MD           Patient information was obtained from patient, spouse/SO, past medical records, and ER records.     Consults  Subjective:     Principal Problem: Melena    Chief Complaint:   Chief Complaint   Patient presents with    Rectal Bleeding     Black stools x 2 days. ERCP done one week ago with stent place in bilary duct. Liver transplant 8/10/2023. Denies weakness, dizziness or SOB.         HPI: Mr. Jed Chávez is a 65 y.o. male who  has a medical history of Abdominal hernia,  Alcoholic cirrhosis, CVA (cerebral vascular accident), DM (diabetes mellitus), Dyslipidemia, Hepatocellular carcinoma,  Liver transplant, HTN (hypertension), Intracranial hemorrhage, Left-sided nontraumatic intracerebral hemorrhage, Skin cancer, Meningioma s/p Left crani/resection.    He was recently admitted to [facility] for evaluation of elevated LFTs where after evaluation he ultimately underwent per chart review, "s/p EUS/ERCP on 2/7, severe biliary stricture treated with sphincterotomy and stent. Recommended ciprofloxacin 500 BID for 5 days. Also recommended ursodiol for biliary prophylaxis. Repeat ERCP in 6 weeks.  He presented to the ED for evaluation for concerns of dark stools/melena over the past 4-5 days and evaluation at a PCP visit noted concerns for positive FOBT and downtrending hemoglobin on labs.  Home medications include aspirin, Plavix.      ED course included discussion with GI at [facility] where decision was made to transfer for hepatology and GI/AES evaluation and recommendations to initiate pantoprazole IV.  ED course notable for hemoglobin 9 hemoglobin 9.1.    Hospital medicine consulted for medical management while pending transfer to " [facility].        Past Medical History:   Diagnosis Date    Abdominal hernia 02/03/2025    Hernia of abdominal cavity (Problem)      Acute blood loss anemia 08/12/2023    - H/H stable  - no overt signs of bleed  - continue to monitor with daily CBC      Alcoholic cirrhosis     CVA (cerebral vascular accident)     DM (diabetes mellitus)     Dyslipidemia     Hepatocellular carcinoma     History of hepatocellular carcinoma, in remission after liver transplant 04/05/2023    Synoptic Report Procedure: Total hepatectomy. Histologic type: Hepatocellular carcinoma. Histologic grade: G1, well-differentiated. Tumor focality: Multifocal. Tumor characteristics: Tumor #1 (blocks 2B-2E): Tumor site: Right lobe. Tumor size: 6.2 cm in greatest dimension grossly. Treatment effect: Complete necrosis (no viable tumor). Tumor #2 (blocks 2F-2H): Tumor site: Right lobe. Tumor size: 4.    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    S/P liver transplant 08/12/2023    Skin cancer        Past Surgical History:   Procedure Laterality Date    CRANIOTOMY, WITH NEOPLASM EXCISION USING COMPUTER-ASSISTED NAVIGATION Left 10/26/2023    Procedure: CRANIOTOMY, WITH NEOPLASM EXCISION USING COMPUTER-ASSISTED NAVIGATION;  Surgeon: Jose E Degroot DO;  Location: [facility] OR [address] [place] [address];  Service: Neurosurgery;  Laterality: Left;    ENDOSCOPIC ULTRASOUND OF UPPER GASTROINTESTINAL TRACT N/A 2/7/2025    Procedure: ULTRASOUND, UPPER GI TRACT, ENDOSCOPIC;  Surgeon: Christiano Landaverde MD;  Location: [facility] ENDO ([address] [place] [address]);  Service: Endoscopy;  Laterality: N/A;    ERCP N/A 2/7/2025    Procedure: ERCP (ENDOSCOPIC RETROGRADE CHOLANGIOPANCREATOGRAPHY);  Surgeon: Christiano Landaverde MD;  Location: [facility] ENDO ([address] [place] [address]);  Service: Endoscopy;  Laterality: N/A;    HERNIA REPAIR N/A     LIVER TRANSPLANT N/A 8/9/2023    Procedure: TRANSPLANT, LIVER;  Surgeon:  "Ameya Suero MD;  Location: [facility] OR [address] [place] [address];  Service: Transplant;  Laterality: N/A;       Review of patient's allergies indicates:  No Known Allergies    No current facility-administered medications on file prior to encounter.     Current Outpatient Medications on File Prior to Encounter   Medication Sig    blood-glucose sensor (DEXCOM G7 SENSOR) Neelam 1 each by Misc.(Non-Drug; Combo Route) route every 10 days.    carvediloL (COREG) 12.5 MG tablet Take 1 tablet (12.5 mg total) by mouth 2 (two) times daily with meals. HOLD if SBP < 125 or pulse < 60.    [] ciprofloxacin HCl (CIPRO) 500 MG tablet Take 1 tablet (500 mg total) by mouth every 12 (twelve) hours. for 5 days    clopidogreL (PLAVIX) 75 mg tablet Take 1 tablet (75 mg total) by mouth once daily.    insulin glargine U-100, Lantus, (LANTUS SOLOSTAR U-100 INSULIN) 100 unit/mL (3 mL) InPn pen Inject 33 Units into the skin once daily.    insulin lispro 100 unit/mL injection VIA ILET INSULIN PUMP, MAX .    losartan (COZAAR) 100 MG tablet Take 1 tablet (100 mg total) by mouth once daily. HOLD if SBP < 120    tacrolimus (PROGRAF) 1 MG Cap Take 2 capsules (2 mg total) by mouth every 12 (twelve) hours.    tirzepatide 10 mg/0.5 mL PnIj Inject 10 mg (one pen) into the skin every 7 days.    ursodioL (ACTIGALL) 300 mg capsule Take 1 capsule (300 mg total) by mouth 2 (two) times daily.    aspirin 81 MG Chew Chew and swallow 1 tablet (81 mg total) by mouth once daily. Restart 23    blood sugar diagnostic Strp Test blood glucose 3 (three) times daily.    pen needle, diabetic (BD ULTRA-FINE MERCEDES PEN NEEDLE) 32 gauge x 5/32" Ndle Use to inject insulin into the skin 4 (four) times daily.     Family History    None       Tobacco Use    Smoking status: Never    Smokeless tobacco: Never   Substance and Sexual Activity    Alcohol use: Not Currently    Drug use: Never    Sexual activity: Not Currently     Partners: Female     Review of Systems   Constitutional:  " Positive for fatigue. Negative for chills and fever.   HENT:  Negative for congestion, rhinorrhea and sore throat.    Respiratory:  Negative for shortness of breath.    Cardiovascular:  Negative for chest pain, palpitations and leg swelling.   Gastrointestinal:  Positive for blood in stool. Negative for abdominal distention, abdominal pain, constipation, diarrhea, nausea and vomiting.   Genitourinary:  Negative for difficulty urinating and dysuria.   Skin:  Negative for wound.   Neurological:  Negative for dizziness and light-headedness.     Objective:     Vital Signs (Most Recent):  Temp: 97.5 °F (36.4 °C) (02/14/25 1603)  Pulse: 79 (02/15/25 1200)  Resp: 20 (02/15/25 1200)  BP: (!) 170/92 (02/15/25 1200)  SpO2: 97 % (02/15/25 1200) Vital Signs (24h Range):  Temp:  [97 °F (36.1 °C)-97.5 °F (36.4 °C)] 97.5 °F (36.4 °C)  Pulse:  [75-94] 79  Resp:  [14-22] 20  SpO2:  [95 %-98 %] 97 %  BP: (124-170)/(70-92) 170/92     Weight: 101.2 kg (223 lb)  Body mass index is 31.1 kg/m².     Physical Exam  Vitals and nursing note reviewed.   Constitutional:       General: He is not in acute distress.     Appearance: He is not ill-appearing, toxic-appearing or diaphoretic.   HENT:      Head: Normocephalic and atraumatic.   Eyes:      General: No scleral icterus.        Right eye: No discharge.         Left eye: No discharge.   Cardiovascular:      Rate and Rhythm: Normal rate and regular rhythm.      Heart sounds: Normal heart sounds.   Pulmonary:      Effort: Pulmonary effort is normal. No respiratory distress.   Abdominal:      General: Bowel sounds are normal.      Tenderness: There is no abdominal tenderness.   Musculoskeletal:      Cervical back: No rigidity.      Right lower leg: No edema.      Left lower leg: No edema.   Skin:     General: Skin is warm and dry.      Coloration: Skin is not jaundiced.   Neurological:      Mental Status: He is alert and oriented to person, place, and time. Mental status is at baseline.  "  Psychiatric:         Mood and Affect: Mood normal.         Behavior: Behavior normal.                Significant Labs: All pertinent labs within the past 24 hours have been reviewed.  CBC:   Recent Labs   Lab 02/14/25  1944 02/15/25  0332 02/15/25  1251   WBC 6.01 4.49 4.82   HGB 10.2* 9.1* 10.0*   HCT 30.4* 27.7* 30.2*    124* 146*     CMP:   Recent Labs   Lab 02/14/25  1944 02/15/25  1251    141   K 3.9 4.0    108   CO2 22* 24   * 171*   BUN 24* 15   CREATININE 1.0 0.8   CALCIUM 8.6* 8.2*   PROT 6.3 6.0   ALBUMIN 3.4* 3.2*   BILITOT 1.7* 2.0*   ALKPHOS 175* 159*   AST 15 15   ALT 46* 40   ANIONGAP 9 9     POCT Glucose:   Recent Labs   Lab 02/15/25  1241   POCTGLUCOSE 180*       Significant Imaging: I have reviewed all pertinent imaging results/findings within the past 24 hours.      Assessment/Plan:     * Melena  Recently admitted to [facility] for evaluation of elevated LFTs where after evaluation he ultimately underwent per chart review, "s/p EUS/ERCP on 2/7, severe biliary stricture treated with sphincterotomy and stent. Recommended ciprofloxacin 500 BID for 5 days. Also recommended ursodiol for biliary prophylaxis. Repeat ERCP in 6 weeks.  He presented to the ED for evaluation for concerns of dark stools/melena over the past 4-5 days and evaluation at a PCP visit noted concerns for positive FOBT and downtrending hemoglobin on labs.  Home medications include aspirin, Plavix.        ED course included discussion with GI at [facility] where decision was made to transfer for hepatology and GI/AES evaluation and recommendations to initiate pantoprazole IV.  ED course notable for hemoglobin 9 hemoglobin 9.1.       Recent Labs     02/14/25  1225 02/14/25  1944 02/15/25  0332 02/15/25  1251   HGB 10.6* 10.2* 9.1* 10.0*       PLAN:  -Continue pantoprazole 40 mg IV b.i.d.  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  History of stent placement by IR 10/2/23.  Postprocedure, he was put on aspirin/Plavix.    PLAN:  -Clopidogrel was held recently prior to ERCP procedure for washout.  Will hold clopidogrel at this time given concerns for bleeding and potential for ERCP procedure at [facility].  -Hemoglobin stabilized, and up trending so will cautiously resume aspirin    Status post liver transplant  Home dose is tacrolimus 2 mg p.o. b.i.d. for immunosuppressant    PLAN:  Continue home tacrolimus      Type 2 diabetes mellitus with other specified complication, with chronic insulin use  Patient reports insulin pump use at home      Most recent A1c reviewed-   Lab Results   Component Value Date    HGBA1C 6.3 (H) 12/16/2024        Most recent glucose reviewed-  Recent Labs     02/15/25  1241   POCTGLUCOSE 180*         Plan:  - Continue home insulin pump  - POCT glucose ACHS  - Will monitor glucose results and adjust insulin regimen accordingly  - Hold oral hypoglycemic agents while patient is in the hospital.    Hypertension complicating diabetes  Hypertensive on admission    Home meds for hyperten  Hypertension Medications              carvediloL (COREG) 12.5 MG tablet Take 1 tablet (12.5 mg total) by mouth 2 (two) times daily with meals. HOLD if SBP < 125 or pulse < 60.    losartan (COZAAR) 100 MG tablet Take 1 tablet (100 mg total) by mouth once daily. HOLD if SBP < 120            Patients blood pressure range in the last 24 hours was: BP  Min:  102/64  Max: 204/106.      PLAN:  -While in the hospital, will manage blood pressure as follows; Continue home antihypertensive regimen  -The patient's inpatient anti-hypertensive regimen is listed below:  Current Antihypertensives  carvediloL tablet 12.5 mg, 2 times daily with meals, Oral  losartan tablet 100 mg, Daily, Oral  hydrALAZINE injection 5 mg, Every 6 hours PRN, Intravenous  -Will utilize p.r.n. blood pressure medication only if patient's blood pressure greater than 180/110 and he develops symptoms such as worsening chest pain or shortness of breath.        VTE Risk Mitigation (From admission, onward)           Ordered     Place sequential compression device  Until discontinued         02/15/25 2248     Reason for No Pharmacological VTE Prophylaxis  Once        Question:  Reasons:  Answer:  Active Bleeding    02/15/25 2248     IP VTE HIGH RISK PATIENT  Once         02/15/25 2248                        Thank you for your consult. I will follow-up with patient. Please contact us if you have any additional questions.    Jorge Ford DO  Department of Hospital Medicine   [facility] - Emergency Dept.    Voice recognition software was used in the creation of this note/communication and any sound-alike/typographical errors which may have occurred despite initial review prior to signing should be taken in context when interpreting.  If such errors prevent a clear understanding of the note/communication, please contact the provider/office for clarification.

## 2025-02-16 NOTE — PHARMACY MED REC
"Admission Medication History     The home medication history was taken by Raoul Coronel.    You may go to "Admission" then "Reconcile Home Medications" tabs to review and/or act upon these items.     The home medication list has been updated by the Pharmacy department.   Please read ALL comments highlighted in yellow.   Please address this information as you see fit.    Feel free to contact us if you have any questions or require assistance.      Medications listed below were obtained from: Analytic software- [employer], Medical records, and Patient's pharmacy  (Not in a hospital admission)      Raoul Coronel  [id number]    Current Outpatient Medications on File Prior to Encounter   Medication Sig Dispense Refill Last Dose/Taking    blood-glucose sensor (DEXCOM G7 SENSOR) Neelam 1 each by Misc.(Non-Drug; Combo Route) route every 10 days. 3 each 11 2/14/2025    carvediloL (COREG) 12.5 MG tablet Take 1 tablet (12.5 mg total) by mouth 2 (two) times daily with meals. HOLD if SBP < 125 or pulse < 60. 180 tablet 0 2/14/2025    clopidogreL (PLAVIX) 75 mg tablet Take 1 tablet (75 mg total) by mouth once daily. 30 tablet 11 2/14/2025    insulin glargine U-100, Lantus, (LANTUS SOLOSTAR U-100 INSULIN) 100 unit/mL (3 mL) InPn pen Inject 33 Units into the skin once daily. 15 mL 11 2/14/2025    insulin lispro 100 unit/mL injection VIA ILET INSULIN PUMP, MAX . 50 mL 11 2/14/2025    losartan (COZAAR) 100 MG tablet Take 1 tablet (100 mg total) by mouth once daily. HOLD if SBP < 120 90 tablet 0 2/14/2025    tacrolimus (PROGRAF) 1 MG Cap Take 2 capsules (2 mg total) by mouth every 12 (twelve) hours. 120 capsule 11 2/14/2025    tirzepatide 10 mg/0.5 mL PnIj Inject 10 mg (one pen) into the skin every 7 days. 2 mL 11 Past Week    ursodioL (ACTIGALL) 300 mg capsule Take 1 capsule (300 mg total) by mouth 2 (two) times daily. 60 capsule 2 2/14/2025    aspirin 81 MG Chew Chew and swallow 1 tablet (81 mg total) by mouth once daily. " "Restart 11/9/23 30 tablet 11 Unknown    blood sugar diagnostic Strp Test blood glucose 3 (three) times daily. 100 strip 11 Unknown    pen needle, diabetic (BD ULTRA-FINE MERCEDES PEN NEEDLE) 32 gauge x 5/32" Ndle Use to inject insulin into the skin 4 (four) times daily. 100 each 11 Unknown                         .          "

## 2025-02-16 NOTE — HOSPITAL COURSE
Medically managed by Hospital Medicine while pending transfer to [facility] for melena evaluation.  Trended hemoglobin 9.1>10>9.3.  Continued pantoprazole IV per recs.    Transferred to  [facility] for melena evaluation.

## 2025-02-16 NOTE — ASSESSMENT & PLAN NOTE
Home dose is tacrolimus 2 mg p.o. b.i.d. for immunosuppressant    PLAN:  Continue home tacrolimus

## 2025-02-16 NOTE — ASSESSMENT & PLAN NOTE
Patient reports insulin pump use at home      Most recent A1c reviewed-   Lab Results   Component Value Date    HGBA1C 6.3 (H) 12/16/2024        Most recent glucose reviewed-  Recent Labs     02/15/25  1241   POCTGLUCOSE 180*         Plan:  - Continue home insulin pump  - POCT glucose ACHS  - Will monitor glucose results and adjust insulin regimen accordingly  - Hold oral hypoglycemic agents while patient is in the hospital.

## 2025-02-16 NOTE — ASSESSMENT & PLAN NOTE
Chronic.  History of stent placement by IR 10/2/23.  Postprocedure, he was put on aspirin/Plavix.    PLAN:  -Clopidogrel was held recently prior to ERCP procedure for washout.  Will hold clopidogrel at this time given concerns for bleeding and potential for ERCP procedure at [facility].  -Hemoglobin stabilized, and up trending so will cautiously resume aspirin

## 2025-02-17 ENCOUNTER — TELEPHONE (OUTPATIENT)
Dept: INTERNAL MEDICINE | Facility: CLINIC | Age: 65
End: 2025-02-17
Payer: COMMERCIAL

## 2025-02-17 ENCOUNTER — ANESTHESIA EVENT (OUTPATIENT)
Dept: ENDOSCOPY | Facility: HOSPITAL | Age: 65
DRG: 377 | End: 2025-02-17
Payer: COMMERCIAL

## 2025-02-17 ENCOUNTER — ANESTHESIA (OUTPATIENT)
Dept: ENDOSCOPY | Facility: HOSPITAL | Age: 65
DRG: 377 | End: 2025-02-17
Payer: COMMERCIAL

## 2025-02-17 PROBLEM — Z96.41 INSULIN PUMP STATUS: Status: ACTIVE | Noted: 2025-02-17

## 2025-02-17 PROBLEM — D61.818 PANCYTOPENIA: Status: ACTIVE | Noted: 2025-02-17

## 2025-02-17 LAB
ABO + RH BLD: NORMAL
ALBUMIN SERPL BCP-MCNC: 3.1 G/DL (ref 3.5–5.2)
ALP SERPL-CCNC: 154 U/L (ref 40–150)
ALT SERPL W/O P-5'-P-CCNC: 30 U/L (ref 10–44)
ANION GAP SERPL CALC-SCNC: 9 MMOL/L (ref 8–16)
APTT PPP: 26.8 SEC (ref 21–32)
AST SERPL-CCNC: 20 U/L (ref 10–40)
BASOPHILS # BLD AUTO: 0.01 K/UL (ref 0–0.2)
BASOPHILS NFR BLD: 0.2 % (ref 0–1.9)
BILIRUB DIRECT SERPL-MCNC: 0.9 MG/DL (ref 0.1–0.3)
BILIRUB SERPL-MCNC: 2.2 MG/DL (ref 0.1–1)
BILIRUB UR QL STRIP: NEGATIVE
BLD GP AB SCN CELLS X3 SERPL QL: NORMAL
BUN SERPL-MCNC: 15 MG/DL (ref 8–23)
CALCIUM SERPL-MCNC: 8.3 MG/DL (ref 8.7–10.5)
CHLORIDE SERPL-SCNC: 107 MMOL/L (ref 95–110)
CLARITY UR REFRACT.AUTO: CLEAR
CO2 SERPL-SCNC: 21 MMOL/L (ref 23–29)
COLOR UR AUTO: YELLOW
CREAT SERPL-MCNC: 0.9 MG/DL (ref 0.5–1.4)
DIFFERENTIAL METHOD BLD: ABNORMAL
EOSINOPHIL # BLD AUTO: 0.1 K/UL (ref 0–0.5)
EOSINOPHIL NFR BLD: 1.8 % (ref 0–8)
ERYTHROCYTE [DISTWIDTH] IN BLOOD BY AUTOMATED COUNT: 15.2 % (ref 11.5–14.5)
EST. GFR  (NO RACE VARIABLE): >60 ML/MIN/1.73 M^2
GLUCOSE SERPL-MCNC: 205 MG/DL (ref 70–110)
GLUCOSE UR QL STRIP: NEGATIVE
HCT VFR BLD AUTO: 30.3 % (ref 40–54)
HGB BLD-MCNC: 9.7 G/DL (ref 14–18)
HGB UR QL STRIP: NEGATIVE
IMM GRANULOCYTES # BLD AUTO: 0.02 K/UL (ref 0–0.04)
IMM GRANULOCYTES NFR BLD AUTO: 0.4 % (ref 0–0.5)
INR PPP: 1 (ref 0.8–1.2)
KETONES UR QL STRIP: NEGATIVE
LEUKOCYTE ESTERASE UR QL STRIP: NEGATIVE
LYMPHOCYTES # BLD AUTO: 2 K/UL (ref 1–4.8)
LYMPHOCYTES NFR BLD: 40.2 % (ref 18–48)
MAGNESIUM SERPL-MCNC: 1.6 MG/DL (ref 1.6–2.6)
MCH RBC QN AUTO: 28 PG (ref 27–31)
MCHC RBC AUTO-ENTMCNC: 32 G/DL (ref 32–36)
MCV RBC AUTO: 88 FL (ref 82–98)
MONOCYTES # BLD AUTO: 0.4 K/UL (ref 0.3–1)
MONOCYTES NFR BLD: 7.1 % (ref 4–15)
NEUTROPHILS # BLD AUTO: 2.5 K/UL (ref 1.8–7.7)
NEUTROPHILS NFR BLD: 50.3 % (ref 38–73)
NITRITE UR QL STRIP: NEGATIVE
NRBC BLD-RTO: 0 /100 WBC
PH UR STRIP: 6 [PH] (ref 5–8)
PHOSPHATE SERPL-MCNC: 2.8 MG/DL (ref 2.7–4.5)
PLATELET # BLD AUTO: 164 K/UL (ref 150–450)
PMV BLD AUTO: 9.9 FL (ref 9.2–12.9)
POCT GLUCOSE: 144 MG/DL (ref 70–110)
POCT GLUCOSE: 147 MG/DL (ref 70–110)
POCT GLUCOSE: 167 MG/DL (ref 70–110)
POCT GLUCOSE: 191 MG/DL (ref 70–110)
POTASSIUM SERPL-SCNC: 3.5 MMOL/L (ref 3.5–5.1)
PROT SERPL-MCNC: 5.8 G/DL (ref 6–8.4)
PROT UR QL STRIP: NEGATIVE
PROTHROMBIN TIME: 11.3 SEC (ref 9–12.5)
RBC # BLD AUTO: 3.46 M/UL (ref 4.6–6.2)
SODIUM SERPL-SCNC: 137 MMOL/L (ref 136–145)
SP GR UR STRIP: 1.02 (ref 1–1.03)
SPECIMEN OUTDATE: NORMAL
TACROLIMUS BLD-MCNC: 3.3 NG/ML (ref 5–15)
TACROLIMUS BLD-MCNC: 4.2 NG/ML (ref 5–15)
URN SPEC COLLECT METH UR: NORMAL
WBC # BLD AUTO: 4.93 K/UL (ref 3.9–12.7)

## 2025-02-17 PROCEDURE — 27202127 HC STENT INTRODUCER: Performed by: INTERNAL MEDICINE

## 2025-02-17 PROCEDURE — 85730 THROMBOPLASTIN TIME PARTIAL: CPT | Performed by: STUDENT IN AN ORGANIZED HEALTH CARE EDUCATION/TRAINING PROGRAM

## 2025-02-17 PROCEDURE — 74328 X-RAY BILE DUCT ENDOSCOPY: CPT | Mod: TC | Performed by: INTERNAL MEDICINE

## 2025-02-17 PROCEDURE — 27202125 HC BALLOON, EXTRACTION (ANY): Performed by: INTERNAL MEDICINE

## 2025-02-17 PROCEDURE — 27201089 HC SNARE, DISP (ANY): Performed by: INTERNAL MEDICINE

## 2025-02-17 PROCEDURE — 85610 PROTHROMBIN TIME: CPT | Performed by: STUDENT IN AN ORGANIZED HEALTH CARE EDUCATION/TRAINING PROGRAM

## 2025-02-17 PROCEDURE — 63600175 PHARM REV CODE 636 W HCPCS: Performed by: NURSE ANESTHETIST, CERTIFIED REGISTERED

## 2025-02-17 PROCEDURE — 83735 ASSAY OF MAGNESIUM: CPT | Performed by: STUDENT IN AN ORGANIZED HEALTH CARE EDUCATION/TRAINING PROGRAM

## 2025-02-17 PROCEDURE — 94761 N-INVAS EAR/PLS OXIMETRY MLT: CPT

## 2025-02-17 PROCEDURE — 82248 BILIRUBIN DIRECT: CPT | Performed by: STUDENT IN AN ORGANIZED HEALTH CARE EDUCATION/TRAINING PROGRAM

## 2025-02-17 PROCEDURE — 25000003 PHARM REV CODE 250: Performed by: STUDENT IN AN ORGANIZED HEALTH CARE EDUCATION/TRAINING PROGRAM

## 2025-02-17 PROCEDURE — C1769 GUIDE WIRE: HCPCS | Performed by: INTERNAL MEDICINE

## 2025-02-17 PROCEDURE — 86850 RBC ANTIBODY SCREEN: CPT | Performed by: STUDENT IN AN ORGANIZED HEALTH CARE EDUCATION/TRAINING PROGRAM

## 2025-02-17 PROCEDURE — 85025 COMPLETE CBC W/AUTO DIFF WBC: CPT | Performed by: STUDENT IN AN ORGANIZED HEALTH CARE EDUCATION/TRAINING PROGRAM

## 2025-02-17 PROCEDURE — C2617 STENT, NON-COR, TEM W/O DEL: HCPCS | Performed by: INTERNAL MEDICINE

## 2025-02-17 PROCEDURE — 25500020 PHARM REV CODE 255: Performed by: INTERNAL MEDICINE

## 2025-02-17 PROCEDURE — 63600175 PHARM REV CODE 636 W HCPCS: Performed by: STUDENT IN AN ORGANIZED HEALTH CARE EDUCATION/TRAINING PROGRAM

## 2025-02-17 PROCEDURE — 43276 ERCP STENT EXCHANGE W/DILATE: CPT | Performed by: INTERNAL MEDICINE

## 2025-02-17 PROCEDURE — 80197 ASSAY OF TACROLIMUS: CPT | Performed by: STUDENT IN AN ORGANIZED HEALTH CARE EDUCATION/TRAINING PROGRAM

## 2025-02-17 PROCEDURE — 0FPB80Z REMOVAL OF DRAINAGE DEVICE FROM HEPATOBILIARY DUCT, VIA NATURAL OR ARTIFICIAL OPENING ENDOSCOPIC: ICD-10-PCS | Performed by: INTERNAL MEDICINE

## 2025-02-17 PROCEDURE — 80053 COMPREHEN METABOLIC PANEL: CPT | Performed by: STUDENT IN AN ORGANIZED HEALTH CARE EDUCATION/TRAINING PROGRAM

## 2025-02-17 PROCEDURE — 20600001 HC STEP DOWN PRIVATE ROOM

## 2025-02-17 PROCEDURE — 25000003 PHARM REV CODE 250: Performed by: NURSE ANESTHETIST, CERTIFIED REGISTERED

## 2025-02-17 PROCEDURE — 81003 URINALYSIS AUTO W/O SCOPE: CPT | Performed by: STUDENT IN AN ORGANIZED HEALTH CARE EDUCATION/TRAINING PROGRAM

## 2025-02-17 PROCEDURE — 37000008 HC ANESTHESIA 1ST 15 MINUTES: Performed by: INTERNAL MEDICINE

## 2025-02-17 PROCEDURE — BF101ZZ FLUOROSCOPY OF BILE DUCTS USING LOW OSMOLAR CONTRAST: ICD-10-PCS | Performed by: INTERNAL MEDICINE

## 2025-02-17 PROCEDURE — 0F798DZ DILATION OF COMMON BILE DUCT WITH INTRALUMINAL DEVICE, VIA NATURAL OR ARTIFICIAL OPENING ENDOSCOPIC: ICD-10-PCS | Performed by: INTERNAL MEDICINE

## 2025-02-17 PROCEDURE — 99900035 HC TECH TIME PER 15 MIN (STAT)

## 2025-02-17 PROCEDURE — 36415 COLL VENOUS BLD VENIPUNCTURE: CPT | Performed by: STUDENT IN AN ORGANIZED HEALTH CARE EDUCATION/TRAINING PROGRAM

## 2025-02-17 PROCEDURE — 37000009 HC ANESTHESIA EA ADD 15 MINS: Performed by: INTERNAL MEDICINE

## 2025-02-17 PROCEDURE — 84100 ASSAY OF PHOSPHORUS: CPT | Performed by: STUDENT IN AN ORGANIZED HEALTH CARE EDUCATION/TRAINING PROGRAM

## 2025-02-17 DEVICE — BILIARY STENT
Type: IMPLANTABLE DEVICE | Site: BILE DUCT | Status: FUNCTIONAL
Brand: ADVANIX™ BILIARY

## 2025-02-17 RX ORDER — PROPOFOL 10 MG/ML
VIAL (ML) INTRAVENOUS
Status: DISCONTINUED | OUTPATIENT
Start: 2025-02-17 | End: 2025-02-17

## 2025-02-17 RX ORDER — SODIUM CHLORIDE 9 MG/ML
INJECTION, SOLUTION INTRAVENOUS CONTINUOUS
Status: DISCONTINUED | OUTPATIENT
Start: 2025-02-17 | End: 2025-02-17

## 2025-02-17 RX ORDER — POLYETHYLENE GLYCOL 3350, SODIUM SULFATE ANHYDROUS, SODIUM BICARBONATE, SODIUM CHLORIDE, POTASSIUM CHLORIDE 236; 22.74; 6.74; 5.86; 2.97 G/4L; G/4L; G/4L; G/4L; G/4L
4000 POWDER, FOR SOLUTION ORAL ONCE
Status: COMPLETED | OUTPATIENT
Start: 2025-02-17 | End: 2025-02-17

## 2025-02-17 RX ORDER — PROPOFOL 10 MG/ML
VIAL (ML) INTRAVENOUS CONTINUOUS PRN
Status: DISCONTINUED | OUTPATIENT
Start: 2025-02-17 | End: 2025-02-17

## 2025-02-17 RX ORDER — HYDROMORPHONE HYDROCHLORIDE 1 MG/ML
0.2 INJECTION, SOLUTION INTRAMUSCULAR; INTRAVENOUS; SUBCUTANEOUS EVERY 5 MIN PRN
Status: DISCONTINUED | OUTPATIENT
Start: 2025-02-17 | End: 2025-02-17 | Stop reason: HOSPADM

## 2025-02-17 RX ORDER — LIDOCAINE HYDROCHLORIDE 20 MG/ML
INJECTION INTRAVENOUS
Status: DISCONTINUED | OUTPATIENT
Start: 2025-02-17 | End: 2025-02-17

## 2025-02-17 RX ORDER — GLUCAGON 1 MG
1 KIT INJECTION
Status: DISCONTINUED | OUTPATIENT
Start: 2025-02-17 | End: 2025-02-17 | Stop reason: HOSPADM

## 2025-02-17 RX ADMIN — CARVEDILOL 12.5 MG: 6.25 TABLET, FILM COATED ORAL at 09:02

## 2025-02-17 RX ADMIN — PROPOFOL 60 MG: 10 INJECTION, EMULSION INTRAVENOUS at 12:02

## 2025-02-17 RX ADMIN — TACROLIMUS 2 MG: 1 CAPSULE ORAL at 06:02

## 2025-02-17 RX ADMIN — PROPOFOL 40 MG: 10 INJECTION, EMULSION INTRAVENOUS at 12:02

## 2025-02-17 RX ADMIN — TACROLIMUS 2 MG: 1 CAPSULE ORAL at 09:02

## 2025-02-17 RX ADMIN — SODIUM CHLORIDE: 0.9 INJECTION, SOLUTION INTRAVENOUS at 12:02

## 2025-02-17 RX ADMIN — CARVEDILOL 12.5 MG: 6.25 TABLET, FILM COATED ORAL at 06:02

## 2025-02-17 RX ADMIN — URSODIOL 300 MG: 300 CAPSULE ORAL at 07:02

## 2025-02-17 RX ADMIN — POLYETHYLENE GLYCOL 3350, SODIUM SULFATE ANHYDROUS, SODIUM BICARBONATE, SODIUM CHLORIDE, POTASSIUM CHLORIDE 4000 ML: 236; 22.74; 6.74; 5.86; 2.97 POWDER, FOR SOLUTION ORAL at 06:02

## 2025-02-17 RX ADMIN — PROPOFOL 150 MCG/KG/MIN: 10 INJECTION, EMULSION INTRAVENOUS at 12:02

## 2025-02-17 RX ADMIN — LOSARTAN POTASSIUM 100 MG: 50 TABLET, FILM COATED ORAL at 09:02

## 2025-02-17 RX ADMIN — URSODIOL 300 MG: 300 CAPSULE ORAL at 09:02

## 2025-02-17 RX ADMIN — LIDOCAINE HYDROCHLORIDE 100 MG: 20 INJECTION INTRAVENOUS at 12:02

## 2025-02-17 NOTE — ASSESSMENT & PLAN NOTE
Patient's blood pressure range in the last 24 hours was: BP  Min: 132/76  Max: 172/85.The patient's inpatient anti-hypertensive regimen is listed below:  Current Antihypertensives  carvediloL tablet 12.5 mg, 2 times daily with meals, Oral  losartan tablet 100 mg, Daily, Oral    Plan  - BP is controlled, no changes needed to their regimen

## 2025-02-17 NOTE — SUBJECTIVE & OBJECTIVE
Interval HPI:   Overnight events: No acute events overnight. Patient in room [facility] 2ND FLR Periop Pool*. Blood glucose stable. BG at goal on current insulin regimen (Home Insulin Pump). Steroid use- None . * Day of Surgery *  Renal function- Normal   Vasopressors-  None       Endocrine will continue to follow and manage insulin orders inpatient.         Diet NPO Except for: Medication, Sips with Medication  Diet Clear Liquid     Eating:   NPO  Nausea: No  Hypoglycemia and intervention: No  Fever: No  TPN and/or TF: No  If yes, type of TF/TPN and rate: n/a    PMH, PSH, FH, SH reviewed     ROS:  Constitutional: Negative for weight changes.  Eyes: Negative for visual disturbance.  Respiratory: Negative for cough.   Cardiovascular: Negative for chest pain.  Gastrointestinal: Negative for nausea.  Endocrine: Negative for polyuria, polydipsia.  Musculoskeletal: Negative for back pain.  Skin: Negative for rash.  Neurological: Negative for syncope.  Psychiatric/Behavioral: Negative for depression.    Review of Systems    Current Medications and/or Treatments Impacting Glycemic Control  Immunotherapy:    Immunosuppressants           Stop Route Frequency     tacrolimus capsule 2 mg         -- Oral 2 times daily          Steroids:   Hormones (From admission, onward)      Start     Stop Route Frequency Ordered    02/17/25 0025  melatonin tablet 6 mg         -- Oral Nightly PRN 02/16/25 2326          Pressors:    Autonomic Drugs (From admission, onward)      None          Hyperglycemia/Diabetes Medications:   Antihyperglycemics (From admission, onward)      None             PHYSICAL EXAMINATION:  Vitals:    02/17/25 1330   BP: 121/76   Pulse: 70   Resp: 19   Temp:      Body mass index is 30.53 kg/m².     Physical Exam   Constitutional: Well developed, well nourished, NAD.  ENT: External ears no masses with nose patent; normal hearing.  Neck: Supple; trachea midline.  Cardiovascular: Normal heart sounds  Lungs: Normal effort;  lungs anterior bilaterally clear to auscultation.  Abdomen: Soft, no masses, no hernias.  MS: No clubbing or cyanosis of nails noted; unable to assess gait.  Skin: No rashes, lesions, or ulcers; no nodules. Insulin pump in place.   Psychiatric: Good judgement and insight; normal mood and affect.  Neurological: Cranial nerves are grossly intact  Foot: Nails in good condition, no amputations noted.

## 2025-02-17 NOTE — NURSING TRANSFER
Nursing Transfer Note      2/17/2025   1:22 PM    Nurse giving handoff:amrti meade   Nurse receiving handoff:marti umaña     Reason patient is being transferred: post procedure     Transfer To: [id number]    Transfer via stretcher    Transfer with cardiac monitoring    Transported by pct    Transfer Vital Signs:  Blood Pressure:119/74  Heart Rate:71  O2:96  Respirations:18    Telemetry: Box Number [id number], Rate  , and Rhythm    Order for Tele Monitor? Yes    Any special needs or follow-up needed: routine    Patient belongings transferred with patient: Yes    Chart send with patient: Yes    Notified: spouse    Patient reassessed at:1300

## 2025-02-17 NOTE — PROGRESS NOTES
"[facility] Medicine  Progress Note    Patient Name: Jed Chávez  MRN: [id number]  Patient Class: OP- Observation   Admission Date: 2/14/2025  Length of Stay: 0 days  Attending Physician: Jorge Ford DO  Primary Care Provider: EVELIN Pitt MD        Subjective     Principal Problem:Melena        HPI:  Mr. Jed Chávez is a 65 y.o. male who  has a medical history of Abdominal hernia,  Alcoholic cirrhosis, CVA (cerebral vascular accident), DM (diabetes mellitus), Dyslipidemia, Hepatocellular carcinoma,  Liver transplant, HTN (hypertension), Intracranial hemorrhage, Left-sided nontraumatic intracerebral hemorrhage, Skin cancer, Meningioma s/p Left crani/resection.    He was recently admitted to [facility] for evaluation of elevated LFTs where after evaluation he ultimately underwent per chart review, "s/p EUS/ERCP on 2/7, severe biliary stricture treated with sphincterotomy and stent. Recommended ciprofloxacin 500 BID for 5 days. Also recommended ursodiol for biliary prophylaxis. Repeat ERCP in 6 weeks.  He presented to the ED for evaluation for concerns of dark stools/melena over the past 4-5 days and evaluation at a PCP visit noted concerns for positive FOBT and downtrending hemoglobin on labs.  Home medications include aspirin, Plavix.      ED course included discussion with GI at [facility] where decision was made to transfer for hepatology and GI/AES evaluation and recommendations to initiate pantoprazole IV.  ED course notable for hemoglobin 9 hemoglobin 9.1.    Hospital medicine consulted for medical management while pending transfer to [facility].        Overview/Hospital Course:  Medically managed by Hospital Medicine while pending transfer to [facility] for melena evaluation.    Interval History: No acute events overnight, afebrile, hemodynamically stable.  Still reporting presence of dark stools.  Pending transfer to [facility] [place] for further evaluation.      Objective:     Vital Signs (Most Recent):  Temp: 98.4 °F (36.9 °C) (02/16/25 1720)  Pulse: 87 (02/16/25 1720)  Resp: 18 (02/16/25 1720)  BP: (!) 146/82 (02/16/25 1720)  SpO2: 96 % (02/16/25 1720) Vital Signs (24h Range):  Temp:  [97.7 °F (36.5 °C)-98.4 °F (36.9 °C)] 98.4 °F (36.9 °C)  Pulse:  [74-92] 87  Resp:  [12-25] 18  SpO2:  [95 %-99 %] 96 %  BP: (128-172)/(69-88) 146/82     Weight: 101.2 kg (223 lb 1.7 oz)  Body mass index is 31.12 kg/m².  No intake or output data in the 24 hours ending 02/16/25 1731      Physical Exam  Vitals and nursing note reviewed.   Constitutional:       General: He is not in acute distress.     Appearance: He is not ill-appearing, toxic-appearing or diaphoretic.   HENT:      Head: Normocephalic and atraumatic.   Eyes:      General: No scleral icterus.        Right eye: No discharge.         Left eye: No discharge.   Cardiovascular:      Rate and Rhythm: Normal rate and regular rhythm.      Heart sounds: Normal heart sounds.   Pulmonary:      Effort: Pulmonary effort is normal. No respiratory distress.   Abdominal:      General: Bowel sounds are normal.      Tenderness: There is no abdominal tenderness.   Musculoskeletal:      Cervical back: No rigidity.      Right lower leg: No edema.      Left lower leg: No edema.   Skin:     General: Skin is warm and dry.      Coloration: Skin is not jaundiced.   Neurological:      Mental Status: He is alert and oriented to person, place, and time. Mental status is at baseline.   Psychiatric:         Mood and Affect: Mood normal.         Behavior: Behavior normal.           Significant Labs: All pertinent labs within the past 24 hours have been reviewed.   Recent Labs   Lab 02/14/25  1944 02/15/25  1251 02/16/25  0430    141 139   K 3.9 4.0 3.7    108 108   CO2 22* 24 21*   BUN 24* 15 11   CREATININE 1.0 0.8 0.8   * 171* 143*   ANIONGAP 9 9 10     Recent Labs   Lab 02/14/25  1944 02/15/25  1257  "02/16/25  0430   MG 1.6 1.6 1.6   PHOS  --  3.1 2.9     Recent Labs   Lab 02/14/25  1944 02/15/25  1251 02/16/25  0430   AST 15 15 17   ALT 46* 40 34   ALKPHOS 175* 159* 156*   BILITOT 1.7* 2.0* 2.3*   ALBUMIN 3.4* 3.2* 3.1*     POCT Glucose:   Recent Labs   Lab 02/15/25  1241   POCTGLUCOSE 180*    Recent Labs   Lab 02/15/25  0332 02/15/25  1251 02/16/25  0430   WBC 4.49 4.82 3.88*   HGB 9.1* 10.0* 9.3*   HCT 27.7* 30.2* 27.9*   * 146* 138*   GRAN  --  49.5  2.4 51.2  2.0                   Inpatient Medications:  Continuous Infusions:    Scheduled Meds:   aspirin  81 mg Oral Daily    carvediloL  12.5 mg Oral BID WM    losartan  100 mg Oral Daily    pantoprazole  40 mg Intravenous Q12H    tacrolimus  2 mg Oral BID    ursodioL  300 mg Oral BID       PRN Meds:  Current Facility-Administered Medications:     acetaminophen, 650 mg, Oral, Q6H PRN    dextrose 50%, 12.5 g, Intravenous, PRN    dextrose 50%, 25 g, Intravenous, PRN    glucagon (human recombinant), 1 mg, Intramuscular, PRN    glucose, 16 g, Oral, PRN    glucose, 24 g, Oral, PRN    hydrALAZINE, 5 mg, Intravenous, Q6H PRN    melatonin, 6 mg, Oral, Nightly PRN    ondansetron, 4 mg, Intravenous, Q8H PRN    polyethylene glycol, 17 g, Oral, BID PRN    prochlorperazine, 2.5 mg, Intravenous, Q8H PRN            Assessment and Plan     * Melena  Recently admitted to [facility] for evaluation of elevated LFTs where after evaluation he ultimately underwent per chart review, "s/p EUS/ERCP on 2/7, severe biliary stricture treated with sphincterotomy and stent. Recommended ciprofloxacin 500 BID for 5 days. Also recommended ursodiol for biliary prophylaxis. Repeat ERCP in 6 weeks.  He presented to the ED for evaluation for concerns of dark stools/melena over the past 4-5 days and evaluation at a PCP visit noted concerns for positive FOBT and downtrending hemoglobin on labs.  Home medications include aspirin, Plavix.        ED course included discussion with " "GI at [facility] where decision was made to transfer for hepatology and GI/AES evaluation and recommendations to initiate pantoprazole IV.  ED course notable for hemoglobin 9 hemoglobin 9.1.       Recent Labs     02/14/25  1225 02/14/25  1944 02/15/25  0332 02/15/25  1251 02/16/25  0430   HGB 10.6* 10.2* 9.1* 10.0* 9.3*         PLAN:  -Continue pantoprazole 40 mg IV b.i.d.  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  History of stent placement by IR 10/2/23.  Postprocedure, he was put on aspirin/Plavix.    PLAN:  -Clopidogrel was held recently prior to ERCP procedure for washout.  Will hold clopidogrel at this time given concerns for bleeding and potential for ERCP procedure at [facility].    Long-term use of immunosuppressant medication  See "Status post liver transplant" A&P        Status post liver transplant  Home dose is tacrolimus 2 mg p.o. b.i.d. for immunosuppressant    PLAN:  Continue home tacrolimus      Type 2 diabetes mellitus with other specified complication, with chronic insulin use  Patient reports insulin pump use at home      Most recent A1c reviewed-   Lab Results   Component Value Date    HGBA1C 6.3 (H) 12/16/2024        Most recent glucose reviewed-  Recent Labs     02/15/25  1241   POCTGLUCOSE 180*           Plan:  - Continue home insulin pump  - POCT glucose ACHS  - Will monitor glucose results and adjust insulin regimen accordingly  - Hold " oral hypoglycemic agents while patient is in the hospital.    Hypertension complicating diabetes  Hypertensive on admission    Home meds for hyperten  Hypertension Medications              carvediloL (COREG) 12.5 MG tablet Take 1 tablet (12.5 mg total) by mouth 2 (two) times daily with meals. HOLD if SBP < 125 or pulse < 60.    losartan (COZAAR) 100 MG tablet Take 1 tablet (100 mg total) by mouth once daily. HOLD if SBP < 120            Patients blood pressure range in the last 24 hours was: BP  Min: 102/64  Max: 204/106.      PLAN:  -While in the hospital, will manage blood pressure as follows; Continue home antihypertensive regimen  -The patient's inpatient anti-hypertensive regimen is listed below:  Current Antihypertensives  carvediloL tablet 12.5 mg, 2 times daily with meals, Oral  losartan tablet 100 mg, Daily, Oral  hydrALAZINE injection 5 mg, Every 6 hours PRN, Intravenous  -Will utilize p.r.n. blood pressure medication only if patient's blood pressure greater than 180/110 and he develops symptoms such as worsening chest pain or shortness of breath.        VTE Risk Mitigation (From admission, onward)           Ordered     Place sequential compression device  Until discontinued         02/15/25 2248     Reason for No Pharmacological VTE Prophylaxis  Once        Question:  Reasons:  Answer:  Active Bleeding    02/15/25 2248     IP VTE HIGH RISK PATIENT  Once         02/15/25 2248                    Discharge Planning   CRISELDA: 2/16/2025     Code Status: Full Code   Medical Readiness for Discharge Date: 2/16/2025      Discharge Delays: None known at this time          Jorge Ford DO  Department of Hospital Medicine   [facility] Telemetry ([facility])    Voice recognition software was used in the creation of this note/communication and any sound-alike/typographical errors which may have occurred despite initial review prior to signing should be taken in context when interpreting.  If such errors prevent a clear  understanding of the note/communication, please contact the provider/office for clarification.

## 2025-02-17 NOTE — ASSESSMENT & PLAN NOTE
65M with PMHx of Type 2 DM (insulin pump), hx of HCC, alcoholic cirrhosis s/p Liver transplant (08/2023), HTN, hx of Meningioma s/p Left crani/resection, history of hemorrhagic CVA, abdominal hernia, and recent admission with billiary stricture [place] [facility] [place] 4-5 days. On presentation Hgb 9 (baseline 14). Denies abdominal pain, fever, chills, nausea, vomiting, lightheadedness, hematemesis, hemoptysis, and/or prior episodes of melena. In addition, he was [place] [facility] [place] including possible AES +/- hepatology consultation given recent biliary stent and history of liver transplant.     BUN/Cr 15/0.9  Iron studies (2023) - normal     Given recent biliary stent placement and sphincterotomy likely possible of bleeding.     Plan  - AES will perform EGD  - Trend Hgb q24hrs. Transfuse for Hgb <7, unless otherwise indicated  - Maintain IV access with 2 large bore Ivs  - Intravascular resuscitation/support with IVFs   - Keep NPO  - Hold all NSAIDs and anticoagulants, unless contraindicated  - Bolus IV pantoprazole 80mg followed by 40mg BID  - Please correct any coagulopathy with platelets and FFP for goal of platelets >50K and INR <2.0

## 2025-02-17 NOTE — ASSESSMENT & PLAN NOTE
Chronic.  History of stent placement by IR 10/2/23.  Postprocedure, he was put on aspirin/Plavix.    PLAN:  -Clopidogrel was held recently prior to ERCP procedure for washout.  Will hold clopidogrel at this time given concerns for bleeding and potential for ERCP procedure at [facility].

## 2025-02-17 NOTE — ANESTHESIA PREPROCEDURE EVALUATION
[facility]  Anesthesia Pre-Operative Evaluation         Patient Name: Jed Chávez  YOB: 1960  MRN: [id number]    SUBJECTIVE:     Pre-operative evaluation for Procedure(s) (LRB):  COLONOSCOPY (N/A)     02/17/2025    Jed Chávez is a 65 y.o. male w/ a significant PMHx of  HTN, T2DM,, HCC liver cirrhosis s/p Liver transplant (08/2023) c/b hepatic artery stenosis s/p stent placement 10/2023 with IR on Plavix, hx of Meningioma s/p Left crani/resection admitted 2/3/25 per hepatology recs for elevated LFTs. S/p EGD 2/17/25.    Patient now presents for the above procedure(s).      LDA:        Peripheral IV - Single Lumen 02/15/25 2155 20 G Anterior;Right Forearm (Active)   Site Assessment Dry;Intact;Clean 02/17/25 1300   Line Securement Device Secured with sutureless device 02/16/25 2300   Extremity Assessment Distal to IV No abnormal discoloration 02/17/25 1300   Line Status Saline locked 02/17/25 1300   Dressing Status Dry;Intact;Clean 02/17/25 1300   Dressing Intervention Integrity maintained 02/17/25 1300   Dressing Change Due 02/20/25 02/16/25 2300   Site Change Due 02/20/25 02/17/25 0800   Reason Not Rotated Not due 02/16/25 2300   Number of days: 1       Prev airway:   Placement Date 10/26/23; Placement Time 0720; Method of Intubation Direct laryngoscopy; Mask Ventilation Easy; Intubated Postinduction; Blade Naga #3; Airway Device Size 7.5; Cuff Inflation Minimal occlusive pressure; Placement Verified by Capnometry; Complicating Factors Anterior larynx, Poor neck/head extension; Intubation Findings Bilateral breath sounds, Atraumatic/Condition of teeth unchanged; Securement Teeth; Complications None     Drips: None documented      Problem List[1]    Review of patient's allergies indicates:  No Known Allergies    Current Inpatient Medications:   carvediloL  12.5 mg Oral BID WM    losartan  100 mg Oral Daily    polyethylene glycol  4,000 mL Oral Once    polyethylene glycol  17  g Oral Daily    senna-docusate 8.6-50 mg  1 tablet Oral BID    tacrolimus  2 mg Oral BID    ursodioL  300 mg Oral BID       Medications Ordered Prior to Encounter[2]    Past Surgical History:   Procedure Laterality Date    CRANIOTOMY, WITH NEOPLASM EXCISION USING COMPUTER-ASSISTED NAVIGATION Left 10/26/2023    Procedure: CRANIOTOMY, WITH NEOPLASM EXCISION USING COMPUTER-ASSISTED NAVIGATION;  Surgeon: Jose E Degroot DO;  Location: [facility] OR 2ND FLR;  Service: Neurosurgery;  Laterality: Left;    ENDOSCOPIC ULTRASOUND OF UPPER GASTROINTESTINAL TRACT N/A 2/7/2025    Procedure: ULTRASOUND, UPPER GI TRACT, ENDOSCOPIC;  Surgeon: Christiano Landaverde MD;  Location: [facility] ENDO (2ND FLR);  Service: Endoscopy;  Laterality: N/A;    ERCP N/A 2/7/2025    Procedure: ERCP (ENDOSCOPIC RETROGRADE CHOLANGIOPANCREATOGRAPHY);  Surgeon: Christiano Landaverde MD;  Location: [facility] ENDO (2ND FLR);  Service: Endoscopy;  Laterality: N/A;    HERNIA REPAIR N/A     LIVER TRANSPLANT N/A 8/9/2023    Procedure: TRANSPLANT, LIVER;  Surgeon: Ameya Suero MD;  Location: [facility] OR 2ND FLR;  Service: Transplant;  Laterality: N/A;       Social History[3]    OBJECTIVE:     Vital Signs Range (Last 24H):  Temp:  [36.3 °C (97.4 °F)-37.1 °C (98.8 °F)]   Pulse:  [68-89]   Resp:  [14-20]   BP: (102-147)/(58-84)   SpO2:  [95 %-99 %]       Significant Labs:  Lab Results   Component Value Date    WBC 4.93 02/17/2025    HGB 9.7 (L) 02/17/2025    HCT 30.3 (L) 02/17/2025     02/17/2025    CHOL 163 12/16/2024    TRIG 150 12/16/2024    HDL 34 (L) 12/16/2024    ALT 30 02/17/2025    AST 20 02/17/2025     02/17/2025    K 3.5 02/17/2025     02/17/2025    CREATININE 0.9 02/17/2025    BUN 15 02/17/2025    CO2 21 (L) 02/17/2025    TSH 2.096 06/15/2023    PSA 0.49 12/16/2024    INR 1.0 02/17/2025    HGBA1C 6.3 (H) 12/16/2024       Diagnostic Studies: No relevant studies.    EKG:   Results for orders placed or performed during the hospital encounter of 02/14/25   EKG  12-lead    Collection Time: 02/14/25  6:08 PM   Result Value Ref Range    QRS Duration 134 ms    [facility] QTC Calculation 493 ms    Narrative    Test Reason : K92.2,    Vent. Rate :  86 BPM     Atrial Rate :  86 BPM     P-R Int : 192 ms          QRS Dur : 134 ms      QT Int : 412 ms       P-R-T Axes :  35  51   9 degrees    QTcB Int : 493 ms    Normal sinus rhythm  Right bundle branch block  Abnormal ECG  When compared with ECG of 24-Oct-2023 11:45,  No significant change was found  Confirmed by Kori Hinton ([id number]) on 2/16/2025 4:13:04 PM    Referred By: AAAREFERRAL SELF           Confirmed By: Kori Hinton       2D ECHO:  TTE:  No results found for this or any previous visit.    EDISON:  No results found for this or any previous visit.    ASSESSMENT/PLAN:           Pre-op Assessment    I have reviewed the Patient Summary Reports.     I have reviewed the Nursing Notes. I have reviewed the NPO Status.   I have reviewed the Medications.     Review of Systems  Anesthesia Hx:  No problems with previous Anesthesia   History of prior surgery of interest to airway management or planning:          Denies Family Hx of Anesthesia complications.    Denies Personal Hx of Anesthesia complications.                    Social:  Non-Smoker, No Alcohol Use       Hematology/Oncology:       -- Anemia:                  Denies Current/Recent Cancer                Cardiovascular:     Hypertension    Denies CAD.     Denies Dysrhythmias.    Denies CHF.    no hyperlipidemia                               Pulmonary:    Denies COPD.  Denies Asthma.     Denies Sleep Apnea.                Renal/:   Denies Chronic Renal Disease.                Hepatic/GI:      Denies GERD. Liver Disease,  Hx HCC s/p Tx 8/2023             Musculoskeletal:  Denies Arthritis.               Neurological:    Denies CVA. Denies Neuromuscular Disease.   Denies Seizures.          Denies Chronic Pain Syndrome                         Endocrine:  Diabetes, type 2   Denies  Hyperthyroidism.       Denies Obesity / BMI > 30  Psych:   denies anxiety denies depression                Physical Exam  General: Well nourished, Cooperative, Alert and Oriented    Airway:  Mallampati: II   Mouth Opening: Normal  TM Distance: Normal  Tongue: Normal  Neck ROM: Normal ROM    Dental:  Intact, Periodontal disease        Anesthesia Plan  Type of Anesthesia, risks & benefits discussed:    Anesthesia Type: MAC, Gen Natural Airway  Intra-op Monitoring Plan: Standard ASA Monitors  Post Op Pain Control Plan: multimodal analgesia and IV/PO Opioids PRN  Induction:  IV  Informed Consent: Informed consent signed with the Patient and all parties understand the risks and agree with anesthesia plan.  All questions answered.   ASA Score: 3  Day of Surgery Review of History & Physical: H&P Update referred to the surgeon/provider.    Ready For Surgery From Anesthesia Perspective.     .           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     Current Outpatient Medications on File Prior to Encounter   Medication Sig Dispense Refill    aspirin 81 MG Chew Chew and swallow  "1 tablet (81 mg total) by mouth once daily. Restart 11/9/23 30 tablet 11    blood sugar diagnostic Strp Test blood glucose 3 (three) times daily. 100 strip 11    blood-glucose sensor (DEXCOM G7 SENSOR) Neelam 1 each by Misc.(Non-Drug; Combo Route) route every 10 days. 3 each 11    carvediloL (COREG) 12.5 MG tablet Take 1 tablet (12.5 mg total) by mouth 2 (two) times daily with meals. HOLD if SBP < 125 or pulse < 60. 180 tablet 0    clopidogreL (PLAVIX) 75 mg tablet Take 1 tablet (75 mg total) by mouth once daily. 30 tablet 11    insulin glargine U-100, Lantus, (LANTUS SOLOSTAR U-100 INSULIN) 100 unit/mL (3 mL) InPn pen Inject 33 Units into the skin once daily. 15 mL 11    insulin lispro 100 unit/mL injection VIA ILET INSULIN PUMP, MAX . 50 mL 11    losartan (COZAAR) 100 MG tablet Take 1 tablet (100 mg total) by mouth once daily. HOLD if SBP < 120 90 tablet 0    pen needle, diabetic (BD ULTRA-FINE MERCEDES PEN NEEDLE) 32 gauge x 5/32" Ndle Use to inject insulin into the skin 4 (four) times daily. 100 each 11    tacrolimus (PROGRAF) 1 MG Cap Take 2 capsules (2 mg total) by mouth every 12 (twelve) hours. 120 capsule 11    tirzepatide 10 mg/0.5 mL PnIj Inject 10 mg (one pen) into the skin every 7 days. 2 mL 11    ursodioL (ACTIGALL) 300 mg capsule Take 1 capsule (300 mg total) by mouth 2 (two) times daily. 60 capsule 2   [3]   Social History  Socioeconomic History    Marital status:    Tobacco Use    Smoking status: Never    Smokeless tobacco: Never   Substance and Sexual Activity    Alcohol use: Not Currently    Drug use: Never    Sexual activity: Not Currently     Partners: Female     Social Drivers of Health     Financial Resource Strain: Low Risk  (2/17/2025)    Overall Financial Resource Strain (CARDIA)     Difficulty of Paying Living Expenses: Not hard at all   Food Insecurity: No Food Insecurity (2/17/2025)    Hunger Vital Sign     Worried About Running Out of Food in the Last Year: Never true     Ran Out " of Food in the Last Year: Never true   Transportation Needs: No Transportation Needs (2/14/2025)    PRAPARE - Transportation     Lack of Transportation (Medical): No     Lack of Transportation (Non-Medical): No   Physical Activity: Insufficiently Active (2/14/2025)    Exercise Vital Sign     Days of Exercise per Week: 3 days     Minutes of Exercise per Session: 30 min   Stress: No Stress Concern Present (2/17/2025)    [employer] [place] [employer] - Occupational Stress Questionnaire     Feeling of Stress : Not at all   Recent Concern: Stress - Stress Concern Present (2/4/2025)    [employer] [place] [employer] - Occupational Stress Questionnaire     Feeling of Stress : To some extent   Housing Stability: Low Risk  (2/17/2025)    Housing Stability Vital Sign     Unable to Pay for Housing in the Last Year: No     Homeless in the Last Year: No

## 2025-02-17 NOTE — PLAN OF CARE
[facility] - Transplant Stepdown  Initial Discharge Assessment       Primary Care Provider: EVELIN Pitt MD    Admission Diagnosis: Upper gastrointestinal bleed [K92.2]    Admission Date: 2/16/2025  Expected Discharge Date: 2/19/2025    Transition of Care Barriers: None    Payor: [employer] / Plan: [employer] ([employer]) / Product Type: Commercial /     Extended Emergency Contact Information  Primary Emergency Contact: ml chávez  Address: [address] [place] [address]  Mobile Phone: [phone]  Relation: Spouse    Discharge Plan A: Home with family, Home Health  Discharge Plan B: Home      [facility] [address] [address]  Phone: [phone] Fax: [phone]    [facility] [place] [address] [address]  [facility] [address]  Phone: [phone] Fax: [phone]    [id number] [place], [place] - [address] [address]  Phone: [phone] Fax: [phone]      Initial Assessment (most recent)       Adult Discharge Assessment - 02/17/25 0940          Discharge Assessment    Assessment Type Discharge Planning Assessment     Confirmed/corrected address, phone number and insurance Yes     Confirmed Demographics Correct on Facesheet     Source of Information family     Does patient/caregiver understand observation status Yes     Communicated CRISELDA with patient/caregiver Yes     Reason For Admission Upper GI bleed     People in Home spouse     Facility Arrived From: Home     Do you expect to return to your current living situation? Yes     Do you have help at home or someone to help you manage your care at home? Yes     Who are your caregiver(s) and their phone number(s)? Ml Cáhvez (spouse) [phone]     Prior to hospitilization cognitive status: Alert/Oriented;No Deficits     Current cognitive status:  Alert/Oriented;No Deficits     Walking or Climbing Stairs Difficulty no     Dressing/Bathing Difficulty no     Home Accessibility not wheelchair accessible     Home Layout Able to live on 1st floor     Equipment Currently Used at Home none     Readmission within 30 days? Yes     Patient currently being followed by outpatient case management? No     Do you currently have service(s) that help you manage your care at home? No     Do you take prescription medications? Yes     Do you have prescription coverage? Yes     Coverage [place] [employer]     Do you have any problems affording any of your prescribed medications? No     Is the patient taking medications as prescribed? yes     Who is going to help you get home at discharge? spouse Lieda     How do you get to doctors appointments? car, drives self     Are you on dialysis? No     Do you take coumadin? No     Discharge Plan A Home with family;Home Health     Discharge Plan B Home     DME Needed Upon Discharge  none     Discharge Plan discussed with: Spouse/sig other     Name(s) and Number(s) Leida [phone]     Transition of Care Barriers None        Financial Resource Strain    How hard is it for you to pay for the very basics like food, housing, medical care, and heating? Not hard at all        Housing Stability    In the last 12 months, was there a time when you were not able to pay the mortgage or rent on time? No     At any time in the past 12 months, were you homeless or living in a shelter (including now)? No        Transportation Needs    Has the lack of transportation kept you from medical appointments, meetings, work or from getting things needed for daily living? No        Food Insecurity    Within the past 12 months, you worried that your food would run out before you got the money to buy more. Never true     Within the past 12 months, the food you bought just didn't last and you didn't have money to get more. Never true        Stress    Do you feel  stress - tense, restless, nervous, or anxious, or unable to sleep at night because your mind is troubled all the time - these days? Not at all        Social Isolation    How often do you feel lonely or isolated from those around you?  Never        Alcohol Use    Q1: How often do you have a drink containing alcohol? Never     Q2: How many drinks containing alcohol do you have on a typical day when you are drinking? Patient does not drink     Q3: How often do you have six or more drinks on one occasion? Never        Utilities    In the past 12 months has the electric, gas, oil, or water company threatened to shut off services in your home? No        Health Literacy    How often do you need to have someone help you when you read instructions, pamphlets, or other written material from your doctor or pharmacy? Never        OTHER    Name(s) of People in Home Leida Chávez CM spoke with patient in Room at bedside. All information was verified on facesheet. Patient lives in a 1 story house with spouse, 1 step to get inside with no pets. Patient states no assistance is needed. Patient can ambulate, drive, run errands, and complete ADL's independently. Patient does not use any DME or any in-home assistive equipment. Patient has not used Home Health previously. Patient is not on dialysis nor use Coumadin as a blood thinner. Patient takes medication as prescribed and has resources for all prescriptive needs. Patient will have help from family member upon discharge. Family will provide transportation home. All Questions and concerns addressed and whiteboard updated with CM contact information. Will continue to follow for course of hospitalization.      Discharge Plan A and Plan B have been determined by review of patient's clinical status, future medical and therapeutic needs, and coverage/benefits for post-acute care in coordination with multidisciplinary team members.     Vira Stapleton, RN, BSN  Case  Management  [phone]

## 2025-02-17 NOTE — ASSESSMENT & PLAN NOTE
BG goal 140-180;     Continue home insulin pump with [employer] (consent forms signed and placed in chart) Patient has all pump supplies at bedside and agrees to manage pump while inpatient   BG monitoring ac/hs/0200    ** Please call Endocrine for any BG related issues **    Discharge plans: TBD

## 2025-02-17 NOTE — H&P
"  [facility] - Transplant [facility] Medicine  History & Physical    Patient Name: Jed Chávez  MRN: [id number]  Patient Class: IP- Inpatient  Admission Date: 2/16/2025  Attending Physician: Cindy Samuels MD   Primary Care Provider: EVELIN Pitt MD    Patient information was obtained from patient, past medical records, and ER records.     Subjective:     Principal Problem:Upper gastrointestinal bleed    HPI: 64 y/o male w/ Type 2 DM (insulin pump), hx of HCC liver cirrhosis s/p Liver transplant (08/2023), HTN, hx of Meningioma s/p Left crani/resection, history of hemorrhagic CVA, abdominal hernia, and recent admission with billiary stricture who presents to [facility] [place] [place] as transfer admit for further evaluation of melena and concern for UGIB.    He was recently admitted at [facility] [place] [place] on 2/3/25 to 2/8/25 due to elevated LFTs. Imaging with US doppler and CT abdomen noted concern for CBD dilation. 2/7/25 EUS/ERCP noted severe biliary stricture treated with sphincterotomy and stent. Recommended ciprofloxacin 500 BID for 5 days, ursodiol for biliary prophylaxis. Repeat ERCP in 6 weeks scheduled 3/5/25.    Transfer note:  Patient presented to the ED at [facility] with complaints of melena x 4 days. Per ED MD, "Patient states he was seen at PCP today where he had blood work and a positive occult study and was advised to come to the ED. He notes he takes ASA and plavix and no other blood thinner. He denies any NSAID use."    Patient is still having episodes of melena, but hemodynamics and hemoglobin are fairly stable. Hemoglobin this morning was 9.3. INR remains normal. Total bilirubin was slightly higher, but AST and ALT were normal. Hemodynamic status is stable. He continues on twice daily Protonix as well as his tacrolimus.  -temperature 97.7°, pulse 77, respirations 16, blood pressure 156/81, O2 sats 97%  -WBCs 3.88, hemoglobin 9.3, hematocrit 27.9, platelets 138, sodium 139, potassium 3.7, chloride 108, CO2 21, BUN 11, " creatinine 0.8, glucose 143, total bilirubin 2.3, AST 17, ALT 34, magnesium 1.6, phosphorus 2.9, INR 1.1.    On arrival to [facility] [place], patient is resting comfortably. He reports melena with dark stools, no bright red blood. Denies abdominal pain but some soreness in epigastric area after the ERCP. He was continued on aspirin but plavix for prior hepatic artery stent was held since admission to OSH.      Past Medical History:   Diagnosis Date    Abdominal hernia 02/03/2025    Hernia of abdominal cavity (Problem)      Acute blood loss anemia 08/12/2023    - H/H stable  - no overt signs of bleed  - continue to monitor with daily CBC      Alcoholic cirrhosis     CVA (cerebral vascular accident)     DM (diabetes mellitus)     Dyslipidemia     Hepatocellular carcinoma     History of hepatocellular carcinoma, in remission after liver transplant 04/05/2023    Synoptic Report Procedure: Total hepatectomy. Histologic type: Hepatocellular carcinoma. Histologic grade: G1, well-differentiated. Tumor focality: Multifocal. Tumor characteristics: Tumor #1 (blocks 2B-2E): Tumor site: Right lobe. Tumor size: 6.2 cm in greatest dimension grossly. Treatment effect: Complete necrosis (no viable tumor). Tumor #2 (blocks 2F-2H): Tumor site: Right lobe. Tumor size: 4.    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    S/P liver transplant 08/12/2023    Skin cancer        Past Surgical History:   Procedure Laterality Date    CRANIOTOMY, WITH NEOPLASM EXCISION USING COMPUTER-ASSISTED NAVIGATION Left 10/26/2023    Procedure: CRANIOTOMY, WITH NEOPLASM EXCISION USING COMPUTER-ASSISTED NAVIGATION;  Surgeon: Jose E Degroot DO;  Location: [facility] OR [address] [place] [address];  Service: Neurosurgery;  Laterality: Left;    ENDOSCOPIC ULTRASOUND OF UPPER GASTROINTESTINAL TRACT N/A 2/7/2025    Procedure: ULTRASOUND, UPPER GI TRACT, ENDOSCOPIC;  Surgeon: Akhil  Christiano ARTEAGA MD;  Location: [facility] ENDO (2ND FLR);  Service: Endoscopy;  Laterality: N/A;    ERCP N/A 2/7/2025    Procedure: ERCP (ENDOSCOPIC RETROGRADE CHOLANGIOPANCREATOGRAPHY);  Surgeon: Christiano Landaverde MD;  Location: [facility] ENDO (2ND FLR);  Service: Endoscopy;  Laterality: N/A;    HERNIA REPAIR N/A     LIVER TRANSPLANT N/A 8/9/2023    Procedure: TRANSPLANT, LIVER;  Surgeon: Ameya Suero MD;  Location: [facility] OR 2ND FLR;  Service: Transplant;  Laterality: N/A;       Review of patient's allergies indicates:  No Known Allergies    Current Facility-Administered Medications on File Prior to Encounter   Medication    [DISCONTINUED] acetaminophen tablet 650 mg    [DISCONTINUED] aspirin chewable tablet 81 mg    [DISCONTINUED] carvediloL tablet 12.5 mg    [DISCONTINUED] dextrose 50% injection 12.5 g    [DISCONTINUED] dextrose 50% injection 25 g    [DISCONTINUED] glucagon (human recombinant) injection 1 mg    [DISCONTINUED] glucose chewable tablet 16 g    [DISCONTINUED] glucose chewable tablet 24 g    [DISCONTINUED] hydrALAZINE injection 5 mg    [DISCONTINUED] losartan tablet 100 mg    [DISCONTINUED] melatonin tablet 6 mg    [DISCONTINUED] ondansetron injection 4 mg    [DISCONTINUED] pantoprazole injection 40 mg    [DISCONTINUED] polyethylene glycol packet 17 g    [DISCONTINUED] prochlorperazine injection Soln 2.5 mg    [DISCONTINUED] tacrolimus capsule 2 mg    [DISCONTINUED] ursodioL capsule 300 mg     Current Outpatient Medications on File Prior to Encounter   Medication Sig    aspirin 81 MG Chew Chew and swallow 1 tablet (81 mg total) by mouth once daily. Restart 11/9/23    blood sugar diagnostic Strp Test blood glucose 3 (three) times daily.    blood-glucose sensor (DEXCOM G7 SENSOR) Neelam 1 each by Misc.(Non-Drug; Combo Route) route every 10 days.    carvediloL (COREG) 12.5 MG tablet Take 1 tablet (12.5 mg total) by mouth 2 (two) times daily with meals. HOLD if SBP < 125 or pulse < 60.    clopidogreL (PLAVIX) 75 mg tablet  "Take 1 tablet (75 mg total) by mouth once daily.    insulin glargine U-100, Lantus, (LANTUS SOLOSTAR U-100 INSULIN) 100 unit/mL (3 mL) InPn pen Inject 33 Units into the skin once daily.    insulin lispro 100 unit/mL injection VIA ILET INSULIN PUMP, MAX .    losartan (COZAAR) 100 MG tablet Take 1 tablet (100 mg total) by mouth once daily. HOLD if SBP < 120    pen needle, diabetic (BD ULTRA-FINE MERCEDES PEN NEEDLE) 32 gauge x 5/32" Ndle Use to inject insulin into the skin 4 (four) times daily.    tacrolimus (PROGRAF) 1 MG Cap Take 2 capsules (2 mg total) by mouth every 12 (twelve) hours.    tirzepatide 10 mg/0.5 mL PnIj Inject 10 mg (one pen) into the skin every 7 days.    ursodioL (ACTIGALL) 300 mg capsule Take 1 capsule (300 mg total) by mouth 2 (two) times daily.     Family History    None       Tobacco Use    Smoking status: Never    Smokeless tobacco: Never   Substance and Sexual Activity    Alcohol use: Not Currently    Drug use: Never    Sexual activity: Not Currently     Partners: Female     Review of Systems   Constitutional:  Negative for chills, diaphoresis, fatigue and fever.   HENT:  Negative for sore throat.    Eyes:  Negative for visual disturbance.   Respiratory:  Negative for apnea, cough, chest tightness, shortness of breath and wheezing.    Cardiovascular:  Negative for chest pain, palpitations and leg swelling.   Gastrointestinal:  Negative for abdominal distention, abdominal pain, constipation, diarrhea, nausea and vomiting.        Black stools   Genitourinary:  Negative for difficulty urinating, flank pain and hematuria.   Musculoskeletal:  Negative for arthralgias and myalgias.   Skin:  Negative for pallor and rash.   Neurological:  Negative for dizziness, syncope, facial asymmetry, weakness, light-headedness, numbness and headaches.   Psychiatric/Behavioral:  Negative for agitation. The patient is not nervous/anxious.      Objective:     Vital Signs (Most Recent):  Temp: 98 °F (36.7 °C) " (02/16/25 2253)  Pulse: 75 (02/16/25 2253)  Resp: 16 (02/16/25 2253)  BP: 138/77 (02/16/25 2253)  SpO2: 96 % (02/16/25 2253) Vital Signs (24h Range):  Temp:  [97.7 °F (36.5 °C)-98.4 °F (36.9 °C)] 98 °F (36.7 °C)  Pulse:  [74-92] 75  Resp:  [12-25] 16  SpO2:  [95 %-98 %] 96 %  BP: (132-172)/(72-88) 138/77        There is no height or weight on file to calculate BMI.     Physical Exam  Vitals and nursing note reviewed.   Constitutional:       General: He is not in acute distress.     Appearance: Normal appearance. He is not diaphoretic.   HENT:      Head: Normocephalic and atraumatic.   Eyes:      General: No scleral icterus.     Extraocular Movements: Extraocular movements intact.      Pupils: Pupils are equal, round, and reactive to light.   Cardiovascular:      Rate and Rhythm: Normal rate and regular rhythm.      Heart sounds: No murmur heard.  Pulmonary:      Effort: Pulmonary effort is normal. No respiratory distress.      Breath sounds: Normal breath sounds. No wheezing.   Chest:      Chest wall: No tenderness.   Abdominal:      General: Abdomen is flat. There is no distension.      Palpations: Abdomen is soft.      Tenderness: There is abdominal tenderness (mild soreness) in the epigastric area. There is no guarding.   Musculoskeletal:         General: No swelling or tenderness.   Skin:     General: Skin is warm and dry.      Capillary Refill: Capillary refill takes less than 2 seconds.      Coloration: Skin is not jaundiced.      Findings: No rash.   Neurological:      General: No focal deficit present.      Mental Status: He is alert and oriented to person, place, and time.   Psychiatric:         Mood and Affect: Mood normal.         Thought Content: Thought content normal.       Significant Labs: All pertinent labs within the past 24 hours have been reviewed.  Bilirubin:   Recent Labs   Lab 02/08/25  0740 02/11/25  0720 02/14/25  1944 02/15/25  1251 02/16/25  0430   BILITOT 2.4* 2.0* 1.7* 2.0* 2.3*     CBC:  "  Recent Labs   Lab 02/15/25  0332 02/15/25  1251 02/16/25  0430   WBC 4.49 4.82 3.88*   HGB 9.1* 10.0* 9.3*   HCT 27.7* 30.2* 27.9*   * 146* 138*     CMP:   Recent Labs   Lab 02/15/25  1251 02/16/25  0430    139   K 4.0 3.7    108   CO2 24 21*   * 143*   BUN 15 11   CREATININE 0.8 0.8   CALCIUM 8.2* 8.1*   PROT 6.0 5.6*   ALBUMIN 3.2* 3.1*   BILITOT 2.0* 2.3*   ALKPHOS 159* 156*   AST 15 17   ALT 40 34   ANIONGAP 9 10     Cardiac Markers: No results for input(s): "CKMB", "MYOGLOBIN", "BNP", "TROPISTAT" in the last 48 hours.  Coagulation:   Recent Labs   Lab 02/15/25  0015 02/16/25  0430   INR 1.0 1.1   APTT 26.9  --      POCT Glucose:   Recent Labs   Lab 02/15/25  1241 02/16/25 2025   POCTGLUCOSE 180* 169*     Significant Imaging: I have reviewed all pertinent imaging results/findings within the past 24 hours.  Assessment/Plan:     * Upper gastrointestinal bleed  Patient transferred with 4 days of melena. HGB stable. Patients most recent Hgb, Hct, platelets, and INR are listed below.  Recent Labs     02/15/25  0015 02/15/25  0332 02/15/25  1251 02/16/25  0430   HGB  --  9.1* 10.0* 9.3*   HCT  --  27.7* 30.2* 27.9*   PLT  --  124* 146* 138*   INR 1.0  --   --  1.1     Plan  - Will trend hemoglobin/hematocrit Daily  - Will monitor and correct any coagulation defects  - Will transfuse if Hgb is <7g/dl (<8g/dl in cases of active ACS) or if patient has rapid bleeding leading to hemodynamic instability  - Continue protonix 40mg IV BID, transition to PO as able  - Consider GI consult for evaluation given drop in hemoglobin from 14.4>9.3 in the past week  - Continue aspirin  - Plavix held by OSH  - monitor for any signs of hemodynamic instability or active bleeding.   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  Pts backup insulin is Lantus 33units.    -Will continue home insulin pump     Hypertension complicating diabetes  Patient's blood pressure range in the last 24 hours was: BP  Min: 132/76  Max: 172/85.The patient's inpatient anti-hypertensive regimen is listed below:  Current Antihypertensives  carvediloL tablet 12.5 mg, 2 times daily with meals, Oral  losartan tablet 100 mg, Daily, Oral    Plan  - BP is controlled, no changes needed to their regimen      VTE Risk Mitigation (From admission, onward)           Ordered     Reason for No Pharmacological VTE Prophylaxis  Once        Question:  Reasons:  Answer:  Risk of Bleeding    02/16/25 2326     IP VTE HIGH RISK PATIENT  Once         02/16/25 2326     Place sequential compression device  Until discontinued         02/16/25 2326                  As clarification, the patient should be placed in hospital services under my care.      Jaspreet Cloud MD  Department of Hospital Medicine  [facility] - Transplant Stepdown

## 2025-02-17 NOTE — CONSULTS
[facility] - Transplant Stepdown  Gastroenterology  Consult Note    Patient Name: Jed Chávez  MRN: [id number]  Admission Date: 2/16/2025  Hospital Length of Stay: 1 days  Code Status: Full Code   Attending Provider: Cindy Samuels MD   Consulting Provider: Ghassan Dailey MD  Primary Care Physician: EVELIN Pitt MD  Principal Problem:Upper gastrointestinal bleed    Inpatient consult to Gastroenterology  Consult performed by: Ghassan Dailey MD  Consult ordered by: Jaspreet Cloud MD        Subjective:     HPI:  Jed Chávez is a 65 year old male with PMHx of Type 2 DM (insulin pump), hx of HCC, alcoholic cirrhosis s/p Liver transplant (08/2023), HTN, hx of Meningioma s/p Left crani/resection, history of hemorrhagic CVA, abdominal hernia, and recent admission with billiary stricture presents to [facility] from melena. He initially presented to his PCP in [place] for dark stools for 4-5 days. He was instructed to present to the hospital after positive FOBT and down trending Hgb (9; baseline 14). He was subsequently transferred to [facility] for further evaluation including possible AES +/- hepatology consultation. He denies abdominal pain, fever, chills, nausea, vomiting, lightheadedness, hematemesis, hemoptysis, and/or prior episodes of melena.     He was recently admitted to [facility] from 2/3-2/8 for evaluation for elevated LFTs in which he underwent ERCP and found to have severe biliary stricture treated with sphincterotomy and stent. He was discharged on Cipro 500 mg BID and ursodiol with plans to repeat ERCP in 6 months. He states he completed his antibiotic course. His hepatologist is Dr. Schneider.      GI was consulted for melena and concern for UGIB.      History obtained through chart review and patient. Accompanied with wife.     No new subjective & objective note has been filed under this hospital service since the last note was generated.    Assessment/Plan:     GI  * Upper gastrointestinal bleed  65M with PMHx of Type  2 DM (insulin pump), hx of HCC, alcoholic cirrhosis s/p Liver transplant (08/2023), HTN, hx of Meningioma s/p Left crani/resection, history of hemorrhagic CVA, abdominal hernia, and recent admission with billiary stricture presents to [facility] from melena for 4-5 days. On presentation Hgb 9 (baseline 14). Denies abdominal pain, fever, chills, nausea, vomiting, lightheadedness, hematemesis, hemoptysis, and/or prior episodes of melena. In addition, he was transferred to [facility] for further evaluation including possible AES +/- hepatology consultation given recent biliary stent and history of liver transplant.     BUN/Cr 15/0.9  Iron studies (2023) - normal     Given recent biliary stent placement and sphincterotomy likely possible of bleeding.     Plan  - AES will perform EGD  - Trend Hgb q24hrs. Transfuse for Hgb <7, unless otherwise indicated  - Maintain IV access with 2 large bore Ivs  - Intravascular resuscitation/support with IVFs   - Keep NPO  - Hold all NSAIDs and anticoagulants, unless contraindicated  - Bolus IV pantoprazole 80mg followed by 40mg BID  - Please correct any coagulopathy with platelets and FFP for goal of platelets >50K and INR <2.0        Thank you for your consult. I will sign off. Please contact us if you have any additional questions.    Ghassan Dailey MD  Gastroenterology  [facility] - Transplant Stepdown

## 2025-02-17 NOTE — H&P
Short Stay Endoscopy History and Physical    PCP - EVELIN Pitt MD  Referring Physician - Cruz Leung MD  [address]  [address]  [place] [address]    Procedure - ercp  ASA - per anesthesia  Mallampati - per anesthesia  History of Anesthesia problems - no  Family history Anesthesia problems -  no   Plan of anesthesia - General    HPI:  This is a 65 y.o. male here for evaluation of: melena    Reflux - no  Dysphagia - no  Abdominal pain - no  Diarrhea - no    ROS:  Constitutional: No fevers, chills, No weight loss  CV: No chest pain  Pulm: No cough, No shortness of breath  Ophtho: No vision changes  GI: see HPI  Derm: No rash    Medical History:  has a past medical history of Abdominal hernia (02/03/2025), Acute blood loss anemia (08/12/2023), Alcoholic cirrhosis, CVA (cerebral vascular accident), DM (diabetes mellitus), Dyslipidemia, Hepatocellular carcinoma, History of hepatocellular carcinoma, in remission after liver transplant (04/05/2023), History of stroke (09/09/2016), HTN (hypertension), Intracranial hemorrhage, 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    Surgical History:  has a past surgical history that includes Hernia repair (N/A); Liver transplant (N/A, 8/9/2023); craniotomy, with neoplasm excision using computer-assisted navigation (Left, 10/26/2023); ERCP (N/A, 2/7/2025); and Endoscopic ultrasound of upper gastrointestinal tract (N/A, 2/7/2025).    Family History: family history is not on file..    Social History:  reports that he has never smoked. He has never used smokeless tobacco. He reports that he does not currently use alcohol. He reports that he does not use drugs.    Review of patient's allergies indicates:  No Known Allergies    Medications:   Prescriptions Prior to Admission[1]    Physical Exam:    Vital Signs:   Vitals:    02/17/25 1150   BP: (!) 147/75   Pulse: 77   Resp: 16  "  Temp: 98.1 °F (36.7 °C)       General Appearance: Well appearing in no acute distress    Labs:  Lab Results   Component Value Date    WBC 4.93 02/17/2025    HGB 9.7 (L) 02/17/2025    HCT 30.3 (L) 02/17/2025     02/17/2025    CHOL 163 12/16/2024    TRIG 150 12/16/2024    HDL 34 (L) 12/16/2024    ALT 30 02/17/2025    AST 20 02/17/2025     02/17/2025    K 3.5 02/17/2025     02/17/2025    CREATININE 0.9 02/17/2025    BUN 15 02/17/2025    CO2 21 (L) 02/17/2025    TSH 2.096 06/15/2023    PSA 0.49 12/16/2024    INR 1.0 02/17/2025    HGBA1C 6.3 (H) 12/16/2024       I have explained the risks and benefits of this endoscopic procedure to the patient including but not limited to bleeding, inflammation, infection, perforation, and death.      Jed Vance MD         [1]   Medications Prior to Admission   Medication Sig Dispense Refill Last Dose/Taking    aspirin 81 MG Chew Chew and swallow 1 tablet (81 mg total) by mouth once daily. Restart 11/9/23 30 tablet 11     blood sugar diagnostic Strp Test blood glucose 3 (three) times daily. 100 strip 11     blood-glucose sensor (DEXCOM G7 SENSOR) Neelam 1 each by Misc.(Non-Drug; Combo Route) route every 10 days. 3 each 11     carvediloL (COREG) 12.5 MG tablet Take 1 tablet (12.5 mg total) by mouth 2 (two) times daily with meals. HOLD if SBP < 125 or pulse < 60. 180 tablet 0     clopidogreL (PLAVIX) 75 mg tablet Take 1 tablet (75 mg total) by mouth once daily. 30 tablet 11     insulin glargine U-100, Lantus, (LANTUS SOLOSTAR U-100 INSULIN) 100 unit/mL (3 mL) InPn pen Inject 33 Units into the skin once daily. 15 mL 11     insulin lispro 100 unit/mL injection VIA ILET INSULIN PUMP, MAX . 50 mL 11     losartan (COZAAR) 100 MG tablet Take 1 tablet (100 mg total) by mouth once daily. HOLD if SBP < 120 90 tablet 0     pen needle, diabetic (BD ULTRA-FINE MERCEDES PEN NEEDLE) 32 gauge x 5/32" Ndle Use to inject insulin into the skin 4 (four) times daily. 100 each 11     " tacrolimus (PROGRAF) 1 MG Cap Take 2 capsules (2 mg total) by mouth every 12 (twelve) hours. 120 capsule 11     tirzepatide 10 mg/0.5 mL PnIj Inject 10 mg (one pen) into the skin every 7 days. 2 mL 11     ursodioL (ACTIGALL) 300 mg capsule Take 1 capsule (300 mg total) by mouth 2 (two) times daily. 60 capsule 2

## 2025-02-17 NOTE — ASSESSMENT & PLAN NOTE
- chronic issue   - continue aspirin  - home plavix was held at OSH with concern for UGIB, restart once able

## 2025-02-17 NOTE — ANESTHESIA POSTPROCEDURE EVALUATION
Anesthesia Post Evaluation    Patient: Jed Chávez    Procedure(s) Performed: Procedure(s) (LRB):  EGD (ESOPHAGOGASTRODUODENOSCOPY) (N/A)    Final Anesthesia Type: general      Patient location during evaluation: PACU  Patient participation: Yes- Able to Participate  Level of consciousness: awake and alert  Post-procedure vital signs: reviewed and stable  Pain management: adequate  Airway patency: patent    PONV status at discharge: No PONV  Anesthetic complications: no      Cardiovascular status: blood pressure returned to baseline  Respiratory status: unassisted  Hydration status: euvolemic  Follow-up not needed.          Vitals Value Taken Time   /76 02/17/25 13:32   Temp 37.1 °C (98.8 °F) 02/17/25 13:10   Pulse 70 02/17/25 13:38   Resp 22 02/17/25 13:38   SpO2 97 % 02/17/25 13:38   Vitals shown include unfiled device data.      Event Time   Out of Recovery 13:40:00         Pain/Chanell Score: Chanell Score: 10 (2/17/2025  1:15 PM)

## 2025-02-17 NOTE — HPI
Jed Chávez is a 65 year old male with PMHx of Type 2 DM (insulin pump), hx of HCC, alcoholic cirrhosis s/p Liver transplant (08/2023), HTN, hx of Meningioma s/p Left crani/resection, history of hemorrhagic CVA, abdominal hernia, and recent admission with billiary stricture presents to [facility] from melena. He initially presented to his PCP in [place] for dark stools for 4-5 days. He was instructed to present to the hospital after positive FOBT and down trending Hgb (9; baseline 14). He was subsequently transferred to [facility] for further evaluation including possible AES +/- hepatology consultation. He denies abdominal pain, fever, chills, nausea, vomiting, lightheadedness, hematemesis, hemoptysis, and/or prior episodes of melena.     He was recently admitted to [facility] from 2/3-2/8 for evaluation for elevated LFTs in which he underwent ERCP and found to have severe biliary stricture treated with sphincterotomy and stent. He was discharged on Cipro 500 mg BID and ursodiol with plans to repeat ERCP in 6 months. He states he completed his antibiotic course. His hepatologist is Dr. Schneider.      GI was consulted for melena and concern for UGIB.      History obtained through chart review and patient. Accompanied with wife.

## 2025-02-17 NOTE — HPI
"64 y/o male w/ Type 2 DM (insulin pump), hx of HCC liver cirrhosis s/p Liver transplant (08/2023), HTN, hx of Meningioma s/p Left crani/resection, history of hemorrhagic CVA, abdominal hernia, and recent admission with billiary stricture who presents to [facility] as transfer admit for further evaluation of melena and concern for UGIB.    He was recently admitted at [facility] on 2/3/25 to 2/8/25 due to elevated LFTs. Imaging with US doppler and CT abdomen noted concern for CBD dilation. 2/7/25 EUS/ERCP noted severe biliary stricture treated with sphincterotomy and stent. Recommended ciprofloxacin 500 BID for 5 days, ursodiol for biliary prophylaxis. Repeat ERCP in 6 weeks scheduled 3/5/25.    Transfer note:  Patient presented to the ED at [facility] with complaints of melena x 4 days. Per ED MD, "Patient states he was seen at PCP today where he had blood work and a positive occult study and was advised to come to the ED. He notes he takes ASA and plavix and no other blood thinner. He denies any NSAID use."    Patient is still having episodes of melena, but hemodynamics and hemoglobin are fairly stable. Hemoglobin this morning was 9.3. INR remains normal. Total bilirubin was slightly higher, but AST and ALT were normal. Hemodynamic status is stable. He continues on twice daily Protonix as well as his tacrolimus.  -temperature 97.7°, pulse 77, respirations 16, blood pressure 156/81, O2 sats 97%  -WBCs 3.88, hemoglobin 9.3, hematocrit 27.9, platelets 138, sodium 139, potassium 3.7, chloride 108, CO2 21, BUN 11, creatinine 0.8, glucose 143, total bilirubin 2.3, AST 17, ALT 34, magnesium 1.6, phosphorus 2.9, INR 1.1.    On arrival to [facility], patient is resting comfortably. He reports melena with dark stools, no bright red blood. Denies abdominal pain but some soreness in epigastric area after the ERCP. He was continued on aspirin but plavix for prior hepatic artery stent was held since admission to OSH.    "

## 2025-02-17 NOTE — SUBJECTIVE & OBJECTIVE
Past Medical History:   Diagnosis Date    Abdominal hernia 02/03/2025    Hernia of abdominal cavity (Problem)      Acute blood loss anemia 08/12/2023    - H/H stable  - no overt signs of bleed  - continue to monitor with daily CBC      Alcoholic cirrhosis     CVA (cerebral vascular accident)     DM (diabetes mellitus)     Dyslipidemia     Hepatocellular carcinoma     History of hepatocellular carcinoma, in remission after liver transplant 04/05/2023    Synoptic Report Procedure: Total hepatectomy. Histologic type: Hepatocellular carcinoma. Histologic grade: G1, well-differentiated. Tumor focality: Multifocal. Tumor characteristics: Tumor #1 (blocks 2B-2E): Tumor site: Right lobe. Tumor size: 6.2 cm in greatest dimension grossly. Treatment effect: Complete necrosis (no viable tumor). Tumor #2 (blocks 2F-2H): Tumor site: Right lobe. Tumor size: 4.    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    S/P liver transplant 08/12/2023    Skin cancer        Past Surgical History:   Procedure Laterality Date    CRANIOTOMY, WITH NEOPLASM EXCISION USING COMPUTER-ASSISTED NAVIGATION Left 10/26/2023    Procedure: CRANIOTOMY, WITH NEOPLASM EXCISION USING COMPUTER-ASSISTED NAVIGATION;  Surgeon: Jose E Degroot DO;  Location: [facility] OR [address] [place] [address];  Service: Neurosurgery;  Laterality: Left;    ENDOSCOPIC ULTRASOUND OF UPPER GASTROINTESTINAL TRACT N/A 2/7/2025    Procedure: ULTRASOUND, UPPER GI TRACT, ENDOSCOPIC;  Surgeon: Christiano Landaverde MD;  Location: [address]);  Service: Endoscopy;  Laterality: N/A;    ERCP N/A 2/7/2025    Procedure: ERCP (ENDOSCOPIC RETROGRADE CHOLANGIOPANCREATOGRAPHY);  Surgeon: Christiano Landaverde MD;  Location: [facility] ([address] [place] [address]);  Service: Endoscopy;  Laterality: N/A;    HERNIA REPAIR N/A     LIVER TRANSPLANT N/A 8/9/2023    Procedure: TRANSPLANT, LIVER;  Surgeon: Ameya Suero MD;  Location:  [facility] OR 2ND FLR;  Service: Transplant;  Laterality: N/A;       Review of patient's allergies indicates:  No Known Allergies  Family History    None       Tobacco Use    Smoking status: Never    Smokeless tobacco: Never   Substance and Sexual Activity    Alcohol use: Not Currently    Drug use: Never    Sexual activity: Not Currently     Partners: Female     Review of Systems   Constitutional:  Negative for chills and fever.   Gastrointestinal:  Positive for blood in stool (melena). Negative for abdominal distention, abdominal pain, anal bleeding, constipation, diarrhea, nausea, rectal pain and vomiting.        Black stools   Skin:  Negative for pallor and rash.   Neurological:  Negative for light-headedness.     Objective:     Vital Signs (Most Recent):  Temp: 97.8 °F (36.6 °C) (02/17/25 0747)  Pulse: 80 (02/17/25 0747)  Resp: 18 (02/17/25 0633)  BP: 138/79 (02/17/25 0747)  SpO2: 96 % (02/17/25 0747) Vital Signs (24h Range):  Temp:  [97.7 °F (36.5 °C)-98.4 °F (36.9 °C)] 97.8 °F (36.6 °C)  Pulse:  [75-92] 80  Resp:  [16-25] 18  SpO2:  [95 %-97 %] 96 %  BP: (137-168)/(75-88) 138/79     Weight: 99.3 kg (218 lb 14.7 oz) (02/16/25 2300)  Body mass index is 30.53 kg/m².    No intake or output data in the 24 hours ending 02/17/25 0928    Lines/Drains/Airways       Peripheral Intravenous Line  Duration                  Peripheral IV - Single Lumen 02/15/25 2155 20 G Anterior;Right Forearm 1 day                     Physical Exam  Vitals and nursing note reviewed.   Constitutional:       General: He is not in acute distress.     Appearance: Normal appearance. He is not ill-appearing, toxic-appearing or diaphoretic.   HENT:      Head: Normocephalic and atraumatic.   Eyes:      General: No scleral icterus.     Extraocular Movements: Extraocular movements intact.      Conjunctiva/sclera: Conjunctivae normal.      Pupils: Pupils are equal, round, and reactive to light.   Cardiovascular:      Pulses: Normal pulses.   Pulmonary:       Effort: Pulmonary effort is normal. No respiratory distress.      Breath sounds: Normal breath sounds.   Abdominal:      General: Abdomen is flat. Bowel sounds are normal. There is no distension.      Palpations: Abdomen is soft. There is no mass.      Tenderness: There is no abdominal tenderness. There is no guarding or rebound.      Hernia: No hernia is present.   Skin:     General: Skin is warm and dry.      Capillary Refill: Capillary refill takes less than 2 seconds.      Coloration: Skin is not jaundiced.   Neurological:      General: No focal deficit present.      Mental Status: He is alert and oriented to person, place, and time.   Psychiatric:         Mood and Affect: Mood normal.         Thought Content: Thought content normal.          Significant Labs:  CBC:   Recent Labs   Lab 02/15/25  1251 02/16/25  0430 02/17/25  0530   WBC 4.82 3.88* 4.93   HGB 10.0* 9.3* 9.7*   HCT 30.2* 27.9* 30.3*   * 138* 164     CMP:   Recent Labs   Lab 02/17/25  0530   *   CALCIUM 8.3*   ALBUMIN 3.1*   PROT 5.8*      K 3.5   CO2 21*      BUN 15   CREATININE 0.9   ALKPHOS 154*   ALT 30   AST 20   BILITOT 2.2*       Significant Imaging:  Imaging results within the past 24 hours have been reviewed.

## 2025-02-17 NOTE — ASSESSMENT & PLAN NOTE
Patient on tacrolimus 2mg PO BID as outpatient   - continue home dose  - check tacrolimus trough daily

## 2025-02-17 NOTE — ASSESSMENT & PLAN NOTE
Insulin Pump:  Patient has the ability and wherewithal to manage the pump.  Order has been placed to notify nurse of patient using own pump. (Order Set: Adult and Pediatric Home Insulin Pump)     Pump type:    Ilet pump   Infusion set type: Stainless steel/plastic cannula  Change infusion set: Every 2-3 days (2/16/25)  Change insertion site: with change of infusion set. (2/16/25)   Location of insertion site: Abdomen  State of insertion site: appears clean

## 2025-02-17 NOTE — CONSULTS
[facility] - Transplant Stepdown  Endocrinology  Diabetes Consult Note    Consult Requested by: Cindy Samuels MD   Reason for admit: Upper gastrointestinal bleed    HISTORY OF PRESENT ILLNESS:  Reason for Consult: Management of T2DM, Hyperglycemia     Surgical Procedure and Date: Liver transplant (08/2023)    Diabetes diagnosis year:  > 10 years ago     Home Diabetes Medications:    Mounjaro 10 mg every 7 days   Humalog via Ilet insulin pump       Lab Results   Component Value Date    HGBA1C 6.3 (H) 12/16/2024       How often checking glucose at home? Dexcom G7   BG readings on regimen: 180s  Hypoglycemia on the regimen?  No  Missed doses on regimen?  No      Diabetes Complications include:     Hyperglycemia     Complicating diabetes co morbidities:   ESLD s/p transplant, previous CVA     HPI:   Patient is a 65 y.o. male with a diagnosis of Type 2 DM (insulin pump), hx of HCC liver cirrhosis s/p Liver transplant (08/2023), HTN, hx of Meningioma s/p Left crani/resection, history of hemorrhagic CVA, abdominal hernia, and recent admission with billiary stricture who presents to [facility] as transfer admit for further evaluation of melena and concern for UGIB. Endocrinology consulted for management of T2DM.            Interval HPI:   Overnight events: No acute events overnight. Patient in room [facility] 2ND FLR Periop Pool*. Blood glucose stable. BG at goal on current insulin regimen (Home Insulin Pump). Steroid use- None . * Day of Surgery *  Renal function- Normal   Vasopressors-  None       Endocrine will continue to follow and manage insulin orders inpatient.         Diet NPO Except for: Medication, Sips with Medication  Diet Clear Liquid     Eating:   NPO  Nausea: No  Hypoglycemia and intervention: No  Fever: No  TPN and/or TF: No  If yes, type of TF/TPN and rate: n/a    PMH, PSH, FH, SH reviewed     ROS:  Constitutional: Negative for weight changes.  Eyes: Negative for visual disturbance.  Respiratory: Negative for cough.    Cardiovascular: Negative for chest pain.  Gastrointestinal: Negative for nausea.  Endocrine: Negative for polyuria, polydipsia.  Musculoskeletal: Negative for back pain.  Skin: Negative for rash.  Neurological: Negative for syncope.  Psychiatric/Behavioral: Negative for depression.    Review of Systems    Current Medications and/or Treatments Impacting Glycemic Control  Immunotherapy:    Immunosuppressants           Stop Route Frequency     tacrolimus capsule 2 mg         -- Oral 2 times daily          Steroids:   Hormones (From admission, onward)      Start     Stop Route Frequency Ordered    02/17/25 0025  melatonin tablet 6 mg         -- Oral Nightly PRN 02/16/25 2326          Pressors:    Autonomic Drugs (From admission, onward)      None          Hyperglycemia/Diabetes Medications:   Antihyperglycemics (From admission, onward)      None             PHYSICAL EXAMINATION:  Vitals:    02/17/25 1330   BP: 121/76   Pulse: 70   Resp: 19   Temp:      Body mass index is 30.53 kg/m².     Physical Exam   Constitutional: Well developed, well nourished, NAD.  ENT: External ears no masses with nose patent; normal hearing.  Neck: Supple; trachea midline.  Cardiovascular: Normal heart sounds  Lungs: Normal effort; lungs anterior bilaterally clear to auscultation.  Abdomen: Soft, no masses, no hernias.  MS: No clubbing or cyanosis of nails noted; unable to assess gait.  Skin: No rashes, lesions, or ulcers; no nodules. Insulin pump in place.   Psychiatric: Good judgement and insight; normal mood and affect.  Neurological: Cranial nerves are grossly intact  Foot: Nails in good condition, no amputations noted.      Labs Reviewed and Include   Recent Labs   Lab 02/17/25  0530   *   CALCIUM 8.3*   ALBUMIN 3.1*   PROT 5.8*      K 3.5   CO2 21*      BUN 15   CREATININE 0.9   ALKPHOS 154*   ALT 30   AST 20   BILITOT 2.2*     Lab Results   Component Value Date    WBC 4.93 02/17/2025    HGB 9.7 (L) 02/17/2025    HCT  "30.3 (L) 02/17/2025    MCV 88 02/17/2025     02/17/2025     No results for input(s): "TSH", "FREET4" in the last 168 hours.  Lab Results   Component Value Date    HGBA1C 6.3 (H) 12/16/2024       Nutritional status:   Body mass index is 30.53 kg/m².  Lab Results   Component Value Date    ALBUMIN 3.1 (L) 02/17/2025    ALBUMIN 3.1 (L) 02/16/2025    ALBUMIN 3.2 (L) 02/15/2025     No results found for: "PREALBUMIN"    Estimated Creatinine Clearance: 99.6 mL/min (based on SCr of 0.9 mg/dL).    Accu-Checks  Recent Labs     02/15/25  1241 02/16/25  2025 02/17/25  0849 02/17/25  1151 02/17/25  1315   POCTGLUCOSE 180* 169* 191* 167* 147*        ASSESSMENT and PLAN    Endocrine  Insulin pump status  Insulin Pump:  Patient has the ability and wherewithal to manage the pump.  Order has been placed to notify nurse of patient using own pump. (Order Set: Adult and Pediatric Home Insulin Pump)     Pump type:    Ilet pump   Infusion set type: Stainless steel/plastic cannula  Change infusion set: Every 2-3 days (2/16/25)  Change insertion site: with change of infusion set. (2/16/25)   Location of insertion site: Abdomen  State of insertion site: appears clean      Type 2 diabetes mellitus with other specified complication, with chronic insulin use  BG goal 140-180;     Continue home insulin pump with [employer] (consent forms signed and placed in chart) Patient has all pump supplies at bedside and agrees to manage pump while inpatient   BG monitoring ac/hs/0200    ** Please call Endocrine for any BG related issues **    Discharge plans: TBD    GI  * Upper gastrointestinal bleed  Managed per primary team  Avoid hypoglycemia        Status post liver transplant  Managed per primary team  Optimize BG control            Plan discussed with patient, family, and RN at bedside.     Kelly Rose NP  Endocrinology  [facility] - Transplant Stepdown  "

## 2025-02-17 NOTE — ASSESSMENT & PLAN NOTE
Patient transferred with 4 days of melena. HGB stable. Patients most recent Hgb, Hct, platelets, and INR are listed below.  Recent Labs     02/15/25  0015 02/15/25  0332 02/15/25  1251 02/16/25  0430   HGB  --  9.1* 10.0* 9.3*   HCT  --  27.7* 30.2* 27.9*   PLT  --  124* 146* 138*   INR 1.0  --   --  1.1     Plan  - Will trend hemoglobin/hematocrit Daily  - Will monitor and correct any coagulation defects  - Will transfuse if Hgb is <7g/dl (<8g/dl in cases of active ACS) or if patient has rapid bleeding leading to hemodynamic instability  - Continue protonix 40mg IV BID, transition to PO as able  - Consider GI consult for evaluation given drop in hemoglobin from 14.4>9.3 in the past week  - Continue aspirin  - Plavix held by OSH  - monitor for any signs of hemodynamic instability or active bleeding.   - NPO for possible endoscopy

## 2025-02-17 NOTE — PLAN OF CARE
Problem: Diabetes Comorbidity  Goal: Blood Glucose Level Within Targeted Range  Outcome: Progressing     Problem: Adult Inpatient Plan of Care  Goal: Plan of Care Review  Outcome: Progressing     Problem: Adult Inpatient Plan of Care  Goal: Patient-Specific Goal (Individualized)  Outcome: Progressing     Problem: Adult Inpatient Plan of Care  Goal: Absence of Hospital-Acquired Illness or Injury  Outcome: Progressing     Problem: Adult Inpatient Plan of Care  Goal: Optimal Comfort and Wellbeing  Outcome: Progressing     Problem: Adult Inpatient Plan of Care  Goal: Readiness for Transition of Care  Outcome: Progressing     Problem: Fall Injury Risk  Goal: Absence of Fall and Fall-Related Injury  Outcome: Progressing

## 2025-02-17 NOTE — DISCHARGE SUMMARY
"[facility] ([facility])  Hospital Medicine  Discharge Summary      Patient Name: Jed Chávez  MRN: [id number]  DEDRICK: [id number]  Patient Class: OP- Observation  Admission Date: 2/14/2025  Hospital Length of Stay: 0 days  Discharge Date and Time: 2/16/2025  8:49 PM  Attending Physician: Rachele att. providers found   Discharging Provider: Jorge Ford DO  Primary Care Provider: EVELIN Pitt MD    Primary Care Team: Networked reference to record PCT     HPI:   Mr. Jed Chávez is a 65 y.o. male who  has a medical history of Abdominal hernia,  Alcoholic cirrhosis, CVA (cerebral vascular accident), DM (diabetes mellitus), Dyslipidemia, Hepatocellular carcinoma,  Liver transplant, HTN (hypertension), Intracranial hemorrhage, Left-sided nontraumatic intracerebral hemorrhage, Skin cancer, Meningioma s/p Left crani/resection.    He was recently admitted to [facility] for evaluation of elevated LFTs where after evaluation he ultimately underwent per chart review, "s/p EUS/ERCP on 2/7, severe biliary stricture treated with sphincterotomy and stent. Recommended ciprofloxacin 500 BID for 5 days. Also recommended ursodiol for biliary prophylaxis. Repeat ERCP in 6 weeks.  He presented to the ED for evaluation for concerns of dark stools/melena over the past 4-5 days and evaluation at a PCP visit noted concerns for positive FOBT and downtrending hemoglobin on labs.  Home medications include aspirin, Plavix.      ED course included discussion with GI at [facility] where decision was made to transfer for hepatology and GI/AES evaluation and recommendations to initiate pantoprazole IV.  ED course notable for hemoglobin 9 hemoglobin 9.1.    Hospital medicine consulted for medical management while pending transfer to [facility].        * No surgery found *      Hospital Course:   Medically managed by Hospital Medicine while pending transfer to [facility] for melena evaluation.  " "Trended hemoglobin 9.1>10>9.3.  Continued pantoprazole IV per recs.    Transferred to  [facility] for melena evaluation.      Goals of Care Treatment Preferences:  Code Status: Full Code    Living Will: Yes                 Consults:     * Melena  Recently admitted to [facility] for evaluation of elevated LFTs where after evaluation he ultimately underwent per chart review, "s/p EUS/ERCP on 2/7, severe biliary stricture treated with sphincterotomy and stent. Recommended ciprofloxacin 500 BID for 5 days. Also recommended ursodiol for biliary prophylaxis. Repeat ERCP in 6 weeks.  He presented to the ED for evaluation for concerns of dark stools/melena over the past 4-5 days and evaluation at a PCP visit noted concerns for positive FOBT and downtrending hemoglobin on labs.  Home medications include aspirin, Plavix.        ED course included discussion with GI at [facility] where decision was made to transfer for hepatology and GI/AES evaluation and recommendations to initiate pantoprazole IV.  ED course notable for hemoglobin 9 hemoglobin 9.1.       Recent Labs     02/14/25  1225 02/14/25  1944 02/15/25  0332 02/15/25  1251 02/16/25  0430   HGB 10.6* 10.2* 9.1* 10.0* 9.3*         PLAN:  -Continue pantoprazole 40 mg IV b.i.d.  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  History of stent placement by IR 10/2/23.  Postprocedure, he was put on aspirin/Plavix.    PLAN:  -Clopidogrel was held recently prior to ERCP procedure for " "washout.  Will hold clopidogrel at this time given concerns for bleeding and potential for ERCP procedure at [facility].    Long-term use of immunosuppressant medication  See "Status post liver transplant" A&P        Status post liver transplant  Home dose is tacrolimus 2 mg p.o. b.i.d. for immunosuppressant    PLAN:  Continue home tacrolimus      Type 2 diabetes mellitus with other specified complication, with chronic insulin use  Patient reports insulin pump use at home      Most recent A1c reviewed-   Lab Results   Component Value Date    HGBA1C 6.3 (H) 12/16/2024        Most recent glucose reviewed-  Recent Labs     02/15/25  1241   POCTGLUCOSE 180*         Plan:  - Continue home insulin pump  - POCT glucose ACHS  - Will monitor glucose results and adjust insulin regimen accordingly  - Hold oral hypoglycemic agents while patient is in the hospital.    Hypertension complicating diabetes  Hypertensive on admission    Home meds for hyperten  Hypertension Medications              carvediloL (COREG) 12.5 MG tablet Take 1 tablet (12.5 mg total) by mouth 2 (two) times daily with meals. HOLD if SBP < 125 or pulse < 60.    losartan (COZAAR) 100 MG tablet Take 1 tablet (100 mg total) by mouth once daily. HOLD if SBP < 120            Patients blood pressure range in the last 24 hours was: BP  Min: 102/64  Max: 204/106.      PLAN:  -While in the hospital, will manage blood pressure as follows; Continue home antihypertensive regimen  -The patient's inpatient anti-hypertensive regimen is listed below:  Current Antihypertensives  carvediloL tablet 12.5 mg, 2 times daily with meals, Oral  losartan tablet 100 mg, Daily, Oral  hydrALAZINE injection 5 mg, Every 6 hours PRN, Intravenous  -Will utilize p.r.n. blood pressure medication only if patient's blood pressure greater than 180/110 and he develops symptoms such as worsening chest pain or shortness of breath.        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    Significant Diagnostic Studies:   Significant Labs:   Recent Labs   Lab 02/14/25  1944 02/15/25  1251 02/16/25  0430    141 139   K 3.9 4.0 3.7    108 108   CO2 22* 24 21*   BUN 24* 15 11   CREATININE 1.0 0.8 0.8   * 171* 143*   ANIONGAP 9 9 10     Recent Labs   Lab 02/14/25  1944 02/15/25  1251 02/16/25  0430   MG 1.6 1.6 1.6   PHOS  --  3.1 2.9     Recent Labs   Lab 02/14/25  1944 02/15/25  1251 02/16/25  0430   AST 15 15 17   ALT 46* 40 34   ALKPHOS 175* 159* 156*   BILITOT 1.7* 2.0* 2.3*   ALBUMIN 3.4* 3.2* 3.1*     POCT Glucose:   Recent Labs   Lab 02/15/25  1241 02/16/25 2025   POCTGLUCOSE 180* 169*    Recent Labs   Lab 02/15/25  0332 02/15/25  1251 02/16/25  0430   WBC 4.49 4.82 3.88*   HGB 9.1* 10.0* 9.3*   HCT 27.7* 30.2* 27.9*   * 146* 138*   GRAN  --  49.5  2.4 51.2  2.0                    Pending Diagnostic Studies:       Procedure Component Value Units Date/Time    Tacrolimus Level [[id number]] Collected: 02/16/25 0430    Order Status: Sent Lab Status: In process Updated: 02/16/25 9196    Specimen: Blood            Medications:  Reconciled Home Medications:      Medication List        ASK your doctor about these medications      aspirin 81 MG Chew  Chew and swallow 1 tablet (81 mg total) by mouth  "once daily. Restart 11/9/23     BD ULTRA-FINE MERCEDES PEN NEEDLE 32 gauge x 5/32" Ndle  Generic drug: pen needle, diabetic  Use to inject insulin into the skin 4 (four) times daily.     carvediloL 12.5 MG tablet  Commonly known as: COREG  Take 1 tablet (12.5 mg total) by mouth 2 (two) times daily with meals. HOLD if SBP < 125 or pulse < 60.     ciprofloxacin HCl 500 MG tablet  Commonly known as: CIPRO  Take 1 tablet (500 mg total) by mouth every 12 (twelve) hours. for 5 days  Ask about: Should I take this medication?     clopidogreL 75 mg tablet  Commonly known as: PLAVIX  Take 1 tablet (75 mg total) by mouth once daily.     CONTOUR NEXT TEST STRIPS Strp  Generic drug: blood sugar diagnostic  Test blood glucose 3 (three) times daily.     DEXCOM G7 SENSOR Neelam  Generic drug: blood-glucose sensor  1 each by Misc.(Non-Drug; Combo Route) route every 10 days.     HumaLOG U-100 Insulin 100 unit/mL injection  Generic drug: insulin lispro  VIA ILET INSULIN PUMP, MAX .     LANTUS SOLOSTAR U-100 INSULIN 100 unit/mL (3 mL) Inpn pen  Generic drug: insulin glargine U-100 (Lantus)  Inject 33 Units into the skin once daily.     losartan 100 MG tablet  Commonly known as: COZAAR  Take 1 tablet (100 mg total) by mouth once daily. HOLD if SBP < 120     MOUNJARO 10 mg/0.5 mL Pnij  Generic drug: tirzepatide  Inject 10 mg (one pen) into the skin every 7 days.     tacrolimus 1 MG Cap  Commonly known as: PROGRAF  Take 2 capsules (2 mg total) by mouth every 12 (twelve) hours.     ursodioL 300 mg capsule  Commonly known as: ACTIGALL  Take 1 capsule (300 mg total) by mouth 2 (two) times daily.              Indwelling Lines/Drains at time of discharge:   Lines/Drains/Airways       None                   Time spent on the discharge of patient: 60 minutes         Jorge Ford DO  Department of Hospital Medicine  [facility] ([facility])    Voice recognition software was used in the creation of this note/communication and any " sound-alike/typographical errors which may have occurred despite initial review prior to signing should be taken in context when interpreting.  If such errors prevent a clear understanding of the note/communication, please contact the provider/office for clarification.

## 2025-02-17 NOTE — ASSESSMENT & PLAN NOTE
[employer] requesting visit notes to approve claim for [employer].  Records faxed.    NITHIN MilesN, RN, PHN  Electrophysiology Nurse Coordinator     Patient reports insulin pump use at home      Most recent A1c reviewed-   Lab Results   Component Value Date    HGBA1C 6.3 (H) 12/16/2024        Most recent glucose reviewed-  Recent Labs     02/15/25  1241   POCTGLUCOSE 180*         Plan:  - Continue home insulin pump  - POCT glucose ACHS  - Will monitor glucose results and adjust insulin regimen accordingly  - Hold oral hypoglycemic agents while patient is in the hospital.

## 2025-02-17 NOTE — ANESTHESIA PREPROCEDURE EVALUATION
02/17/2025  Jed Chávez is a 65 y.o., male.      Pre-op Assessment    I have reviewed the Patient Summary Reports.     I have reviewed the Nursing Notes.       Review of Systems  Anesthesia Hx:  No problems with previous Anesthesia                Hematology/Oncology:  Hematology Normal   Oncology Normal                                   EENT/Dental:  EENT/Dental Normal           Cardiovascular:     Hypertension                                          Pulmonary:  Pulmonary Normal                       Renal/:  Renal/ Normal                 Hepatic/GI:      Liver Disease,  Hepatocellular carcinoma   History of hepatocellular carcinoma, in remission after liver transplant S/P liver transplant                 Musculoskeletal:  Musculoskeletal Normal                Neurological:   CVA                                    Endocrine:  Diabetes           Dermatological:  Skin Normal    Psych:  Psychiatric Normal                    Physical Exam  General: Well nourished    Airway:  Mallampati: II   Mouth Opening: Normal  TM Distance: Normal  Tongue: Normal  Neck ROM: Normal ROM    Dental:  Intact        Anesthesia Plan  Type of Anesthesia, risks & benefits discussed:    Anesthesia Type: Gen Natural Airway  Intra-op Monitoring Plan: Standard ASA Monitors  Post Op Pain Control Plan: multimodal analgesia  Induction:  IV  Airway Plan: Direct  Informed Consent: Informed consent signed with the Patient and all parties understand the risks and agree with anesthesia plan.  All questions answered. Patient consented to blood products? No  ASA Score: 3  Day of Surgery Review of History & Physical: H&P Update referred to the surgeon/provider.    Ready For Surgery From Anesthesia Perspective.     .

## 2025-02-17 NOTE — ASSESSMENT & PLAN NOTE
65M with PMHx of Type 2 DM (insulin pump), hx of HCC, alcoholic cirrhosis s/p Liver transplant (08/2023), HTN, hx of Meningioma s/p Left crani/resection, history of hemorrhagic CVA, abdominal hernia, and recent admission with billiary stricture presents to [facility] from melena for 4-5 days. On presentation Hgb 9 (baseline 14). Denies abdominal pain, fever, chills, nausea, vomiting, lightheadedness, hematemesis, hemoptysis, and/or prior episodes of melena. In addition, he was transferred to [facility] for further evaluation including possible AES +/- hepatology consultation given recent biliary stent and history of liver transplant.     BUN/Cr 15/0.9  Iron studies (2023) - normal     Given recent biliary stent placement likely possible of bleeding.     Plan  - Consult AES for EGD  - Trend Hgb q24hrs. Transfuse for Hgb <7, unless otherwise indicated  - Maintain IV access with 2 large bore Ivs  - Intravascular resuscitation/support with IVFs   - Keep NPO  - Hold all NSAIDs and anticoagulants, unless contraindicated  - Bolus IV pantoprazole 80mg followed by 40mg BID  - Please correct any coagulopathy with platelets and FFP for goal of platelets >50K and INR <2.0

## 2025-02-17 NOTE — ASSESSMENT & PLAN NOTE
"Recently admitted to [facility] for evaluation of elevated LFTs where after evaluation he ultimately underwent per chart review, "s/p EUS/ERCP on 2/7, severe biliary stricture treated with sphincterotomy and stent. Recommended ciprofloxacin 500 BID for 5 days. Also recommended ursodiol for biliary prophylaxis. Repeat ERCP in 6 weeks.  He presented to the ED for evaluation for concerns of dark stools/melena over the past 4-5 days and evaluation at a PCP visit noted concerns for positive FOBT and downtrending hemoglobin on labs.  Home medications include aspirin, Plavix.        ED course included discussion with GI at [facility] where decision was made to transfer for hepatology and GI/AES evaluation and recommendations to initiate pantoprazole IV.  ED course notable for hemoglobin 9 hemoglobin 9.1.       Recent Labs     02/14/25  1225 02/14/25  1944 02/15/25  0332 02/15/25  1251 02/16/25  0430   HGB 10.6* 10.2* 9.1* 10.0* 9.3*         PLAN:  -Continue pantoprazole 40 mg IV b.i.d.  -Pending transfer to [facility] for further evaluation  -NPO at midnight in anticipation of transfer for potential procedure  -Continuous cardiac monitoring for hemodynamic instability  -Intravascular resuscitation/support with IVFs   - Transfuse pRBC for Hgb < 7, unless otherwise indicated  -Hold all NSAIDs and anticoagulants, unless contraindicated  -Correct any coagulopathy with platelets and FFP to a goal of platelets >50K and INR <2.0  "

## 2025-02-17 NOTE — TRANSFER OF CARE
"Anesthesia Transfer of Care Note    Patient: Jed Chávez    Procedure(s) Performed: Procedure(s) (LRB):  EGD (ESOPHAGOGASTRODUODENOSCOPY) (N/A)    Patient location: PACU    Anesthesia Type: general    Transport from OR: Transported from OR on room air with adequate spontaneous ventilation    Post pain: adequate analgesia    Post assessment: no apparent anesthetic complications and tolerated procedure well    Post vital signs: stable    Level of consciousness: sedated    Nausea/Vomiting: no nausea/vomiting    Complications: none    Transfer of care protocol was followed      Last vitals: Visit Vitals  BP (!) 102/58   Pulse 68   Temp 36.7 °C (98.1 °F) (Temporal)   Resp 14   Ht 5' 11" (1.803 m)   Wt 99.3 kg (218 lb 14.7 oz)   SpO2 99%   BMI 30.53 kg/m²     "

## 2025-02-17 NOTE — TELEPHONE ENCOUNTER
----- Message from Rebecca sent at 2/14/2025  3:22 PM CST -----  Regarding: Medical advice  Contact: Jed  .Type:  Needs Medical Advice    Who Called: Jed   Symptoms (please be specific):    How long has patient had these symptoms:    Pharmacy name and phone #:    Would the patient rather a call back or a response via [facility]? call  Best Call Back Number:  [phone]  Additional Information:  Jed is on the way to the ER. Please call the patient

## 2025-02-17 NOTE — TREATMENT PLAN
GI Treatment Plan    S/p ERCP today    Impression:            - One stent from the biliary tree was seen in the                          major papilla.                          - No evidence of sphincterotomy bleed. No blood in                          the upper GI tract                          - The major papilla appeared normal.                          - A single localized biliary stricture was found                          in the post-transplant anastomosis.                          - One stent was removed from the biliary tree.                          - One stent was placed into the common bile duct.   Recommendation:        - [facility] patient [facility] for [facility] care.                          - Perform a colonoscopy tomorrow. GI team to                          suggest prep.                          - Continue present medications.                          - Repeat ERCP in 8 weeks to exchange stent.     Eligio Gregory MD  Gastroenterology Fellow, PGY-IV

## 2025-02-17 NOTE — HPI
Reason for Consult: Management of T2DM, Hyperglycemia     Surgical Procedure and Date: Liver transplant (08/2023)    Diabetes diagnosis year:  > 10 years ago     Home Diabetes Medications:    Mounjaro 10 mg every 7 days   Humalog via Ilet insulin pump       Lab Results   Component Value Date    HGBA1C 6.3 (H) 12/16/2024       How often checking glucose at home? Dexcom G7   BG readings on regimen: 180s  Hypoglycemia on the regimen?  No  Missed doses on regimen?  No      Diabetes Complications include:     Hyperglycemia     Complicating diabetes co morbidities:   ESLD s/p transplant, previous CVA     HPI:   Patient is a 65 y.o. male with a diagnosis of Type 2 DM (insulin pump), hx of HCC liver cirrhosis s/p Liver transplant (08/2023), HTN, hx of Meningioma s/p Left crani/resection, history of hemorrhagic CVA, abdominal hernia, and recent admission with billiary stricture who [place] [facility] [place] for further evaluation of melena and concern for UGIB. Endocrinology consulted for management of T2DM.

## 2025-02-17 NOTE — [employer] PATIENT INSTRUCTIONS
Discharge Summary/Instructions after an Endoscopic Procedure  Patient Name: Jed Chávez  Patient MRN: [id number]  Patient YOB: 1960 Monday, February 17, 2025  Jed Vance MD  Dear patient,  As a result of recent federal legislation (The Federal Cures Act), you may   receive lab or pathology results from your procedure in your [facility]   account before your physician is able to contact you. Your physician or   their representative will relay the results to you with their   recommendations at their soonest availability.  Thank you,  RESTRICTIONS:  During your procedure today, you received medications for sedation.  These   medications may affect your judgment, balance and coordination.  Therefore,   for 24 hours, you have the following restrictions:   - DO NOT drive a car, operate machinery, make legal/financial decisions,   sign important papers or drink alcohol.    ACTIVITY:  Today: no heavy lifting, straining or running due to procedural   sedation/anesthesia.  The following day: return to full activity including work.  DIET:  Eat and drink normally unless instructed otherwise.     TREATMENT FOR COMMON SIDE EFFECTS:  - Mild abdominal pain, nausea, belching, bloating or excessive gas:  rest,   eat lightly and use a heating pad.  - Sore Throat: treat with throat lozenges and/or gargle with warm salt   water.  - Because air was used during the procedure, expelling large amounts of air   from your rectum or belching is normal.  - If a bowel prep was taken, you may not have a bowel movement for 1-3 days.    This is normal.  SYMPTOMS TO WATCH FOR AND REPORT TO YOUR PHYSICIAN:  1. Abdominal pain or bloating, other than gas cramps.  2. Chest pain.  3. Back pain.  4. Signs of infection such as: chills or fever occurring within 24 hours   after the procedure.  5. Rectal bleeding, which would show as bright red, maroon, or black stools.   (A tablespoon of blood from the rectum is not serious, especially if    hemorrhoids are present.)  6. Vomiting.  7. Weakness or dizziness.  GO DIRECTLY TO THE NEAREST EMERGENCY ROOM IF YOU HAVE ANY OF THE FOLLOWING:      Difficulty breathing              Chills and/or fever over 101 F   Persistent vomiting and/or vomiting blood   Severe abdominal pain   Severe chest pain   Black, tarry stools   Bleeding- more than one tablespoon   Any other symptom or condition that you feel may need urgent attention  Your doctor recommends these additional instructions:  If any biopsies were taken, your doctors clinic will contact you in 1 to 2   weeks with any results.  - Return patient to [facility] for ongoing care.   - Perform a colonoscopy tomorrow. GI team to suggest prep.  - Continue present medications.   - Repeat ERCP in 8 weeks to exchange stent.  For questions, problems or results please call your physician - Jed Vance MD at Work:  [phone].  [facility], EMERGENCY ROOM PHONE NUMBER: [phone]  IF A COMPLICATION OR EMERGENCY SITUATION ARISES AND YOU ARE UNABLE TO REACH   YOUR PHYSICIAN - GO DIRECTLY TO THE EMERGENCY ROOM.  Jed Vance MD  2/17/2025 12:52:45 PM  This report has been verified and signed electronically.  Dear patient,  As a result of recent federal legislation (The Federal Cures Act), you may   receive lab or pathology results from your procedure in your [facility]   account before your physician is able to contact you. Your physician or   their representative will relay the results to you with their   recommendations at their soonest availability.  Thank you,  [employer]ATION

## 2025-02-17 NOTE — ASSESSMENT & PLAN NOTE
Patient is on an insulin pump as an outpatient, brought home lispro vial to refill pump   -basal bolus regimen.  Pts backup insulin is Lantus 33units.    -Will continue home insulin pump

## 2025-02-17 NOTE — SUBJECTIVE & OBJECTIVE
Past Medical History:   Diagnosis Date    Abdominal hernia 02/03/2025    Hernia of abdominal cavity (Problem)      Acute blood loss anemia 08/12/2023    - H/H stable  - no overt signs of bleed  - continue to monitor with daily CBC      Alcoholic cirrhosis     CVA (cerebral vascular accident)     DM (diabetes mellitus)     Dyslipidemia     Hepatocellular carcinoma     History of hepatocellular carcinoma, in remission after liver transplant 04/05/2023    Synoptic Report Procedure: Total hepatectomy. Histologic type: Hepatocellular carcinoma. Histologic grade: G1, well-differentiated. Tumor focality: Multifocal. Tumor characteristics: Tumor #1 (blocks 2B-2E): Tumor site: Right lobe. Tumor size: 6.2 cm in greatest dimension grossly. Treatment effect: Complete necrosis (no viable tumor). Tumor #2 (blocks 2F-2H): Tumor site: Right lobe. Tumor size: 4.    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    S/P liver transplant 08/12/2023    Skin cancer        Past Surgical History:   Procedure Laterality Date    CRANIOTOMY, WITH NEOPLASM EXCISION USING COMPUTER-ASSISTED NAVIGATION Left 10/26/2023    Procedure: CRANIOTOMY, WITH NEOPLASM EXCISION USING COMPUTER-ASSISTED NAVIGATION;  Surgeon: Jose E Degroot DO;  Location: [facility] OR [address] [place] [address];  Service: Neurosurgery;  Laterality: Left;    ENDOSCOPIC ULTRASOUND OF UPPER GASTROINTESTINAL TRACT N/A 2/7/2025    Procedure: ULTRASOUND, UPPER GI TRACT, ENDOSCOPIC;  Surgeon: Christiano Landaverde MD;  Location: [address]);  Service: Endoscopy;  Laterality: N/A;    ERCP N/A 2/7/2025    Procedure: ERCP (ENDOSCOPIC RETROGRADE CHOLANGIOPANCREATOGRAPHY);  Surgeon: Christiano Landaverde MD;  Location: [facility] ENDO ([address] [place] [address]);  Service: Endoscopy;  Laterality: N/A;    HERNIA REPAIR N/A     LIVER TRANSPLANT N/A 8/9/2023    Procedure: TRANSPLANT, LIVER;  Surgeon: Ameya Suero MD;  Location:  [facility] OR 2ND FLR;  Service: Transplant;  Laterality: N/A;       Review of patient's allergies indicates:  No Known Allergies    Current Facility-Administered Medications on File Prior to Encounter   Medication    [DISCONTINUED] acetaminophen tablet 650 mg    [DISCONTINUED] aspirin chewable tablet 81 mg    [DISCONTINUED] carvediloL tablet 12.5 mg    [DISCONTINUED] dextrose 50% injection 12.5 g    [DISCONTINUED] dextrose 50% injection 25 g    [DISCONTINUED] glucagon (human recombinant) injection 1 mg    [DISCONTINUED] glucose chewable tablet 16 g    [DISCONTINUED] glucose chewable tablet 24 g    [DISCONTINUED] hydrALAZINE injection 5 mg    [DISCONTINUED] losartan tablet 100 mg    [DISCONTINUED] melatonin tablet 6 mg    [DISCONTINUED] ondansetron injection 4 mg    [DISCONTINUED] pantoprazole injection 40 mg    [DISCONTINUED] polyethylene glycol packet 17 g    [DISCONTINUED] prochlorperazine injection Soln 2.5 mg    [DISCONTINUED] tacrolimus capsule 2 mg    [DISCONTINUED] ursodioL capsule 300 mg     Current Outpatient Medications on File Prior to Encounter   Medication Sig    aspirin 81 MG Chew Chew and swallow 1 tablet (81 mg total) by mouth once daily. Restart 11/9/23    blood sugar diagnostic Strp Test blood glucose 3 (three) times daily.    blood-glucose sensor (DEXCOM G7 SENSOR) Neelam 1 each by Misc.(Non-Drug; Combo Route) route every 10 days.    carvediloL (COREG) 12.5 MG tablet Take 1 tablet (12.5 mg total) by mouth 2 (two) times daily with meals. HOLD if SBP < 125 or pulse < 60.    clopidogreL (PLAVIX) 75 mg tablet Take 1 tablet (75 mg total) by mouth once daily.    insulin glargine U-100, Lantus, (LANTUS SOLOSTAR U-100 INSULIN) 100 unit/mL (3 mL) InPn pen Inject 33 Units into the skin once daily.    insulin lispro 100 unit/mL injection VIA ILET INSULIN PUMP, MAX .    losartan (COZAAR) 100 MG tablet Take 1 tablet (100 mg total) by mouth once daily. HOLD if SBP < 120    pen needle, diabetic (BD ULTRA-FINE  "MERCEDES PEN NEEDLE) 32 gauge x 5/32" Ndle Use to inject insulin into the skin 4 (four) times daily.    tacrolimus (PROGRAF) 1 MG Cap Take 2 capsules (2 mg total) by mouth every 12 (twelve) hours.    tirzepatide 10 mg/0.5 mL PnIj Inject 10 mg (one pen) into the skin every 7 days.    ursodioL (ACTIGALL) 300 mg capsule Take 1 capsule (300 mg total) by mouth 2 (two) times daily.     Family History    None       Tobacco Use    Smoking status: Never    Smokeless tobacco: Never   Substance and Sexual Activity    Alcohol use: Not Currently    Drug use: Never    Sexual activity: Not Currently     Partners: Female     Review of Systems   Constitutional:  Negative for chills, diaphoresis, fatigue and fever.   HENT:  Negative for sore throat.    Eyes:  Negative for visual disturbance.   Respiratory:  Negative for apnea, cough, chest tightness, shortness of breath and wheezing.    Cardiovascular:  Negative for chest pain, palpitations and leg swelling.   Gastrointestinal:  Negative for abdominal distention, abdominal pain, constipation, diarrhea, nausea and vomiting.        Black stools   Genitourinary:  Negative for difficulty urinating, flank pain and hematuria.   Musculoskeletal:  Negative for arthralgias and myalgias.   Skin:  Negative for pallor and rash.   Neurological:  Negative for dizziness, syncope, facial asymmetry, weakness, light-headedness, numbness and headaches.   Psychiatric/Behavioral:  Negative for agitation. The patient is not nervous/anxious.      Objective:     Vital Signs (Most Recent):  Temp: 98 °F (36.7 °C) (02/16/25 2253)  Pulse: 75 (02/16/25 2253)  Resp: 16 (02/16/25 2253)  BP: 138/77 (02/16/25 2253)  SpO2: 96 % (02/16/25 2253) Vital Signs (24h Range):  Temp:  [97.7 °F (36.5 °C)-98.4 °F (36.9 °C)] 98 °F (36.7 °C)  Pulse:  [74-92] 75  Resp:  [12-25] 16  SpO2:  [95 %-98 %] 96 %  BP: (132-172)/(72-88) 138/77        There is no height or weight on file to calculate BMI.     Physical Exam  Vitals and nursing " note reviewed.   Constitutional:       General: He is not in acute distress.     Appearance: Normal appearance. He is not diaphoretic.   HENT:      Head: Normocephalic and atraumatic.   Eyes:      General: No scleral icterus.     Extraocular Movements: Extraocular movements intact.      Pupils: Pupils are equal, round, and reactive to light.   Cardiovascular:      Rate and Rhythm: Normal rate and regular rhythm.      Heart sounds: No murmur heard.  Pulmonary:      Effort: Pulmonary effort is normal. No respiratory distress.      Breath sounds: Normal breath sounds. No wheezing.   Chest:      Chest wall: No tenderness.   Abdominal:      General: Abdomen is flat. There is no distension.      Palpations: Abdomen is soft.      Tenderness: There is abdominal tenderness (mild soreness) in the epigastric area. There is no guarding.   Musculoskeletal:         General: No swelling or tenderness.   Skin:     General: Skin is warm and dry.      Capillary Refill: Capillary refill takes less than 2 seconds.      Coloration: Skin is not jaundiced.      Findings: No rash.   Neurological:      General: No focal deficit present.      Mental Status: He is alert and oriented to person, place, and time.   Psychiatric:         Mood and Affect: Mood normal.         Thought Content: Thought content normal.       Significant Labs: All pertinent labs within the past 24 hours have been reviewed.  Bilirubin:   Recent Labs   Lab 02/08/25  0740 02/11/25  0720 02/14/25  1944 02/15/25  1251 02/16/25  0430   BILITOT 2.4* 2.0* 1.7* 2.0* 2.3*     CBC:   Recent Labs   Lab 02/15/25  0332 02/15/25  1251 02/16/25  0430   WBC 4.49 4.82 3.88*   HGB 9.1* 10.0* 9.3*   HCT 27.7* 30.2* 27.9*   * 146* 138*     CMP:   Recent Labs   Lab 02/15/25  1251 02/16/25  0430    139   K 4.0 3.7    108   CO2 24 21*   * 143*   BUN 15 11   CREATININE 0.8 0.8   CALCIUM 8.2* 8.1*   PROT 6.0 5.6*   ALBUMIN 3.2* 3.1*   BILITOT 2.0* 2.3*   ALKPHOS 159*  "156*   AST 15 17   ALT 40 34   ANIONGAP 9 10     Cardiac Markers: No results for input(s): "CKMB", "MYOGLOBIN", "BNP", "TROPISTAT" in the last 48 hours.  Coagulation:   Recent Labs   Lab 02/15/25  0015 02/16/25  0430   INR 1.0 1.1   APTT 26.9  --      POCT Glucose:   Recent Labs   Lab 02/15/25  1241 02/16/25  2025   POCTGLUCOSE 180* 169*     Significant Imaging: I have reviewed all pertinent imaging results/findings within the past 24 hours.  "

## 2025-02-18 ENCOUNTER — ANESTHESIA (OUTPATIENT)
Dept: ENDOSCOPY | Facility: HOSPITAL | Age: 65
DRG: 377 | End: 2025-02-18
Payer: COMMERCIAL

## 2025-02-18 PROBLEM — D61.818 PANCYTOPENIA: Status: RESOLVED | Noted: 2025-02-17 | Resolved: 2025-02-18

## 2025-02-18 LAB
ALBUMIN SERPL BCP-MCNC: 3.2 G/DL (ref 3.5–5.2)
ALP SERPL-CCNC: 146 U/L (ref 40–150)
ALT SERPL W/O P-5'-P-CCNC: 26 U/L (ref 10–44)
ANION GAP SERPL CALC-SCNC: 7 MMOL/L (ref 8–16)
AST SERPL-CCNC: 22 U/L (ref 10–40)
BASOPHILS # BLD AUTO: 0.01 K/UL (ref 0–0.2)
BASOPHILS NFR BLD: 0.2 % (ref 0–1.9)
BILIRUB SERPL-MCNC: 2.3 MG/DL (ref 0.1–1)
BUN SERPL-MCNC: 8 MG/DL (ref 8–23)
CALCIUM SERPL-MCNC: 8.4 MG/DL (ref 8.7–10.5)
CHLORIDE SERPL-SCNC: 111 MMOL/L (ref 95–110)
CO2 SERPL-SCNC: 24 MMOL/L (ref 23–29)
CREAT SERPL-MCNC: 0.9 MG/DL (ref 0.5–1.4)
DIFFERENTIAL METHOD BLD: ABNORMAL
EOSINOPHIL # BLD AUTO: 0.1 K/UL (ref 0–0.5)
EOSINOPHIL NFR BLD: 1.3 % (ref 0–8)
ERYTHROCYTE [DISTWIDTH] IN BLOOD BY AUTOMATED COUNT: 15.5 % (ref 11.5–14.5)
EST. GFR  (NO RACE VARIABLE): >60 ML/MIN/1.73 M^2
GLUCOSE SERPL-MCNC: 120 MG/DL (ref 70–110)
HCT VFR BLD AUTO: 29.7 % (ref 40–54)
HGB BLD-MCNC: 9.6 G/DL (ref 14–18)
IMM GRANULOCYTES # BLD AUTO: 0.02 K/UL (ref 0–0.04)
IMM GRANULOCYTES NFR BLD AUTO: 0.4 % (ref 0–0.5)
LYMPHOCYTES # BLD AUTO: 1.8 K/UL (ref 1–4.8)
LYMPHOCYTES NFR BLD: 38.1 % (ref 18–48)
MAGNESIUM SERPL-MCNC: 1.7 MG/DL (ref 1.6–2.6)
MCH RBC QN AUTO: 28.2 PG (ref 27–31)
MCHC RBC AUTO-ENTMCNC: 32.3 G/DL (ref 32–36)
MCV RBC AUTO: 87 FL (ref 82–98)
MONOCYTES # BLD AUTO: 0.3 K/UL (ref 0.3–1)
MONOCYTES NFR BLD: 5.3 % (ref 4–15)
NEUTROPHILS # BLD AUTO: 2.6 K/UL (ref 1.8–7.7)
NEUTROPHILS NFR BLD: 54.7 % (ref 38–73)
NRBC BLD-RTO: 0 /100 WBC
PHOSPHATE SERPL-MCNC: 2.8 MG/DL (ref 2.7–4.5)
PLATELET # BLD AUTO: 155 K/UL (ref 150–450)
PMV BLD AUTO: 9.9 FL (ref 9.2–12.9)
POCT GLUCOSE: 128 MG/DL (ref 70–110)
POCT GLUCOSE: 129 MG/DL (ref 70–110)
POCT GLUCOSE: 131 MG/DL (ref 70–110)
POCT GLUCOSE: 170 MG/DL (ref 70–110)
POCT GLUCOSE: 184 MG/DL (ref 70–110)
POTASSIUM SERPL-SCNC: 3.6 MMOL/L (ref 3.5–5.1)
PROT SERPL-MCNC: 5.9 G/DL (ref 6–8.4)
RBC # BLD AUTO: 3.41 M/UL (ref 4.6–6.2)
SODIUM SERPL-SCNC: 142 MMOL/L (ref 136–145)
TACROLIMUS BLD-MCNC: 5.2 NG/ML (ref 5–15)
WBC # BLD AUTO: 4.73 K/UL (ref 3.9–12.7)

## 2025-02-18 PROCEDURE — 80053 COMPREHEN METABOLIC PANEL: CPT | Performed by: STUDENT IN AN ORGANIZED HEALTH CARE EDUCATION/TRAINING PROGRAM

## 2025-02-18 PROCEDURE — 36415 COLL VENOUS BLD VENIPUNCTURE: CPT | Performed by: STUDENT IN AN ORGANIZED HEALTH CARE EDUCATION/TRAINING PROGRAM

## 2025-02-18 PROCEDURE — 80197 ASSAY OF TACROLIMUS: CPT | Performed by: STUDENT IN AN ORGANIZED HEALTH CARE EDUCATION/TRAINING PROGRAM

## 2025-02-18 PROCEDURE — 37000008 HC ANESTHESIA 1ST 15 MINUTES: Performed by: STUDENT IN AN ORGANIZED HEALTH CARE EDUCATION/TRAINING PROGRAM

## 2025-02-18 PROCEDURE — 37000009 HC ANESTHESIA EA ADD 15 MINS: Performed by: STUDENT IN AN ORGANIZED HEALTH CARE EDUCATION/TRAINING PROGRAM

## 2025-02-18 PROCEDURE — 25000003 PHARM REV CODE 250: Performed by: STUDENT IN AN ORGANIZED HEALTH CARE EDUCATION/TRAINING PROGRAM

## 2025-02-18 PROCEDURE — 63600175 PHARM REV CODE 636 W HCPCS: Performed by: NURSE ANESTHETIST, CERTIFIED REGISTERED

## 2025-02-18 PROCEDURE — 82962 GLUCOSE BLOOD TEST: CPT | Performed by: STUDENT IN AN ORGANIZED HEALTH CARE EDUCATION/TRAINING PROGRAM

## 2025-02-18 PROCEDURE — 63600175 PHARM REV CODE 636 W HCPCS: Performed by: STUDENT IN AN ORGANIZED HEALTH CARE EDUCATION/TRAINING PROGRAM

## 2025-02-18 PROCEDURE — 84100 ASSAY OF PHOSPHORUS: CPT | Performed by: STUDENT IN AN ORGANIZED HEALTH CARE EDUCATION/TRAINING PROGRAM

## 2025-02-18 PROCEDURE — 85025 COMPLETE CBC W/AUTO DIFF WBC: CPT | Performed by: STUDENT IN AN ORGANIZED HEALTH CARE EDUCATION/TRAINING PROGRAM

## 2025-02-18 PROCEDURE — 45378 DIAGNOSTIC COLONOSCOPY: CPT | Performed by: STUDENT IN AN ORGANIZED HEALTH CARE EDUCATION/TRAINING PROGRAM

## 2025-02-18 PROCEDURE — 0DJD8ZZ INSPECTION OF LOWER INTESTINAL TRACT, VIA NATURAL OR ARTIFICIAL OPENING ENDOSCOPIC: ICD-10-PCS | Performed by: STUDENT IN AN ORGANIZED HEALTH CARE EDUCATION/TRAINING PROGRAM

## 2025-02-18 PROCEDURE — 83735 ASSAY OF MAGNESIUM: CPT | Performed by: STUDENT IN AN ORGANIZED HEALTH CARE EDUCATION/TRAINING PROGRAM

## 2025-02-18 PROCEDURE — 20600001 HC STEP DOWN PRIVATE ROOM

## 2025-02-18 RX ORDER — PROPOFOL 10 MG/ML
VIAL (ML) INTRAVENOUS
Status: DISCONTINUED | OUTPATIENT
Start: 2025-02-18 | End: 2025-02-18

## 2025-02-18 RX ORDER — GLUCAGON 1 MG
1 KIT INJECTION
OUTPATIENT
Start: 2025-02-18

## 2025-02-18 RX ORDER — LIDOCAINE HYDROCHLORIDE 20 MG/ML
INJECTION INTRAVENOUS
Status: DISCONTINUED | OUTPATIENT
Start: 2025-02-18 | End: 2025-02-18

## 2025-02-18 RX ORDER — SODIUM CHLORIDE 0.9 % (FLUSH) 0.9 %
10 SYRINGE (ML) INJECTION
OUTPATIENT
Start: 2025-02-18

## 2025-02-18 RX ORDER — PROPOFOL 10 MG/ML
VIAL (ML) INTRAVENOUS CONTINUOUS PRN
Status: DISCONTINUED | OUTPATIENT
Start: 2025-02-18 | End: 2025-02-18

## 2025-02-18 RX ADMIN — CARVEDILOL 12.5 MG: 6.25 TABLET, FILM COATED ORAL at 06:02

## 2025-02-18 RX ADMIN — PROPOFOL 150 MCG/KG/MIN: 10 INJECTION, EMULSION INTRAVENOUS at 12:02

## 2025-02-18 RX ADMIN — URSODIOL 300 MG: 300 CAPSULE ORAL at 07:02

## 2025-02-18 RX ADMIN — LIDOCAINE HYDROCHLORIDE 30 MG: 20 INJECTION INTRAVENOUS at 12:02

## 2025-02-18 RX ADMIN — SENNOSIDES AND DOCUSATE SODIUM 1 TABLET: 50; 8.6 TABLET ORAL at 07:02

## 2025-02-18 RX ADMIN — CARVEDILOL 12.5 MG: 6.25 TABLET, FILM COATED ORAL at 07:02

## 2025-02-18 RX ADMIN — TACROLIMUS 2 MG: 1 CAPSULE ORAL at 06:02

## 2025-02-18 RX ADMIN — PROPOFOL 70 MG: 10 INJECTION, EMULSION INTRAVENOUS at 12:02

## 2025-02-18 RX ADMIN — LOSARTAN POTASSIUM 100 MG: 50 TABLET, FILM COATED ORAL at 07:02

## 2025-02-18 RX ADMIN — URSODIOL 300 MG: 300 CAPSULE ORAL at 10:02

## 2025-02-18 NOTE — NURSING TRANSFER
Nursing Transfer Note      2/18/2025   2:11 PM    Nurse giving handoff:Brian   Nurse receiving handoff:Carey     Reason patient is being transferred: post op    Transfer To: [id number]    Transfer via stretcher    Transfer with cardiac monitoring     Transported by PCT     Transfer Vital Signs: See Flowsheet       Telemetry: TTX 2815 NSR   Order for Tele Monitor? Yes    Additional Lines: NONE     Medicines sent: None     Any special needs or follow-up needed: none     Patient belongings transferred with patient: Yes     Chart send with patient: Yes    Notified: spouse    Patient reassessed at: 2/18/2025 1400    Upon arrival to floor: cardiac monitor applied, patient oriented to room, call bell in reach, and bed in lowest position

## 2025-02-18 NOTE — PLAN OF CARE
AAOx4, afebrile, w/o c/o pain. Tele monitor in place. BG monitored achs and 0200. Pt has home insulin pump on. Endocrine is following. Pt is NPO after midnight for colonoscopy.  Pt completed prep. GI is following. Hepatology is consulted. Pt able to position self independently. Pt in lowest position, side rails up x2, non-skid foot wear in place, call light within reach, pt verbalized understanding to call RN when needed. Hand hygiene practiced per protocol. Will continue to monitor.

## 2025-02-18 NOTE — PROGRESS NOTES
"[facility] - Surgery ([facility])  Endocrinology  Progress Note    Admit Date: 2/16/2025     Reason for Consult: Management of T2DM, Hyperglycemia     Surgical Procedure and Date: Liver transplant (08/2023)    Diabetes diagnosis year:  > 10 years ago     Home Diabetes Medications:    Mounjaro 10 mg every 7 days   Humalog via Ilet insulin pump       Lab Results   Component Value Date    HGBA1C 6.3 (H) 12/16/2024       How often checking glucose at home? Dexcom G7   BG readings on regimen: 180s  Hypoglycemia on the regimen?  No  Missed doses on regimen?  No      Diabetes Complications include:     Hyperglycemia     Complicating diabetes co morbidities:   ESLD s/p transplant, previous CVA     HPI:   Patient is a 65 y.o. male with a diagnosis of Type 2 DM (insulin pump), hx of HCC liver cirrhosis s/p Liver transplant (08/2023), HTN, hx of Meningioma s/p Left crani/resection, history of hemorrhagic CVA, abdominal hernia, and recent admission with billiary stricture who presents to [facility] as transfer admit for further evaluation of melena and concern for UGIB. Endocrinology consulted for management of T2DM.            Interval HPI:   Overnight events: No acute events overnight. Patient in room 25228/50596 A. Blood glucose stable. BG at goal on current insulin regimen (SSI, prandial, and basal insulin ). Steroid use- None . 1 Day Post-Op  Renal function- Normal   Vasopressors-  None       Endocrine will continue to follow and manage insulin orders inpatient.         Diet NPO Except for: Medication, Sips with Medication     Eating:   NPO  Nausea: No  Hypoglycemia and intervention: No  Fever: No  TPN and/or TF: No  If yes, type of TF/TPN and rate: n/a    BP (!) 151/81 (BP Location: Left arm, Patient Position: Sitting)   Pulse 74   Temp 97.6 °F (36.4 °C) (Oral)   Resp 18   Ht 5' 11" (1.803 m)   Wt 98.2 kg (216 lb 7.9 oz)   SpO2 96%   BMI 30.19 kg/m²     Labs Reviewed and Include    Recent Labs   Lab 02/18/25  0549   * " "  CALCIUM 8.4*   ALBUMIN 3.2*   PROT 5.9*      K 3.6   CO2 24   *   BUN 8   CREATININE 0.9   ALKPHOS 146   ALT 26   AST 22   BILITOT 2.3*     Lab Results   Component Value Date    WBC 4.73 02/18/2025    HGB 9.6 (L) 02/18/2025    HCT 29.7 (L) 02/18/2025    MCV 87 02/18/2025     02/18/2025     No results for input(s): "TSH", "FREET4" in the last 168 hours.  Lab Results   Component Value Date    HGBA1C 6.3 (H) 12/16/2024       Nutritional status:   Body mass index is 30.19 kg/m².  Lab Results   Component Value Date    ALBUMIN 3.2 (L) 02/18/2025    ALBUMIN 3.1 (L) 02/17/2025    ALBUMIN 3.1 (L) 02/16/2025     No results found for: "PREALBUMIN"    Estimated Creatinine Clearance: 99.1 mL/min (based on SCr of 0.9 mg/dL).    Accu-Checks  Recent Labs     02/15/25  1241 02/16/25  2025 02/17/25  0849 02/17/25  1151 02/17/25  1315 02/17/25  1743 02/17/25  2118 02/18/25  0152 02/18/25  0742   POCTGLUCOSE 180* 169* 191* 167* 147* 144* 170* 131* 129*       Current Medications and/or Treatments Impacting Glycemic Control  Immunotherapy:    Immunosuppressants           Stop Route Frequency     tacrolimus capsule 2 mg         -- Oral 2 times daily          Steroids:   Hormones (From admission, onward)      Start     Stop Route Frequency Ordered    02/17/25 0025  melatonin tablet 6 mg         -- Oral Nightly PRN 02/16/25 2326          Pressors:    Autonomic Drugs (From admission, onward)      None          Hyperglycemia/Diabetes Medications:   Antihyperglycemics (From admission, onward)      None            ASSESSMENT and PLAN    Endocrine  Insulin pump status  Insulin Pump:  Patient has the ability and wherewithal to manage the pump.  Order has been placed to notify nurse of patient using own pump. (Order Set: Adult and Pediatric Home Insulin Pump)     Pump type:    Ilet pump   Infusion set type: Stainless steel/plastic cannula  Change infusion set: Every 2-3 days (2/16/25)  Change insertion site: with change of " infusion set. (2/16/25)   Location of insertion site: Abdomen  State of insertion site: appears clean      Type 2 diabetes mellitus with other specified complication, with chronic insulin use  BG goal 140-180;     Continue home insulin pump with [employer] (consent forms signed and placed in chart) Patient has all pump supplies at bedside and agrees to manage pump while inpatient   BG monitoring ac/hs/0200    ** Please call Endocrine for any BG related issues **    Discharge plans: TBD    GI  * Upper gastrointestinal bleed  Managed per primary team  Avoid hypoglycemia        Status post liver transplant  Managed per primary team  Optimize BG control            Kelly Rose, NP  Endocrinology  [facility] - Surgery (2nd Fl)

## 2025-02-18 NOTE — TRANSFER OF CARE
"Anesthesia Transfer of Care Note    Patient: Jed Chávez    Procedure(s) Performed: Procedure(s) (LRB):  COLONOSCOPY (N/A)    Patient location: PACU    Anesthesia Type: general    Transport from OR: Transported from OR on room air with adequate spontaneous ventilation    Post pain: adequate analgesia    Post assessment: no apparent anesthetic complications and tolerated procedure well    Post vital signs: stable    Level of consciousness: awake, alert and oriented    Nausea/Vomiting: no nausea/vomiting    Complications: none    Transfer of care protocol was followed      Last vitals: Visit Vitals  BP (!) 170/83 (BP Location: Left arm, Patient Position: Lying)   Pulse 80   Temp 36.6 °C (97.8 °F)   Resp 16   Ht 5' 11" (1.803 m)   Wt 98 kg (216 lb)   SpO2 97%   BMI 30.13 kg/m²     "

## 2025-02-18 NOTE — PROVATION PATIENT INSTRUCTIONS
Discharge Summary/Instructions after an Endoscopic Procedure  Patient Name: Jed Chávez  Patient MRN: [id number]  Patient YOB: 1960 Tuesday, February 18, 2025  Abundio Gibbs MD  Dear patient,  As a result of recent federal legislation (The Federal Cures Act), you may   receive lab or pathology results from your procedure in your [facility]   account before your physician is able to contact you. Your physician or   their representative will relay the results to you with their   recommendations at their soonest availability.  Thank you,  RESTRICTIONS:  During your procedure today, you received medications for sedation.  These   medications may affect your judgment, balance and coordination.  Therefore,   for 24 hours, you have the following restrictions:   - DO NOT drive a car, operate machinery, make legal/financial decisions,   sign important papers or drink alcohol.    ACTIVITY:  Today: no heavy lifting, straining or running due to procedural   sedation/anesthesia.  The following day: return to full activity including work.  DIET:  Eat and drink normally unless instructed otherwise.     TREATMENT FOR COMMON SIDE EFFECTS:  - Mild abdominal pain, nausea, belching, bloating or excessive gas:  rest,   eat lightly and use a heating pad.  - Sore Throat: treat with throat lozenges and/or gargle with warm salt   water.  - Because air was used during the procedure, expelling large amounts of air   from your rectum or belching is normal.  - If a bowel prep was taken, you may not have a bowel movement for 1-3 days.    This is normal.  SYMPTOMS TO WATCH FOR AND REPORT TO YOUR PHYSICIAN:  1. Abdominal pain or bloating, other than gas cramps.  2. Chest pain.  3. Back pain.  4. Signs of infection such as: chills or fever occurring within 24 hours   after the procedure.  5. Rectal bleeding, which would show as bright red, maroon, or black stools.   (A tablespoon of blood from the rectum is not serious, especially  if   hemorrhoids are present.)  6. Vomiting.  7. Weakness or dizziness.  GO DIRECTLY TO THE NEAREST EMERGENCY ROOM IF YOU HAVE ANY OF THE FOLLOWING:      Difficulty breathing              Chills and/or fever over 101 F   Persistent vomiting and/or vomiting blood   Severe abdominal pain   Severe chest pain   Black, tarry stools   Bleeding- more than one tablespoon   Any other symptom or condition that you feel may need urgent attention  Your doctor recommends these additional instructions:  If any biopsies were taken, your doctors clinic will contact you in 1 to 2   weeks with any results.  - Return patient to [facility] for [facility] care.   - Resume regular diet.   - Continue present medications.   - Resume Plavix (clopidogrel) at prior dose today.   - Repeat colonoscopy in 2026 as previously recommended for surveillance.  For questions, problems or results please call your physician - Abundio Gibbs MD at Work:  [phone].  [facility], EMERGENCY ROOM PHONE NUMBER: [phone]  IF A COMPLICATION OR EMERGENCY SITUATION ARISES AND YOU ARE UNABLE TO REACH   YOUR PHYSICIAN - GO DIRECTLY TO THE EMERGENCY ROOM.  Abundio Gibbs MD  2/18/2025 1:17:47 PM  This report has been verified and signed electronically.  Dear patient,  As a result of recent federal legislation (The Federal Cures Act), you may   receive lab or pathology results from your procedure in your [facility]   account before your physician is able to contact you. Your physician or   their representative will relay the results to you with their   recommendations at their soonest availability.  Thank you,  PROVATION

## 2025-02-18 NOTE — PLAN OF CARE
Pt is AAOx4; afebrile; vital signs stable. BG <170. Pt has an insulin pump and CGM, which he manages himself. His pump does not have carb counting or specific amount of insulin given, but BG done with our accuchecks for documentation. He is up independently. He had an EGD/ERCP today with stent exchange; no bleeding found in upper GI tract. Colonoscopy to be done tomorrow. He started drinking prep this evening. He has had nothing but clear liquids throughout the day. Wife at bedside. Home insulin in fridge of med room 2.

## 2025-02-18 NOTE — CONSULTS
[facility]  Hepatology  Consult Note    Patient Name: Jed Chávez  MRN: [id number]  Admission Date: 2/16/2025  Hospital Length of Stay: 2 days  Code Status: Full Code   Attending Provider: Cindy Samuels MD   Consulting Provider: Sunitha Russell DO  Primary Care Physician: EVELIN Pitt MD  Principal Problem:Upper gastrointestinal bleed    Inpatient consult to Hepatology  Consult performed by: Sunitha Russell DO  Consult ordered by: Cindy Samuels MD        Subjective:     HPI: Jed Chávez is a 65 y.o. 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     Patient represented to [facility] ED 2/15 for evaluation for concerns of dark stools/melena over the previous 4-5 days and downtrending hemoglobin noted on labs ordered by his PCP. Patient was transferred to [facility] for GI and Hepatology evaluation. Patient reports one episode of fever about a week ago. Currently denies any fevers, chills, SOB, CP, cough, dysuria, N/V/D. He states his last bowel movement while in the hospital was brown in color. Patient takes aspirin and Plavix. His home dose of tacrolimus is 2mg BID. He states he has been compliant with all his medications. Hepatology has been consulted for management of immunosuppression.    ERCP 2/17/25 impression:  - One stent from the biliary tree was seen in the major papilla.   - No evidence of sphincterotomy bleed. No blood in the upper GI tract   - The major papilla appeared normal.   - A single localized biliary stricture was found in the  post-transplant anastomosis.   - One stent was removed from the biliary tree.   - One stent was placed into the common bile duct.         Past Medical History:   Diagnosis Date    Abdominal hernia 02/03/2025    Hernia of abdominal cavity (Problem)      Acute blood loss anemia 08/12/2023    - H/H stable  - no overt signs of bleed  - continue to monitor with daily CBC      Alcoholic cirrhosis     CVA (cerebral vascular accident)     DM (diabetes mellitus)     Dyslipidemia     Hepatocellular carcinoma     History of hepatocellular carcinoma, in remission after liver transplant 04/05/2023    Synoptic Report Procedure: Total hepatectomy. Histologic type: Hepatocellular carcinoma. Histologic grade: G1, well-differentiated. Tumor focality: Multifocal. Tumor characteristics: Tumor #1 (blocks 2B-2E): Tumor site: Right lobe. Tumor size: 6.2 cm in greatest dimension grossly. Treatment effect: Complete necrosis (no viable tumor). Tumor #2 (blocks 2F-2H): Tumor site: Right lobe. Tumor size: 4.    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    S/P liver transplant 08/12/2023    Skin cancer        Past Surgical History:   Procedure Laterality Date    CRANIOTOMY, WITH NEOPLASM EXCISION USING COMPUTER-ASSISTED NAVIGATION Left 10/26/2023    Procedure: CRANIOTOMY, WITH NEOPLASM EXCISION USING COMPUTER-ASSISTED NAVIGATION;  Surgeon: Jose E Degroot DO;  Location: [facility] OR [address] [place] [address];  Service: Neurosurgery;  Laterality: Left;    ENDOSCOPIC ULTRASOUND OF UPPER GASTROINTESTINAL TRACT N/A 2/7/2025    Procedure: ULTRASOUND, UPPER GI TRACT, ENDOSCOPIC;  Surgeon: Christiano Landaverde MD;  Location: [facility] ENDO ([address] [place] [address]);  Service: Endoscopy;  Laterality: N/A;    ERCP N/A 2/7/2025    Procedure: ERCP (ENDOSCOPIC RETROGRADE CHOLANGIOPANCREATOGRAPHY);  Surgeon: Christiano Landaverde MD;  Location: [facility] ENDO ([address] [place] [address]);  Service: Endoscopy;   Laterality: N/A;    ESOPHAGOGASTRODUODENOSCOPY N/A 2/17/2025    Procedure: EGD (ESOPHAGOGASTRODUODENOSCOPY);  Surgeon: Jed Vance MD;  Location: [facility] ([address] [place] [address]);  Service: Endoscopy;  Laterality: N/A;    HERNIA REPAIR N/A     LIVER TRANSPLANT N/A 8/9/2023    Procedure: TRANSPLANT, LIVER;  Surgeon: Ameya Suero MD;  Location: [facility] OR [address] [place] [address];  Service: Transplant;  Laterality: N/A;       No family history on file.    Social History[1]    Medications Ordered Prior to Encounter[2]    Review of patient's allergies indicates:  No Known Allergies    Review of Systems   Constitutional:  Negative for chills and fever.   HENT:  Negative for congestion.    Eyes:  Negative for redness.   Respiratory:  Negative for cough and shortness of breath.    Cardiovascular:  Negative for chest pain and leg swelling.   Gastrointestinal:  Negative for abdominal pain, diarrhea, nausea and vomiting.   Genitourinary:  Negative for dysuria.   Skin:  Negative for itching.   Neurological:  Negative for weakness and headaches.   Psychiatric/Behavioral:  Negative for suicidal ideas.         Objective:     Vitals:    02/18/25 0730   BP: (!) 151/81   Pulse: 74   Resp: 18   Temp: 97.6 °F (36.4 °C)         Constitutional:  not in acute distress and well developed  HENT: Head: Normal, normocephalic, atraumatic.  Eyes: conjunctiva clear and sclera nonicteric  Cardiovascular: regular rate and rhythm and no murmur  Respiratory: normal chest expansion & respiratory effort   and no accessory muscle use  GI: soft, non-tender, without masses or organomegaly  Musculoskeletal: no muscular tenderness noted  Skin: normal color  Neurological: alert, oriented x3  Psychiatric: mood and affect are within normal limits, pt is a good historian; no memory problems were noted    Significant Labs:  Recent Labs   Lab 02/16/25  0430 02/17/25  0530 02/18/25  0549   HGB 9.3* 9.7* 9.6*       Lab Results   Component Value Date    WBC 4.73 02/18/2025    HGB 9.6 (L)  02/18/2025    HCT 29.7 (L) 02/18/2025    MCV 87 02/18/2025     02/18/2025       Lab Results   Component Value Date     02/18/2025    K 3.6 02/18/2025     (H) 02/18/2025    CO2 24 02/18/2025    BUN 8 02/18/2025    CREATININE 0.9 02/18/2025    CALCIUM 8.4 (L) 02/18/2025    ANIONGAP 7 (L) 02/18/2025       Lab Results   Component Value Date    ALT 26 02/18/2025    AST 22 02/18/2025     (H) 06/15/2023    ALKPHOS 146 02/18/2025    BILITOT 2.3 (H) 02/18/2025       Lab Results   Component Value Date    INR 1.0 02/17/2025    INR 1.1 02/16/2025    INR 1.0 02/15/2025       Significant Imaging:  Reviewed pertinent radiology findings.       Assessment/Plan:     Jed Chávez is a 65 y.o. 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 Patient re-admitted for evaluation of possible GI bleed and management. Hepatology consulted for management of immunosuppression.     Problem List:  Melena with concerns for possible GI Bleed  History HCC s/p Liver Transplant on Immunosuppression  Hepatic Artery Stenosis on Plavix    Patient underwent ERCP on 2/17 with no evidence of sphincterotomy bleeding with stent exchange. Patient is currently asymptomatic from an infectious stand-point. Patient reports compliance with his Tacrolimus 2mg BID (home dosage). Tacro level today is within normal range at 5.2 today.    Recommendations:    -Follow GI's plan for colonoscopy today for evaluation of possible GI bleed  -Continue Tacrolimus at current dose and monitor levels  daily  -Hold Aspirin and Plavix while awaiting IR evaluation of hepatic artery stent  -Obtain CBC, CMP, INR, and tacrolimus levels daily    Thank you for involving us in the care of Jed Chávez. Please call with any additional questions, concerns or changes in the patient's clinical status.     We will continue to follow. Patient seen and discussed with Dr. Ambriz who agrees with plan above.     Sunitha Russell DO MA  Gastroenterology & Hepatology Fellow, PGY-4                          [1]   Social History  Socioeconomic History    Marital status:    Tobacco Use    Smoking status: Never    Smokeless tobacco: Never   Substance and Sexual Activity    Alcohol use: Not Currently    Drug use: Never    Sexual activity: Not Currently     Partners: Female     Social Drivers of Health     Financial Resource Strain: Low Risk  (2/17/2025)    Overall Financial Resource Strain (CARDIA)     Difficulty of Paying Living Expenses: Not hard at all   Food Insecurity: No Food Insecurity (2/17/2025)    Hunger Vital Sign     Worried About Running Out of Food in the Last Year: Never true     Ran Out of Food in the Last Year: Never true   Transportation Needs: No Transportation Needs (2/14/2025)    PRAPARE - Transportation     Lack of Transportation (Medical): No     Lack of Transportation (Non-Medical): No   Physical Activity: Insufficiently Active (2/14/2025)    Exercise Vital Sign     Days of Exercise per Week: 3 days     Minutes of Exercise per Session: 30 min   Stress: No Stress Concern Present (2/17/2025)    [employer] - Occupational Stress Questionnaire     Feeling of Stress : Not at all   Recent Concern: Stress - Stress Concern Present (2/4/2025)    [employer] - Occupational Stress Questionnaire     Feeling of Stress : To some extent   Housing Stability: Low Risk  (2/17/2025)    Housing Stability Vital Sign     Unable to Pay for Housing in the Last Year: No      "Homeless in the Last Year: No   [2]   No current facility-administered medications on file prior to encounter.     Current Outpatient Medications on File Prior to Encounter   Medication Sig Dispense Refill    aspirin 81 MG Chew Chew and swallow 1 tablet (81 mg total) by mouth once daily. Restart 11/9/23 30 tablet 11    blood sugar diagnostic Strp Test blood glucose 3 (three) times daily. 100 strip 11    blood-glucose sensor (DEXCOM G7 SENSOR) Neelam 1 each by Misc.(Non-Drug; Combo Route) route every 10 days. 3 each 11    carvediloL (COREG) 12.5 MG tablet Take 1 tablet (12.5 mg total) by mouth 2 (two) times daily with meals. HOLD if SBP < 125 or pulse < 60. 180 tablet 0    clopidogreL (PLAVIX) 75 mg tablet Take 1 tablet (75 mg total) by mouth once daily. 30 tablet 11    insulin glargine U-100, Lantus, (LANTUS SOLOSTAR U-100 INSULIN) 100 unit/mL (3 mL) InPn pen Inject 33 Units into the skin once daily. 15 mL 11    insulin lispro 100 unit/mL injection VIA ILET INSULIN PUMP, MAX . 50 mL 11    losartan (COZAAR) 100 MG tablet Take 1 tablet (100 mg total) by mouth once daily. HOLD if SBP < 120 90 tablet 0    pen needle, diabetic (BD ULTRA-FINE MERCEDES PEN NEEDLE) 32 gauge x 5/32" Ndle Use to inject insulin into the skin 4 (four) times daily. 100 each 11    tacrolimus (PROGRAF) 1 MG Cap Take 2 capsules (2 mg total) by mouth every 12 (twelve) hours. 120 capsule 11    tirzepatide 10 mg/0.5 mL PnIj Inject 10 mg (one pen) into the skin every 7 days. 2 mL 11    ursodioL (ACTIGALL) 300 mg capsule Take 1 capsule (300 mg total) by mouth 2 (two) times daily. 60 capsule 2     "

## 2025-02-18 NOTE — SUBJECTIVE & OBJECTIVE
Interval History:     Completed colonoscopy today. No source of bleeding identified. Resume diet. Discussed with IR and no further intervention needed. Will discuss with transplant surgery    Review of Systems   Constitutional:  Negative for fatigue and fever.   HENT:  Negative for congestion.    Eyes:  Negative for visual disturbance.   Respiratory:  Negative for cough and shortness of breath.    Cardiovascular:  Negative for chest pain.   Gastrointestinal:  Negative for abdominal pain, constipation and diarrhea.   Genitourinary:  Negative for dysuria.   Skin:  Negative for rash.   Neurological:  Negative for light-headedness.   Psychiatric/Behavioral:  Negative for confusion and decreased concentration.      Objective:     Vital Signs (Most Recent):  Temp: 97.4 °F (36.3 °C) (02/17/25 1604)  Pulse: 74 (02/17/25 1604)  Resp: 18 (02/17/25 1604)  BP: 137/75 (02/17/25 1812)  SpO2: 97 % (02/17/25 1604) Vital Signs (24h Range):  Temp:  [97.4 °F (36.3 °C)-98.8 °F (37.1 °C)] 97.4 °F (36.3 °C)  Pulse:  [68-89] 74  Resp:  [14-20] 18  SpO2:  [95 %-99 %] 97 %  BP: (102-147)/(58-84) 137/75     Weight: 99.3 kg (218 lb 14.7 oz)  Body mass index is 30.53 kg/m².     Physical Exam  Vitals and nursing note reviewed.   Constitutional:       General: He is not in acute distress.     Appearance: Normal appearance. He is not ill-appearing.   HENT:      Head: Normocephalic and atraumatic.   Eyes:      General: No scleral icterus.     Extraocular Movements: Extraocular movements intact.      Pupils: Pupils are equal, round, and reactive to light.   Cardiovascular:      Rate and Rhythm: Normal rate and regular rhythm.      Heart sounds: No murmur heard.  Pulmonary:      Effort: Pulmonary effort is normal. No respiratory distress.      Breath sounds: Normal breath sounds. No wheezing.   Abdominal:      General: Abdomen is flat. There is no distension.      Palpations: Abdomen is soft.      Tenderness: There is no abdominal tenderness (mild  soreness).   Musculoskeletal:         General: No swelling or tenderness.   Skin:     General: Skin is warm and dry.      Capillary Refill: Capillary refill takes less than 2 seconds.      Coloration: Skin is not jaundiced.   Neurological:      General: No focal deficit present.      Mental Status: He is alert and oriented to person, place, and time.   Psychiatric:         Mood and Affect: Mood normal.         Thought Content: Thought content normal.       Significant Labs: All pertinent labs within the past 24 hours have been reviewed.  CBC:   Recent Labs   Lab 02/16/25 0430 02/17/25  0530   WBC 3.88* 4.93   HGB 9.3* 9.7*   HCT 27.9* 30.3*   * 164     CMP:   Recent Labs   Lab 02/16/25 0430 02/17/25  0530    137   K 3.7 3.5    107   CO2 21* 21*   * 205*   BUN 11 15   CREATININE 0.8 0.9   CALCIUM 8.1* 8.3*   PROT 5.6* 5.8*   ALBUMIN 3.1* 3.1*   BILITOT 2.3* 2.2*   ALKPHOS 156* 154*   AST 17 20   ALT 34 30   ANIONGAP 10 9       Significant Imaging: I have reviewed all pertinent imaging results/findings within the past 24 hours.

## 2025-02-18 NOTE — CONSULTS
66 y/o M with PMHx alcoholic cirrhosis c/b HCC s/p OLTx 8/2023, HAS s/p hepatic artery stent placement 10/2/23, biliary stricture s/p ERCP with stent placement on 2/7/25, meningioma s/p L craniectomy/resection, h/o hemorrhagic CVA, T2DM who originally presented to OSH with melena, was transferred to [facility] [place]/16/25 for further evaluation and AES/hepatology consultation. Hospital course notable for ERCP on 2/17 which did not show evidence of bleed. IR consult received for evaluation of his HAS and need for ongoing management/DAPT. Most recent imaging of his hepatic artery stent includes CTA a/p on 2/4/25 which revealed a chronically occluded hepatic artery stent with recanalization of the hepatic artery. Liver US on 2/3/25 reveals mild tardus parvus waveform, similar compared to prior imaging. LFTs are WNL. Case discussed with Dr. Gong. No need for intervention at this time given pt's imaging findings are stable over the past 2 years and his LFTs are stable. Pt does not have to continue on DAPT from our standpoint. Recommend reaching out to liver transplant surgery to confirm whether they prefer to continue or discontinue his DAPT. Informed ordering provider of recommendations via secure chat who verbalized understanding.     No plans for IR intervention   Recommend confirming with LTS to determine whether pt needs to continue on DAPT    Irina Kelly PA-C  Interventional Radiology   Spectra: [phone]

## 2025-02-18 NOTE — TREATMENT PLAN
GI Treatment Plan    S/p colonoscopy today    Impression:            - Preparation of the colon was fair.                          - Perianal skin tags found on perianal exam.                          - The entire examined colon is normal on direct                          and retroflexion views.                          - No specimens collected.   Recommendation:        - Return patient to [facility] for ongoing care.                          - Resume regular diet.                          - Continue present medications.                          - Resume Plavix (clopidogrel) at prior dose today.                          - Repeat colonoscopy in 2026 as previously                          recommended for surveillance.     Gi will sign off    Eligio Gregory MD  Gastroenterology Fellow, PGY-IV

## 2025-02-18 NOTE — SUBJECTIVE & OBJECTIVE
"Interval HPI:   Overnight events: No acute events overnight. Patient in room 79577/11859 A. Blood glucose stable. BG at goal on current insulin regimen (SSI, prandial, and basal insulin ). Steroid use- None . 1 Day Post-Op  Renal function- Normal   Vasopressors-  None       Endocrine will continue to follow and manage insulin orders inpatient.         Diet NPO Except for: Medication, Sips with Medication     Eating:   NPO  Nausea: No  Hypoglycemia and intervention: No  Fever: No  TPN and/or TF: No  If yes, type of TF/TPN and rate: n/a    BP (!) 151/81 (BP Location: Left arm, Patient Position: Sitting)   Pulse 74   Temp 97.6 °F (36.4 °C) (Oral)   Resp 18   Ht 5' 11" (1.803 m)   Wt 98.2 kg (216 lb 7.9 oz)   SpO2 96%   BMI 30.19 kg/m²     Labs Reviewed and Include    Recent Labs   Lab 02/18/25  0549   *   CALCIUM 8.4*   ALBUMIN 3.2*   PROT 5.9*      K 3.6   CO2 24   *   BUN 8   CREATININE 0.9   ALKPHOS 146   ALT 26   AST 22   BILITOT 2.3*     Lab Results   Component Value Date    WBC 4.73 02/18/2025    HGB 9.6 (L) 02/18/2025    HCT 29.7 (L) 02/18/2025    MCV 87 02/18/2025     02/18/2025     No results for input(s): "TSH", "FREET4" in the last 168 hours.  Lab Results   Component Value Date    HGBA1C 6.3 (H) 12/16/2024       Nutritional status:   Body mass index is 30.19 kg/m².  Lab Results   Component Value Date    ALBUMIN 3.2 (L) 02/18/2025    ALBUMIN 3.1 (L) 02/17/2025    ALBUMIN 3.1 (L) 02/16/2025     No results found for: "PREALBUMIN"    Estimated Creatinine Clearance: 99.1 mL/min (based on SCr of 0.9 mg/dL).    Accu-Checks  Recent Labs     02/15/25  1241 02/16/25  2025 02/17/25  0849 02/17/25  1151 02/17/25  1315 02/17/25  1743 02/17/25  2118 02/18/25  0152 02/18/25  0742   POCTGLUCOSE 180* 169* 191* 167* 147* 144* 170* 131* 129*       Current Medications and/or Treatments Impacting Glycemic Control  Immunotherapy:    Immunosuppressants           Stop Route Frequency     tacrolimus " capsule 2 mg         -- Oral 2 times daily          Steroids:   Hormones (From admission, onward)      Start     Stop Route Frequency Ordered    02/17/25 0025  melatonin tablet 6 mg         -- Oral Nightly PRN 02/16/25 3686          Pressors:    Autonomic Drugs (From admission, onward)      None          Hyperglycemia/Diabetes Medications:   Antihyperglycemics (From admission, onward)      None

## 2025-02-18 NOTE — ANESTHESIA POSTPROCEDURE EVALUATION
Anesthesia Post Evaluation    Patient: Jed Chávez    Procedure(s) Performed: Procedure(s) (LRB):  COLONOSCOPY (N/A)    Final Anesthesia Type: general      Patient location during evaluation: PACU  Patient participation: Yes- Able to Participate  Level of consciousness: awake and alert  Post-procedure vital signs: reviewed and stable  Pain management: adequate  Airway patency: patent    PONV status at discharge: No PONV  Anesthetic complications: no      Cardiovascular status: stable  Respiratory status: unassisted and spontaneous ventilation  Hydration status: euvolemic  Follow-up not needed.              Vitals Value Taken Time   /69 02/18/25 14:02   Temp 36.4 °C (97.5 °F) 02/18/25 13:15   Pulse 76 02/18/25 14:32   Resp 14 02/18/25 14:11   SpO2 95 % 02/18/25 14:11   Vitals shown include unfiled device data.      Event Time   Out of Recovery 14:11:43         Pain/Chanell Score: Chanell Score: 10 (2/18/2025  2:00 PM)

## 2025-02-18 NOTE — HPI
65yoM with PMHx of alcoholic cirrhosis, HCC, liver transplant (8/9/23), hepatic artery stenosis on Plavix, HTN, ICH (9/9/16), left-sided nontraumatic ICH (6/6/23), skin cancer, meningioma s/p left crani/resection (10/26/23), DM, dyslipidemia, abdominal hernia, and recent admission for evaluation of elevated LFTs during which EUS/ERCP on 2/7/25 found severe biliary stricture that was treated with sphincterotomy and stent as well as ciprofloxacin 500BID for 5 days and ursodiol for biliary prophylaxis.     Patient represented to [facility] ED 2/15 for evaluation for concerns of dark stools/melena over the previous 4-5 days and downtrending hemoglobin noted on labs ordered by his PCP. Patient reports one episode of fever about a week ago. Currently denies any fevers, chills, SOB, CP, cough, dysuria, N/V/D. He states his last bowel movement while in the hospital was brown in color. Patient takes aspirin and Plavix. His home dose of tacrolimus is 2mg BID. He states he has been compliant with all his medications. Patient was transferred to [facility] for GI and Hepatology evaluation. Hepatology has been consulted for management of immunosuppression.    ERCP 2/17/25 impression:  - One stent from the biliary tree was seen in the major papilla.   - No evidence of sphincterotomy bleed. No blood in the upper GI tract   - The major papilla appeared normal.   - A single localized biliary stricture was found in the post-transplant anastomosis.   - One stent was removed from the biliary tree.   - One stent was placed into the common bile duct.

## 2025-02-18 NOTE — HOSPITAL COURSE
64 y/o male w/ Type 2 DM (insulin pump), hx of HCC liver cirrhosis s/p Liver transplant (08/2023), HTN, hx of Meningioma s/p Left crani/resection, history of hemorrhagic CVA, abdominal hernia, and recent admission with billiary stricture who presents to [facility] as transfer admit for further evaluation of melena and concern for UGIB.     Appreciate GI evaluation. Underwent ERCP 2/17 underwent One stent from the biliary tree was seen in the major papilla. No evidence of sphincterotomy bleed. No blood in the upper GI tract. The major papilla appeared normal. A single localized biliary stricture was found in the post-transplant anastomosis. One stent was removed from the biliary tree. One stent was placed into the common bile duct. Repeat ERCP in 8 weeks to exchange stent. Colonoscopy completed 2/18  Perianal skin tags found on perianal exam. The entire examined colon is normal on direct and retroflexion views.    2/19 - Patient had hepatic artery stenosis and had stent placed in October 2024 which has been occluded and was discussed with IR and hepatology however no rec intervention was recommended given liver function was stable.  Given concern for recent intervention with ERCP and GI bleed subsequently longitudinal plan was discuss with the transplant committee and images were reviewed with transplant surgery and Interventional Radiology and plan was to do no further intervention and only continue aspirin and discontinue Plavix.      Objective:     Vital Signs (Most Recent):  Temp: 97.4 °F (36.3 °C) (02/19/25 1124)  Pulse: 70 (02/19/25 1124)  Resp: 18 (02/19/25 1124)  BP: (!) 153/74 (02/19/25 1124)  SpO2: (!) 93 % (02/19/25 1124)        Weight: 97.4 kg (214 lb 13.4 oz)  Body mass index is 29.96 kg/m².     Physical Exam  Vitals and nursing note reviewed.   Constitutional:       Appearance: Normal appearance. He is not ill-appearing.   HENT:      Head: Normocephalic and atraumatic.   Eyes:      General: No scleral  icterus.     Pupils: Pupils are equal, round, and reactive to light.   Cardiovascular:      Rate and Rhythm: Normal rate and regular rhythm.      Heart sounds: No murmur heard.  Pulmonary:      Effort: Pulmonary effort is normal. No respiratory distress.      Breath sounds: Normal breath sounds. No wheezing.   Abdominal:      General: Abdomen is flat. There is no distension.      Palpations: Abdomen is soft. No tenderness  Musculoskeletal:         General: No swelling or tenderness.   Skin:     General: Skin is warm and dry.      Capillary Refill: Capillary refill takes less than 2 seconds.   Neurological:      General: No focal deficit present.      Mental Status: He is alert and oriented to person, place, and time.   Psychiatric:         Mood and Affect: Mood normal.

## 2025-02-18 NOTE — H&P
Short Stay Endoscopy History and Physical    PCP - EVELIN Pitt MD    Procedure - Colonoscopy  ASA - per anesthesia  Mallampati - per anesthesia  History of Anesthesia problems - no  Family history Anesthesia problems -  no   Plan of anesthesia - General    HPI:  This is a 65 y.o. male here for evaluation of : melena.    ROS:  Constitutional: No fevers, chills  CV: No chest pain  Pulm: No shortness of breath  GI: see HPI  Derm: No rash    Medical History:  has a past medical history of Abdominal hernia (02/03/2025), Acute blood loss anemia (08/12/2023), Alcoholic cirrhosis, CVA (cerebral vascular accident), DM (diabetes mellitus), Dyslipidemia, Hepatocellular carcinoma, History of hepatocellular carcinoma, in remission after liver transplant (04/05/2023), History of stroke (09/09/2016), HTN (hypertension), Intracranial hemorrhage, 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    Surgical History:  has a past surgical history that includes Hernia repair (N/A); Liver transplant (N/A, 8/9/2023); craniotomy, with neoplasm excision using computer-assisted navigation (Left, 10/26/2023); ERCP (N/A, 2/7/2025); Endoscopic ultrasound of upper gastrointestinal tract (N/A, 2/7/2025); and Esophagogastroduodenoscopy (N/A, 2/17/2025).    Family History: family history is not on file.. Otherwise no colon cancer, inflammatory bowel disease, or GI malignancies.    Social History:  reports that he has never smoked. He has never used smokeless tobacco. He reports that he does not currently use alcohol. He reports that he does not use drugs.    Review of patient's allergies indicates:  No Known Allergies    Medications:   Prescriptions Prior to Admission[1]      Physical Exam:    Vital Signs:   Vitals:    02/18/25 1035   BP:    Pulse: 79   Resp:    Temp:        General Appearance: Well appearing in no acute distress  Abdomen: non distended     Labs:  Lab  "Results   Component Value Date    WBC 4.73 02/18/2025    HGB 9.6 (L) 02/18/2025    HCT 29.7 (L) 02/18/2025     02/18/2025    CHOL 163 12/16/2024    TRIG 150 12/16/2024    HDL 34 (L) 12/16/2024    ALT 26 02/18/2025    AST 22 02/18/2025     02/18/2025    K 3.6 02/18/2025     (H) 02/18/2025    CREATININE 0.9 02/18/2025    BUN 8 02/18/2025    CO2 24 02/18/2025    TSH 2.096 06/15/2023    PSA 0.49 12/16/2024    INR 1.0 02/17/2025    HGBA1C 6.3 (H) 12/16/2024       I have explained the risks and benefits of endoscopy procedures to the patient including but not limited to bleeding, perforation, infection, and death.  The patient was asked if they understand and allowed to ask any further questions to their satisfaction.    Brittney Holt MD         [1]   Medications Prior to Admission   Medication Sig Dispense Refill Last Dose/Taking    aspirin 81 MG Chew Chew and swallow 1 tablet (81 mg total) by mouth once daily. Restart 11/9/23 30 tablet 11     blood sugar diagnostic Strp Test blood glucose 3 (three) times daily. 100 strip 11     blood-glucose sensor (DEXCOM G7 SENSOR) Neelam 1 each by Misc.(Non-Drug; Combo Route) route every 10 days. 3 each 11     carvediloL (COREG) 12.5 MG tablet Take 1 tablet (12.5 mg total) by mouth 2 (two) times daily with meals. HOLD if SBP < 125 or pulse < 60. 180 tablet 0     clopidogreL (PLAVIX) 75 mg tablet Take 1 tablet (75 mg total) by mouth once daily. 30 tablet 11     insulin glargine U-100, Lantus, (LANTUS SOLOSTAR U-100 INSULIN) 100 unit/mL (3 mL) InPn pen Inject 33 Units into the skin once daily. 15 mL 11     insulin lispro 100 unit/mL injection VIA ILET INSULIN PUMP, MAX . 50 mL 11     losartan (COZAAR) 100 MG tablet Take 1 tablet (100 mg total) by mouth once daily. HOLD if SBP < 120 90 tablet 0     pen needle, diabetic (BD ULTRA-FINE MERCEDES PEN NEEDLE) 32 gauge x 5/32" Ndle Use to inject insulin into the skin 4 (four) times daily. 100 each 11     tacrolimus " (PROGRAF) 1 MG Cap Take 2 capsules (2 mg total) by mouth every 12 (twelve) hours. 120 capsule 11     tirzepatide 10 mg/0.5 mL PnIj Inject 10 mg (one pen) into the skin every 7 days. 2 mL 11     ursodioL (ACTIGALL) 300 mg capsule Take 1 capsule (300 mg total) by mouth 2 (two) times daily. 60 capsule 2

## 2025-02-18 NOTE — PLAN OF CARE
Pt is AAOx4; afebrile; vital signs stable. BG <150; he is using his insulin pump, although we are also doing accuchecks. He went for a colonoscopy today; no bleeding found. Waiting on hepatology and IR recs. No blood noted in stool.  Possible d/c tomorrow.

## 2025-02-19 ENCOUNTER — CONFERENCE (OUTPATIENT)
Dept: TRANSPLANT | Facility: CLINIC | Age: 65
End: 2025-02-19
Payer: COMMERCIAL

## 2025-02-19 ENCOUNTER — TELEPHONE (OUTPATIENT)
Dept: TRANSPLANT | Facility: CLINIC | Age: 65
End: 2025-02-19
Payer: COMMERCIAL

## 2025-02-19 VITALS
OXYGEN SATURATION: 93 % | BODY MASS INDEX: 30.07 KG/M2 | DIASTOLIC BLOOD PRESSURE: 74 MMHG | RESPIRATION RATE: 18 BRPM | HEART RATE: 70 BPM | SYSTOLIC BLOOD PRESSURE: 153 MMHG | WEIGHT: 214.81 LBS | TEMPERATURE: 97 F | HEIGHT: 71 IN

## 2025-02-19 LAB
ALBUMIN SERPL BCP-MCNC: 3 G/DL (ref 3.5–5.2)
ALP SERPL-CCNC: 130 U/L (ref 40–150)
ALT SERPL W/O P-5'-P-CCNC: 22 U/L (ref 10–44)
ANION GAP SERPL CALC-SCNC: 7 MMOL/L (ref 8–16)
AST SERPL-CCNC: 21 U/L (ref 10–40)
BASOPHILS # BLD AUTO: 0.01 K/UL (ref 0–0.2)
BASOPHILS NFR BLD: 0.2 % (ref 0–1.9)
BILIRUB SERPL-MCNC: 2.1 MG/DL (ref 0.1–1)
BUN SERPL-MCNC: 9 MG/DL (ref 8–23)
CALCIUM SERPL-MCNC: 8.2 MG/DL (ref 8.7–10.5)
CHLORIDE SERPL-SCNC: 108 MMOL/L (ref 95–110)
CO2 SERPL-SCNC: 24 MMOL/L (ref 23–29)
CREAT SERPL-MCNC: 0.8 MG/DL (ref 0.5–1.4)
DIFFERENTIAL METHOD BLD: ABNORMAL
EOSINOPHIL # BLD AUTO: 0.1 K/UL (ref 0–0.5)
EOSINOPHIL NFR BLD: 1.3 % (ref 0–8)
ERYTHROCYTE [DISTWIDTH] IN BLOOD BY AUTOMATED COUNT: 15.5 % (ref 11.5–14.5)
EST. GFR  (NO RACE VARIABLE): >60 ML/MIN/1.73 M^2
GLUCOSE SERPL-MCNC: 165 MG/DL (ref 70–110)
HCT VFR BLD AUTO: 28.5 % (ref 40–54)
HGB BLD-MCNC: 9.2 G/DL (ref 14–18)
IMM GRANULOCYTES # BLD AUTO: 0.02 K/UL (ref 0–0.04)
IMM GRANULOCYTES NFR BLD AUTO: 0.4 % (ref 0–0.5)
LYMPHOCYTES # BLD AUTO: 1.7 K/UL (ref 1–4.8)
LYMPHOCYTES NFR BLD: 36.4 % (ref 18–48)
MAGNESIUM SERPL-MCNC: 1.7 MG/DL (ref 1.6–2.6)
MCH RBC QN AUTO: 28 PG (ref 27–31)
MCHC RBC AUTO-ENTMCNC: 32.3 G/DL (ref 32–36)
MCV RBC AUTO: 87 FL (ref 82–98)
MONOCYTES # BLD AUTO: 0.3 K/UL (ref 0.3–1)
MONOCYTES NFR BLD: 6.3 % (ref 4–15)
NEUTROPHILS # BLD AUTO: 2.6 K/UL (ref 1.8–7.7)
NEUTROPHILS NFR BLD: 55.4 % (ref 38–73)
NRBC BLD-RTO: 0 /100 WBC
PHOSPHATE SERPL-MCNC: 3 MG/DL (ref 2.7–4.5)
PLATELET # BLD AUTO: 150 K/UL (ref 150–450)
PMV BLD AUTO: 9.7 FL (ref 9.2–12.9)
POCT GLUCOSE: 122 MG/DL (ref 70–110)
POCT GLUCOSE: 145 MG/DL (ref 70–110)
POCT GLUCOSE: 236 MG/DL (ref 70–110)
POCT GLUCOSE: 243 MG/DL (ref 70–110)
POCT GLUCOSE: 250 MG/DL (ref 70–110)
POTASSIUM SERPL-SCNC: 3.6 MMOL/L (ref 3.5–5.1)
PROT SERPL-MCNC: 5.6 G/DL (ref 6–8.4)
RBC # BLD AUTO: 3.28 M/UL (ref 4.6–6.2)
SODIUM SERPL-SCNC: 139 MMOL/L (ref 136–145)
TACROLIMUS BLD-MCNC: 8.2 NG/ML (ref 5–15)
WBC # BLD AUTO: 4.64 K/UL (ref 3.9–12.7)

## 2025-02-19 PROCEDURE — 85025 COMPLETE CBC W/AUTO DIFF WBC: CPT | Performed by: STUDENT IN AN ORGANIZED HEALTH CARE EDUCATION/TRAINING PROGRAM

## 2025-02-19 PROCEDURE — 83735 ASSAY OF MAGNESIUM: CPT | Performed by: STUDENT IN AN ORGANIZED HEALTH CARE EDUCATION/TRAINING PROGRAM

## 2025-02-19 PROCEDURE — 84100 ASSAY OF PHOSPHORUS: CPT | Performed by: STUDENT IN AN ORGANIZED HEALTH CARE EDUCATION/TRAINING PROGRAM

## 2025-02-19 PROCEDURE — 36415 COLL VENOUS BLD VENIPUNCTURE: CPT | Performed by: STUDENT IN AN ORGANIZED HEALTH CARE EDUCATION/TRAINING PROGRAM

## 2025-02-19 PROCEDURE — 80053 COMPREHEN METABOLIC PANEL: CPT | Performed by: STUDENT IN AN ORGANIZED HEALTH CARE EDUCATION/TRAINING PROGRAM

## 2025-02-19 PROCEDURE — 25000003 PHARM REV CODE 250: Performed by: STUDENT IN AN ORGANIZED HEALTH CARE EDUCATION/TRAINING PROGRAM

## 2025-02-19 PROCEDURE — 63600175 PHARM REV CODE 636 W HCPCS: Performed by: STUDENT IN AN ORGANIZED HEALTH CARE EDUCATION/TRAINING PROGRAM

## 2025-02-19 PROCEDURE — 80197 ASSAY OF TACROLIMUS: CPT | Performed by: STUDENT IN AN ORGANIZED HEALTH CARE EDUCATION/TRAINING PROGRAM

## 2025-02-19 RX ADMIN — LOSARTAN POTASSIUM 100 MG: 50 TABLET, FILM COATED ORAL at 07:02

## 2025-02-19 RX ADMIN — TACROLIMUS 2 MG: 1 CAPSULE ORAL at 07:02

## 2025-02-19 RX ADMIN — SENNOSIDES AND DOCUSATE SODIUM 1 TABLET: 50; 8.6 TABLET ORAL at 07:02

## 2025-02-19 RX ADMIN — POLYETHYLENE GLYCOL 3350 17 G: 17 POWDER, FOR SOLUTION ORAL at 07:02

## 2025-02-19 RX ADMIN — URSODIOL 300 MG: 300 CAPSULE ORAL at 07:02

## 2025-02-19 RX ADMIN — CARVEDILOL 12.5 MG: 6.25 TABLET, FILM COATED ORAL at 07:02

## 2025-02-19 NOTE — PLAN OF CARE
[facility]  Discharge Final Note    Primary Care Provider: EVELIN Pitt MD    Expected Discharge Date:     Patient discharged to home via familty transportation.     Patient's bedside nurse provided discharge instructions.    Discharge Plan A and Plan B have been determined by review of patient's clinical status, future medical and therapeutic needs, and coverage/benefits for post-acute care in coordination with multidisciplinary team members.            Important Message from Medicare                 Future Appointments   Date Time Provider Department Center   2/24/2025 11:00 AM EVELIN Pitt MD [facility] IM [facility]   2/24/2025  1:20 PM ON[facility] US2 [facility] ULSOUND [place]   2/24/2025  3:30 PM BR CT1 LIMIT 500 LBS BR CT SCAN [place]   2/24/2025  4:00 PM BR CT1 LIMIT 500 LBS [facility] CT SCAN [place]   2/27/2025  9:00 AM [facility] MRI1 [facility] [place]   3/10/2025 11:00 AM Apoorva Pat, TANNER [facility] DIABETE [facility]   3/12/2025  3:30 PM Marshall Casillas MD [facility] RAD ONC [facility]   6/27/2025  9:15 AM Jed Yu MD [facility] UROLOGY [facility]   2/5/2026  7:30 AM FIELDS, VISUAL-SILVINA [facility] OPHTHAL [facility]   2/5/2026  8:00 AM Naveed Steiner, OD [facility] scheduled post-discharge follow-up appointment and information added to AVS.      Discharge Plan A and Plan B have been determined by review of patient's clinical status, future medical and therapeutic needs, and coverage/benefits for post-acute care in coordination with multidisciplinary team members.     Vria Stapleton, RN, BSN  Case Management  [phone]

## 2025-02-19 NOTE — ASSESSMENT & PLAN NOTE
Patient transferred with 4 days of melena. HGB stable. Patients most recent Hgb, Hct, platelets, and INR are listed below.  Recent Labs     02/16/25  0430 02/17/25  0057 02/17/25  0530 02/18/25  0549   HGB 9.3*  --  9.7* 9.6*   HCT 27.9*  --  30.3* 29.7*   *  --  164 155   INR 1.1 1.0  --   --        Plan  - Will trend hemoglobin/hematocrit Daily  - Will monitor and correct any coagulation defects  - Will transfuse if Hgb is <7g/dl (<8g/dl in cases of active ACS) or if patient has rapid bleeding leading to hemodynamic instability  - Continue protonix 40mg IV BID, transition to PO as able  - GI consulted for evaluation given drop in hemoglobin from 14.4>9.3 in the past week    - ERCP 2/17 underwent One stent from the biliary tree was seen in the major papilla. No evidence of sphincterotomy bleed. No blood in the upper GI tract. A single localized biliary stricture was found in the post-transplant anastomosis. One stent was removed from the biliary tree. One stent was placed into the common bile duct. Repeat ERCP in 8 weeks to exchange stent.   -Colonoscopy completed 2/18  Perianal skin tags found on perianal exam. The entire examined colon is normal on direct and retroflexion views.

## 2025-02-19 NOTE — PROGRESS NOTES
"[facility] - Transplant Stepdown  Endocrinology  Progress Note    Admit Date: 2025     Reason for Consult: Management of T2DM, Hyperglycemia     Surgical Procedure and Date: Liver transplant (2023)    Diabetes diagnosis year:  > 10 years ago     Home Diabetes Medications:    Mounjaro 10 mg every 7 days   Humalog via Ilet insulin pump       Lab Results   Component Value Date    HGBA1C 6.3 (H) 2024       How often checking glucose at home? Dexcom G7   BG readings on regimen: 180s  Hypoglycemia on the regimen?  No  Missed doses on regimen?  No      Diabetes Complications include:     Hyperglycemia     Complicating diabetes co morbidities:   ESLD s/p transplant, previous CVA     HPI:   Patient is a 65 y.o. male with a diagnosis of Type 2 DM (insulin pump), hx of HCC liver cirrhosis s/p Liver transplant (2023), HTN, hx of Meningioma s/p Left crani/resection, history of hemorrhagic CVA, abdominal hernia, and recent admission with billiary stricture who presents to [facility] as transfer admit for further evaluation of melena and concern for UGIB. Endocrinology consulted for management of T2DM.            Interval HPI:   Overnight events: No acute events overnight. Patient in room 12534/94839 A. Blood glucose stable. BG at and above goal on current insulin regimen (Home Insulin Pump). Steroid use- None . 1 Day Post-Op  Renal function- Normal   Vasopressors-  None       Endocrine will continue to follow and manage insulin orders inpatient.         Diet diabetic 2000 Calories (up to 75 gm per meal)     Eatin%  Nausea: No  Hypoglycemia and intervention: No  Fever: No  TPN and/or TF: No  If yes, type of TF/TPN and rate: n/a    /69   Pulse 71   Temp 98.1 °F (36.7 °C)   Resp 18   Ht 5' 11" (1.803 m)   Wt 97.4 kg (214 lb 13.4 oz)   SpO2 (!) 93%   BMI 29.96 kg/m²     Labs Reviewed and Include    Recent Labs   Lab 25  0532   *   CALCIUM 8.2*   ALBUMIN 3.0*   PROT 5.6*      K 3.6   CO2 " "24      BUN 9   CREATININE 0.8   ALKPHOS 130   ALT 22   AST 21   BILITOT 2.1*     Lab Results   Component Value Date    WBC 4.64 02/19/2025    HGB 9.2 (L) 02/19/2025    HCT 28.5 (L) 02/19/2025    MCV 87 02/19/2025     02/19/2025     No results for input(s): "TSH", "FREET4" in the last 168 hours.  Lab Results   Component Value Date    HGBA1C 6.3 (H) 12/16/2024       Nutritional status:   Body mass index is 29.96 kg/m².  Lab Results   Component Value Date    ALBUMIN 3.0 (L) 02/19/2025    ALBUMIN 3.2 (L) 02/18/2025    ALBUMIN 3.1 (L) 02/17/2025     No results found for: "PREALBUMIN"    Estimated Creatinine Clearance: 111.1 mL/min (based on SCr of 0.8 mg/dL).    Accu-Checks  Recent Labs     02/17/25  1743 02/17/25  2118 02/18/25  0152 02/18/25  0742 02/18/25  1156 02/18/25  1319 02/18/25  1739 02/18/25  2126 02/19/25  0155 02/19/25  0739   POCTGLUCOSE 144* 170* 131* 129* 122* 128* 250* 184* 145* 243*       Current Medications and/or Treatments Impacting Glycemic Control  Immunotherapy:    Immunosuppressants           Stop Route Frequency     tacrolimus capsule 2 mg         -- Oral 2 times daily          Steroids:   Hormones (From admission, onward)      Start     Stop Route Frequency Ordered    02/17/25 0025  melatonin tablet 6 mg         -- Oral Nightly PRN 02/16/25 2326          Pressors:    Autonomic Drugs (From admission, onward)      None          Hyperglycemia/Diabetes Medications:   Antihyperglycemics (From admission, onward)      None            ASSESSMENT and PLAN    Endocrine  Insulin pump status  Insulin Pump:  Patient has the ability and wherewithal to manage the pump.  Order has been placed to notify nurse of patient using own pump. (Order Set: Adult and Pediatric Home Insulin Pump)     Pump type:    Ilet pump   Infusion set type: Stainless steel/plastic cannula  Change infusion set: Every 2-3 days (2/16/25)  Change insertion site: with change of infusion set. (2/16/25)   Location of insertion " site: Abdomen  State of insertion site: appears clean      Type 2 diabetes mellitus with other specified complication, with chronic insulin use  BG goal 140-180;   Patient reports eating slice of pizza yesterday evening without announcing meal on Ilet pump. Likely cause of elevated BG reading this AM.    Continue home insulin pump with [employer] (consent forms signed and placed in chart) Patient has all pump supplies at bedside and agrees to manage pump while inpatient   BG monitoring ac/hs/0200    ** Please call Endocrine for any BG related issues **    Discharge plans: Continue home insulin pump    GI  * Upper gastrointestinal bleed  Managed per primary team  Avoid hypoglycemia        Status post liver transplant  Managed per primary team  Optimize BG control            Kelly Rose, NP  Endocrinology  [facility] - Transplant Stepdown

## 2025-02-19 NOTE — PROGRESS NOTES
"[facility] Trans[facility] Medicine  Progress Note    Patient Name: Jed Chávez  MRN: [id number]  Patient Class: IP- Inpatient   Admission Date: 2/16/2025  Length of Stay: 2 days  Attending Physician: Cindy Samuels MD  Primary Care Provider: EVELIN Pitt MD        Subjective     Principal Problem:Upper gastrointestinal bleed        HPI:  66 y/o male w/ Type 2 DM (insulin pump), hx of HCC liver cirrhosis s/p Liver transplant (08/2023), HTN, hx of Meningioma s/p Left crani/resection, history of hemorrhagic CVA, abdominal hernia, and recent admission with billiary stricture who presents to [facility] as transfer admit for further evaluation of melena and concern for UGIB.    He was recently admitted at [facility] on 2/3/25 to 2/8/25 due to elevated LFTs. Imaging with US doppler and CT abdomen noted concern for CBD dilation. 2/7/25 EUS/ERCP noted severe biliary stricture treated with sphincterotomy and stent. Recommended ciprofloxacin 500 BID for 5 days, ursodiol for biliary prophylaxis. Repeat ERCP in 6 weeks scheduled 3/5/25.    Transfer note:  Patient presented to the ED at [facility] with complaints of melena x 4 days. Per ED MD, "Patient states he was seen at PCP today where he had blood work and a positive occult study and was advised to come to the ED. He notes he takes ASA and plavix and no other blood thinner. He denies any NSAID use."    Patient is still having episodes of melena, but hemodynamics and hemoglobin are fairly stable. Hemoglobin this morning was 9.3. INR remains normal. Total bilirubin was slightly higher, but AST and ALT were normal. Hemodynamic status is stable. He continues on twice daily Protonix as well as his tacrolimus.  -temperature 97.7°, pulse 77, respirations 16, blood pressure 156/81, O2 sats 97%  -WBCs 3.88, hemoglobin 9.3, hematocrit 27.9, platelets 138, sodium 139, potassium 3.7, chloride 108, CO2 21, BUN 11, creatinine 0.8, glucose 143, total bilirubin 2.3, AST 17, ALT 34, " magnesium 1.6, phosphorus 2.9, INR 1.1.    On arrival to [facility], patient is resting comfortably. He reports melena with dark stools, no bright red blood. Denies abdominal pain but some soreness in epigastric area after the ERCP. He was continued on aspirin but plavix for prior hepatic artery stent was held since admission to OSH.      Overview/Hospital Course:  66 y/o male w/ Type 2 DM (insulin pump), hx of HCC liver cirrhosis s/p Liver transplant (08/2023), HTN, hx of Meningioma s/p Left crani/resection, history of hemorrhagic CVA, abdominal hernia, and recent admission with billiary stricture who presents to [facility] as transfer admit for further evaluation of melena and concern for UGIB.     Appreciate GI evaluation. Underwent ERCP 2/17 underwent One stent from the biliary tree was seen in the major papilla. No evidence of sphincterotomy bleed. No blood in the upper GI tract. The major papilla appeared normal. A single localized biliary stricture was found in the post-transplant anastomosis. One stent was removed from the biliary tree. One stent was placed into the common bile duct. Repeat ERCP in 8 weeks to exchange stent. Colonoscopy completed 2/18  Perianal skin tags found on perianal exam. The entire examined colon is normal on direct and retroflexion views.    No new subjective & objective note has been filed under this hospital service since the last note was generated.      Assessment and Plan     * Upper gastrointestinal bleed  Patient transferred with 4 days of melena. HGB stable. Patients most recent Hgb, Hct, platelets, and INR are listed below.  Recent Labs     02/16/25  0430 02/17/25  0057 02/17/25  0530 02/18/25  0549   HGB 9.3*  --  9.7* 9.6*   HCT 27.9*  --  30.3* 29.7*   *  --  164 155   INR 1.1 1.0  --   --        Plan  - Will trend hemoglobin/hematocrit Daily  - Will monitor and correct any coagulation defects  - Will transfuse if Hgb is <7g/dl (<8g/dl in cases of active ACS) or if patient has  rapid bleeding leading to hemodynamic instability  - Continue protonix 40mg IV BID, transition to PO as able  - GI consulted for evaluation given drop in hemoglobin from 14.4>9.3 in the past week    - ERCP 2/17 underwent One stent from the biliary tree was seen in the major papilla. No evidence of sphincterotomy bleed. No blood in the upper GI tract. A single localized biliary stricture was found in the post-transplant anastomosis. One stent was removed from the biliary tree. One stent was placed into the common bile duct. Repeat ERCP in 8 weeks to exchange stent.   -Colonoscopy completed 2/18  Perianal skin tags found on perianal exam. The entire examined colon is normal on direct and retroflexion views.      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 Liver US on 2/3/25 reveals mild tardus parvus waveform, similar compared to prior imaging    - Appreciate IR recommendations. No need for intervention at this time given pt's imaging findings are stable over the past 2 years and his LFTs are stable. Pt does not have to continue on DAPT from our standpoint    - We will discuss with transplant surgery, likely continue aspirin however discontinue plavix    Status post liver transplant  Patient on tacrolimus 2mg PO BID as outpatient   - continue home dose  - check tacrolimus trough daily     Type 2 diabetes mellitus with other specified complication, with chronic insulin use  Patient is on an insulin pump as an outpatient, brought home lispro vial to refill pump   -basal bolus regimen.  Pts backup insulin is Lantus 33units.    -Will continue home  insulin pump     Last A1c reviewed-   Lab Results   Component Value Date    HGBA1C 6.3 (H) 12/16/2024     Most recent fingerstick glucose reviewed-   Recent Labs   Lab 02/17/25  2118 02/18/25  0152 02/18/25  0742 02/18/25  1319   POCTGLUCOSE 170* 131* 129* 128*       Hypertension complicating diabetes  Patient's blood pressure range in the last 24 hours was: BP  Min: 112/69  Max: 170/83.The patient's inpatient anti-hypertensive regimen is listed below:  Current Antihypertensives  carvediloL tablet 12.5 mg, 2 times daily with meals, Oral  losartan tablet 100 mg, Daily, Oral    Plan  - BP is controlled, no changes needed to their regimen      VTE Risk Mitigation (From admission, onward)           Ordered     Reason for No Pharmacological VTE Prophylaxis  Once        Question:  Reasons:  Answer:  Risk of Bleeding    02/16/25 2326     IP VTE HIGH RISK PATIENT  Once         02/16/25 2326     Place sequential compression device  Until discontinued         02/16/25 2326                    Discharge Planning   CRISELDA: 2/19/2025     Code Status: Full Code   Medical Readiness for Discharge Date:   Discharge Plan A: Home with family, Home Health                        Cindy Samuels MD  Department of Hospital Medicine   [facility] - Transplant [facility]down

## 2025-02-19 NOTE — PLAN OF CARE
[facility]plant [facility]down  Discharge Reassessment    Primary Care Provider: EVELIN Pitt MD    Expected Discharge Date: 2/19/2025    CM visited with patient to discuss discharge plan. Patient to wife, Leida to transport patient home at time of discharge. No DME or Home health services needed at discharge.     Discharge Plan A and Plan B have been determined by review of patient's clinical status, future medical and therapeutic needs, and coverage/benefits for post-acute care in coordination with multidisciplinary team members.    Jordan Hinojosa RN-CM  Case Management  [facility]  [phone]     Reassessment (most recent)       Discharge Reassessment - 02/19/25 1249          Discharge Reassessment    Assessment Type Discharge Planning Reassessment     Did the patient's condition or plan change since previous assessment? No     Discharge Plan discussed with: Patient     Name(s) and Number(s) Mere Casarez     Discharge Plan A Home with family     Discharge Plan B Home     DME Needed Upon Discharge  none     Transition of Care Barriers None     Why the patient remains in the hospital Other (see comment)   Patient to discharge today       Post-Acute Status    Post-Acute Authorization Other     Coverage [employer]     Other Status No Post-Acute Service Needs     Discharge Delays None known at this time

## 2025-02-19 NOTE — PROGRESS NOTES
"[facility] - Transplant [facility] Medicine  Progress Note    Patient Name: Jed Chávez  MRN: [id number]  Patient Class: IP- Inpatient   Admission Date: 2/16/2025  Length of Stay: 2 days  Attending Physician: Cindy Samuels MD  Primary Care Provider: EVELIN Pitt MD        Subjective     Principal Problem:Upper gastrointestinal bleed        HPI:  64 y/o male w/ Type 2 DM (insulin pump), hx of HCC liver cirrhosis s/p Liver transplant (08/2023), HTN, hx of Meningioma s/p Left crani/resection, history of hemorrhagic CVA, abdominal hernia, and recent admission with billiary stricture who [place] [facility] [place] for further evaluation of melena and concern for UGIB.    He was recently [place] [facility] [place]3/25 to 2/8/25 due to elevated LFTs. Imaging with US doppler and CT abdomen noted concern for CBD dilation. 2/7/25 EUS/ERCP noted severe biliary stricture treated with sphincterotomy and stent. Recommended ciprofloxacin 500 BID for 5 days, ursodiol for biliary prophylaxis. Repeat ERCP in 6 weeks scheduled 3/5/25.    Transfer note:  Patient presented to the ED at [facility] with complaints of melena x 4 days. Per ED MD, "Patient states he was seen at PCP today where he had blood work and a positive occult study and was advised to come to the ED. He notes he takes ASA and plavix and no other blood thinner. He denies any NSAID use."    Patient is still having episodes of melena, but hemodynamics and hemoglobin are fairly stable. Hemoglobin this morning was 9.3. INR remains normal. Total bilirubin was slightly higher, but AST and ALT were normal. Hemodynamic status is stable. He continues on twice daily Protonix as well as his tacrolimus.  -temperature 97.7°, pulse 77, respirations 16, blood pressure 156/81, O2 sats 97%  -WBCs 3.88, hemoglobin 9.3, hematocrit 27.9, platelets 138, sodium 139, potassium 3.7, chloride 108, CO2 21, BUN 11, creatinine 0.8, glucose 143, total bilirubin 2.3, AST 17, ALT 34, " magnesium 1.6, phosphorus 2.9, INR 1.1.    On [place] [facility], patient is resting comfortably. He reports melena with dark stools, no bright red blood. Denies abdominal pain but some soreness in epigastric area after the ERCP. He was continued on aspirin but plavix for prior hepatic artery stent was held since admission to OSH.      Overview/Hospital Course:  66 y/o male w/ Type 2 DM (insulin pump), hx of HCC liver cirrhosis s/p Liver transplant (08/2023), HTN, hx of Meningioma s/p Left crani/resection, history of hemorrhagic CVA, abdominal hernia, and recent admission with billiary stricture who presents to [facility] as [place] admit for further evaluation of melena and concern for UGIB.     Appreciate GI evaluation. Underwent ERCP 2/17 underwent One stent from the biliary tree was seen in the major papilla. No evidence of sphincterotomy bleed. No blood in the upper GI tract. The major papilla appeared normal. A single localized biliary stricture was found in the post-transplant anastomosis. One stent was removed from the biliary tree. One stent was placed into the common bile duct. Repeat ERCP in 8 weeks to exchange stent. Colonoscopy completed 2/18  Perianal skin tags found on perianal exam. The entire examined colon is normal on direct and retroflexion views.    Interval History:     Completed colonoscopy today. No source of bleeding identified. Resume diet. Discussed with IR and no further intervention needed. Will discuss with transplant surgery    Review of Systems   Constitutional:  Negative for fatigue and fever.   HENT:  Negative for congestion.    Eyes:  Negative for visual disturbance.   Respiratory:  Negative for cough and shortness of breath.    Cardiovascular:  Negative for chest pain.   Gastrointestinal:  Negative for abdominal pain, constipation and diarrhea.   Genitourinary:  Negative for dysuria.   Skin:  Negative for rash.   Neurological:  Negative for light-headedness.   Psychiatric/Behavioral:   Negative for confusion and decreased concentration.      Objective:     Vital Signs (Most Recent):  Temp: 97.4 °F (36.3 °C) (02/17/25 1604)  Pulse: 74 (02/17/25 1604)  Resp: 18 (02/17/25 1604)  BP: 137/75 (02/17/25 1812)  SpO2: 97 % (02/17/25 1604) Vital Signs (24h Range):  Temp:  [97.4 °F (36.3 °C)-98.8 °F (37.1 °C)] 97.4 °F (36.3 °C)  Pulse:  [68-89] 74  Resp:  [14-20] 18  SpO2:  [95 %-99 %] 97 %  BP: (102-147)/(58-84) 137/75     Weight: 99.3 kg (218 lb 14.7 oz)  Body mass index is 30.53 kg/m².     Physical Exam  Vitals and nursing note reviewed.   Constitutional:       General: He is not in acute distress.     Appearance: Normal appearance. He is not ill-appearing.   HENT:      Head: Normocephalic and atraumatic.   Eyes:      General: No scleral icterus.     Extraocular Movements: Extraocular movements intact.      Pupils: Pupils are equal, round, and reactive to light.   Cardiovascular:      Rate and Rhythm: Normal rate and regular rhythm.      Heart sounds: No murmur heard.  Pulmonary:      Effort: Pulmonary effort is normal. No respiratory distress.      Breath sounds: Normal breath sounds. No wheezing.   Abdominal:      General: Abdomen is flat. There is no distension.      Palpations: Abdomen is soft.      Tenderness: There is no abdominal tenderness (mild soreness).   Musculoskeletal:         General: No swelling or tenderness.   Skin:     General: Skin is warm and dry.      Capillary Refill: Capillary refill takes less than 2 seconds.      Coloration: Skin is not jaundiced.   Neurological:      General: No focal deficit present.      Mental Status: He is alert and oriented to person, place, and time.   Psychiatric:         Mood and Affect: Mood normal.         Thought Content: Thought content normal.       Significant Labs: All pertinent labs within the past 24 hours have been reviewed.  CBC:   Recent Labs   Lab 02/16/25  0430 02/17/25  0530   WBC 3.88* 4.93   HGB 9.3* 9.7*   HCT 27.9* 30.3*   * 164      CMP:   Recent Labs   Lab 02/16/25  0430 02/17/25  0530    137   K 3.7 3.5    107   CO2 21* 21*   * 205*   BUN 11 15   CREATININE 0.8 0.9   CALCIUM 8.1* 8.3*   PROT 5.6* 5.8*   ALBUMIN 3.1* 3.1*   BILITOT 2.3* 2.2*   ALKPHOS 156* 154*   AST 17 20   ALT 34 30   ANIONGAP 10 9       Significant Imaging: I have reviewed all pertinent imaging results/findings within the past 24 hours.    Assessment and Plan     * Upper gastrointestinal bleed  Patient transferred with 4 days of melena. HGB stable. Patients most recent Hgb, Hct, platelets, and INR are listed below.  Recent Labs     02/16/25  0430 02/17/25  0057 02/17/25  0530 02/18/25  0549   HGB 9.3*  --  9.7* 9.6*   HCT 27.9*  --  30.3* 29.7*   *  --  164 155   INR 1.1 1.0  --   --        Plan  - Will trend hemoglobin/hematocrit Daily  - Will monitor and correct any coagulation defects  - Will transfuse if Hgb is <7g/dl (<8g/dl in cases of active ACS) or if patient has rapid bleeding leading to hemodynamic instability  - Continue protonix 40mg IV BID, transition to PO as able  - GI consulted for evaluation given drop in hemoglobin from 14.4>9.3 in the past week    - ERCP 2/17 underwent One stent from the biliary tree was seen in the major papilla. No evidence of sphincterotomy bleed. No blood in the upper GI tract. A single localized biliary stricture was found in the post-transplant anastomosis. One stent was removed from the biliary tree. One stent was placed into the common bile duct. Repeat ERCP in 8 weeks to exchange stent.   -Colonoscopy completed 2/18  Perianal skin tags found on perianal exam. The entire examined colon is normal on direct and retroflexion views.      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  Pts backup insulin is Lantus 33units.    -Will continue home insulin pump     Last A1c reviewed-   Lab Results   Component Value Date    HGBA1C 6.3 (H) 12/16/2024     Most recent fingerstick glucose reviewed-   Recent Labs   Lab 02/17/25  2118 02/18/25  0152 02/18/25  0742 02/18/25  1319   POCTGLUCOSE 170* 131* 129* 128*       Hypertension complicating diabetes  Patient's blood pressure range in the last 24 hours was: BP  Min: 112/69  Max: 170/83.The patient's inpatient anti-hypertensive regimen is listed below:  Current Antihypertensives  carvediloL tablet 12.5 mg, 2 times daily with meals, Oral  losartan tablet 100 mg, Daily, Oral    Plan  - BP is controlled, no changes needed to their regimen      VTE Risk Mitigation (From admission, onward)           Ordered     Reason for No Pharmacological VTE Prophylaxis  Once        Question:  Reasons:  Answer:  Risk of Bleeding    02/16/25 2326     IP VTE HIGH RISK PATIENT  Once         02/16/25 2326     Place sequential compression device  Until discontinued         02/16/25 2326                    Discharge Planning   CRISELDA: 2/19/2025     Code Status: Full Code   Medical Readiness for Discharge Date:   Discharge Plan A: Home with family, Home Health                        Cindy Samuels MD  Department of Hospital Medicine   [facility] - Transplant Stepdown

## 2025-02-19 NOTE — TELEPHONE ENCOUNTER
Pt's case discussed in the liver committee meeting.  Pt is s/p OLT 2023, admitted with upper GIB, discuss whether to continue anticoagulation considering degree of HAS on CTA 2/4.  Case discussed with Dr. Gong. No need for intervention at this time given pt's imaging findings are stable over the past 2 years and his LFTs are stable. Pt does not have to continue on DAPT from our standpoint. Per committee ok with Dr. Gong's plan.

## 2025-02-19 NOTE — ASSESSMENT & PLAN NOTE
65 year male with alcoholic cirrhosis c/b HCC s/p OLTx 8/2023, HAS s/p hepatic artery stent placement 10/2/23    -Most recent imaging of his hepatic artery stent includes CTA a/p on 2/4/25 which revealed a chronically occluded hepatic artery stent with recanalization of the hepatic artery. Liver US on 2/3/25 reveals mild tardus parvus waveform, similar compared to prior imaging    - Appreciate IR recommendations. No need for intervention at this time given pt's imaging findings are stable over the past 2 years and his LFTs are stable. Pt does not have to continue on DAPT from our standpoint    - Discussed with committee and will discontinue Plavix, lcontinue aspirin

## 2025-02-19 NOTE — PROGRESS NOTES
DC instructions reviewed with pt and wife. Pt and wife verbalized understanding. PIV and tele dc'd. Pt walked off of floor-NAD.

## 2025-02-19 NOTE — ASSESSMENT & PLAN NOTE
BG goal 140-180;   Patient reports eating slice of pizza yesterday evening without announcing meal on [employer] pump. Likely cause of elevated BG reading this AM.    Continue home insulin pump with [employer] (consent forms signed and placed in chart) Patient has all pump supplies at bedside and agrees to manage pump while inpatient   BG monitoring ac/hs/0200    ** Please call Endocrine for any BG related issues **    Discharge plans: Continue home insulin pump

## 2025-02-19 NOTE — TREATMENT PLAN
Hepatology Treatment Plan    Jed Chávez is a 65 y.o. male admitted to hospital 2/16/2025 (Hospital Day: 4) due to Upper gastrointestinal bleed.     Interval History  Patient underwent colonoscopy yesterday with no bleeding noted.     Objective  Temp:  [97.4 °F (36.3 °C)-98.2 °F (36.8 °C)] 97.4 °F (36.3 °C) (02/19 1124)  Pulse:  [70-93] 70 (02/19 1124)  BP: (112-174)/(68-94) 153/74 (02/19 1124)  Resp:  [16-20] 18 (02/19 1124)  SpO2:  [93 %-97 %] 93 % (02/19 1124)    Laboratory    Lab Results   Component Value Date    WBC 4.64 02/19/2025    HGB 9.2 (L) 02/19/2025    HCT 28.5 (L) 02/19/2025    MCV 87 02/19/2025     02/19/2025       Lab Results   Component Value Date     02/19/2025    K 3.6 02/19/2025     02/19/2025    CO2 24 02/19/2025    BUN 9 02/19/2025    CREATININE 0.8 02/19/2025    CALCIUM 8.2 (L) 02/19/2025       Lab Results   Component Value Date    ALBUMIN 3.0 (L) 02/19/2025    ALT 22 02/19/2025    AST 21 02/19/2025     (H) 06/15/2023    ALKPHOS 130 02/19/2025    BILITOT 2.1 (H) 02/19/2025       Lab Results   Component Value Date    INR 1.0 02/17/2025    INR 1.1 02/16/2025    INR 1.0 02/15/2025       MELD 3.0: 10 at 2/19/2025  5:32 AM  MELD-Na: 9 at 2/19/2025  5:32 AM  Calculated from:  Serum Creatinine: 0.8 mg/dL (Using min of 1 mg/dL) at 2/19/2025  5:32 AM  Serum Sodium: 139 mmol/L (Using max of 137 mmol/L) at 2/19/2025  5:32 AM  Total Bilirubin: 2.1 mg/dL at 2/19/2025  5:32 AM  Serum Albumin: 3 g/dL at 2/19/2025  5:32 AM  INR(ratio): 1 at 2/17/2025 12:57 AM  Age at listing (hypothetical): 65 years  Sex: Male at 2/19/2025  5:32 AM      Assessment    Jed Chávez is a 65 y.o. 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 Patient re-admitted for evaluation of possible GI bleed and management. Hepatology consulted for management of immunosuppression.      Problem List:  Melena with concerns for possible GI Bleed  History HCC s/p Liver Transplant on Immunosuppression  Hepatic Artery Stenosis on Plavix/ASA    Plan  - Continue home tacrolimus dosage  - Please obtain daily CBC, BMP, LFT, INR and tacrolimus trough levels   - Discussed case with committee, okay to discontinue plavix and continue aspirin     Thank you for involving us in the care of Jed Chávez. Please call with any additional concerns or questions.    Sunitha Russell DO MA  Gastroenterology & Hepatology Fellow, PGY-4

## 2025-02-19 NOTE — ASSESSMENT & PLAN NOTE
Patient's blood pressure range in the last 24 hours was: BP  Min: 112/69  Max: 170/83.The patient's inpatient anti-hypertensive regimen is listed below:  Current Antihypertensives  carvediloL tablet 12.5 mg, 2 times daily with meals, Oral  losartan tablet 100 mg, Daily, Oral    Plan  - BP is controlled, no changes needed to their regimen

## 2025-02-19 NOTE — TELEPHONE ENCOUNTER
Liver Transplant Committee Discussion     Patient Name: Jed Chávez   : 1960  MRN: [id number]    Requested by: Ana Ambriz MD    Day to be discussed: Liver discussion days: Wednesday     Transplant Coordinator: Liver Coordinators: Linda Donahue    Patient Status: inpatient    Transplant Status: transplant status: Post-Liver    Reason for Discussion:From IR:Most recent imaging of his hepatic artery stent includes CTA a/p on 25 which revealed a chronically occluded hepatic artery stent with recanalization of the hepatic artery. Liver US on 2/3/25 reveals mild tardus parvus waveform, similar compared to prior imaging. LFTs are WNL. Case discussed with Dr. Gong. No need for intervention at this time given pt's imaging findings are stable over the past 2 years and his LFTs are stable. Pt does not have to continue on DAPT from our standpoint.     Plan: Ok with Dr. Gong's plan per committee

## 2025-02-19 NOTE — PLAN OF CARE
AAOx4, afebrile, w/o c/o pain. Tele monitor in place (NSR). BG monitored achs and 0200. Pt has home insulin pump on. Pt changed his site this am around 0320. Encouraged pt to change his site every 48-72 hours. Endocrine is following. Hepatology is consulted. Pt's bp is elevated-Dr. Barrientos aware. Pt's bp seems to slightly lower stannding. Pt able to position self independently. Pt in lowest position, side rails up x2, non-skid foot wear in place, call light within reach, pt verbalized understanding to call RN when needed. Hand hygiene practiced per protocol. Will continue to monitor.

## 2025-02-19 NOTE — SUBJECTIVE & OBJECTIVE
"Interval HPI:   Overnight events: No acute events overnight. Patient in room 02682/63208 A. Blood glucose stable. BG at and above goal on current insulin regimen (Home Insulin Pump). Steroid use- None . 1 Day Post-Op  Renal function- Normal   Vasopressors-  None       Endocrine will continue to follow and manage insulin orders inpatient.         Diet diabetic 2000 Calories (up to 75 gm per meal)     Eatin%  Nausea: No  Hypoglycemia and intervention: No  Fever: No  TPN and/or TF: No  If yes, type of TF/TPN and rate: n/a    /69   Pulse 71   Temp 98.1 °F (36.7 °C)   Resp 18   Ht 5' 11" (1.803 m)   Wt 97.4 kg (214 lb 13.4 oz)   SpO2 (!) 93%   BMI 29.96 kg/m²     Labs Reviewed and Include    Recent Labs   Lab 25  0532   *   CALCIUM 8.2*   ALBUMIN 3.0*   PROT 5.6*      K 3.6   CO2 24      BUN 9   CREATININE 0.8   ALKPHOS 130   ALT 22   AST 21   BILITOT 2.1*     Lab Results   Component Value Date    WBC 4.64 2025    HGB 9.2 (L) 2025    HCT 28.5 (L) 2025    MCV 87 2025     2025     No results for input(s): "TSH", "FREET4" in the last 168 hours.  Lab Results   Component Value Date    HGBA1C 6.3 (H) 2024       Nutritional status:   Body mass index is 29.96 kg/m².  Lab Results   Component Value Date    ALBUMIN 3.0 (L) 2025    ALBUMIN 3.2 (L) 2025    ALBUMIN 3.1 (L) 2025     No results found for: "PREALBUMIN"    Estimated Creatinine Clearance: 111.1 mL/min (based on SCr of 0.8 mg/dL).    Accu-Checks  Recent Labs     25  1743 25  2118 25  0152 25  0742 25  1156 25  1319 25  1739 25  2126 25  0155 25  0739   POCTGLUCOSE 144* 170* 131* 129* 122* 128* 250* 184* 145* 243*       Current Medications and/or Treatments Impacting Glycemic Control  Immunotherapy:    Immunosuppressants           Stop Route Frequency     tacrolimus capsule 2 mg         -- Oral 2 times daily "          Steroids:   Hormones (From admission, onward)      Start     Stop Route Frequency Ordered    02/17/25 0025  melatonin tablet 6 mg         -- Oral Nightly PRN 02/16/25 1884          Pressors:    Autonomic Drugs (From admission, onward)      None          Hyperglycemia/Diabetes Medications:   Antihyperglycemics (From admission, onward)      None

## 2025-02-19 NOTE — ASSESSMENT & PLAN NOTE
Patient is on an insulin pump as an outpatient, brought home lispro vial to refill pump   -basal bolus regimen.  Pts backup insulin is Lantus 33units.    -Will continue home insulin pump     Last A1c reviewed-   Lab Results   Component Value Date    HGBA1C 6.3 (H) 12/16/2024     Most recent fingerstick glucose reviewed-   Recent Labs   Lab 02/17/25  2118 02/18/25  0152 02/18/25  0742 02/18/25  1319   POCTGLUCOSE 170* 131* 129* 128*

## 2025-02-20 NOTE — SUBJECTIVE & OBJECTIVE
Interval History:     Completed colonoscopy today. No source of bleeding identified. Resume diet. Discussed with IR and no further intervention needed. Will discuss with transplant surgery    Review of Systems   Constitutional:  Negative for fatigue and fever.   HENT:  Negative for congestion.    Eyes:  Negative for visual disturbance.   Respiratory:  Negative for cough and shortness of breath.    Cardiovascular:  Negative for chest pain.   Gastrointestinal:  Negative for abdominal pain, constipation and diarrhea.   Genitourinary:  Negative for dysuria.   Skin:  Negative for rash.   Neurological:  Negative for light-headedness.   Psychiatric/Behavioral:  Negative for confusion and decreased concentration.      Objective:     Vital Signs (Most Recent):  Temp: 97.4 °F (36.3 °C) (02/19/25 1124)  Pulse: 70 (02/19/25 1124)  Resp: 18 (02/19/25 1124)  BP: (!) 153/74 (02/19/25 1124)  SpO2: (!) 93 % (02/19/25 1124) Vital Signs (24h Range):        Weight: 97.4 kg (214 lb 13.4 oz)  Body mass index is 29.96 kg/m².     Physical Exam  Vitals and nursing note reviewed.   Constitutional:       General: He is not in acute distress.     Appearance: Normal appearance. He is not ill-appearing.   HENT:      Head: Normocephalic and atraumatic.   Eyes:      General: No scleral icterus.     Extraocular Movements: Extraocular movements intact.      Pupils: Pupils are equal, round, and reactive to light.   Cardiovascular:      Rate and Rhythm: Normal rate and regular rhythm.      Heart sounds: No murmur heard.  Pulmonary:      Effort: Pulmonary effort is normal. No respiratory distress.      Breath sounds: Normal breath sounds. No wheezing.   Abdominal:      General: Abdomen is flat. There is no distension.      Palpations: Abdomen is soft.      Tenderness: There is no abdominal tenderness (mild soreness).   Musculoskeletal:         General: No swelling or tenderness.   Skin:     General: Skin is warm and dry.      Capillary Refill:  Capillary refill takes less than 2 seconds.      Coloration: Skin is not jaundiced.   Neurological:      General: No focal deficit present.      Mental Status: He is alert and oriented to person, place, and time.   Psychiatric:         Mood and Affect: Mood normal.         Thought Content: Thought content normal.       Significant Labs: All pertinent labs within the past 24 hours have been reviewed.  CBC:   Recent Labs   Lab 02/19/25  0532   WBC 4.64   HGB 9.2*   HCT 28.5*        CMP:   Recent Labs   Lab 02/19/25  0532      K 3.6      CO2 24   *   BUN 9   CREATININE 0.8   CALCIUM 8.2*   PROT 5.6*   ALBUMIN 3.0*   BILITOT 2.1*   ALKPHOS 130   AST 21   ALT 22   ANIONGAP 7*       Significant Imaging: I have reviewed all pertinent imaging results/findings within the past 24 hours.

## 2025-02-20 NOTE — ASSESSMENT & PLAN NOTE
Patient transferred with 4 days of melena. HGB stable. Patients most recent Hgb, Hct, platelets, and INR are listed below.  Recent Labs     02/18/25  0549 02/19/25  0532   HGB 9.6* 9.2*   HCT 29.7* 28.5*    150       Plan  - Will trend hemoglobin/hematocrit Daily  - Will monitor and correct any coagulation defects  - Will transfuse if Hgb is <7g/dl (<8g/dl in cases of active ACS) or if patient has rapid bleeding leading to hemodynamic instability  - Continue protonix 40mg IV BID, transition to PO as able  - GI consulted for evaluation given drop in hemoglobin from 14.4>9.3 in the past week    - ERCP 2/17 underwent One stent from the biliary tree was seen in the major papilla. No evidence of sphincterotomy bleed. No blood in the upper GI tract. A single localized biliary stricture was found in the post-transplant anastomosis. One stent was removed from the biliary tree. One stent was placed into the common bile duct. Repeat ERCP in 8 weeks to exchange stent.   -Colonoscopy completed 2/18  Perianal skin tags found on perianal exam. The entire examined colon is normal on direct and retroflexion views.    - will discontinue home plavix per committee discussion. Continue home aspirin

## 2025-02-20 NOTE — DISCHARGE SUMMARY
"Leobardo Hutchinson - Transplant [facility] Medicine  Discharge Summary      Patient Name: Jed Chávez  MRN: [id number]  DEDRICK: [id number]  Patient Class: IP- Inpatient  Admission Date: 2/16/2025  Hospital Length of Stay: 3 days  Discharge Date and Time: 2/19/2025  1:32 PM  Attending Physician: Rachele att. providers found   Discharging Provider: Cindy Samuels MD  Primary Care Provider: EVELIN Pitt MD  [facility] Medicine Team: [facility] HOSP MED L Cindy Samuels MD  Primary Care Team: [facility] HOSP MED L    HPI:   64 y/o male w/ Type 2 DM (insulin pump), hx of HCC liver cirrhosis s/p Liver transplant (08/2023), HTN, hx of Meningioma s/p Left crani/resection, history of hemorrhagic CVA, abdominal hernia, and recent admission with billiary stricture who presents to [facility] as transfer admit for further evaluation of melena and concern for UGIB.    He was recently admitted at [facility] on 2/3/25 to 2/8/25 due to elevated LFTs. Imaging with US doppler and CT abdomen noted concern for CBD dilation. 2/7/25 EUS/ERCP noted severe biliary stricture treated with sphincterotomy and stent. Recommended ciprofloxacin 500 BID for 5 days, ursodiol for biliary prophylaxis. Repeat ERCP in 6 weeks scheduled 3/5/25.    Transfer note:  Patient presented to the ED at [facility] with complaints of melena x 4 days. Per ED MD, "Patient states he was seen at PCP today where he had blood work and a positive occult study and was advised to come to the ED. He notes he takes ASA and plavix and no other blood thinner. He denies any NSAID use."    Patient is still having episodes of melena, but hemodynamics and hemoglobin are fairly stable. Hemoglobin this morning was 9.3. INR remains normal. Total bilirubin was slightly higher, but AST and ALT were normal. Hemodynamic status is stable. He continues on twice daily Protonix as well as his tacrolimus.  -temperature 97.7°, pulse 77, respirations 16, blood pressure 156/81, O2 sats 97%  -WBCs 3.88, hemoglobin 9.3, hematocrit 27.9, " platelets 138, sodium 139, potassium 3.7, chloride 108, CO2 21, BUN 11, creatinine 0.8, glucose 143, total bilirubin 2.3, AST 17, ALT 34, magnesium 1.6, phosphorus 2.9, INR 1.1.    On arrival to [facility], patient is resting comfortably. He reports melena with dark stools, no bright red blood. Denies abdominal pain but some soreness in epigastric area after the ERCP. He was continued on aspirin but plavix for prior hepatic artery stent was held since admission to OSH.      Procedure(s) (LRB):  COLONOSCOPY (N/A)      Hospital Course:   64 y/o male w/ Type 2 DM (insulin pump), hx of HCC liver cirrhosis s/p Liver transplant (08/2023), HTN, hx of Meningioma s/p Left crani/resection, history of hemorrhagic CVA, abdominal hernia, and recent admission with billiary stricture who presents to [facility] as transfer admit for further evaluation of melena and concern for UGIB.     Appreciate GI evaluation. Underwent ERCP 2/17 underwent One stent from the biliary tree was seen in the major papilla. No evidence of sphincterotomy bleed. No blood in the upper GI tract. The major papilla appeared normal. A single localized biliary stricture was found in the post-transplant anastomosis. One stent was removed from the biliary tree. One stent was placed into the common bile duct. Repeat ERCP in 8 weeks to exchange stent. Colonoscopy completed 2/18  Perianal skin tags found on perianal exam. The entire examined colon is normal on direct and retroflexion views.    2/19 - Patient had hepatic artery stenosis and had stent placed in October 2024 which has been occluded and was discussed with IR and hepatology however no rec intervention was recommended given liver function was stable.  Given concern for recent intervention with ERCP and GI bleed subsequently longitudinal plan was discuss with the transplant committee and images were reviewed with transplant surgery and Interventional Radiology and plan was to do no further intervention and only  continue aspirin and discontinue Plavix.      Objective:     Vital Signs (Most Recent):  Temp: 97.4 °F (36.3 °C) (02/19/25 1124)  Pulse: 70 (02/19/25 1124)  Resp: 18 (02/19/25 1124)  BP: (!) 153/74 (02/19/25 1124)  SpO2: (!) 93 % (02/19/25 1124)        Weight: 97.4 kg (214 lb 13.4 oz)  Body mass index is 29.96 kg/m².     Physical Exam  Vitals and nursing note reviewed.   Constitutional:       Appearance: Normal appearance. He is not ill-appearing.   HENT:      Head: Normocephalic and atraumatic.   Eyes:      General: No scleral icterus.     Pupils: Pupils are equal, round, and reactive to light.   Cardiovascular:      Rate and Rhythm: Normal rate and regular rhythm.      Heart sounds: No murmur heard.  Pulmonary:      Effort: Pulmonary effort is normal. No respiratory distress.      Breath sounds: Normal breath sounds. No wheezing.   Abdominal:      General: Abdomen is flat. There is no distension.      Palpations: Abdomen is soft. No tenderness  Musculoskeletal:         General: No swelling or tenderness.   Skin:     General: Skin is warm and dry.      Capillary Refill: Capillary refill takes less than 2 seconds.   Neurological:      General: No focal deficit present.      Mental Status: He is alert and oriented to person, place, and time.   Psychiatric:         Mood and Affect: Mood normal.           Goals of Care Treatment Preferences:  Code Status: Full Code    Living Will: Yes              SDOH Screening:  The patient was screened for utility difficulties, food insecurity, transport difficulties, housing insecurity, and interpersonal safety and there were no concerns identified this admission.     Consults:   Consults (From admission, onward)          Status Ordering Provider     Inpatient consult to Interventional Radiology  Once        Provider:  (Not yet assigned)    Completed MARIO, ADIL     Inpatient consult to Endocrinology  Once        Provider:  (Not yet assigned)    Completed MARIO, ADIL     Inpatient  consult to Hepatology  Once        Provider:  (Not yet assigned)    Completed MARIO, ADIL     Inpatient consult to Gastroenterology  Once        Provider:  (Not yet assigned)    Completed FARHAN GRAYSON            * Upper gastrointestinal bleed  Patient transferred with 4 days of melena. HGB stable. Patients most recent Hgb, Hct, platelets, and INR are listed below.  Recent Labs     02/18/25  0549 02/19/25  0532   HGB 9.6* 9.2*   HCT 29.7* 28.5*    150       Plan  - Will trend hemoglobin/hematocrit Daily  - Will monitor and correct any coagulation defects  - Will transfuse if Hgb is <7g/dl (<8g/dl in cases of active ACS) or if patient has rapid bleeding leading to hemodynamic instability  - Continue protonix 40mg IV BID, transition to PO as able  - GI consulted for evaluation given drop in hemoglobin from 14.4>9.3 in the past week    - ERCP 2/17 underwent One stent from the biliary tree was seen in the major papilla. No evidence of sphincterotomy bleed. No blood in the upper GI tract. A single localized biliary stricture was found in the post-transplant anastomosis. One stent was removed from the biliary tree. One stent was placed into the common bile duct. Repeat ERCP in 8 weeks to exchange stent.   -Colonoscopy completed 2/18  Perianal skin tags found on perianal exam. The entire examined colon is normal on direct and retroflexion views.    - will discontinue home plavix per committee discussion. 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 Liver US on 2/3/25 reveals mild tardus parvus waveform, similar  compared to prior imaging    - Appreciate IR recommendations. No need for intervention at this time given pt's imaging findings are stable over the past 2 years and his LFTs are stable. Pt does not have to continue on DAPT from our standpoint    - Discussed with committee and will discontinue Plavix, lcontinue aspirin     Status post liver transplant  Patient on tacrolimus 2mg PO BID as outpatient   - continue home dose      Type 2 diabetes mellitus with other specified complication, with chronic insulin use  Patient is on an insulin pump as an outpatient, brought home lispro vial to refill pump   -basal bolus regimen.  Pts backup insulin is Lantus 33units.    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    Significant Diagnostic Studies: Labs: CMP   Recent Labs   Lab 02/19/25  0532      K 3.6      CO2 24   *   BUN 9   CREATININE 0.8   CALCIUM 8.2*   PROT 5.6*   ALBUMIN 3.0*   BILITOT 2.1*   ALKPHOS 130   AST 21   ALT 22   ANIONGAP 7*    and CBC   Recent Labs   Lab 02/19/25  0532   WBC 4.64   HGB 9.2*   HCT 28.5*     "      Pending Diagnostic Studies:       Procedure Component Value Units Date/Time    FL ERCP Biliary And Pancreatic By Non Rad Tech [[id number]] Resulted: 02/17/25 1159    Order Status: Sent Lab Status: In process Updated: 02/17/25 1159           Medications:  Reconciled Home Medications:      Medication List        CONTINUE taking these medications      aspirin 81 MG Chew  Chew and swallow 1 tablet (81 mg total) by mouth once daily. Restart 11/9/23     BD ULTRA-FINE MERCEDES PEN NEEDLE 32 gauge x 5/32" Ndle  Generic drug: pen needle, diabetic  Use to inject insulin into the skin 4 (four) times daily.     carvediloL 12.5 MG tablet  Commonly known as: COREG  Take 1 tablet (12.5 mg total) by mouth 2 (two) times daily with meals. HOLD if SBP < 125 or pulse < 60.     CONTOUR NEXT TEST STRIPS Strp  Generic drug: blood sugar diagnostic  Test blood glucose 3 (three) times daily.     DEXCOM G7 SENSOR Neelam  Generic drug: blood-glucose sensor  1 each by Misc.(Non-Drug; Combo Route) route every 10 days.     HumaLOG U-100 Insulin 100 unit/mL injection  Generic drug: insulin lispro  VIA ILET INSULIN PUMP, MAX .     LANTUS SOLOSTAR U-100 INSULIN 100 unit/mL (3 mL) Inpn pen  Generic drug: insulin glargine U-100 (Lantus)  Inject 33 Units into the skin once daily.     losartan 100 MG tablet  Commonly known as: COZAAR  Take 1 tablet (100 mg total) by mouth once daily. HOLD if SBP < 120     MOUNJARO 10 mg/0.5 mL Pnij  Generic drug: tirzepatide  Inject 10 mg (one pen) into the skin every 7 days.     tacrolimus 1 MG Cap  Commonly known as: PROGRAF  Take 2 capsules (2 mg total) by mouth every 12 (twelve) hours.     ursodioL 300 mg capsule  Commonly known as: ACTIGALL  Take 1 capsule (300 mg total) by mouth 2 (two) times daily.            STOP taking these medications      clopidogreL 75 mg tablet  Commonly known as: PLAVIX              Indwelling Lines/Drains at time of discharge:   Lines/Drains/Airways       None             "       Time spent on the discharge of patient: 45 minutes         Cindy Samuels MD  Department of Hospital Medicine  [facility] - Transplant Stepdown

## 2025-02-21 ENCOUNTER — PATIENT OUTREACH (OUTPATIENT)
Dept: ADMINISTRATIVE | Facility: CLINIC | Age: 65
End: 2025-02-21
Payer: COMMERCIAL

## 2025-02-21 NOTE — PROGRESS NOTES
C3 nurse attempted to contact Jed Chávez  for a TCC post hospital discharge follow up call. No answer. Left voicemail with callback information. The patient has a scheduled HOSFU appointment with EVELIN Pitt MD  on 2/24/25 @ 1100.

## 2025-02-21 NOTE — H&P
Patient was initially roomed in the ED pending transfer to [facility] so was initially seen as a consult for medical management on 2/15/25(see associated consult note). Due to delays in transfer, patient was admitted for observation and transferred to a hospital room on 02/16/25 as an observation admit while pending transfer.       See 02/15/25 Consult Note

## 2025-02-24 ENCOUNTER — PATIENT MESSAGE (OUTPATIENT)
Dept: ADMINISTRATIVE | Facility: CLINIC | Age: 65
End: 2025-02-24
Payer: COMMERCIAL

## 2025-02-24 ENCOUNTER — RESULTS FOLLOW-UP (OUTPATIENT)
Dept: HEPATOLOGY | Facility: CLINIC | Age: 65
End: 2025-02-24
Payer: COMMERCIAL

## 2025-02-24 ENCOUNTER — RESULTS FOLLOW-UP (OUTPATIENT)
Dept: TRANSPLANT | Facility: CLINIC | Age: 65
End: 2025-02-24
Payer: COMMERCIAL

## 2025-02-24 ENCOUNTER — HOSPITAL ENCOUNTER (OUTPATIENT)
Dept: RADIOLOGY | Facility: HOSPITAL | Age: 65
Discharge: HOME | End: 2025-02-24
Attending: INTERNAL MEDICINE
Payer: COMMERCIAL

## 2025-02-24 ENCOUNTER — OFFICE VISIT (OUTPATIENT)
Dept: INTERNAL MEDICINE | Facility: CLINIC | Age: 65
End: 2025-02-24
Payer: COMMERCIAL

## 2025-02-24 VITALS
RESPIRATION RATE: 18 BRPM | BODY MASS INDEX: 31.18 KG/M2 | HEART RATE: 85 BPM | SYSTOLIC BLOOD PRESSURE: 138 MMHG | WEIGHT: 222.69 LBS | DIASTOLIC BLOOD PRESSURE: 80 MMHG | OXYGEN SATURATION: 98 % | TEMPERATURE: 97 F | HEIGHT: 71 IN

## 2025-02-24 DIAGNOSIS — Z85.05 PERSONAL HISTORY OF MALIGNANT NEOPLASM OF LIVER: ICD-10-CM

## 2025-02-24 DIAGNOSIS — Z94.4 S/P LIVER TRANSPLANT: ICD-10-CM

## 2025-02-24 DIAGNOSIS — R79.89 ELEVATED LFTS: ICD-10-CM

## 2025-02-24 DIAGNOSIS — E11.59 HYPERTENSION ASSOCIATED WITH TYPE 2 DIABETES MELLITUS: Chronic | ICD-10-CM

## 2025-02-24 DIAGNOSIS — I15.2 HYPERTENSION ASSOCIATED WITH TYPE 2 DIABETES MELLITUS: Chronic | ICD-10-CM

## 2025-02-24 DIAGNOSIS — Z87.19 HISTORY OF GI BLEED: ICD-10-CM

## 2025-02-24 DIAGNOSIS — Z79.4 TYPE 2 DIABETES MELLITUS WITH OTHER SPECIFIED COMPLICATION, WITH LONG-TERM CURRENT USE OF INSULIN: Primary | Chronic | ICD-10-CM

## 2025-02-24 DIAGNOSIS — E11.69 TYPE 2 DIABETES MELLITUS WITH OTHER SPECIFIED COMPLICATION, WITH LONG-TERM CURRENT USE OF INSULIN: Primary | Chronic | ICD-10-CM

## 2025-02-24 PROBLEM — T38.0X5A ADRENAL CORTICAL STEROIDS CAUSING ADVERSE EFFECT IN THERAPEUTIC USE: Status: RESOLVED | Noted: 2023-08-10 | Resolved: 2025-02-24

## 2025-02-24 PROCEDURE — 99999 PR PBB SHADOW E&M-EST. PATIENT-LVL IV: CPT | Mod: PBBFAC,,, | Performed by: FAMILY MEDICINE

## 2025-02-24 PROCEDURE — 74170 CT ABD WO CNTRST FLWD CNTRST: CPT | Mod: 26,,, | Performed by: RADIOLOGY

## 2025-02-24 PROCEDURE — 3075F SYST BP GE 130 - 139MM HG: CPT | Mod: CPTII,S$GLB,, | Performed by: FAMILY MEDICINE

## 2025-02-24 PROCEDURE — 3008F BODY MASS INDEX DOCD: CPT | Mod: CPTII,S$GLB,, | Performed by: FAMILY MEDICINE

## 2025-02-24 PROCEDURE — 25500020 PHARM REV CODE 255: Performed by: INTERNAL MEDICINE

## 2025-02-24 PROCEDURE — 71250 CT THORAX DX C-: CPT | Mod: TC

## 2025-02-24 PROCEDURE — 1160F RVW MEDS BY RX/DR IN RCRD: CPT | Mod: CPTII,S$GLB,, | Performed by: FAMILY MEDICINE

## 2025-02-24 PROCEDURE — 1111F DSCHRG MED/CURRENT MED MERGE: CPT | Mod: CPTII,S$GLB,, | Performed by: FAMILY MEDICINE

## 2025-02-24 PROCEDURE — 99214 OFFICE O/P EST MOD 30 MIN: CPT | Mod: S$GLB,,, | Performed by: FAMILY MEDICINE

## 2025-02-24 PROCEDURE — G2211 COMPLEX E/M VISIT ADD ON: HCPCS | Mod: S$GLB,,, | Performed by: FAMILY MEDICINE

## 2025-02-24 PROCEDURE — 1159F MED LIST DOCD IN RCRD: CPT | Mod: CPTII,S$GLB,, | Performed by: FAMILY MEDICINE

## 2025-02-24 PROCEDURE — 74170 CT ABD WO CNTRST FLWD CNTRST: CPT | Mod: TC

## 2025-02-24 PROCEDURE — 71250 CT THORAX DX C-: CPT | Mod: 26,,, | Performed by: RADIOLOGY

## 2025-02-24 PROCEDURE — 76705 ECHO EXAM OF ABDOMEN: CPT | Mod: TC

## 2025-02-24 PROCEDURE — 3079F DIAST BP 80-89 MM HG: CPT | Mod: CPTII,S$GLB,, | Performed by: FAMILY MEDICINE

## 2025-02-24 RX ORDER — LOSARTAN POTASSIUM 100 MG/1
100 TABLET ORAL DAILY
Qty: 90 TABLET | Refills: 0 | Status: SHIPPED | OUTPATIENT
Start: 2025-02-24 | End: 2025-02-24 | Stop reason: SDUPTHER

## 2025-02-24 RX ORDER — LOSARTAN POTASSIUM 100 MG/1
100 TABLET ORAL DAILY
Qty: 90 TABLET | Refills: 3 | Status: SHIPPED | OUTPATIENT
Start: 2025-02-24

## 2025-02-24 RX ADMIN — IOHEXOL 100 ML: 350 INJECTION, SOLUTION INTRAVENOUS at 01:02

## 2025-02-24 NOTE — ASSESSMENT & PLAN NOTE
Lab Results   Component Value Date    HGBA1C 6.3 (H) 12/16/2024    HGBA1C 5.9 (H) 10/10/2024    HGBA1C 6.3 (H) 04/11/2024    EGFRNORACEVR >60 02/24/2025    MICALBCREAT 13.3 12/16/2024    LDLCALC 99.0 12/16/2024

## 2025-02-24 NOTE — PROGRESS NOTES
OFFICE VISIT 2/24/25 11:00 AM CST    CHIEF COMPLAINT: Follow-up    Jed is a 65-year-old male with a history of T2DM, liver transplant for HCC with cirrhosis (08/2023), HTN, prior meningioma resection, hemorrhagic CVA, and biliary stricture requiring ERCP/stent (2/7/25) admitted to hospital for upper GI bleed, found to have CBD dilation, and underwent ERCP with biliary stent placement and sphincterotomy. A repeat ERCP (2/17/25) showed no active bleeding, and the biliary stent was replaced. Colonoscopy (2/18/25) was normal except for perianal skin tags. GI workup revealed stable hemoglobin (9.3) and INR (1.1), with no overt UGIB on endoscopy. He reported no NSAID use but was on aspirin and Plavix (Plavix held). Liver transplant evaluation noted prior hepatic artery stenting (10/24), now occluded, but no intervention was recommended as liver function remained stable. ERCP is scheduled for 3/5/25. Chronic conditions are otherwise represented as and appear to be compensated/controlled and stable. No new complaints or concerns reported.      1. Type 2 diabetes mellitus with other specified complication, with long-term current use of insulin  Assessment & Plan:  Lab Results   Component Value Date    HGBA1C 6.3 (H) 12/16/2024    HGBA1C 5.9 (H) 10/10/2024    HGBA1C 6.3 (H) 04/11/2024    EGFRNORACEVR >60 02/24/2025    MICALBCREAT 13.3 12/16/2024    LDLCALC 99.0 12/16/2024          2. Hypertension associated with type 2 diabetes mellitus  -     Discontinue: losartan (COZAAR) 100 MG tablet; Take 1 tablet (100 mg total) by mouth once daily. HOLD if SBP < 120  Dispense: 90 tablet; Refill: 0  -     losartan (COZAAR) 100 MG tablet; Take 1 tablet (100 mg total) by mouth once daily. HOLD if SBP < 120  Dispense: 90 tablet; Refill: 3    3. History of GI bleed    No other significant complaints or concerns were reported.  Unless specified otherwise, chronic conditions are represented as and appear to be compensated/controlled and  "stable.  Today's visit involved the intricate management of episodic problem(s) and the ongoing care for the patient's serious or complex condition(s) listed above, reflecting the inherent complexity of providing longitudinal, comprehensive evaluation and management as the central hub for the patient's primary care services.    Except as noted herein, ROS is otherwise negative.    Vitals:    02/24/25 1051   BP: 138/80   BP Location: Left arm   Patient Position: Sitting   Pulse: 85   Resp: 18   Temp: 97.3 °F (36.3 °C)   TempSrc: Tympanic   SpO2: 98%   Weight: 101 kg (222 lb 10.6 oz)   Height: 5' 11" (1.803 m)   Physical Exam  Vitals reviewed.   Constitutional:       General: He is not in acute distress.     Appearance: Normal appearance. He is not ill-appearing, toxic-appearing or diaphoretic.   Cardiovascular:      Rate and Rhythm: Normal rate and regular rhythm.   Pulmonary:      Effort: Pulmonary effort is normal.   Abdominal:      Tenderness: There is no abdominal tenderness.   Skin:     General: Skin is warm.   Neurological:      General: No focal deficit present.      Mental Status: He is alert and oriented to person, place, and time. Mental status is at baseline.   Psychiatric:         Mood and Affect: Mood normal.         Behavior: Behavior normal.         Judgment: Judgment normal.       DIABETIC FOOT EXAM:  Protective Sensation (w/ 10 gram monofilament):  Right: Intact  Left: Intact    Visual Inspection:  Normal -  Bilateral    Pedal Pulses:   Right: Present  Left: Present    Posterior Tibialis Pulses:   Right:Present  Left: Present     Documentation entered by me for this encounter may have been done in part using speech-recognition technology. Although I have made an effort to ensure accuracy, "sound like" errors may exist and should be interpreted in context.    WRAP-UP INSTRUCTIONS  OV Sheri 6M    FOR FOLLOW-UP AT NEXT APPOINTMENT  @Sheri @KBFNP: Get to know you visit. F/U with me in 6 months if all is " well.

## 2025-02-25 NOTE — TELEPHONE ENCOUNTER
Pt advised of stable chest CT.  ----- Message from Ochoa Quesada MD sent at 2/24/2025  4:06 PM CST -----  Results reviewed  ----- Message -----  From: Interface, Rad Results In  Sent: 2/24/2025   3:34 PM CST  To: Ochoa Quesada MD

## 2025-02-25 NOTE — TELEPHONE ENCOUNTER
Pt advised of stable ultrasound  ----- Message from Ochoa Quesada MD sent at 2/24/2025  4:06 PM CST -----  Results reviewed  ----- Message -----  From: Interface, Rad Results In  Sent: 2/24/2025   3:11 PM CST  To: Ochoa Quesada MD

## 2025-02-25 NOTE — TELEPHONE ENCOUNTER
Pt notified via portal of stable labs and that no medication changes are needed. Repeat labs due 3/10/25.   ----- Message from Ochoa Quesada MD sent at 2/25/2025  2:07 PM CST -----  Results reviewed  ----- Message -----  From: Jhonatan, [facility] Lab Interface  Sent: 2/24/2025   7:46 AM CST  To: Ochoa Quesada MD

## 2025-02-25 NOTE — TELEPHONE ENCOUNTER
Pt advised of stable CT ab.  ----- Message from Ochoa Quesada MD sent at 2/24/2025  3:01 PM CST -----  Results reviewed.  ----- Message -----  From: Interface, Rad Results In  Sent: 2/24/2025   2:01 PM CST  To: Ochoa Quesada MD

## 2025-02-26 ENCOUNTER — PATIENT MESSAGE (OUTPATIENT)
Dept: TRANSPLANT | Facility: CLINIC | Age: 65
End: 2025-02-26
Payer: COMMERCIAL

## 2025-02-27 ENCOUNTER — TELEPHONE (OUTPATIENT)
Dept: ENDOSCOPY | Facility: HOSPITAL | Age: 65
End: 2025-02-27
Payer: COMMERCIAL

## 2025-02-27 ENCOUNTER — HOSPITAL ENCOUNTER (OUTPATIENT)
Dept: RADIOLOGY | Facility: HOSPITAL | Age: 65
Discharge: HOME | End: 2025-02-27
Attending: SPECIALIST
Payer: COMMERCIAL

## 2025-02-27 DIAGNOSIS — D42.0 ATYPICAL INTRACRANIAL MENINGIOMA: ICD-10-CM

## 2025-02-27 PROCEDURE — A9585 GADOBUTROL INJECTION: HCPCS | Performed by: SPECIALIST

## 2025-02-27 PROCEDURE — 25500020 PHARM REV CODE 255: Performed by: SPECIALIST

## 2025-02-27 PROCEDURE — 70553 MRI BRAIN STEM W/O & W/DYE: CPT | Mod: TC

## 2025-02-27 PROCEDURE — 70553 MRI BRAIN STEM W/O & W/DYE: CPT | Mod: 26,,, | Performed by: RADIOLOGY

## 2025-02-27 RX ORDER — GADOBUTROL 604.72 MG/ML
10 INJECTION INTRAVENOUS
Status: COMPLETED | OUTPATIENT
Start: 2025-02-27 | End: 2025-02-27

## 2025-02-27 RX ADMIN — GADOBUTROL 10 ML: 604.72 INJECTION INTRAVENOUS at 09:02

## 2025-02-27 NOTE — ASSESSMENT & PLAN NOTE
Diabetes Management Status    Statin: Not taking  ACE/ARB: Taking    Screening or Prevention Patient's value Goal Complete/Controlled?   HgA1C Testing and Control   Lab Results   Component Value Date    HGBA1C 6.3 (H) 12/16/2024      Annually/Less than 8% Yes   Lipid profile : 12/16/2024 Annually Yes   LDL control Lab Results   Component Value Date    LDLCALC 99.0 12/16/2024    Annually/Less than 100 mg/dl  Yes   Nephropathy screening Lab Results   Component Value Date    LABMICR 36.0 12/16/2024     Lab Results   Component Value Date    PROTEINUA Negative 02/17/2025    Annually Yes   Blood pressure BP Readings from Last 1 Encounters:   02/24/25 138/80    Less than 140/90 Yes   Dilated retinal exam : 02/03/2025 Annually Yes     Lab Results   Component Value Date    HGBA1C 6.3 (H) 12/16/2024    HGBA1C 5.9 (H) 10/10/2024    HGBA1C 6.3 (H) 04/11/2024    EGFRNORACEVR >60 02/24/2025    MICALBCREAT 13.3 12/16/2024    LDLCALC 99.0 12/16/2024     Lab Results   Component Value Date    GLUTAMICACID 0.00 04/11/2024    CPEPTIDE 1.78 04/11/2024      Last 5 Blood Glucose Readings           No data to display               HEALTH MAINTENANCE: Diabetic health maintenance interventions reviewed and are up to date except for:  There are no preventive care reminders to display for this patient.

## 2025-02-27 NOTE — TELEPHONE ENCOUNTER
Patient called to confirm date/time ERCP. Asks to re-send instructions via [employer]. Confirmed last dose of tirzepetide 2/24/25. States no longer taking plavix. All questions answered. Instructed to call with additional questions.

## 2025-02-27 NOTE — ASSESSMENT & PLAN NOTE
BP Readings from Encounter:   02/14/25 124/76   ASSESSMENT: This is a chronic problem that appears compensated/controlled and stable on carvedilol and losartan.  PLAN: Continue present treatment plan.

## 2025-02-28 ENCOUNTER — RESULTS FOLLOW-UP (OUTPATIENT)
Dept: RADIATION ONCOLOGY | Facility: CLINIC | Age: 65
End: 2025-02-28

## 2025-03-01 NOTE — ED NOTES
Pt arrived via EMS from [facility]. Has noticeable edema to RUE, reports retired fistula bruit auscultated. Bilateral lower extremities are wrapped upon admission, family states pt has been battling with infection in both legs. A&Ox3.    Update received from transfer center. Still no bed

## 2025-03-03 ENCOUNTER — TELEPHONE (OUTPATIENT)
Dept: ENDOSCOPY | Facility: HOSPITAL | Age: 65
End: 2025-03-03
Payer: COMMERCIAL

## 2025-03-05 ENCOUNTER — HOSPITAL ENCOUNTER (OUTPATIENT)
Facility: HOSPITAL | Age: 65
Discharge: HOME | End: 2025-03-05
Attending: INTERNAL MEDICINE | Admitting: INTERNAL MEDICINE
Payer: COMMERCIAL

## 2025-03-05 ENCOUNTER — RESULTS FOLLOW-UP (OUTPATIENT)
Dept: TRANSPLANT | Facility: CLINIC | Age: 65
End: 2025-03-05
Payer: COMMERCIAL

## 2025-03-05 ENCOUNTER — PATIENT MESSAGE (OUTPATIENT)
Dept: TRANSPLANT | Facility: CLINIC | Age: 65
End: 2025-03-05
Payer: COMMERCIAL

## 2025-03-05 ENCOUNTER — ANESTHESIA (OUTPATIENT)
Dept: ENDOSCOPY | Facility: HOSPITAL | Age: 65
End: 2025-03-05
Payer: COMMERCIAL

## 2025-03-05 ENCOUNTER — ANESTHESIA EVENT (OUTPATIENT)
Dept: ENDOSCOPY | Facility: HOSPITAL | Age: 65
End: 2025-03-05
Payer: COMMERCIAL

## 2025-03-05 VITALS
DIASTOLIC BLOOD PRESSURE: 88 MMHG | OXYGEN SATURATION: 98 % | RESPIRATION RATE: 18 BRPM | TEMPERATURE: 98 F | WEIGHT: 222 LBS | HEART RATE: 63 BPM | HEIGHT: 71 IN | BODY MASS INDEX: 31.08 KG/M2 | SYSTOLIC BLOOD PRESSURE: 172 MMHG

## 2025-03-05 DIAGNOSIS — K83.1 BILE DUCT STRICTURE: ICD-10-CM

## 2025-03-05 LAB
POCT GLUCOSE: 193 MG/DL (ref 70–110)
POCT GLUCOSE: 228 MG/DL (ref 70–110)

## 2025-03-05 PROCEDURE — 74328 X-RAY BILE DUCT ENDOSCOPY: CPT | Performed by: INTERNAL MEDICINE

## 2025-03-05 PROCEDURE — C1874 STENT, COATED/COV W/DEL SYS: HCPCS | Performed by: INTERNAL MEDICINE

## 2025-03-05 PROCEDURE — 37000009 HC ANESTHESIA EA ADD 15 MINS: Performed by: INTERNAL MEDICINE

## 2025-03-05 PROCEDURE — 63600175 PHARM REV CODE 636 W HCPCS: Performed by: INTERNAL MEDICINE

## 2025-03-05 PROCEDURE — 27202125 HC BALLOON, EXTRACTION (ANY): Performed by: INTERNAL MEDICINE

## 2025-03-05 PROCEDURE — 43276 ERCP STENT EXCHANGE W/DILATE: CPT | Mod: ,,, | Performed by: INTERNAL MEDICINE

## 2025-03-05 PROCEDURE — 27201014 HC GRASPER DEVICE: Performed by: INTERNAL MEDICINE

## 2025-03-05 PROCEDURE — 37000008 HC ANESTHESIA 1ST 15 MINUTES: Performed by: INTERNAL MEDICINE

## 2025-03-05 PROCEDURE — 25500020 PHARM REV CODE 255: Performed by: INTERNAL MEDICINE

## 2025-03-05 PROCEDURE — C1769 GUIDE WIRE: HCPCS | Performed by: INTERNAL MEDICINE

## 2025-03-05 PROCEDURE — 25000003 PHARM REV CODE 250: Performed by: NURSE ANESTHETIST, CERTIFIED REGISTERED

## 2025-03-05 PROCEDURE — 43276 ERCP STENT EXCHANGE W/DILATE: CPT | Performed by: INTERNAL MEDICINE

## 2025-03-05 PROCEDURE — 63600175 PHARM REV CODE 636 W HCPCS: Performed by: NURSE ANESTHETIST, CERTIFIED REGISTERED

## 2025-03-05 PROCEDURE — 74328 X-RAY BILE DUCT ENDOSCOPY: CPT | Mod: 26,,, | Performed by: INTERNAL MEDICINE

## 2025-03-05 DEVICE — STENT SYSTEM RMV
Type: IMPLANTABLE DEVICE | Site: BILE DUCT | Status: FUNCTIONAL
Brand: WALLFLEX BILIARY

## 2025-03-05 RX ORDER — FENTANYL CITRATE 50 UG/ML
25 INJECTION, SOLUTION INTRAMUSCULAR; INTRAVENOUS EVERY 5 MIN PRN
Status: DISCONTINUED | OUTPATIENT
Start: 2025-03-05 | End: 2025-03-05 | Stop reason: HOSPADM

## 2025-03-05 RX ORDER — CIPROFLOXACIN 500 MG/1
500 TABLET ORAL 2 TIMES DAILY
Qty: 10 TABLET | Refills: 0 | Status: SHIPPED | OUTPATIENT
Start: 2025-03-05 | End: 2025-03-10

## 2025-03-05 RX ORDER — CIPROFLOXACIN 2 MG/ML
400 INJECTION, SOLUTION INTRAVENOUS ONCE
Status: COMPLETED | OUTPATIENT
Start: 2025-03-05 | End: 2025-03-05

## 2025-03-05 RX ORDER — PROCHLORPERAZINE EDISYLATE 5 MG/ML
5 INJECTION INTRAMUSCULAR; INTRAVENOUS EVERY 30 MIN PRN
Status: DISCONTINUED | OUTPATIENT
Start: 2025-03-05 | End: 2025-03-05 | Stop reason: HOSPADM

## 2025-03-05 RX ORDER — PROPOFOL 10 MG/ML
VIAL (ML) INTRAVENOUS CONTINUOUS PRN
Status: DISCONTINUED | OUTPATIENT
Start: 2025-03-05 | End: 2025-03-05

## 2025-03-05 RX ORDER — GLUCAGON 1 MG
1 KIT INJECTION
Status: DISCONTINUED | OUTPATIENT
Start: 2025-03-05 | End: 2025-03-05 | Stop reason: HOSPADM

## 2025-03-05 RX ORDER — SODIUM CHLORIDE 0.9 % (FLUSH) 0.9 %
10 SYRINGE (ML) INJECTION
Status: DISCONTINUED | OUTPATIENT
Start: 2025-03-05 | End: 2025-03-05 | Stop reason: HOSPADM

## 2025-03-05 RX ORDER — DEXMEDETOMIDINE HYDROCHLORIDE 100 UG/ML
INJECTION, SOLUTION INTRAVENOUS
Status: DISCONTINUED | OUTPATIENT
Start: 2025-03-05 | End: 2025-03-05

## 2025-03-05 RX ORDER — SODIUM CHLORIDE 9 MG/ML
INJECTION, SOLUTION INTRAVENOUS CONTINUOUS
Status: DISCONTINUED | OUTPATIENT
Start: 2025-03-05 | End: 2025-03-05 | Stop reason: HOSPADM

## 2025-03-05 RX ORDER — SODIUM CHLORIDE 0.9 % (FLUSH) 0.9 %
3 SYRINGE (ML) INJECTION
Status: DISCONTINUED | OUTPATIENT
Start: 2025-03-05 | End: 2025-03-05 | Stop reason: HOSPADM

## 2025-03-05 RX ADMIN — CIPROFLOXACIN 400 MG: 2 INJECTION, SOLUTION INTRAVENOUS at 08:03

## 2025-03-05 RX ADMIN — DEXMEDETOMIDINE 20 MCG: 100 INJECTION, SOLUTION, CONCENTRATE INTRAVENOUS at 08:03

## 2025-03-05 RX ADMIN — PROPOFOL 150 MCG/KG/MIN: 10 INJECTION, EMULSION INTRAVENOUS at 08:03

## 2025-03-05 NOTE — PLAN OF CARE
Patient discharged to home via wheelchair, escorted by his wife. Pt alert and talkative, vitals stable on room air, tolerating PO intake. Discharge instructions (written and verbal) and follow-up information given to patient who verbalized understanding, as well as a readiness for discharge. [facility] contact info provided for additional questions following discharge.Medications delivered bedside. Aox4 IV removed with catheter intact.

## 2025-03-05 NOTE — TRANSFER OF CARE
"Anesthesia Transfer of Care Note    Patient: Jed Chávez    Procedure(s) Performed: Procedure(s) (LRB):  ERCP (ENDOSCOPIC RETROGRADE CHOLANGIOPANCREATOGRAPHY) (N/A)    Patient location: [facility]    Anesthesia Type: general    Transport from OR: Transported from OR on room air with adequate spontaneous ventilation    Post pain: adequate analgesia    Post assessment: no apparent anesthetic complications    Post vital signs: stable    Level of consciousness: awake    Nausea/Vomiting: no nausea/vomiting    Complications: none    Transfer of care protocol was followed    Last vitals: Visit Vitals  BP (!) 181/100   Pulse 69   Temp 36.6 °C (97.9 °F) (Tympanic)   Resp 16   Ht 5' 11" (1.803 m)   Wt 100.7 kg (222 lb)   SpO2 98%   BMI 30.96 kg/m²     "

## 2025-03-05 NOTE — ANESTHESIA POSTPROCEDURE EVALUATION
Anesthesia Post Evaluation    Patient: Jed Chávez    Procedure(s) Performed: Procedure(s) (LRB):  ERCP (ENDOSCOPIC RETROGRADE CHOLANGIOPANCREATOGRAPHY) (N/A)    Final Anesthesia Type: general      Patient location during evaluation: PACU  Patient participation: Yes- Able to Participate  Level of consciousness: awake and alert  Post-procedure vital signs: reviewed and stable  Pain management: adequate  Airway patency: patent    PONV status at discharge: No PONV  Anesthetic complications: no      Cardiovascular status: blood pressure returned to baseline  Respiratory status: unassisted  Hydration status: euvolemic  Follow-up not needed.          Vitals Value Taken Time   /88 03/05/25 10:01   Temp 36.5 °C (97.7 °F) 03/05/25 10:00   Pulse 64 03/05/25 10:14   Resp 18 03/05/25 10:00   SpO2 98 % 03/05/25 10:14   Vitals shown include unfiled device data.      No case tracking events are documented in the log.      Pain/Chanell Score: Chanell Score: 10 (3/5/2025 10:00 AM)

## 2025-03-05 NOTE — ANESTHESIA PREPROCEDURE EVALUATION
[facility]  Anesthesia Pre-Operative Evaluation         Patient Name: Jed Chávez  YOB: 1960  MRN: [id number]    SUBJECTIVE:     Pre-operative evaluation for Procedure(s) (LRB):  ERCP (ENDOSCOPIC RETROGRADE CHOLANGIOPANCREATOGRAPHY) (N/A)     03/05/2025    Jed Chávez is a 65 y.o. male w/ a significant PMHx of T2DM, liver transplant for HCC with cirrhosis (08/2023), HTN, prior meningioma resection, hemorrhagic CVA, and biliary stricture requiring ERCP/stent (2/7/25)  recent GI bleed with ERCP and biliary stent.    Patient now presents for the above procedure(s).      LDA: None documented.    Prev airway:     Intubation     Date/Time: 10/26/2023 7:20 AM     Performed by: Sebastian Cisneros MD  Authorized by: Holli Mendoza MD    Intubation:     Induction:  Intravenous    Intubated:  Postinduction    Mask Ventilation:  Easy mask    Attempts:  2    Attempted By:  Student    Method of Intubation:  Direct    Blade:  Naga 3    Laryngeal View Grade: Grade III - only epiglottis visible      Attempted By (2nd Attempt):  Staff anesthesiologist    Method of Intubation (2nd Attempt):  Direct    Blade (2nd Attempt):  Naga 3    Laryngeal View Grade (2nd Attempt): Grade IIa - cords partially seen      Difficult Airway Encountered?: No      Complications:  None    Airway Device:  Oral endotracheal tube    Airway Device Size:  7.5    Style/Cuff Inflation:  Cuffed (inflated to minimal occlusive pressure)    Tube secured:  22    Secured at:  The teeth    Placement Verified By:  Capnometry    Complicating Factors:  Anterior larynx and poor neck/head extension    Findings Post-Intubation:  BS equal bilateral and atraumatic/condition of teeth unchanged          Drips: None documented.    Problem List[1]    Review of patient's allergies indicates:  No Known Allergies    Current Outpatient Medications:  Current Medications[2]    Past Surgical History:   Procedure Laterality Date     COLONOSCOPY N/A 2/18/2025    Procedure: COLONOSCOPY;  Surgeon: Abundio Gibbs MD;  Location: [facility] ENDO (2ND FLR);  Service: Endoscopy;  Laterality: N/A;    CRANIOTOMY, WITH NEOPLASM EXCISION USING COMPUTER-ASSISTED NAVIGATION Left 10/26/2023    Procedure: CRANIOTOMY, WITH NEOPLASM EXCISION USING COMPUTER-ASSISTED NAVIGATION;  Surgeon: Jose E Degroot DO;  Location: [facility] OR 2ND FLR;  Service: Neurosurgery;  Laterality: Left;    ENDOSCOPIC ULTRASOUND OF UPPER GASTROINTESTINAL TRACT N/A 2/7/2025    Procedure: ULTRASOUND, UPPER GI TRACT, ENDOSCOPIC;  Surgeon: Christiano Landaverde MD;  Location: [facility] ENDO (2ND FLR);  Service: Endoscopy;  Laterality: N/A;    ERCP N/A 2/7/2025    Procedure: ERCP (ENDOSCOPIC RETROGRADE CHOLANGIOPANCREATOGRAPHY);  Surgeon: Christiano Landaverde MD;  Location: [facility] ENDO (2ND FLR);  Service: Endoscopy;  Laterality: N/A;    ESOPHAGOGASTRODUODENOSCOPY N/A 2/17/2025    Procedure: EGD (ESOPHAGOGASTRODUODENOSCOPY);  Surgeon: Jed Vance MD;  Location: [facility] ENDO (2ND FLR);  Service: Endoscopy;  Laterality: N/A;    HERNIA REPAIR N/A     LIVER TRANSPLANT N/A 8/9/2023    Procedure: TRANSPLANT, LIVER;  Surgeon: Ameya Suero MD;  Location: [facility] OR 2ND FLR;  Service: Transplant;  Laterality: N/A;       Social History[3]    OBJECTIVE:     Vital Signs Range (Last 24H):         Significant Labs:  Lab Results   Component Value Date    WBC 4.87 02/24/2025    HGB 11.1 (L) 02/24/2025    HCT 33.7 (L) 02/24/2025     (L) 02/24/2025    CHOL 163 12/16/2024    TRIG 150 12/16/2024    HDL 34 (L) 12/16/2024    ALT 23 02/24/2025    AST 18 02/24/2025     02/24/2025    K 3.9 02/24/2025     02/24/2025    CREATININE 1.0 02/24/2025    BUN 19 02/24/2025    CO2 24 02/24/2025    TSH 2.096 06/15/2023    PSA 0.49 12/16/2024    INR 1.0 02/17/2025    HGBA1C 6.3 (H) 12/16/2024       Diagnostic Studies: No relevant studies.    EKG:   Results for orders placed or performed during the hospital encounter of 02/14/25    EKG 12-lead    Collection Time: 02/14/25  6:08 PM   Result Value Ref Range    QRS Duration 134 ms    [facility] QTC Calculation 493 ms    Narrative    Test Reason : K92.2,    Vent. Rate :  86 BPM     Atrial Rate :  86 BPM     P-R Int : 192 ms          QRS Dur : 134 ms      QT Int : 412 ms       P-R-T Axes :  35  51   9 degrees    QTcB Int : 493 ms    Normal sinus rhythm  Right bundle branch block  Abnormal ECG  When compared with ECG of 24-Oct-2023 11:45,  No significant change was found  Confirmed by Kori Hinton ([id number]) on 2/16/2025 4:13:04 PM    Referred By: AAAREFERRAL SELF           Confirmed By: Kori Hinton       2D ECHO:  TTE:  No results found for this or any previous visit.    EDISON:  No results found for this or any previous visit.    ASSESSMENT/PLAN:           Pre-op Assessment    I have reviewed the Patient Summary Reports.     I have reviewed the Nursing Notes. I have reviewed the NPO Status.   I have reviewed the Medications.     Review of Systems  Anesthesia Hx:  No problems with previous Anesthesia   History of prior surgery of interest to airway management or planning:          Denies Family Hx of Anesthesia complications.    Denies Personal Hx of Anesthesia complications.                    Social:  Non-Smoker, No Alcohol Use       Hematology/Oncology:       -- Anemia:                  Denies Current/Recent Cancer                Cardiovascular:     Hypertension    Denies CAD.     Denies Dysrhythmias.    Denies CHF.    no hyperlipidemia                               Pulmonary:    Denies COPD.  Denies Asthma.     Denies Sleep Apnea.                Renal/:   Denies Chronic Renal Disease.                Hepatic/GI:      Denies GERD. Liver Disease,  Hx HCC s/p Tx 8/2023             Musculoskeletal:  Denies Arthritis.               Neurological:    Denies CVA. Denies Neuromuscular Disease.   Denies Seizures.          Denies Chronic Pain Syndrome                         Endocrine:  Diabetes, type 2    Denies Hyperthyroidism.       Denies Obesity / BMI > 30  Psych:   denies anxiety denies depression                   Anesthesia Plan  Type of Anesthesia, risks & benefits discussed:    Anesthesia Type: MAC, Gen Natural Airway  Intra-op Monitoring Plan: Standard ASA Monitors  Post Op Pain Control Plan: multimodal analgesia and IV/PO Opioids PRN  Induction:  IV  Informed Consent: Informed consent signed with the Patient and all parties understand the risks and agree with anesthesia plan.  All questions answered.   ASA Score: 3  Day of Surgery Review of History & Physical: H&P Update referred to the surgeon/provider.    Ready For Surgery From Anesthesia Perspective.     .           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    Current Outpatient Medications:     aspirin 81 MG Chew, Chew and swallow 1 tablet (81 mg total) by mouth once daily. Restart 11/9/23, Disp: 30 tablet, Rfl: 11    blood sugar diagnostic Strp, Test blood glucose 3 (three) times daily., Disp: 100 strip, Rfl: 11    blood-glucose sensor (DEXCOM G7 SENSOR) Neelam, 1 each by Misc.(Non-Drug; Combo Route) route every 10 days., Disp: 3 each, Rfl: 11    carvediloL  "(COREG) 12.5 MG tablet, Take 1 tablet (12.5 mg total) by mouth 2 (two) times daily with meals. HOLD if SBP < 125 or pulse < 60., Disp: 180 tablet, Rfl: 0    insulin glargine U-100, Lantus, (LANTUS SOLOSTAR U-100 INSULIN) 100 unit/mL (3 mL) InPn pen, Inject 33 Units into the skin once daily., Disp: 15 mL, Rfl: 11    insulin lispro 100 unit/mL injection, VIA ILET INSULIN PUMP, MAX ., Disp: 50 mL, Rfl: 11    losartan (COZAAR) 100 MG tablet, Take 1 tablet (100 mg total) by mouth once daily. HOLD if SBP < 120, Disp: 90 tablet, Rfl: 3    pen needle, diabetic (BD ULTRA-FINE MERCEDES PEN NEEDLE) 32 gauge x 5/32" Ndle, Use to inject insulin into the skin 4 (four) times daily., Disp: 100 each, Rfl: 11    tacrolimus (PROGRAF) 1 MG Cap, Take 2 capsules (2 mg total) by mouth every 12 (twelve) hours., Disp: 120 capsule, Rfl: 11    tirzepatide 10 mg/0.5 mL PnIj, Inject 10 mg (one pen) into the skin every 7 days., Disp: 2 mL, Rfl: 11    ursodioL (ACTIGALL) 300 mg capsule, Take 1 capsule (300 mg total) by mouth 2 (two) times daily., Disp: 60 capsule, Rfl: 2  [3]   Social History  Socioeconomic History    Marital status:    Tobacco Use    Smoking status: Never    Smokeless tobacco: Never   Substance and Sexual Activity    Alcohol use: Not Currently    Drug use: Never    Sexual activity: Not Currently     Partners: Female     Social Drivers of Health     Financial Resource Strain: Low Risk  (2/17/2025)    Overall Financial Resource Strain (CARDIA)     Difficulty of Paying Living Expenses: Not hard at all   Food Insecurity: No Food Insecurity (2/17/2025)    Hunger Vital Sign     Worried About Running Out of Food in the Last Year: Never true     Ran Out of Food in the Last Year: Never true   Transportation Needs: No Transportation Needs (2/14/2025)    PRAPARE - Transportation     Lack of Transportation (Medical): No     Lack of Transportation (Non-Medical): No   Physical Activity: Insufficiently Active (2/14/2025)    Exercise " Vital Sign     Days of Exercise per Week: 3 days     Minutes of Exercise per Session: 30 min   Stress: No Stress Concern Present (2/17/2025)    [employer] [place] [employer] - Occupational Stress Questionnaire     Feeling of Stress : Not at all   Recent Concern: Stress - Stress Concern Present (2/4/2025)    [employer] [place] [employer] - Occupational Stress Questionnaire     Feeling of Stress : To some extent   Housing Stability: Low Risk  (2/17/2025)    Housing Stability Vital Sign     Unable to Pay for Housing in the Last Year: No     Homeless in the Last Year: No

## 2025-03-05 NOTE — [employer] PATIENT INSTRUCTIONS
Discharge Summary/Instructions after an Endoscopic Procedure  Patient Name: Jed Chávez  Patient MRN: [id number]  Patient YOB: 1960 Wednesday, March 5, 2025  Jed Vance MD  Dear patient,  As a result of recent federal legislation (The Federal Cures Act), you may   receive lab or pathology results from your procedure in your [facility]   account before your physician is able to contact you. Your physician or   their representative will relay the results to you with their   recommendations at their soonest availability.  Thank you,  RESTRICTIONS:  During your procedure today, you received medications for sedation.  These   medications may affect your judgment, balance and coordination.  Therefore,   for 24 hours, you have the following restrictions:   - DO NOT drive a car, operate machinery, make legal/financial decisions,   sign important papers or drink alcohol.    ACTIVITY:  Today: no heavy lifting, straining or running due to procedural   sedation/anesthesia.  The following day: return to full activity including work.  DIET:  Eat and drink normally unless instructed otherwise.     TREATMENT FOR COMMON SIDE EFFECTS:  - Mild abdominal pain, nausea, belching, bloating or excessive gas:  rest,   eat lightly and use a heating pad.  - Sore Throat: treat with throat lozenges and/or gargle with warm salt   water.  - Because air was used during the procedure, expelling large amounts of air   from your rectum or belching is normal.  - If a bowel prep was taken, you may not have a bowel movement for 1-3 days.    This is normal.  SYMPTOMS TO WATCH FOR AND REPORT TO YOUR PHYSICIAN:  1. Abdominal pain or bloating, other than gas cramps.  2. Chest pain.  3. Back pain.  4. Signs of infection such as: chills or fever occurring within 24 hours   after the procedure.  5. Rectal bleeding, which would show as bright red, maroon, or black stools.   (A tablespoon of blood from the rectum is not serious, especially if    hemorrhoids are present.)  6. Vomiting.  7. Weakness or dizziness.  GO DIRECTLY TO THE NEAREST EMERGENCY ROOM IF YOU HAVE ANY OF THE FOLLOWING:      Difficulty breathing              Chills and/or fever over 101 F   Persistent vomiting and/or vomiting blood   Severe abdominal pain   Severe chest pain   Black, tarry stools   Bleeding- more than one tablespoon   Any other symptom or condition that you feel may need urgent attention  Your doctor recommends these additional instructions:  If any biopsies were taken, your doctors clinic will contact you in 1 to 2   weeks with any results.  - Discharge patient to home.   - Resume previous diet.   - Cipro (ciprofloxacin) 500 mg PO BID for 5 days.   - Resume Plavix (clopidogrel) at prior dose in 2 days.   - Repeat ERCP in 4 months to remove stent.  For questions, problems or results please call your physician - Jed Vance MD at Work:  [phone].  [facility], EMERGENCY ROOM PHONE NUMBER: [phone]  IF A COMPLICATION OR EMERGENCY SITUATION ARISES AND YOU ARE UNABLE TO REACH   YOUR PHYSICIAN - GO DIRECTLY TO THE EMERGENCY ROOM.  Jed Vance MD  3/5/2025 9:08:49 AM  This report has been verified and signed electronically.  Dear patient,  As a result of recent federal legislation (The Federal Cures Act), you may   receive lab or pathology results from your procedure in your [facility]   account before your physician is able to contact you. Your physician or   their representative will relay the results to you with their   recommendations at their soonest availability.  Thank you,  [employer]

## 2025-03-06 NOTE — TELEPHONE ENCOUNTER
Next ERCP due in 4 months - 7/2025  ----- Message from Ochoa Quesada MD sent at 3/5/2025 11:07 AM CST -----  Results reviewed  ----- Message -----  From: Interface, Lab In [facility]  Sent: 3/5/2025   9:09 AM CST  To: Ochoa Quesada MD

## 2025-03-10 ENCOUNTER — LAB VISIT (OUTPATIENT)
Dept: LAB | Facility: HOSPITAL | Age: 65
End: 2025-03-10
Attending: INTERNAL MEDICINE
Payer: COMMERCIAL

## 2025-03-10 ENCOUNTER — OFFICE VISIT (OUTPATIENT)
Dept: DIABETES | Facility: CLINIC | Age: 65
End: 2025-03-10
Payer: COMMERCIAL

## 2025-03-10 DIAGNOSIS — R79.89 ELEVATED LFTS: ICD-10-CM

## 2025-03-10 DIAGNOSIS — E11.65 TYPE 2 DIABETES MELLITUS WITH HYPERGLYCEMIA, WITH LONG-TERM CURRENT USE OF INSULIN: Primary | ICD-10-CM

## 2025-03-10 DIAGNOSIS — Z94.4 STATUS POST LIVER TRANSPLANT: ICD-10-CM

## 2025-03-10 DIAGNOSIS — Z79.4 TYPE 2 DIABETES MELLITUS WITH HYPERGLYCEMIA, WITH LONG-TERM CURRENT USE OF INSULIN: Primary | ICD-10-CM

## 2025-03-10 LAB
ALBUMIN SERPL BCP-MCNC: 3.8 G/DL (ref 3.5–5.2)
ALP SERPL-CCNC: 120 U/L (ref 40–150)
ALT SERPL W/O P-5'-P-CCNC: 19 U/L (ref 10–44)
ANION GAP SERPL CALC-SCNC: 8 MMOL/L (ref 8–16)
AST SERPL-CCNC: 16 U/L (ref 10–40)
BASOPHILS # BLD AUTO: 0.01 K/UL (ref 0–0.2)
BASOPHILS NFR BLD: 0.2 % (ref 0–1.9)
BILIRUB SERPL-MCNC: 1.7 MG/DL (ref 0.1–1)
BUN SERPL-MCNC: 14 MG/DL (ref 8–23)
CALCIUM SERPL-MCNC: 9.1 MG/DL (ref 8.7–10.5)
CHLORIDE SERPL-SCNC: 107 MMOL/L (ref 95–110)
CO2 SERPL-SCNC: 26 MMOL/L (ref 23–29)
CREAT SERPL-MCNC: 1 MG/DL (ref 0.5–1.4)
DIFFERENTIAL METHOD BLD: ABNORMAL
EOSINOPHIL # BLD AUTO: 0.1 K/UL (ref 0–0.5)
EOSINOPHIL NFR BLD: 1.8 % (ref 0–8)
ERYTHROCYTE [DISTWIDTH] IN BLOOD BY AUTOMATED COUNT: 15.4 % (ref 11.5–14.5)
EST. GFR  (NO RACE VARIABLE): >60 ML/MIN/1.73 M^2
GLUCOSE SERPL-MCNC: 181 MG/DL (ref 70–110)
HCT VFR BLD AUTO: 40 % (ref 40–54)
HGB BLD-MCNC: 12.8 G/DL (ref 14–18)
IMM GRANULOCYTES # BLD AUTO: 0.01 K/UL (ref 0–0.04)
IMM GRANULOCYTES NFR BLD AUTO: 0.2 % (ref 0–0.5)
LYMPHOCYTES # BLD AUTO: 2.1 K/UL (ref 1–4.8)
LYMPHOCYTES NFR BLD: 46 % (ref 18–48)
MCH RBC QN AUTO: 27.6 PG (ref 27–31)
MCHC RBC AUTO-ENTMCNC: 32 G/DL (ref 32–36)
MCV RBC AUTO: 86 FL (ref 82–98)
MONOCYTES # BLD AUTO: 0.3 K/UL (ref 0.3–1)
MONOCYTES NFR BLD: 7.4 % (ref 4–15)
NEUTROPHILS # BLD AUTO: 2 K/UL (ref 1.8–7.7)
NEUTROPHILS NFR BLD: 44.4 % (ref 38–73)
NRBC BLD-RTO: 0 /100 WBC
PLATELET # BLD AUTO: 136 K/UL (ref 150–450)
PMV BLD AUTO: 8.9 FL (ref 9.2–12.9)
POTASSIUM SERPL-SCNC: 4.5 MMOL/L (ref 3.5–5.1)
PROT SERPL-MCNC: 7 G/DL (ref 6–8.4)
RBC # BLD AUTO: 4.63 M/UL (ref 4.6–6.2)
SODIUM SERPL-SCNC: 141 MMOL/L (ref 136–145)
WBC # BLD AUTO: 4.57 K/UL (ref 3.9–12.7)

## 2025-03-10 PROCEDURE — 80197 ASSAY OF TACROLIMUS: CPT | Performed by: INTERNAL MEDICINE

## 2025-03-10 PROCEDURE — 85025 COMPLETE CBC W/AUTO DIFF WBC: CPT | Performed by: INTERNAL MEDICINE

## 2025-03-10 PROCEDURE — 36415 COLL VENOUS BLD VENIPUNCTURE: CPT | Performed by: INTERNAL MEDICINE

## 2025-03-10 PROCEDURE — 80053 COMPREHEN METABOLIC PANEL: CPT | Performed by: INTERNAL MEDICINE

## 2025-03-10 NOTE — PROGRESS NOTES
The patient location is: Home  The chief complaint leading to consultation is: Diabetes    Visit type: audiovisual    Face to Face time with patient: 16  30 minutes of total time spent on the encounter, which includes face to face time and non-face to face time preparing to see the patient (eg, review of tests), Obtaining and/or reviewing separately obtained history, Documenting clinical information in the electronic or other health record, Independently interpreting results (not separately reported) and communicating results to the patient/family/caregiver, or Care coordination (not separately reported).  Each patient to whom he or she provides medical services by telemedicine is:  (1) informed of the relationship between the physician and patient and the respective role of any other health care provider with respect to management of the patient; and (2) notified that he or she may decline to receive medical services by telemedicine and may withdraw from such care at any time.    Notes:    Jed Chávez is a 65 y.o. male who presents for a follow up evaluation of Type 2 diabetes mellitus.     CHIEF COMPLAINT: Diabetes Consultation    PCP: EVELIN Pitt MD      Initial visit with me - 4/9/2024    The patient was initially diagnosed with diabetes in 2016.   S/p liver transplant in August 2023.   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      Previous failed treatments include:  Metformin - d/c after transplant - hold until 1 year post transplant.   Jardiance - d/c after transplant - hold until 1 year post transplant.     Social Documentation:  Patient lives in [place].   Occupation: self employed, janitorial service.   Exercise: daily. Plays golf.      Diabetes related complications:   cerebrovascular disease.   denies Pancreatitis  denies Gastroparesis  denies DKA  denies Hx/family Hx of MEN2/MTC  denies Frequent UTIs/yeast infections     Diabetes Medications              insulin glargine U-100, Lantus,  "(LANTUS SOLOSTAR U-100 INSULIN) 100 unit/mL (3 mL) InPn pen Inject 33 Units into the skin once daily.    insulin lispro 100 unit/mL injection VIA ILET INSULIN PUMP, MAX .    tirzepatide 10 mg/0.5 mL PnIj Inject 10 mg (one pen) into the skin every 7 days.     Current monitoring regimen:  Dexcom G7 with iLet Insulin Pump    The patient's Dexcom CGM was downloaded and reviewed. For 14 days, patient average glucose was 191 mg/dL. He was above range 51% of the time, in range 49% of the time, and below range 0% of the time. The target range for this patient was 70 - 180 mg/dL. Overall, there was a pattern of post prandial hyperglycemia. Hyperglycemia over last 3 days of report. Was out of town in [place] last week, eating out and higher carb meals, not entering "more than usual" in pump.         Patient currently taking insulin or sulfonylurea?  Yes. Emergency Glucagon Prescription current? no  Recent hypoglycemic episodes: No.     Patient compliant with glucose checks and medication administration? Yes    DIABETES MANAGEMENT STATUS  Statin: Not taking  ACE/ARB: Taking  Screening or Prevention Patient's value Goal Complete/Controlled?   HgA1C Testing and Control   Lab Results   Component Value Date    HGBA1C 6.3 (H) 12/16/2024      Annually/Less than 8% Yes   Lipid profile : 12/16/2024 Annually Yes   LDL control Lab Results   Component Value Date    LDLCALC 99.0 12/16/2024    Annually/Less than 100 mg/dl  Yes   Nephropathy screening Lab Results   Component Value Date    LABMICR 36.0 12/16/2024     Lab Results   Component Value Date    PROTEINUA Negative 02/17/2025     No results found for: "UTPCR"   Annually Yes   Blood pressure BP Readings from Last 1 Encounters:   03/05/25 (!) 172/88    Less than 140/90 No   Dilated retinal exam : 02/03/2025 Annually Yes   Foot exam   : 02/24/2025 Annually Yes   Patient's medications, allergies, surgical, social and family histories were reviewed and updated as " appropriate.     Review of Systems   Constitutional:  Negative for weight loss.   Eyes:  Negative for blurred vision and double vision.   Cardiovascular:  Negative for chest pain.   Gastrointestinal:  Negative for nausea and vomiting.   Genitourinary:  Negative for frequency.   Musculoskeletal:  Negative for falls.   Neurological:  Negative for dizziness and weakness.   Endo/Heme/Allergies:  Negative for polydipsia.   Psychiatric/Behavioral:  Negative for depression.    All other systems reviewed and are negative.       Physical Exam  Constitutional:       General: He is not in acute distress.     Appearance: Normal appearance.   Pulmonary:      Effort: No respiratory distress.   Neurological:      Mental Status: He is alert and oriented to person, place, and time.   Psychiatric:         Mood and Affect: Mood normal.         Behavior: Behavior normal.        There were no vitals taken for this visit.  Wt Readings from Last 3 Encounters:   03/05/25 100.7 kg (222 lb)   02/24/25 101 kg (222 lb 10.6 oz)   02/19/25 97.4 kg (214 lb 13.4 oz)       LAB REVIEW  Lab Results   Component Value Date     03/10/2025    K 4.5 03/10/2025     03/10/2025    CO2 26 03/10/2025    BUN 14 03/10/2025    CREATININE 1.0 03/10/2025    CALCIUM 9.1 03/10/2025    ANIONGAP 8 03/10/2025    EGFRNORACEVR >60 03/10/2025     Lab Results   Component Value Date    CPEPTIDE 1.78 04/11/2024    GLUTAMICACID 0.00 04/11/2024     Hemoglobin A1C   Date Value Ref Range Status   12/16/2024 6.3 (H) 4.0 - 5.6 % Final     Comment:     ADA Screening Guidelines:  5.7-6.4%  Consistent with prediabetes  >or=6.5%  Consistent with diabetes    High levels of fetal hemoglobin interfere with the HbA1C  assay. Heterozygous hemoglobin variants (HbS, HgC, etc)do  not significantly interfere with this assay.   However, presence of multiple variants may affect accuracy.     10/10/2024 5.9 (H) 4.0 - 5.6 % Final     Comment:     ADA Screening Guidelines:  5.7-6.4%   "Consistent with prediabetes  >or=6.5%  Consistent with diabetes    High levels of fetal hemoglobin interfere with the HbA1C  assay. Heterozygous hemoglobin variants (HbS, HgC, etc)do  not significantly interfere with this assay.   However, presence of multiple variants may affect accuracy.     04/11/2024 6.3 (H) 4.0 - 5.6 % Final     Comment:     ADA Screening Guidelines:  5.7-6.4%  Consistent with prediabetes  >or=6.5%  Consistent with diabetes    High levels of fetal hemoglobin interfere with the HbA1C  assay. Heterozygous hemoglobin variants (HbS, HgC, etc)do  not significantly interfere with this assay.   However, presence of multiple variants may affect accuracy.          ASSESSMENT    ICD-10-CM ICD-9-CM   1. Type 2 diabetes mellitus with hyperglycemia, with long-term current use of insulin  E11.65 250.00    Z79.4 790.29     V58.67           PLAN  Diagnoses and all orders for this visit:    Type 2 diabetes mellitus with hyperglycemia, with long-term current use of insulin  -     Hemoglobin A1C; Future  -     Basic Metabolic Panel; Future            Reviewed pathophysiology of diabetes, complications related to the disease, importance of annual dilated eye exam and daily foot examination. Explained MOA, SE, dosage of medications. Written instructions given and reviewed with patient and patient verbalizes understanding.     6/24/2024 - pump evaluation done on 4/30/24, at that time patient had decided on OP5, but states he really does not think he will do well with carb counting and is preferring a more "hands off" approach. Discussed iLet pump and patient would like to move forward with that. Overall glucoses stablizing, still with some hyperglycemia. Will increase Lantus to 33 units,  decrease humalog dose to 6 and increase Mounjaro to 10 mg with next refill.     8/12/2024 - iLet pump training tomorrow.    9/9/2024 - Doing well on iLet. Has occasional late meal announcement, discussed skipping announcement if " "over 30 minutes after start of meal.     12/4/2024 - A1c remains at goal.     3/10/2025 - Overall, there was a pattern of post prandial hyperglycemia. Hyperglycemia over last 3 days of report. Was out of town in [place] last week, eating out and higher carb meals, not entering "more than usual" in pump. Discussed using the "more than usual" for eating out and higher carb content meals. Labs prior to f/u in 3 months      PATIENT INSTRUCTIONS     Labs ordered and will be scheduled by [facility] staff.     Continue iLet insulin pump with Dexcom G7 CGM.   Continue Mounjaro 10 mg subcutaneously every 7 days.       U100 Pump Failure Plan:   We have decided to develop a plan in the event that your pump breaks or is nonfunctioning. After 4 hours without pump use, you should begin to consider putting your Pump Failure Back Up Plan into action especially if you foresee that you may not be able to use your pump for more than 20 hours. Since you are currently using short acting insulin (Humalog/Novolog/Admelog/Novolin R) in your pump, you will need access to your short acting insulin vial, syringes, and a back up long acting insulin in the event of a pump failure. I have sent a prescription for a long acting insulin to your pharmacy for use during your emergency pump failure plan. It is important that you keep both types of insulin/syringes with you so that you may have access to it at least 3 times daily if needed. This medication and syringes should be brought with you on overnight trips in the case of an emergency pump failure. This means making a plan to keeping your insulin and syringes at school, work, or other places you frequent. If needed, please reach out to my office about any letters you may need for permission to keep these items for emergency purposes. We will outline your plan for pump failure emergencies below, but please remember to contact the insulin pump company (toll free number is " printed on the label on the back of the insulin pump) and our office if you have a pump failure. When the insulin pump is restarted, do not restart basal rates until at least 22 hours after the last long acting insulin injection. You can set a 0% temporary basal setting that will last until this time and use your pump to bolus for meals and correction. If you need help with these feature, please call your insulin pump company tech support line or ask them in anticipation of this action.     Lantus 33 units daily.      Humalog to 6 units three times daily before meals, plus correction if needed.      If  - 250, ADD 2 units of Humalog  If  - 300, ADD 3 units of Humalog   If  - 350, ADD 4 units of Humalog  If  - 400, ADD 5 units of Humalog  If +, ADD 6 units of Humalog          Follow up in about 3 months (around 6/10/2025) for iLet, Virtual, Schedule fasting labs.

## 2025-03-11 LAB — TACROLIMUS BLD-MCNC: 7.7 NG/ML (ref 5–15)

## 2025-03-12 ENCOUNTER — OFFICE VISIT (OUTPATIENT)
Dept: RADIATION ONCOLOGY | Facility: CLINIC | Age: 65
End: 2025-03-12
Payer: COMMERCIAL

## 2025-03-12 VITALS
HEART RATE: 88 BPM | BODY MASS INDEX: 30.68 KG/M2 | OXYGEN SATURATION: 98 % | HEIGHT: 71 IN | SYSTOLIC BLOOD PRESSURE: 145 MMHG | RESPIRATION RATE: 18 BRPM | WEIGHT: 219.13 LBS | TEMPERATURE: 99 F | DIASTOLIC BLOOD PRESSURE: 92 MMHG

## 2025-03-12 DIAGNOSIS — D42.0 ATYPICAL INTRACRANIAL MENINGIOMA: Primary | ICD-10-CM

## 2025-03-12 PROCEDURE — 1111F DSCHRG MED/CURRENT MED MERGE: CPT | Mod: CPTII,S$GLB,, | Performed by: SPECIALIST

## 2025-03-12 PROCEDURE — 99999 PR PBB SHADOW E&M-EST. PATIENT-LVL IV: CPT | Mod: PBBFAC,,, | Performed by: SPECIALIST

## 2025-03-12 PROCEDURE — 3080F DIAST BP >= 90 MM HG: CPT | Mod: CPTII,S$GLB,, | Performed by: SPECIALIST

## 2025-03-12 PROCEDURE — 99212 OFFICE O/P EST SF 10 MIN: CPT | Mod: S$GLB,,, | Performed by: SPECIALIST

## 2025-03-12 PROCEDURE — 4010F ACE/ARB THERAPY RXD/TAKEN: CPT | Mod: CPTII,S$GLB,, | Performed by: SPECIALIST

## 2025-03-12 PROCEDURE — 3077F SYST BP >= 140 MM HG: CPT | Mod: CPTII,S$GLB,, | Performed by: SPECIALIST

## 2025-03-12 PROCEDURE — 3008F BODY MASS INDEX DOCD: CPT | Mod: CPTII,S$GLB,, | Performed by: SPECIALIST

## 2025-03-12 PROCEDURE — 1159F MED LIST DOCD IN RCRD: CPT | Mod: CPTII,S$GLB,, | Performed by: SPECIALIST

## 2025-03-12 NOTE — PROGRESS NOTES
Mr. Chávez returns for follow-up after undergoing MRI at my request.  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  He has done well in the interval since last seen although he did require stenting of 1 of his biliary ducts likely related to his liver transplant.  He has no complaints of headache or focal neurologic findings.  Physical exam: His scalp looks normal other than the surgical scar.  He is alert and oriented.  Judgment orientation memory affect and cognition are intact.  Impression: He continues to do well  Plan: I will see him back in 6 months with a repeat MRI.  Following that I have recommended annual MRIs.  I certainly appreciate participating in this delightful gentleman's care.

## 2025-03-14 ENCOUNTER — TELEPHONE (OUTPATIENT)
Dept: TRANSPLANT | Facility: CLINIC | Age: 65
End: 2025-03-14
Payer: COMMERCIAL

## 2025-03-14 NOTE — TELEPHONE ENCOUNTER
Left message for pt to return my call. Need to schedule IR follow up.  Please forward call to [phone]. Thanks     OCCUPATIONAL THERAPY Initial Assessment, Daily Note, and AM      (Link to Caseload Tracking: OT Visit Days: 1  OT Orders   Time  OT Charge Capture  Rehab Caseload Tracker  Episode     Edvin Lisa is a 69 y.o. male   PRIMARY DIAGNOSIS: Degenerative arthritis of left knee  Degenerative arthritis of left knee [M17.12]  Osteoarthritis of left knee, unspecified osteoarthritis type [M17.12]  Procedure(s) (LRB):  KNEE TOTAL ARTHROPLASTY ROBOTIC LEFT (Left)  * Day of Surgery *  Reason for Referral: Pain in Left Knee (M25.562)  Stiffness of Left Knee, Not elsewhere classified (M25.662)  Other lack of cordination (R27.8)  Difficulty in walking, Not elsewhere classified (R26.2)  Other abnormalities of gait and mobility (R26.89)  Outpatient in a bed: [place] / Product Type: *No Product type* /     ASSESSMENT:     REHAB RECOMMENDATIONS:   Recommendation to date pending progress:  Setting:  No further skilled occupational therapy after discharge from hospital    Equipment:    None     ASSESSMENT:  Mr. Lisa is s/p left TKA and presents with decreased independence with functional mobility and activities of daily living as compared to baseline level of function and safety. Patient would benefit from skilled Occupational Therapy to maximize independence and safety with self-care task and functional mobility.   Patient walking in the hallway with PT and he returned to his room. He would like to go home today. His wife was present. OT educated on OT plan of care, discharge planning, adl task performance, post op showering, resting joint position, ice machine, walker use and home safety. He was assisted getting dressed and educated on safe post op showering. He was set up with his ice machine and his lunch tray. All questions answered. Will follow.       [facility] AM-PAC™ “6 Clicks” Daily Activity Inpatient Short Form:     AM-PAC Daily Activity - Inpatient   How much help is needed for putting on and  Patient Education  (Reference education tab)    [x] Safe And Effective Hygiene  [x] Fall Precautions  [x] Precautions  [x] D/C Instruction Review [x] Self Care Training and Home Safety  [x] Walker Management/Safety  [x] Adaptive Equipment as Needed  [x] Therapeutic Resting Position of Joint     TOTAL TREATMENT DURATION AND TIME:  Time In: 1158  Time Out: 1215  Minutes: 17    Argelia Shay, OT

## 2025-03-14 NOTE — TELEPHONE ENCOUNTER
Labs reviewed by Dr. Quesada. No medication changes ordered. Repeat labs due 4/7/25 per protocol.    Pt notified via portal of stable labs and that no medication changes are needed. Repeat labs due 4/7/25 per protocol.

## 2025-03-17 ENCOUNTER — PATIENT OUTREACH (OUTPATIENT)
Dept: ADMINISTRATIVE | Facility: HOSPITAL | Age: 65
End: 2025-03-17
Payer: COMMERCIAL

## 2025-03-17 NOTE — PROGRESS NOTES
Working 2nd Attempt 01.08.2025 for Annual follow up     Did not call patient.  Patient had recent surgical procedure

## 2025-03-19 DIAGNOSIS — K72.10 END STAGE LIVER DISEASE: ICD-10-CM

## 2025-03-19 RX ORDER — TACROLIMUS 1 MG/1
2 CAPSULE ORAL EVERY 12 HOURS
Qty: 120 CAPSULE | Refills: 11 | Status: CANCELLED | OUTPATIENT
Start: 2025-03-19 | End: 2026-03-19

## 2025-03-20 ENCOUNTER — PATIENT MESSAGE (OUTPATIENT)
Dept: TRANSPLANT | Facility: CLINIC | Age: 65
End: 2025-03-20
Payer: COMMERCIAL

## 2025-03-20 DIAGNOSIS — K72.10 END STAGE LIVER DISEASE: ICD-10-CM

## 2025-03-21 RX ORDER — TACROLIMUS 1 MG/1
2 CAPSULE ORAL EVERY 12 HOURS
Qty: 120 CAPSULE | Refills: 11 | Status: SHIPPED | OUTPATIENT
Start: 2025-03-21 | End: 2026-03-21

## 2025-03-25 ENCOUNTER — PATIENT OUTREACH (OUTPATIENT)
Dept: ADMINISTRATIVE | Facility: HOSPITAL | Age: 65
End: 2025-03-25
Payer: COMMERCIAL

## 2025-03-25 NOTE — PROGRESS NOTES
VBC OUTREACH: per chart review pt is DUE for BP check, pt has PCP appt already scheduled 8.25.25

## 2025-03-27 ENCOUNTER — PATIENT MESSAGE (OUTPATIENT)
Dept: TRANSPLANT | Facility: CLINIC | Age: 65
End: 2025-03-27
Payer: COMMERCIAL

## 2025-04-01 ENCOUNTER — PATIENT OUTREACH (OUTPATIENT)
Dept: ADMINISTRATIVE | Facility: HOSPITAL | Age: 65
End: 2025-04-01
Payer: COMMERCIAL

## 2025-04-01 NOTE — PROGRESS NOTES
Lab visit report: Patient has next lab appointment scheduled 6/3/25 for recheck of diabetic labs.

## 2025-04-04 ENCOUNTER — TELEPHONE (OUTPATIENT)
Dept: TRANSPLANT | Facility: CLINIC | Age: 65
End: 2025-04-04
Payer: COMMERCIAL

## 2025-04-04 LAB
EXT ALBUMIN: 3.2
EXT ALKALINE PHOSPHATASE: 104
EXT ALT: 20
EXT AST: 19
EXT BASOPHIL%: 0
EXT BILIRUBIN TOTAL: 1.7
EXT BUN: 17
EXT CALCIUM: 9.1
EXT CHLORIDE: 100
EXT CO2: 32
EXT CREATININE: 0.74 MG/DL
EXT EGFR NO RACE VARIABLE: 101
EXT EOSINOPHIL%: 1
EXT GLUCOSE: 174
EXT HEMATOCRIT: 35.7
EXT HEMOGLOBIN: 11.1
EXT LYMPH%: 37
EXT MONOCYTES%: 10
EXT PLATELETS: 159
EXT POTASSIUM: 3.9
EXT PROTEIN TOTAL: 6.7
EXT SEGS%: 52
EXT SODIUM: 137 MMOL/L
EXT TACROLIMUS LVL: 4
EXT WBC: 4.55

## 2025-04-04 NOTE — TELEPHONE ENCOUNTER
Received a call from  rehab. Labs faxed to us and MD requesting Dr. Quesada review and let them know if tac level is ok.    Received and entered labs. Reviewed with Dr. Quesada through secure chat. Per MD, labs are stable. No tac changes needed.    Contacted MD at  rehab. Reviewed above. He states they will get a tac level weekly with labs and send to us for reviewed.

## 2025-04-05 NOTE — ASSESSMENT & PLAN NOTE
Glucocorticoids markedly increase prandial glucoses. Expect the steroid taper will help glucose control.        none

## 2025-04-06 ENCOUNTER — RESULTS FOLLOW-UP (OUTPATIENT)
Dept: TRANSPLANT | Facility: CLINIC | Age: 65
End: 2025-04-06
Payer: COMMERCIAL

## 2025-04-07 NOTE — TELEPHONE ENCOUNTER
----- Message from Ochoa Quesada MD sent at 4/6/2025  4:15 PM CDT -----  Results reviewed  ----- Message -----  From: Linda Donahue RN  Sent: 4/4/2025  10:05 AM CDT  To: Ochoa Quesada MD

## 2025-04-10 LAB
EXT ALBUMIN: 3.5
EXT ALKALINE PHOSPHATASE: 161
EXT ALT: 29
EXT AST: 24
EXT BASOPHIL%: 0
EXT BILIRUBIN TOTAL: 1.6
EXT BUN: 16
EXT CALCIUM: 9.2
EXT CHLORIDE: 103
EXT CO2: 27
EXT CREATININE: 0.83 MG/DL
EXT EGFR NO RACE VARIABLE: 98
EXT EOSINOPHIL%: 1
EXT GLUCOSE: 212
EXT HEMATOCRIT: 38.2
EXT HEMOGLOBIN: 12.3
EXT LYMPH%: 47
EXT MONOCYTES%: 7
EXT PLATELETS: 236
EXT POTASSIUM: 4.1
EXT PROTEIN TOTAL: 7.5
EXT SEGS%: 45
EXT SODIUM: 136 MMOL/L
EXT TACROLIMUS LVL: 4.8
EXT WBC: 5.61

## 2025-04-11 ENCOUNTER — RESULTS FOLLOW-UP (OUTPATIENT)
Dept: TRANSPLANT | Facility: CLINIC | Age: 65
End: 2025-04-11
Payer: COMMERCIAL

## 2025-04-11 NOTE — Clinical Note
April 14, 2025    Jed Chávez  [address]  [place] [address]          Dear Jed Chávez:  MRN: [id number]    This is a follow up to your recent labs, your lab results were stable.  There are no medicine changes.  Please have your labs drawn again on ***.      If you cannot have your labs drawn on the scheduled date, it is your responsibility to call the transplant department to reschedule.  Please call [phone] and ask to speak to {scheduling specialists:[phone]} for all scheduling requests.     Sincerely,        Your Liver Transplant Coordinator    [facility]  [address] [place] [id number]  [phone]

## 2025-04-14 NOTE — TELEPHONE ENCOUNTER
Fax sent to [facility] ([phone]) - Dr. Quesada reviewed labs. Labs are stable. No changes needed to tacrolimus dose. Requested update on anticipated dc date.   ----- Message from Ochoa Quesada MD sent at 4/11/2025  5:27 PM CDT -----  Results reviewed  ----- Message -----  From: Linda Donahue RN  Sent: 4/10/2025   2:07 PM CDT  To: Ochoa Quesada MD

## 2025-04-23 ENCOUNTER — PATIENT MESSAGE (OUTPATIENT)
Dept: TRANSPLANT | Facility: CLINIC | Age: 65
End: 2025-04-23
Payer: COMMERCIAL

## 2025-04-23 LAB
EXT ALBUMIN: 3.1
EXT ALKALINE PHOSPHATASE: 117
EXT ALT: 25
EXT AST: 17
EXT BASOPHIL%: 0
EXT BILIRUBIN TOTAL: 1.2
EXT BUN: 13
EXT CALCIUM: 8.7
EXT CHLORIDE: 107
EXT CO2: 31
EXT CREATININE: 0.68 MG/DL
EXT EGFR NO RACE VARIABLE: 104
EXT EOSINOPHIL%: 1
EXT GLUCOSE: 175
EXT HEMATOCRIT: 35.3
EXT HEMOGLOBIN: 11
EXT LYMPH%: 46
EXT MONOCYTES%: 7
EXT PLATELETS: 107
EXT POTASSIUM: 3.7
EXT PROTEIN TOTAL: 6.2
EXT SEGS%: 46
EXT SODIUM: 140 MMOL/L
EXT TACROLIMUS LVL: 4.8
EXT WBC: 4.18

## 2025-04-24 ENCOUNTER — RESULTS FOLLOW-UP (OUTPATIENT)
Dept: TRANSPLANT | Facility: CLINIC | Age: 65
End: 2025-04-24
Payer: COMMERCIAL

## 2025-04-25 NOTE — TELEPHONE ENCOUNTER
Pt notified via portal of stable labs and that no medication changes are needed. Repeat labs due 6/9/25 per protocol.    ---- Message from Ochoa Quesada MD sent at 4/24/2025  9:16 AM CDT -----  Results reviewed    ----- Message -----  From: Linda Donahue RN  Sent: 4/23/2025   2:09 PM CDT  To: Ochoa Quesada MD

## 2025-04-29 ENCOUNTER — PATIENT MESSAGE (OUTPATIENT)
Dept: TRANSPLANT | Facility: CLINIC | Age: 65
End: 2025-04-29
Payer: COMMERCIAL

## 2025-05-01 ENCOUNTER — PATIENT MESSAGE (OUTPATIENT)
Dept: TRANSPLANT | Facility: CLINIC | Age: 65
End: 2025-05-01
Payer: COMMERCIAL

## 2025-05-08 ENCOUNTER — OFFICE VISIT (OUTPATIENT)
Dept: INTERNAL MEDICINE | Facility: CLINIC | Age: 65
End: 2025-05-08
Payer: COMMERCIAL

## 2025-05-08 ENCOUNTER — PATIENT MESSAGE (OUTPATIENT)
Dept: INTERNAL MEDICINE | Facility: CLINIC | Age: 65
End: 2025-05-08

## 2025-05-08 DIAGNOSIS — I69.391 DYSPHAGIA FOLLOWING CEREBROVASCULAR ACCIDENT: Chronic | ICD-10-CM

## 2025-05-08 DIAGNOSIS — I15.2 HYPERTENSION ASSOCIATED WITH TYPE 2 DIABETES MELLITUS: Chronic | ICD-10-CM

## 2025-05-08 DIAGNOSIS — E11.59 HYPERTENSION ASSOCIATED WITH TYPE 2 DIABETES MELLITUS: Chronic | ICD-10-CM

## 2025-05-08 DIAGNOSIS — Z79.4 TYPE 2 DIABETES MELLITUS WITH OTHER SPECIFIED COMPLICATION, WITH LONG-TERM CURRENT USE OF INSULIN: Primary | Chronic | ICD-10-CM

## 2025-05-08 DIAGNOSIS — E11.69 TYPE 2 DIABETES MELLITUS WITH OTHER SPECIFIED COMPLICATION, WITH LONG-TERM CURRENT USE OF INSULIN: Primary | Chronic | ICD-10-CM

## 2025-05-08 DIAGNOSIS — Z79.899 IMMUNOSUPPRESSION DUE TO DRUG THERAPY: Chronic | ICD-10-CM

## 2025-05-08 DIAGNOSIS — Z94.4 STATUS POST LIVER TRANSPLANT: Chronic | ICD-10-CM

## 2025-05-08 DIAGNOSIS — E78.5 HYPERLIPIDEMIA ASSOCIATED WITH TYPE 2 DIABETES MELLITUS: Chronic | ICD-10-CM

## 2025-05-08 DIAGNOSIS — E11.69 HYPERLIPIDEMIA ASSOCIATED WITH TYPE 2 DIABETES MELLITUS: Chronic | ICD-10-CM

## 2025-05-08 DIAGNOSIS — D84.821 IMMUNOSUPPRESSION DUE TO DRUG THERAPY: Chronic | ICD-10-CM

## 2025-05-08 DIAGNOSIS — I69.359 HEMIPARESIS AFFECTING NONDOMINANT SIDE AS LATE EFFECT OF CEREBROVASCULAR ACCIDENT: Chronic | ICD-10-CM

## 2025-05-08 DIAGNOSIS — I69.321 DYSPHASIA AS LATE EFFECT OF CEREBROVASCULAR ACCIDENT (CVA): Chronic | ICD-10-CM

## 2025-05-08 PROCEDURE — 98007 SYNCH AUDIO-VIDEO EST HI 40: CPT | Mod: 95,,, | Performed by: FAMILY MEDICINE

## 2025-05-08 PROCEDURE — 1159F MED LIST DOCD IN RCRD: CPT | Mod: CPTII,95,, | Performed by: FAMILY MEDICINE

## 2025-05-08 PROCEDURE — 1160F RVW MEDS BY RX/DR IN RCRD: CPT | Mod: CPTII,95,, | Performed by: FAMILY MEDICINE

## 2025-05-08 PROCEDURE — G2211 COMPLEX E/M VISIT ADD ON: HCPCS | Mod: 95,,, | Performed by: FAMILY MEDICINE

## 2025-05-08 PROCEDURE — 4010F ACE/ARB THERAPY RXD/TAKEN: CPT | Mod: CPTII,95,, | Performed by: FAMILY MEDICINE

## 2025-05-08 RX ORDER — PANTOPRAZOLE SODIUM 40 MG/1
40 TABLET, DELAYED RELEASE ORAL DAILY
COMMUNITY

## 2025-05-08 RX ORDER — ATORVASTATIN CALCIUM 40 MG/1
40 TABLET, FILM COATED ORAL DAILY
COMMUNITY
End: 2025-05-08 | Stop reason: SDUPTHER

## 2025-05-08 RX ORDER — LOSARTAN POTASSIUM 100 MG/1
100 TABLET ORAL DAILY
Qty: 90 TABLET | Refills: 3 | Status: SHIPPED | OUTPATIENT
Start: 2025-05-08

## 2025-05-08 RX ORDER — HYDROCHLOROTHIAZIDE 12.5 MG/1
12.5 TABLET ORAL DAILY
Qty: 90 TABLET | Refills: 0 | Status: SHIPPED | OUTPATIENT
Start: 2025-05-08 | End: 2025-05-21 | Stop reason: DRUGHIGH

## 2025-05-08 RX ORDER — ATORVASTATIN CALCIUM 40 MG/1
40 TABLET, FILM COATED ORAL NIGHTLY
Qty: 90 TABLET | Refills: 3 | Status: SHIPPED | OUTPATIENT
Start: 2025-05-08

## 2025-05-08 RX ORDER — HYDROCHLOROTHIAZIDE 12.5 MG/1
12.5 TABLET ORAL DAILY
COMMUNITY
End: 2025-05-08 | Stop reason: SDUPTHER

## 2025-05-08 RX ORDER — CARVEDILOL 12.5 MG/1
12.5 TABLET ORAL 2 TIMES DAILY WITH MEALS
Qty: 180 TABLET | Refills: 0 | Status: SHIPPED | OUTPATIENT
Start: 2025-05-08 | End: 2025-05-21 | Stop reason: SDUPTHER

## 2025-05-08 NOTE — PROGRESS NOTES
TELEMEDICINE VIRTUAL VIDEO VISIT  5/8/25  4:20 PM CDT    Visit Type: Audiovisual    Patient's Location: Jed represents that they are located within the state [facility].    CHIEF COMPLAINT: Follow-up multiple problems    Type 2 diabetes mellitus with other specified complication, with long-term current use of insulin: He has a history of type 2 diabetes managed with insulin and tirzepatide, along with recent use of a continuous glucose monitor and an insulin pump. His recent HbA1c values have ranged from 5.9 to 6.3 percent, indicating suboptimal glycemic control. Recent lab results showed elevated glucose readings (181 mg/dL on 3/10/25 and 149 mg/dL on 2/24/25), and he reported blood sugar levels reaching up to 300 in the past week, likely related to interruption of tirzepatide therapy during hospitalization. After resuming tirzepatide and using the new insulin pump, his glucose decreased to 206. He tests his blood glucose three times daily and uses insulin glargine and insulin lispro. Current management includes continuation of insulin glargine and lispro (via insulin pump), tirzepatide (Mounjaro) weekly, and blood sugar monitoring with the Dexcom G7 sensor. Orders include rescheduling of lab appointment with HbA1c testing. He was instructed to contact the diabetes clinic if glucose goals are not achieved after resuming tirzepatide. He is scheduled for follow-up in the diabetes clinic on [address].     Hypertension associated with type 2 diabetes mellitus: Blood pressure is inadequately controlled with office measurements ranging from 124/76 to 172/88, with recent home readings averaging 165/99 and 155/98, and most recent office BP at 145/92. Home blood pressure readings were identified as elevated compared to his usual levels. He was counseled on proper BP measurement technique, timing of BP readings, and was instructed to verify the accuracy of his home BP cuff. Current medications continued include losartan  (Cozaar) 100 mg daily, carvedilol (Coreg) 12.5 mg twice daily with meals, and hydrochlorothiazide 25 mg daily. He was advised to take BP readings at various times of day, use deep breathing exercises before measurement, and follow up with recent readings and medication information via e-visit on May 18.     Hyperlipidemia associated with type 2 diabetes mellitus: Recent laboratory data show LDL-C of 99 mg/dL, total cholesterol of 163 mg/dL, triglycerides of 150 mg/dL, and low HDL at 34 mg/dL. Management includes atorvastatin (Lipitor) 40 mg daily, continued at the same dose. Aspirin 81 mg daily is also continued as an antiplatelet measure. Lipid levels will continue to be monitored at follow-up visits.     Dysphasia as late effect of cerebrovascular accident (CVA): He continues to experience noticeable speech abnormalities post-stroke, but overall, neurologic deficits are stable or improving with ongoing rehabilitation. Atorvastatin is continued for secondary prevention of vascular events.     Hemiparesis affecting nondominant side as late effect of cerebrovascular accident: He has persistent left-sided weakness post-stroke, exacerbated by recent immobilization due to a broken arm, with gradual improvement in strength noted during rehabilitation. Atorvastatin is continued for vascular protection. He is participating in physical therapy and occupational therapy per recommendations.     Dysphagia following cerebrovascular accident: He has ongoing swallowing difficulties following his CVA, requiring a minced and soft diet with nectar-thick liquids. These deficits are reported to be stable or improving. He continues rehabilitation therapy. Atorvastatin is continued as part of his post-stroke vascular risk management.     Status post liver transplant: He is stable following liver transplantation, with no evidence of rejection or systemic infection. Tacrolimus (Prograf) and ursodiol (Actigall) are continued. Follow-up  appointments are scheduled with transplant providers, including labs and abdominal ultrasound on May 29, and a hepatology appointment on July 28.     Immunosuppression due to drug therapy: He remains on tacrolimus for immunosuppression post-transplant. He has no current signs or symptoms of infection. Ongoing monitoring includes continued therapy and routine laboratory testing. He was instructed to continue current precautions and therapy as planned.     A follow-up appointment with me (Dr. Pitt) is scheduled for June 26. Additional follow-up includes a diabetes clinic visit with DELMA Cerda, on [address], laboratory and abdominal ultrasound appointments on May 29, a hepatology appointment on July 28, and continued rehabilitation therapy as recommended. He was instructed to contact the office if acid reflux symptoms occur after discontinuing pantoprazole, and to contact the diabetes clinic if glucose goals are not met. He was also advised to verify home blood pressure monitor accuracy at his rehabilitation facility and continue providing blood pressure readings for review at the upcoming hypertension e-visit on May 18.    1. Type 2 diabetes mellitus with other specified complication, with long-term current use of insulin  Assessment & Plan:  Lab Results   Component Value Date    HGBA1C 6.3 (H) 12/16/2024    HGBA1C 5.9 (H) 10/10/2024    HGBA1C 6.3 (H) 04/11/2024    EGFRNORACEVR >60 03/10/2025    MICALBCREAT 13.3 12/16/2024    LDLCALC 99.0 12/16/2024         2. Hypertension associated with type 2 diabetes mellitus  -     MYC E-VISIT  -     Discontinue: carvediloL (COREG) 12.5 MG tablet; Take 1 tablet (12.5 mg total) by mouth 2 (two) times daily with meals. HOLD if SBP < 125 or pulse < 60.  Dispense: 180 tablet; Refill: 0  -     losartan (COZAAR) 100 MG tablet; Take 1 tablet (100 mg total) by mouth once daily. HOLD if SBP < 120  Dispense: 90 tablet; Refill: 3  -     Discontinue: hydroCHLOROthiazide 12.5  MG Tab; Take 1 tablet (12.5 mg total) by mouth once daily.  Dispense: 90 tablet; Refill: 0    3. Hyperlipidemia associated with type 2 diabetes mellitus  -     atorvastatin (LIPITOR) 40 MG tablet; Take 1 tablet (40 mg total) by mouth every evening.  Dispense: 90 tablet; Refill: 3    4. Dysphasia as late effect of cerebrovascular accident (CVA)  -     atorvastatin (LIPITOR) 40 MG tablet; Take 1 tablet (40 mg total) by mouth every evening.  Dispense: 90 tablet; Refill: 3    5. Hemiparesis affecting nondominant side as late effect of cerebrovascular accident  -     atorvastatin (LIPITOR) 40 MG tablet; Take 1 tablet (40 mg total) by mouth every evening.  Dispense: 90 tablet; Refill: 3    6. Dysphagia following cerebrovascular accident  -     atorvastatin (LIPITOR) 40 MG tablet; Take 1 tablet (40 mg total) by mouth every evening.  Dispense: 90 tablet; Refill: 3    7. Status post liver transplant  -     Discontinue: carvediloL (COREG) 12.5 MG tablet; Take 1 tablet (12.5 mg total) by mouth 2 (two) times daily with meals. HOLD if SBP < 125 or pulse < 60.  Dispense: 180 tablet; Refill: 0    8. Immunosuppression due to drug therapy  Overview:  On tacrolimus for liver transplant.    Assessment & Plan:  ASSESSMENT: Jed reports he is doing well on current therapy without signs/symptoms of focal or systemic infection.  PLAN: Continue present treatment plan.           Unless specified otherwise, chronic conditions are represented as and appear to be compensated/controlled and stable.  Today's visit involved the intricate management of episodic problem(s) and the ongoing care for the patient's serious or complex condition(s) listed above, reflecting the inherent complexity of providing longitudinal, comprehensive evaluation and management as the central hub for the patient's primary care services.  There were no vitals filed for this visit.  PHYSICAL EXAM:  GENERAL APPEARANCE:  - Alert and grossly oriented.  - No apparent  distress, breathing comfortably.     EYES:  - Sclera without icterus.     EARS, NOSE, AND THROAT:  - No visible abnormalities.     RESPIRATORY:  - No respiratory distress.  - No audible wheezing or cough.     PSYCHIATRIC:  - Mood and affect appropriate; behavior cooperative.  Review of Systems   Respiratory:  Negative for chest tightness and shortness of breath.    Cardiovascular:  Negative for chest pain.   Endocrine: Negative for polydipsia and polyuria.     I spent a total of 45 minutes today evaluating and managing this patient for this encounter.  This includes face to face time and non-face to face time preparing to see the patient (eg, review of tests), obtaining and/or reviewing separately obtained history, documenting clinical information in the electronic or other health record, independently interpreting results and communicating results to the patient/family/caregiver, or care coordinator.  This time was exclusive of any separately billable procedures for this patient and exclusive of time spent treating any other patient.    Sections of this note may have been produced using ambient-listening and speech-recognition technologies. I have reviewed the content for accuracy, though errors in syntax, spelling, or similar-sounding words may be present and should be interpreted in context. Please contact the author for any clarification.    Each patient to whom medical services are provided by telemedicine is: (1) informed of the relationship between the physician and patient and the respective role of any other health care provider with respect to management of the patient; and (2) notified that he or she may decline to receive medical services by telemedicine and may withdraw from such care at any time.

## 2025-05-08 NOTE — ASSESSMENT & PLAN NOTE
Lab Results   Component Value Date    HGBA1C 6.3 (H) 12/16/2024    HGBA1C 5.9 (H) 10/10/2024    HGBA1C 6.3 (H) 04/11/2024    EGFRNORACEVR >60 03/10/2025    MICALBCREAT 13.3 12/16/2024    LDLCALC 99.0 12/16/2024

## 2025-05-09 DIAGNOSIS — Z94.4 STATUS POST LIVER TRANSPLANT: Primary | ICD-10-CM

## 2025-05-09 DIAGNOSIS — T86.49 BILIARY STRICTURE OF TRANSPLANTED LIVER: ICD-10-CM

## 2025-05-09 DIAGNOSIS — K83.1 BILIARY STRICTURE OF TRANSPLANTED LIVER: ICD-10-CM

## 2025-05-09 RX ORDER — URSODIOL 300 MG/1
300 CAPSULE ORAL 2 TIMES DAILY
Qty: 60 CAPSULE | Refills: 2 | Status: SHIPPED | OUTPATIENT
Start: 2025-05-09 | End: 2025-08-07

## 2025-05-13 DIAGNOSIS — E11.69 TYPE 2 DIABETES MELLITUS WITH OTHER SPECIFIED COMPLICATION, WITH LONG-TERM CURRENT USE OF INSULIN: ICD-10-CM

## 2025-05-13 DIAGNOSIS — Z79.4 TYPE 2 DIABETES MELLITUS WITH OTHER SPECIFIED COMPLICATION, WITH LONG-TERM CURRENT USE OF INSULIN: ICD-10-CM

## 2025-05-14 ENCOUNTER — TELEPHONE (OUTPATIENT)
Dept: ENDOSCOPY | Facility: HOSPITAL | Age: 65
End: 2025-05-14
Payer: COMMERCIAL

## 2025-05-14 ENCOUNTER — PATIENT MESSAGE (OUTPATIENT)
Dept: INTERNAL MEDICINE | Facility: CLINIC | Age: 65
End: 2025-05-14
Payer: COMMERCIAL

## 2025-05-14 DIAGNOSIS — K83.1 COMMON BILE DUCT STRICTURE: Primary | ICD-10-CM

## 2025-05-14 RX ORDER — BLOOD-GLUCOSE SENSOR
1 EACH MISCELLANEOUS
Qty: 3 EACH | Refills: 11 | Status: SHIPPED | OUTPATIENT
Start: 2025-05-14 | End: 2026-05-14

## 2025-05-14 NOTE — TELEPHONE ENCOUNTER
Telephoned pt and pt's wife to schedule ERCP, both with no answer.  Left voicemail messages with direct contact number for pt to return call.  Portal message also sent.

## 2025-05-14 NOTE — TELEPHONE ENCOUNTER
Jed Vance MD  [facility] P [facility] Schedulers  Caller: Unspecified (2 months ago)  Needs an ERCP and stent removal at [facility] in 4 months,

## 2025-05-14 NOTE — TELEPHONE ENCOUNTER
Patient is scheduled for a ERCP  on 7/2/25 with Dr. JENNI Vance  Referral for procedure from  last procedure report - Pt due for follow up procedure

## 2025-05-14 NOTE — TELEPHONE ENCOUNTER
Jed Vance MD  P P [facility] Schedulers  Caller: Unspecified (2 months ago)  Needs an ERCP and stent removal at [facility] in 4 months,

## 2025-05-18 ENCOUNTER — E-VISIT (OUTPATIENT)
Dept: INTERNAL MEDICINE | Facility: CLINIC | Age: 65
End: 2025-05-18
Payer: COMMERCIAL

## 2025-05-18 DIAGNOSIS — Z94.4 STATUS POST LIVER TRANSPLANT: ICD-10-CM

## 2025-05-18 DIAGNOSIS — E11.59 HYPERTENSION ASSOCIATED WITH TYPE 2 DIABETES MELLITUS: Chronic | ICD-10-CM

## 2025-05-18 DIAGNOSIS — I15.2 HYPERTENSION ASSOCIATED WITH TYPE 2 DIABETES MELLITUS: Chronic | ICD-10-CM

## 2025-05-21 RX ORDER — CARVEDILOL 12.5 MG/1
12.5 TABLET ORAL 2 TIMES DAILY WITH MEALS
Qty: 180 TABLET | Refills: 0 | Status: SHIPPED | OUTPATIENT
Start: 2025-05-21 | End: 2025-08-19

## 2025-05-21 RX ORDER — HYDROCHLOROTHIAZIDE 25 MG/1
25 TABLET ORAL DAILY
Qty: 90 TABLET | Refills: 0 | Status: SHIPPED | OUTPATIENT
Start: 2025-05-21

## 2025-05-21 NOTE — PROGRESS NOTES
Patient ID: Jed Chávez is a 65 y.o. male.    Chief Complaint: Hypertension (Entered automatically based on patient selection in [employer].)    The patient initiated a request through [employer] on 5/18/2025 for evaluation and management with a chief complaint of Hypertension (Entered automatically based on patient selection in [employer].)     I evaluated the questionnaire submission on 05/21/2025.    Oh[facility] Peq Evisit Hypertension    5/20/2025  4:23 PM CDT - Filed by Radha Chávez (Proxy)   Do you agree to participate in an E-Visit? Yes   If you have any of the following symptoms, please do not complete an E-Visit. Instead, schedule an appointment with your provider I acknowledge   Choose the state of your primary residence [place]   What would you like addressed about your blood pressure? Recent blood pressure medication change   What is the main issue you would like addressed today? Blood pressure readings   How would you classify your blood pressure? Hard to control   Are you having any of the following symptoms from your high blood pressure? None of the above   Are you taking any of the following medications? None of these   The following factors can make high blood pressure worse or harder to control. Which of them might be contributing to your high blood pressure?  None of these   Have you taken blood pressure medications in the past that caused you problems or side effects? No   Are you currently taking medication(s) for your blood pressure? Yes   Have you recently started a new medication or changed your dose? No   How often are you taking your medication per week?  Every day   Have you had any side effects from your current blood pressure medication? No   Are you able to take your blood pressure? Yes   Please give your most recent blood pressure readings    Reading 1 132/70-5/20 2pm   Reading 2 139/83-5/19 2pm   Reading 3 152/92- 5/16 2:15 pm   Reading 4 148/87-5/15 2pm   Reading 5 151/84-5/14 2pm   If you are  "able to take your pulse, please provide it below.    Provide any additional information you feel is important.    Please attach any relevant images or files    Are you able to take any other vitals? No       Current Outpatient Medications   Medication    aspirin 81 MG Chew    atorvastatin (LIPITOR) 40 MG tablet    blood sugar diagnostic Strp    blood-glucose sensor (DEXCOM G7 SENSOR) Neelam    carvediloL (COREG) 12.5 MG tablet    hydroCHLOROthiazide (HYDRODIURIL) 25 MG tablet    insulin glargine U-100, Lantus, (LANTUS SOLOSTAR U-100 INSULIN) 100 unit/mL (3 mL) InPn pen    insulin lispro 100 unit/mL injection    losartan (COZAAR) 100 MG tablet    pantoprazole (PROTONIX) 40 MG tablet    pen needle, diabetic (BD ULTRA-FINE MERCEDES PEN NEEDLE) 32 gauge x 5/32" Ndle    tacrolimus (PROGRAF) 1 MG Cap    tirzepatide 10 mg/0.5 mL PnIj    ursodioL (ACTIGALL) 300 mg capsule     No current facility-administered medications for this visit.      Estimated body mass index is 30.56 kg/m² as calculated from the following:    Height as of 3/12/25: 5' 11" (1.803 m).    Weight as of 3/12/25: 99.4 kg (219 lb 2.2 oz).    1. Hypertension associated with type 2 diabetes mellitus  -     carvediloL (COREG) 12.5 MG tablet; Take 1 tablet (12.5 mg total) by mouth 2 (two) times daily with meals. HOLD if SBP < 125 or pulse < 60.  Dispense: 180 tablet; Refill: 0  -     hydroCHLOROthiazide (HYDRODIURIL) 25 MG tablet; Take 1 tablet (25 mg total) by mouth once daily.  Dispense: 90 tablet; Refill: 0    2. Status post liver transplant  -     carvediloL (COREG) 12.5 MG tablet; Take 1 tablet (12.5 mg total) by mouth 2 (two) times daily with meals. HOLD if SBP < 125 or pulse < 60.  Dispense: 180 tablet; Refill: 0       Medications Discontinued During This Encounter   Medication Reason    hydroCHLOROthiazide 12.5 MG Tab Dose adjustment    carvediloL (COREG) 12.5 MG tablet Reorder     Medications Ordered This Encounter   Medications    carvediloL (COREG) 12.5 MG " tablet     Sig: Take 1 tablet (12.5 mg total) by mouth 2 (two) times daily with meals. HOLD if SBP < 125 or pulse < 60.     Dispense:  180 tablet     Refill:  0     .    hydroCHLOROthiazide (HYDRODIURIL) 25 MG tablet     Sig: Take 1 tablet (25 mg total) by mouth once daily.     Dispense:  90 tablet     Refill:  0     .       No follow-ups on file.  Future Appointments   Date Time Provider Department Center   5/29/2025  8:40 AM ON US2 ON [facility]   5/29/2025 10:10 AM LABORATORY, [facility] ON LAB [facility]   6/10/2025  8:30 AM Apoorva Pat FNP [facility] DIABETE  [facility]   6/24/2025 10:15 AM Jed Yu MD [facility] UROLOGY [facility]   7/28/2025  4:00 PM Ochoa Quesada MD [facility]   8/25/2025  8:00 AM Jay Mark NP [facility] IM [facility]   9/12/2025  9:30 AM [facility] MRI1 [facility] MRI [place]   9/15/2025  2:00 PM Marshall Casillas MD [facility] RAD ONC [facility]   2/5/2026  7:30 AM FIELDS, VISUAL-SILVINA [facility] OPHTHAL [facility]   2/5/2026  8:00 AM Naveed Steiner, OD [facility] OPHTHAL [facility]        E-Visit Time Tracking:  Day 1 Time (in minutes): 11    Total Time (in minutes): 11  This time was exclusive of any separately billable procedures for this patient and exclusive of time spent treating any other patient.     Jed Sanders.    Thanks for filling out the E-Visit and sharing your recent blood pressure readings. I know it can be frustrating when blood pressure stays on the higher side, even when youre taking your medications regularly. I want to reassure you that youre doing the right thing by keeping track of your numbers and staying consistent with your meds--it really helps us make the best decisions moving forward.    Looking over your readings, they are showing some improvement but still indicate that your blood pressure isnt quite where wed like it to be for long-term health. To help with this, Im increasing your dose of hydrochlorothiazide to 25 mg daily. Well continue  your other medications--carvedilol and losartan--at the same doses for now.    My expectation is that this dose adjustment may help bring things more into range, but I suspect well likely need to make further changes to fully control your blood pressure. Id like for us to touch base again in about a month to see how youre responding to this new dose and to go over any side effects, if they arise, along with your home readings.    Please click on the accompanying link to schedule a virtual video visit with me in about a month. I look forward to checking in with you then and continuing to work together to get your blood pressure in a healthier range.    Best regards,    Dr. GIL  Medications Ordered This Encounter   Medications    carvediloL (COREG) 12.5 MG tablet     Sig: Take 1 tablet (12.5 mg total) by mouth 2 (two) times daily with meals. HOLD if SBP < 125 or pulse < 60.     Dispense:  180 tablet     Refill:  0     .    hydroCHLOROthiazide (HYDRODIURIL) 25 MG tablet     Sig: Take 1 tablet (25 mg total) by mouth once daily.     Dispense:  90 tablet     Refill:  0     .      Medications Discontinued During This Encounter   Medication Reason    hydroCHLOROthiazide 12.5 MG Tab Dose adjustment    carvediloL (COREG) 12.5 MG tablet Reorder

## 2025-05-26 PROBLEM — E78.5 HYPERLIPIDEMIA ASSOCIATED WITH TYPE 2 DIABETES MELLITUS: Chronic | Status: ACTIVE | Noted: 2025-05-26

## 2025-05-26 PROBLEM — E78.5 HYPERLIPIDEMIA ASSOCIATED WITH TYPE 2 DIABETES MELLITUS: Status: ACTIVE | Noted: 2025-05-26

## 2025-05-26 PROBLEM — I69.359 HEMIPARESIS AFFECTING NONDOMINANT SIDE AS LATE EFFECT OF CEREBROVASCULAR ACCIDENT: Status: ACTIVE | Noted: 2025-05-26

## 2025-05-26 PROBLEM — I69.321 DYSPHASIA AS LATE EFFECT OF CEREBROVASCULAR ACCIDENT (CVA): Status: ACTIVE | Noted: 2025-05-26

## 2025-05-26 PROBLEM — I69.359 HEMIPARESIS AFFECTING NONDOMINANT SIDE AS LATE EFFECT OF CEREBROVASCULAR ACCIDENT: Chronic | Status: ACTIVE | Noted: 2025-05-26

## 2025-05-26 PROBLEM — I69.321 DYSPHASIA AS LATE EFFECT OF CEREBROVASCULAR ACCIDENT (CVA): Chronic | Status: ACTIVE | Noted: 2025-05-26

## 2025-05-26 PROBLEM — I69.391 DYSPHAGIA FOLLOWING CEREBROVASCULAR ACCIDENT: Status: ACTIVE | Noted: 2025-05-26

## 2025-05-26 PROBLEM — E11.69 HYPERLIPIDEMIA ASSOCIATED WITH TYPE 2 DIABETES MELLITUS: Chronic | Status: ACTIVE | Noted: 2025-05-26

## 2025-05-26 PROBLEM — E11.69 HYPERLIPIDEMIA ASSOCIATED WITH TYPE 2 DIABETES MELLITUS: Status: ACTIVE | Noted: 2025-05-26

## 2025-05-26 NOTE — ASSESSMENT & PLAN NOTE
ASSESSMENT: Jed reports he is doing well on current therapy without signs/symptoms of focal or systemic infection.  PLAN: Continue present treatment plan.

## 2025-05-27 ENCOUNTER — PATIENT OUTREACH (OUTPATIENT)
Dept: ADMINISTRATIVE | Facility: HOSPITAL | Age: 65
End: 2025-05-27
Payer: COMMERCIAL

## 2025-05-29 ENCOUNTER — HOSPITAL ENCOUNTER (OUTPATIENT)
Dept: RADIOLOGY | Facility: HOSPITAL | Age: 65
Discharge: HOME | End: 2025-05-29
Attending: INTERNAL MEDICINE
Payer: COMMERCIAL

## 2025-05-29 DIAGNOSIS — Z94.4 STATUS POST LIVER TRANSPLANT: ICD-10-CM

## 2025-05-29 PROCEDURE — 76705 ECHO EXAM OF ABDOMEN: CPT | Mod: TC

## 2025-05-30 ENCOUNTER — RESULTS FOLLOW-UP (OUTPATIENT)
Dept: TRANSPLANT | Facility: CLINIC | Age: 65
End: 2025-05-30
Payer: COMMERCIAL

## 2025-05-30 ENCOUNTER — PATIENT MESSAGE (OUTPATIENT)
Dept: TRANSPLANT | Facility: CLINIC | Age: 65
End: 2025-05-30
Payer: COMMERCIAL

## 2025-05-30 DIAGNOSIS — Z85.05 HISTORY OF HEPATOCELLULAR CARCINOMA: Primary | Chronic | ICD-10-CM

## 2025-05-30 NOTE — TELEPHONE ENCOUNTER
Continue routine labs no changes needed.  Letter sent for next lab appointment 6/30/25      ----- Message from Ochoa Quesada MD sent at 5/30/2025 10:21 AM CDT -----  Repeat in 1 month  Results reviewed    ----- Message -----  From: Lab, Background User  Sent: 5/29/2025   7:58 AM CDT  To: Ochoa Quesada MD

## 2025-05-30 NOTE — TELEPHONE ENCOUNTER
Sent message to let patient know.    ----- Message from Ochoa Quesada MD sent at 5/30/2025 10:21 AM CDT -----  Results reviewed    ----- Message -----  From: Interface, Rad Results In  Sent: 5/29/2025   9:14 AM CDT  To: Ochoa Quesada MD

## 2025-06-04 ENCOUNTER — TELEPHONE (OUTPATIENT)
Dept: TRANSPLANT | Facility: CLINIC | Age: 65
End: 2025-06-04
Payer: COMMERCIAL

## 2025-06-04 DIAGNOSIS — Z94.4 STATUS POST LIVER TRANSPLANT: Primary | ICD-10-CM

## 2025-06-10 ENCOUNTER — OFFICE VISIT (OUTPATIENT)
Dept: DIABETES | Facility: CLINIC | Age: 65
End: 2025-06-10
Payer: COMMERCIAL

## 2025-06-10 VITALS
BODY MASS INDEX: 28.1 KG/M2 | DIASTOLIC BLOOD PRESSURE: 79 MMHG | HEART RATE: 82 BPM | SYSTOLIC BLOOD PRESSURE: 123 MMHG | WEIGHT: 201.5 LBS

## 2025-06-10 DIAGNOSIS — Z79.4 TYPE 2 DIABETES MELLITUS WITH OTHER SPECIFIED COMPLICATION, WITH LONG-TERM CURRENT USE OF INSULIN: Primary | ICD-10-CM

## 2025-06-10 DIAGNOSIS — E11.69 TYPE 2 DIABETES MELLITUS WITH OTHER SPECIFIED COMPLICATION, WITH LONG-TERM CURRENT USE OF INSULIN: Primary | ICD-10-CM

## 2025-06-10 LAB — GLUCOSE SERPL-MCNC: 167 MG/DL (ref 70–110)

## 2025-06-10 PROCEDURE — 1159F MED LIST DOCD IN RCRD: CPT | Mod: CPTII,S$GLB,, | Performed by: NURSE PRACTITIONER

## 2025-06-10 PROCEDURE — 3044F HG A1C LEVEL LT 7.0%: CPT | Mod: CPTII,S$GLB,, | Performed by: NURSE PRACTITIONER

## 2025-06-10 PROCEDURE — 4010F ACE/ARB THERAPY RXD/TAKEN: CPT | Mod: CPTII,S$GLB,, | Performed by: NURSE PRACTITIONER

## 2025-06-10 PROCEDURE — 3074F SYST BP LT 130 MM HG: CPT | Mod: CPTII,S$GLB,, | Performed by: NURSE PRACTITIONER

## 2025-06-10 PROCEDURE — 99999 PR PBB SHADOW E&M-EST. PATIENT-LVL III: CPT | Mod: PBBFAC,,, | Performed by: NURSE PRACTITIONER

## 2025-06-10 PROCEDURE — G2211 COMPLEX E/M VISIT ADD ON: HCPCS | Mod: S$GLB,,, | Performed by: NURSE PRACTITIONER

## 2025-06-10 PROCEDURE — 82962 GLUCOSE BLOOD TEST: CPT | Mod: S$GLB,,, | Performed by: NURSE PRACTITIONER

## 2025-06-10 PROCEDURE — 99214 OFFICE O/P EST MOD 30 MIN: CPT | Mod: S$GLB,,, | Performed by: NURSE PRACTITIONER

## 2025-06-10 PROCEDURE — 3008F BODY MASS INDEX DOCD: CPT | Mod: CPTII,S$GLB,, | Performed by: NURSE PRACTITIONER

## 2025-06-10 PROCEDURE — 3078F DIAST BP <80 MM HG: CPT | Mod: CPTII,S$GLB,, | Performed by: NURSE PRACTITIONER

## 2025-06-10 NOTE — PROGRESS NOTES
Jed Chávez is a 65 y.o. male who presents for a follow up evaluation of Type 2 diabetes mellitus.     CHIEF COMPLAINT: Diabetes Consultation    PCP: EVELIN Pitt MD      Initial visit with me - 4/9/2024    The patient was initially diagnosed with diabetes in 2016.   S/p liver transplant in August 2023.   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      Previous failed treatments include:  Metformin - d/c after transplant - hold until 1 year post transplant.   Jardiance - d/c after transplant - hold until 1 year post transplant.     Social Documentation:  Patient lives in [place].   Occupation: self employed, [employer] service.   Exercise: daily. Plays golf.      Diabetes related complications:   cerebrovascular disease.   denies Pancreatitis  denies Gastroparesis  denies DKA  denies Hx/family Hx of MEN2/MTC  denies Frequent UTIs/yeast infections     Diabetes Medications              insulin glargine U-100, Lantus, (LANTUS SOLOSTAR U-100 INSULIN) 100 unit/mL (3 mL) InPn pen Inject 33 Units into the skin once daily.    insulin lispro 100 unit/mL injection VIA ILET INSULIN PUMP, MAX .    tirzepatide 10 mg/0.5 mL PnIj Inject 10 mg (one pen) into the skin every 7 days.     Current monitoring regimen: Dexcom G7with iLet Insulin Pump        Patient currently taking insulin or sulfonylurea?  Yes. Emergency Glucagon Prescription current? no  Recent hypoglycemic episodes: No.     Patient compliant with glucose checks and medication administration? Yes    DIABETES MANAGEMENT STATUS  Statin: Not taking  ACE/ARB: Taking  Screening or Prevention Patient's value Goal Complete/Controlled?   HgA1C Testing and Control   Lab Results   Component Value Date    HGBA1C 6.9 (H) 05/29/2025      Annually/Less than 8% Yes   Lipid profile : 12/16/2024 Annually Yes   LDL control Lab Results   Component Value Date    LDLCALC 99.0 12/16/2024    Annually/Less than 100 mg/dl  Yes   Nephropathy screening Lab Results   Component  "Value Date    LABMICR 36.0 12/16/2024     Lab Results   Component Value Date    PROTEINUA Negative 02/17/2025     No results found for: "UTPCR"   Annually Yes   Blood pressure BP Readings from Last 1 Encounters:   06/10/25 123/79    Less than 140/90 No   Dilated retinal exam : 02/03/2025 Annually Yes   Foot exam   : 02/24/2025 Annually Yes   Patient's medications, allergies, surgical, social and family histories were reviewed and updated as appropriate.     Review of Systems   Constitutional:  Negative for weight loss.   Eyes:  Negative for blurred vision and double vision.   Cardiovascular:  Negative for chest pain.   Gastrointestinal:  Negative for nausea and vomiting.   Genitourinary:  Negative for frequency.   Musculoskeletal:  Negative for falls.   Neurological:  Negative for dizziness and weakness.   Endo/Heme/Allergies:  Negative for polydipsia.   Psychiatric/Behavioral:  Negative for depression.    All other systems reviewed and are negative.       Physical Exam  Constitutional:       General: He is not in acute distress.     Appearance: Normal appearance.   Pulmonary:      Effort: No respiratory distress.   Neurological:      Mental Status: He is alert and oriented to person, place, and time.   Psychiatric:         Mood and Affect: Mood normal.         Behavior: Behavior normal.        Blood pressure 123/79, pulse 82, weight 91.4 kg (201 lb 8 oz).  Wt Readings from Last 3 Encounters:   06/10/25 91.4 kg (201 lb 8 oz)   03/12/25 99.4 kg (219 lb 2.2 oz)   03/05/25 100.7 kg (222 lb)       LAB REVIEW  Lab Results   Component Value Date     05/29/2025     05/29/2025    K 4.1 05/29/2025    K 4.0 05/29/2025     05/29/2025     05/29/2025    CO2 27 05/29/2025    CO2 28 05/29/2025    BUN 18 05/29/2025    BUN 18 05/29/2025    CREATININE 1.0 05/29/2025    CREATININE 0.9 05/29/2025    CALCIUM 9.3 05/29/2025    CALCIUM 9.0 05/29/2025    ANIONGAP 9 05/29/2025    ANIONGAP 9 05/29/2025    EGFRNORACEVR " >60 05/29/2025    EGFRNORACEVR >60 05/29/2025     Lab Results   Component Value Date    CPEPTIDE 1.78 04/11/2024    GLUTAMICACID 0.00 04/11/2024     Hemoglobin A1C   Date Value Ref Range Status   12/16/2024 6.3 (H) 4.0 - 5.6 % Final     Comment:     ADA Screening Guidelines:  5.7-6.4%  Consistent with prediabetes  >or=6.5%  Consistent with diabetes    High levels of fetal hemoglobin interfere with the HbA1C  assay. Heterozygous hemoglobin variants (HbS, HgC, etc)do  not significantly interfere with this assay.   However, presence of multiple variants may affect accuracy.     10/10/2024 5.9 (H) 4.0 - 5.6 % Final     Comment:     ADA Screening Guidelines:  5.7-6.4%  Consistent with prediabetes  >or=6.5%  Consistent with diabetes    High levels of fetal hemoglobin interfere with the HbA1C  assay. Heterozygous hemoglobin variants (HbS, HgC, etc)do  not significantly interfere with this assay.   However, presence of multiple variants may affect accuracy.     04/11/2024 6.3 (H) 4.0 - 5.6 % Final     Comment:     ADA Screening Guidelines:  5.7-6.4%  Consistent with prediabetes  >or=6.5%  Consistent with diabetes    High levels of fetal hemoglobin interfere with the HbA1C  assay. Heterozygous hemoglobin variants (HbS, HgC, etc)do  not significantly interfere with this assay.   However, presence of multiple variants may affect accuracy.       Hemoglobin A1c   Date Value Ref Range Status   05/29/2025 6.9 (H) 4.0 - 5.6 % Final     Comment:     ADA Screening Guidelines:  5.7-6.4%  Consistent with prediabetes  >=6.5%  Consistent with diabetes    High levels of fetal hemoglobin interfere with the HbA1C  assay. Heterozygous hemoglobin variants (HbS, HgC, etc)do  not significantly interfere with this assay.   However, presence of multiple variants may affect accuracy.        ASSESSMENT    ICD-10-CM ICD-9-CM   1. Type 2 diabetes mellitus with other specified complication, with long-term current use of insulin  E11.69 250.80    Z79.4  "V58.67           PLAN  Diagnoses and all orders for this visit:    Type 2 diabetes mellitus with other specified complication, with long-term current use of insulin  -     POCT Glucose, Hand-Held Device            Reviewed pathophysiology of diabetes, complications related to the disease, importance of annual dilated eye exam and daily foot examination. Explained MOA, SE, dosage of medications. Written instructions given and reviewed with patient and patient verbalizes understanding.     6/24/2024 - pump evaluation done on 4/30/24, at that time patient had decided on OP5, but states he really does not think he will do well with carb counting and is preferring a more "hands off" approach. Discussed iLet pump and patient would like to move forward with that. Overall glucoses stablizing, still with some hyperglycemia. Will increase Lantus to 33 units,  decrease humalog dose to 6 and increase Mounjaro to 10 mg with next refill.     8/12/2024 - iLet pump training tomorrow.    9/9/2024 - Doing well on iLet. Has occasional late meal announcement, discussed skipping announcement if over 30 minutes after start of meal.     12/4/2024 - A1c remains at goal.     3/10/2025 - Overall, there was a pattern of post prandial hyperglycemia. Hyperglycemia over last 3 days of report. Was out of town in [place] last week, eating out and higher carb meals, not entering "more than usual" in pump. Discussed using the "more than usual" for eating out and higher carb content meals. Labs prior to f/u in 3 months    6/10/2025 - s/p CVA on 3/18/25 with residual slurred speech, left sided weakness. In   PATIENT INSTRUCTIONS     Continue iLet insulin pump with Dexcom G7 CGM.   Continue Mounjaro 10 mg subcutaneously every 7 days.       U100 Pump Failure Plan:   We have decided to develop a plan in the event that your pump breaks or is nonfunctioning. After 4 hours without pump use, you should begin to consider putting your Pump Failure Back " Up Plan into action especially if you foresee that you may not be able to use your pump for more than 20 hours. Since you are currently using short acting insulin (Humalog/Novolog/Admelog/Novolin R) in your pump, you will need access to your short acting insulin vial, syringes, and a back up long acting insulin in the event of a pump failure. I have sent a prescription for a long acting insulin to your pharmacy for use during your emergency pump failure plan. It is important that you keep both types of insulin/syringes with you so that you may have access to it at least 3 times daily if needed. This medication and syringes should be brought with you on overnight trips in the case of an emergency pump failure. This means making a plan to keeping your insulin and syringes at school, work, or other places you frequent. If needed, please reach out to my office about any letters you may need for permission to keep these items for emergency purposes. We will outline your plan for pump failure emergencies below, but please remember to contact the insulin pump company (toll free number is printed on the label on the back of the insulin pump) and our office if you have a pump failure. When the insulin pump is restarted, do not restart basal rates until at least 22 hours after the last long acting insulin injection. You can set a 0% temporary basal setting that will last until this time and use your pump to bolus for meals and correction. If you need help with these feature, please call your insulin pump company tech support line or ask them in anticipation of this action.     Lantus 33 units daily.      Humalog to 6 units three times daily before meals, plus correction if needed.      If  - 250, ADD 2 units of Humalog  If  - 300, ADD 3 units of Humalog   If  - 350, ADD 4 units of Humalog  If  - 400, ADD 5 units of Humalog  If +, ADD 6 units of Humalog          Follow up in about 3 months (around  9/10/2025) for iLet, Virtual.

## 2025-06-24 ENCOUNTER — OFFICE VISIT (OUTPATIENT)
Dept: UROLOGY | Facility: CLINIC | Age: 65
End: 2025-06-24
Payer: COMMERCIAL

## 2025-06-24 VITALS
DIASTOLIC BLOOD PRESSURE: 83 MMHG | SYSTOLIC BLOOD PRESSURE: 134 MMHG | BODY MASS INDEX: 28.21 KG/M2 | HEIGHT: 71 IN | HEART RATE: 77 BPM | WEIGHT: 201.5 LBS

## 2025-06-24 DIAGNOSIS — N52.9 ERECTILE DYSFUNCTION, UNSPECIFIED ERECTILE DYSFUNCTION TYPE: Primary | ICD-10-CM

## 2025-06-24 DIAGNOSIS — Z12.5 PROSTATE CANCER SCREENING: ICD-10-CM

## 2025-06-24 PROCEDURE — 3008F BODY MASS INDEX DOCD: CPT | Mod: CPTII,S$GLB,, | Performed by: UROLOGY

## 2025-06-24 PROCEDURE — 3079F DIAST BP 80-89 MM HG: CPT | Mod: CPTII,S$GLB,, | Performed by: UROLOGY

## 2025-06-24 PROCEDURE — 3075F SYST BP GE 130 - 139MM HG: CPT | Mod: CPTII,S$GLB,, | Performed by: UROLOGY

## 2025-06-24 PROCEDURE — 1159F MED LIST DOCD IN RCRD: CPT | Mod: CPTII,S$GLB,, | Performed by: UROLOGY

## 2025-06-24 PROCEDURE — 4010F ACE/ARB THERAPY RXD/TAKEN: CPT | Mod: CPTII,S$GLB,, | Performed by: UROLOGY

## 2025-06-24 PROCEDURE — 3044F HG A1C LEVEL LT 7.0%: CPT | Mod: CPTII,S$GLB,, | Performed by: UROLOGY

## 2025-06-24 PROCEDURE — 1160F RVW MEDS BY RX/DR IN RCRD: CPT | Mod: CPTII,S$GLB,, | Performed by: UROLOGY

## 2025-06-24 PROCEDURE — 99214 OFFICE O/P EST MOD 30 MIN: CPT | Mod: S$GLB,,, | Performed by: UROLOGY

## 2025-06-24 PROCEDURE — 99999 PR PBB SHADOW E&M-EST. PATIENT-LVL IV: CPT | Mod: PBBFAC,,, | Performed by: UROLOGY

## 2025-06-24 RX ORDER — SILDENAFIL 100 MG/1
100 TABLET, FILM COATED ORAL DAILY PRN
Qty: 30 TABLET | Refills: 5 | Status: SHIPPED | OUTPATIENT
Start: 2025-06-24 | End: 2026-06-24

## 2025-06-24 NOTE — PROGRESS NOTES
Chief Complaint:  ED    HPI:   06/24/2025 - returns today for follow-up, unfortunately had a stroke in March, has some aphasia as well as some residual left-sided weakness but is in rehab and his neurologist feels like he will make a full recovery, has not had much occasion to utilize the ED medications but has some swallowing disorders because of the stroke and is not able to chew the gummies, no changes to his urination, no new urgency, denies hematuria or dysuria    12/20/2024 - 66 yo male that presents for evaluation of ED.  Patient notes that he underwent a liver transplant about 18 months ago and has had issues since that time.  Notes mostly issues maintaining an erection.  Is able to achieve and it is rigid enough for penetration but just does not last.  Has tried sildenafil/tadalafil gummies from an online supplier and these work well for him.  He denies any side effects.  He notes decent libido.  No voiding issues, has a good stream and feels like he empties.  Denies family history of  cancers.  Denies prior urologic procedures.  RAPHAEL - 3/2/2/3/2 = 12(mild-mod ED)    PMH:  Past Medical History:   Diagnosis Date    Abdominal hernia 02/03/2025    Hernia of abdominal cavity (Problem)      Acute blood loss anemia 08/12/2023    - H/H stable  - no overt signs of bleed  - continue to monitor with daily CBC      Adrenal cortical steroids causing adverse effect in therapeutic use 08/10/2023    Alcoholic cirrhosis     CVA (cerebral vascular accident)     DM (diabetes mellitus)     Dyslipidemia     Hepatocellular carcinoma     History of hepatocellular carcinoma, in remission after liver transplant 04/05/2023    Synoptic Report Procedure: Total hepatectomy. Histologic type: Hepatocellular carcinoma. Histologic grade: G1, well-differentiated. Tumor focality: Multifocal. Tumor characteristics: Tumor #1 (blocks 2B-2E): Tumor site: Right lobe. Tumor size: 6.2 cm in greatest dimension grossly. Treatment effect:  Complete necrosis (no viable tumor). Tumor #2 (blocks 2F-2H): Tumor site: Right lobe. Tumor size: 4.    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 ENDO (2ND FLR);  Service: Endoscopy;  Laterality: N/A;    CRANIOTOMY, WITH NEOPLASM EXCISION USING COMPUTER-ASSISTED NAVIGATION Left 10/26/2023    Procedure: CRANIOTOMY, WITH NEOPLASM EXCISION USING COMPUTER-ASSISTED NAVIGATION;  Surgeon: Jose E Degroot DO;  Location: [facility] OR 2ND FLR;  Service: Neurosurgery;  Laterality: Left;    ENDOSCOPIC ULTRASOUND OF UPPER GASTROINTESTINAL TRACT N/A 2/7/2025    Procedure: ULTRASOUND, UPPER GI TRACT, ENDOSCOPIC;  Surgeon: Christiano Landaverde MD;  Location: [facility] ENDO (2ND FLR);  Service: Endoscopy;  Laterality: N/A;    ERCP N/A 2/7/2025    Procedure: ERCP (ENDOSCOPIC RETROGRADE CHOLANGIOPANCREATOGRAPHY);  Surgeon: Christiano Landaverde MD;  Location: [facility] ENDO (2ND FLR);  Service: Endoscopy;  Laterality: N/A;    ERCP N/A 3/5/2025    Procedure: ERCP (ENDOSCOPIC RETROGRADE CHOLANGIOPANCREATOGRAPHY);  Surgeon: Jed Vance MD;  Location: [facility] ENDO (2ND FLR);  Service: Endoscopy;  Laterality: N/A;  2/11 glp1-plavix approved  te 2/11/2025-pt stated any Md- Repeat ERCP in 6 weeks to exchange stent. [email or web address]  2/25 precall attempt, no answer, left message. TH  2/27 - precall attempted, [facility] -   2/27: precall complete-    ESOPHAGOGASTRODUODENOSCOPY N/A 2/17/2025    Procedure: EGD (ESOPHAGOGASTRODUODENOSCOPY);  Surgeon: Jed Vance MD;  Location: [facility] ENDO (2ND FLR);  Service: Endoscopy;  Laterality: N/A;    HERNIA REPAIR N/A     LIVER TRANSPLANT N/A 8/9/2023    Procedure: TRANSPLANT, LIVER;  Surgeon:  Ameya Suero MD;  Location: [facility] OR [address] [place] [address];  Service: Transplant;  Laterality: N/A;       Family History:  No family history on file.    Social History:  Social History     Tobacco Use    Smoking status: Never    Smokeless tobacco: Never   Substance Use Topics    Alcohol use: Not Currently    Drug use: Never        Review of Systems:  General: No fever, chills  Skin: No rashes  Chest:  Denies cough and sputum production  Heart: Denies chest pain  Resp: Denies dyspnea  Abdomen: Denies diarrhea, abdominal pain, hematemesis, or blood in stool.  Musculoskeletal: No joint stiffness or swelling. Denies back pain.  : see HPI  Neuro: no dizziness or weakness    Allergies:  Patient has no known allergies.    Medications:    Current Outpatient Medications:     aspirin 81 MG Chew, Chew and swallow 1 tablet (81 mg total) by mouth once daily. Restart 11/9/23, Disp: 30 tablet, Rfl: 11    atorvastatin (LIPITOR) 40 MG tablet, Take 1 tablet (40 mg total) by mouth every evening., Disp: 90 tablet, Rfl: 3    blood sugar diagnostic Strp, Test blood glucose 3 (three) times daily., Disp: 100 strip, Rfl: 11    blood-glucose sensor (DEXCOM G7 SENSOR) Neelam, use to monitor blood glucose continuously. Change sensor every 10 days, Disp: 3 each, Rfl: 11    carvediloL (COREG) 12.5 MG tablet, Take 1 tablet (12.5 mg total) by mouth 2 (two) times daily with meals. HOLD if SBP < 125 or pulse < 60., Disp: 180 tablet, Rfl: 0    hydroCHLOROthiazide (HYDRODIURIL) 25 MG tablet, Take 1 tablet (25 mg total) by mouth once daily., Disp: 90 tablet, Rfl: 0    insulin glargine U-100, Lantus, (LANTUS SOLOSTAR U-100 INSULIN) 100 unit/mL (3 mL) InPn pen, Inject 33 Units into the skin once daily., Disp: 15 mL, Rfl: 11    insulin lispro 100 unit/mL injection, VIA ILET INSULIN PUMP, MAX ., Disp: 50 mL, Rfl: 11    losartan (COZAAR) 100 MG tablet, Take 1 tablet (100 mg total) by mouth once daily. HOLD if SBP < 120, Disp: 90 tablet, Rfl: 3    pantoprazole  "(PROTONIX) 40 MG tablet, Take 40 mg by mouth once daily., Disp: , Rfl:     pen needle, diabetic (BD ULTRA-FINE MERCEDES PEN NEEDLE) 32 gauge x 5/32" Ndle, Use to inject insulin into the skin 4 (four) times daily., Disp: 100 each, Rfl: 11    tacrolimus (PROGRAF) 1 MG Cap, Take 2 capsules (2 mg total) by mouth every 12 (twelve) hours., Disp: 120 capsule, Rfl: 11    tirzepatide 10 mg/0.5 mL PnIj, Inject 10 mg (one pen) into the skin every 7 days., Disp: 2 mL, Rfl: 11    ursodioL (ACTIGALL) 300 mg capsule, Take 1 capsule (300 mg total) by mouth 2 (two) times daily., Disp: 60 capsule, Rfl: 2    Physical Exam:  Vitals:    06/24/25 1006   BP: 134/83   Pulse: 77     Body mass index is 28.1 kg/m².  General: awake, alert, cooperative  Head: NC/AT  Ears: external ears normal  Eyes: sclera normal  Lungs: normal inspiration, NAD  Heart: well-perfused  Abdomen: Soft, NT, ND  : Normal circ'd phallus, meatus normal in size and position, BL testicles palpable, no masses, nontender, no abnormalities of epididymi  MATEUSZ: Normal rectal tone, no hemorrhoids. Prostate smooth and normal, no nodules 40 gm SV not palpable. Perineum and anus normal.  Lymphatic: groin nodes negative  Skin: The skin is warm and dry  Ext: No c/c/e.  Neuro: grossly intact, AOx3    RADIOLOGY:  No recent relevant imaging available for review.    LABS:  I personally reviewed the following lab values:  Lab Results   Component Value Date    WBC 5.58 05/29/2025    HGB 13.9 (L) 05/29/2025    HCT 43.8 05/29/2025     (L) 05/29/2025     05/29/2025     05/29/2025    K 4.1 05/29/2025    K 4.0 05/29/2025     05/29/2025     05/29/2025    CREATININE 1.0 05/29/2025    CREATININE 0.9 05/29/2025    BUN 18 05/29/2025    BUN 18 05/29/2025    CO2 27 05/29/2025    CO2 28 05/29/2025    TSH 2.096 06/15/2023    PSA 0.49 12/16/2024    INR 1.0 02/17/2025    HGBA1C 6.9 (H) 05/29/2025    CHOL 163 12/16/2024    TRIG 150 12/16/2024    HDL 34 (L) 12/16/2024    ALT 16 " 05/29/2025    AST 20 05/29/2025     Assessment/Plan:   Jed Chávez is a 65 y.o. male with:    ED - switch to sildenafil 100 mg, side effects reviewed    Prostate cancer screening - PSA and MATEUSZ normal, recheck PSA in six months    CVA - complicates his care    Jed Yu MD  Urology

## 2025-06-27 DIAGNOSIS — Z00.6 RESEARCH STUDY PATIENT: Primary | ICD-10-CM

## 2025-06-30 ENCOUNTER — RESEARCH ENCOUNTER (OUTPATIENT)
Dept: RESEARCH | Facility: HOSPITAL | Age: 65
End: 2025-06-30
Payer: COMMERCIAL

## 2025-06-30 ENCOUNTER — LAB VISIT (OUTPATIENT)
Dept: LAB | Facility: HOSPITAL | Age: 65
End: 2025-06-30
Attending: INTERNAL MEDICINE
Payer: COMMERCIAL

## 2025-06-30 DIAGNOSIS — R79.89 ELEVATED LFTS: ICD-10-CM

## 2025-06-30 DIAGNOSIS — Z85.05 HISTORY OF HEPATOCELLULAR CARCINOMA: Chronic | ICD-10-CM

## 2025-06-30 DIAGNOSIS — Z00.6 RESEARCH STUDY PATIENT: ICD-10-CM

## 2025-06-30 DIAGNOSIS — Z94.4 STATUS POST LIVER TRANSPLANT: ICD-10-CM

## 2025-06-30 LAB
ABSOLUTE EOSINOPHIL (OHS): 0.08 K/UL
ABSOLUTE MONOCYTE (OHS): 0.4 K/UL (ref 0.3–1)
ABSOLUTE NEUTROPHIL COUNT (OHS): 3.58 K/UL (ref 1.8–7.7)
AFP SERPL-MCNC: <2 NG/ML
ALBUMIN SERPL BCP-MCNC: 4 G/DL (ref 3.5–5.2)
ALP SERPL-CCNC: 81 UNIT/L (ref 40–150)
ALT SERPL W/O P-5'-P-CCNC: 14 UNIT/L (ref 10–44)
ANION GAP (OHS): 10 MMOL/L (ref 8–16)
AST SERPL-CCNC: 15 UNIT/L (ref 11–45)
BASOPHILS # BLD AUTO: 0.02 K/UL
BASOPHILS NFR BLD AUTO: 0.3 %
BILIRUB SERPL-MCNC: 1.6 MG/DL (ref 0.1–1)
BUN SERPL-MCNC: 15 MG/DL (ref 8–23)
CALCIUM SERPL-MCNC: 9.2 MG/DL (ref 8.7–10.5)
CHLORIDE SERPL-SCNC: 105 MMOL/L (ref 95–110)
CO2 SERPL-SCNC: 27 MMOL/L (ref 23–29)
CREAT SERPL-MCNC: 0.9 MG/DL (ref 0.5–1.4)
ERYTHROCYTE [DISTWIDTH] IN BLOOD BY AUTOMATED COUNT: 15 % (ref 11.5–14.5)
GFR SERPLBLD CREATININE-BSD FMLA CKD-EPI: >60 ML/MIN/1.73/M2
GLUCOSE SERPL-MCNC: 178 MG/DL (ref 70–110)
HCT VFR BLD AUTO: 41.5 % (ref 40–54)
HGB BLD-MCNC: 13.6 GM/DL (ref 14–18)
IMM GRANULOCYTES # BLD AUTO: 0.01 K/UL (ref 0–0.04)
IMM GRANULOCYTES NFR BLD AUTO: 0.2 % (ref 0–0.5)
LYMPHOCYTES # BLD AUTO: 1.81 K/UL (ref 1–4.8)
MCH RBC QN AUTO: 26.7 PG (ref 27–31)
MCHC RBC AUTO-ENTMCNC: 32.8 G/DL (ref 32–36)
MCV RBC AUTO: 82 FL (ref 82–98)
NUCLEATED RBC (/100WBC) (OHS): 0 /100 WBC
PLATELET # BLD AUTO: 117 K/UL (ref 150–450)
PMV BLD AUTO: 9.9 FL (ref 9.2–12.9)
POTASSIUM SERPL-SCNC: 3.6 MMOL/L (ref 3.5–5.1)
PROT SERPL-MCNC: 7.3 GM/DL (ref 6–8.4)
RBC # BLD AUTO: 5.09 M/UL (ref 4.6–6.2)
RELATIVE EOSINOPHIL (OHS): 1.4 %
RELATIVE LYMPHOCYTE (OHS): 30.7 % (ref 18–48)
RELATIVE MONOCYTE (OHS): 6.8 % (ref 4–15)
RELATIVE NEUTROPHIL (OHS): 60.6 % (ref 38–73)
SODIUM SERPL-SCNC: 142 MMOL/L (ref 136–145)
WBC # BLD AUTO: 5.9 K/UL (ref 3.9–12.7)

## 2025-06-30 PROCEDURE — 80197 ASSAY OF TACROLIMUS: CPT

## 2025-06-30 PROCEDURE — 36415 COLL VENOUS BLD VENIPUNCTURE: CPT

## 2025-06-30 PROCEDURE — 85025 COMPLETE CBC W/AUTO DIFF WBC: CPT

## 2025-06-30 PROCEDURE — 82105 ALPHA-FETOPROTEIN SERUM: CPT

## 2025-06-30 PROCEDURE — 80053 COMPREHEN METABOLIC PANEL: CPT

## 2025-06-30 NOTE — PROGRESS NOTES
RESEARCH STUDY SPECIMEN COLLECTION ENCOUNTER  ORGAN TRANSPLANT  [facility]    Study Title: Role of Tumor-Induced Immune Tolerance in the Patient Response to Locoregional Therapy: Implications in Assessment Risk of Hepatocellular Carcinoma Recurrence Following Liver Transplantation    IRB #: [id number]    IRB Approval Date: 6/8/2016    : Ameya Suero MD  Sub-investigator: Cecil Reilly, PhD    Patient Number: [id number]    In accordance with the study protocol, [facility] orders were placed and follow-up specimens were collected on (date: 06/30/2025) in:  [facility] VNP: YES/NO: No  [facility] LABTX: YES/NO: No  [facility] IM: YES/NO: No  Other [facility] location: YES/NO: Yes   Location: [facility]    A  was used to transport blood specimens to [facility] for processing: YES/NO: Yes  Blood specimens were transported to [facility] for processing: YES/NO: Yes    Rob Tee  Admin Research- Liver Transplant

## 2025-07-01 LAB — TACROLIMUS BLD-MCNC: 10.3 NG/ML (ref 5–15)

## 2025-07-02 ENCOUNTER — ANESTHESIA EVENT (OUTPATIENT)
Dept: ENDOSCOPY | Facility: HOSPITAL | Age: 65
End: 2025-07-02
Payer: COMMERCIAL

## 2025-07-02 ENCOUNTER — ANESTHESIA (OUTPATIENT)
Dept: ENDOSCOPY | Facility: HOSPITAL | Age: 65
End: 2025-07-02
Payer: COMMERCIAL

## 2025-07-02 ENCOUNTER — HOSPITAL ENCOUNTER (OUTPATIENT)
Facility: HOSPITAL | Age: 65
Discharge: HOME | End: 2025-07-02
Attending: INTERNAL MEDICINE | Admitting: INTERNAL MEDICINE
Payer: COMMERCIAL

## 2025-07-02 VITALS
HEIGHT: 72 IN | BODY MASS INDEX: 27.63 KG/M2 | SYSTOLIC BLOOD PRESSURE: 122 MMHG | RESPIRATION RATE: 21 BRPM | TEMPERATURE: 97 F | WEIGHT: 204 LBS | DIASTOLIC BLOOD PRESSURE: 74 MMHG | OXYGEN SATURATION: 98 % | HEART RATE: 66 BPM

## 2025-07-02 DIAGNOSIS — Z46.89 ENCOUNTER FOR REMOVAL OF BILIARY STENT: ICD-10-CM

## 2025-07-02 LAB
POCT GLUCOSE: 214 MG/DL (ref 70–110)
POCT GLUCOSE: 237 MG/DL (ref 70–110)

## 2025-07-02 PROCEDURE — 27201674 HC SPHINCTERTOME: Performed by: INTERNAL MEDICINE

## 2025-07-02 PROCEDURE — 63600175 PHARM REV CODE 636 W HCPCS: Performed by: INTERNAL MEDICINE

## 2025-07-02 PROCEDURE — C1769 GUIDE WIRE: HCPCS | Performed by: INTERNAL MEDICINE

## 2025-07-02 PROCEDURE — 74328 X-RAY BILE DUCT ENDOSCOPY: CPT | Mod: 26,,, | Performed by: INTERNAL MEDICINE

## 2025-07-02 PROCEDURE — 74328 X-RAY BILE DUCT ENDOSCOPY: CPT | Mod: TC | Performed by: INTERNAL MEDICINE

## 2025-07-02 PROCEDURE — 27201014 HC GRASPER DEVICE: Performed by: INTERNAL MEDICINE

## 2025-07-02 PROCEDURE — 43264 ERCP REMOVE DUCT CALCULI: CPT | Performed by: INTERNAL MEDICINE

## 2025-07-02 PROCEDURE — 25000003 PHARM REV CODE 250: Performed by: INTERNAL MEDICINE

## 2025-07-02 PROCEDURE — 43262 ENDO CHOLANGIOPANCREATOGRAPH: CPT | Mod: 51,,, | Performed by: INTERNAL MEDICINE

## 2025-07-02 PROCEDURE — 43262 ENDO CHOLANGIOPANCREATOGRAPH: CPT | Performed by: INTERNAL MEDICINE

## 2025-07-02 PROCEDURE — 43275 ERCP REMOVE FORGN BODY DUCT: CPT | Performed by: INTERNAL MEDICINE

## 2025-07-02 PROCEDURE — 27202125 HC BALLOON, EXTRACTION (ANY): Performed by: INTERNAL MEDICINE

## 2025-07-02 PROCEDURE — 43275 ERCP REMOVE FORGN BODY DUCT: CPT | Mod: ,,, | Performed by: INTERNAL MEDICINE

## 2025-07-02 PROCEDURE — 25500020 PHARM REV CODE 255: Performed by: INTERNAL MEDICINE

## 2025-07-02 PROCEDURE — 37000009 HC ANESTHESIA EA ADD 15 MINS: Performed by: INTERNAL MEDICINE

## 2025-07-02 PROCEDURE — 63600175 PHARM REV CODE 636 W HCPCS: Performed by: NURSE ANESTHETIST, CERTIFIED REGISTERED

## 2025-07-02 PROCEDURE — 37000008 HC ANESTHESIA 1ST 15 MINUTES: Performed by: INTERNAL MEDICINE

## 2025-07-02 PROCEDURE — 43264 ERCP REMOVE DUCT CALCULI: CPT | Mod: 51,,, | Performed by: INTERNAL MEDICINE

## 2025-07-02 RX ORDER — LIDOCAINE HYDROCHLORIDE 10 MG/ML
INJECTION, SOLUTION EPIDURAL; INFILTRATION; INTRACAUDAL; PERINEURAL
Status: DISCONTINUED | OUTPATIENT
Start: 2025-07-02 | End: 2025-07-02

## 2025-07-02 RX ORDER — FENTANYL CITRATE 50 UG/ML
25 INJECTION, SOLUTION INTRAMUSCULAR; INTRAVENOUS EVERY 5 MIN PRN
Status: DISCONTINUED | OUTPATIENT
Start: 2025-07-02 | End: 2025-07-02 | Stop reason: HOSPADM

## 2025-07-02 RX ORDER — CIPROFLOXACIN 500 MG/1
500 TABLET, FILM COATED ORAL 2 TIMES DAILY
Qty: 10 TABLET | Refills: 0 | Status: SHIPPED | OUTPATIENT
Start: 2025-07-02 | End: 2025-07-07

## 2025-07-02 RX ORDER — DIPHENHYDRAMINE HYDROCHLORIDE 50 MG/ML
25 INJECTION, SOLUTION INTRAMUSCULAR; INTRAVENOUS EVERY 6 HOURS PRN
Status: DISCONTINUED | OUTPATIENT
Start: 2025-07-02 | End: 2025-07-02 | Stop reason: HOSPADM

## 2025-07-02 RX ORDER — GLUCAGON 1 MG
1 KIT INJECTION
Status: DISCONTINUED | OUTPATIENT
Start: 2025-07-02 | End: 2025-07-02 | Stop reason: HOSPADM

## 2025-07-02 RX ORDER — HALOPERIDOL LACTATE 5 MG/ML
0.5 INJECTION, SOLUTION INTRAMUSCULAR EVERY 10 MIN PRN
Status: DISCONTINUED | OUTPATIENT
Start: 2025-07-02 | End: 2025-07-02 | Stop reason: HOSPADM

## 2025-07-02 RX ORDER — CIPROFLOXACIN 2 MG/ML
400 INJECTION, SOLUTION INTRAVENOUS ONCE
Status: COMPLETED | OUTPATIENT
Start: 2025-07-02 | End: 2025-07-02

## 2025-07-02 RX ORDER — PROPOFOL 10 MG/ML
VIAL (ML) INTRAVENOUS
Status: DISCONTINUED | OUTPATIENT
Start: 2025-07-02 | End: 2025-07-02

## 2025-07-02 RX ORDER — HYDROMORPHONE HYDROCHLORIDE 2 MG/ML
0.2 INJECTION, SOLUTION INTRAMUSCULAR; INTRAVENOUS; SUBCUTANEOUS EVERY 5 MIN PRN
Status: DISCONTINUED | OUTPATIENT
Start: 2025-07-02 | End: 2025-07-02 | Stop reason: HOSPADM

## 2025-07-02 RX ORDER — SODIUM CHLORIDE 0.9 % (FLUSH) 0.9 %
3 SYRINGE (ML) INJECTION
Status: DISCONTINUED | OUTPATIENT
Start: 2025-07-02 | End: 2025-07-02 | Stop reason: HOSPADM

## 2025-07-02 RX ORDER — SODIUM CHLORIDE 9 MG/ML
INJECTION, SOLUTION INTRAVENOUS CONTINUOUS
Status: DISCONTINUED | OUTPATIENT
Start: 2025-07-02 | End: 2025-07-02 | Stop reason: HOSPADM

## 2025-07-02 RX ORDER — PROCHLORPERAZINE EDISYLATE 5 MG/ML
5 INJECTION INTRAMUSCULAR; INTRAVENOUS EVERY 30 MIN PRN
Status: DISCONTINUED | OUTPATIENT
Start: 2025-07-02 | End: 2025-07-02 | Stop reason: HOSPADM

## 2025-07-02 RX ADMIN — PROPOFOL 50 MG: 10 INJECTION, EMULSION INTRAVENOUS at 09:07

## 2025-07-02 RX ADMIN — PROPOFOL 100 MG: 10 INJECTION, EMULSION INTRAVENOUS at 09:07

## 2025-07-02 RX ADMIN — CIPROFLOXACIN 400 MG: 2 INJECTION, SOLUTION INTRAVENOUS at 10:07

## 2025-07-02 RX ADMIN — SODIUM CHLORIDE: 0.9 INJECTION, SOLUTION INTRAVENOUS at 09:07

## 2025-07-02 RX ADMIN — LIDOCAINE HYDROCHLORIDE 5 ML: 10 INJECTION, SOLUTION EPIDURAL; INFILTRATION; INTRACAUDAL; PERINEURAL at 09:07

## 2025-07-02 NOTE — PROVATION PATIENT INSTRUCTIONS
Discharge Summary/Instructions after an Endoscopic Procedure  Patient Name: Jed Chávez  Patient MRN: [id number]  Patient YOB: 1960 Wednesday, July 2, 2025  Jed Vance MD  Dear patient,  As a result of recent federal legislation (The Federal Cures Act), you may   receive lab or pathology results from your procedure in your [facility]   account before your physician is able to contact you. Your physician or   their representative will relay the results to you with their   recommendations at their soonest availability.  Thank you,  RESTRICTIONS:  During your procedure today, you received medications for sedation.  These   medications may affect your judgment, balance and coordination.  Therefore,   for 24 hours, you have the following restrictions:   - DO NOT drive a car, operate machinery, make legal/financial decisions,   sign important papers or drink alcohol.    ACTIVITY:  Today: no heavy lifting, straining or running due to procedural   sedation/anesthesia.  The following day: return to full activity including work.  DIET:  Eat and drink normally unless instructed otherwise.     TREATMENT FOR COMMON SIDE EFFECTS:  - Mild abdominal pain, nausea, belching, bloating or excessive gas:  rest,   eat lightly and use a heating pad.  - Sore Throat: treat with throat lozenges and/or gargle with warm salt   water.  - Because air was used during the procedure, expelling large amounts of air   from your rectum or belching is normal.  - If a bowel prep was taken, you may not have a bowel movement for 1-3 days.    This is normal.  SYMPTOMS TO WATCH FOR AND REPORT TO YOUR PHYSICIAN:  1. Abdominal pain or bloating, other than gas cramps.  2. Chest pain.  3. Back pain.  4. Signs of infection such as: chills or fever occurring within 24 hours   after the procedure.  5. Rectal bleeding, which would show as bright red, maroon, or black stools.   (A tablespoon of blood from the rectum is not serious, especially if    hemorrhoids are present.)  6. Vomiting.  7. Weakness or dizziness.  GO DIRECTLY TO THE NEAREST EMERGENCY ROOM IF YOU HAVE ANY OF THE FOLLOWING:      Difficulty breathing              Chills and/or fever over 101 F   Persistent vomiting and/or vomiting blood   Severe abdominal pain   Severe chest pain   Black, tarry stools   Bleeding- more than one tablespoon   Any other symptom or condition that you feel may need urgent attention  Your doctor recommends these additional instructions:  If any biopsies were taken, your doctors clinic will contact you in 1 to 2   weeks with any results.  - Discharge patient to home.   - Resume previous diet.   - Continue present medications.   - Cipro (ciprofloxacin) 500 mg PO BID for 5 days.   - Repeat ERCP PRN for retreatment.  For questions, problems or results please call your physician - Jed Vance MD at Work:  [phone].  [facility] [place], EMERGENCY ROOM PHONE NUMBER: [phone]  IF A COMPLICATION OR EMERGENCY SITUATION ARISES AND YOU ARE UNABLE TO REACH   YOUR PHYSICIAN - GO DIRECTLY TO THE EMERGENCY ROOM.  Jed Vance MD  7/2/2025 10:14:28 AM  This report has been verified and signed electronically.  Dear patient,  As a result of recent federal legislation (The Federal Cures Act), you may   receive lab or pathology results from your procedure in your [facility]   account before your physician is able to contact you. Your physician or   their representative will relay the results to you with their   recommendations at their soonest availability.  Thank you,  PROVATION

## 2025-07-02 NOTE — TRANSFER OF CARE
Anesthesia Transfer of Care Note    Patient: Jed Chávez    Procedure(s) Performed: Procedure(s) (LRB):  ERCP (ENDOSCOPIC RETROGRADE CHOLANGIOPANCREATOGRAPHY) (N/A)    Patient location: [facility]    Anesthesia Type: general    Transport from OR: Transported from OR on room air with adequate spontaneous ventilation    Post pain: adequate analgesia    Post assessment: no apparent anesthetic complications    Post vital signs: stable    Level of consciousness: awake    Nausea/Vomiting: no nausea/vomiting    Complications: none    Transfer of care protocol was followed    Last vitals: Visit Vitals  /73   Pulse 78   Temp 36.6 °C (97.9 °F)   Resp 18   Ht 6' (1.829 m)   Wt 92.5 kg (204 lb)   SpO2 97%   BMI 27.67 kg/m²

## 2025-07-02 NOTE — H&P
Short Stay Endoscopy History and Physical    PCP - EVELIN Pitt MD  Referring Physician - Jed Vance MD  [address] [facility]  [address] [facility]  [place] [address]    Procedure - ercp  ASA - per anesthesia  Mallampati - per anesthesia  History of Anesthesia problems - no  Family history Anesthesia problems -  no   Plan of anesthesia - General    HPI:  This is a 65 y.o. male here for evaluation of: stent removal    Reflux - no  Dysphagia - no  Abdominal pain - no  Diarrhea - no    ROS:  Constitutional: No fevers, chills, No weight loss  CV: No chest pain  Pulm: No cough, No shortness of breath  Ophtho: No vision changes  GI: see HPI  Derm: No rash    Medical History:  has a past medical history of Abdominal hernia (02/03/2025), Acute blood loss anemia (08/12/2023), Adrenal cortical steroids causing adverse effect in therapeutic use (08/10/2023), Alcoholic cirrhosis, CVA (cerebral vascular accident), DM (diabetes mellitus), Dyslipidemia, Hepatocellular carcinoma, History of hepatocellular carcinoma, in remission after liver transplant (04/05/2023), History of stroke (09/09/2016), HTN (hypertension), Intracranial hemorrhage, 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    Surgical History:  has a past surgical history that includes Hernia repair (N/A); Liver transplant (N/A, 8/9/2023); craniotomy, with neoplasm excision using computer-assisted navigation (Left, 10/26/2023); ERCP (N/A, 2/7/2025); Endoscopic ultrasound of upper gastrointestinal tract (N/A, 2/7/2025); Colonoscopy (N/A, 2/18/2025); ERCP (N/A, 3/5/2025); and Esophagogastroduodenoscopy (N/A, 2/17/2025).    Family History: family history is not on file..    Social History:  reports that he has never smoked. He has never used smokeless tobacco. He reports that he does not currently use alcohol. He reports that he does not use drugs.    Review of patient's allergies  indicates:  No Known Allergies    Medications:   Prescriptions Prior to Admission[1]    Physical Exam:    Vital Signs:   Vitals:    07/02/25 0858   BP: 124/73   Pulse: 68   Resp: 16   Temp: 98 °F (36.7 °C)       General Appearance: Well appearing in no acute distress    Labs:  Lab Results   Component Value Date    WBC 5.90 06/30/2025    HGB 13.6 (L) 06/30/2025    HCT 41.5 06/30/2025     (L) 06/30/2025    CHOL 163 12/16/2024    TRIG 150 12/16/2024    HDL 34 (L) 12/16/2024    ALT 14 06/30/2025    AST 15 06/30/2025     06/30/2025    K 3.6 06/30/2025     06/30/2025    CREATININE 0.9 06/30/2025    BUN 15 06/30/2025    CO2 27 06/30/2025    TSH 2.096 06/15/2023    PSA 0.49 12/16/2024    INR 1.0 02/17/2025    HGBA1C 6.9 (H) 05/29/2025       I have explained the risks and benefits of this endoscopic procedure to the patient including but not limited to bleeding, inflammation, infection, perforation, and death.      Jed Vance MD         [1]   Medications Prior to Admission   Medication Sig Dispense Refill Last Dose/Taking    aspirin 81 MG Chew Chew and swallow 1 tablet (81 mg total) by mouth once daily. Restart 11/9/23 30 tablet 11 7/1/2025    atorvastatin (LIPITOR) 40 MG tablet Take 1 tablet (40 mg total) by mouth every evening. 90 tablet 3 7/2/2025 Morning    blood-glucose sensor (DEXCOM G7 SENSOR) Neelam use to monitor blood glucose continuously. Change sensor every 10 days 3 each 11 7/2/2025 Morning    carvediloL (COREG) 12.5 MG tablet Take 1 tablet (12.5 mg total) by mouth 2 (two) times daily with meals. HOLD if SBP < 125 or pulse < 60. 180 tablet 0 7/2/2025 at  7:00 AM    hydroCHLOROthiazide (HYDRODIURIL) 25 MG tablet Take 1 tablet (25 mg total) by mouth once daily. 90 tablet 0 7/2/2025 Morning    insulin lispro 100 unit/mL injection VIA ILET INSULIN PUMP, MAX . 50 mL 11 7/2/2025 Morning    losartan (COZAAR) 100 MG tablet Take 1 tablet (100 mg total) by mouth once daily. HOLD if SBP < 120 90  "tablet 3 7/2/2025 Morning    tacrolimus (PROGRAF) 1 MG Cap Take 2 capsules (2 mg total) by mouth every 12 (twelve) hours. 120 capsule 11 7/2/2025 Morning    ursodioL (ACTIGALL) 300 mg capsule Take 1 capsule (300 mg total) by mouth 2 (two) times daily. 60 capsule 2 7/1/2025    blood sugar diagnostic Strp Test blood glucose 3 (three) times daily. 100 strip 11 Unknown    insulin glargine U-100, Lantus, (LANTUS SOLOSTAR U-100 INSULIN) 100 unit/mL (3 mL) InPn pen Inject 33 Units into the skin once daily. 15 mL 11 More than a month    pantoprazole (PROTONIX) 40 MG tablet Take 40 mg by mouth once daily.   Unknown    pen needle, diabetic (BD ULTRA-FINE MERCEDES PEN NEEDLE) 32 gauge x 5/32" Ndle Use to inject insulin into the skin 4 (four) times daily. 100 each 11 Unknown    sildenafiL (VIAGRA) 100 MG tablet Take 1 tablet (100 mg total) by mouth daily as needed. 30 tablet 5 Unknown    tirzepatide 10 mg/0.5 mL PnIj Inject 10 mg (one pen) into the skin every 7 days. 2 mL 11 6/18/2025     "

## 2025-07-02 NOTE — PROGRESS NOTES
MD Margot notified of BG of 237. MD is ok with patient proceeding with D/C home. No internevtions ordered at this time.

## 2025-07-02 NOTE — PROGRESS NOTES
Patient is ready for discharge. Patient stable alert and oriented. IVs removed. No complaints of pain. Discussed discharge plan. Reviewed medications and side effects, appointments, and answered questions with patient and family. [facility]

## 2025-07-02 NOTE — ANESTHESIA PREPROCEDURE EVALUATION
07/02/2025  Pre-operative evaluation for Procedure(s) (LRB):  ERCP (ENDOSCOPIC RETROGRADE CHOLANGIOPANCREATOGRAPHY) (N/A)    Jed Chávez is a 65 y.o. male s/p liver transplant here for ERCP and stent removal    Problem List[1]    Review of patient's allergies indicates:  No Known Allergies    Medications Ordered Prior to Encounter[2]    Past Surgical History:   Procedure Laterality Date    COLONOSCOPY N/A 2/18/2025    Procedure: COLONOSCOPY;  Surgeon: Abundio Gibbs MD;  Location: [facility] ENDO (2ND FLR);  Service: Endoscopy;  Laterality: N/A;    CRANIOTOMY, WITH NEOPLASM EXCISION USING COMPUTER-ASSISTED NAVIGATION Left 10/26/2023    Procedure: CRANIOTOMY, WITH NEOPLASM EXCISION USING COMPUTER-ASSISTED NAVIGATION;  Surgeon: Jose E Degroot DO;  Location: [facility] OR 2ND FLR;  Service: Neurosurgery;  Laterality: Left;    ENDOSCOPIC ULTRASOUND OF UPPER GASTROINTESTINAL TRACT N/A 2/7/2025    Procedure: ULTRASOUND, UPPER GI TRACT, ENDOSCOPIC;  Surgeon: Christiano Landaverde MD;  Location: [facility] (2ND FLR);  Service: Endoscopy;  Laterality: N/A;    ERCP N/A 2/7/2025    Procedure: ERCP (ENDOSCOPIC RETROGRADE CHOLANGIOPANCREATOGRAPHY);  Surgeon: Christiano Landaverde MD;  Location: [facility] ENDO (2ND FLR);  Service: Endoscopy;  Laterality: N/A;    ERCP N/A 3/5/2025    Procedure: ERCP (ENDOSCOPIC RETROGRADE CHOLANGIOPANCREATOGRAPHY);  Surgeon: Jed Vance MD;  Location: [facility] ENDO (2ND FLR);  Service: Endoscopy;  Laterality: N/A;  2/11 [id number] approved  te 2/11/2025-pt stated any Md- Repeat ERCP in 6 weeks to exchange stent. [id number]  2/25 precall attempt, no answer, left message.   2/27 - precall attempted, [facility] -   2/27: precall complete-    ESOPHAGOGASTRODUODENOSCOPY N/A 2/17/2025    Procedure: EGD (ESOPHAGOGASTRODUODENOSCOPY);  Surgeon: Jed Vance MD;  Location: [facility] ENDO (2ND FLR);  Service:  Endoscopy;  Laterality: N/A;    HERNIA REPAIR N/A     LIVER TRANSPLANT N/A 8/9/2023    Procedure: TRANSPLANT, LIVER;  Surgeon: Ameya Suero MD;  Location: [facility] OR [address] [place] [address];  Service: Transplant;  Laterality: N/A;       Social History[3]      CBC:   Recent Labs     06/30/25  0712   WBC 5.90   RBC 5.09   HGB 13.6*   HCT 41.5   *   MCV 82   MCH 26.7*   MCHC 32.8       CMP:   Recent Labs     06/30/25  0712      K 3.6      CO2 27   BUN 15   CREATININE 0.9   *   CALCIUM 9.2   ALBUMIN 4.0   PROT 7.3   ALKPHOS 81   ALT 14   AST 15   BILITOT 1.6*           Pre-op Assessment    I have reviewed the Patient Summary Reports.     I have reviewed the Nursing Notes. I have reviewed the NPO Status.      Review of Systems  Anesthesia Hx:  No problems with previous Anesthesia                Cardiovascular:     Hypertension                                          Pulmonary:  Pulmonary Normal                       Renal/:  Renal/ Normal                 Hepatic/GI:      Liver Disease,               Neurological:   CVA                                    Endocrine:  Diabetes               Physical Exam    Airway:  Mallampati: II   Mouth Opening: Normal  Tongue: Normal    Chest/Lungs:  Normal Respiratory Rate    Heart:  Rhythm: Regular Rhythm        Anesthesia Plan  Type of Anesthesia, risks & benefits discussed:    Anesthesia Type: Gen Natural Airway  Intra-op Monitoring Plan: Standard ASA Monitors  Induction:  IV  Informed Consent: Informed consent signed with the Patient and all parties understand the risks and agree with anesthesia plan.  All questions answered.   ASA Score: 3    Ready For Surgery From Anesthesia Perspective.     .           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    Anemia of chronic disease    Immunosuppression due to drug therapy    Atypical intracranial meningioma    S/P craniotomy    Liver transplant disorder    Hypomagnesemia    Thrombocytopenia    Melena    Upper gastrointestinal bleed    Insulin pump status    History of upper GI bleed    Dysphasia as late effect of cerebrovascular accident (CVA)    Hemiparesis affecting nondominant side as late effect of cerebrovascular accident    Dysphagia following cerebrovascular accident    Hyperlipidemia associated with type 2 diabetes mellitus   [2]   No current facility-administered medications on file prior to encounter.     Current Outpatient Medications on File Prior to Encounter   Medication Sig Dispense Refill    aspirin 81 MG Chew Chew and swallow 1 tablet (81 mg total) by mouth once daily. Restart 11/9/23 30 tablet 11    atorvastatin (LIPITOR) 40 MG tablet Take 1 tablet (40 mg total) by mouth every evening. 90 tablet 3    blood sugar diagnostic Strp Test blood glucose 3 (three) times daily. 100 strip 11    blood-glucose sensor (DEXCOM G7 SENSOR) Neelam use to monitor blood glucose continuously. Change sensor every 10 days 3 each 11    insulin glargine U-100, Lantus, (LANTUS SOLOSTAR U-100 INSULIN) 100 unit/mL (3 mL) InPn pen Inject 33 Units into the skin once daily. 15 mL 11    insulin lispro 100 unit/mL injection VIA ILET INSULIN PUMP, MAX . 50 mL 11    losartan (COZAAR) 100 MG tablet Take 1 tablet (100 mg total) by mouth once daily. HOLD if SBP < 120 90 tablet 3    pantoprazole (PROTONIX) 40 MG tablet Take 40 mg by mouth once daily.      pen needle, diabetic (BD ULTRA-FINE MERCEDES PEN NEEDLE) 32 gauge x 5/32" Ndle Use to inject insulin into the skin 4 (four) times daily. 100 each 11    " tacrolimus (PROGRAF) 1 MG Cap Take 2 capsules (2 mg total) by mouth every 12 (twelve) hours. 120 capsule 11    tirzepatide 10 mg/0.5 mL PnIj Inject 10 mg (one pen) into the skin every 7 days. 2 mL 11    ursodioL (ACTIGALL) 300 mg capsule Take 1 capsule (300 mg total) by mouth 2 (two) times daily. 60 capsule 2   [3]   Social History  Socioeconomic History    Marital status:    Tobacco Use    Smoking status: Never    Smokeless tobacco: Never   Substance and Sexual Activity    Alcohol use: Not Currently    Drug use: Never    Sexual activity: Not Currently     Partners: Female     Social Drivers of Health     Financial Resource Strain: Low Risk  (2/17/2025)    Overall Financial Resource Strain (CARDIA)     Difficulty of Paying Living Expenses: Not hard at all   Food Insecurity: No Food Insecurity (2/17/2025)    Hunger Vital Sign     Worried About Running Out of Food in the Last Year: Never true     Ran Out of Food in the Last Year: Never true   Transportation Needs: No Transportation Needs (2/14/2025)    PRAPARE - Transportation     Lack of Transportation (Medical): No     Lack of Transportation (Non-Medical): No   Physical Activity: Insufficiently Active (2/14/2025)    Exercise Vital Sign     Days of Exercise per Week: 3 days     Minutes of Exercise per Session: 30 min   Stress: No Stress Concern Present (2/17/2025)    [employer] - Occupational Stress Questionnaire     Feeling of Stress : Not at all   Recent Concern: Stress - Stress Concern Present (2/4/2025)    [employer] - Occupational Stress Questionnaire     Feeling of Stress : To some extent   Housing Stability: Low Risk  (2/17/2025)    Housing Stability Vital Sign     Unable to Pay for Housing in the Last Year: No     Homeless in the Last Year: No

## 2025-07-07 ENCOUNTER — RESULTS FOLLOW-UP (OUTPATIENT)
Dept: TRANSPLANT | Facility: HOSPITAL | Age: 65
End: 2025-07-07
Payer: COMMERCIAL

## 2025-07-07 DIAGNOSIS — Z85.05 HISTORY OF HEPATOCELLULAR CARCINOMA: ICD-10-CM

## 2025-07-07 DIAGNOSIS — Z94.4 STATUS POST LIVER TRANSPLANT: Primary | ICD-10-CM

## 2025-07-07 NOTE — TELEPHONE ENCOUNTER
ERCP repeat PRN  ----- Message from Ochoa Quesada MD sent at 7/7/2025 11:50 AM CDT -----  Results reviewed    ----- Message -----  From: Interface, Lab In [facility]  Sent: 7/2/2025  10:14 AM CDT  To: Ochoa Quesada MD

## 2025-07-07 NOTE — TELEPHONE ENCOUNTER
Pt notified via portal of stable labs and that no medication changes are needed. Repeat labs due 8/25/25 per protocol.   ----- Message from Ochoa Quesada MD sent at 7/7/2025 11:49 AM CDT -----  Results reviewed    ----- Message -----  From: Lab, Background User  Sent: 6/30/2025   7:38 AM CDT  To: Ochoa Quesada MD

## 2025-07-09 ENCOUNTER — OFFICE VISIT (OUTPATIENT)
Dept: INTERNAL MEDICINE | Facility: CLINIC | Age: 65
End: 2025-07-09
Payer: COMMERCIAL

## 2025-07-09 VITALS
BODY MASS INDEX: 27.77 KG/M2 | SYSTOLIC BLOOD PRESSURE: 138 MMHG | WEIGHT: 205 LBS | DIASTOLIC BLOOD PRESSURE: 77 MMHG | HEIGHT: 72 IN

## 2025-07-09 DIAGNOSIS — E11.69 HYPERLIPIDEMIA ASSOCIATED WITH TYPE 2 DIABETES MELLITUS: Chronic | ICD-10-CM

## 2025-07-09 DIAGNOSIS — I15.2 HYPERTENSION ASSOCIATED WITH TYPE 2 DIABETES MELLITUS: Primary | Chronic | ICD-10-CM

## 2025-07-09 DIAGNOSIS — Z79.4 TYPE 2 DIABETES MELLITUS WITH OTHER SPECIFIED COMPLICATION, WITH LONG-TERM CURRENT USE OF INSULIN: Chronic | ICD-10-CM

## 2025-07-09 DIAGNOSIS — I69.391 DYSPHAGIA AS LATE EFFECT OF CEREBROVASCULAR ACCIDENT (CVA): Chronic | ICD-10-CM

## 2025-07-09 DIAGNOSIS — E78.5 HYPERLIPIDEMIA ASSOCIATED WITH TYPE 2 DIABETES MELLITUS: Chronic | ICD-10-CM

## 2025-07-09 DIAGNOSIS — Z79.899 LONG TERM CURRENT USE OF DIURETIC: Chronic | ICD-10-CM

## 2025-07-09 DIAGNOSIS — E11.59 HYPERTENSION ASSOCIATED WITH TYPE 2 DIABETES MELLITUS: Primary | Chronic | ICD-10-CM

## 2025-07-09 DIAGNOSIS — E66.3 OVERWEIGHT (BMI 25.0-29.9): Chronic | ICD-10-CM

## 2025-07-09 DIAGNOSIS — E11.69 TYPE 2 DIABETES MELLITUS WITH OTHER SPECIFIED COMPLICATION, WITH LONG-TERM CURRENT USE OF INSULIN: Chronic | ICD-10-CM

## 2025-07-09 PROCEDURE — 98006 SYNCH AUDIO-VIDEO EST MOD 30: CPT | Mod: 95,,, | Performed by: FAMILY MEDICINE

## 2025-07-09 PROCEDURE — 3078F DIAST BP <80 MM HG: CPT | Mod: CPTII,95,, | Performed by: FAMILY MEDICINE

## 2025-07-09 PROCEDURE — 4010F ACE/ARB THERAPY RXD/TAKEN: CPT | Mod: CPTII,95,, | Performed by: FAMILY MEDICINE

## 2025-07-09 PROCEDURE — 3075F SYST BP GE 130 - 139MM HG: CPT | Mod: CPTII,95,, | Performed by: FAMILY MEDICINE

## 2025-07-09 PROCEDURE — 3044F HG A1C LEVEL LT 7.0%: CPT | Mod: CPTII,95,, | Performed by: FAMILY MEDICINE

## 2025-07-09 PROCEDURE — 1159F MED LIST DOCD IN RCRD: CPT | Mod: CPTII,95,, | Performed by: FAMILY MEDICINE

## 2025-07-09 PROCEDURE — G2211 COMPLEX E/M VISIT ADD ON: HCPCS | Mod: 95,,, | Performed by: FAMILY MEDICINE

## 2025-07-09 PROCEDURE — 1160F RVW MEDS BY RX/DR IN RCRD: CPT | Mod: CPTII,95,, | Performed by: FAMILY MEDICINE

## 2025-07-09 RX ORDER — CHLORTHALIDONE 25 MG/1
25 TABLET ORAL DAILY
Qty: 90 TABLET | Refills: 1 | Status: SHIPPED | OUTPATIENT
Start: 2025-07-09

## 2025-07-09 RX ORDER — TIRZEPATIDE 12.5 MG/.5ML
12.5 INJECTION, SOLUTION SUBCUTANEOUS
Qty: 2 ML | Refills: 4 | Status: SHIPPED | OUTPATIENT
Start: 2025-07-09

## 2025-07-09 RX ORDER — VALSARTAN 320 MG/1
320 TABLET ORAL DAILY
Qty: 90 TABLET | Refills: 3 | Status: SHIPPED | OUTPATIENT
Start: 2025-07-09 | End: 2026-07-09

## 2025-07-09 RX ORDER — POTASSIUM CHLORIDE 750 MG/1
10 TABLET, EXTENDED RELEASE ORAL 2 TIMES DAILY
Qty: 180 TABLET | Refills: 1 | Status: SHIPPED | OUTPATIENT
Start: 2025-07-09

## 2025-07-09 NOTE — Clinical Note
Schedule fasting labs around same time as MRI (Friday, September 12th at 9:30 AM) at same location. Assist with ENT Referral

## 2025-07-09 NOTE — PROGRESS NOTES
TELEMEDICINE VIRTUAL VIDEO VISIT  7/9/25  2:00 PM CDT    Visit Type: Audiovisual    Patient's Location: Jed represents that they are located within the state [facility].    CHIEF COMPLAINT: Follow-up multiple problems     Hypertension associated with type 2 diabetes mellitus: He presented for follow-up of chronic conditions, specifically blood pressure. He reports home blood pressure readings averaging 138/77, with the highest at 175/81. Most readings have been in the 130s and low 140s, which he notes is a significant improvement compared to previous months. He is tolerating his current blood pressure medications well. Office blood pressure measurements over the last six encounters ranged from 122/74 to 172/88, with the most recent on 07/09/25 at 138/77. Systolic blood pressure remains higher than optimal, while diastolic values are within range. Laboratory results from 06/30/25 show normal renal function (EGFR >60, creatinine 0.9, BUN 15) and electrolytes (K 3.6, Na 142, Cl 105). An EKG from 02/14/25 demonstrated a right bundle branch block but no significant change compared to prior studies. I discontinued losartan and initiated valsartan 320 mg daily for improved kidney protection. I also discontinued hydrochlorothiazide and started chlorthalidone 25 mg daily for a more potent blood pressure lowering effect. Potassium chloride supplementation was added due to low-normal potassium and increased risk of hypokalemia with chlorthalidone. Orders placed include a microalbumin/creatinine ratio, urine. He is scheduled to follow up with SRINI King on 8/25/25 to re-evaluate blood pressure.    Long term current use of diuretic: He continues long-term use of a diuretic for blood pressure management. I transitioned him from hydrochlorothiazide to chlorthalidone 25 mg daily during this encounter, and potassium chloride 10 mEq twice daily was initiated due to the increased risk of hypokalemia with chlorthalidone.  Laboratory monitoring includes serum potassium, which was 3.6 on 06/30/25. These changes were reviewed with him, and he is tolerating the medication regimen.    Type 2 diabetes mellitus with other specified complication, with long-term current use of insulin: He reports blood sugar levels ranging from 170 to 295, which are above target. He denies hypoglycemic episodes and is tolerating tirzepatide (Mounjaro) well. Hemoglobin A1c values have trended upward, with the most recent at 6.9 on 05/29/25. Glucose levels on 06/30/25 were 178. Complications include vascular disease (prior CVA), hypertension, and hyperlipidemia. I increased tirzepatide to 12.5 mg weekly for improved glycemic control and continued insulin glargine and insulin lispro. Orders placed include a microalbumin/creatinine ratio, urine, a lipid panel, and hemoglobin A1c. He is scheduled to follow up with the diabetes clinic on 9/15/25.    Hyperlipidemia associated with type 2 diabetes mellitus: He is being monitored for hyperlipidemia, with recent LDL cholesterol of 99.0 on 12/16/24 and HDL of 34. He continues atorvastatin 40 mg daily. A comprehensive metabolic panel and lipid panel were ordered for ongoing assessment. No changes were made to his lipid-lowering regimen during this encounter.    Overweight (BMI 25.0-29.9): His weight has been stable over the past few months, with a slight recent increase after significant weight loss earlier in the year. Weight measurements include 93 kg (205 lb) on 07/09/25, with a calculated BMI of 27.8. He is 6 feet tall. The current plan includes ongoing monitoring of weight and encouragement of lifestyle modifications as appropriate.    Dysphagia as late effect of cerebrovascular accident (CVA): He continues to experience dysphagia following a prior stroke and is receiving speech therapy three times weekly at [facility]. He has undergone two swallow function studies. The speech pathologist recommended  an ENT consultation, and I placed an ambulatory referral to ENT Dr. Lara for evaluation of swallowing function. He was advised [facility] or by calling the appointment desk.    Follow-up instructions include scheduling a future appointment with me in about 8 months (around 3/9/26) for an office visit. He is scheduled to follow up on 8/25/25 for blood pressure re-evaluation, with laboratory testing on the same day. He is scheduled for laboratory testing and MRI on 9/12/25, and a diabetes clinic visit on 9/15/25. He was instructed to schedule an ENT appointment with Dr. Lara for evaluation of swallowing function.    1. Hypertension associated with type 2 diabetes mellitus  Assessment & Plan:  BP Readings from Last 6 Encounters:   07/09/25 138/77   07/02/25 122/74   06/24/25 134/83   06/10/25 123/79   03/12/25 (!) 145/92   03/05/25 (!) 172/88     Lab Results   Component Value Date    EGFRNORACEVR >60 06/30/2025    CREATININE 0.9 06/30/2025    BUN 15 06/30/2025    K 3.6 06/30/2025     06/30/2025     06/30/2025     Results for orders placed or performed during the hospital encounter of 02/14/25   EKG 12-lead    Collection Time: 02/14/25  6:08 PM   Result Value Ref Range    QRS Duration 134 ms    OHS QTC Calculation 493 ms    Narrative    Test Reason : K92.2,    Vent. Rate :  86 BPM     Atrial Rate :  86 BPM     P-R Int : 192 ms          QRS Dur : 134 ms      QT Int : 412 ms       P-R-T Axes :  35  51   9 degrees    QTcB Int : 493 ms    Normal sinus rhythm  Right bundle branch block  Abnormal ECG  When compared with ECG of 24-Oct-2023 11:45,  No significant change was found  Confirmed by Kori Hinton ([id number]) on 2/16/2025 4:13:04 PM    Referred By: AAAREFERRAL SELF           Confirmed By: Kori Hinton        Orders:  -     valsartan (DIOVAN) 320 MG tablet; Take 1 tablet (320 mg total) by mouth once daily.  Dispense: 90 tablet; Refill: 3  -     chlorthalidone  (HYGROTEN) 25 MG Tab; Take 1 tablet (25 mg total) by mouth once daily.  Dispense: 90 tablet; Refill: 1  -     Microalbumin/Creatinine Ratio, Urine; Standing  -     Comprehensive Metabolic Panel; Standing    2. Long term current use of diuretic  -     chlorthalidone (HYGROTEN) 25 MG Tab; Take 1 tablet (25 mg total) by mouth once daily.  Dispense: 90 tablet; Refill: 1  -     potassium chloride SA (K-DUR,KLOR-CON M) 10 MEQ tablet; Take 1 tablet (10 mEq total) by mouth 2 (two) times daily.  Dispense: 180 tablet; Refill: 1    3. Type 2 diabetes mellitus with other specified complication, with long-term current use of insulin  Overview:  Complications include vascular (prior CVA), hypertension, and hyperlipidemia.  Lab Results   Component Value Date    HGBA1C 6.9 (H) 05/29/2025    HGBA1C 6.3 (H) 12/16/2024    HGBA1C 5.9 (H) 10/10/2024    EGFRNORACEVR >60 06/30/2025    MICALBCREAT 13.3 12/16/2024    LDLCALC 99.0 12/16/2024        Orders:  -     tirzepatide (MOUNJARO) 12.5 mg/0.5 mL PnIj; Inject 12.5 mg into the skin every 7 days.  Dispense: 2 mL; Refill: 4  -     Microalbumin/Creatinine Ratio, Urine; Standing  -     Comprehensive Metabolic Panel; Standing  -     Lipid Panel; Standing  -     Hemoglobin A1C; Standing    4. Hyperlipidemia associated with type 2 diabetes mellitus  -     Comprehensive Metabolic Panel; Standing  -     Lipid Panel; Standing    5. Overweight (BMI 25.0-29.9)  Assessment & Plan:  Wt Readings from Last 6 Encounters:   07/09/25 93 kg (205 lb)   07/02/25 92.5 kg (204 lb)   06/24/25 91.4 kg (201 lb 8 oz)   06/10/25 91.4 kg (201 lb 8 oz)   03/12/25 99.4 kg (219 lb 2.2 oz)   03/05/25 100.7 kg (222 lb)     Estimated body mass index is 27.8 kg/m² as calculated from the following:    Height as of this encounter: 6' (1.829 m).    Weight as of this encounter: 93 kg (205 lb).        6. Dysphagia as late effect of cerebrovascular accident (CVA)  Assessment & Plan:  Speech pathologist recommended ENT  consultation.    Orders:  -     Ambulatory referral/consult to ENT; Future; Expected date: 07/16/2025       Unless specified otherwise, chronic conditions are represented as and appear to be compensated/controlled and stable.  Today's visit involved the intricate management of episodic problem(s) and the ongoing care for the patient's serious or complex condition(s) listed above, reflecting the inherent complexity of providing longitudinal, comprehensive evaluation and management as the central hub for the patient's primary care services.  Vitals:    07/09/25 1417   BP: 138/77   Weight: 93 kg (205 lb)   Height: 6' (1.829 m)     PHYSICAL EXAM:  GENERAL APPEARANCE:  - Alert and grossly oriented.  - No apparent distress, breathing comfortably.     EYES:  - Sclera without icterus.     EARS, NOSE, AND THROAT:  - No visible abnormalities.     RESPIRATORY:  - No respiratory distress.  - No audible wheezing or cough.     PSYCHIATRIC:  - Mood and affect appropriate; behavior cooperative.  Review of Systems   Constitutional:  Negative for activity change and unexpected weight change.   HENT:  Negative for hearing loss, rhinorrhea and trouble swallowing.    Eyes:  Negative for discharge and visual disturbance.   Respiratory:  Negative for chest tightness and wheezing.    Cardiovascular:  Negative for chest pain and palpitations.   Gastrointestinal:  Negative for blood in stool, constipation, diarrhea and vomiting.   Endocrine: Negative for polydipsia and polyuria.   Genitourinary:  Negative for difficulty urinating, hematuria and urgency.   Musculoskeletal:  Negative for arthralgias, joint swelling and neck pain.   Neurological:  Positive for weakness. Negative for headaches.   Psychiatric/Behavioral:  Negative for confusion and dysphoric mood.      I spent a total of 25 minutes today evaluating and managing this patient for this encounter.  This includes face to face time and non-face to face time preparing to see the patient  (eg, review of tests), obtaining and/or reviewing separately obtained history, documenting clinical information in the electronic or other health record, independently interpreting results and communicating results to the patient/family/caregiver, or care coordinator.  This time was exclusive of any separately billable procedures for this patient and exclusive of time spent treating any other patient.    Documentation entered by me for this encounter may have been done in part using ambient-listening and speech-recognition technologies. I have reviewed the content for accuracy, though errors in syntax, spelling, or similar-sounding words may be present and should be interpreted in context. Please contact the author for any clarification.     Each patient to whom medical services are provided by telemedicine is: (1) informed of the relationship between the physician and patient and the respective role of any other health care provider with respect to management of the patient; and (2) notified that he or she may decline to receive medical services by telemedicine and may withdraw from such care at any time.    Follow up in about 8 months (around 3/9/2026) for office visit; schedule labs 9/12/25.

## 2025-07-09 NOTE — ASSESSMENT & PLAN NOTE
BP Readings from Last 6 Encounters:   07/09/25 138/77   07/02/25 122/74   06/24/25 134/83   06/10/25 123/79   03/12/25 (!) 145/92   03/05/25 (!) 172/88     Lab Results   Component Value Date    EGFRNORACEVR >60 06/30/2025    CREATININE 0.9 06/30/2025    BUN 15 06/30/2025    K 3.6 06/30/2025     06/30/2025     06/30/2025     Results for orders placed or performed during the hospital encounter of 02/14/25   EKG 12-lead    Collection Time: 02/14/25  6:08 PM   Result Value Ref Range    QRS Duration 134 ms    OHS QTC Calculation 493 ms    Narrative    Test Reason : K92.2,    Vent. Rate :  86 BPM     Atrial Rate :  86 BPM     P-R Int : 192 ms          QRS Dur : 134 ms      QT Int : 412 ms       P-R-T Axes :  35  51   9 degrees    QTcB Int : 493 ms    Normal sinus rhythm  Right bundle branch block  Abnormal ECG  When compared with ECG of 24-Oct-2023 11:45,  No significant change was found  Confirmed by Kori Hinton ([id number]) on 2/16/2025 4:13:04 PM    Referred By: AAAREFERRAL SELF           Confirmed By: Kori Hinton

## 2025-07-09 NOTE — Clinical Note
Schedule fasting labs around same time as MRI (Friday, September 12th at 9:30 AM) at same location. Schedule OV with me in Feb-Mar.

## 2025-07-09 NOTE — ASSESSMENT & PLAN NOTE
Wt Readings from Last 6 Encounters:   07/09/25 93 kg (205 lb)   07/02/25 92.5 kg (204 lb)   06/24/25 91.4 kg (201 lb 8 oz)   06/10/25 91.4 kg (201 lb 8 oz)   03/12/25 99.4 kg (219 lb 2.2 oz)   03/05/25 100.7 kg (222 lb)     Estimated body mass index is 27.8 kg/m² as calculated from the following:    Height as of this encounter: 6' (1.829 m).    Weight as of this encounter: 93 kg (205 lb).

## 2025-07-11 NOTE — PROGRESS NOTES
2nd Req:TAMMY faxed to [facility] alt # for Colonoscopy  F:[phone]     Phone: [phone] [facility]  Date: 2025  Fax: [phone]      Physical Therapy    Daily Note    Patient Name: Quique Wray      : 1946  (79 y.o.)  MRN: [id number]      Assessment          Supine to Sit: Supervision      Sit to Stand: Stand by assistance, Supervision  Stand to Sit: Stand by assistance, Supervision               WB Status: WBAT  Ambulation  Surface: Level tile  Device: Rolling Walker  Assistance: Contact guard assistance, Stand by assistance (w/c follow)  Gait Deviations: Slow Pham  Distance: 50 ftx1; 125 ftx1              Assessment: Patient in bed upon arrival to room. Co-treat with CHRIS.  Supine to sit is supervision. Sitting balance to get dressed with CHRIS is good. Sit to stand from bed is SBA.  Ambulates wtih wheeled walker ~50 ftx1 before requesting to sit down due to fatigue.  Wheeled to therapy room following. Able to stand at walker for 3:17 with CGA/SBA for balance to play a game.  When finshed, he is able to ambulate ~125 ft back to room with wheeled walker to sit up in chair. Fatigue noted following. Call light in reach.  Safety Devices  Type of Devices: Call light within reach, Gait belt, Left in chair, Nurse notified          Call light in reach, Phone in reach, Use of Gait belt, Nurse Notified, and Left in chair  Time In: 1008  Time Out: 1057  Timed Coded Minutes: 22 ( co-treat with CHRIS)  Total Treatment Time: 49       Exercises:  See Flowsheets    Plan  Cont Per Plan Of Care    Goals  Short Term Goals  Time Frame for Short Term Goals: -  Short Term Goal 1: STG=LTG  Short Term Goal 2: -  Short Term Goal 3: -  Short Term Goal 4: -  Short Term Goal 5: -    Long Term Goals  Time Frame for Long Term Goals : 16 days - expires 25  Long Term Goal 1: Complete basic transfers in/OOB and chair/commode with supervision or CGA to safely return home at prior level  Long Term Goal 2: Ambulate with wh walker x 50-75ft with supervision to safely negotiate home  Present, unchanged

## 2025-07-14 ENCOUNTER — TELEPHONE (OUTPATIENT)
Dept: OTOLARYNGOLOGY | Facility: CLINIC | Age: 65
End: 2025-07-14
Payer: COMMERCIAL

## 2025-07-14 NOTE — TELEPHONE ENCOUNTER
Call placed back to patient's wife Leida in regards to questions/ concerns pf scope exam. I informed Mrs. Casarez the day of consult visit there will be a scope exam performed. Mrs. Casarez verbalized understanding.

## 2025-07-17 ENCOUNTER — OFFICE VISIT (OUTPATIENT)
Dept: OTOLARYNGOLOGY | Facility: CLINIC | Age: 65
End: 2025-07-17
Payer: COMMERCIAL

## 2025-07-17 DIAGNOSIS — I69.391 DYSPHAGIA AS LATE EFFECT OF CEREBROVASCULAR ACCIDENT (CVA): Primary | Chronic | ICD-10-CM

## 2025-07-17 DIAGNOSIS — R49.0 DYSPHONIA: ICD-10-CM

## 2025-07-17 PROCEDURE — 99999 PR PBB SHADOW E&M-EST. PATIENT-LVL III: CPT | Mod: PBBFAC,,, | Performed by: STUDENT IN AN ORGANIZED HEALTH CARE EDUCATION/TRAINING PROGRAM

## 2025-07-17 NOTE — PROGRESS NOTES
Chief complaint:    Chief Complaint   Patient presents with    Consult     Vocal cord evaluation, ST suspicious of vocal paralysis             Referring Provider:  EVELIN Pitt Md  [address] [place] [address]      History of present illness:     Mr. Chávez is a 65 y.o. presenting for evaluation of dysphagia.     Onset: March 19 acutely with stroke    [x]  Solid dysphagia   [x]  Liquid dysphagia  []  Coughing/choking with swallow  []  Delayed regurgitation or vomiting  []  Tobacco exposure  []  Unintentional weight loss  []  Recurrent pneumonia  []  Globus sensation  []  Sensation of pills getting stuck  []  Heartburn or chest tightness  [x]  Voice change - weak, slurred, nasal   []  Odynophagia  []  Otalgia  []  Neck mass  []  History of head & neck radiation  []  Neurologic disorder/symptoms    Treatment has included: ST      Work up has included:   SLP evaluation and tx      Hx crainiotomy for menigioma and liver transplant  History of CVA      History      Past Medical History:   Past Medical History:   Diagnosis Date    Abdominal hernia 02/03/2025    Hernia of abdominal cavity (Problem)      Acute blood loss anemia 08/12/2023    - H/H stable  - no overt signs of bleed  - continue to monitor with daily CBC      Adrenal cortical steroids causing adverse effect in therapeutic use 08/10/2023    Alcoholic cirrhosis     CVA (cerebral vascular accident)     DM (diabetes mellitus)     Dyslipidemia     Hepatocellular carcinoma     History of hepatocellular carcinoma, in remission after liver transplant 04/05/2023    Synoptic Report Procedure: Total hepatectomy. Histologic type: Hepatocellular carcinoma. Histologic grade: G1, well-differentiated. Tumor focality: Multifocal. Tumor characteristics: Tumor #1 (blocks 2B-2E): Tumor site: Right lobe. Tumor size: 6.2 cm in greatest dimension grossly. Treatment effect: Complete necrosis (no viable tumor). Tumor #2 (blocks 2F-2H): Tumor site: Right lobe.  Tumor size: 4.    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    S/P liver transplant 08/12/2023    Skin cancer     Stroke 04/2025         Past Surgical History:  Past Surgical History:   Procedure Laterality Date    COLONOSCOPY N/A 2/18/2025    Procedure: COLONOSCOPY;  Surgeon: Abundio Gibbs MD;  Location: [facility] ENDO (2ND FLR);  Service: Endoscopy;  Laterality: N/A;    CRANIOTOMY, WITH NEOPLASM EXCISION USING COMPUTER-ASSISTED NAVIGATION Left 10/26/2023    Procedure: CRANIOTOMY, WITH NEOPLASM EXCISION USING COMPUTER-ASSISTED NAVIGATION;  Surgeon: Jose E Degroot DO;  Location: [facility] OR 2ND FLR;  Service: Neurosurgery;  Laterality: Left;    ENDOSCOPIC ULTRASOUND OF UPPER GASTROINTESTINAL TRACT N/A 2/7/2025    Procedure: ULTRASOUND, UPPER GI TRACT, ENDOSCOPIC;  Surgeon: Christiano Landaverde MD;  Location: [facility] ENDO (2ND FLR);  Service: Endoscopy;  Laterality: N/A;    ERCP N/A 2/7/2025    Procedure: ERCP (ENDOSCOPIC RETROGRADE CHOLANGIOPANCREATOGRAPHY);  Surgeon: Christiano Landaverde MD;  Location: [facility] ENDO (2ND FLR);  Service: Endoscopy;  Laterality: N/A;    ERCP N/A 3/5/2025    Procedure: ERCP (ENDOSCOPIC RETROGRADE CHOLANGIOPANCREATOGRAPHY);  Surgeon: Jed Vance MD;  Location: [facility] ENDO (2ND FLR);  Service: Endoscopy;  Laterality: N/A;  2/11 glp1-plavix approved  te 2/11/2025-pt stated any Md- Repeat ERCP in 6 weeks to exchange stent. [id number]  2/25 precall attempt, no answer, left message. TH  2/27 - precall attempted, [facility] -   2/27: precall complete-    ERCP N/A 7/2/2025    Procedure: ERCP (ENDOSCOPIC RETROGRADE CHOLANGIOPANCREATOGRAPHY);  Surgeon: Jed Vance MD;  Location: [facility] ENDO (2ND FLR);  Service: Endoscopy;  Laterality: N/A;  Jed Vance MD  [facility] Schedulers  Caller: Unspecified (2 months ago)  Needs an ERCP and stent removal at [facility] in 4  months,  5/14/25-Tirzepatide, no longer taking Plavix, recovering from left humerus fracture, Dexcom,     ESOPHAGOGASTRODUODENOSCOPY N/A 2/17/2025    Procedure: EGD (ESOPHAGOGASTRODUODENOSCOPY);  Surgeon: Jed Vance MD;  Location: [facility] ([address] [place] [address]);  Service: Endoscopy;  Laterality: N/A;    HERNIA REPAIR N/A     LIVER TRANSPLANT N/A 8/9/2023    Procedure: TRANSPLANT, LIVER;  Surgeon: Ameya Suero MD;  [facility];  Service: Transplant;  Laterality: N/A;         Medications: Medication list reviewed. He  has a current medication list which includes the following prescription(s): aspirin, atorvastatin, blood sugar diagnostic, dexcom g7 sensor, carvedilol, chlorthalidone, lantus solostar u-100 insulin, insulin lispro, pantoprazole, pen needle, diabetic, potassium chloride sa, sildenafil, tacrolimus, mounjaro, ursodiol, and valsartan.     Allergies: Review of patient's allergies indicates:  No Known Allergies      Family history: family history is not on file.         Social History          Alcohol use:  reports that he does not currently use alcohol.            Tobacco:  reports that he has never smoked. He has never used smokeless tobacco.         Physical Examination      Vitals: There were no vitals taken for this visit.      General: Well developed, well nourished, well hydrated.   Voice: no hoarseness, no dysarthria      Head/Face: Normocephalic, atraumatic. No scars or lesions. Facial musculature equal.     Eyes: No scleral icterus or conjunctival hemorrhage. EOMI. PERRLA.     Ears:     Right ear: No gross deformity. EAC is clear of debris and erythema. TM are intact with a pneumatized middle ear. No signs of retraction, fluid or infection.      Left ear: No gross deformity. EAC is clear of debris and erythema. TM are intact with a pneumatized middle ear. No signs of retraction, fluid or infection.      Nose: No gross deformity or lesions. No purulent discharge. No significant NSD.       Mouth/Oropharynx: Lips without any lesions. No mucosal lesions within the oropharynx. No tonsillar exudate or lesions. Pharyngeal walls symmetrical. Uvula midline. Tongue midline without lesions.     Neck: Trachea midline. No masses. No thyromegaly or nodules palpated.     Lymphatic: No lymphadenopathy in the neck.     Extremities: No cyanosis. Warm and well-perfused.     Skin: No scars or lesions on face or neck.      Neurologic: Moving all extremities without gross abnormality.CN II-XII grossly intact. House-Brackmann 1/6. No signs of nystagmus.          Data reviewed      Review of records:      I reviewed records from the referring provider's office visits.  These describe the history, workup, and/or treatment of this problem thus far:       Review SLP notes and neuro notes        Imaging:      MRI brain  Stable examination. Postsurgical changes consistent with left frontal meningioma resection with adjacent encephalomalacia. No abnormal enhancement. Stable chronic left lacunar infarct. Stable atrophy with white matter degeneration.       Procedures:    Procedure -Transnasal fiberoptic laryngoscopy     Surgeon: Thomas Lara M.D. .      Anesthesia: topical 0.05% oxymetazoline with 4% lidocaine      Complications: None.     Description of Procedure: With the patient in the sitting position, topical lidocaine and oxymetazoline was applied to the nose. The scope was passed through the nose. Examination was carried out of the nose, nasopharynx, oropharynx, hypopharynx, and larynx with findings as noted above. Scope was removed. The patient tolerated the procedure well.      Findings: No masses or lesions in the nose, nasopharynx, oropharynx, hypopharynx, or larynx. Vocal fold abduction and adduction is normal. Moderate to severe pooling of secretions in the piriform sinuses with occasional penetration.         Assessment/Plan:      1. Dysphagia as late effect of cerebrovascular accident (CVA)    2. Dysphonia          Based on the patient's history and workup to this point I suspect the dysphagia is related to prior CVA, functional dysphagia.  We discussed how VC paralysis can impact in swallowing and voice, but, luckily, he does not have impaired vocal cord function.    I recommend continued work with SLP. F/u as needed.        Thomas Lara MD  [facility]   [facility] [address]  [place] [address]  P: [phone]  F: [phone]

## 2025-07-19 PROBLEM — Z79.899 LONG TERM CURRENT USE OF DIURETIC: Status: ACTIVE | Noted: 2025-07-19

## 2025-07-19 NOTE — ASSESSMENT & PLAN NOTE
Lab Results   Component Value Date    HGBA1C 6.9 (H) 05/29/2025    HGBA1C 6.3 (H) 12/16/2024    HGBA1C 5.9 (H) 10/10/2024    EGFRNORACEVR >60 06/30/2025    MICALBCREAT 13.3 12/16/2024    LDLCALC 99.0 12/16/2024

## 2025-07-21 ENCOUNTER — PATIENT MESSAGE (OUTPATIENT)
Dept: INTERNAL MEDICINE | Facility: CLINIC | Age: 65
End: 2025-07-21
Payer: COMMERCIAL

## 2025-07-22 ENCOUNTER — PATIENT MESSAGE (OUTPATIENT)
Dept: INTERNAL MEDICINE | Facility: CLINIC | Age: 65
End: 2025-07-22
Payer: COMMERCIAL

## 2025-07-22 ENCOUNTER — TELEPHONE (OUTPATIENT)
Dept: INTERNAL MEDICINE | Facility: CLINIC | Age: 65
End: 2025-07-22
Payer: COMMERCIAL

## 2025-07-22 NOTE — TELEPHONE ENCOUNTER
Copied from CRM #[id number]. Topic: General Inquiry - Return Call  >> Jul 22, 2025 10:11 AM Tayler wrote:  Type: Patient Call Back    Who called:pt wife    What is the request in detail:pt wife said the pt needs to have a driving evaluation. She wants to know if an order can be done without a visit since he just came in.    Can the clinic reply by [facility]?no    Would the patient rather a call back or a response via [facility]? Call back    Best call back number:Telephone Information:           [phone]    Additional Information:

## 2025-07-25 ENCOUNTER — TELEPHONE (OUTPATIENT)
Dept: INTERNAL MEDICINE | Facility: CLINIC | Age: 65
End: 2025-07-25
Payer: COMMERCIAL

## 2025-07-25 NOTE — TELEPHONE ENCOUNTER
"From previous message: "He may not need a referral order. If he does, have them do E-Visit for "Other/General" and provide name, phone, and fax of facility where they want referral sent."    If they are unable to do E-Visit, they can do VV with me or Alma Dunn PA-C.  "

## 2025-07-25 NOTE — TELEPHONE ENCOUNTER
"[facility] ([facility]) does driving evaluations.Have them contact [facility] to see if they accept his insurance. He may not need a referral order. If he does, have them do E-Visit for "Other/General" and provide name, phone, and fax of facility where they want referral sent.  "

## 2025-07-25 NOTE — TELEPHONE ENCOUNTER
Copied from CRM #[id number]. Topic: Appointments - Amb Referral  >> Jul 25, 2025 12:11 PM Alvino wrote:  ..Type: Orders Request    Is there a future appointment scheduled for the patient with PCP? Yes     When?02/2026    Would you prefer a response via [facility]? Either . Call back, [phone]    Comments: Leida the pt wife is asking that the order be sent over to [facility], pt is asking once order is sent over to send a message via [facility] .

## 2025-07-28 ENCOUNTER — OFFICE VISIT (OUTPATIENT)
Dept: TRANSPLANT | Facility: CLINIC | Age: 65
End: 2025-07-28
Payer: COMMERCIAL

## 2025-07-28 ENCOUNTER — PATIENT MESSAGE (OUTPATIENT)
Dept: TRANSPLANT | Facility: CLINIC | Age: 65
End: 2025-07-28

## 2025-07-28 DIAGNOSIS — Z29.89 PROPHYLACTIC IMMUNOTHERAPY: ICD-10-CM

## 2025-07-28 DIAGNOSIS — I77.1 STENOSIS OF HEPATIC ARTERY OF TRANSPLANTED LIVER: ICD-10-CM

## 2025-07-28 DIAGNOSIS — Z98.890 S/P CRANIOTOMY: ICD-10-CM

## 2025-07-28 DIAGNOSIS — I69.391 DYSPHAGIA AS LATE EFFECT OF CEREBROVASCULAR ACCIDENT (CVA): Chronic | ICD-10-CM

## 2025-07-28 DIAGNOSIS — Z94.4 STATUS POST LIVER TRANSPLANT: Primary | ICD-10-CM

## 2025-07-28 DIAGNOSIS — T86.49 STENOSIS OF HEPATIC ARTERY OF TRANSPLANTED LIVER: ICD-10-CM

## 2025-07-28 NOTE — PROGRESS NOTES
Subjective:       Patient ID: Jed Chávez is a 65 y.o. male.    Chief Complaint: No chief complaint on file.  The patient location is: [place]  The chief complaint leading to consultation is: Liver transplant    Visit type: audiovisual    Face to Face time with patient: 20 minutes of total time spent on the encounter, which includes face to face time and non-face to face time preparing to see the patient (eg, review of tests), Obtaining and/or reviewing separately obtained history, Documenting clinical information in the electronic or other health record, Independently interpreting results (not separately reported) and communicating results to the patient/family/caregiver, or Care coordination (not separately reported).     Each patient to whom he or she provides medical services by telemedicine is:  (1) informed of the relationship between the physician and patient and the respective role of any other health care provider with respect to management of the patient; and (2) notified that he or she may decline to receive medical services by telemedicine and may withdraw from such care at any time.    Notes:    HPI  I saw this 65 y.o. man by video visit. His wife was present during the consultation.    Transplant Date: 8/10/2023  UN Native Liver Dx: Primary Liver Malignancy: Hepatoma (HCC) and Cirrhosis     Jed is here for follow up of his liver transplant. He is now 1 year and 11 months post Tx.    He had a non traumatic intracerebral hemorrhage and a meningioma removed in Oct 2023.  - some memory issues since he would the meningioma removed.      ERCP 7/2/2025  - A previously placed stent had migrated into the                          biliary tree.                          - A biliary sphincterotomy was performed.                          - One stent was removed from the biliary tree.                          - The biliary tree was swept and sludge was found.      ORGAN: LIVER  Whole or Partial: whole liver  Donor  Type: donation after circulatory death   [facility] High Risk: no  Donor CMV Status: Positive  Donor HCV Status: Negative  Donor HBcAb: Negative  Biliary Anastomosis: end to end  Arterial Anatomy: standard  IVC reconstruction: end to end ivc  Portal vein status: patent      Review of Systems   Constitutional:  Negative for activity change, appetite change, chills, fatigue, fever and unexpected weight change.   HENT:  Negative for hearing loss.    Eyes:  Negative for discharge and visual disturbance.   Respiratory:  Negative for cough, chest tightness, shortness of breath and wheezing.    Cardiovascular:  Negative for chest pain, palpitations and leg swelling.   Gastrointestinal:  Negative for abdominal distention, abdominal pain, constipation, diarrhea and nausea.   Genitourinary:  Negative for dysuria and frequency.   Musculoskeletal:  Negative for arthralgias and back pain.   Skin:  Negative for pallor and rash.   Neurological:  Negative for dizziness, tremors, speech difficulty and headaches.   Hematological:  Negative for adenopathy.   Psychiatric/Behavioral:  Negative for agitation and confusion.            Lab Results   Component Value Date    ALT 14 06/30/2025    AST 15 06/30/2025     (H) 06/15/2023    ALKPHOS 81 06/30/2025    BILITOT 1.6 (H) 06/30/2025     Past Medical History:   Diagnosis Date    Abdominal hernia 02/03/2025    Hernia of abdominal cavity (Problem)      Acute blood loss anemia 08/12/2023    - H/H stable  - no overt signs of bleed  - continue to monitor with daily CBC      Adrenal cortical steroids causing adverse effect in therapeutic use 08/10/2023    Alcoholic cirrhosis     CVA (cerebral vascular accident)     DM (diabetes mellitus)     Dyslipidemia     Hepatocellular carcinoma     History of hepatocellular carcinoma, in remission after liver transplant 04/05/2023    Synoptic Report Procedure: Total hepatectomy. Histologic type: Hepatocellular carcinoma. Histologic grade: G1,  well-differentiated. Tumor focality: Multifocal. Tumor characteristics: Tumor #1 (blocks 2B-2E): Tumor site: Right lobe. Tumor size: 6.2 cm in greatest dimension grossly. Treatment effect: Complete necrosis (no viable tumor). Tumor #2 (blocks 2F-2H): Tumor site: Right lobe. Tumor size: 4.    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    S/P liver transplant 08/12/2023    Skin cancer     Stroke 04/2025     Past Surgical History:   Procedure Laterality Date    COLONOSCOPY N/A 2/18/2025    Procedure: COLONOSCOPY;  Surgeon: Abundio Gibbs MD;  Location: [facility] ENDO (2ND FLR);  Service: Endoscopy;  Laterality: N/A;    CRANIOTOMY, WITH NEOPLASM EXCISION USING COMPUTER-ASSISTED NAVIGATION Left 10/26/2023    Procedure: CRANIOTOMY, WITH NEOPLASM EXCISION USING COMPUTER-ASSISTED NAVIGATION;  Surgeon: Jose E Degroot DO;  Location: [facility] OR 2ND FLR;  Service: Neurosurgery;  Laterality: Left;    ENDOSCOPIC ULTRASOUND OF UPPER GASTROINTESTINAL TRACT N/A 2/7/2025    Procedure: ULTRASOUND, UPPER GI TRACT, ENDOSCOPIC;  Surgeon: Christiano Landaverde MD;  Location: [facility] END[facility] (2ND FLR);  Service: Endoscopy;  Laterality: N/A;    ERCP N/A 2/7/2025    Procedure: ERCP (ENDOSCOPIC RETROGRADE CHOLANGIOPANCREATOGRAPHY);  Surgeon: Christiano Landaverde MD;  Location: [facility] (2ND FLR);  Service: Endoscopy;  Laterality: N/A;    ERCP N/A 3/5/2025    Procedure: ERCP (ENDOSCOPIC RETROGRADE CHOLANGIOPANCREATOGRAPHY);  Surgeon: Jed Vance MD;  Location: [facility] (2ND FLR);  Service: Endoscopy;  Laterality: N/A;  2/11 glp1-plavix approved  te 2/11/2025-pt stated any Md- Repeat ERCP in 6 weeks to exchange stent. [id number]  2/25 precall attempt, no answer, left message. TH  2/27 - precall attempted, [facility]  2/27: precall complete-    ERCP N/A 7/2/2025    Procedure: ERCP (ENDOSCOPIC RETROGRADE CHOLANGIOPANCREATOGRAPHY);   "Surgeon: Jed Vance MD;  Location: [facility] ENDO (2ND FLR);  Service: Endoscopy;  Laterality: N/A;  Jed Vance MD  [facility] Schedulers  Caller: Unspecified (2 months ago)  Needs an ERCP and stent removal at [facility] in 4 months,  5/14/25-Tirzepatide, no longer taking Plavix, recovering from left humerus fracture, Dexcom,     ESOPHAGOGASTRODUODENOSCOPY N/A 2/17/2025    Procedure: EGD (ESOPHAGOGASTRODUODENOSCOPY);  Surgeon: Jed Vance MD;  Location: [facility] ENDO (2ND FLR);  Service: Endoscopy;  Laterality: N/A;    HERNIA REPAIR N/A     LIVER TRANSPLANT N/A 8/9/2023    Procedure: TRANSPLANT, LIVER;  Surgeon: Ameya Suero MD;  Location: [facility] OR 2ND FLR;  Service: Transplant;  Laterality: N/A;     Current Outpatient Medications   Medication Sig    aspirin 81 MG Chew Chew and swallow 1 tablet (81 mg total) by mouth once daily. Restart 11/9/23    atorvastatin (LIPITOR) 40 MG tablet Take 1 tablet (40 mg total) by mouth every evening.    blood sugar diagnostic Strp Test blood glucose 3 (three) times daily.    blood-glucose sensor (DEXCOM G7 SENSOR) Neelam use to monitor blood glucose continuously. Change sensor every 10 days    carvediloL (COREG) 12.5 MG tablet Take 1 tablet (12.5 mg total) by mouth 2 (two) times daily with meals. HOLD if SBP < 125 or pulse < 60.    chlorthalidone (HYGROTEN) 25 MG Tab Take 1 tablet (25 mg total) by mouth once daily.    insulin glargine U-100, Lantus, (LANTUS SOLOSTAR U-100 INSULIN) 100 unit/mL (3 mL) InPn pen Inject 33 Units into the skin once daily.    insulin lispro 100 unit/mL injection VIA ILET INSULIN PUMP: MAX TOTAL DAILY DOSE: 180 UNITS.    pantoprazole (PROTONIX) 40 MG tablet Take 40 mg by mouth once daily.    pen needle, diabetic (BD ULTRA-FINE MERCEDES PEN NEEDLE) 32 gauge x 5/32" Ndle Use to inject insulin into the skin 4 (four) times daily.    potassium chloride SA (K-DUR,KLOR-CON M) 10 MEQ tablet Take 1 tablet (10 mEq total) by mouth 2 (two) times daily.    sildenafiL " (VIAGRA) 100 MG tablet Take 1 tablet (100 mg total) by mouth daily as needed.    tacrolimus (PROGRAF) 1 MG Cap Take 2 capsules (2 mg total) by mouth every 12 (twelve) hours.    tirzepatide (MOUNJARO) 12.5 mg/0.5 mL PnIj Inject 12.5 mg into the skin every 7 days.    ursodioL (ACTIGALL) 300 mg capsule Take 1 capsule (300 mg total) by mouth 2 (two) times daily.    valsartan (DIOVAN) 320 MG tablet Take 1 tablet (320 mg total) by mouth once daily.     No current facility-administered medications for this visit.       Objective:      Physical Exam    NOT DONE-VIDEO VISIT    Assessment:       1. Status post liver transplant    2. 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 S/P craniotomy    4. Prophylactic immunotherapy    5. Dysphagia as late effect of cerebrovascular accident (CVA)        Plan:   He is doing reasonably well after his recent CVA. He can walk unaided and is attending rehab for PT and speech therapy.  -left hand weakness- broke shoulder when he fell after stroke    Labs next week  1) Last HCC screening labs in Feb 2025- will be repeated next week  2) Continue tacro 2 mg BID  3) On Carvedilol, aspirin + UDCA as well as tirzepatide and insulin pump.    Clinic in 1 year.      UNOS Patient Status  Functional Status: 100% - Normal, no complaints, no evidence of disease  Physical Capacity: Limited Mobility    Patient on life support: No  Diabetes: Type II  Any previous malignancy: Yes, Hepatocellular carcinoma  Neoadjuvant Therapy: unknown  Has patient ever had a dx of HCC: yes  Previous Abdominal Surgery: yes  Spontaneous Bacterial Peritonitis: no  History of Portal Vein Thrombosis: no  Transjugular Intrahepatic Portosystemic Shunt: no    New diabetes onset between last follow-up to the current follow-up: No  Did patient have any acute rejection episodes during the follow-up period: No  Post transplant malignancy: No

## 2025-07-28 NOTE — LETTER
July 30, 2025        Abdulkadir Germain Jr.  [address] [facility] [address]  Phone: [phone]  Fax: [phone]             [facility]st [facility]  [address]  [facility] [address]  Phone: [phone]   Patient: Jed Chávez   MR Number: [id number]   YOB: 1960   Date of Visit: 7/28/2025       Dear Dr. Abdulkadir Germain Jr.    Thank you for referring Jed Chávez to me for evaluation. Attached you will find relevant portions of my assessment and plan of care.    If you have questions, please do not hesitate to call me. I look forward to following Jed Chávez along with you.    Sincerely,    Ochoa Quesada MD    Enclosure    If you would like to receive this communication electronically, please contact [email or web address] or [phone] to request [employer] access.    [employer] is a tool which provides read-only access to select patient information with whom you have a relationship. Its easy to use and provides real time access to review your patients record including encounter summaries, notes, results, and demographic information.    If you feel you have received this communication in error or would no longer like to receive these types of communications, please e-mail [email or web address]

## 2025-07-31 DIAGNOSIS — Z94.4 STATUS POST LIVER TRANSPLANT: Primary | ICD-10-CM

## 2025-07-31 DIAGNOSIS — Z85.05 PERSONAL HISTORY OF MALIGNANT NEOPLASM OF LIVER: ICD-10-CM

## 2025-08-01 ENCOUNTER — PATIENT MESSAGE (OUTPATIENT)
Dept: INTERNAL MEDICINE | Facility: CLINIC | Age: 65
End: 2025-08-01
Payer: COMMERCIAL

## 2025-08-04 ENCOUNTER — PATIENT MESSAGE (OUTPATIENT)
Dept: TRANSPLANT | Facility: CLINIC | Age: 65
End: 2025-08-04
Payer: COMMERCIAL

## 2025-08-04 ENCOUNTER — HOSPITAL ENCOUNTER (OUTPATIENT)
Dept: RADIOLOGY | Facility: HOSPITAL | Age: 65
Discharge: HOME | End: 2025-08-04
Attending: INTERNAL MEDICINE
Payer: COMMERCIAL

## 2025-08-04 DIAGNOSIS — Z85.05 HISTORY OF HEPATOCELLULAR CARCINOMA: ICD-10-CM

## 2025-08-04 DIAGNOSIS — Z94.4 STATUS POST LIVER TRANSPLANT: ICD-10-CM

## 2025-08-04 PROCEDURE — 74160 CT ABDOMEN W/CONTRAST: CPT | Mod: 26,,, | Performed by: RADIOLOGY

## 2025-08-04 PROCEDURE — 25500020 PHARM REV CODE 255: Performed by: INTERNAL MEDICINE

## 2025-08-04 PROCEDURE — 74160 CT ABDOMEN W/CONTRAST: CPT | Mod: TC

## 2025-08-04 PROCEDURE — 71260 CT THORAX DX C+: CPT | Mod: 26,,, | Performed by: RADIOLOGY

## 2025-08-04 RX ADMIN — IOHEXOL 100 ML: 350 INJECTION, SOLUTION INTRAVENOUS at 10:08

## 2025-08-07 ENCOUNTER — HOSPITAL ENCOUNTER (OUTPATIENT)
Dept: RADIOLOGY | Facility: HOSPITAL | Age: 65
Discharge: HOME | End: 2025-08-07
Attending: INTERNAL MEDICINE
Payer: COMMERCIAL

## 2025-08-07 DIAGNOSIS — Z94.4 STATUS POST LIVER TRANSPLANT: ICD-10-CM

## 2025-08-07 PROCEDURE — 93976 VASCULAR STUDY: CPT | Mod: TC

## 2025-08-08 DIAGNOSIS — Z94.4 STATUS POST LIVER TRANSPLANT: ICD-10-CM

## 2025-08-08 DIAGNOSIS — K83.1 BILIARY STRICTURE OF TRANSPLANTED LIVER: ICD-10-CM

## 2025-08-08 DIAGNOSIS — T86.49 BILIARY STRICTURE OF TRANSPLANTED LIVER: ICD-10-CM

## 2025-08-09 ENCOUNTER — RESULTS FOLLOW-UP (OUTPATIENT)
Dept: TRANSPLANT | Facility: CLINIC | Age: 65
End: 2025-08-09
Payer: COMMERCIAL

## 2025-08-09 RX ORDER — URSODIOL 300 MG/1
300 CAPSULE ORAL 2 TIMES DAILY
Qty: 60 CAPSULE | Refills: 2 | Status: SHIPPED | OUTPATIENT
Start: 2025-08-09 | End: 2025-11-07

## 2025-08-25 ENCOUNTER — LAB VISIT (OUTPATIENT)
Dept: LAB | Facility: HOSPITAL | Age: 65
End: 2025-08-25
Attending: INTERNAL MEDICINE
Payer: COMMERCIAL

## 2025-08-25 ENCOUNTER — PATIENT MESSAGE (OUTPATIENT)
Dept: INTERNAL MEDICINE | Facility: CLINIC | Age: 65
End: 2025-08-25

## 2025-08-25 ENCOUNTER — OFFICE VISIT (OUTPATIENT)
Dept: INTERNAL MEDICINE | Facility: CLINIC | Age: 65
End: 2025-08-25
Payer: COMMERCIAL

## 2025-08-25 VITALS
DIASTOLIC BLOOD PRESSURE: 80 MMHG | HEART RATE: 83 BPM | WEIGHT: 189.13 LBS | OXYGEN SATURATION: 99 % | TEMPERATURE: 98 F | SYSTOLIC BLOOD PRESSURE: 132 MMHG | BODY MASS INDEX: 25.62 KG/M2 | HEIGHT: 72 IN

## 2025-08-25 DIAGNOSIS — Z48.02 VISIT FOR SUTURE REMOVAL: ICD-10-CM

## 2025-08-25 DIAGNOSIS — Z94.4 STATUS POST LIVER TRANSPLANT: ICD-10-CM

## 2025-08-25 DIAGNOSIS — I69.359 HEMIPARESIS AFFECTING NONDOMINANT SIDE AS LATE EFFECT OF CEREBROVASCULAR ACCIDENT: Chronic | ICD-10-CM

## 2025-08-25 DIAGNOSIS — Z79.899 LONG TERM CURRENT USE OF DIURETIC: Chronic | ICD-10-CM

## 2025-08-25 DIAGNOSIS — E11.59 HYPERTENSION ASSOCIATED WITH TYPE 2 DIABETES MELLITUS: Chronic | ICD-10-CM

## 2025-08-25 DIAGNOSIS — E78.5 HYPERLIPIDEMIA ASSOCIATED WITH TYPE 2 DIABETES MELLITUS: Chronic | ICD-10-CM

## 2025-08-25 DIAGNOSIS — I69.391 DYSPHAGIA FOLLOWING CEREBROVASCULAR ACCIDENT: Chronic | ICD-10-CM

## 2025-08-25 DIAGNOSIS — I69.321 DYSPHASIA AS LATE EFFECT OF CEREBROVASCULAR ACCIDENT (CVA): Chronic | ICD-10-CM

## 2025-08-25 DIAGNOSIS — Z79.4 TYPE 2 DIABETES MELLITUS WITH OTHER SPECIFIED COMPLICATION, WITH LONG-TERM CURRENT USE OF INSULIN: Chronic | ICD-10-CM

## 2025-08-25 DIAGNOSIS — E11.69 HYPERLIPIDEMIA ASSOCIATED WITH TYPE 2 DIABETES MELLITUS: Chronic | ICD-10-CM

## 2025-08-25 DIAGNOSIS — E11.69 TYPE 2 DIABETES MELLITUS WITH OTHER SPECIFIED COMPLICATION, WITH LONG-TERM CURRENT USE OF INSULIN: Chronic | ICD-10-CM

## 2025-08-25 DIAGNOSIS — I15.2 HYPERTENSION ASSOCIATED WITH TYPE 2 DIABETES MELLITUS: Chronic | ICD-10-CM

## 2025-08-25 DIAGNOSIS — L98.9 SKIN LESION: Primary | ICD-10-CM

## 2025-08-25 LAB
ABSOLUTE EOSINOPHIL (OHS): 0.07 K/UL
ABSOLUTE MONOCYTE (OHS): 0.44 K/UL (ref 0.3–1)
ABSOLUTE NEUTROPHIL COUNT (OHS): 4.12 K/UL (ref 1.8–7.7)
ALBUMIN SERPL BCP-MCNC: 3.9 G/DL (ref 3.5–5.2)
ALP SERPL-CCNC: 99 UNIT/L (ref 40–150)
ALT SERPL W/O P-5'-P-CCNC: 16 UNIT/L (ref 0–55)
ANION GAP (OHS): 11 MMOL/L (ref 8–16)
AST SERPL-CCNC: 24 UNIT/L (ref 0–50)
BASOPHILS # BLD AUTO: 0.01 K/UL
BASOPHILS NFR BLD AUTO: 0.1 %
BILIRUB SERPL-MCNC: 2.3 MG/DL (ref 0.1–1)
BUN SERPL-MCNC: 22 MG/DL (ref 8–23)
CALCIUM SERPL-MCNC: 9.4 MG/DL (ref 8.7–10.5)
CHLORIDE SERPL-SCNC: 103 MMOL/L (ref 95–110)
CO2 SERPL-SCNC: 27 MMOL/L (ref 23–29)
CREAT SERPL-MCNC: 1 MG/DL (ref 0.5–1.4)
ERYTHROCYTE [DISTWIDTH] IN BLOOD BY AUTOMATED COUNT: 15.3 % (ref 11.5–14.5)
GFR SERPLBLD CREATININE-BSD FMLA CKD-EPI: >60 ML/MIN/1.73/M2
GLUCOSE SERPL-MCNC: 164 MG/DL (ref 70–110)
HCT VFR BLD AUTO: 39.5 % (ref 40–54)
HGB BLD-MCNC: 12.5 GM/DL (ref 14–18)
IMM GRANULOCYTES # BLD AUTO: 0.01 K/UL (ref 0–0.04)
IMM GRANULOCYTES NFR BLD AUTO: 0.1 % (ref 0–0.5)
LYMPHOCYTES # BLD AUTO: 2.28 K/UL (ref 1–4.8)
MCH RBC QN AUTO: 27.1 PG (ref 27–31)
MCHC RBC AUTO-ENTMCNC: 31.6 G/DL (ref 32–36)
MCV RBC AUTO: 86 FL (ref 82–98)
NUCLEATED RBC (/100WBC) (OHS): 0 /100 WBC
PLATELET # BLD AUTO: 155 K/UL (ref 150–450)
PMV BLD AUTO: 10 FL (ref 9.2–12.9)
POTASSIUM SERPL-SCNC: 3.7 MMOL/L (ref 3.5–5.1)
PROT SERPL-MCNC: 7.2 GM/DL (ref 6–8.4)
RBC # BLD AUTO: 4.61 M/UL (ref 4.6–6.2)
RELATIVE EOSINOPHIL (OHS): 1 %
RELATIVE LYMPHOCYTE (OHS): 32.9 % (ref 18–48)
RELATIVE MONOCYTE (OHS): 6.3 % (ref 4–15)
RELATIVE NEUTROPHIL (OHS): 59.6 % (ref 38–73)
SODIUM SERPL-SCNC: 141 MMOL/L (ref 136–145)
WBC # BLD AUTO: 6.93 K/UL (ref 3.9–12.7)

## 2025-08-25 PROCEDURE — 80197 ASSAY OF TACROLIMUS: CPT

## 2025-08-25 PROCEDURE — 36415 COLL VENOUS BLD VENIPUNCTURE: CPT

## 2025-08-25 PROCEDURE — 99999 PR PBB SHADOW E&M-EST. PATIENT-LVL IV: CPT | Mod: PBBFAC,,, | Performed by: PHYSICIAN ASSISTANT

## 2025-08-25 PROCEDURE — 82040 ASSAY OF SERUM ALBUMIN: CPT

## 2025-08-25 PROCEDURE — 85025 COMPLETE CBC W/AUTO DIFF WBC: CPT

## 2025-08-25 RX ORDER — CARVEDILOL 12.5 MG/1
12.5 TABLET ORAL 2 TIMES DAILY WITH MEALS
Qty: 180 TABLET | Refills: 0 | Status: SHIPPED | OUTPATIENT
Start: 2025-08-25 | End: 2025-11-23

## 2025-08-25 RX ORDER — CHLORTHALIDONE 25 MG/1
25 TABLET ORAL DAILY
Qty: 90 TABLET | Refills: 1 | Status: SHIPPED | OUTPATIENT
Start: 2025-08-25

## 2025-08-25 RX ORDER — TIRZEPATIDE 12.5 MG/.5ML
12.5 INJECTION, SOLUTION SUBCUTANEOUS
Qty: 2 ML | Refills: 4 | Status: SHIPPED | OUTPATIENT
Start: 2025-08-25

## 2025-08-25 RX ORDER — ATORVASTATIN CALCIUM 40 MG/1
40 TABLET, FILM COATED ORAL NIGHTLY
Qty: 90 TABLET | Refills: 1 | Status: SHIPPED | OUTPATIENT
Start: 2025-08-25

## 2025-08-25 RX ORDER — VALSARTAN 320 MG/1
320 TABLET ORAL DAILY
Qty: 90 TABLET | Refills: 1 | Status: SHIPPED | OUTPATIENT
Start: 2025-08-25 | End: 2026-08-25

## 2025-08-25 RX ORDER — POTASSIUM CHLORIDE 750 MG/1
10 TABLET, EXTENDED RELEASE ORAL 2 TIMES DAILY
Qty: 180 TABLET | Refills: 1 | Status: SHIPPED | OUTPATIENT
Start: 2025-08-25

## 2025-08-26 ENCOUNTER — TELEPHONE (OUTPATIENT)
Dept: INTERNAL MEDICINE | Facility: CLINIC | Age: 65
End: 2025-08-26
Payer: COMMERCIAL

## 2025-08-26 DIAGNOSIS — I69.321 DYSPHASIA AS LATE EFFECT OF CEREBROVASCULAR ACCIDENT (CVA): Primary | Chronic | ICD-10-CM

## 2025-08-26 DIAGNOSIS — I69.359 HEMIPARESIS AFFECTING NONDOMINANT SIDE AS LATE EFFECT OF CEREBROVASCULAR ACCIDENT: Chronic | ICD-10-CM

## 2025-08-26 DIAGNOSIS — I69.391 DYSPHAGIA AS LATE EFFECT OF CEREBROVASCULAR ACCIDENT (CVA): Chronic | ICD-10-CM

## 2025-08-26 LAB — TACROLIMUS BLD-MCNC: 9 NG/ML (ref 5–15)

## 2025-08-28 ENCOUNTER — RESULTS FOLLOW-UP (OUTPATIENT)
Dept: TRANSPLANT | Facility: CLINIC | Age: 65
End: 2025-08-28
Payer: COMMERCIAL

## 2025-08-28 DIAGNOSIS — Z85.05 PERSONAL HISTORY OF MALIGNANT NEOPLASM OF LIVER: ICD-10-CM

## 2025-08-28 DIAGNOSIS — Z94.4 STATUS POST LIVER TRANSPLANT: Primary | ICD-10-CM

## 2025-08-28 DIAGNOSIS — K83.1 BILIARY STRICTURE OF TRANSPLANTED LIVER: ICD-10-CM

## 2025-08-28 DIAGNOSIS — T86.49 BILIARY STRICTURE OF TRANSPLANTED LIVER: ICD-10-CM

## (undated) DEVICE — TUBE FRAZIER 5MM 2FT SOFT TIP

## (undated) DEVICE — DRAPE T THYROID STERILE

## (undated) DEVICE — STAPLER SKIN PROXIMATE WIDE

## (undated) DEVICE — SPONGE NEURO 1/4X1/4

## (undated) DEVICE — MARKERS SPHERZ PASSIVE

## (undated) DEVICE — SET EXTENSION STERILE 30IN

## (undated) DEVICE — SUT 3-0 12-18IN SILK

## (undated) DEVICE — NDL MONOPTY BIOPSY 14GX10CM

## (undated) DEVICE — SUT 2-0 12-18IN SILK

## (undated) DEVICE — TRAY CATH FOL SIL URIMTR 16FR

## (undated) DEVICE — CONNECTOR TUBING STR 5 IN 1

## (undated) DEVICE — Device

## (undated) DEVICE — SUT SILK 3-0 SH 18IN BLACK

## (undated) DEVICE — ELECTRODE REM PLYHSV RETURN 9

## (undated) DEVICE — DIFFUSER

## (undated) DEVICE — SUT PROLENE 5-0 36IN C-1

## (undated) DEVICE — PINS SKULL ADULT MAYFIELD
Type: IMPLANTABLE DEVICE | Site: CRANIAL | Status: NON-FUNCTIONAL
Removed: 2023-10-26

## (undated) DEVICE — SEE MEDLINE ITEM 156901

## (undated) DEVICE — SUT VICRYL 2 0 CT 2

## (undated) DEVICE — DRESSING ADH ISLAND 3.6 X 14

## (undated) DEVICE — DRAPE CRANIOTOMY T SURG STRL

## (undated) DEVICE — MARKER SKIN STND TIP BLUE BARR

## (undated) DEVICE — SUT PROLENE 6-0 BV-1 30IN

## (undated) DEVICE — FIBRILLAR ABS HEMOSTAT 4X4

## (undated) DEVICE — SET IV ADMIN 15DROP 3 CARESITE

## (undated) DEVICE — ELECTRODE PAD DEFIB STERILE

## (undated) DEVICE — DRESSING SURGICAL 1X1

## (undated) DEVICE — CARTRIDGE OIL

## (undated) DEVICE — SPONGE PATTY SURGICAL .5X3IN

## (undated) DEVICE — KIT SAHARA DRAPE DRAW/LIFT

## (undated) DEVICE — SUT PROLENE 4-0 SH BLU 36IN

## (undated) DEVICE — SUT 4-0 12-30IN SILK

## (undated) DEVICE — SUT 4/0 18IN NUROLON BLK B

## (undated) DEVICE — SUT PROLENE 3-0 SH DA 36 BL

## (undated) DEVICE — GOWN SURGICAL X-LARGE

## (undated) DEVICE — TOWEL OR XRAY WHITE 17X26IN

## (undated) DEVICE — DRAPE CORETEMP FLD WRM 56X62IN

## (undated) DEVICE — TOWEL OR DISP STRL BLUE 4/PK

## (undated) DEVICE — TRAY NEURO OMC

## (undated) DEVICE — HEMOSTAT SURGICEL 4X8IN

## (undated) DEVICE — DRESSING AQUACEL SACRAL 9 X 9

## (undated) DEVICE — SUT ETHILON 3-0 PS2 18 BLK

## (undated) DEVICE — DRESSING SURGICAL 1X3

## (undated) DEVICE — SUT SILK 0 STRANDS 30IN BLK

## (undated) DEVICE — ROUTER TAPERED 2.3MM

## (undated) DEVICE — PAD K-THERMIA 24IN X 60IN

## (undated) DEVICE — BUR BONE CUT MICRO TPS 3X3.8MM

## (undated) DEVICE — SUT 1 36IN PDS II VIO MONO

## (undated) DEVICE — SUT SILK 3-0 STRANDS 30IN

## (undated) DEVICE — DECANTER FLUID TRNSF WHITE 9IN

## (undated) DEVICE — TAPE SURG MEDIPORE 6X72IN

## (undated) DEVICE — TRAY SKIN SCRUB WET PREMIUM

## (undated) DEVICE — SOL NS 1000CC

## (undated) DEVICE — BLADE SURG CARBON STEEL SZ11

## (undated) DEVICE — DRAPE OPMI STERILE

## (undated) DEVICE — HEMOSTAT SURGICEL NU-KNIT 6X9

## (undated) DEVICE — TIP YANKAUERS BULB NO VENT

## (undated) DEVICE — SUT VICRYL PLUS 3-0 SH 18IN

## (undated) DEVICE — CORD BIPOLAR 12 FOOT

## (undated) DEVICE — CLIP SPRING 6MM

## (undated) DEVICE — WIPE ESENTA BARR STNG FREE 3ML

## (undated) DEVICE — BOOT AIR FLUID HEEL ADLT STD

## (undated) DEVICE — SUT PDS BV 6-0

## (undated) DEVICE — BLADE 4 INCH EDGE UN-INS

## (undated) DEVICE — SUT 4-0 12-18IN SILK BLACK

## (undated) DEVICE — RUBBERBAND STERILE 3X1/8IN

## (undated) DEVICE — SUT SILK 2-0 STRANDS 30IN

## (undated) DEVICE — EVACUATOR WOUND BULB 100CC

## (undated) DEVICE — HOOK STAY ELAS 5MM 8EA/PK

## (undated) DEVICE — BIT DRILL WIRE PASS 1.0MM

## (undated) DEVICE — HANDSET ARGON PLUS

## (undated) DEVICE — DRAIN CHANNEL ROUND 19FR

## (undated) DEVICE — CATH URETHRAL RED RUBBER 18FR

## (undated) DEVICE — CONTAINER SPECIMEN STRL 4OZ

## (undated) DEVICE — DRESSING SURGICAL 1/2X1/2